# Patient Record
Sex: FEMALE | Race: BLACK OR AFRICAN AMERICAN | Employment: UNEMPLOYED | URBAN - METROPOLITAN AREA
[De-identification: names, ages, dates, MRNs, and addresses within clinical notes are randomized per-mention and may not be internally consistent; named-entity substitution may affect disease eponyms.]

---

## 2017-01-03 ENCOUNTER — HOSPITAL ENCOUNTER (EMERGENCY)
Age: 52
Discharge: HOME | End: 2017-01-03
Attending: EMERGENCY MEDICINE | Admitting: EMERGENCY MEDICINE
Payer: MEDICAID

## 2017-01-03 VITALS
HEIGHT: 64 IN | HEART RATE: 79 BPM | RESPIRATION RATE: 16 BRPM | BODY MASS INDEX: 39.22 KG/M2 | WEIGHT: 229.72 LBS | OXYGEN SATURATION: 100 % | TEMPERATURE: 98.4 F | DIASTOLIC BLOOD PRESSURE: 102 MMHG | SYSTOLIC BLOOD PRESSURE: 169 MMHG

## 2017-01-03 DIAGNOSIS — G89.29 CHRONIC RIGHT SHOULDER PAIN: Primary | ICD-10-CM

## 2017-01-03 DIAGNOSIS — M25.511 CHRONIC RIGHT SHOULDER PAIN: Primary | ICD-10-CM

## 2017-01-03 PROCEDURE — 74011250636 HC RX REV CODE- 250/636: Performed by: PHYSICIAN ASSISTANT

## 2017-01-03 PROCEDURE — 99282 EMERGENCY DEPT VISIT SF MDM: CPT

## 2017-01-03 PROCEDURE — 96372 THER/PROPH/DIAG INJ SC/IM: CPT

## 2017-01-03 RX ORDER — OXYCODONE AND ACETAMINOPHEN 5; 325 MG/1; MG/1
1 TABLET ORAL
Qty: 6 TAB | Refills: 0 | Status: SHIPPED | OUTPATIENT
Start: 2017-01-03 | End: 2017-01-06

## 2017-01-03 RX ORDER — KETOROLAC TROMETHAMINE 30 MG/ML
15 INJECTION, SOLUTION INTRAMUSCULAR; INTRAVENOUS
Status: COMPLETED | OUTPATIENT
Start: 2017-01-03 | End: 2017-01-03

## 2017-01-03 RX ADMIN — KETOROLAC TROMETHAMINE 15 MG: 30 INJECTION, SOLUTION INTRAMUSCULAR at 23:33

## 2017-01-04 RX ORDER — GABAPENTIN 600 MG/1
600 TABLET ORAL 3 TIMES DAILY
Qty: 90 TAB | Refills: 5 | Status: SHIPPED | OUTPATIENT
Start: 2017-01-04 | End: 2017-02-22 | Stop reason: SDUPTHER
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
## 2017-01-04 NOTE — TELEPHONE ENCOUNTER
Requested Prescriptions     Pending Prescriptions Disp Refills    gabapentin (NEURONTIN) 600 mg tablet 90 Tab 5     Sig: Take 1 Tab by mouth three (3) times daily.  Start 2 per day increase as tolerated

## 2017-01-04 NOTE — ED NOTES
Discharge instructions given to patient by WILLIS Sullivan. Verbalized understanding of instructions. Patient discharged without difficulty. Patient discharged in stable condition via ambulation accompanied by self.

## 2017-01-04 NOTE — DISCHARGE INSTRUCTIONS
Shoulder Pain: Care Instructions  Your Care Instructions    You can hurt your shoulder by using it too much during an activity, such as fishing or baseball. It can also happen as part of the everyday wear and tear of getting older. Shoulder injuries can be slow to heal, but your shoulder should get better with time. Your doctor may recommend a sling to rest your shoulder. If you have injured your shoulder, you may need testing and treatment. Follow-up care is a key part of your treatment and safety. Be sure to make and go to all appointments, and call your doctor if you are having problems. It's also a good idea to know your test results and keep a list of the medicines you take. How can you care for yourself at home? · Take pain medicines exactly as directed. ¨ If the doctor gave you a prescription medicine for pain, take it as prescribed. ¨ If you are not taking a prescription pain medicine, ask your doctor if you can take an over-the-counter medicine. ¨ Do not take two or more pain medicines at the same time unless the doctor told you to. Many pain medicines contain acetaminophen, which is Tylenol. Too much acetaminophen (Tylenol) can be harmful. · If your doctor recommends that you wear a sling, use it as directed. Do not take it off before your doctor tells you to. · Put ice or a cold pack on the sore area for 10 to 20 minutes at a time. Put a thin cloth between the ice and your skin. · If there is no swelling, you can put moist heat, a heating pad, or a warm cloth on your shoulder. Some doctors suggest alternating between hot and cold. · Rest your shoulder for a few days. If your doctor recommends it, you can then begin gentle exercise of the shoulder, but do not lift anything heavy. When should you call for help? Call 911 anytime you think you may need emergency care. For example, call if:  · You have chest pain or pressure. This may occur with:  ¨ Sweating. ¨ Shortness of breath.   ¨ Nausea or vomiting. ¨ Pain that spreads from the chest to the neck, jaw, or one or both shoulders or arms. ¨ Dizziness or lightheadedness. ¨ A fast or uneven pulse. After calling 911, chew 1 adult-strength aspirin. Wait for an ambulance. Do not try to drive yourself. · Your arm or hand is cool or pale or changes color. Call your doctor now or seek immediate medical care if:  · You have signs of infection, such as:  ¨ Increased pain, swelling, warmth, or redness in your shoulder. ¨ Red streaks leading from a place on your shoulder. ¨ Pus draining from an area of your shoulder. ¨ Swollen lymph nodes in your neck, armpits, or groin. ¨ A fever. Watch closely for changes in your health, and be sure to contact your doctor if:  · You cannot use your shoulder. · Your shoulder does not get better as expected. Where can you learn more? Go to http://lizbeth-efraín.info/. Enter V704 in the search box to learn more about \"Shoulder Pain: Care Instructions. \"  Current as of: May 23, 2016  Content Version: 11.1  © 0046-3065 [employer]. Care instructions adapted under license by [employer] (which disclaims liability or warranty for this information). If you have questions about a medical condition or this instruction, always ask your healthcare professional. [place] [address] any warranty or liability for your use of this information.

## 2017-01-04 NOTE — TELEPHONE ENCOUNTER
Requested Prescriptions     Pending Prescriptions Disp Refills    gabapentin (NEURONTIN) 600 mg tablet 90 Tab 5     Sig: Take 1 Tab by mouth three (3) times daily. Start 2 per day increase as tolerated     Last office visit:  Last med refill date:  8/15/16  TID - #90 x 5 refills  Changes to med during last visit:  None    Kenyatta Juanjo [facility]ED[facility]  Female, 49 y.o., 1965  Weight:   229 lb 11.5 oz (104.2 kg)  Phone:   21   PCP:   Simeon Larios MD  MRN:   [id number]  MyChart:   Pending  Next Appt:    Today  Future/Past Appointments      Future Appointments:  1/4/2017 3:40 PM Celso Myrick MD [address]  3/14/2017 10:30 AM Simeon Larios MD [facility] JACQUELINE Sandoval      Last Appointment With Me:  12/30/2016      Last Appointment My Department:  12/30/2016
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
## 2017-01-05 NOTE — TELEPHONE ENCOUNTER
Requested Prescriptions     Pending Prescriptions Disp Refills    traMADol (ULTRAM) 50 mg tablet 100 Tab 0     Sig: Take 1 Tab by mouth every six (6) hours as needed for Pain. Max Daily Amount: 200 mg. Appointment needed to refill this medication again. Patient called saying that her knees and legs are in a lot of pain and she is out of her pain meds.

## 2017-01-05 NOTE — TELEPHONE ENCOUNTER
Requested Prescriptions     Pending Prescriptions Disp Refills    traMADol (ULTRAM) 50 mg tablet 100 Tab 0     Sig: Take 1 Tab by mouth every six (6) hours as needed for Pain. Max Daily Amount: 200 mg. Appointment needed to refill this medication again.      Last office visit:12/30/16  Last med refill date:    Changes to med during last visit:

## 2017-01-06 ENCOUNTER — DOCUMENTATION ONLY (OUTPATIENT)
Dept: INTERNAL MEDICINE CLINIC | Age: 52
End: 2017-01-06

## 2017-01-06 RX ORDER — TRAMADOL HYDROCHLORIDE 50 MG/1
50 TABLET ORAL
Qty: 100 TAB | Refills: 0 | Status: SHIPPED | OUTPATIENT
Start: 2017-01-06 | End: 2017-02-22 | Stop reason: ALTCHOICE

## 2017-01-06 NOTE — PROGRESS NOTES
Per verbal order of Tamy Orourke MD called in written prescription of Tramadol 50mg #100 no refills to [address] [place], [place]  4/8/8502  9:89 PM

## 2017-01-30 ENCOUNTER — TELEPHONE (OUTPATIENT)
Dept: NEUROLOGY | Age: 52
End: 2017-01-30

## 2017-01-30 NOTE — TELEPHONE ENCOUNTER
Spoke to patient who was a little confused as to what she needed.    She has an apppointment with another  physicain today   Let her know we will gladly send over any reports requested by that physician

## 2017-02-13 ENCOUNTER — TELEPHONE (OUTPATIENT)
Dept: INTERNAL MEDICINE CLINIC | Age: 52
End: 2017-02-13

## 2017-02-13 ENCOUNTER — OFFICE VISIT (OUTPATIENT)
Dept: INTERNAL MEDICINE CLINIC | Age: 52
End: 2017-02-13

## 2017-02-13 VITALS
OXYGEN SATURATION: 100 % | BODY MASS INDEX: 38.41 KG/M2 | HEART RATE: 89 BPM | WEIGHT: 225 LBS | SYSTOLIC BLOOD PRESSURE: 140 MMHG | RESPIRATION RATE: 16 BRPM | TEMPERATURE: 98.9 F | DIASTOLIC BLOOD PRESSURE: 82 MMHG | HEIGHT: 64 IN

## 2017-02-13 DIAGNOSIS — I10 ESSENTIAL HYPERTENSION: ICD-10-CM

## 2017-02-13 DIAGNOSIS — F11.20 NARCOTIC DEPENDENCE, EPISODIC USE (HCC): ICD-10-CM

## 2017-02-13 DIAGNOSIS — L84 CORNS AND CALLOSITIES: ICD-10-CM

## 2017-02-13 DIAGNOSIS — E11.42 DIABETIC POLYNEUROPATHY ASSOCIATED WITH TYPE 2 DIABETES MELLITUS (HCC): Primary | ICD-10-CM

## 2017-02-13 RX ORDER — OXYCODONE AND ACETAMINOPHEN 5; 325 MG/1; MG/1
1 TABLET ORAL
Qty: 30 TAB | Refills: 0 | Status: SHIPPED | OUTPATIENT
Start: 2017-02-13 | End: 2017-02-22 | Stop reason: SDUPTHER

## 2017-02-13 RX ORDER — NORTRIPTYLINE HYDROCHLORIDE 25 MG/1
25 CAPSULE ORAL
Qty: 30 CAP | Refills: 5 | Status: SHIPPED | OUTPATIENT
Start: 2017-02-13 | End: 2017-02-28 | Stop reason: SDUPTHER

## 2017-02-13 NOTE — PROGRESS NOTES
Chief Complaint   Patient presents with    Leg Pain     leg and foot pain L/R     I have reviewed the patient's medical history in detail and updated the computerized patient record. Health Maintenance reviewed. 1. Have you been to the ER, urgent care clinic since your last visit? Hospitalized since your last visit?no    2. Have you seen or consulted any other health care providers outside of the [address] [place] [address] since your last visit? Include any pap smears or colon screening.  no

## 2017-02-13 NOTE — LETTER
2/22/2017 9:03 AM 
 
Ms. Carson Henao [facility] [address] [id number] Dear Carson Henao: 
 
Diabetes is not as good as it was, but it isn't too bad currently. For now no change in your medications. Please follow the diabetic diet carefully. Reduce starchy foods and sweet foods. Will need to re-check this levels in a few months. If diabetes worsens will need to increase your medications. Please find your most recent results below. Resulted Orders COMPLIANCE DRUG SCREEN/PRESCRIPTION MONITORING Result Value Ref Range Summary FINAL Comment:  
   ==================================================================== 
[facility] COMP DRUG ANALYSIS,UR 
==================================================================== Test                             Result       Flag       Units Drug Present and Declared for Prescription Verification Noroxycodone                   59           EXPECTED   ng/mg creat Noroxycodone is an expected metabolite of oxycodone. Sources of 
   oxycodone include scheduled prescription medications. Tramadol                       PRESENT      EXPECTED 
  O-Desmethyltramadol            PRESENT      EXPECTED Source of tramadol is a prescription medication. O-desmethyltramadol is an expected metabolite of tramadol. Gabapentin                     PRESENT      EXPECTED Atenolol                       PRESENT      EXPECTED Drug Absent but Declared for Prescription Verification Oxycodone                      Not Detected UNEXPECTED ng/mg creat Oxycodone is almost always present in pat 
ients taking this drug 
   consistently. Absence of oxycodone could be due to lapse of time 
   since the last dose or unusual pharmacokinetics (rapid 
   metabolism). Acetaminophen                  Not Detected UNEXPECTED Acetaminophen, as indicated in the declared medication list, is 
   not always detected even when used as directed. Salicylate                     Not Detected UNEXPECTED Aspirin, as indicated in the declared medication list, is not 
   always detected even when used as directed. 
==================================================================== Test                      Result    Flag   Units      Ref Range Creatinine              90               mg/dL      >=20 
==================================================================== Declared Medications: The flagging and interpretation on this report are based on the 
 following declared medications. Unexpected results may arise from 
 inaccuracies in the declared medications. **Note: The  
testing scope of this panel includes these medications: 
 Atenolol (Tenormin) Gabapentin (Neurontin) Oxycodone (Percocet) Tramadol (Ultram) **Note: The testing scope of this panel does not include small to 
 moderate amounts of these reported medications: 
 Acetaminophen (Percocet) Aspirin (Aspirin 81) **Note: The testing scope of this panel does not include following 
 reported medications: 
 Metformin Metformin (Glucophage) 
==================================================================== For clinical consultation, please call [phone]. 
==================================================================== Narrative Performed at:  [address] [facility], [address], [address] : Radha Paniagua MD, Phone:  [phone] CBC W/O DIFF Result Value Ref Range WBC 6.9 3.4 - 10.8 x10E3/uL  
 RBC 4.51 3.77 - 5.28 x10E6/uL HGB 11.7 11.1 - 15.9 g/dL HCT 34.9 34.0 - 46.6 % MCV 77 (L) 79 - 97 fL  
 MCH 25.9 (L) 26.6 - 33.0 pg  
 MCHC 33.5 31.5 - 35.7 g/dL  
 RDW 19.3 (H) 12.3 - 15.4 % PLATELET 933 814 - 817 x10E3/uL Narrative Performed at:  [address]  [id number] : Reena South MD, Phone:  [phone] METABOLIC PANEL, COMPREHENSIVE Result Value Ref Range Glucose 199 (H) 65 - 99 mg/dL BUN 17 6 - 24 mg/dL Creatinine 1.16 (H) 0.57 - 1.00 mg/dL GFR est non-AA 55 (L) >59 mL/min/1.73 GFR est AA 63 >59 mL/min/1.73  
 BUN/Creatinine ratio 15 9 - 23 Sodium 143 134 - 144 mmol/L Potassium 4.4 3.5 - 5.2 mmol/L Chloride 105 96 - 106 mmol/L  
 CO2 22 18 - 29 mmol/L Calcium 9.9 8.7 - 10.2 mg/dL Protein, total 7.3 6.0 - 8.5 g/dL Albumin 4.4 3.5 - 5.5 g/dL GLOBULIN, TOTAL 2.9 1.5 - 4.5 g/dL A-G Ratio 1.5 1.1 - 2.5 Comment: **Effective March 13, 2017 the reference interval** 
  for A/G Ratio will be changing to: Age                Male          Female 0 -  7 days       1.1 - 2.3       1.1 - 2.3 
          8 - 30 days       1.2 - 2.8       1.2 - 2.8 
          1 -  6 months     1.3 - 3.6       1.3 - 3.6 
   7 months -  5 years      1.5 - 2.6       1.5 - 2.6 
             > 5 years      1.2 - 2.2       1.2 - 2.2 Bilirubin, total 0.4 0.0 - 1.2 mg/dL Alk. phosphatase 121 (H) 39 - 117 IU/L  
 AST (SGOT) 8 0 - 40 IU/L  
 ALT (SGPT) 7 0 - 32 IU/L Narrative Performed at:  [address]  [id number] : Reena South MD, Phone:  [phone] HEMOGLOBIN A1C WITH EAG Result Value Ref Range Hemoglobin A1c 7.4 (H) 4.8 - 5.6 % Comment:  
            Pre-diabetes: 5.7 - 6.4 Diabetes: >6.4 Glycemic control for adults with diabetes: <7.0 Estimated average glucose 166 mg/dL Narrative Performed at:  [address]  [id number] : Reena South MD, Phone:  [phone] CKD REPORT Result Value Ref Range Interpretation Note Comment:  
   Supplement report is available. Narrative Performed at:  [address] [place] [address]  [id number] : Arturo Shah PhD, Phone:  [phone] DIABETES PATIENT EDUCATION Result Value Ref Range PDF Image Not applicable Narrative Performed at:  [address] [place], [place] [id number] : Arturo Shah PhD, Phone:  [phone] Please call me if you have any questions: [phone] Sincerely, Bryn Nickerson MD

## 2017-02-13 NOTE — MR AVS SNAPSHOT
Visit Information Date & Time Provider Department Dept. Phone Encounter #  
 2/13/2017 10:10 AM Chrissie Groves MD [facility] Primary Care [phone] [id number] Follow-up Instructions Return in about 4 weeks (around 3/13/2017) for routine follow up. Your Appointments 3/14/2017 10:30 AM  
ROUTINE CARE with Chrissie Groves MD  
[place] [address] ([facility]) Appt Note: f/u $0pb $0cp [facility] 11/23/16; f/u $0pb $0cp [facility] 11/23/16  
 [address] [facility] [address]  
[phone]  
  
   
 [address] [facility] [address] Upcoming Health Maintenance Date Due Pneumococcal 19-64 Medium Risk (1 of 1 - PPSV23) 12/28/1985 DTaP/Tdap/Td series (1 - Tdap) 12/28/1987 BREAST CANCER SCRN MAMMOGRAM 12/28/2016 FOBT Q 1 YEAR AGE 50-75 12/28/2016 PAP AKA CERVICAL CYTOLOGY 1/14/2017 HEMOGLOBIN A1C Q6M 2/15/2017 LIPID PANEL Q1 3/25/2017 MICROALBUMIN Q1 8/15/2017 EYE EXAM RETINAL OR DILATED Q1 10/25/2017 FOOT EXAM Q1 2/13/2018 Allergies as of 2/13/2017  Review Complete On: 2/13/2017 By: Rebecca Zheng LPN Severity Noted Reaction Type Reactions Lortab [Hydrocodone-acetaminophen] High 08/11/2013   Intolerance Nausea and Vomiting Cymbalta [Duloxetine]  10/04/2016    Diarrhea Current Immunizations  Reviewed on 12/13/2016 No immunizations on file. Not reviewed this visit You Were Diagnosed With   
  
 Codes Comments Diabetic polyneuropathy associated with type 2 diabetes mellitus ([facility] [address] [address]    -  Primary ICD-10-CM: E11.42 
ICD-9-CM: 250.60, 357.2 Narcotic dependence, episodic use ([facility] [address] [address]     ICD-10-CM: P54.86 ICD-9-CM: 304.92 Essential hypertension     ICD-10-CM: I10 
ICD-9-CM: 401.9 Corns and callosities     ICD-10-CM: L84 
ICD-9-CM: 909 Vitals BP Pulse Temp Resp Height(growth percentile) Weight(growth percentile) 140/82 89 98.9 °F (37.2 °C) (Oral) 16 5' 4\" (1.626 m) 225 lb (102.1 kg) SpO2 BMI OB Status Smoking Status 100% 38.62 kg/m2 Having regular periods Never Smoker Vitals History BMI and BSA Data Body Mass Index Body Surface Area  
 38.62 kg/m 2 2.15 m 2 Preferred Pharmacy Pharmacy Name Phone [facility] [address] [facility], [address] [facility] [phone] Your Updated Medication List  
  
   
This list is accurate as of: 2/13/17 12:20 PM.  Always use your most recent med list.  
  
  
  
  
 aspirin delayed-release 81 mg tablet Take  by mouth daily. atenolol 100 mg tablet Commonly known as:  TENORMIN Take 1 Tab by mouth daily. TAKE ONE TABLET BY MOUTH ONCE DAILY  
  
 gabapentin 600 mg tablet Commonly known as:  NEURONTIN Take 1 Tab by mouth three (3) times daily. * metFORMIN 1,000 mg tablet Commonly known as:  GLUCOPHAGE  
TAKE 1 TABLET BY MOUTH TWO (2) TIMES DAILY WITH MEALS  
  
 * metFORMIN 1,000 mg tablet Commonly known as:  GLUCOPHAGE  
TAKE ONE TABLET BY MOUTH TWICE DAILY WITH MEALS  
  
 nortriptyline 25 mg capsule Commonly known as:  PAMELOR Take 1 Cap by mouth nightly. oxyCODONE-acetaminophen 5-325 mg per tablet Commonly known as:  PERCOCET Take 1 Tab by mouth every eight (8) hours as needed for Pain. Max Daily Amount: 3 Tabs. traMADol 50 mg tablet Commonly known as:  ULTRAM  
Take 1 Tab by mouth every six (6) hours as needed for Pain. Max Daily Amount: 200 mg. Appointment needed to refill this medication again. * Notice: This list has 2 medication(s) that are the same as other medications prescribed for you. Read the directions carefully, and ask your doctor or other care provider to review them with you. Prescriptions Printed Refills  
 oxyCODONE-acetaminophen (PERCOCET) 5-325 mg per tablet 0 Sig: Take 1 Tab by mouth every eight (8) hours as needed for Pain.  Max Daily Amount: 3 Tabs. Class: Print Route: Oral  
  
Prescriptions Sent to Pharmacy Refills  
 nortriptyline (PAMELOR) 25 mg capsule 5 Sig: Take 1 Cap by mouth nightly. Class: Normal  
 Pharmacy: [address] [facility], [address] [facility] Ph #: [phone] Route: Oral  
  
We Performed the Following AMB SUPPLY ORDER [[id number] Custom] Comments:  
 Suboxone therapy Dr.Antony Cathy Charlton [phone] 
Intelleflex/suboxone CBC W/O DIFF [99869 CPT(R)] [address] MONITORING [[id number] Custom] HEMOGLOBIN A1C WITH EAG [58115 CPT(R)] METABOLIC PANEL, COMPREHENSIVE [11587 CPT(R)] REFERRAL TO PAIN CLINIC [VAA84 Custom] Comments:  
 Chronic pain from neuropathy diabetes, has seen neurology, percocet helps. REFERRAL TO PODIATRY [REF90 Custom] Comments:  
 Corns and neuropathy Follow-up Instructions Return in about 4 weeks (around 3/13/2017) for routine follow up. Referral Information Referral ID Referred By Referred To  
  
 [id number] Andrew MIGUEL [address], [address] Visits Status Start Date End Date 1 New Request 2/13/17 2/13/18 If your referral has a status of pending review or denied, additional information will be sent to support the outcome of this decision. Referral ID Referred By Referred To  
 [id number] [facility]  
   [address] [facility] [address] [place], [address] [facility] Visits Status Start Date End Date 1 New Request 2/13/17 2/13/18 If your referral has a status of pending review or denied, additional information will be sent to support the outcome of this decision. Introducing [facility]!    
 Jodee Meeks introduces [employer] patient portal. Now you can access parts of your medical record, email your doctor's office, and request medication refills online. 1. In your internet browser, go to [email or web address] 2. Click on the First Time User? Click Here link in the Sign In box. You will see the New Member Sign Up page. 3. Enter your [employer] Access Code exactly as it appears below. You will not need to use this code after youve completed the sign-up process. If you do not sign up before the expiration date, you must request a new code. · [employer] Access Code: [email or web address] Expires: 3/13/2017  2:28 PM 
 
4. Enter the last four digits of your Social Security Number (xxxx) and Date of Birth (mm/dd/yyyy) as indicated and click Submit. You will be taken to the next sign-up page. 5. Create a [employer] ID. This will be your [employer] login ID and cannot be changed, so think of one that is secure and easy to remember. 6. Create a [employer] password. You can change your password at any time. 7. Enter your Password Reset Question and Answer. This can be used at a later time if you forget your password. 8. Enter your e-mail address. You will receive e-mail notification when new information is available in [address] 9. Click Sign Up. You can now view and download portions of your medical record. 10. Click the Download Summary menu link to download a portable copy of your medical information. If you have questions, please visit the Frequently Asked Questions section of the [employer] website. Remember, [employer] is NOT to be used for urgent needs. For medical emergencies, dial 911. Now available from your iPhone and Android! Please provide this summary of care documentation to your next provider. Your primary care clinician is listed as Antonella Velásquez. If you have any questions after today's visit, please call [phone].

## 2017-02-13 NOTE — LETTER
AGREEMENT for controlled medication treatment Carolina Desai, have agreed to a course of treatment that includes taking controlled medication. For this treatment I designate _____________________________________ as the [facility] Provider.  The purpose of this agreement is to prevent misunderstandings about controlled medications I may be taking for treatment, and to comply with applicable laws. I understand this agreement is essential to the trust and confidence necessary in a provider-patient relationship and that the designated provider will treat me in accordance with the statements below. As part of my treatment I agree to the followin.  USE 
a. I will take the controlled medication as instructed by the designated provider and avoid improper use of controlled medications. b.   I will not share, sell or trade controlled medication with anyone as this is a criminal offense.  
c.   I will not use any illegal controlled substance, including marijuana, cocaine, etc. as this is a criminal offense. 2.  PROVIDERS 
a. I will only obtain controlled medication from the designated provider. b.   I will not attempt to obtain the same or similar controlled medications, such as opioid pain medication, stimulants, anti-anxiety or hypnotics from any other provider as this is a criminal offense. 
c.   I will inform the designated provider about all other licensed professionals providing medical care to me and authorize communication between all providers to coordinate care, particularly prescribing or dispensing of controlled medications. 3.  PHARMACY 
a.   I will only fill controlled medication prescriptions at the approved pharmacy as listed below. 4.  OFFICE VISITS 
a. I agree to attend scheduled office visit appointments. b.   I am aware my office visits may be monthly or as determined necessary by the designated provider. c.   I will communicate fully with the designated provider about the character and intensity of my symptoms, the effect of the symptoms on my daily life, and how well the controlled medication is helping to relieve the cause of my symptoms. 5.  REFILLS OR CALL-IN PRESCRIPTIONS OF CONTROLLED MEDICATIONS 
a. I agree that refills of my prescriptions for controlled medications will be made at the time of an office visit during regular office hours. No refills will be available during evenings or on weekends. Abusive or inappropriate behavior related to medication refills will not be tolerated. b.   I will not call the office repeatedly to inquire about my controlled medication. I understand that medications will be written on the due date and not before. Calling the office repeatedly will be considered harassment, and I may be discharged from the practice. c.   Controlled medications may not be called in for refill, but doing so is at the discretion of the designated provider. d.   I am aware that only I must  prescriptions for controlled medications at the office but the designated provider may allow a designee to  the prescription from the office under very specific circumstance that may develop. 6.  TOXICOLOGY SCREENING 
a. I agree to random urine toxicology screenings in order to comply with government and [address] [place] [address] regulations. I understand that I will be financially responsible for any charges incurred by this office, Aiden Ruelas of [facility], [employer], or [facility] for the urine toxicology screening, which may not be covered by my insurance. Failure to do so will be considered non-compliance and I may be discharged. b. In some cases an oral swab or hair sample may be substituted for a urine screen. This is at the discretion of the designated provider. I understand that I will be financially responsible for any charges incurred as well. c.   I understand that if the urine toxicology does not show my medications prescribed to me by the designated provider, or it shows any illegal substance or any other medications NOT prescribed by the designated providers, I may be discharged from this practice at the discretion of the designated provider and no further controlled medications will be prescribed or follow-up appointments scheduled. Also, if any illegal substances are detected on the urine toxicology, this information may be provided to local law enforcement. 7. PILL COUNTS 
a. I am aware I may be called at any time to come into the office for a count of all my remaining controlled medications in order to help the designated provider understand the rate at which I use my controlled medications and to more effectively adjust dosage. b. I agree that I will use my medication at a rate NO greater than the prescribed rate and that use of my medication at a greater rate will result in my being without medication for the period of time until next expected due date. c. I will bring in the containers with the medication prescribed by all providers, including the designated provider, to each office visit even if there is no medication remaining. All controlled medication will be in the original containers from the pharmacy for each medication. Failure to do so will be considered non-compliance and I may be discharged from the practice. 8.  LOSS OR THEFT OF MEDICATION 
a. I will safeguard my controlled medication from loss or theft. b.   Lost or stolen controlled medication may not be replaced. This includes a prescription that has not yet been filled at the pharmacy. c.   In the event my controlled medications are stolen or lost, I will notify the designated providers office immediately.   If such event occurs during the night, weekend or holiday, I will leave a detailed message on the answering machine or answering service at the number listed above. 
d.   I will file and produce an official police report for any effort to replace controlled medications prescribed. 9.  AGENCY COLLABORATION I authorize the designated provider and the authorized pharmacy/pharmacist to cooperate fully with any city, state, or federal law enforcement agency in the investigation of any possible misuse, sale or other diversion of my controlled medication. 10.  TREATMENT I understand that if I violate any of the conditions, my controlled medication and/or treatment will be terminated. If the violation involves obtaining controlled substances and/or dangerous drugs from another source, the incident may be reported to other medical facilities and authorities, including law enforcement. In this case, the designated provider may taper off the medication over a period of several days, as necessary, to avoid withdrawal symptoms or will suggest alternate treatment facilities. Also, a drug dependence treatment program may be recommended. 11.  AGREEMENT I agree to follow these guidelines that have been fully explained to me. All of my questions and concerns regarding treatment have been adequately answered. A copy of this document has been given to me. I agree to use ______________________________ Authorized Pharmacy located at _________________________________________________________________ Telephone ________________________________for filling ALL controlled medication prescriptions. This agreement is entered into on 2/13/2017 Patient signature__________________________________________________________ Legal Guardian signature___________________________________________________ Provider signature_________________________________________________________ Witness signature_________________________________________________________

## 2017-02-14 LAB
ALBUMIN SERPL-MCNC: 4.4 G/DL (ref 3.5–5.5)
ALBUMIN/GLOB SERPL: 1.5 {RATIO} (ref 1.1–2.5)
ALP SERPL-CCNC: 121 IU/L (ref 39–117)
ALT SERPL-CCNC: 7 IU/L (ref 0–32)
AST SERPL-CCNC: 8 IU/L (ref 0–40)
BILIRUB SERPL-MCNC: 0.4 MG/DL (ref 0–1.2)
BUN SERPL-MCNC: 17 MG/DL (ref 6–24)
BUN/CREAT SERPL: 15 (ref 9–23)
CALCIUM SERPL-MCNC: 9.9 MG/DL (ref 8.7–10.2)
CHLORIDE SERPL-SCNC: 105 MMOL/L (ref 96–106)
CO2 SERPL-SCNC: 22 MMOL/L (ref 18–29)
CREAT SERPL-MCNC: 1.16 MG/DL (ref 0.57–1)
ERYTHROCYTE [DISTWIDTH] IN BLOOD BY AUTOMATED COUNT: 19.3 % (ref 12.3–15.4)
EST. AVERAGE GLUCOSE BLD GHB EST-MCNC: 166 MG/DL
GLOBULIN SER CALC-MCNC: 2.9 G/DL (ref 1.5–4.5)
GLUCOSE SERPL-MCNC: 199 MG/DL (ref 65–99)
HBA1C MFR BLD: 7.4 % (ref 4.8–5.6)
HCT VFR BLD AUTO: 34.9 % (ref 34–46.6)
HGB BLD-MCNC: 11.7 G/DL (ref 11.1–15.9)
INTERPRETATION: NORMAL
Lab: NORMAL
MCH RBC QN AUTO: 25.9 PG (ref 26.6–33)
MCHC RBC AUTO-ENTMCNC: 33.5 G/DL (ref 31.5–35.7)
MCV RBC AUTO: 77 FL (ref 79–97)
PLATELET # BLD AUTO: 184 X10E3/UL (ref 150–379)
POTASSIUM SERPL-SCNC: 4.4 MMOL/L (ref 3.5–5.2)
PROT SERPL-MCNC: 7.3 G/DL (ref 6–8.5)
RBC # BLD AUTO: 4.51 X10E6/UL (ref 3.77–5.28)
SODIUM SERPL-SCNC: 143 MMOL/L (ref 134–144)
WBC # BLD AUTO: 6.9 X10E3/UL (ref 3.4–10.8)

## 2017-02-14 NOTE — PROGRESS NOTES
HISTORY OF PRESENT ILLNESS  Vance Castro is a 49 y.o. female. Leg Pain    The history is provided by the patient. This is a chronic problem. Episode onset: years. The problem occurs constantly. The problem has been gradually worsening. The pain is present in the right foot and left foot. The pain is severe. Associated symptoms include numbness and tingling. The symptoms are aggravated by activity and contact. Treatments tried: gabapentin. Improvement on treatment: minimal. There has been no history of extremity trauma. lately has undergone a workup with her neurologist Dr. Dejuan Harris. Final diagnosis is that neuropathy. Does not look like he is willing to prescribe chronic narcotics for chronic pain therefore she is come here. States she only gets narcotics from me, but was looked up in the  and received narcotics from another physician just last month. That was evidently for some shoulder problems. She is gotten small amounts of narcotics from numerous providers. Has not signed a narcotic agreement with this practice. Her diabetes has been reasonably well controlled in general her last hemoglobin A1c was several months ago and was 7.4. No problems with hypoglycemia. Has had a workup looking for PAD. That was negative as well. Past Medical History   Diagnosis Date    Anemia     Chronic pain     Cyst in hand 2014     [address]    Diabetes mellitus type 2 in obese ([address]     Diabetic neuropathy ([address]     Hypertension     Migraine        Past Medical History   Diagnosis Date    Anemia     Chronic pain     Cyst in hand 2014     [address]    Diabetes mellitus type 2 in obese ([address]     Diabetic neuropathy ([address]     Hypertension     Migraine      Denies narcotic abuse but her pattern of narcotic usage is somewhat suspicious.   Social History     Social History    Marital status:      Spouse name: N/A    Number of children: N/A    Years of education: N/A     Occupational History    disabled Social History Main Topics    Smoking status: Never Smoker    Smokeless tobacco: Never Used    Alcohol use No    Drug use: No    Sexual activity: Not Currently     Other Topics Concern    Not on file     Social History Narrative    Lives with daughter. Not working. Applying for disabilty. Review of Systems   Cardiovascular: Negative for chest pain. Musculoskeletal: Positive for joint pain. Negative for falls. Neurological: Positive for tingling and numbness. Psychiatric/Behavioral: Negative for substance abuse. Physical Exam  Visit Vitals    /82    Pulse 89    Temp 98.9 °F (37.2 °C) (Oral)    Resp 16    Ht 5' 4\" (1.626 m)    Wt 225 lb (102.1 kg)    SpO2 100%    BMI 38.62 kg/m2     WD WN female NAD  Heart RRR without murmers clicks or rubs  Lungs CTA  Abdo soft nontender  Ext no edema  Diabetic foot exam was performed. No obvious sores or red lesions. Sensation checked by monofilament exam which was normal.  Dorsalis pedis pulse wasreduced. ASSESSMENT and PLAN  Encounter Diagnoses   Name Primary?  Diabetic polyneuropathy associated with type 2 diabetes mellitus (HCC) Yes    Narcotic dependence, episodic use (HCC)     Essential hypertension     Corns and callosities      Orders Placed This Encounter    AMB SUPPLY ORDER    COMPLIANCE DRUG SCREEN/PRESCRIPTION MONITORING    CBC W/O DIFF    METABOLIC PANEL, COMPREHENSIVE    HEMOGLOBIN A1C WITH EAG    REFERRAL TO PAIN CLINIC    REFERRAL TO PODIATRY    oxyCODONE-acetaminophen (PERCOCET) 5-325 mg per tablet    nortriptyline (PAMELOR) 25 mg capsule     Discussed possible side affects, precautions, and drug interactions and possible benefits of the medication(s). We discussed this pain and the usage of controlled substances for chronicneuropathic relief.  In general these medications are indicated for the acute symptom relief and not for chronic usage, allthough they are frequently used for chronic management  After a certain period of time they should be stopped to avoid dependence and/or addiction. I warned her about the dangers of addiction and other side affects including respiratory depression and death. Discussed how this is a controlled substance and that the prescribing of it is should be monitored very carefully. Drug usage is also monitored by our practise and the [facility], since there has been a lot of abuse and diversion of controlled substances. In general we do not refill this medication over the phone. PLease ask for enough pills to get you to your next appointment and make sure you keep your appointments. Warned not to take other medications like alcohol, other benzodiazapines marijuana or other narcotics when on this medication. she was given a controlled substance contract to sign. We went over the terms of the contract. she signed the contract. I explained that  if she violates the terms of the contract I will no longer prescribe controlled substances. Copy of the contract was given to the patient and one will be kept in the chart. We do not treat chronic pain with narcotics in this practice for very long. Even though denies substance abuse Suboxone with a risk for substance abuse chronic pain might help her suggest that she contact the Suboxone clinic. One name and number as well as websites for more were given to her see above. We can also try and get her into a pain manager but nowadays that is proving to be quite difficult. Follow-up Disposition:  Return in about 4 weeks (around 3/13/2017) for routine follow up.

## 2017-02-17 LAB — DRUGS UR: NORMAL

## 2017-02-17 NOTE — PROGRESS NOTES
Send normal/stable results letter. Diabetes is not as good as it was, but it isn't too bad currently. For now no change in your medications. Please follow the diabetic diet carefully. Reduce starchy foods and sweet foods. Will need to re-check this levels in a few months. If diabetes worsens will need to increase your medications.

## 2017-02-21 ENCOUNTER — TELEPHONE (OUTPATIENT)
Dept: INTERNAL MEDICINE CLINIC | Age: 52
End: 2017-02-21

## 2017-02-21 NOTE — TELEPHONE ENCOUNTER
Returned patients call - LM that an appt would be needed - advised she should call in the morning at 8am to schedule that appt  Cristina James LPN  2/58/3757  0:61 PM

## 2017-02-21 NOTE — TELEPHONE ENCOUNTER
Patient called in reference to her foot being in a lot of pain. She said she can barely walk. Ms. Ofelia Gill is willing to come in for an appt if he will be able to give her medicine for this. She said she was only given a 10 day supply and see's the foot doctor on March 2, 2017. Please call her at [phone].

## 2017-02-22 ENCOUNTER — OFFICE VISIT (OUTPATIENT)
Dept: INTERNAL MEDICINE CLINIC | Age: 52
End: 2017-02-22

## 2017-02-22 VITALS
WEIGHT: 225 LBS | HEART RATE: 82 BPM | SYSTOLIC BLOOD PRESSURE: 168 MMHG | DIASTOLIC BLOOD PRESSURE: 90 MMHG | BODY MASS INDEX: 38.41 KG/M2 | OXYGEN SATURATION: 100 % | TEMPERATURE: 99.7 F | HEIGHT: 64 IN | RESPIRATION RATE: 16 BRPM

## 2017-02-22 DIAGNOSIS — Z02.71 DISABILITY EXAMINATION: ICD-10-CM

## 2017-02-22 DIAGNOSIS — I15.2 HYPERTENSION DUE TO ENDOCRINE DISORDER: ICD-10-CM

## 2017-02-22 DIAGNOSIS — Z12.11 SCREENING FOR COLON CANCER: ICD-10-CM

## 2017-02-22 DIAGNOSIS — F11.20 NARCOTIC DEPENDENCE, EPISODIC USE (HCC): ICD-10-CM

## 2017-02-22 DIAGNOSIS — E08.42 DIABETIC POLYNEUROPATHY ASSOCIATED WITH DIABETES MELLITUS DUE TO UNDERLYING CONDITION (HCC): Primary | ICD-10-CM

## 2017-02-22 RX ORDER — GABAPENTIN 600 MG/1
600 TABLET ORAL 3 TIMES DAILY
Qty: 90 TAB | Refills: 5 | Status: SHIPPED | OUTPATIENT
Start: 2017-02-22 | End: 2017-05-28

## 2017-02-22 RX ORDER — OXYCODONE AND ACETAMINOPHEN 5; 325 MG/1; MG/1
1 TABLET ORAL
Qty: 30 TAB | Refills: 0 | Status: SHIPPED | OUTPATIENT
Start: 2017-02-22 | End: 2017-03-13 | Stop reason: ALTCHOICE

## 2017-02-22 NOTE — PROGRESS NOTES
PROGRESS NOTE        SUBJECTIVE:  Diagnosis/Chief Complaint: Foot Pain (refill pain meds)      Doing well with pain no  Pain levels 10/10   Symptoms can't walk  Activities lmited  Side affects: no  States taking medications per medicine list.yes - tramadol doesn't help  Has seen neurology, final diagnosis is just bad diabetic neuropathy. They would not refill her narcotic. She is not getting any narcotics since the last one I gave her about 10 days ago. She has about disability. Applying for that. Has an appointment next week to see podiatry she is going to ask them for a steroid injection to see if that will help her pain. Social History   Substance Use Topics    Smoking status: Never Smoker    Smokeless tobacco: Never Used    Alcohol use No        OBJECTIVE:    .  Visit Vitals    BP (!) 165/92 (BP 1 Location: Left arm, BP Patient Position: Sitting)    Pulse 83    Temp 99.7 °F (37.6 °C)    Resp 16    Ht 5' 4\" (1.626 m)    Wt 225 lb (102.1 kg)    LMP 02/03/2017    SpO2 100%    BMI 38.62 kg/m2     WDWN in NAD  Heart RRR, no:C/M/R  Lungs CTA No wheezes, rales or rhonchi  Abdo: soft no tenderness, rebound or guarding  Neurological exam[de-identified] 2-12 intact  Psychiatric: Normal mood, judgement  Diabetic foot exam was performed. No obvious sores or red lesions. Sensation checked by monofilament exam which was greatly reduced. Dorsalis pedis pulse was present. Reviewed: Medications, allergies, clinical lab test results and imaging results have been reviewed. Any abnormal findings have been addressed. ASSESSMENT:       ICD-10-CM ICD-9-CM    1. Diabetic polyneuropathy associated with diabetes mellitus due to underlying condition ([facility]) E08.42 249.60      357.2    2. Screening for colon cancer Z12.11 V76.51 OCCULT BLOOD, IMMUNOASSAY (FIT)   3. Narcotic dependence, episodic use ([facility]) F11.20 304.92    4.  Hypertension due to endocrine disorder I15.2 405.99      259.9          Patient is 49 y.o. with diagnosis of :   Patient Active Problem List   Diagnosis Code    Hypertension I10    Chronic pain G89.29    Narcotic dependence, episodic use ([facility] F11.20    Diabetes mellitus type 2, controlled ([facility] E11.9    Diabetic polyneuropathy associated with type 2 diabetes mellitus (HCC) E11.42    Idiopathic small and large fiber sensory neuropathy G60.8    Chronic inflammatory demyelinating polyneuropathy (HCC) G61.81    Spinal stenosis of lumbosacral region M48.07    Lumbar back pain with radiculopathy affecting left lower extremity M54.17    Lumbar back pain with radiculopathy affecting right lower extremity M54.17       PLAN:     We discussed a screening exam colon CA and the  the pros and cons of having the test done. The patient made a decision to do the test: yes    Orders Placed This Encounter    OCCULT BLOOD, IMMUNOASSAY (FIT)    gabapentin (NEURONTIN) 600 mg tablet     Sig: Take 1 Tab by mouth three (3) times daily. Dispense:  90 Tab     Refill:  5    oxyCODONE-acetaminophen (PERCOCET) 5-325 mg per tablet     Sig: Take 1 Tab by mouth every eight (8) hours as needed for Pain. Max Daily Amount: 3 Tabs. Dispense:  30 Tab     Refill:  0       We discussed this pain and the usage of controlled substances for chronic neuropathic pain relief. In general these medications are indicated for the acute symptom relief and not for chronic usage, allthough they are frequently used for chronic management  After a certain period of time they should be stopped to avoid dependence and/or addiction. I warned her about the dangers of addiction and other side affects including respiratory depression and death. Discussed how this is a controlled substance and that the prescribing of it is should be monitored very carefully. Drug usage is also monitored by our practise and the [facility], since there has been a lot of abuse and diversion of controlled substances.   In general we do not refill this medication over the phone. PLease ask for enough pills to get you to your next appointment and make sure you keep your appointments. Warned not to take other medications like alcohol, other benzodiazapines marijuana or other narcotics when on this medication. Told her that I would not continue the Percocet and that she should use it very sparingly. Have her  send me disability forms to be filled out. Follow-up Disposition:  Return in about 3 months (around 5/22/2017) for routine follow up.

## 2017-02-22 NOTE — MR AVS SNAPSHOT
Visit Information Date & Time Provider Department Dept. Phone Encounter #  
 2/22/2017 10:00 AM MD Cisco Castillo Dr [id number] Follow-up Instructions Return in about 3 months (around 5/22/2017) for routine follow up. Your Appointments 3/14/2017 10:30 AM  
ROUTINE CARE with MD Elin Castillo ([facility]) Appt Note: f/u $0pb $0cp [facility] 11/23/16; f/u $0pb $0cp [facility] 11/23/16  
 [address] [place] [address]. [address] [place] [address]  
[phone]  
  
   
 [address] [place] [address] [address] Upcoming Health Maintenance Date Due Pneumococcal 19-64 Medium Risk (1 of 1 - PPSV23) 12/28/1985 DTaP/Tdap/Td series (1 - Tdap) 12/28/1987 BREAST CANCER SCRN MAMMOGRAM 12/28/2016 FOBT Q 1 YEAR AGE 50-75 12/28/2016 PAP AKA CERVICAL CYTOLOGY 1/14/2017 LIPID PANEL Q1 3/25/2017 HEMOGLOBIN A1C Q6M 8/13/2017 MICROALBUMIN Q1 8/15/2017 EYE EXAM RETINAL OR DILATED Q1 10/25/2017 FOOT EXAM Q1 2/13/2018 Allergies as of 2/22/2017  Review Complete On: 2/22/2017 By: Ean Thompson LPN Severity Noted Reaction Type Reactions Lortab [Hydrocodone-acetaminophen] High 08/11/2013   Intolerance Nausea and Vomiting Cymbalta [Duloxetine]  10/04/2016    Diarrhea Current Immunizations  Reviewed on 12/13/2016 No immunizations on file. Not reviewed this visit You Were Diagnosed With   
  
 Codes Comments Diabetic polyneuropathy associated with diabetes mellitus due to underlying condition ([facility]    -  Primary ICD-10-CM: E82.00 
ICD-9-CM: 249.60, 357.2 Screening for colon cancer     ICD-10-CM: Z12.11 ICD-9-CM: V76.51 Narcotic dependence, episodic use ([facility]     ICD-10-CM: M72.25 ICD-9-CM: 304.92 Hypertension due to endocrine disorder     ICD-10-CM: I15.2 ICD-9-CM: 405.99, 259.9  Disability examination     ICD-10-CM: Z02.71 
 ICD-9-CM: V68.01 Vitals BP  
  
  
  
  
  
 (!) 165/92 (BP 1 Location: Left arm, BP Patient Position: Sitting) Vitals History BMI and BSA Data Body Mass Index Body Surface Area  
 38.62 kg/m 2 2.15 m 2 Preferred Pharmacy Pharmacy Name Phone [facility] [address] [facility] [phone] Your Updated Medication List  
  
   
This list is accurate as of: 2/22/17 10:42 AM.  Always use your most recent med list.  
  
  
  
  
 aspirin delayed-release 81 mg tablet Take  by mouth daily. atenolol 100 mg tablet Commonly known as:  TENORMIN Take 1 Tab by mouth daily. TAKE ONE TABLET BY MOUTH ONCE DAILY  
  
 gabapentin 600 mg tablet Commonly known as:  NEURONTIN Take 1 Tab by mouth three (3) times daily. metFORMIN 1,000 mg tablet Commonly known as:  GLUCOPHAGE  
TAKE 1 TABLET BY MOUTH TWO (2) TIMES DAILY WITH MEALS  
  
 nortriptyline 25 mg capsule Commonly known as:  PAMELOR Take 1 Cap by mouth nightly. oxyCODONE-acetaminophen 5-325 mg per tablet Commonly known as:  PERCOCET Take 1 Tab by mouth every eight (8) hours as needed for Pain. Max Daily Amount: 3 Tabs. Prescriptions Printed Refills  
 oxyCODONE-acetaminophen (PERCOCET) 5-325 mg per tablet 0 Sig: Take 1 Tab by mouth every eight (8) hours as needed for Pain. Max Daily Amount: 3 Tabs. Class: Print Route: Oral  
  
Prescriptions Sent to Pharmacy Refills  
 gabapentin (NEURONTIN) 600 mg tablet 5 Sig: Take 1 Tab by mouth three (3) times daily. Class: Normal  
 Pharmacy: [facility] [address] [facility], [address] [facility] Ph #: [phone] Route: Oral  
  
We Performed the Following OCCULT BLOOD, IMMUNOASSAY (FIT) [id number] CPT(R)] Follow-up Instructions Return in about 3 months (around 5/22/2017) for routine follow up. Introducing [facility] SERVICES! [employer] introduces [employer] patient portal. Now you can access parts of your medical record, email your doctor's office, and request medication refills online. 1. In your internet browser, go to [email or web address] 2. Click on the First Time User? Click Here link in the Sign In box. You will see the New Member Sign Up page. 3. Enter your [employer] Access Code exactly as it appears below. You will not need to use this code after youve completed the sign-up process. If you do not sign up before the expiration date, you must request a new code. · [employer] Access Code: [email or web address] Expires: 3/13/2017  2:28 PM 
 
4. Enter the last four digits of your Social Security Number (xxxx) and Date of Birth (mm/dd/yyyy) as indicated and click Submit. You will be taken to the next sign-up page. 5. Create a [employer] ID. This will be your [employer] login ID and cannot be changed, so think of one that is secure and easy to remember. 6. Create a [employer] password. You can change your password at any time. 7. Enter your Password Reset Question and Answer. This can be used at a later time if you forget your password. 8. Enter your e-mail address. You will receive e-mail notification when new information is available in [address] 9. Click Sign Up. You can now view and download portions of your medical record. 10. Click the Download Summary menu link to download a portable copy of your medical information. If you have questions, please visit the Frequently Asked Questions section of the [employer] website. Remember, [employer] is NOT to be used for urgent needs. For medical emergencies, dial 911. Now available from your iPhone and Android! Please provide this summary of care documentation to your next provider. Your primary care clinician is listed as Lilia Crow. If you have any questions after today's visit, please call [phone].

## 2017-02-22 NOTE — PROGRESS NOTES
Chief Complaint   Patient presents with    Foot Pain     refill pain meds     I have reviewed the patient's medical history in detail and updated the computerized patient record. Health Maintenance reviewed. .1. Have you been to the ER, urgent care clinic since your last visit? Hospitalized since your last visit?no    2. Have you seen or consulted any other health care providers outside of the [address] [place] [address] since your last visit? Include any pap smears or colon screening.  no

## 2017-02-22 NOTE — ACP (ADVANCE CARE PLANNING)
Pt stated that they DO NOT HAVE AN ADVANCED DIRECTIVE    Encouraged pt to discuss pt's wishes with spouse/partner/family and bring them in the next appt to follow thru with the Advanced Directive

## 2017-02-28 ENCOUNTER — OFFICE VISIT (OUTPATIENT)
Dept: INTERNAL MEDICINE CLINIC | Age: 52
End: 2017-02-28

## 2017-02-28 VITALS
HEART RATE: 88 BPM | HEIGHT: 64 IN | WEIGHT: 225 LBS | RESPIRATION RATE: 16 BRPM | DIASTOLIC BLOOD PRESSURE: 78 MMHG | SYSTOLIC BLOOD PRESSURE: 144 MMHG | OXYGEN SATURATION: 100 % | BODY MASS INDEX: 38.41 KG/M2 | TEMPERATURE: 98.7 F

## 2017-02-28 DIAGNOSIS — M54.50 LOW BACK PAIN WITHOUT SCIATICA, UNSPECIFIED BACK PAIN LATERALITY, UNSPECIFIED CHRONICITY: ICD-10-CM

## 2017-02-28 DIAGNOSIS — E11.40 CONTROLLED TYPE 2 DIABETES MELLITUS WITH DIABETIC NEUROPATHY, WITHOUT LONG-TERM CURRENT USE OF INSULIN (HCC): ICD-10-CM

## 2017-02-28 DIAGNOSIS — G62.9 NEUROPATHY: ICD-10-CM

## 2017-02-28 DIAGNOSIS — Z02.71 DISABILITY EXAMINATION: Primary | ICD-10-CM

## 2017-02-28 RX ORDER — NORTRIPTYLINE HYDROCHLORIDE 25 MG/1
25 CAPSULE ORAL 2 TIMES DAILY
Qty: 60 CAP | Refills: 5 | Status: SHIPPED | OUTPATIENT
Start: 2017-02-28 | End: 2017-05-15 | Stop reason: SDUPTHER

## 2017-02-28 NOTE — PROGRESS NOTES
Chief Complaint   Patient presents with    Documentation     disability     I have reviewed the patient's medical history in detail and updated the computerized patient record. Health Maintenance reviewed. 1. Have you been to the ER, urgent care clinic since your last visit? Hospitalized since your last visit?no    2. Have you seen or consulted any other health care providers outside of the [address] [place] [address] since your last visit? Include any pap smears or colon screening.  no

## 2017-02-28 NOTE — MR AVS SNAPSHOT
Visit Information Date & Time Provider Department Dept. Phone Encounter #  
 2/28/2017  3:50 PM MD Cisco [address] Dr [id number] Follow-up Instructions Return in about 6 weeks (around 4/11/2017) for routine follow up. Your Appointments 3/14/2017 10:30 AM  
ROUTINE CARE with Eunice Gonzalez MD  
Elin Yanez ([facility]) Appt Note: f/u $0pb $0cp [facility] 11/23/16; f/u $0pb $0cp [facility] 11/23/16  
 [address] [place] [address]  
[phone]  
  
   
 [address] Upcoming Health Maintenance Date Due Pneumococcal 19-64 Medium Risk (1 of 1 - PPSV23) 12/28/1985 DTaP/Tdap/Td series (1 - Tdap) 12/28/1987 BREAST CANCER SCRN MAMMOGRAM 12/28/2016 FOBT Q 1 YEAR AGE 50-75 12/28/2016 PAP AKA CERVICAL CYTOLOGY 1/14/2017 LIPID PANEL Q1 3/25/2017 HEMOGLOBIN A1C Q6M 8/13/2017 MICROALBUMIN Q1 8/15/2017 EYE EXAM RETINAL OR DILATED Q1 10/25/2017 FOOT EXAM Q1 2/13/2018 Allergies as of 2/28/2017  Review Complete On: 2/28/2017 By: Gregorio Rodriguez LPN Severity Noted Reaction Type Reactions Lortab [Hydrocodone-acetaminophen] High 08/11/2013   Intolerance Nausea and Vomiting Cymbalta [Duloxetine]  10/04/2016    Diarrhea Current Immunizations  Reviewed on 12/13/2016 No immunizations on file. Not reviewed this visit You Were Diagnosed With   
  
 Codes Comments Disability examination    -  Primary ICD-10-CM: Z02.71 ICD-9-CM: V68.01 Neuropathy     ICD-10-CM: G62.9 ICD-9-CM: 355.9 Low back pain without sciatica, unspecified back pain laterality, unspecified chronicity     ICD-10-CM: M54.5 ICD-9-CM: 724.2 Controlled type 2 diabetes mellitus with diabetic neuropathy, without long-term current use of insulin (HCC)     ICD-10-CM: E11.40 ICD-9-CM: 250.60, 357.2 Vitals  BP  
  
  
  
  
  
 144/78 (BP 1 Location: Left arm, BP Patient Position: Sitting) BMI and BSA Data Body Mass Index Body Surface Area  
 38.62 kg/m 2 2.15 m 2 Preferred Pharmacy Pharmacy Name Phone [facility] [address] [facility], [address] [facility] [phone] Your Updated Medication List  
  
   
This list is accurate as of: 2/28/17  4:52 PM.  Always use your most recent med list.  
  
  
  
  
 aspirin delayed-release 81 mg tablet Take  by mouth daily. atenolol 100 mg tablet Commonly known as:  TENORMIN Take 1 Tab by mouth daily. TAKE ONE TABLET BY MOUTH ONCE DAILY  
  
 gabapentin 600 mg tablet Commonly known as:  NEURONTIN Take 1 Tab by mouth three (3) times daily. metFORMIN 1,000 mg tablet Commonly known as:  GLUCOPHAGE  
TAKE 1 TABLET BY MOUTH TWO (2) TIMES DAILY WITH MEALS  
  
 nortriptyline 25 mg capsule Commonly known as:  PAMELOR Take 1 Cap by mouth two (2) times a day. oxyCODONE-acetaminophen 5-325 mg per tablet Commonly known as:  PERCOCET Take 1 Tab by mouth every eight (8) hours as needed for Pain. Max Daily Amount: 3 Tabs. Prescriptions Sent to Pharmacy Refills  
 nortriptyline (PAMELOR) 25 mg capsule 5 Sig: Take 1 Cap by mouth two (2) times a day. Class: Normal  
 Pharmacy: [address] [facility], [address] [facility] Ph #: [phone] Route: Oral  
  
Follow-up Instructions Return in about 6 weeks (around 4/11/2017) for routine follow up. Introducing [facility] SERVICES! [employer] introduces [employer] patient portal. Now you can access parts of your medical record, email your doctor's office, and request medication refills online. 1. In your internet browser, go to [email or web address] 2. Click on the First Time User? Click Here link in the Sign In box. You will see the New Member Sign Up page. 3. Enter your [employer] Access Code exactly as it appears below. You will not need to use this code after youve completed the sign-up process. If you do not sign up before the expiration date, you must request a new code. · [employer] Access Code: [email or web address] Expires: 3/13/2017  2:28 PM 
 
4. Enter the last four digits of your Social Security Number (xxxx) and Date of Birth (mm/dd/yyyy) as indicated and click Submit. You will be taken to the next sign-up page. 5. Create a [employer] ID. This will be your [employer] login ID and cannot be changed, so think of one that is secure and easy to remember. 6. Create a [employer] password. You can change your password at any time. 7. Enter your Password Reset Question and Answer. This can be used at a later time if you forget your password. 8. Enter your e-mail address. You will receive e-mail notification when new information is available in [address] 9. Click Sign Up. You can now view and download portions of your medical record. 10. Click the Download Summary menu link to download a portable copy of your medical information. If you have questions, please visit the Frequently Asked Questions section of the [employer] website. Remember, [employer] is NOT to be used for urgent needs. For medical emergencies, dial 911. Now available from your iPhone and Android! Please provide this summary of care documentation to your next provider. Your primary care clinician is listed as Dahlia Diaz. If you have any questions after today's visit, please call [phone].

## 2017-03-02 NOTE — PROGRESS NOTES
HISTORY OF PRESENT ILLNESS  Kit Cortez is a 49 y.o. female. Foot Pain   The history is provided by the patient. This is a chronic problem. Episode onset: years. The problem occurs constantly. The problem has been gradually worsening. Pertinent negatives include no chest pain and no shortness of breath. Treatments tried: percocet. The treatment provided mild relief. she needs her disability form filled out. This was done. See scanned sheet for further details. She was last given Percocet few weeks ago and is almost finished it. Her appointment is with podiatry. She is hoping that they can do some injections into her feet to help her with her pain issues. She has had an finished testing with her neurologist final diagnosis seems to be just bad neuropathy. Social History     Social History    Marital status:      Spouse name: N/A    Number of children: N/A    Years of education: N/A     Occupational History    disabled      Social History Main Topics    Smoking status: Never Smoker    Smokeless tobacco: Never Used    Alcohol use No    Drug use: No    Sexual activity: Not Currently     Other Topics Concern    Not on file     Social History Narrative    Lives with daughter. Not working. Applying for disabilty. Review of Systems   Respiratory: Negative for shortness of breath. Cardiovascular: Negative for chest pain. Musculoskeletal: Positive for back pain and joint pain. Negative for falls. Physical Exam  Visit Vitals    /78 (BP 1 Location: Left arm, BP Patient Position: Sitting)    Pulse 88    Temp 98.7 °F (37.1 °C)    Resp 16    Ht 5' 4\" (1.626 m)    Wt 225 lb (102.1 kg)    LMP 02/03/2017    SpO2 100%    BMI 38.62 kg/m2       WDWN NAD  TM clear, throat wnl  Neck no adenopathy  Heart RRR no C/M/R  Lungs CTA  Abdo soft non tender  Ext No redness swelling or edema  Diabetic foot exam was performed. No obvious sores or red lesions.   Sensation checked by monofilament exam which was normal.  Dorsalis pedis pulse waspresent. ASSESSMENT and PLAN  Encounter Diagnoses   Name Primary?  Disability examination Yes    Neuropathy     Low back pain without sciatica, unspecified back pain laterality, unspecified chronicity     Controlled type 2 diabetes mellitus with diabetic neuropathy, without long-term current use of insulin ([facility])      Orders Placed This Encounter    nortriptyline (PAMELOR) 25 mg capsule   Inc dose, keep pods appt. Did not rf percs. Follow-up Disposition:  Return in about 6 weeks (around 4/11/2017) for routine follow up.

## 2017-03-13 ENCOUNTER — OFFICE VISIT (OUTPATIENT)
Dept: INTERNAL MEDICINE CLINIC | Age: 52
End: 2017-03-13

## 2017-03-13 VITALS
RESPIRATION RATE: 16 BRPM | HEIGHT: 64 IN | OXYGEN SATURATION: 99 % | HEART RATE: 90 BPM | SYSTOLIC BLOOD PRESSURE: 147 MMHG | DIASTOLIC BLOOD PRESSURE: 85 MMHG | TEMPERATURE: 98.2 F | BODY MASS INDEX: 39.27 KG/M2 | WEIGHT: 230 LBS

## 2017-03-13 DIAGNOSIS — I10 ESSENTIAL HYPERTENSION: ICD-10-CM

## 2017-03-13 DIAGNOSIS — E11.42 DIABETIC POLYNEUROPATHY ASSOCIATED WITH TYPE 2 DIABETES MELLITUS (HCC): Primary | ICD-10-CM

## 2017-03-13 DIAGNOSIS — Z12.31 SCREENING MAMMOGRAM, ENCOUNTER FOR: ICD-10-CM

## 2017-03-13 NOTE — PROGRESS NOTES
Chief Complaint   Patient presents with    Foot Pain     med refill     I have reviewed the patient's medical history in detail and updated the computerized patient record. Health Maintenance reviewed. 1. Have you been to the ER, urgent care clinic since your last visit? Hospitalized since your last visit?no    2. Have you seen or consulted any other health care providers outside of the [address] [place] [address] since your last visit? Include any pap smears or colon screening.  no

## 2017-03-13 NOTE — MR AVS SNAPSHOT
Visit Information Date & Time Provider Department Dept. Phone Encounter #  
 3/13/2017  1:50 PM Clara Mccullough  [place]  [id number] Follow-up Instructions Return in about 3 months (around 6/13/2017) for routine follow up. Upcoming Health Maintenance Date Due  
 BREAST CANCER SCRN MAMMOGRAM 12/28/2016 FOBT Q 1 YEAR AGE 50-75 12/28/2016 PAP AKA CERVICAL CYTOLOGY 1/14/2017 LIPID PANEL Q1 3/25/2017 HEMOGLOBIN A1C Q6M 8/13/2017 MICROALBUMIN Q1 8/15/2017 EYE EXAM RETINAL OR DILATED Q1 10/25/2017 FOOT EXAM Q1 2/13/2018 DTaP/Tdap/Td series (2 - Td) 3/13/2027 Allergies as of 3/13/2017  Review Complete On: 3/13/2017 By: Pipo Beckham LPN Severity Noted Reaction Type Reactions Lortab [Hydrocodone-acetaminophen] High 08/11/2013   Intolerance Nausea and Vomiting Cymbalta [Duloxetine]  10/04/2016    Diarrhea Lyrica [Pregabalin]  03/13/2017    Drowsiness Current Immunizations  Reviewed on 12/13/2016 No immunizations on file. Not reviewed this visit You Were Diagnosed With   
  
 Codes Comments Diabetic polyneuropathy associated with type 2 diabetes mellitus ([address]    -  Primary ICD-10-CM: E11.42 
ICD-9-CM: 250.60, 357.2 Screening mammogram, encounter for     ICD-10-CM: Z12.31 
ICD-9-CM: V76.12 Essential hypertension     ICD-10-CM: I10 
ICD-9-CM: 401.9 Vitals BP Pulse Temp Resp Height(growth percentile) Weight(growth percentile) 147/85 (BP 1 Location: Left arm, BP Patient Position: Sitting) 90 98.2 °F (36.8 °C) (Oral) 16 5' 4\" (1.626 m) 230 lb (104.3 kg) LMP SpO2 BMI OB Status Smoking Status 03/03/2017 99% 39.48 kg/m2 Having regular periods Never Smoker BMI and BSA Data Body Mass Index Body Surface Area  
 39.48 kg/m 2 2.17 m 2 Preferred Pharmacy Pharmacy Name Phone [facility] [address] [facility], [facility] [address] [phone] Your Updated Medication List  
  
   
This list is accurate as of: 3/13/17  2:31 PM.  Always use your most recent med list.  
  
  
  
  
 aspirin delayed-release 81 mg tablet Take  by mouth daily. atenolol 100 mg tablet Commonly known as:  TENORMIN Take 1 Tab by mouth daily. TAKE ONE TABLET BY MOUTH ONCE DAILY  
  
 gabapentin 600 mg tablet Commonly known as:  NEURONTIN Take 1 Tab by mouth three (3) times daily. metFORMIN 1,000 mg tablet Commonly known as:  GLUCOPHAGE  
TAKE 1 TABLET BY MOUTH TWO (2) TIMES DAILY WITH MEALS  
  
 nortriptyline 25 mg capsule Commonly known as:  PAMELOR Take 1 Cap by mouth two (2) times a day. Follow-up Instructions Return in about 3 months (around 6/13/2017) for routine follow up. To-Do List   
 03/13/2017 Imaging:  ABDULKADIR MAMMO BI SCREENING INCL CAD Patient Instructions Type in the following address into your browser: 
[employer]/suboxone Introducing [facility]! Karla Schafer introduces [employer] patient portal. Now you can access parts of your medical record, email your doctor's office, and request medication refills online. 1. In your internet browser, go to [email or web address] 2. Click on the First Time User? Click Here link in the Sign In box. You will see the New Member Sign Up page. 3. Enter your [employer] Access Code exactly as it appears below. You will not need to use this code after youve completed the sign-up process. If you do not sign up before the expiration date, you must request a new code. · imedot Access Code: [email or web address] Expires: 3/13/2017  3:28 PM 
 
4. Enter the last four digits of your Social Security Number (xxxx) and Date of Birth (mm/dd/yyyy) as indicated and click Submit. You will be taken to the next sign-up page. 5. Create a [employer] ID.  This will be your [employer] login ID and cannot be changed, so think of one that is secure and easy to remember. 6. Create a [employer] password. You can change your password at any time. 7. Enter your Password Reset Question and Answer. This can be used at a later time if you forget your password. 8. Enter your e-mail address. You will receive e-mail notification when new information is available in [address] 9. Click Sign Up. You can now view and download portions of your medical record. 10. Click the Download Summary menu link to download a portable copy of your medical information. If you have questions, please visit the Frequently Asked Questions section of the [employer] website. Remember, [employer] is NOT to be used for urgent needs. For medical emergencies, dial 911. Now available from your iPhone and Android! Please provide this summary of care documentation to your next provider. Your primary care clinician is listed as Robin Alex. If you have any questions after today's visit, please call [phone].

## 2017-03-13 NOTE — PROGRESS NOTES
HISTORY OF PRESENT ILLNESS  Kevin Zaldivar is a 49 y.o. female. Foot Pain   The history is provided by the patient. This is a chronic problem. The problem occurs constantly. The problem has not changed since onset. Pertinent negatives include no chest pain, no abdominal pain and no shortness of breath. Treatments tried: Lyrica, Cymbalta, Tens. The treatment provided no relief. Saw pods who did not recommend steroid shots just get insoles, needs me to sign off. W/u with neurology not helpful. Wants more percs. RefH2T=8.2 recently. Not had mammogram done yet. Not currently on BP meds for her HTN. Fair control off them. Current Outpatient Prescriptions   Medication Sig Dispense Refill    nortriptyline (PAMELOR) 25 mg capsule Take 1 Cap by mouth two (2) times a day. 60 Cap 5    gabapentin (NEURONTIN) 600 mg tablet Take 1 Tab by mouth three (3) times daily. 90 Tab 5    aspirin delayed-release 81 mg tablet Take  by mouth daily.  metFORMIN (GLUCOPHAGE) 1,000 mg tablet TAKE 1 TABLET BY MOUTH TWO (2) TIMES DAILY WITH MEALS 180 Tab 3    atenolol (TENORMIN) 100 mg tablet Take 1 Tab by mouth daily. TAKE ONE TABLET BY MOUTH ONCE DAILY 30 Tab 5     Allergies   Allergen Reactions    Lortab [Hydrocodone-Acetaminophen] Nausea and Vomiting    Cymbalta [Duloxetine] Diarrhea    Lyrica [Pregabalin] Drowsiness         Review of Systems   Respiratory: Negative for shortness of breath. Cardiovascular: Negative for chest pain. Gastrointestinal: Negative for abdominal pain.    Genitourinary:        No breast c/o       Physical Exam  Visit Vitals    /85 (BP 1 Location: Left arm, BP Patient Position: Sitting)    Pulse 90    Temp 98.2 °F (36.8 °C) (Oral)    Resp 16    Ht 5' 4\" (1.626 m)    Wt 230 lb (104.3 kg)    LMP 03/03/2017    SpO2 99%    BMI 39.48 kg/m2     WD WN female NAD  Heart RRR without murmers clicks or rubs  Lungs CTA  Abdo soft nontender  Ext no edema    ASSESSMENT and PLAN  Encounter Diagnoses   Name Primary?  Diabetic polyneuropathy associated with type 2 diabetes mellitus ([facility] [address] Yes    Screening mammogram, encounter for     Essential hypertension      Orders Placed This Encounter    ABDULKADIR MAMMO BI SCREENING INCL CAD     Will not cont percs. Signed off on Dm insoles. Recommend getting into Suboxone clinic, she'll need to call them. Web site given. Will see if there are any other Rx but will NOT cont percs. Follow-up Disposition:  Return in about 3 months (around 6/13/2017) for routine follow up.

## 2017-03-17 ENCOUNTER — HOSPITAL ENCOUNTER (EMERGENCY)
Age: 52
Discharge: HOME | End: 2017-03-17
Attending: EMERGENCY MEDICINE
Payer: MEDICAID

## 2017-03-17 VITALS
TEMPERATURE: 98.3 F | HEART RATE: 95 BPM | DIASTOLIC BLOOD PRESSURE: 95 MMHG | HEIGHT: 64 IN | OXYGEN SATURATION: 97 % | BODY MASS INDEX: 39.97 KG/M2 | WEIGHT: 234.13 LBS | RESPIRATION RATE: 18 BRPM | SYSTOLIC BLOOD PRESSURE: 176 MMHG

## 2017-03-17 DIAGNOSIS — M25.511 CHRONIC RIGHT SHOULDER PAIN: Primary | ICD-10-CM

## 2017-03-17 DIAGNOSIS — G89.29 CHRONIC RIGHT SHOULDER PAIN: Primary | ICD-10-CM

## 2017-03-17 PROCEDURE — 99282 EMERGENCY DEPT VISIT SF MDM: CPT

## 2017-03-17 PROCEDURE — 74011250636 HC RX REV CODE- 250/636: Performed by: PHYSICIAN ASSISTANT

## 2017-03-17 PROCEDURE — 96372 THER/PROPH/DIAG INJ SC/IM: CPT

## 2017-03-17 RX ORDER — KETOROLAC TROMETHAMINE 30 MG/ML
60 INJECTION, SOLUTION INTRAMUSCULAR; INTRAVENOUS
Status: COMPLETED | OUTPATIENT
Start: 2017-03-17 | End: 2017-03-17

## 2017-03-17 RX ADMIN — KETOROLAC TROMETHAMINE 60 MG: 30 INJECTION, SOLUTION INTRAMUSCULAR at 21:07
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
## 2017-03-18 NOTE — DISCHARGE INSTRUCTIONS
Shoulder Pain: Care Instructions  Your Care Instructions    You can hurt your shoulder by using it too much during an activity, such as fishing or baseball. It can also happen as part of the everyday wear and tear of getting older. Shoulder injuries can be slow to heal, but your shoulder should get better with time. Your doctor may recommend a sling to rest your shoulder. If you have injured your shoulder, you may need testing and treatment. Follow-up care is a key part of your treatment and safety. Be sure to make and go to all appointments, and call your doctor if you are having problems. It's also a good idea to know your test results and keep a list of the medicines you take. How can you care for yourself at home? · Take pain medicines exactly as directed. ¨ If the doctor gave you a prescription medicine for pain, take it as prescribed. ¨ If you are not taking a prescription pain medicine, ask your doctor if you can take an over-the-counter medicine. ¨ Do not take two or more pain medicines at the same time unless the doctor told you to. Many pain medicines contain acetaminophen, which is Tylenol. Too much acetaminophen (Tylenol) can be harmful. · If your doctor recommends that you wear a sling, use it as directed. Do not take it off before your doctor tells you to. · Put ice or a cold pack on the sore area for 10 to 20 minutes at a time. Put a thin cloth between the ice and your skin. · If there is no swelling, you can put moist heat, a heating pad, or a warm cloth on your shoulder. Some doctors suggest alternating between hot and cold. · Rest your shoulder for a few days. If your doctor recommends it, you can then begin gentle exercise of the shoulder, but do not lift anything heavy. When should you call for help? Call 911 anytime you think you may need emergency care. For example, call if:  · You have chest pain or pressure. This may occur with:  ¨ Sweating. ¨ Shortness of breath.   ¨ Nausea or vomiting. ¨ Pain that spreads from the chest to the neck, jaw, or one or both shoulders or arms. ¨ Dizziness or lightheadedness. ¨ A fast or uneven pulse. After calling 911, chew 1 adult-strength aspirin. Wait for an ambulance. Do not try to drive yourself. · Your arm or hand is cool or pale or changes color. Call your doctor now or seek immediate medical care if:  · You have signs of infection, such as:  ¨ Increased pain, swelling, warmth, or redness in your shoulder. ¨ Red streaks leading from a place on your shoulder. ¨ Pus draining from an area of your shoulder. ¨ Swollen lymph nodes in your neck, armpits, or groin. ¨ A fever. Watch closely for changes in your health, and be sure to contact your doctor if:  · You cannot use your shoulder. · Your shoulder does not get better as expected. Where can you learn more? Go to http://lizbeth-efrían.info/. Enter Q581 in the search box to learn more about \"Shoulder Pain: Care Instructions. \"  Current as of: May 23, 2016  Content Version: 11.1  © 1985-6784 [employer]. Care instructions adapted under license by [employer] (which disclaims liability or warranty for this information). If you have questions about a medical condition or this instruction, always ask your healthcare professional. [address] any warranty or liability for your use of this information.

## 2017-03-18 NOTE — ED NOTES
Assume care of patient. Patient states she has pain in her right shoulder that started from an [facility] last April. Patient states the the pain has come back and has not seen an orthopedic specialist for. PA at bedside.

## 2017-03-18 NOTE — ED NOTES
.Discharge instructions reviewed with patient and given to pt per Cayetano Belle. Pt able to return/verbalize discharge instructions. Copy of discharge instructions given. Pt condition stable, no further complaints. Pt out of ER, accompanied by self. Ambulatory, steady gait. Wheelchair offered & pt declined. Patient out of ED prior to obtaining discharge vitals.

## 2017-03-20 ENCOUNTER — APPOINTMENT (OUTPATIENT)
Dept: GENERAL RADIOLOGY | Age: 52
End: 2017-03-20
Attending: PHYSICIAN ASSISTANT
Payer: MEDICAID

## 2017-03-20 ENCOUNTER — HOSPITAL ENCOUNTER (EMERGENCY)
Age: 52
Discharge: HOME | End: 2017-03-20
Attending: EMERGENCY MEDICINE
Payer: MEDICAID

## 2017-03-20 ENCOUNTER — TELEPHONE (OUTPATIENT)
Dept: INTERNAL MEDICINE CLINIC | Age: 52
End: 2017-03-20

## 2017-03-20 VITALS
BODY MASS INDEX: 39.75 KG/M2 | TEMPERATURE: 98.6 F | HEART RATE: 109 BPM | OXYGEN SATURATION: 100 % | WEIGHT: 232.81 LBS | RESPIRATION RATE: 18 BRPM | SYSTOLIC BLOOD PRESSURE: 155 MMHG | DIASTOLIC BLOOD PRESSURE: 91 MMHG | HEIGHT: 64 IN

## 2017-03-20 DIAGNOSIS — G89.29 OTHER CHRONIC PAIN: ICD-10-CM

## 2017-03-20 DIAGNOSIS — F11.20 NARCOTIC DEPENDENCE, EPISODIC USE (HCC): ICD-10-CM

## 2017-03-20 DIAGNOSIS — M25.561 ACUTE PAIN OF RIGHT KNEE: Primary | ICD-10-CM

## 2017-03-20 PROCEDURE — 74011250637 HC RX REV CODE- 250/637: Performed by: PHYSICIAN ASSISTANT

## 2017-03-20 PROCEDURE — 99283 EMERGENCY DEPT VISIT LOW MDM: CPT

## 2017-03-20 PROCEDURE — 74011250636 HC RX REV CODE- 250/636: Performed by: PHYSICIAN ASSISTANT

## 2017-03-20 PROCEDURE — 96372 THER/PROPH/DIAG INJ SC/IM: CPT

## 2017-03-20 PROCEDURE — 73562 X-RAY EXAM OF KNEE 3: CPT

## 2017-03-20 RX ORDER — MELOXICAM 15 MG/1
15 TABLET ORAL DAILY
Qty: 20 TAB | Refills: 0 | Status: SHIPPED | OUTPATIENT
Start: 2017-03-20 | End: 2017-03-21 | Stop reason: SDUPTHER

## 2017-03-20 RX ORDER — OXYCODONE AND ACETAMINOPHEN 5; 325 MG/1; MG/1
1 TABLET ORAL
Status: COMPLETED | OUTPATIENT
Start: 2017-03-20 | End: 2017-03-20

## 2017-03-20 RX ORDER — KETOROLAC TROMETHAMINE 30 MG/ML
60 INJECTION, SOLUTION INTRAMUSCULAR; INTRAVENOUS
Status: COMPLETED | OUTPATIENT
Start: 2017-03-20 | End: 2017-03-20

## 2017-03-20 RX ADMIN — KETOROLAC TROMETHAMINE 60 MG: 30 INJECTION, SOLUTION INTRAMUSCULAR at 19:55

## 2017-03-20 RX ADMIN — OXYCODONE HYDROCHLORIDE AND ACETAMINOPHEN 1 TABLET: 5; 325 TABLET ORAL at 20:23

## 2017-03-20 NOTE — LETTER
[address] 
[facility] EMERGENCY DEPT 
[address] [place] [address] [address] [id number] 
[phone] Work/School Note Date: 3/20/2017 To Whom It May concern: 
 
Jayson Geiger was seen and treated today in the emergency room by the following provider(s): 
Attending Provider: Vega Vanegas MD 
Physician Assistant: Jaime Mccabe. Jayson Geiger may return to work on 3/22/2017. Sincerely, 
 
 
 
 
Chaim Byers.  Jaime Tidwell

## 2017-03-20 NOTE — ED NOTES
Received pt to truong chair, resting in position of comfort; pt states she slipped on water today and fell landed on her knees, c/o right knee pain since

## 2017-03-21 ENCOUNTER — OFFICE VISIT (OUTPATIENT)
Dept: INTERNAL MEDICINE CLINIC | Age: 52
End: 2017-03-21

## 2017-03-21 VITALS
HEIGHT: 64 IN | OXYGEN SATURATION: 100 % | TEMPERATURE: 98.9 F | WEIGHT: 230 LBS | HEART RATE: 90 BPM | DIASTOLIC BLOOD PRESSURE: 90 MMHG | BODY MASS INDEX: 39.27 KG/M2 | SYSTOLIC BLOOD PRESSURE: 156 MMHG | RESPIRATION RATE: 16 BRPM

## 2017-03-21 DIAGNOSIS — F11.20 NARCOTIC DEPENDENCE, EPISODIC USE (HCC): ICD-10-CM

## 2017-03-21 DIAGNOSIS — S89.91XA KNEE INJURY, RIGHT, INITIAL ENCOUNTER: Primary | ICD-10-CM

## 2017-03-21 DIAGNOSIS — G60.8 IDIOPATHIC SMALL AND LARGE FIBER SENSORY NEUROPATHY: ICD-10-CM

## 2017-03-21 RX ORDER — MELOXICAM 15 MG/1
15 TABLET ORAL DAILY
Qty: 30 TAB | Refills: 1 | Status: SHIPPED | OUTPATIENT
Start: 2017-03-21 | End: 2017-04-17

## 2017-03-21 RX ORDER — OXYCODONE AND ACETAMINOPHEN 5; 325 MG/1; MG/1
1 TABLET ORAL
Qty: 10 TAB | Refills: 0 | Status: SHIPPED | OUTPATIENT
Start: 2017-03-21 | End: 2017-03-25

## 2017-03-21 NOTE — MR AVS SNAPSHOT
Visit Information Date & Time Provider Department Dept. Phone Encounter #  
 3/21/2017  3:00 PM Ramesh Sullivan MD [facility] Donavan Coats [id number] Follow-up Instructions Return if symptoms worsen or fail to improve. Your Appointments 5/15/2017  1:00 PM  
ROUTINE CARE with Ramesh Sullivan MD  
[facility] [address] [place] [address]) Appt Note: f/u on dm $0 cp lsh 3/13/17  
 [address] [place] [address]. [address] [place] [address]  
[phone]  
  
   
 [address] [place] [address] [address] Upcoming Health Maintenance Date Due  
 BREAST CANCER SCRN MAMMOGRAM 12/28/2016 FOBT Q 1 YEAR AGE 50-75 12/28/2016 PAP AKA CERVICAL CYTOLOGY 1/14/2017 LIPID PANEL Q1 3/25/2017 HEMOGLOBIN A1C Q6M 8/13/2017 MICROALBUMIN Q1 8/15/2017 EYE EXAM RETINAL OR DILATED Q1 10/25/2017 FOOT EXAM Q1 2/13/2018 DTaP/Tdap/Td series (2 - Td) 3/13/2027 Allergies as of 3/21/2017  Review Complete On: 3/21/2017 By: aRmesh Sullivan MD  
  
 Severity Noted Reaction Type Reactions Lortab [Hydrocodone-acetaminophen] High 08/11/2013   Intolerance Nausea and Vomiting Cymbalta [Duloxetine]  10/04/2016    Diarrhea Lyrica [Pregabalin]  03/13/2017    Drowsiness Current Immunizations  Reviewed on 12/13/2016 No immunizations on file. Not reviewed this visit You Were Diagnosed With   
  
 Codes Comments Knee injury, right, initial encounter    -  Primary ICD-10-CM: A24.82TC ICD-9-CM: 959.7 Narcotic dependence, episodic use ([facility] [address]     ICD-10-CM: B54.93 ICD-9-CM: 304.92 Idiopathic small and large fiber sensory neuropathy     ICD-10-CM: G60.8 ICD-9-CM: 595. 4 Vitals BP Pulse Temp Resp Height(growth percentile) Weight(growth percentile) 156/90 (BP 1 Location: Left arm, BP Patient Position: Sitting) 90 98.9 °F (37.2 °C) (Oral) 16 5' 4\" (1.626 m) 230 lb (104.3 kg) LMP SpO2 BMI OB Status Smoking Status 03/03/2017 100% 39.48 kg/m2 Having regular periods Never Smoker BMI and BSA Data Body Mass Index Body Surface Area  
 39.48 kg/m 2 2.17 m 2 Preferred Pharmacy Pharmacy Name [facility] [address] [facility], [address] [phone] Your Updated Medication List  
  
   
This list is accurate as of: 3/21/17  4:04 PM.  Always use your most recent med list.  
  
  
  
  
 aspirin delayed-release 81 mg tablet Take  by mouth daily. atenolol 100 mg tablet Commonly known as:  TENORMIN Take 1 Tab by mouth daily. TAKE ONE TABLET BY MOUTH ONCE DAILY  
  
 gabapentin 600 mg tablet Commonly known as:  NEURONTIN Take 1 Tab by mouth three (3) times daily. meloxicam 15 mg tablet Commonly known as:  MOBIC Take 1 Tab by mouth daily. metFORMIN 1,000 mg tablet Commonly known as:  GLUCOPHAGE  
TAKE 1 TABLET BY MOUTH TWO (2) TIMES DAILY WITH MEALS  
  
 nortriptyline 25 mg capsule Commonly known as:  PAMELOR Take 1 Cap by mouth two (2) times a day. oxyCODONE-acetaminophen 5-325 mg per tablet Commonly known as:  PERCOCET Take 1 Tab by mouth every eight (8) hours as needed for Pain. Max Daily Amount: 3 Tabs. Prescriptions Printed Refills  
 oxyCODONE-acetaminophen (PERCOCET) 5-325 mg per tablet 0 Sig: Take 1 Tab by mouth every eight (8) hours as needed for Pain. Max Daily Amount: 3 Tabs. Class: Print Route: Oral  
  
Prescriptions Sent to Pharmacy Refills  
 meloxicam (MOBIC) 15 mg tablet 1 Sig: Take 1 Tab by mouth daily. Class: Normal  
 Pharmacy: [facility] [address] [facility], [address] Ph #: [phone] Route: Oral  
  
We Performed the Following AMB SUPPLY ORDER [[id number] Custom] Comments:  
 Right knee immobolizer Follow-up Instructions Return if symptoms worsen or fail to improve. Patient Instructions Knee Sprain: Care Instructions Your Care Instructions A knee sprain is one or more stretched, partly torn, or completely torn knee ligaments. Ligaments are bands of ropelike tissue that connect bone to bone and make the knee stable. The knee has four main ligaments. Knee sprains often happen because of a twisting or bending injury from sports such as skiing, basketball, soccer, or football. The knee turns one way while the lower or upper leg goes another way. A sprain also can happen when the knee is hit from the side or the front. If a knee ligament is slightly stretched, you will probably need only home treatment. You may need a splint or brace (immobilizer) for a partly torn ligament. A complete tear may need surgery. A minor knee sprain may take up to 6 weeks to heal, while a severe sprain may take months. Follow-up care is a key part of your treatment and safety. Be sure to make and go to all appointments, and call your doctor if you are having problems. It's also a good idea to know your test results and keep a list of the medicines you take. How can you care for yourself at home? · Follow instructions about how much weight you can put on your leg and how to walk with crutches. · Prop up your leg on a pillow when you ice it or anytime you sit or lie down for the next 3 days. Try to keep it above the level of your heart. This will help reduce swelling. · Put ice or a cold pack on your knee for 10 to 20 minutes at a time. Try to do this every 1 to 2 hours for the next 3 days (when you are awake) or until the swelling goes down. Put a thin cloth between the ice and your skin. Do not get the splint wet. · If you have an elastic bandage, make sure it is snug but not so tight that your leg is numb, tingles, or swells below the bandage. You can loosen the bandage if it is too tight. · Your doctor may recommend a brace (immobilizer) to support your knee while it heals. Wear it as directed. · Ask your doctor if you can take an over-the-counter pain medicine, such as acetaminophen (Tylenol), ibuprofen (Advil, Motrin), or naproxen (Aleve). Be safe with medicines. Read and follow all instructions on the label. When should you call for help? Call 911 anytime you think you may need emergency care. For example, call if: 
· You have sudden chest pain and shortness of breath, or you cough up blood. Call your doctor now or seek immediate medical care if: 
· You have increased or severe pain. · You cannot move your toes or ankle. · Your foot is cool or pale or changes color. · You have tingling, weakness, or numbness in your foot or leg. · Your splint or brace feels too tight. · You are unable to straighten the knee, or the knee \"locks. \" 
· You have signs of a blood clot in your leg, such as: 
¨ Pain in your calf, back of the knee, thigh, or groin. ¨ Redness and swelling in your leg. Watch closely for changes in your health, and be sure to contact your doctor if: 
· Your pain is not getting better or is getting worse. Where can you learn more? Go to http://lizbeth-efraín.info/. Enter N406 in the search box to learn more about \"Knee Sprain: Care Instructions. \" Current as of: May 23, 2016 Content Version: 11.1 © 4948-4224 CareDox. Care instructions adapted under license by [employer] (which disclaims liability or warranty for this information). If you have questions about a medical condition or this instruction, always ask your healthcare professional. [place] [address] any warranty or liability for your use of this information. Introducing [facility]! [employer] introduces [employer] patient portal. Now you can access parts of your medical record, email your doctor's office, and request medication refills online. 1. In your internet browser, go to [email or web address] 2. Click on the First Time User? Click Here link in the Sign In box. You will see the New Member Sign Up page. 3. Enter your Lela Access Code exactly as it appears below. You will not need to use this code after youve completed the sign-up process. If you do not sign up before the expiration date, you must request a new code. · Lela Access Code: [email or web address] Expires: 6/15/2017  8:22 PM 
 
4. Enter the last four digits of your Social Security Number (xxxx) and Date of Birth (mm/dd/yyyy) as indicated and click Submit. You will be taken to the next sign-up page. 5. Create a Lela ID. This will be your Lela login ID and cannot be changed, so think of one that is secure and easy to remember. 6. Create a Lela password. You can change your password at any time. 7. Enter your Password Reset Question and Answer. This can be used at a later time if you forget your password. 8. Enter your e-mail address. You will receive e-mail notification when new information is available in [address] 9. Click Sign Up. You can now view and download portions of your medical record. 10. Click the Download Summary menu link to download a portable copy of your medical information. If you have questions, please visit the Frequently Asked Questions section of the Lela website. Remember, Lela is NOT to be used for urgent needs. For medical emergencies, dial 911. Now available from your iPhone and Android! Please provide this summary of care documentation to your next provider. Your primary care clinician is listed as Ronda Khanna. If you have any questions after today's visit, please call [phone].

## 2017-03-21 NOTE — PATIENT INSTRUCTIONS
Knee Sprain: Care Instructions  Your Care Instructions    A knee sprain is one or more stretched, partly torn, or completely torn knee ligaments. Ligaments are bands of ropelike tissue that connect bone to bone and make the knee stable. The knee has four main ligaments. Knee sprains often happen because of a twisting or bending injury from sports such as skiing, basketball, soccer, or football. The knee turns one way while the lower or upper leg goes another way. A sprain also can happen when the knee is hit from the side or the front. If a knee ligament is slightly stretched, you will probably need only home treatment. You may need a splint or brace (immobilizer) for a partly torn ligament. A complete tear may need surgery. A minor knee sprain may take up to 6 weeks to heal, while a severe sprain may take months. Follow-up care is a key part of your treatment and safety. Be sure to make and go to all appointments, and call your doctor if you are having problems. It's also a good idea to know your test results and keep a list of the medicines you take. How can you care for yourself at home? · Follow instructions about how much weight you can put on your leg and how to walk with crutches. · Prop up your leg on a pillow when you ice it or anytime you sit or lie down for the next 3 days. Try to keep it above the level of your heart. This will help reduce swelling. · Put ice or a cold pack on your knee for 10 to 20 minutes at a time. Try to do this every 1 to 2 hours for the next 3 days (when you are awake) or until the swelling goes down. Put a thin cloth between the ice and your skin. Do not get the splint wet. · If you have an elastic bandage, make sure it is snug but not so tight that your leg is numb, tingles, or swells below the bandage. You can loosen the bandage if it is too tight. · Your doctor may recommend a brace (immobilizer) to support your knee while it heals. Wear it as directed.   · Ask your doctor if you can take an over-the-counter pain medicine, such as acetaminophen (Tylenol), ibuprofen (Advil, Motrin), or naproxen (Aleve). Be safe with medicines. Read and follow all instructions on the label. When should you call for help? Call 911 anytime you think you may need emergency care. For example, call if:  · You have sudden chest pain and shortness of breath, or you cough up blood. Call your doctor now or seek immediate medical care if:  · You have increased or severe pain. · You cannot move your toes or ankle. · Your foot is cool or pale or changes color. · You have tingling, weakness, or numbness in your foot or leg. · Your splint or brace feels too tight. · You are unable to straighten the knee, or the knee \"locks. \"  · You have signs of a blood clot in your leg, such as:  ¨ Pain in your calf, back of the knee, thigh, or groin. ¨ Redness and swelling in your leg. Watch closely for changes in your health, and be sure to contact your doctor if:  · Your pain is not getting better or is getting worse. Where can you learn more? Go to http://lizbeth-efraín.info/. Enter N406 in the search box to learn more about \"Knee Sprain: Care Instructions. \"  Current as of: May 23, 2016  Content Version: 11.1  © 2596-8118 ScreenTag. Care instructions adapted under license by [employer] (which disclaims liability or warranty for this information). If you have questions about a medical condition or this instruction, always ask your healthcare professional. [place] [address] any warranty or liability for your use of this information.

## 2017-03-21 NOTE — DISCHARGE INSTRUCTIONS
Joint Pain: Care Instructions  Your Care Instructions  Many people have small aches and pains from overuse or injury to muscles and joints. Joint injuries often happen during sports or recreation, work tasks, or projects around the home. An overuse injury can happen when you put too much stress on a joint or when you do an activity that stresses the joint over and over, such as using the computer or rowing a boat. You can take action at home to help your muscles and joints get better. You should feel better in 1 to 2 weeks, but it can take 3 months or more to heal completely. Follow-up care is a key part of your treatment and safety. Be sure to make and go to all appointments, and call your doctor if you are having problems. It's also a good idea to know your test results and keep a list of the medicines you take. How can you care for yourself at home? · Do not put weight on the injured joint for at least a day or two. · For the first day or two after an injury, do not take hot showers or baths, and do not use hot packs. The heat could make swelling worse. · Put ice or a cold pack on the sore joint for 10 to 20 minutes at a time. Try to do this every 1 to 2 hours for the next 3 days (when you are awake) or until the swelling goes down. Put a thin cloth between the ice and your skin. · Wrap the injury in an elastic bandage. Do not wrap it too tightly because this can cause more swelling. · Prop up the sore joint on a pillow when you ice it or anytime you sit or lie down during the next 3 days. Try to keep it above the level of your heart. This will help reduce swelling. · Take an over-the-counter pain medicine, such as acetaminophen (Tylenol), ibuprofen (Advil, Motrin), or naproxen (Aleve). Read and follow all instructions on the label. · After 1 or 2 days of rest, begin moving the joint gently.  While the joint is still healing, you can begin to exercise using activities that do not strain or hurt the painful joint. When should you call for help? Call your doctor now or seek immediate medical care if:  · You have signs of infection, such as:  ¨ Increased pain, swelling, warmth, and redness. ¨ Red streaks leading from the joint. ¨ A fever. Watch closely for changes in your health, and be sure to contact your doctor if:  · Your movement or symptoms are not getting better after 1 to 2 weeks of home treatment. Where can you learn more? Go to http://lizbeth-efraín.info/. Enter P205 in the search box to learn more about \"Joint Pain: Care Instructions. \"  Current as of: May 23, 2016  Content Version: 11.1  © 8642-2012 Camero. Care instructions adapted under license by [employer] (which disclaims liability or warranty for this information). If you have questions about a medical condition or this instruction, always ask your healthcare professional. [place] [address] any warranty or liability for your use of this information. Knee Pain or Injury: Care Instructions  Your Care Instructions    Injuries are a common cause of knee problems. Sudden (acute) injuries may be caused by a direct blow to the knee. They can also be caused by abnormal twisting, bending, or falling on the knee. Pain, bruising, or swelling may be severe, and may start within minutes of the injury. Overuse is another cause of knee pain. Other causes are climbing stairs, kneeling, and other activities that use the knee. Everyday wear and tear, especially as you get older, also can cause knee pain. Rest, along with home treatment, often relieves pain and allows your knee to heal. If you have a serious knee injury, you may need tests and treatment. Follow-up care is a key part of your treatment and safety. Be sure to make and go to all appointments, and call your doctor if you are having problems.  It's also a good idea to know your test results and keep a list of the medicines you take. How can you care for yourself at home? · Be safe with medicines. Read and follow all instructions on the label. ¨ If the doctor gave you a prescription medicine for pain, take it as prescribed. ¨ If you are not taking a prescription pain medicine, ask your doctor if you can take an over-the-counter medicine. · Rest and protect your knee. Take a break from any activity that may cause pain. · Put ice or a cold pack on your knee for 10 to 20 minutes at a time. Put a thin cloth between the ice and your skin. · Prop up a sore knee on a pillow when you ice it or anytime you sit or lie down for the next 3 days. Try to keep it above the level of your heart. This will help reduce swelling. · If your knee is not swollen, you can put moist heat, a heating pad, or a warm cloth on your knee. · If your doctor recommends an elastic bandage, sleeve, or other type of support for your knee, wear it as directed. · Follow your doctor's instructions about how much weight you can put on your leg. Use a cane, crutches, or a walker as instructed. · Follow your doctor's instructions about activity during your healing process. If you can do mild exercise, slowly increase your activity. · Reach and stay at a healthy weight. Extra weight can strain the joints, especially the knees and hips, and make the pain worse. Losing even a few pounds may help. When should you call for help? Call 911 anytime you think you may need emergency care. For example, call if:  · You have symptoms of a blood clot in your lung (called a pulmonary embolism). These may include:  ¨ Sudden chest pain. ¨ Trouble breathing. ¨ Coughing up blood. Call your doctor now or seek immediate medical care if:  · You have severe or increasing pain. · Your leg or foot turns cold or changes color. · You cannot stand or put weight on your knee. · Your knee looks twisted or bent out of shape. · You cannot move your knee.   · You have signs of infection, such as:  ¨ Increased pain, swelling, warmth, or redness. ¨ Red streaks leading from the knee. ¨ Pus draining from a place on your knee. ¨ A fever. · You have signs of a blood clot in your leg (called a deep vein thrombosis), such as:  ¨ Pain in your calf, back of the knee, thigh, or groin. ¨ Redness and swelling in your leg or groin. Watch closely for changes in your health, and be sure to contact your doctor if:  · You have tingling, weakness, or numbness in your knee. · You have any new symptoms, such as swelling. · You have bruises from a knee injury that last longer than 2 weeks. · You do not get better as expected. Where can you learn more? Go to http://lizbeth-efraín.info/. Enter K195 in the search box to learn more about \"Knee Pain or Injury: Care Instructions. \"  Current as of: May 27, 2016  Content Version: 11.1  © 9574-7353 AutoReflex.com. Care instructions adapted under license by [employer] (which disclaims liability or warranty for this information). If you have questions about a medical condition or this instruction, always ask your healthcare professional. [place] [address] any warranty or liability for your use of this information. Musculoskeletal Pain: Care Instructions  Your Care Instructions  Different problems with the bones, muscles, nerves, ligaments, and tendons in the body can cause pain. One or more areas of your body may ache or burn. Or they may feel tired, stiff, or sore. The medical term for this type of pain is musculoskeletal pain. It can have many different causes. Sometimes the pain is caused by an injury such as a strain or sprain. Or you might have pain from using one part of your body in the same way over and over again. This is called overuse. In some cases, the cause of the pain is another health problem such as arthritis or fibromyalgia.   The doctor will examine you and ask you questions about your health to help find the cause of your pain. Blood tests or imaging tests like an X-ray may also be helpful. But sometimes doctors can't find a cause of the pain. Treatment depends on your symptoms and the cause of the pain, if known. The doctor has checked you carefully, but problems can develop later. If you notice any problems or new symptoms, get medical treatment right away. Follow-up care is a key part of your treatment and safety. Be sure to make and go to all appointments, and call your doctor if you are having problems. It's also a good idea to know your test results and keep a list of the medicines you take. How can you care for yourself at home? · Rest until you feel better. · Do not do anything that makes the pain worse. Return to exercise gradually if you feel better and your doctor says it's okay. · Be safe with medicines. Read and follow all instructions on the label. ¨ If the doctor gave you a prescription medicine for pain, take it as prescribed. ¨ If you are not taking a prescription pain medicine, ask your doctor if you can take an over-the-counter medicine. · Put ice or a cold pack on the area for 10 to 20 minutes at a time to ease pain. Put a thin cloth between the ice and your skin. When should you call for help? Call your doctor now or seek immediate medical care if:  · You have new pain, or your pain gets worse. · You have new symptoms such as a fever, a rash, or chills. Watch closely for changes in your health, and be sure to contact your doctor if:  · You do not get better as expected. Where can you learn more? Go to http://lizbeth-efraín.info/. Enter R513 in the search box to learn more about \"Musculoskeletal Pain: Care Instructions. \"  Current as of: February 19, 2016  Content Version: 11.1  © 6220-6245 [employer]. Care instructions adapted under license by [employer]â„¢ (which disclaims liability or warranty for this information).  If you have questions about a medical condition or this instruction, always ask your healthcare professional. [place] [address] any warranty or liability for your use of this information.

## 2017-03-21 NOTE — PROGRESS NOTES
HISTORY OF PRESENT ILLNESS  Alison Desir is a 49 y.o. female. Knee Pain   The history is provided by the patient. This is a new problem. The current episode started yesterday. The problem occurs constantly. The problem has not changed since onset. Associated symptoms comments: Right knee only. Exacerbated by: fell yesterday injured right knee. Treatments tried: went to ER [facility] did X-rays given 1 perc. Did not take meloxicam. Slipped on wet floor, not a lot of swelling after injury just hurts. Continues to have the usual neuropathic feet pain complaints. Not taking narcotics since she ran out of. Allergies   Allergen Reactions    Lortab [Hydrocodone-Acetaminophen] Nausea and Vomiting    Cymbalta [Duloxetine] Diarrhea    Lyrica [Pregabalin] Drowsiness     SUBJECTIVE:    Review of Systems   Musculoskeletal: Positive for falls and joint pain. Negative for back pain. Right knee       Physical Exam  Visit Vitals    /90 (BP 1 Location: Left arm, BP Patient Position: Sitting)    Pulse 90    Temp 98.9 °F (37.2 °C) (Oral)    Resp 16    Ht 5' 4\" (1.626 m)    Wt 230 lb (104.3 kg)    LMP 03/03/2017    SpO2 100%    BMI 39.48 kg/m2     WD WN female NAD  Heart RRR without murmers clicks or rubs  Lungs CTA  Abdo soft nontender  Ext  dec rom painful sl swelling     ASSESSMENT and PLAN  Encounter Diagnoses   Name Primary?  Knee injury, right, initial encounter Yes    Narcotic dependence, episodic use (HCC)     Idiopathic small and large fiber sensory neuropathy      Orders Placed This Encounter    AMB SUPPLY ORDER    meloxicam (MOBIC) 15 mg tablet    oxyCODONE-acetaminophen (PERCOCET) 5-325 mg per tablet       We discussed this pain and the usage of controlled substances for acute knee injury relief.  In general these medications are indicated for the acute symptom relief and not for chronic usage, allthough they are frequently used for chronic management  After a certain period of time they should be stopped to avoid dependence and/or addiction. I warned her about the dangers of addiction and other side affects including respiratory depression and death. Discussed how this is a controlled substance and that the prescribing of it is should be monitored very carefully. Drug usage is also monitored by our practise and the [facility], since there has been a lot of abuse and diversion of controlled substances. In general we do not refill this medication over the phone. PLease ask for enough pills to get you to your next appointment and make sure you keep your appointments. Warned not to take other medications like alcohol, other benzodiazapines marijuana or other narcotics when on this medication. One time only prescription for this problem. If pain continues can call me to get a referral to orthopedics, most likely strain the should improve.

## 2017-03-21 NOTE — ED NOTES
Discharge instructions reviewed with pt and copy given by [facility]; pt reporting she got no pain relief and \"only percocet works\"

## 2017-03-22 NOTE — ED PROVIDER NOTES
HPI Comments: Ever Blair is a 49 y.o. female with pertinent PMHx of obesity, DM2, chronic pain, and HTN presenting ambulatory without assistance to ED c/o R knee pain. Pt states that she fell today at home because water was on the floor, causing it to be slick. Pt denies head injury, LOC, syncope, AMS since the event. Pt reports that she has been ambulatory without assistance since the fall. Pt reports 9/10 R knee pain without radiation. Pt has not tried any medications PTA. Pt denies history of arthritis or injury to her right knee. Pt denies associated symptoms of weakness, sensation changes, fever, chills, dizziness, skin changes. PCP: Jamil Stiles MD  Social Hx: Tobacco (-), alcohol use (-), illicit drug use (-)    PMH: per HPI  Surgical Hx: none reported    There are no other complaints, changes, or physical findings at this time. The history is provided by the patient. Past Medical History:   Diagnosis Date    Anemia     Chronic pain     Cyst in hand 2014    [address]    Diabetes mellitus type 2 in obese ([address]     Diabetic neuropathy ([address]     Hypertension     Migraine        History reviewed. No pertinent surgical history. Family History:   Problem Relation Age of Onset   [facility] Cancer Father      brain    Diabetes Mother     Heart Disease Mother     Hypertension Mother     Hypertension Maternal Grandmother     Heart Disease Maternal Grandmother        Social History     Social History    Marital status:      Spouse name: N/A    Number of children: N/A    Years of education: N/A     Occupational History    disabled      Social History Main Topics    Smoking status: Never Smoker    Smokeless tobacco: Never Used    Alcohol use No    Drug use: No    Sexual activity: Not Currently     Other Topics Concern    Not on file     Social History Narrative    Lives with daughter. Not working. Applying for disabilty.          ALLERGIES: Lortab [hydrocodone-acetaminophen]; Cymbalta [duloxetine]; and Lyrica [pregabalin]    Review of Systems   Constitutional: Negative. Negative for activity change, appetite change, chills, diaphoresis, fever and unexpected weight change. HENT: Negative for congestion, hearing loss, rhinorrhea, sinus pressure, sneezing, sore throat and trouble swallowing. Eyes: Negative for pain, redness, itching and visual disturbance. Respiratory: Negative for cough, shortness of breath and wheezing. Cardiovascular: Negative for chest pain, palpitations and leg swelling. Gastrointestinal: Negative for abdominal pain, constipation, diarrhea, nausea and vomiting. Genitourinary: Negative for dysuria. Musculoskeletal: Positive for arthralgias (R knee pain). Negative for gait problem, joint swelling and myalgias. Skin: Negative for color change, pallor, rash and wound. Neurological: Negative for tremors, weakness, light-headedness, numbness and headaches. All other systems reviewed and are negative. Vitals:    03/20/17 1729   BP: (!) 155/91   Pulse: (!) 109   Resp: 18   Temp: 98.6 °F (37 °C)   SpO2: 100%   Weight: 105.6 kg (232 lb 12.9 oz)   Height: 5' 4\" (1.626 m)            Physical Exam   Constitutional: She is oriented to person, place, and time. She appears well-developed and well-nourished. No distress. 53yo AAF in NAD  Communicates appropriately and in full sentences     HENT:   Head: Normocephalic and atraumatic. Neg Wang's sign  Neg Raccoon's sign   Eyes: Conjunctivae and EOM are normal. Pupils are equal, round, and reactive to light. Right eye exhibits no discharge. Left eye exhibits no discharge. Neck: Normal range of motion. Neck supple. No nuchal rigidity   Cardiovascular: Normal rate, normal heart sounds and intact distal pulses. Pulmonary/Chest: Effort normal. No respiratory distress. Musculoskeletal: Normal range of motion. She exhibits tenderness. She exhibits no edema or deformity.         Right knee: She exhibits bony tenderness. She exhibits normal range of motion, no swelling, no effusion, no ecchymosis, no deformity, no laceration, no erythema, normal alignment, no LCL laxity, normal patellar mobility, normal meniscus and no MCL laxity. Tenderness (prepatellar) found. Legs:  Minimal R-sided prepatellar tenderness  NVI  No neurologic, motor, vascular, or compartment embarrassment observed on exam. No focal neurologic deficits. No obvious gait abnormalities and is able to bear weight on the joint. Flexion and extension movements are patients baseline. Appearance and temperature are symmetrical. No tenderness elicited of patella, joint lines, tibial tuberosity, or insertion points. Anterior Drawer Test: Pts knee flexed to 90 degrees to assess forward movement, which is normal.        Neurological: She is alert and oriented to person, place, and time. No cranial nerve deficit. She exhibits normal muscle tone. Coordination normal.   No focal neuro deficits. NVI. Neurologically intact of UE and LE B/L  Sensation intact and symmetrical of UE and LE B/L. Strength 5/5 of UE B/L, Strength 5/5 of LE B/L. Symmetric bulk and tone of LE muscle groups. Skin: Skin is warm and dry. No rash noted. She is not diaphoretic. No erythema. No pallor. No acute skin changes overlying R knee including ecchymosis, laceration, abrasion   Nursing note and vitals reviewed. MDM  Number of Diagnoses or Management Options  Acute pain of right knee:   Diagnosis management comments: DDx: fracture, contusion, effusion, sprain, strain, bursitis; cannot exclude internal derangement. Doubt gout, OA, Baker's cyst, septic arthritis, due to patient's presentation. 50 yo with chronic pain presenting after GLF where she landed on her R knee. Plain films negative but reveal OA.  reviewed and she typically fills Percocets.  Informed pt no indication to treat her with medication so strong without fracture and mobic will treat the pain related to her OA. Pt expresses understanding. Urged orthopedics follow-up. Amount and/or Complexity of Data Reviewed  Tests in the radiology section of CPT®: ordered and reviewed  Independent visualization of images, tracings, or specimens: yes      ED Course       Procedures    I reviewed our electronic medical record system for any past medical records that were available that may contribute to the patients current condition, the nursing notes and and vital signs from today's visit     Nursing notes will be reviewed as they become available in realtime while the pt is in the ED. Progress Note:  8:14 PM  Pt specifically requests Percocet. Informed her that due to the Opioid crisis in [place] and her plain films that reveal OA, I will be treating her with Mobic. Pt bargains with provider but informed her that narcotic medication is not acutely indicated. DISCHARGE NOTE:  8:19 PM  Porsha Randolph  results have been reviewed with her. She has been counseled regarding her diagnosis. She verbally conveys understanding and agreement of the signs, symptoms, diagnosis, treatment and prognosis and additionally agrees to follow up as recommended with Dr. Bib Elizondo MD in 24 - 48 hours. She also agrees with the care-plan and conveys that all of her questions have been answered. I have also put together some discharge instructions for her that include: 1) educational information regarding their diagnosis, 2) how to care for their diagnosis at home, as well a 3) list of reasons why they would want to return to the ED prior to their follow-up appointment, should their condition change. She and/or family's questions have been answered. I have encouraged them to see the official results in [facility]\" or to retrieve the specifics of their results from medical records.      IMAGING COMPLETED AND REVIEWED:  EXAM: XR KNEE RT 3 V     INDICATION: Right knee pain after slip and fall on wet floor today    COMPARISON: 12/15/2016.     FINDINGS: Three views of the right knee demonstrate no fracture or other acute  osseous or articular abnormality. There is tricompartmental osteoarthritis with  cortical irregularity of the posterior patella surface. Incidental note is made  of a small exostosis arises from the proximal fibula. There is no significant  effusion.     IMPRESSION  IMPRESSION: No fracture. Tricompartmental right knee osteoarthritis. Incidental  right proximal fibular exostosis. CLINICAL IMPRESSION:  1. Acute pain of right knee    2. Other chronic pain    3. Narcotic dependence, episodic use ([facility] [address]        Plan:  1. Return precautions  2. Medications as prescribed  3. Follow-ups as discussed  Discharge Medication List as of 3/20/2017  8:14 PM      CONTINUE these medications which have NOT CHANGED    Details   nortriptyline (PAMELOR) 25 mg capsule Take 1 Cap by mouth two (2) times a day., Normal, Disp-60 Cap, R-5      gabapentin (NEURONTIN) 600 mg tablet Take 1 Tab by mouth three (3) times daily. , Normal, Disp-90 Tab, R-5      aspirin delayed-release 81 mg tablet Take  by mouth daily. , Historical Med      metFORMIN (GLUCOPHAGE) 1,000 mg tablet TAKE 1 TABLET BY MOUTH TWO (2) TIMES DAILY WITH MEALS, Print, Disp-180 Tab, R-3      atenolol (TENORMIN) 100 mg tablet Take 1 Tab by mouth daily. TAKE ONE TABLET BY MOUTH ONCE DAILY, Normal, Disp-30 Tab, R-5           Follow-up Information     Follow up With Details Comments Contact Info    None Schedule an appointment as soon as possible for a visit in 2 days As needed, If symptoms worsen, Possible further evaluation and treatment None [phone] Patient stated that they have no PCP          Return to the closest emergency room or follow up sooner for any deterioration      This note will not be viewable in [facility].

## 2017-03-25 ENCOUNTER — HOSPITAL ENCOUNTER (EMERGENCY)
Age: 52
Discharge: HOME | End: 2017-03-26
Attending: EMERGENCY MEDICINE
Payer: MEDICAID
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
## 2017-03-25 DIAGNOSIS — M51.36 DEGENERATIVE DISC DISEASE, LUMBAR: ICD-10-CM

## 2017-03-25 DIAGNOSIS — S39.012A BACK STRAIN, INITIAL ENCOUNTER: ICD-10-CM

## 2017-03-25 PROCEDURE — 99283 EMERGENCY DEPT VISIT LOW MDM: CPT

## 2017-03-25 RX ORDER — OXYCODONE AND ACETAMINOPHEN 5; 325 MG/1; MG/1
TABLET ORAL
COMMUNITY
Start: 2017-03-21 | End: 2017-03-26

## 2017-03-26 ENCOUNTER — APPOINTMENT (OUTPATIENT)
Dept: GENERAL RADIOLOGY | Age: 52
End: 2017-03-26
Attending: PHYSICIAN ASSISTANT
Payer: MEDICAID

## 2017-03-26 VITALS
HEIGHT: 64 IN | HEART RATE: 90 BPM | TEMPERATURE: 98.4 F | WEIGHT: 235.67 LBS | BODY MASS INDEX: 40.23 KG/M2 | RESPIRATION RATE: 18 BRPM | OXYGEN SATURATION: 100 % | SYSTOLIC BLOOD PRESSURE: 157 MMHG | DIASTOLIC BLOOD PRESSURE: 91 MMHG

## 2017-03-26 LAB
APPEARANCE UR: CLEAR
BACTERIA URNS QL MICRO: NEGATIVE /HPF
BILIRUB UR QL: NEGATIVE
COLOR UR: ABNORMAL
EPITH CASTS URNS QL MICRO: ABNORMAL /LPF
GLUCOSE UR STRIP.AUTO-MCNC: >1000 MG/DL
HGB UR QL STRIP: NEGATIVE
HYALINE CASTS URNS QL MICRO: ABNORMAL /LPF (ref 0–5)
KETONES UR QL STRIP.AUTO: NEGATIVE MG/DL
LEUKOCYTE ESTERASE UR QL STRIP.AUTO: NEGATIVE
NITRITE UR QL STRIP.AUTO: NEGATIVE
PH UR STRIP: 5.5 [PH] (ref 5–8)
PROT UR STRIP-MCNC: NEGATIVE MG/DL
RBC #/AREA URNS HPF: ABNORMAL /HPF (ref 0–5)
SP GR UR REFRACTOMETRY: 1.02 (ref 1–1.03)
UA: UC IF INDICATED,UAUC: ABNORMAL
UROBILINOGEN UR QL STRIP.AUTO: 1 EU/DL (ref 0.2–1)
WBC URNS QL MICRO: ABNORMAL /HPF (ref 0–4)

## 2017-03-26 PROCEDURE — 81001 URINALYSIS AUTO W/SCOPE: CPT | Performed by: PHYSICIAN ASSISTANT

## 2017-03-26 PROCEDURE — 74011250637 HC RX REV CODE- 250/637: Performed by: PHYSICIAN ASSISTANT

## 2017-03-26 PROCEDURE — 72100 X-RAY EXAM L-S SPINE 2/3 VWS: CPT

## 2017-03-26 RX ORDER — OXYCODONE AND ACETAMINOPHEN 5; 325 MG/1; MG/1
.5-1 TABLET ORAL
Qty: 10 TAB | Refills: 0 | Status: SHIPPED | OUTPATIENT
Start: 2017-03-26 | End: 2017-04-05 | Stop reason: SDUPTHER

## 2017-03-26 RX ORDER — NAPROXEN 250 MG/1
500 TABLET ORAL
Status: COMPLETED | OUTPATIENT
Start: 2017-03-26 | End: 2017-03-26

## 2017-03-26 RX ORDER — OXYCODONE AND ACETAMINOPHEN 5; 325 MG/1; MG/1
1 TABLET ORAL
Status: DISCONTINUED | OUTPATIENT
Start: 2017-03-26 | End: 2017-03-26

## 2017-03-26 RX ORDER — CYCLOBENZAPRINE HCL 10 MG
10 TABLET ORAL
Status: DISCONTINUED | OUTPATIENT
Start: 2017-03-26 | End: 2017-03-26

## 2017-03-26 RX ADMIN — NAPROXEN 500 MG: 250 TABLET ORAL at 00:48

## 2017-03-26 NOTE — ED NOTES
Pt stated her cousin, who was her ride home, left the hospital. Explained to the pt that she can not have the other ordered medications until her ride comes back to the hospital and is seen by a nurse.

## 2017-03-26 NOTE — ED PROVIDER NOTES
HPI Comments: Jaymie Goodwin is a 49 y.o. female with PMHx significant for chronic pain, HTN, DM, diabetic neuropathy, who presents ambulatory to ED [facility] ED with cc of acute on chronic lower back pain s/p falling backwards down steps yesterday. Patient reports taking Tylenol after falling last night. She states she was unable to get out of bed this morning secondary to pain. Patient notes taking muscle relaxer around 3:00 PM today. She states she ran out of the Percocet prescribed on 3/21/2017 by Dr. Bari Osborne. Patient specifically denies having a pain contract with Dr. Bari Osborne. She reports seeing Dr. Leilani Arce and having fluid drawn from her back and states the results were normal. Of note, patient states her sugar levels have been an average of 130s. Patient specifically denies any head injury, LOC, vomiting, fever, or bladder/bowel incontinence. PCP: Elva Simms MD    Social History: (-) Tobacco, (-) EtOH, (-) Illicit Drugs       There are no other complaints, changes, or physical findings at this time. Written by Hermila Pandey ED [employer], as dictated by Puma Corrigan. The history is provided by the patient. No  was used. Past Medical History:   Diagnosis Date    Anemia     Chronic pain     [facility]yst [facility] hand 2014    [facility]    Diabetes mellitus type 2 in obese ([facility]     Diabetic neuropathy ([facility]     Hypertension     Migraine        History reviewed. No pertinent surgical history.       Family History:   Problem Relation Age of Onset   [facility] Father      brain    Diabetes Mother     Heart Disease Mother     Hypertension Mother     Hypertension Maternal Grandmother     Heart Disease Maternal Grandmother        Social History     Social History    Marital status:      Spouse name: N/A    Number of children: N/A    Years of education: N/A     Occupational History    disabled      Social History Main Topics    Smoking status: Never Smoker    Smokeless tobacco: Never Used    Alcohol use No    Drug use: No    Sexual activity: Not Currently     Other Topics Concern    Not on file     Social History Narrative    Lives with daughter. Not working. Applying for disabilty. ALLERGIES: Lortab [hydrocodone-acetaminophen]; Cymbalta [duloxetine]; and Lyrica [pregabalin]    Review of Systems   Constitutional: Negative. Negative for chills and fever. HENT: Negative. Negative for rhinorrhea and sore throat. Eyes: Negative. Negative for visual disturbance. Respiratory: Negative. Negative for cough, chest tightness, shortness of breath and wheezing. Cardiovascular: Negative. Negative for chest pain and palpitations. Gastrointestinal: Negative. Negative for abdominal pain, constipation, diarrhea, nausea and vomiting. Genitourinary: Negative. Negative for dysuria and hematuria. Musculoskeletal: Positive for back pain. Negative for arthralgias and myalgias. Skin: Negative. Negative for rash. Allergic/Immunologic: Negative. Negative for environmental allergies and food allergies. Neurological: Negative. Negative for syncope and headaches. Psychiatric/Behavioral: Negative. Negative for suicidal ideas. Vitals:    03/25/17 2352   BP: (!) 171/100   Pulse: 96   Resp: 18   Temp: 98.4 °F (36.9 °C)   SpO2: 100%   Weight: 106.9 kg (235 lb 10.8 oz)   Height: 5' 4\" (1.626 m)            Physical Exam   Constitutional: She is oriented to person, place, and time. She appears well-developed and well-nourished. No distress. Patient is ambulatory  Patient is overweight   HENT:   Head: Normocephalic and atraumatic. Right Ear: External ear normal.   Left Ear: External ear normal.   Nose: Nose normal.   Mouth/Throat: Oropharynx is clear and moist. No oropharyngeal exudate. Eyes: Conjunctivae and EOM are normal. Pupils are equal, round, and reactive to light. Right eye exhibits no discharge. Left eye exhibits no discharge. No scleral icterus.    Neck: Normal range of motion. Neck supple. No tracheal deviation present. Cardiovascular: Normal rate, regular rhythm, normal heart sounds and intact distal pulses. Exam reveals no gallop and no friction rub. No murmur heard. Pulmonary/Chest: Effort normal and breath sounds normal. No respiratory distress. She has no wheezes. She has no rales. She exhibits no tenderness. Abdominal: Soft. Bowel sounds are normal. She exhibits no distension and no mass. There is no tenderness. There is no rebound and no guarding. Musculoskeletal:   Bilateral musculature lumbar tenderness   No bony tenderness or deformity      Lymphadenopathy:     She has no cervical adenopathy. Neurological: She is alert and oriented to person, place, and time. No cranial nerve deficit. Skin: Skin is warm and dry. No rash noted. No erythema. Psychiatric: She has a normal mood and affect. Her behavior is normal.   Nursing note and vitals reviewed. MDM  Number of Diagnoses or Management Options  Back strain, initial encounter:   Degenerative disc disease, lumbar:   Elevated blood pressure:   Fall, initial encounter:   Diagnosis management comments: DDx: sprain, strain, fracture, UTI, elevated BP       Amount and/or Complexity of Data Reviewed  Clinical lab tests: ordered and reviewed  Tests in the radiology section of CPT®: ordered and reviewed  Review and summarize past medical records: yes    Patient Progress  Patient progress: stable    ED Course       Procedures    PROGRESS NOTE  1:09 AM   Patient is here with her daughter who is being treated as well. She stated her cousin is in the lobby and would take her home. However, her cousin has left, so we did not medicate her here with narcotics. Written by Herminia Dong ED Scribe, as dictated by [employer].       LABORATORY TESTS:  Recent Results (from the past 12 hour(s))   URINALYSIS W/ REFLEX CULTURE    Collection Time: 03/26/17 12:25 AM   Result Value Ref Range    Color YELLOW/STRAW Appearance CLEAR CLEAR      Specific gravity 1.019 1.003 - 1.030      pH (UA) 5.5 5.0 - 8.0      Protein NEGATIVE  NEG mg/dL    Glucose >1000 (A) NEG mg/dL    Ketone NEGATIVE  NEG mg/dL    Bilirubin NEGATIVE  NEG      Blood NEGATIVE  NEG      Urobilinogen 1.0 0.2 - 1.0 EU/dL    Nitrites NEGATIVE  NEG      Leukocyte Esterase NEGATIVE  NEG      WBC 0-4 0 - 4 /hpf    RBC 0-5 0 - 5 /hpf    Epithelial cells FEW FEW /lpf    Bacteria NEGATIVE  NEG /hpf    UA:UC IF INDICATED CULTURE NOT INDICATED BY UA RESULT CNI      Hyaline cast 2-5 0 - 5 /lpf       IMAGING RESULTS:  XR SPINE LUMB 2 OR 3 V   Final Result   History: Back pain.     Frontal, lateral and coned lateral L5-S1 views show no fracture or subluxation. There is anterior osteophyte formation. There is disc space narrowing at L4-L5. The soft tissues are unremarkable.     IMPRESSION  IMPRESSION:  Disc space narrowing. MEDICATIONS GIVEN:  Medications   oxyCODONE-acetaminophen (PERCOCET) 5-325 mg per tablet 1 Tab (not administered)   cyclobenzaprine (FLEXERIL) tablet 10 mg (not administered)   naproxen (NAPROSYN) tablet 500 mg (500 mg Oral Given 3/26/17 0048)       IMPRESSION:  1. Fall, initial encounter    2. Back strain, initial encounter    3. Degenerative disc disease, lumbar    4. Elevated blood pressure        PLAN:  1. Current Discharge Medication List      CONTINUE these medications which have CHANGED    Details   oxyCODONE-acetaminophen (PERCOCET) 5-325 mg per tablet Take 0.5-1 Tabs by mouth every eight (8) hours as needed for Pain. Max Daily Amount: 3 Tabs. Qty: 10 Tab, Refills: 0           2.    Follow-up Information     Follow up With Details Comments [address], [place]   [address]      Sade Ruby MD   [address] [phone]      [facility] EMERGENCY DEPT  If symptoms worsen [facility] [place] [address]  [phone]          Return to ED if worse     Discharge Note:  1:09 AM  The patient has been re-evaluated and is ready for discharge. Reviewed available results with patient. Counseled patient on diagnosis and care plan. Patient has expressed understanding, and all questions have been answered. Patient agrees with plan and agrees to follow up as recommended, or to return to the ED if their symptoms worsen. Discharge instructions have been provided and explained to the patient, along with reasons to return to the ED. Written by Fhaeem Pabon, ED Scribe, as dictated by Suzanne Koehler. This note is prepared by Faheem Pabon, acting as Scribe for Suzanne Koehler. LIU Mackay,: The scribe's documentation has been prepared under my direction and personally reviewed by me in its entirety. I confirm that the note above accurately reflects all work, treatment, procedures, and medical decision making performed by me.

## 2017-03-26 NOTE — DISCHARGE INSTRUCTIONS

## 2017-03-26 NOTE — ED NOTES
Patient discharged and given discharge instructions by Jaime Birmingham. Patient had an opportunity to ask questions. Patient verbalized understanding of discharge instructions. Patient ambulatory from ED, discharge instructions and prescriptions in hand. Patient accompanied by daughter. WILLIS Vickers notified of pt's BP of 157/91. PA okay for pt to be discharged.

## 2017-04-05 ENCOUNTER — OFFICE VISIT (OUTPATIENT)
Dept: INTERNAL MEDICINE CLINIC | Age: 52
End: 2017-04-05

## 2017-04-05 VITALS
HEIGHT: 64 IN | WEIGHT: 235 LBS | HEART RATE: 99 BPM | DIASTOLIC BLOOD PRESSURE: 77 MMHG | SYSTOLIC BLOOD PRESSURE: 133 MMHG | OXYGEN SATURATION: 98 % | BODY MASS INDEX: 40.12 KG/M2 | RESPIRATION RATE: 16 BRPM | TEMPERATURE: 99.1 F

## 2017-04-05 DIAGNOSIS — M54.50 ACUTE LOW BACK PAIN WITHOUT SCIATICA, UNSPECIFIED BACK PAIN LATERALITY: Primary | ICD-10-CM

## 2017-04-05 DIAGNOSIS — E11.42 DIABETIC POLYNEUROPATHY ASSOCIATED WITH TYPE 2 DIABETES MELLITUS (HCC): ICD-10-CM

## 2017-04-05 DIAGNOSIS — N18.1 CRI (CHRONIC RENAL INSUFFICIENCY), STAGE 1: ICD-10-CM

## 2017-04-05 DIAGNOSIS — F11.20 NARCOTIC DEPENDENCE, EPISODIC USE (HCC): ICD-10-CM

## 2017-04-05 DIAGNOSIS — E11.21 CONTROLLED TYPE 2 DIABETES MELLITUS WITH DIABETIC NEPHROPATHY, WITHOUT LONG-TERM CURRENT USE OF INSULIN (HCC): ICD-10-CM

## 2017-04-05 LAB
BILIRUB UR QL STRIP: NEGATIVE
GLUCOSE UR-MCNC: NORMAL MG/DL
KETONES P FAST UR STRIP-MCNC: NEGATIVE MG/DL
PH UR STRIP: 5.5 [PH] (ref 4.6–8)
PROT UR QL STRIP: NEGATIVE MG/DL
SP GR UR STRIP: 1.01 (ref 1–1.03)
UA UROBILINOGEN AMB POC: NORMAL (ref 0.2–1)
URINALYSIS CLARITY POC: CLEAR
URINALYSIS COLOR POC: YELLOW
URINE BLOOD POC: NORMAL
URINE LEUKOCYTES POC: NEGATIVE
URINE NITRITES POC: NEGATIVE

## 2017-04-05 RX ORDER — OXYCODONE AND ACETAMINOPHEN 5; 325 MG/1; MG/1
1 TABLET ORAL
Qty: 12 TAB | Refills: 0 | Status: SHIPPED | OUTPATIENT
Start: 2017-04-05 | End: 2017-04-17 | Stop reason: ALTCHOICE

## 2017-04-05 NOTE — LETTER
4/10/2017 11:57 AM 
 
Ms. Jamil Lombardo [address] [facility] [address] Dear Jamil Lombardo: 
 
Please find your most recent results below. Resulted Orders AMB POC URINALYSIS DIP STICK AUTO W/O MICRO Result Value Ref Range Color (UA POC) Yellow Clarity (UA POC) Clear Glucose (UA POC) 1+ Negative Comment:  
   >=1000 mg/dl Bilirubin (UA POC) Negative Negative Ketones (UA POC) Negative Negative Specific gravity (UA POC) 1.010 1.001 - 1.035 Blood (UA POC) Trace Negative pH (UA POC) 5.5 4.6 - 8.0 Protein (UA POC) Negative Negative mg/dL Urobilinogen (UA POC) 0.2 mg/dL 0.2 - 1 Nitrites (UA POC) Negative Negative Leukocyte esterase (UA POC) Negative Negative SED RATE (ESR) Result Value Ref Range Sed rate (ESR) 17 0 - 40 mm/hr Narrative Performed at:  [address]  [id number] : Santino Mendenhall MD, Phone:  [phone] RA + CCP ABS Result Value Ref Range Rheumatoid factor <10.0 0.0 - 13.9 IU/mL  
 CCP Antibodies IgG/IgA 5 0 - 19 units Comment:  
                             Negative               <20 Weak positive      20 - 39 Moderate positive  40 - 59 Strong positive        >59 Narrative Performed at:  [address]  [id number] : Santino Mendenhall MD, Phone:  [phone] METABOLIC PANEL, BASIC Result Value Ref Range Glucose 246 (H) 65 - 99 mg/dL BUN 14 6 - 24 mg/dL Creatinine 1.28 (H) 0.57 - 1.00 mg/dL GFR est non-AA 49 (L) >59 mL/min/1.73 GFR est AA 56 (L) >59 mL/min/1.73  
 BUN/Creatinine ratio 11 9 - 23 Comment: **Please note reference interval change** Sodium 139 134 - 144 mmol/L Potassium 4.8 3.5 - 5.2 mmol/L  Chloride 101 96 - 106 mmol/L  
 CO2 18 18 - 29 mmol/L  
 Calcium 9.6 8.7 - 10.2 mg/dL Narrative Performed at:  [address]  [id number] : Faye Berman MD, Phone:  [phone] CKD REPORT Result Value Ref Range Interpretation Note Comment:  
   Supplement report is available. Narrative Performed at:  [address] [place] [address]  [id number] : Christy Esquivel PhD, Phone:  [phone] RECOMMENDATIONS: 
Results of labs are normal/ stable. Follow up as scheduled. Please call me if you have any questions: [phone] Sincerely, Thao Quinones MD

## 2017-04-05 NOTE — PROGRESS NOTES
Chief Complaint   Patient presents with    Back Pain     [facility] last Sat AM     I have reviewed the patient's medical history in detail and updated the computerized patient record. Health Maintenance reviewed. 1. Have you been to the ER, urgent care clinic since your last visit? Hospitalized since your last visit? yes    2. Have you seen or consulted any other health care providers outside of the [address] [place] [address] since your last visit? Include any pap smears or colon screening. Yes  [facility] last Sat AM    Do you have an [address] in place in the event that you have a healthcare crisis that could impact your decision making as it pertains to your health? NO    Would you like information about the Advance Care Plan? YES    Information given. YES

## 2017-04-05 NOTE — MR AVS SNAPSHOT
Visit Information Date & Time Provider Department Dept. Phone Encounter #  
 4/5/2017  2:00 PM MD [address] [id number] Follow-up Instructions Return in about 6 weeks (around 5/17/2017) for routine follow up. Your Appointments 5/15/2017  1:00 PM  
ROUTINE CARE with Ottoniel Greer MD  
[facility]) Appt Note: f/u on dm $0 cp lsh 3/13/17  
 [address] [place] [address]  
[phone]  
  
   
 [address] [facility] [address] Upcoming Health Maintenance Date Due  
 BREAST CANCER SCRN MAMMOGRAM 12/28/2016 FOBT Q 1 YEAR AGE 50-75 12/28/2016 LIPID PANEL Q1 3/25/2017 HEMOGLOBIN A1C Q6M 8/13/2017 MICROALBUMIN Q1 8/15/2017 PAP AKA CERVICAL CYTOLOGY 9/3/2017 EYE EXAM RETINAL OR DILATED Q1 10/25/2017 FOOT EXAM Q1 2/13/2018 DTaP/Tdap/Td series (2 - Td) 3/13/2027 Allergies as of 4/5/2017  Review Complete On: 4/5/2017 By: Shavonne Hopkins LPN Severity Noted Reaction Type Reactions Lortab [Hydrocodone-acetaminophen] High 08/11/2013   Intolerance Nausea and Vomiting Cymbalta [Duloxetine]  10/04/2016    Diarrhea Lyrica [Pregabalin]  03/13/2017    Drowsiness Current Immunizations  Reviewed on 12/13/2016 No immunizations on file. Not reviewed this visit You Were Diagnosed With   
  
 Codes Comments Acute low back pain without sciatica, unspecified back pain laterality    -  Primary ICD-10-CM: M54.5 ICD-9-CM: 724.2 Narcotic dependence, episodic use ([facility]     ICD-10-CM: Z04.62 ICD-9-CM: 304.92 Diabetic polyneuropathy associated with type 2 diabetes mellitus ([facility]     ICD-10-CM: E11.42 
ICD-9-CM: 250.60, 357.2 CRI (chronic renal insufficiency), stage 1     ICD-10-CM: N18.1 ICD-9-CM: 585.1  Controlled type 2 diabetes mellitus with diabetic nephropathy, without long-term current use of insulin (HCC)     ICD-10-CM: E11.21 
ICD-9-CM: 250.40, 583.81 Vitals BP Pulse Temp Resp Height(growth percentile) Weight(growth percentile) 133/77 (BP 1 Location: Left arm, BP Patient Position: Sitting) 99 99.1 °F (37.3 °C) (Oral) 16 5' 4\" (1.626 m) 235 lb (106.6 kg) LMP SpO2 BMI OB Status Smoking Status 03/03/2017 98% 40.34 kg/m2 Having regular periods Never Smoker BMI and BSA Data Body Mass Index Body Surface Area  
 40.34 kg/m 2 2.19 m 2 Preferred Pharmacy Pharmacy Name Phone [facility] [address] [facility], [address] [facility] [phone] Your Updated Medication List  
  
   
This list is accurate as of: 4/5/17  3:14 PM.  Always use your most recent med list.  
  
  
  
  
 aspirin delayed-release 81 mg tablet Take  by mouth daily. atenolol 100 mg tablet Commonly known as:  TENORMIN Take 1 Tab by mouth daily. TAKE ONE TABLET BY MOUTH ONCE DAILY  
  
 gabapentin 600 mg tablet Commonly known as:  NEURONTIN Take 1 Tab by mouth three (3) times daily. lisinopril 10 mg tablet Commonly known as:  PRINIVIL, ZESTRIL  
TAKE ONE TABLET BY MOUTH ONCE DAILY  
  
 meloxicam 15 mg tablet Commonly known as:  MOBIC Take 1 Tab by mouth daily. metFORMIN 1,000 mg tablet Commonly known as:  GLUCOPHAGE  
TAKE 1 TABLET BY MOUTH TWO (2) TIMES DAILY WITH MEALS  
  
 nortriptyline 25 mg capsule Commonly known as:  PAMELOR Take 1 Cap by mouth two (2) times a day. oxyCODONE-acetaminophen 5-325 mg per tablet Commonly known as:  PERCOCET Take 1 Tab by mouth every eight (8) hours as needed for Pain. Max Daily Amount: 3 Tabs. Prescriptions Printed Refills  
 oxyCODONE-acetaminophen (PERCOCET) 5-325 mg per tablet 0 Sig: Take 1 Tab by mouth every eight (8) hours as needed for Pain. Max Daily Amount: 3 Tabs. Class: Print Route: Oral  
  
We Performed the Following AMB POC URINALYSIS DIP STICK AUTO W/O MICRO [40301 CPT(R)] METABOLIC PANEL, BASIC [94760 CPT(R)] CO COLLECTION VENOUS BLOOD,VENIPUNCTURE [id number] CPT(R)] CO HANDLG&/OR CONVEY OF SPEC FOR TR OFFICE TO LAB [12091 CPT(R)]   
 RA + CCP ABS [OPQ15207 Custom] SED RATE (ESR) [id number] CPT(R)] Follow-up Instructions Return in about 6 weeks (around 5/17/2017) for routine follow up. Introducing [facility]! [employer] introduces Drivy patient portal. Now you can access parts of your medical record, email your doctor's office, and request medication refills online. 1. In your internet browser, go to [email or web address] 2. Click on the First Time User? Click Here link in the Sign In box. You will see the New Member Sign Up page. 3. Enter your Drivy Access Code exactly as it appears below. You will not need to use this code after youve completed the sign-up process. If you do not sign up before the expiration date, you must request a new code. · Drivy Access Code: [email or web address] Expires: 6/15/2017  8:22 PM 
 
4. Enter the last four digits of your Social Security Number (xxxx) and Date of Birth (mm/dd/yyyy) as indicated and click Submit. You will be taken to the next sign-up page. 5. Create a Drivy ID. This will be your Drivy login ID and cannot be changed, so think of one that is secure and easy to remember. 6. Create a Drivy password. You can change your password at any time. 7. Enter your Password Reset Question and Answer. This can be used at a later time if you forget your password. 8. Enter your e-mail address. You will receive e-mail notification when new information is available in [address] 9. Click Sign Up. You can now view and download portions of your medical record. 10. Click the Download Summary menu link to download a portable copy of your medical information. If you have questions, please visit the Frequently Asked Questions section of the [employer] website. Remember, [employer] is NOT to be used for urgent needs. For medical emergencies, dial 911. Now available from your iPhone and Android! Please provide this summary of care documentation to your next provider. Your primary care clinician is listed as Sierra Kaur. If you have any questions after today's visit, please call [phone].

## 2017-04-05 NOTE — PROGRESS NOTES
HISTORY OF PRESENT ILLNESS  Nena Coker is a 49 y.o. female. Back Pain    The history is provided by the patient. This is a recurrent problem. Episode onset: 4 days. The problem has not changed since onset. The problem occurs constantly. The pain is associated with no known injury. Pertinent negatives include no fever and no weight loss. left foot was swollen, it's better  Pain was very severe, finished percocet. Daughter is trying to get her into a pain manager for her neuropathic pain. Review of Systems   Constitutional: Negative for fever and weight loss. Gastrointestinal: Negative for blood in stool. Genitourinary: Negative for hematuria. No incontinence   Musculoskeletal: Positive for back pain. Past Medical History:   Diagnosis Date    Anemia     Chronic pain     Cyst in hand 2014    [address]    Diabetes mellitus type 2 in obese ([address]     Diabetic neuropathy ([address]     Hypertension     Migraine      Social History     Social History    Marital status:      Spouse name: N/A    Number of children: N/A    Years of education: N/A     Occupational History    disabled      Social History Main Topics    Smoking status: Never Smoker    Smokeless tobacco: Never Used    Alcohol use No    Drug use: No    Sexual activity: Not Currently     Other Topics Concern    Not on file     Social History Narrative    Lives with daughter. Not working. Applying for disabilty. Physical Exam  Visit Vitals    /77 (BP 1 Location: Left arm, BP Patient Position: Sitting)    Pulse 99    Temp 99.1 °F (37.3 °C) (Oral)    Resp 16    Ht 5' 4\" (1.626 m)    Wt 235 lb (106.6 kg)    LMP 03/03/2017    SpO2 98%    BMI 40.34 kg/m2     WD WN female NAD  Heart RRR without murmers clicks or rubs  Lungs CTA  Abdo soft nontender  Ext no edema  Back was examined, grossly normal, no lesions or rash. Saddle area, sensation present.   Lower leg strength 3 out of 5 bilateral.  Straight leg raises not tested  Diabetic foot exam was performed. No obvious sores or red lesions. ASSESSMENT and PLAN  Encounter Diagnoses   Name Primary?  Acute low back pain without sciatica, unspecified back pain laterality Yes    Narcotic dependence, episodic use ([address]     Diabetic polyneuropathy associated with type 2 diabetes mellitus ([address]     CRI (chronic renal insufficiency), stage 1     Controlled type 2 diabetes mellitus with diabetic nephropathy, without long-term current use of insulin ([facility]      Orders Placed This Encounter    SED RATE (ESR)    RA + CCP ABS    METABOLIC PANEL, BASIC    AMB POC URINALYSIS DIP STICK AUTO W/O MICRO    oxyCODONE-acetaminophen (PERCOCET) 5-325 mg per tablet     Discussed the nature of back pain. Discussed how this is in general a 'red flag' diagnosis and the general limited and temporary nature of this problem as well as its re-occurrence. Discussed further work-up and treatment options. Discused narcotics and back pain: yes    She was looked up in the drug database. Findings show evidence of drug abuse: no    Follow-up Disposition:  Return in about 6 weeks (around 5/17/2017) for routine follow up.

## 2017-04-07 LAB
BUN SERPL-MCNC: 14 MG/DL (ref 6–24)
BUN/CREAT SERPL: 11 (ref 9–23)
CALCIUM SERPL-MCNC: 9.6 MG/DL (ref 8.7–10.2)
CCP IGA+IGG SERPL IA-ACNC: 5 UNITS (ref 0–19)
CHLORIDE SERPL-SCNC: 101 MMOL/L (ref 96–106)
CO2 SERPL-SCNC: 18 MMOL/L (ref 18–29)
CREAT SERPL-MCNC: 1.28 MG/DL (ref 0.57–1)
ERYTHROCYTE [SEDIMENTATION RATE] IN BLOOD BY WESTERGREN METHOD: 17 MM/HR (ref 0–40)
GLUCOSE SERPL-MCNC: 246 MG/DL (ref 65–99)
INTERPRETATION: NORMAL
POTASSIUM SERPL-SCNC: 4.8 MMOL/L (ref 3.5–5.2)
RHEUMATOID FACT SERPL-ACNC: <10 IU/ML (ref 0–13.9)
SODIUM SERPL-SCNC: 139 MMOL/L (ref 134–144)

## 2017-04-14 DIAGNOSIS — Z12.31 SCREENING MAMMOGRAM, ENCOUNTER FOR: ICD-10-CM

## 2017-04-17 ENCOUNTER — APPOINTMENT (OUTPATIENT)
Dept: GENERAL RADIOLOGY | Age: 52
End: 2017-04-17
Attending: EMERGENCY MEDICINE
Payer: MEDICAID

## 2017-04-17 ENCOUNTER — OFFICE VISIT (OUTPATIENT)
Dept: INTERNAL MEDICINE CLINIC | Age: 52
End: 2017-04-17

## 2017-04-17 ENCOUNTER — HOSPITAL ENCOUNTER (EMERGENCY)
Age: 52
Discharge: HOME | End: 2017-04-17
Attending: EMERGENCY MEDICINE
Payer: MEDICAID

## 2017-04-17 VITALS
BODY MASS INDEX: 39.27 KG/M2 | HEIGHT: 64 IN | TEMPERATURE: 99.1 F | WEIGHT: 230 LBS | OXYGEN SATURATION: 98 % | SYSTOLIC BLOOD PRESSURE: 160 MMHG | RESPIRATION RATE: 18 BRPM | DIASTOLIC BLOOD PRESSURE: 90 MMHG | HEART RATE: 89 BPM

## 2017-04-17 VITALS
HEART RATE: 86 BPM | SYSTOLIC BLOOD PRESSURE: 198 MMHG | WEIGHT: 233.91 LBS | DIASTOLIC BLOOD PRESSURE: 113 MMHG | TEMPERATURE: 97.2 F | BODY MASS INDEX: 39.93 KG/M2 | OXYGEN SATURATION: 99 % | RESPIRATION RATE: 16 BRPM | HEIGHT: 64 IN

## 2017-04-17 DIAGNOSIS — E08.42 DIABETIC POLYNEUROPATHY ASSOCIATED WITH DIABETES MELLITUS DUE TO UNDERLYING CONDITION (HCC): Primary | ICD-10-CM

## 2017-04-17 DIAGNOSIS — S80.01XA CONTUSION OF RIGHT KNEE, INITIAL ENCOUNTER: Primary | ICD-10-CM

## 2017-04-17 PROCEDURE — 96372 THER/PROPH/DIAG INJ SC/IM: CPT

## 2017-04-17 PROCEDURE — 99282 EMERGENCY DEPT VISIT SF MDM: CPT

## 2017-04-17 PROCEDURE — 74011250636 HC RX REV CODE- 250/636: Performed by: PHYSICIAN ASSISTANT

## 2017-04-17 PROCEDURE — 73562 X-RAY EXAM OF KNEE 3: CPT

## 2017-04-17 RX ORDER — KETOROLAC TROMETHAMINE 30 MG/ML
30 INJECTION, SOLUTION INTRAMUSCULAR; INTRAVENOUS
Status: COMPLETED | OUTPATIENT
Start: 2017-04-17 | End: 2017-04-17

## 2017-04-17 RX ORDER — ETODOLAC 500 MG/1
500 TABLET, FILM COATED ORAL 2 TIMES DAILY
Qty: 20 TAB | Refills: 0 | Status: SHIPPED | OUTPATIENT
Start: 2017-04-17 | End: 2017-05-15 | Stop reason: ALTCHOICE

## 2017-04-17 RX ADMIN — KETOROLAC TROMETHAMINE 30 MG: 30 INJECTION, SOLUTION INTRAMUSCULAR at 16:12

## 2017-04-17 NOTE — DISCHARGE INSTRUCTIONS
Bruises: Care Instructions  Your Care Instructions    Bruises occur when small blood vessels under the skin tear or rupture, most often from a twist, bump, or fall. Blood leaks into tissues under the skin and causes a black-and-blue spot that often turns colors, including purplish black, reddish blue, or yellowish green, as the bruise heals. Bruises hurt, but most are not serious and will go away on their own within 2 to 4 weeks. Sometimes, gravity causes them to spread down the body. A leg bruise usually will take longer to heal than a bruise on the face or arms. Follow-up care is a key part of your treatment and safety. Be sure to make and go to all appointments, and call your doctor if you are having problems. Its also a good idea to know your test results and keep a list of the medicines you take. How can you care for yourself at home? · Take pain medicines exactly as directed. ¨ If the doctor gave you a prescription medicine for pain, take it as prescribed. ¨ If you are not taking a prescription pain medicine, ask your doctor if you can take an over-the-counter medicine. · Put ice or a cold pack on the area for 10 to 20 minutes at a time. Put a thin cloth between the ice and your skin. · If you can, prop up the bruised area on pillows as much as possible for the next few days. Try to keep the bruise above the level of your heart. When should you call for help? Call your doctor now or seek immediate medical care if:  · You have signs of infection, such as:  ¨ Increased pain, swelling, warmth, or redness. ¨ Red streaks leading from the bruise. ¨ Pus draining from the bruise. ¨ A fever. · You have a bruise on your leg and signs of a blood clot, such as:  ¨ Increasing redness and swelling along with warmth, tenderness, and pain in the bruised area. ¨ Pain in your calf, back of the knee, thigh, or groin. ¨ Redness and swelling in your leg or groin. · Your pain gets worse.   Watch closely for changes in your health, and be sure to contact your doctor if:  · You do not get better as expected. Where can you learn more? Go to http://lizbeth-efraín.info/. Enter [phone] [phone] in the search box to learn more about \"Bruises: Care Instructions. \"  Current as of: May 27, 2016  Content Version: 11.2  © 0590-2075 [employer]. Care instructions adapted under license by [employer] (which disclaims liability or warranty for this information). If you have questions about a medical condition or this instruction, always ask your healthcare professional. [place] [address] any warranty or liability for your use of this information. Chronic Pain: Care Instructions  Your Care Instructions  Chronic pain is pain that lasts a long time (months or even years) and may or may not have a clear cause. It is different from acute pain, which usually does have a clear cause--like an injury or illness--and gets better over time. Chronic pain:  · Lasts over time but may vary from day to day. · Does not go away despite efforts to end it. · May disrupt your sleep and lead to fatigue. · May cause depression or anxiety. · May make your muscles tense, causing more pain. · Can disrupt your work, hobbies, home life, and relationships with friends and family. Chronic pain is a very real condition. It is not just in your head. Treatment can help and usually includes several methods used together, such as medicines, physical therapy, exercise, and other treatments. Learning how to relax and changing negative thought patterns can also help you cope. Chronic pain is complex. Taking an active role in your treatment will help you better manage your pain. Tell your doctor if you have trouble dealing with your pain. You may have to try several things before you find what works best for you. Follow-up care is a key part of your treatment and safety.  Be sure to make and go to all appointments, and call your doctor if you are having problems. Its also a good idea to know your test results and keep a list of the medicines you take. How can you care for yourself at home? · Pace yourself. Break up large jobs into smaller tasks. Save harder tasks for days when you have less pain, or go back and forth between hard tasks and easier ones. Take rest breaks. · Relax, and reduce stress. Relaxation techniques such as deep breathing or meditation can help. · Keep moving. Gentle, daily exercise can help reduce pain over the long run. Try low- or no-impact exercises such as walking, swimming, and stationary biking. Do stretches to stay flexible. · Try heat, cold packs, and massage. · Get enough sleep. Chronic pain can make you tired and drain your energy. Talk with your doctor if you have trouble sleeping because of pain. · Think positive. Your thoughts can affect your pain level. Do things that you enjoy to distract yourself when you have pain instead of focusing on the pain. See a movie, read a book, listen to music, or spend time with a friend. · If you think you are depressed, talk to your doctor about treatment. · Keep a daily pain diary. Record how your moods, thoughts, sleep patterns, activities, and medicine affect your pain. You may find that your pain is worse during or after certain activities or when you are feeling a certain emotion. Having a record of your pain can help you and your doctor find the best ways to treat your pain. · Take pain medicines exactly as directed. ¨ If the doctor gave you a prescription medicine for pain, take it as prescribed. ¨ If you are not taking a prescription pain medicine, ask your doctor if you can take an over-the-counter medicine. Reducing constipation caused by pain medicine  · Include fruits, vegetables, beans, and whole grains in your diet each day. These foods are high in fiber.   · Drink plenty of fluids, enough so that your urine is light yellow or clear like water. If you have kidney, heart, or liver disease and have to limit fluids, talk with your doctor before you increase the amount of fluids you drink. · If your doctor recommends it, get more exercise. Walking is a good choice. Bit by bit, increase the amount you walk every day. Try for at least 30 minutes on most days of the week. · Schedule time each day for a bowel movement. A daily routine may help. Take your time and do not strain when having a bowel movement. When should you call for help? Call your doctor now or seek immediate medical care if:  · Your pain gets worse or is out of control. · You feel down or blue, or you do not enjoy things like you once did. You may be depressed, which is common in people with chronic pain. Depression can be treated. · You have vomiting or cramps for more than 2 hours. Watch closely for changes in your health, and be sure to contact your doctor if:  · You cannot sleep because of pain. · You are very worried or anxious about your pain. · You have trouble taking your pain medicine. · You have any concerns about your pain medicine. · You have trouble with bowel movements, such as:  ¨ No bowel movement in 3 days. ¨ Blood in the anal area, in your stool, or on the toilet paper. ¨ Diarrhea for more than 24 hours. Where can you learn more? Go to http://lizbeth-efraín.info/. Enter N004 in the search box to learn more about \"Chronic Pain: Care Instructions. \"  Current as of: October 14, 2016  Content Version: 11.2  © 1129-6133 [employer]. Care instructions adapted under license by [employer] (which disclaims liability or warranty for this information). If you have questions about a medical condition or this instruction, always ask your healthcare professional. [address] any warranty or liability for your use of this information.

## 2017-04-17 NOTE — PROGRESS NOTES
Chief Complaint   Patient presents with    Leg Pain     I have reviewed the patient's medical history in detail and updated the computerized patient record. Health Maintenance reviewed. 1. Have you been to the ER, urgent care clinic since your last visit? Hospitalized since your last visit?no    2. Have you seen or consulted any other health care providers outside of the [address] [place] [address] since your last visit? Include any pap smears or colon screening. No    Do you have an [address] in place in the event that you have a healthcare crisis that could impact your decision making as it pertains to your health?no    Would you like information about the [address] [place] [address] given. no

## 2017-04-17 NOTE — MR AVS SNAPSHOT
Visit Information Date & Time Provider Department Dept. Phone Encounter #  
 4/17/2017 10:30 AM La Vega MD [facility] Donavan Coats [id number] Follow-up Instructions Return if symptoms worsen or fail to improve. Your Appointments 5/15/2017  1:00 PM  
ROUTINE CARE with La Vega MD  
[facility] [address] [place] [address]) Appt Note: f/u on dm $0 cp lsh 3/13/17  
 [address] [place] [address]. [address] [facility] [address]  
[phone]  
  
   
 [address] [place] [address] [address]  
  
    
 7/11/2017  2:30 PM  
New Patient with Say Deluna MD  
[facility] [address] [place] [address]) Appt Note: np est for diabetes--self referred [facility] [address] [id number] [address] [place] [address] Upcoming Health Maintenance Date Due FOBT Q 1 YEAR AGE 50-75 12/28/2016 LIPID PANEL Q1 3/25/2017 HEMOGLOBIN A1C Q6M 8/13/2017 MICROALBUMIN Q1 8/15/2017 PAP AKA CERVICAL CYTOLOGY 9/3/2017 EYE EXAM RETINAL OR DILATED Q1 10/25/2017 FOOT EXAM Q1 2/13/2018 BREAST CANCER SCRN MAMMOGRAM 3/22/2019 DTaP/Tdap/Td series (2 - Td) 3/13/2027 Allergies as of 4/17/2017  Review Complete On: 4/5/2017 By: Codey Maxwell LPN Severity Noted Reaction Type Reactions Lortab [Hydrocodone-acetaminophen] High 08/11/2013   Intolerance Nausea and Vomiting Cymbalta [Duloxetine]  10/04/2016    Diarrhea Lyrica [Pregabalin]  03/13/2017    Drowsiness Current Immunizations  Reviewed on 12/13/2016 No immunizations on file. Not reviewed this visit You Were Diagnosed With   
  
 Codes Comments Diabetic polyneuropathy associated with diabetes mellitus due to underlying condition ([facility] [address]    -  Primary ICD-10-CM: B55.68 
ICD-9-CM: 249.60, 357.2 Vitals BP Pulse Temp Resp Height(growth percentile) Weight(growth percentile) 160/90 89 99.1 °F (37.3 °C) (Oral) 18 5' 4\" (1.626 m) 230 lb (104.3 kg) SpO2 BMI OB Status Smoking Status 98% 39.48 kg/m2 Having regular periods Never Smoker Vitals History BMI and BSA Data Body Mass Index Body Surface Area  
 39.48 kg/m 2 2.17 m 2 Preferred Pharmacy Pharmacy Name Phone [facility] [address] [facility], [address] [facility] [phone] Your Updated Medication List  
  
   
This list is accurate as of: 4/17/17 10:37 AM.  Always use your most recent med list.  
  
  
  
  
 aspirin delayed-release 81 mg tablet Take  by mouth daily. atenolol 100 mg tablet Commonly known as:  TENORMIN Take 1 Tab by mouth daily. TAKE ONE TABLET BY MOUTH ONCE DAILY  
  
 gabapentin 600 mg tablet Commonly known as:  NEURONTIN Take 1 Tab by mouth three (3) times daily. lisinopril 10 mg tablet Commonly known as:  PRINIVIL, ZESTRIL  
TAKE ONE TABLET BY MOUTH ONCE DAILY  
  
 meloxicam 15 mg tablet Commonly known as:  MOBIC Take 1 Tab by mouth daily. metFORMIN 1,000 mg tablet Commonly known as:  GLUCOPHAGE  
TAKE 1 TABLET BY MOUTH TWO (2) TIMES DAILY WITH MEALS  
  
 nortriptyline 25 mg capsule Commonly known as:  PAMELOR Take 1 Cap by mouth two (2) times a day. Follow-up Instructions Return if symptoms worsen or fail to improve. Introducing [facility] HE[facility]! Kira Lin introduces [employer] patient portal. Now you can access parts of your medical record, email your doctor's office, and request medication refills online. 1. In your internet browser, go to [email or web address] 2. Click on the First Time User? Click Here link in the Sign In box. You will see the New Member Sign Up page. 3. Enter your [employer] Access Code exactly as it appears below.  You will not need to use this code after youve completed the sign-up process. If you do not sign up before the expiration date, you must request a new code. · [email or web address] Access Code: [email or web address] Expires: 6/15/2017  8:22 PM 
 
4. Enter the last four digits of your Social Security Number (xxxx) and Date of Birth (mm/dd/yyyy) as indicated and click Submit. You will be taken to the next sign-up page. 5. Create a [email or web address] ID. This will be your [email or web address] login ID and cannot be changed, so think of one that is secure and easy to remember. 6. Create a [email or web address] password. You can change your password at any time. 7. Enter your Password Reset Question and Answer. This can be used at a later time if you forget your password. 8. Enter your e-mail address. You will receive e-mail notification when new information is available in [address] 9. Click Sign Up. You can now view and download portions of your medical record. 10. Click the Download Summary menu link to download a portable copy of your medical information. If you have questions, please visit the Frequently Asked Questions section of the [email or web address] website. Remember, [email or web address] is NOT to be used for urgent needs. For medical emergencies, dial 911. Now available from your iPhone and Android! Please provide this summary of care documentation to your next provider. Your primary care clinician is listed as Valentino Mooney. If you have any questions after today's visit, please call [phone].

## 2017-04-17 NOTE — ED PROVIDER NOTES
HPI Comments: Hilton Robertson, 49 y.o. female, presents ambulatory to ED [facility] ED with cc of acute onset right anterior knee pain with associated swelling secondary to an injury that occurred last night. The patient also c/o associated right leg swelling. The patient notes that she took ibuprofen and tylenol with no relief. The patient notes that her BP has been elevated. The patient denies hx of prior knee complications or prior evaluation by an orthopedist. She specifically denies any fevers, chills, nausea, vomiting, chest pain, shortness of breath, headache, rash, diarrhea, sweating or weight loss. PCP: Jeimy Wu MD    PMHx significant for: Migraine, HTN, anemia, diabetic neuropathy, diabetes  Social history significant for: - Tobacco, - EtOH, - Illicit Drug Use    There are no other complaints, changes, or physical findings at this time. Written by TEX Bryson, as dictated by Uli Lubin. Mary Singh MD.    The history is provided by the patient. No  was used. Past Medical History:   Diagnosis Date    Anemia     Chronic pain     Cyst in hand 2014    [address]    Diabetes mellitus type 2 in obese ([address]     Diabetic neuropathy ([facility] [address]     Hypertension     Migraine        History reviewed. No pertinent surgical history.       Family History:   Problem Relation Age of Onset   [facility] Cancer Father      brain    Diabetes Mother     Heart Disease Mother     Hypertension Mother     Hypertension Maternal Grandmother     Heart Disease Maternal Grandmother        Social History     Social History    Marital status:      Spouse name: N/A    Number of children: N/A    Years of education: N/A     Occupational History    disabled      Social History Main Topics    Smoking status: Never Smoker    Smokeless tobacco: Never Used    Alcohol use No    Drug use: No    Sexual activity: Not Currently     Other Topics Concern    Not on file     Social History Narrative    Lives with daughter. Not working. Applying for disabilty. ALLERGIES: Lortab [hydrocodone-acetaminophen]; Cymbalta [duloxetine]; and Lyrica [pregabalin]    Review of Systems   Constitutional: Negative. Negative for activity change, appetite change, chills, fatigue, fever and unexpected weight change. HENT: Negative. Negative for congestion, hearing loss, rhinorrhea, sneezing and voice change. Eyes: Negative. Negative for pain and visual disturbance. Respiratory: Negative. Negative for apnea, cough, choking, chest tightness and shortness of breath. Cardiovascular: Positive for leg swelling (Right). Negative for chest pain and palpitations. Gastrointestinal: Negative. Negative for abdominal distention, abdominal pain, blood in stool, diarrhea, nausea and vomiting. Genitourinary: Negative. Negative for difficulty urinating, flank pain, frequency and urgency. No discharge   Musculoskeletal: Positive for arthralgias (R knee) and joint swelling (R knee). Negative for back pain, myalgias and neck stiffness. Skin: Negative. Negative for color change and rash. Neurological: Negative. Negative for dizziness, seizures, syncope, speech difficulty, weakness, numbness and headaches. Hematological: Negative for adenopathy. Psychiatric/Behavioral: Negative. Negative for agitation, behavioral problems, dysphoric mood and suicidal ideas. The patient is not nervous/anxious. Patient Vitals for the past 12 hrs:   Temp Pulse Resp BP SpO2   04/17/17 1320 - 86 - (!) 198/113 99 %   04/17/17 1227 97.2 °F (36.2 °C) 92 16 (!) 162/97 98 %       Physical Exam   Constitutional: She is oriented to person, place, and time. She appears well-developed and well-nourished. No distress. HENT:   Head: Normocephalic and atraumatic. Mouth/Throat: Oropharynx is clear and moist. No oropharyngeal exudate. Eyes: Conjunctivae and EOM are normal. Pupils are equal, round, and reactive to light.  Right eye exhibits no discharge. Left eye exhibits no discharge. Neck: Normal range of motion. Neck supple. Cardiovascular: Normal rate, regular rhythm and intact distal pulses. Exam reveals no gallop and no friction rub. No murmur heard. DP and PT pulses intact   Pulmonary/Chest: Effort normal and breath sounds normal. No respiratory distress. She has no wheezes. She has no rales. She exhibits no tenderness. Abdominal: Soft. Bowel sounds are normal. She exhibits no distension and no mass. There is no tenderness. There is no rebound and no guarding. Musculoskeletal: Normal range of motion. She exhibits no edema. Mild deformity to right anterior knee  (-) Lachman test   Lymphadenopathy:     She has no cervical adenopathy. Neurological: She is alert and oriented to person, place, and time. No cranial nerve deficit. Coordination normal.   Skin: Skin is warm and dry. No rash noted. No erythema. Capillary refill < 2 seconds   Psychiatric: She has a normal mood and affect. Nursing note and vitals reviewed. Written by Rulon Lennox, ED Scribe, as dictated by Carnella Cranker. Eric Hanley MD.    MDM  Number of Diagnoses or Management Options  Diagnosis management comments:   DDx: Sprain, strain, fracture, contusion. Will assess with xray. Amount and/or Complexity of Data Reviewed  Tests in the radiology section of CPT®: ordered and reviewed  Review and summarize past medical records: yes    Patient Progress  Patient progress: stable    ED Course       Procedures    Chief Complaint   Patient presents with    Leg Pain     Bilateral knee pain with swelling worse on the right side for the past 1 day; just left PCP's office this morning who would not continue prescribing her narcotics for her chronic pain       2:30 PM  The patients presenting problems have been discussed, and they are in agreement with the care plan formulated and outlined with them.   I have encouraged them to ask questions as they arise throughout their visit.    MEDICATIONS GIVEN:  Medications   ketorolac (TORADOL) injection 30 mg (30 mg IntraMUSCular Given 4/17/17 1612)     VITAL SIGNS:  Patient Vitals for the past 12 hrs:   Temp Pulse Resp BP SpO2   04/17/17 1320 - 86 - (!) 198/113 99 %   04/17/17 1227 97.2 °F (36.2 °C) 92 16 (!) 162/97 98 %       RADIOLOGY RESULTS:  The following have been ordered and reviewed:   EXAM: XR KNEE RT 3 V     INDICATION: Trauma.     COMPARISON: None.     FINDINGS: Three views of the right knee demonstrate no fracture or other acute  osseous or articular abnormality. There is no effusion. Mild DJD     IMPRESSION  IMPRESSION: No acute abnormality.                  PROGRESS NOTES:  3:35 PM  The patient states that their symptoms have resolved and they feel much better. There are no other new complaints at this time. Her questions have been answered. We are awaiting final results and those will be reviewed with them when they become available. DIAGNOSIS:    1. Contusion of right knee, initial encounter        PLAN:  Follow-up Information     Follow up With Details Comments Fredi Roach MD Call in 2 days As needed, If symptoms worsen [place] [id number] [address] [facility]      Dodie Palomino MD Call in 2 days  [address]  [phone]          Current Discharge Medication List      START taking these medications    Details   etodolac (LODINE) 500 mg tablet Take 1 Tab by mouth two (2) times a day. Qty: 20 Tab, Refills: 0         STOP taking these medications       meloxicam (MOBIC) 15 mg tablet Comments:   Reason for Stopping:             ED COURSE: The patients hospital course has been uncomplicated. 3:45 PM  Yulissa Alvares's  results have been reviewed with her. She has been counseled regarding her diagnosis.   She verbally conveys understanding and agreement of the signs, symptoms, diagnosis, treatment and prognosis and additionally agrees to follow up as recommended with Dr. Meghna Gibbons MD in 24 - 48 hours. She also agrees with the care-plan and conveys that all of her questions have been answered. I have also put together some discharge instructions for her that include: 1) educational information regarding their diagnosis, 2) how to care for their diagnosis at home, as well a 3) list of reasons why they would want to return to the ED prior to their follow-up appointment, should their condition change. This note is prepared by Kristyn Singh, acting as a Scribe for Radha Gale. Saulo Quiroga, [address] Saulo Quiroga MD: The scribe's documentation has been prepared under my direction and personally reviewed by me in its entirety. I confirm that the notes above accurately reflects all work, treatment, procedures, and medical decision making performed by me.

## 2017-04-17 NOTE — PROGRESS NOTES
PROGRESS NOTE        SUBJECTIVE:  Diagnosis/Chief Complaint: Leg Pain      Doing well with pain no  Pain levels 9/10   Symptoms can't sleep  Activities can't walk much because of pain  Side affects: no  States taking medications per medicine list.yes - finished percocet, has appt April 24th  Only percocet helps with my pain, multiple prescribers, short coarses,  other Rx modalities have not helped. Social History   Substance Use Topics    Smoking status: Never Smoker    Smokeless tobacco: Never Used    Alcohol use No        OBJECTIVE:    .  Visit Vitals    BP (!) 168/98    Pulse 89    Temp 99.1 °F (37.3 °C) (Oral)    Resp 18    Ht 5' 4\" (1.626 m)    Wt 230 lb (104.3 kg)    SpO2 98%    BMI 39.48 kg/m2     WDWN in NAD  Heart RRR, no:C/M/R  Lungs CTA No wheezes, rales or rhonchi  Abdo: soft no tenderness, rebound or guarding  Neurological exam[de-identified] 2-12 intact  Psychiatric: Normal mood, judgement    Reviewed: Medications, allergies, clinical lab test results and imaging results have been reviewed. Any abnormal findings have been addressed. ASSESSMENT:       ICD-10-CM ICD-9-CM    1.  Diabetic polyneuropathy associated with diabetes mellitus due to underlying condition ([facility] [address] E08.42 249.60      357.2          Patient is 49 y.o. with diagnosis of :   Patient Active Problem List   Diagnosis Code    Hypertension I10    Chronic pain G89.29    Narcotic dependence, episodic use ([facility] [address] F11.20    Diabetes mellitus type 2, controlled ([facility] [address] E11.9    Diabetic polyneuropathy associated with type 2 diabetes mellitus (HCC) E11.42    Idiopathic small and large fiber sensory neuropathy G60.8    Chronic inflammatory demyelinating polyneuropathy (HCC) G61.81    Spinal stenosis of lumbosacral region M48.07    Lumbar back pain with radiculopathy affecting left lower extremity M54.17    Lumbar back pain with radiculopathy affecting right lower extremity M54.17       PLAN:     Offered change and/or non narcotic Rx to her current neuropathic meds but she is declining. Will no longer Rx narcotics for her chronic pain problem, she needs to f/u with a pain manager for this problem. Explained to her why I can't keep Rx narcotics for her chronic pain issues. We discussed this pain and the usage of controlled substances for chronic neuropathic pain relief. In general these medications are indicated for the acute symptom relief and not for chronic usage, allthough they are frequently used for chronic management  After a certain period of time they should be stopped to avoid dependence and/or addiction. I warned her about the dangers of addiction and other side affects including respiratory depression and death. Discussed how this is a controlled substance and that the prescribing of it is should be monitored very carefully. Drug usage is also monitored by our practise and the [facility], since there has been a lot of abuse and diversion of controlled substances. In general we do not refill this medication over the phone. PLease ask for enough pills to get you to your next appointment and make sure you keep your appointments. Warned not to take other medications like alcohol, other benzodiazapines marijuana or other narcotics when on this medication. She was looked up in the drug database. Findings show evidence of drug abuse: has multiple Rx for her pain issues. Follow-up Disposition:  Return if symptoms worsen or fail to improve.   And as scheduled

## 2017-04-18 ENCOUNTER — PATIENT OUTREACH (OUTPATIENT)
Dept: INTERNAL MEDICINE CLINIC | Age: 52
End: 2017-04-18

## 2017-04-18 NOTE — PROGRESS NOTES
4/18/17  Pt on [address] report. After office visit pt went to [facility] ED. Pt having pain to knee that is chronic and diabetic neuropathy pain. Pt has not been able to get pain under control. Pt states she wants to be able to get the medication she needs to control her pain. Pt states she has appt with a pain management dr on Thursday, unsure of name. She states her daughter set up appt. I told I will follow up with her next week to see how her appt went, pt agreed to follow up call. Pt was also concerned about her blood pressure and would like monitor for at home    4/19/17 [facility] and they will cover pt to get blood pressure monitor but unable to find place that will process the prescription through medicaid for pt. I called [facility], [employer], [employer], [employer] and [employer] with no luck. Per Medicaid rep pt can call number on back of card and find location to get it filled, will relay this info to pt. Pt stated need for glucometer, per medicaid rep they will cover [employer] brand glucometer and strips. [facility] did not have in stock but can order, will relay to pt during next phone call. This note will not be viewable in [address]

## 2017-04-19 ENCOUNTER — PATIENT OUTREACH (OUTPATIENT)
Dept: INTERNAL MEDICINE CLINIC | Age: 52
End: 2017-04-19

## 2017-04-20 ENCOUNTER — DOCUMENTATION ONLY (OUTPATIENT)
Dept: INTERNAL MEDICINE CLINIC | Age: 52
End: 2017-04-20

## 2017-05-01 ENCOUNTER — PATIENT OUTREACH (OUTPATIENT)
Dept: INTERNAL MEDICINE CLINIC | Age: 52
End: 2017-05-01

## 2017-05-15 ENCOUNTER — OFFICE VISIT (OUTPATIENT)
Dept: INTERNAL MEDICINE CLINIC | Age: 52
End: 2017-05-15

## 2017-05-15 VITALS
RESPIRATION RATE: 18 BRPM | WEIGHT: 240 LBS | HEIGHT: 64 IN | HEART RATE: 83 BPM | TEMPERATURE: 98.7 F | BODY MASS INDEX: 40.97 KG/M2 | DIASTOLIC BLOOD PRESSURE: 88 MMHG | OXYGEN SATURATION: 100 % | SYSTOLIC BLOOD PRESSURE: 152 MMHG

## 2017-05-15 DIAGNOSIS — R60.9 EDEMA, UNSPECIFIED TYPE: ICD-10-CM

## 2017-05-15 DIAGNOSIS — I10 ESSENTIAL HYPERTENSION: ICD-10-CM

## 2017-05-15 DIAGNOSIS — E08.40 DIABETES MELLITUS DUE TO UNDERLYING CONDITION, CONTROLLED, WITH DIABETIC NEUROPATHY, WITHOUT LONG-TERM CURRENT USE OF INSULIN (HCC): Primary | ICD-10-CM

## 2017-05-15 RX ORDER — FUROSEMIDE 20 MG/1
40 TABLET ORAL DAILY
Qty: 60 TAB | Refills: 2 | Status: SHIPPED | OUTPATIENT
Start: 2017-05-15 | End: 2017-08-25 | Stop reason: CLARIF

## 2017-05-15 RX ORDER — NORTRIPTYLINE HYDROCHLORIDE 25 MG/1
50 CAPSULE ORAL 2 TIMES DAILY
Qty: 120 CAP | Refills: 5 | Status: SHIPPED | OUTPATIENT
Start: 2017-05-15 | End: 2017-08-25 | Stop reason: CLARIF

## 2017-05-15 RX ORDER — POTASSIUM CHLORIDE 750 MG/1
10 TABLET, EXTENDED RELEASE ORAL DAILY
Qty: 30 TAB | Refills: 2 | Status: SHIPPED | OUTPATIENT
Start: 2017-05-15 | End: 2017-08-25 | Stop reason: CLARIF

## 2017-05-15 NOTE — PATIENT INSTRUCTIONS
Leg and Ankle Edema: Care Instructions  Your Care Instructions  Swelling in the legs, ankles, and feet is called edema. It is common after you sit or stand for a while. Long plane flights or car rides often cause swelling in the legs and feet. You may also have swelling if you have to stand for long periods of time at your job. Problems with the veins in the legs (varicose veins) and changes in hormones can also cause swelling. Sometimes the swelling in the ankles and feet is caused by a more serious problem, such as heart failure, infection, blood clots, or liver or kidney disease. Follow-up care is a key part of your treatment and safety. Be sure to make and go to all appointments, and call your doctor if you are having problems. Its also a good idea to know your test results and keep a list of the medicines you take. How can you care for yourself at home? · If your doctor gave you medicine, take it as prescribed. Call your doctor if you think you are having a problem with your medicine. · Whenever you are resting, raise your legs up. Try to keep the swollen area higher than the level of your heart. · Take breaks from standing or sitting in one position. ¨ Walk around to increase the blood flow in your lower legs. ¨ Move your feet and ankles often while you stand, or tighten and relax your leg muscles. · Wear support stockings. Put them on in the morning, before swelling gets worse. · Eat a balanced diet. Lose weight if you need to. · Limit the amount of salt (sodium) in your diet. Salt holds fluid in the body and may increase swelling. When should you call for help? Call 911 anytime you think you may need emergency care. For example, call if:  · You have symptoms of a blood clot in your lung (called a pulmonary embolism). These may include:  ¨ Sudden chest pain. ¨ Trouble breathing. ¨ Coughing up blood.   Call your doctor now or seek immediate medical care if:  · You have signs of a blood clot, such as:  ¨ Pain in your calf, back of the knee, thigh, or groin. ¨ Redness and swelling in your leg or groin. · You have symptoms of infection, such as:  ¨ Increased pain, swelling, warmth, or redness. ¨ Red streaks or pus. ¨ A fever. Watch closely for changes in your health, and be sure to contact your doctor if:  · Your swelling is getting worse. · You have new or worsening pain in your legs. · You do not get better as expected. Where can you learn more? Go to http://lizbeth-efraín.info/. Enter E238 in the search box to learn more about \"Leg and Ankle Edema: Care Instructions. \"  Current as of: May 27, 2016  Content Version: 11.2  © 1766-8835 [employer]. Care instructions adapted under license by [employer] (which disclaims liability or warranty for this information). If you have questions about a medical condition or this instruction, always ask your healthcare professional. [place] [address] any warranty or liability for your use of this information.

## 2017-05-15 NOTE — PROGRESS NOTES
Chief Complaint   Patient presents with    Ankle Pain     R/ankle pain and swelling    Diabetes     follow up     I have reviewed the patient's medical history in detail and updated the computerized patient record. 1. Have you been to the ER, urgent care clinic since your last visit? Hospitalized since your last visit?no  2. Have you seen or consulted any other health care providers outside of the [address] [place] [address] since your last visit? Include any pap smears or colon screening. No    Do you have an [address] in place in the event that you have a healthcare crisis that could impact your decision making as it pertains to your health?no    Would you like information about the [address] [place] [address] given. no

## 2017-05-15 NOTE — PROGRESS NOTES
Subjective:     Tabitha Cobb is a 49 y.o. female seen for follow-up of diabetes. She has had hypoglycemic attacks. .no  Blood sugar control has been OK  She has diabetes and hypertension. Tabitha Cobb has the additional concern of pain in feet cont PM would not take her ins but has another 1, appt is 5/24/2017, pain level 9/10 nortrip not helping much, nut min c/o side affects. Asks for more perc, only they help. Once again begs for percs  Diabetic Review of Systems: no polyuria or polydipsia, no chest pain, dyspnea or TIA's, no hypoglycemia, has dysesthesias in the feet. Current Outpatient Prescriptions   Medication Sig Dispense Refill    lisinopril (PRINIVIL, ZESTRIL) 10 mg tablet TAKE ONE TABLET BY MOUTH ONCE DAILY 30 Tab 5    nortriptyline (PAMELOR) 25 mg capsule Take 1 Cap by mouth two (2) times a day. 60 Cap 5    gabapentin (NEURONTIN) 600 mg tablet Take 1 Tab by mouth three (3) times daily. 90 Tab 5    aspirin delayed-release 81 mg tablet Take  by mouth daily.  metFORMIN (GLUCOPHAGE) 1,000 mg tablet TAKE 1 TABLET BY MOUTH TWO (2) TIMES DAILY WITH MEALS 180 Tab 3    atenolol (TENORMIN) 100 mg tablet Take 1 Tab by mouth daily. TAKE ONE TABLET BY MOUTH ONCE DAILY 30 Tab 5     Allergies   Allergen Reactions    Lortab [Hydrocodone-Acetaminophen] Nausea and Vomiting    Cymbalta [Duloxetine] Diarrhea    Lyrica [Pregabalin] Drowsiness       Diet and Lifestyle: follows a diabetic diet regularly, nonsmoker.     Social History   Substance Use Topics    Smoking status: Never Smoker    Smokeless tobacco: Never Used    Alcohol use No        Lab Results  Component Value Date/Time   WBC 6.9 02/13/2017 12:20 PM   HGB 11.7 02/13/2017 12:20 PM   HCT 34.9 02/13/2017 12:20 PM   PLATELET 777 03/82/8783 12:20 PM   MCV 77 02/13/2017 12:20 PM       Lab Results  Component Value Date/Time   Hemoglobin A1c 7.4 02/13/2017 12:20 PM   Hemoglobin A1c 6.6 08/15/2016 02:35 PM   Hemoglobin A1c 6.5 03/25/2016 01:08 PM   Glucose 246 04/05/2017 02:59 PM   Glucose (POC) 173 12/18/2015 11:17 AM   Microalb/Creat ratio (ug/mg creat.) 18.2 08/15/2016 02:35 PM   LDL, calculated 96 03/25/2016 01:08 PM   Creatinine 1.28 04/05/2017 02:59 PM      Lab Results  Component Value Date/Time   ALT (SGPT) 7 02/13/2017 12:20 PM   AST (SGOT) 8 02/13/2017 12:20 PM   Alk. phosphatase 121 02/13/2017 12:20 PM   Bilirubin, total 0.4 02/13/2017 12:20 PM       Lab Results  Component Value Date/Time   GFR est AA 56 04/05/2017 02:59 PM   GFR est non-AA 49 04/05/2017 02:59 PM   Creatinine 1.28 04/05/2017 02:59 PM   BUN 14 04/05/2017 02:59 PM   Sodium 139 04/05/2017 02:59 PM   Potassium 4.8 04/05/2017 02:59 PM   Chloride 101 04/05/2017 02:59 PM   CO2 18 04/05/2017 02:59 PM      Lab Results  Component Value Date/Time   TSH 1.920 06/15/2016 10:03 AM   T4, Free 1.1 01/30/2014 10:48 PM      Lab Results   Component Value Date/Time    Glucose 246 04/05/2017 02:59 PM    Glucose (POC) 173 12/18/2015 11:17 AM         Review of Systems  Pertinent items are noted in HPI. Objective:     Significant for the followingmild edema    Visit Vitals    /88 (BP 1 Location: Left arm, BP Patient Position: Sitting)    Pulse 83    Temp 98.7 °F (37.1 °C) (Oral)    Resp 18    Ht 5' 4\" (1.626 m)    Wt 240 lb (108.9 kg)    LMP 05/03/2017    SpO2 100%    BMI 41.2 kg/m2     Appearance: alert, well appearing, and in no distress. Exam: heart sounds normal rate, regular rhythm, normal S1, S2, no murmurs, rubs, clicks or gallops, chest clear  Foot exam: mild edema in ankle sl pitting, feet NV intact no obvious lesions    Lab review: labs reviewed, I note that glycosylated hemoglobin mildly abnormal but acceptable. Assessment/Plan:     Follow-up diabetes stable. Diabetic issues reviewed with her: glycohemoglobin and other lab monitoring discussed. ICD-10-CM ICD-9-CM    1.  Diabetes mellitus due to underlying condition, controlled, with diabetic neuropathy, without long-term current use of insulin ([facility]) E08.40 249.60      357.2    2. Essential hypertension I10 401.9    3. Edema, unspecified type R60.9 782.3      Orders Placed This Encounter    nortriptyline (PAMELOR) 25 mg capsule     Sig: Take 2 Caps by mouth two (2) times a day. Dispense:  120 Cap     Refill:  5    furosemide (LASIX) 20 mg tablet     Sig: Take 2 Tabs by mouth daily. As needed for swelling     Dispense:  60 Tab     Refill:  2    potassium chloride (KLOR-CON) 10 mEq tablet     Sig: Take 1 Tab by mouth daily. Of takes lasix     Dispense:  30 Tab     Refill:  2     Once again told her that I don't Rx narcotics for chronic pain  She will need to see PM to get percs. Pt unhappy but accepting . See patient instructions, went over them personally with the patient. Emphasized compliance. Questions answered. Patient states that they understand the plan of action and will call if there are any issues or misunderstandings. Re-start stockings. Follow-up Disposition:  Return in about 4 months (around 9/15/2017) for routine follow up.

## 2017-05-15 NOTE — MR AVS SNAPSHOT
Visit Information Date & Time Provider Department Dept. Phone Encounter #  
 5/15/2017  1:00 PM Fausto Mendenhall MD [facility] [phone] [id number] Follow-up Instructions Return in about 4 months (around 9/15/2017) for routine follow up. Your Appointments 7/11/2017  2:30 PM  
New Patient with MD [facility]) Appt Note: np est for diabetes--self referred [address] [phone] [address] [facility] Upcoming Health Maintenance Date Due FOBT Q 1 YEAR AGE 50-75 12/28/2016 LIPID PANEL Q1 3/25/2017 PAP AKA CERVICAL CYTOLOGY 9/3/2017 INFLUENZA AGE 9 TO ADULT 8/1/2017 HEMOGLOBIN A1C Q6M 8/13/2017 MICROALBUMIN Q1 8/15/2017 EYE EXAM RETINAL OR DILATED Q1 10/25/2017 FOOT EXAM Q1 2/13/2018 BREAST CANCER SCRN MAMMOGRAM 3/22/2019 DTaP/Tdap/Td series (2 - Td) 3/13/2027 Allergies as of 5/15/2017  Review Complete On: 5/15/2017 By: Roberto Nuñez LPN Severity Noted Reaction Type Reactions Lortab [Hydrocodone-acetaminophen] High 08/11/2013   Intolerance Nausea and Vomiting Cymbalta [Duloxetine]  10/04/2016    Diarrhea Lyrica [Pregabalin]  03/13/2017    Drowsiness Current Immunizations  Reviewed on 12/13/2016 No immunizations on file. Not reviewed this visit You Were Diagnosed With   
  
 Codes Comments Controlled type 2 diabetes mellitus with diabetic nephropathy, without long-term current use of insulin ([facility] [address]    -  Primary ICD-10-CM: E11.21 
ICD-9-CM: 250.40, 583.81 Chronic inflammatory demyelinating polyneuropathy (HCC)     ICD-10-CM: G61.81 
ICD-9-CM: 357.81 Essential hypertension     ICD-10-CM: I10 
ICD-9-CM: 401.9 Vitals BP Pulse Temp Resp Height(growth percentile) Weight(growth percentile) 152/88 (BP 1 Location: Left arm, BP Patient Position: Sitting) 83 98.7 °F (37.1 °C) (Oral) 18 5' 4\" (1.626 m) 240 lb (108.9 kg) LMP SpO2 BMI OB Status Smoking Status 05/03/2017 100% 41.2 kg/m2 Having regular periods Never Smoker Vitals History BMI and BSA Data Body Mass Index Body Surface Area  
 41.2 kg/m 2 2.22 m 2 Preferred Pharmacy Pharmacy Name Phone [facility] [address] [facility], [address] [phone] Your Updated Medication List  
  
   
This list is accurate as of: 5/15/17  1:27 PM.  Always use your most recent med list.  
  
  
  
  
 aspirin delayed-release 81 mg tablet Take  by mouth daily. atenolol 100 mg tablet Commonly known as:  TENORMIN Take 1 Tab by mouth daily. TAKE ONE TABLET BY MOUTH ONCE DAILY  
  
 furosemide 20 mg tablet Commonly known as:  LASIX Take 2 Tabs by mouth daily. As needed for swelling  
  
 gabapentin 600 mg tablet Commonly known as:  NEURONTIN Take 1 Tab by mouth three (3) times daily. lisinopril 10 mg tablet Commonly known as:  PRINIVIL, ZESTRIL  
TAKE ONE TABLET BY MOUTH ONCE DAILY  
  
 metFORMIN 1,000 mg tablet Commonly known as:  GLUCOPHAGE  
TAKE 1 TABLET BY MOUTH TWO (2) TIMES DAILY WITH MEALS  
  
 nortriptyline 25 mg capsule Commonly known as:  PAMELOR Take 2 Caps by mouth two (2) times a day. potassium chloride 10 mEq tablet Commonly known as:  KLOR-CON Take 1 Tab by mouth daily. Of takes lasix Prescriptions Sent to Pharmacy Refills  
 nortriptyline (PAMELOR) 25 mg capsule 5 Sig: Take 2 Caps by mouth two (2) times a day. Class: Normal  
 Pharmacy: [address] [facility], [address] [facility] Ph #: [phone] Route: Oral  
 furosemide (LASIX) 20 mg tablet 2 Sig: Take 2 Tabs by mouth daily. As needed for swelling  Class: Normal  
 Pharmacy: [facility] [address] [facility], [address] Ph #: [phone] Route: Oral  
 potassium chloride (KLOR-CON) 10 mEq tablet 2 Sig: Take 1 Tab by mouth daily. Of takes lasix Class: Normal  
 Pharmacy: [facility] [address] [facility], [address] Ph #: [phone] Route: Oral  
  
Follow-up Instructions Return in about 4 months (around 9/15/2017) for routine follow up. Patient Instructions Leg and Ankle Edema: Care Instructions Your Care Instructions Swelling in the legs, ankles, and feet is called edema. It is common after you sit or stand for a while. Long plane flights or car rides often cause swelling in the legs and feet. You may also have swelling if you have to stand for long periods of time at your job. Problems with the veins in the legs (varicose veins) and changes in hormones can also cause swelling. Sometimes the swelling in the ankles and feet is caused by a more serious problem, such as heart failure, infection, blood clots, or liver or kidney disease. Follow-up care is a key part of your treatment and safety. Be sure to make and go to all appointments, and call your doctor if you are having problems. Its also a good idea to know your test results and keep a list of the medicines you take. How can you care for yourself at home? · If your doctor gave you medicine, take it as prescribed. Call your doctor if you think you are having a problem with your medicine. · Whenever you are resting, raise your legs up. Try to keep the swollen area higher than the level of your heart. · Take breaks from standing or sitting in one position. ¨ Walk around to increase the blood flow in your lower legs. ¨ Move your feet and ankles often while you stand, or tighten and relax your leg muscles. · Wear support stockings. Put them on in the morning, before swelling gets worse. · Eat a balanced diet. Lose weight if you need to. · Limit the amount of salt (sodium) in your diet. Salt holds fluid in the body and may increase swelling. When should you call for help? Call 911 anytime you think you may need emergency care. For example, call if: 
· You have symptoms of a blood clot in your lung (called a pulmonary embolism). These may include: 
¨ Sudden chest pain. ¨ Trouble breathing. ¨ Coughing up blood. Call your doctor now or seek immediate medical care if: 
· You have signs of a blood clot, such as: 
¨ Pain in your calf, back of the knee, thigh, or groin. ¨ Redness and swelling in your leg or groin. · You have symptoms of infection, such as: 
¨ Increased pain, swelling, warmth, or redness. ¨ Red streaks or pus. ¨ A fever. Watch closely for changes in your health, and be sure to contact your doctor if: 
· Your swelling is getting worse. · You have new or worsening pain in your legs. · You do not get better as expected. Where can you learn more? Go to http://lizbeth-efraín.info/. Enter K027 in the search box to learn more about \"Leg and Ankle Edema: Care Instructions. \" Current as of: May 27, 2016 Content Version: 11.2 © 6523-5425 Adviesmanager.nl. Care instructions adapted under license by [employer] (which disclaims liability or warranty for this information). If you have questions about a medical condition or this instruction, always ask your healthcare professional. [place] [address] any warranty or liability for your use of this information. Introducing \[facility] [place] [facility]\"" & HEALTH SERVICES! Heike Black introduces Ratio patient portal. Now you can access parts of your medical record, email your doctor's office, and request medication refills online. 1. In your internet browser, go to [email or web address] 2. Click on the First Time User? Click Here link in the Sign In box. You will see the New Member Sign Up page. 3. Enter your Wiz Maps Access Code exactly as it appears below. You will not need to use this code after youve completed the sign-up process. If you do not sign up before the expiration date, you must request a new code. · Wiz Maps Access Code: [email or web address] Expires: 6/15/2017  8:22 PM 
 
4. Enter the last four digits of your Social Security Number (xxxx) and Date of Birth (mm/dd/yyyy) as indicated and click Submit. You will be taken to the next sign-up page. 5. Create a Wiz Maps ID. This will be your Wiz Maps login ID and cannot be changed, so think of one that is secure and easy to remember. 6. Create a Wiz Maps password. You can change your password at any time. 7. Enter your Password Reset Question and Answer. This can be used at a later time if you forget your password. 8. Enter your e-mail address. You will receive e-mail notification when new information is available in [address] 9. Click Sign Up. You can now view and download portions of your medical record. 10. Click the Download Summary menu link to download a portable copy of your medical information. If you have questions, please visit the Frequently Asked Questions section of the Wiz Maps website. Remember, Wiz Maps is NOT to be used for urgent needs. For medical emergencies, dial 911. Now available from your iPhone and Android! Please provide this summary of care documentation to your next provider. Your primary care clinician is listed as Marti Strauss. If you have any questions after today's visit, please call [phone].

## 2017-05-24 ENCOUNTER — TELEPHONE (OUTPATIENT)
Dept: INTERNAL MEDICINE CLINIC | Age: 52
End: 2017-05-24

## 2017-05-24 NOTE — TELEPHONE ENCOUNTER
Sarthak Cordova, from Dr. Whitmore Bybaron office, called in reference to needing most recent office notes and labs for patient. She is in the office now. Please fax to [phone].

## 2017-05-24 NOTE — TELEPHONE ENCOUNTER
Faxed last office note and last lab results to Dr Sukhdev Pollard per their request - at 50 Angus Zuniga LPN  [phone]/62/9213  66:81 AM

## 2017-05-28 ENCOUNTER — HOSPITAL ENCOUNTER (EMERGENCY)
Age: 52
Discharge: HOME | End: 2017-05-28
Attending: EMERGENCY MEDICINE
Payer: MEDICAID

## 2017-05-28 VITALS
HEIGHT: 64 IN | WEIGHT: 234.35 LBS | OXYGEN SATURATION: 95 % | HEART RATE: 79 BPM | BODY MASS INDEX: 40.01 KG/M2 | RESPIRATION RATE: 14 BRPM | SYSTOLIC BLOOD PRESSURE: 146 MMHG | DIASTOLIC BLOOD PRESSURE: 83 MMHG | TEMPERATURE: 98.2 F

## 2017-05-28 DIAGNOSIS — G62.9 NEUROPATHY: Primary | ICD-10-CM

## 2017-05-28 PROCEDURE — 99282 EMERGENCY DEPT VISIT SF MDM: CPT

## 2017-05-28 RX ORDER — GABAPENTIN 800 MG/1
800 TABLET ORAL 3 TIMES DAILY
Qty: 42 TAB | Refills: 0 | Status: SHIPPED | OUTPATIENT
Start: 2017-05-28 | End: 2017-06-11

## 2017-05-28 RX ORDER — HYDROCODONE BITARTRATE AND ACETAMINOPHEN 7.5; 325 MG/1; MG/1
1 TABLET ORAL
Qty: 10 TAB | Refills: 0 | Status: SHIPPED | OUTPATIENT
Start: 2017-05-28 | End: 2017-07-17 | Stop reason: ALTCHOICE

## 2017-05-29 NOTE — DISCHARGE INSTRUCTIONS
Neuropathic Pain: Care Instructions  Your Care Instructions  Neuropathic pain is caused by pressure on or damage to your nerves. It's often simply called nerve pain. Some people feel this type of pain all the time. For others, it comes and goes. Diabetes, shingles, or an injury can cause nerve pain. Many people say the pain feels sharp, burning, or stabbing. But some people feel it as a dull ache. In some cases, it makes your skin very sensitive. So touch, pressure, and other sensations that did not hurt before may now cause pain. It's important to know that this kind of pain is real and can affect your quality of life. It's also important to know that treatment can help. Treatment includes pain medicines, exercise, and physical therapy. Medicines can help reduce the number of pain signals that travel over the nerves. This can make the painful areas less sensitive. It can also help you sleep better and improve your mood. But medicines are only one part of successful treatment. Most people do best with more than one kind of treatment. Your doctor may recommend that you try cognitive-behavioral therapy and stress management. Or, if needed, you may decide to try to quit smoking, lower your blood pressure, or better control blood sugar. These kinds of healthy changes can also make a difference. If you feel that your treatment is not working, talk to your doctor. And be sure to tell your doctor if you think you might be depressed or anxious. These are common problems that can also be treated. Follow-up care is a key part of your treatment and safety. Be sure to make and go to all appointments, and call your doctor if you are having problems. It's also a good idea to know your test results and keep a list of the medicines you take. How can you care for yourself at home? · Be safe with medicines. Read and follow all instructions on the label.   ¨ If the doctor gave you a prescription medicine for pain, take it as prescribed. ¨ If you are not taking a prescription pain medicine, ask your doctor if you can take an over-the-counter medicine. · Save hard tasks for days when you have less pain. Follow a hard task with an easy task. And remember to take breaks. · Relax, and reduce stress. You may want to try deep breathing or meditation. These can help. · Keep moving. Gentle, daily exercise can help reduce pain. Your doctor or physical therapist can tell you what type of exercise is best for you. This may include walking, swimming, and stationary biking. It may also include stretches and range-of-motion exercises. · Try heat, cold packs, and massage. · Get enough sleep. Constant pain can make you more tired. If the pain makes it hard to sleep, talk with your doctor. · Think positively. Your thoughts can affect your pain. Do fun things to distract yourself from the pain. See a movie, read a book, listen to music, or spend time with a friend. · Keep a pain diary. Try to write down how strong your pain is and what it feels like. Also try to notice and write down how your moods, thoughts, sleep, activities, and medicine affect your pain. These notes can help you and your doctor find the best ways to treat your pain. Reducing constipation caused by pain medicine  Pain medicines often cause constipation. To reduce constipation:  · Include fruits, vegetables, beans, and whole grains in your diet each day. These foods are high in fiber. · Drink plenty of fluids, enough so that your urine is light yellow or clear like water. If you have kidney, heart, or liver disease and have to limit fluids, talk with your doctor before you increase the amount of fluids you drink. · Get some exercise every day. Build up slowly to 30 to 60 minutes a day on 5 or more days of the week. · Take a fiber supplement, such as Citrucel or Metamucil, every day if needed. Read and follow all instructions on the label.   · Schedule time each day for a bowel movement. Having a daily routine may help. Take your time and do not strain when having a bowel movement. · Ask your doctor about a laxative. The goal is to have one easy bowel movement every 1 to 2 days. Do not let constipation go untreated for more than 3 days. When should you call for help? Call your doctor now or seek immediate medical care if:  · You feel sad, anxious, or hopeless for more than a few days. This could mean you are depressed. Depression is common in people who have a lot of pain. But it can be treated. · You have trouble with bowel movements, such as:  ¨ No bowel movement in 3 days. ¨ Blood in the anal area, in your stool, or on the toilet paper. ¨ Diarrhea for more than 24 hours. Watch closely for changes in your health, and be sure to contact your doctor if:  · Your pain is getting worse. · You can't sleep because of pain. · You are very worried or anxious about your pain. · You have trouble taking your pain medicine. · You have any concerns about your pain medicine or its side effects. · You have vomiting or cramps for more than 2 hours. Where can you learn more? Go to http://lizbeth-efraín.info/. Enter S235 in the search box to learn more about \"Neuropathic Pain: Care Instructions. \"  Current as of: October 14, 2016  Content Version: 11.2  © 9899-0621 [employer]. Care instructions adapted under license by [employer] (which disclaims liability or warranty for this information). If you have questions about a medical condition or this instruction, always ask your healthcare professional. [place] [address] any warranty or liability for your use of this information.

## 2017-05-29 NOTE — ED PROVIDER NOTES
HPI Comments: Zuleika Kamara is a 49 y.o. F with PMHx significant for HTN / Anemia / Diabetic neuropathy / DM T2 who presents ambulatory to ED [facility] ED c/o aching pain to BL toes with associated numbness and tingling. Pt also endorses difficulty sleeping secondary to pain. She notes that sx feel like neuropathy. Pt reports compliance with Gabapentin 600 mg and notes that Percocet does not provide relief of sx. She denies any recent injury or trauma. Pt denies any nausea, vomiting, diarrhea, SOB, chest pain, calf pain, abdominal pain, fever or chills. PCP: Meghna Gibbons MD    There are no other complaints, changes, or physical findings at this time. The history is provided by the patient. Past Medical History:   Diagnosis Date    Anemia     Chronic pain     Cyst in hand 2014    [address]    Diabetes mellitus type 2 in obese ([address]     Diabetic neuropathy ([address]     Hypertension     Migraine        History reviewed. No pertinent surgical history. Family History:   Problem Relation Age of Onset   [facility] Cancer Father      brain    Diabetes Mother     Heart Disease Mother     Hypertension Mother     Hypertension Maternal Grandmother     Heart Disease Maternal Grandmother        Social History     Social History    Marital status:      Spouse name: N/A    Number of children: N/A    Years of education: N/A     Occupational History    disabled      Social History Main Topics    Smoking status: Never Smoker    Smokeless tobacco: Never Used    Alcohol use No    Drug use: No    Sexual activity: Not Currently     Other Topics Concern    Not on file     Social History Narrative    Lives with daughter. Not working. Applying for disabilty. ALLERGIES: Lortab [hydrocodone-acetaminophen]; Cymbalta [duloxetine]; and Lyrica [pregabalin]    Review of Systems   Constitutional: Negative for chills, fatigue and fever.         Positive for difficulty sleeping   HENT: Negative for congestion, rhinorrhea and sore throat. Eyes: Negative for pain, discharge and visual disturbance. Respiratory: Negative for cough, chest tightness, shortness of breath and wheezing. Cardiovascular: Negative for chest pain, palpitations and leg swelling. Negative for calf pain   Gastrointestinal: Negative for abdominal pain, constipation, diarrhea, nausea and vomiting. Genitourinary: Negative for dysuria, frequency and hematuria. Musculoskeletal: Negative for back pain. Positive for aching pain to BL toes   Skin: Negative for rash. Neurological: Positive for numbness. Negative for dizziness, weakness, light-headedness and headaches. Psychiatric/Behavioral: Negative. Vitals:    05/28/17 1949 05/28/17 2115   BP: 170/87 146/83   Pulse: 99 79   Resp: 16 14   Temp: 98.4 °F (36.9 °C) 98.2 °F (36.8 °C)   SpO2: 100% 95%   Weight: 106.3 kg (234 lb 5.6 oz)    Height: 5' 4\" (1.626 m)             Physical Exam   Constitutional: She is oriented to person, place, and time. She appears well-developed and well-nourished. No distress. HENT:   Head: Normocephalic and atraumatic. Eyes: EOM are normal. Right eye exhibits no discharge. Left eye exhibits no discharge. No scleral icterus. Neck: Normal range of motion. Neck supple. No tracheal deviation present. Cardiovascular: Normal rate, regular rhythm, normal heart sounds and intact distal pulses. Exam reveals no gallop and no friction rub. No murmur heard. Pulses:       Dorsalis pedis pulses are 2+ on the right side, and 2+ on the left side. Pulmonary/Chest: Effort normal and breath sounds normal. No respiratory distress. She has no wheezes. She has no rales. Abdominal: Soft. She exhibits no distension. There is no tenderness. Musculoskeletal: Normal range of motion. She exhibits no edema or tenderness. No calf tenderness  No lower extremity joint erythema or warmth   Lymphadenopathy:     She has no cervical adenopathy.    Neurological: She is alert and oriented to person, place, and time. No focal neuro deficits   Skin: Skin is warm and dry. No rash noted. Psychiatric: She has a normal mood and affect. Nursing note and vitals reviewed. MDM  Number of Diagnoses or Management Options  Neuropathy:   Diagnosis management comments:     Patient presents to ED with symptoms consistent with diabetic neuropathy. She is NVI. Will increase gabapentin and provide short course of analgesics. Amount and/or Complexity of Data Reviewed  Review and summarize past medical records: yes    Patient Progress  Patient progress: stable    ED Course       Procedures    PROGRESS NOTE:  8:49 PM  Reviewed . Patient has multiple narcotic prescriptions from multiple providers that are all filled at the same pharmacy. Will increase gabapentin dose and give limited supply of Norco and advise patient to follow up with PCP for further pain management. Written by Peng Leblanc. TEX Art Scribe, as dictated by Julia Foley MD    Patient Vitals for the past 12 hrs:   Temp Pulse Resp BP SpO2   05/28/17 2115 98.2 °F (36.8 °C) 79 14 146/83 95 %   05/28/17 1949 98.4 °F (36.9 °C) 99 16 170/87 100 %     IMPRESSION:  1. Neuropathy        PLAN:  1. Discharge Medication List as of 5/28/2017  8:51 PM      START taking these medications    Details   HYDROcodone-acetaminophen (NORCO) 7.5-325 mg per tablet Take 1 Tab by mouth every six (6) hours as needed for Pain. Max Daily Amount: 4 Tabs., Print, Disp-10 Tab, R-0         CONTINUE these medications which have CHANGED    Details   gabapentin (NEURONTIN) 800 mg tablet Take 1 Tab by mouth three (3) times daily for 14 days. , Normal, Disp-42 Tab, R-0         CONTINUE these medications which have NOT CHANGED    Details   nortriptyline (PAMELOR) 25 mg capsule Take 2 Caps by mouth two (2) times a day., Normal, Disp-120 Cap, R-5      furosemide (LASIX) 20 mg tablet Take 2 Tabs by mouth daily.  As needed for swelling, Normal, Disp-60 Tab, R-2      potassium chloride (KLOR-CON) 10 mEq tablet Take 1 Tab by mouth daily. Of takes lasix, Normal, Disp-30 Tab, R-2      lisinopril (PRINIVIL, ZESTRIL) 10 mg tablet TAKE ONE TABLET BY MOUTH ONCE DAILY, Normal, Disp-30 Tab, R-5      aspirin delayed-release 81 mg tablet Take  by mouth daily. , Historical Med      metFORMIN (GLUCOPHAGE) 1,000 mg tablet TAKE 1 TABLET BY MOUTH TWO (2) TIMES DAILY WITH MEALS, Print, Disp-180 Tab, R-3      atenolol (TENORMIN) 100 mg tablet Take 1 Tab by mouth daily. TAKE ONE TABLET BY MOUTH ONCE DAILY, Normal, Disp-30 Tab, R-5           2. Follow-up Information     Follow up With Details Comments [address], [place] In 3 days  [address]  [phone]      [facility] EMERGENCY DEPT  As needed, If symptoms worsen [address] [address] Balwinder Coats        Return to ED if worse     DISCHARGE NOTE:  8:50 PM  The patient's results have been reviewed with family and/or caregiver. They verbally convey their understanding and agreement of the patient's signs, symptoms, diagnosis, treatment, and prognosis. They additionally agree to follow up as recommended in the discharge instructions or to return to the Emergency Room should the patient's condition change prior to their follow-up appointment. The family and/or caregiver verbally agrees with the care-plan and all of their questions have been answered. The discharge instructions have also been provided to the them along with educational information regarding the patient's diagnosis and a list of reasons why the patient would want to return to the ER prior to their follow-up appointment should their condition change. Written by Chester Oconnor. Aman Segovia, ED Scribe, as dictated by Piotr Robledo MD.        Attestations: This note is prepared by Chester Oconnor.  Kaelyn, acting as Scribe for Piotr Robledo MD.    Piotr Robledo MD: The scribe's documentation has been prepared under my direction and personally reviewed by me in its entirety. I confirm that the note above accurately reflects all work, treatment, procedures, and medical decision making performed by me.

## 2017-06-02 RX ORDER — MELOXICAM 15 MG/1
TABLET ORAL
Qty: 90 TAB | Refills: 1 | Status: SHIPPED | OUTPATIENT
Start: 2017-06-02 | End: 2017-12-29 | Stop reason: SDUPTHER

## 2017-06-02 NOTE — TELEPHONE ENCOUNTER
Requested Prescriptions     Pending Prescriptions Disp Refills    meloxicam (MOBIC) 15 mg tablet [Pharmacy Med Name: MELOXICAM 15MG      TAB] 90 Tab 1     Sig: TAKE ONE TABLET BY MOUTH ONCE DAILY     Last office visit 5/15/17 FUTURE 9/15/17  Last filled 3/20/17  Changes made to medication on last visit NONE

## 2017-06-09 ENCOUNTER — DOCUMENTATION ONLY (OUTPATIENT)
Dept: NEUROLOGY | Age: 52
End: 2017-06-09

## 2017-06-09 ENCOUNTER — HOSPITAL ENCOUNTER (EMERGENCY)
Age: 52
Discharge: HOME | End: 2017-06-09
Attending: EMERGENCY MEDICINE
Payer: MEDICAID

## 2017-06-09 VITALS
RESPIRATION RATE: 16 BRPM | OXYGEN SATURATION: 100 % | HEART RATE: 64 BPM | WEIGHT: 242.95 LBS | TEMPERATURE: 98.1 F | DIASTOLIC BLOOD PRESSURE: 88 MMHG | BODY MASS INDEX: 41.48 KG/M2 | HEIGHT: 64 IN | SYSTOLIC BLOOD PRESSURE: 140 MMHG

## 2017-06-09 DIAGNOSIS — R10.9 FLANK PAIN: ICD-10-CM

## 2017-06-09 DIAGNOSIS — G89.29 OTHER CHRONIC PAIN: ICD-10-CM

## 2017-06-09 DIAGNOSIS — M54.50 ACUTE RIGHT-SIDED LOW BACK PAIN WITHOUT SCIATICA: Primary | ICD-10-CM

## 2017-06-09 LAB
ALBUMIN SERPL BCP-MCNC: 3.6 G/DL (ref 3.5–5)
ALBUMIN/GLOB SERPL: 0.9 {RATIO} (ref 1.1–2.2)
ALP SERPL-CCNC: 113 U/L (ref 45–117)
ALT SERPL-CCNC: 15 U/L (ref 12–78)
ANION GAP BLD CALC-SCNC: 7 MMOL/L (ref 5–15)
APPEARANCE UR: CLEAR
AST SERPL W P-5'-P-CCNC: 13 U/L (ref 15–37)
BACTERIA URNS QL MICRO: NEGATIVE /HPF
BASOPHILS # BLD AUTO: 0 K/UL (ref 0–0.1)
BASOPHILS # BLD: 0 % (ref 0–1)
BILIRUB SERPL-MCNC: 0.3 MG/DL (ref 0.2–1)
BILIRUB UR QL: NEGATIVE
BUN SERPL-MCNC: 11 MG/DL (ref 6–20)
BUN/CREAT SERPL: 8 (ref 12–20)
CALCIUM SERPL-MCNC: 8.5 MG/DL (ref 8.5–10.1)
CHLORIDE SERPL-SCNC: 111 MMOL/L (ref 97–108)
CO2 SERPL-SCNC: 25 MMOL/L (ref 21–32)
COLOR UR: NORMAL
CREAT SERPL-MCNC: 1.35 MG/DL (ref 0.55–1.02)
EOSINOPHIL # BLD: 0.3 K/UL (ref 0–0.4)
EOSINOPHIL NFR BLD: 4 % (ref 0–7)
EPITH CASTS URNS QL MICRO: NORMAL /LPF
ERYTHROCYTE [DISTWIDTH] IN BLOOD BY AUTOMATED COUNT: 15.8 % (ref 11.5–14.5)
GLOBULIN SER CALC-MCNC: 3.8 G/DL (ref 2–4)
GLUCOSE SERPL-MCNC: 168 MG/DL (ref 65–100)
GLUCOSE UR STRIP.AUTO-MCNC: NEGATIVE MG/DL
HCG UR QL: NEGATIVE
HCT VFR BLD AUTO: 33.4 % (ref 35–47)
HGB BLD-MCNC: 11.2 G/DL (ref 11.5–16)
HGB UR QL STRIP: NEGATIVE
HYALINE CASTS URNS QL MICRO: NORMAL /LPF (ref 0–5)
KETONES UR QL STRIP.AUTO: NEGATIVE MG/DL
LEUKOCYTE ESTERASE UR QL STRIP.AUTO: NEGATIVE
LYMPHOCYTES # BLD AUTO: 39 % (ref 12–49)
LYMPHOCYTES # BLD: 3.2 K/UL (ref 0.8–3.5)
MCH RBC QN AUTO: 26.4 PG (ref 26–34)
MCHC RBC AUTO-ENTMCNC: 33.5 G/DL (ref 30–36.5)
MCV RBC AUTO: 78.6 FL (ref 80–99)
MONOCYTES # BLD: 0.6 K/UL (ref 0–1)
MONOCYTES NFR BLD AUTO: 7 % (ref 5–13)
NEUTS SEG # BLD: 3.9 K/UL (ref 1.8–8)
NEUTS SEG NFR BLD AUTO: 50 % (ref 32–75)
NITRITE UR QL STRIP.AUTO: NEGATIVE
PH UR STRIP: 5.5 [PH] (ref 5–8)
PLATELET # BLD AUTO: 137 K/UL (ref 150–400)
POTASSIUM SERPL-SCNC: 3.8 MMOL/L (ref 3.5–5.1)
PROT SERPL-MCNC: 7.4 G/DL (ref 6.4–8.2)
PROT UR STRIP-MCNC: NEGATIVE MG/DL
RBC # BLD AUTO: 4.25 M/UL (ref 3.8–5.2)
RBC #/AREA URNS HPF: NORMAL /HPF (ref 0–5)
SODIUM SERPL-SCNC: 143 MMOL/L (ref 136–145)
SP GR UR REFRACTOMETRY: 1.01 (ref 1–1.03)
UA: UC IF INDICATED,UAUC: NORMAL
UROBILINOGEN UR QL STRIP.AUTO: 0.2 EU/DL (ref 0.2–1)
WBC # BLD AUTO: 8 K/UL (ref 3.6–11)
WBC URNS QL MICRO: NORMAL /HPF (ref 0–4)

## 2017-06-09 PROCEDURE — 99283 EMERGENCY DEPT VISIT LOW MDM: CPT

## 2017-06-09 PROCEDURE — 81025 URINE PREGNANCY TEST: CPT

## 2017-06-09 PROCEDURE — 81001 URINALYSIS AUTO W/SCOPE: CPT | Performed by: EMERGENCY MEDICINE

## 2017-06-09 PROCEDURE — 96374 THER/PROPH/DIAG INJ IV PUSH: CPT

## 2017-06-09 PROCEDURE — 85025 COMPLETE CBC W/AUTO DIFF WBC: CPT | Performed by: EMERGENCY MEDICINE

## 2017-06-09 PROCEDURE — 36415 COLL VENOUS BLD VENIPUNCTURE: CPT | Performed by: EMERGENCY MEDICINE

## 2017-06-09 PROCEDURE — 80053 COMPREHEN METABOLIC PANEL: CPT | Performed by: EMERGENCY MEDICINE

## 2017-06-09 PROCEDURE — 74011250636 HC RX REV CODE- 250/636: Performed by: EMERGENCY MEDICINE

## 2017-06-09 RX ORDER — KETOROLAC TROMETHAMINE 30 MG/ML
INJECTION, SOLUTION INTRAMUSCULAR; INTRAVENOUS
Status: DISPENSED
Start: 2017-06-09 | End: 2017-06-09

## 2017-06-09 RX ORDER — KETOROLAC TROMETHAMINE 30 MG/ML
30 INJECTION, SOLUTION INTRAMUSCULAR; INTRAVENOUS
Status: COMPLETED | OUTPATIENT
Start: 2017-06-09 | End: 2017-06-09

## 2017-06-09 RX ORDER — LIDOCAINE 50 MG/G
PATCH TOPICAL
Qty: 6 EACH | Refills: 0 | Status: SHIPPED | OUTPATIENT
Start: 2017-06-09 | End: 2017-09-09

## 2017-06-09 RX ADMIN — KETOROLAC TROMETHAMINE 30 MG: 30 INJECTION, SOLUTION INTRAMUSCULAR at 01:29

## 2017-06-09 NOTE — ED PROVIDER NOTES
HPI Comments: Adal Ayala, 49 y.o. female, presents ambulatory to ED [facility] ED with cc of constant \"nagging\" right flank pain that started yesterday night. She states that her pain is exacerbated with movement. She states that she took a muscle relaxer with no relief in pain. She denies any recent strenuous activity. She specifically denies urinary symptoms. PCP: Simeon Larios MD    Social history significant for: - Tobacco, - EtOH, - Illicit Drug Use    There are no other complaints, changes, or physical findings at this time. Written by TEX Fossibbaron, as dictated by Bryan Conklin MD.    The history is provided by the patient. No  was used. Past Medical History:   Diagnosis Date    Anemia     Chronic pain     Cyst in hand 2014    [address]    Diabetes mellitus type 2 in obese ([address]     Diabetic neuropathy ([address]     Hypertension     Migraine        History reviewed. No pertinent surgical history. Family History:   Problem Relation Age of Onset   [facility] Cancer Father      brain    Diabetes Mother     Heart Disease Mother     Hypertension Mother     Hypertension Maternal Grandmother     Heart Disease Maternal Grandmother        Social History     Social History    Marital status:      Spouse name: N/A    Number of children: N/A    Years of education: N/A     Occupational History    disabled      Social History Main Topics    Smoking status: Never Smoker    Smokeless tobacco: Never Used    Alcohol use No    Drug use: No    Sexual activity: Not Currently     Other Topics Concern    Not on file     Social History Narrative    Lives with daughter. Not working. Applying for disabilty. ALLERGIES: Lortab [hydrocodone-acetaminophen]; Cymbalta [duloxetine]; and Lyrica [pregabalin]    Review of Systems   Constitutional: Negative for activity change, appetite change, fatigue and fever. HENT: Negative. Negative for congestion, rhinorrhea and sore throat. Respiratory: Negative. Negative for cough, shortness of breath and wheezing. Cardiovascular: Negative. Negative for chest pain and leg swelling. Gastrointestinal: Negative. Negative for abdominal distention, abdominal pain, constipation, diarrhea, nausea and vomiting. Endocrine: Negative. Genitourinary: Positive for flank pain (right sided). Negative for difficulty urinating, dysuria, menstrual problem, vaginal bleeding and vaginal discharge. Musculoskeletal: Negative for arthralgias, joint swelling and myalgias. Skin: Negative. Negative for rash. Neurological: Negative. Negative for dizziness, weakness, light-headedness and headaches. Psychiatric/Behavioral: Negative. Patient Vitals for the past 12 hrs:   Temp Pulse Resp BP SpO2   06/09/17 0116 98.1 °F (36.7 °C) 93 18 (!) 160/93 100 %       Physical Exam   Constitutional: She is oriented to person, place, and time. She appears well-developed and well-nourished. HENT:   Head: Atraumatic. Eyes: EOM are normal.   Cardiovascular: Normal rate, regular rhythm, normal heart sounds and intact distal pulses. Exam reveals no gallop and no friction rub. No murmur heard. Pulmonary/Chest: Effort normal and breath sounds normal. No respiratory distress. She has no wheezes. She has no rales. She exhibits no tenderness. Abdominal: Soft. Bowel sounds are normal. She exhibits no distension and no mass. There is no tenderness. There is no rebound and no guarding. Musculoskeletal: Normal range of motion. + paraspinal tenderness that extends from lower thoracic to lumbar region, with extension to iliac crest  + tenderness upon flexion and extension, decreased ROM   Neurological: She is oriented to person, place, and time. Skin: Skin is warm. Psychiatric: She has a normal mood and affect. Nursing note and vitals reviewed.        MDM  Number of Diagnoses or Management Options  Acute right-sided low back pain without sciatica:   Flank pain:   Other chronic pain:   Diagnosis management comments:   Appears mechanical in nature although patient cannot recall the strenuating incident. Will order blood work and UA to rule out UTI/pyelonephritis as underlying factor. Will treat pain. Of note, treating patient's daughter who says that she will be the  and vice versa. Will start analgesic treatment with Toradol. Amount and/or Complexity of Data Reviewed  Clinical lab tests: ordered and reviewed  Review and summarize past medical records: yes    Patient Progress  Patient progress: stable    ED Course       Procedures    Progress Note:  2:23 AM  The patient was offered Toradol, but she refuses at this time. Written by Michelle Guan, ED Scribe, as dictated by Nilam Zhong MD.    Progress Note:  3:54 AM  Patient's PCP states that he will not prescribe pain medication to patient despite patient's repeated requests for narcotics. LABORATORY TESTS:  Recent Results (from the past 12 hour(s))   CBC WITH AUTOMATED DIFF    Collection Time: 06/09/17  1:21 AM   Result Value Ref Range    WBC 8.0 3.6 - 11.0 K/uL    RBC 4.25 3.80 - 5.20 M/uL    HGB 11.2 (L) 11.5 - 16.0 g/dL    HCT 33.4 (L) 35.0 - 47.0 %    MCV 78.6 (L) 80.0 - 99.0 FL    MCH 26.4 26.0 - 34.0 PG    MCHC 33.5 30.0 - 36.5 g/dL    RDW 15.8 (H) 11.5 - 14.5 %    PLATELET 513 (L) 243 - 400 K/uL    NEUTROPHILS 50 32 - 75 %    LYMPHOCYTES 39 12 - 49 %    MONOCYTES 7 5 - 13 %    EOSINOPHILS 4 0 - 7 %    BASOPHILS 0 0 - 1 %    ABS. NEUTROPHILS 3.9 1.8 - 8.0 K/UL    ABS. LYMPHOCYTES 3.2 0.8 - 3.5 K/UL    ABS. MONOCYTES 0.6 0.0 - 1.0 K/UL    ABS. EOSINOPHILS 0.3 0.0 - 0.4 K/UL    ABS.  BASOPHILS 0.0 0.0 - 0.1 K/UL   METABOLIC PANEL, COMPREHENSIVE    Collection Time: 06/09/17  1:21 AM   Result Value Ref Range    Sodium 143 136 - 145 mmol/L    Potassium 3.8 3.5 - 5.1 mmol/L    Chloride 111 (H) 97 - 108 mmol/L    CO2 25 21 - 32 mmol/L    Anion gap 7 5 - 15 mmol/L    Glucose 168 (H) 65 - 100 mg/dL    BUN 11 6 - 20 MG/DL    Creatinine 1.35 (H) 0.55 - 1.02 MG/DL    BUN/Creatinine ratio 8 (L) 12 - 20      GFR est AA 50 (L) >60 ml/min/1.73m2    GFR est non-AA 42 (L) >60 ml/min/1.73m2    Calcium 8.5 8.5 - 10.1 MG/DL    Bilirubin, total 0.3 0.2 - 1.0 MG/DL    ALT (SGPT) 15 12 - 78 U/L    AST (SGOT) 13 (L) 15 - 37 U/L    Alk. phosphatase 113 45 - 117 U/L    Protein, total 7.4 6.4 - 8.2 g/dL    Albumin 3.6 3.5 - 5.0 g/dL    Globulin 3.8 2.0 - 4.0 g/dL    A-G Ratio 0.9 (L) 1.1 - 2.2     URINALYSIS W/ REFLEX CULTURE    Collection Time: 06/09/17  1:21 AM   Result Value Ref Range    Color YELLOW/STRAW      Appearance CLEAR CLEAR      Specific gravity 1.010 1.003 - 1.030      pH (UA) 5.5 5.0 - 8.0      Protein NEGATIVE  NEG mg/dL    Glucose NEGATIVE  NEG mg/dL    Ketone NEGATIVE  NEG mg/dL    Bilirubin NEGATIVE  NEG      Blood NEGATIVE  NEG      Urobilinogen 0.2 0.2 - 1.0 EU/dL    Nitrites NEGATIVE  NEG      Leukocyte Esterase NEGATIVE  NEG      UA:UC IF INDICATED CULTURE NOT INDICATED BY UA RESULT CNI      WBC 0-4 0 - 4 /hpf    RBC 0-5 0 - 5 /hpf    Epithelial cells FEW FEW /lpf    Bacteria NEGATIVE  NEG /hpf    Hyaline cast 0-2 0 - 5 /lpf   HCG URINE, QL. - POC    Collection Time: 06/09/17  1:22 AM   Result Value Ref Range    Pregnancy test,urine (POC) NEGATIVE  NEG         MEDICATIONS GIVEN:  Medications   ketorolac (TORADOL) injection 30 mg (30 mg IntraVENous Given 6/9/17 0129)       IMPRESSION:  1. Acute right-sided low back pain without sciatica    2. Flank pain    3. Other chronic pain        PLAN:  1. Discharge    Follow-up Information     Follow up With Details Comments [address], [place] In 3 days As needed if pain persists [address] [facility],[address] [address] [phone]          Return to ED if worse     Discharge Note:  3:55 AM  The pt is ready for discharge. The pt's signs, symptoms, diagnosis, and discharge instructions have been discussed and pt has conveyed their understanding.  The pt is to follow up as recommended or return to ER should their symptoms worsen. Plan has been discussed and pt is in agreement. This note is prepared by Charleen Cherry, acting as a Scribe for Mart Boone MD.    Mart Boone MD: The scribe's documentation has been prepared under my direction and personally reviewed by me in its entirety. I confirm that the notes above accurately reflects all work, treatment, procedures, and medical decision making performed by me.

## 2017-06-14 ENCOUNTER — DOCUMENTATION ONLY (OUTPATIENT)
Dept: NEUROLOGY | Age: 52
End: 2017-06-14

## 2017-06-14 NOTE — PROGRESS NOTES
Received request for release point for medical records.    This was sent to our medical records   3rd request.

## 2017-06-21 ENCOUNTER — TELEPHONE (OUTPATIENT)
Dept: INTERNAL MEDICINE CLINIC | Age: 52
End: 2017-06-21

## 2017-06-21 RX ORDER — ATENOLOL 100 MG/1
TABLET ORAL
Qty: 30 TAB | Refills: 5 | Status: SHIPPED | OUTPATIENT
Start: 2017-06-21 | End: 2017-12-18 | Stop reason: ALTCHOICE

## 2017-06-21 RX ORDER — TRAMADOL HYDROCHLORIDE 50 MG/1
50 TABLET ORAL
Qty: 100 TAB | Refills: 0 | Status: SHIPPED | OUTPATIENT
Start: 2017-06-21 | End: 2017-07-14 | Stop reason: SDUPTHER

## 2017-06-21 NOTE — TELEPHONE ENCOUNTER
Requested Prescriptions     Pending Prescriptions Disp Refills    atenolol (TENORMIN) 100 mg tablet [Pharmacy Med Name: ATENOLOL 100MG      TAB] 30 Tab 5     Sig: TAKE ONE TABLET BY MOUTH ONCE DAILY    traMADol (ULTRAM) 50 mg tablet 100 Tab 0     Sig: Take 1 Tab by mouth every six (6) hours as needed for Pain. Max Daily Amount: 200 mg. Appointment needed to refill this medication again.

## 2017-06-21 NOTE — TELEPHONE ENCOUNTER
Last office visit:  5/15/17  Last filled:  5/20/16 #30 X 5 refills  No changes  Follow up scheduled in 2 months

## 2017-07-14 ENCOUNTER — TELEPHONE (OUTPATIENT)
Dept: INTERNAL MEDICINE CLINIC | Age: 52
End: 2017-07-14

## 2017-07-14 DIAGNOSIS — G60.8 IDIOPATHIC SMALL AND LARGE FIBER SENSORY NEUROPATHY: Primary | ICD-10-CM

## 2017-07-14 RX ORDER — TRAMADOL HYDROCHLORIDE 50 MG/1
TABLET ORAL
Qty: 100 TAB | Refills: 0 | Status: SHIPPED | OUTPATIENT
Start: 2017-07-14 | End: 2017-07-17

## 2017-07-14 NOTE — TELEPHONE ENCOUNTER
Requested Prescriptions     Pending Prescriptions Disp Refills    traMADol (ULTRAM) 50 mg tablet [Pharmacy Med Name: TRAMADOL HCL 50MG   TAB] 100 Tab 0     Sig: TAKE ONE TABLET BY MOUTH EVERY 6 HOURS AS NEEDED FOR PAIN . MAX  DAIY  AMOUNT  200  MG. Last office visit 5/15/17 FUTURE APPT 7/14/17  Last filled 6/21/17  Changes made to medication on last visit NONE    NEED AN APPT BEFORE FILLING?

## 2017-07-17 ENCOUNTER — OFFICE VISIT (OUTPATIENT)
Dept: INTERNAL MEDICINE CLINIC | Age: 52
End: 2017-07-17

## 2017-07-17 ENCOUNTER — TELEPHONE (OUTPATIENT)
Dept: INTERNAL MEDICINE CLINIC | Age: 52
End: 2017-07-17

## 2017-07-17 VITALS
SYSTOLIC BLOOD PRESSURE: 135 MMHG | BODY MASS INDEX: 40.12 KG/M2 | HEART RATE: 84 BPM | DIASTOLIC BLOOD PRESSURE: 87 MMHG | RESPIRATION RATE: 16 BRPM | WEIGHT: 235 LBS | HEIGHT: 64 IN | TEMPERATURE: 98.7 F | OXYGEN SATURATION: 100 %

## 2017-07-17 DIAGNOSIS — G61.81 CHRONIC INFLAMMATORY DEMYELINATING POLYNEUROPATHY (HCC): ICD-10-CM

## 2017-07-17 DIAGNOSIS — S49.91XA RIGHT SHOULDER INJURY, INITIAL ENCOUNTER: Primary | ICD-10-CM

## 2017-07-17 DIAGNOSIS — E11.21 CONTROLLED TYPE 2 DIABETES MELLITUS WITH DIABETIC NEPHROPATHY, WITHOUT LONG-TERM CURRENT USE OF INSULIN (HCC): ICD-10-CM

## 2017-07-17 LAB — HBA1C MFR BLD HPLC: 7.6 %

## 2017-07-17 RX ORDER — OXYCODONE AND ACETAMINOPHEN 5; 325 MG/1; MG/1
1 TABLET ORAL
Qty: 12 TAB | Refills: 0 | Status: SHIPPED | OUTPATIENT
Start: 2017-07-17 | End: 2017-07-26 | Stop reason: SDUPTHER

## 2017-07-17 RX ORDER — HYDROCODONE BITARTRATE AND ACETAMINOPHEN 7.5; 325 MG/1; MG/1
1 TABLET ORAL
Qty: 12 TAB | Refills: 0 | Status: SHIPPED | OUTPATIENT
Start: 2017-07-17 | End: 2017-07-17 | Stop reason: ALTCHOICE

## 2017-07-17 NOTE — MR AVS SNAPSHOT
Visit Information Date & Time Provider Department Dept. Phone Encounter #  
 7/17/2017  1:45 PM Roxie Ospina MD [address] [place] [address] Primary Care [phone] [id number] Follow-up Instructions Return in about 3 months (around 10/17/2017) for routine follow up. Your Appointments 9/15/2017  1:00 PM  
ROUTINE CARE with Roxie Ospina MD  
[facility]) Appt Note: f/u on dm $0 cp lsh 5/15/17  
 [address] [place] [address]  
[phone]  
  
   
 [address] Upcoming Health Maintenance Date Due FOBT Q 1 YEAR AGE 50-75 12/28/2016 LIPID PANEL Q1 3/25/2017 HEMOGLOBIN A1C Q6M 8/13/2017 MICROALBUMIN Q1 8/15/2017 PAP AKA CERVICAL CYTOLOGY 9/3/2017 INFLUENZA AGE 9 TO ADULT 8/1/2017 EYE EXAM RETINAL OR DILATED Q1 10/25/2017 FOOT EXAM Q1 2/13/2018 BREAST CANCER SCRN MAMMOGRAM 3/22/2019 DTaP/Tdap/Td series (2 - Td) 3/13/2027 Allergies as of 7/17/2017  Review Complete On: 7/17/2017 By: Larisa Vargas LPN Severity Noted Reaction Type Reactions Lortab [Hydrocodone-acetaminophen] High 08/11/2013   Intolerance Nausea and Vomiting Cymbalta [Duloxetine]  10/04/2016    Diarrhea Lyrica [Pregabalin]  03/13/2017    Drowsiness Current Immunizations  Reviewed on 12/13/2016 No immunizations on file. Not reviewed this visit You Were Diagnosed With   
  
 Codes Comments Right shoulder injury, initial encounter    -  Primary ICD-10-CM: T15.35MU ICD-9-CM: 327. 2 Chronic inflammatory demyelinating polyneuropathy (HCC)     ICD-10-CM: G61.81 
ICD-9-CM: 357.81 Controlled type 2 diabetes mellitus with diabetic nephropathy, without long-term current use of insulin (HCC)     ICD-10-CM: E11.21 
ICD-9-CM: 250.40, 583.81 Vitals BP Pulse Temp Resp Height(growth percentile) Weight(growth percentile) 135/87 (BP 1 Location: Left arm, BP Patient Position: Sitting) 84 98.7 °F (37.1 °C) 16 5' 4\" (1.626 m) 235 lb (106.6 kg) SpO2 BMI OB Status Smoking Status 100% 40.34 kg/m2 Having regular periods Never Smoker Vitals History BMI and BSA Data Body Mass Index Body Surface Area  
 40.34 kg/m 2 2.19 m 2 Preferred Pharmacy Pharmacy Name Phone [facility] [address] [facility] [phone] Your Updated Medication List  
  
   
This list is accurate as of: 7/17/17  2:52 PM.  Always use your most recent med list.  
  
  
  
  
 aspirin delayed-release 81 mg tablet Take  by mouth daily. atenolol 100 mg tablet Commonly known as:  TENORMIN  
TAKE ONE TABLET BY MOUTH ONCE DAILY  
  
 furosemide 20 mg tablet Commonly known as:  LASIX Take 2 Tabs by mouth daily. As needed for swelling  
  
 lidocaine 5 % Commonly known as:  Mainor Omid Apply patch to the affected area for 12 hours a day and remove for 12 hours a day. lisinopril 10 mg tablet Commonly known as:  PRINIVIL, ZESTRIL  
TAKE ONE TABLET BY MOUTH ONCE DAILY  
  
 meloxicam 15 mg tablet Commonly known as:  MOBIC  
TAKE ONE TABLET BY MOUTH ONCE DAILY  
  
 metFORMIN 1,000 mg tablet Commonly known as:  GLUCOPHAGE  
TAKE 1 TABLET BY MOUTH TWO (2) TIMES DAILY WITH MEALS  
  
 nortriptyline 25 mg capsule Commonly known as:  PAMELOR Take 2 Caps by mouth two (2) times a day. oxyCODONE-acetaminophen 5-325 mg per tablet Commonly known as:  PERCOCET Take 1 Tab by mouth every eight (8) hours as needed for Pain. Max Daily Amount: 3 Tabs. potassium chloride 10 mEq tablet Commonly known as:  KLOR-CON Take 1 Tab by mouth daily. Of takes lasix Prescriptions Printed Refills  
 oxyCODONE-acetaminophen (PERCOCET) 5-325 mg per tablet 0 Sig: Take 1 Tab by mouth every eight (8) hours as needed for Pain. Max Daily Amount: 3 Tabs. Class: Print Route: Oral  
  
We Performed the Following AMB POC HEMOGLOBIN A1C [82423 CPT(R)] Follow-up Instructions Return in about 3 months (around 10/17/2017) for routine follow up. To-Do List   
 07/17/2017 Imaging:  XR SHOULDER RT AP/LAT MIN 2 V Introducing [facility]! [employer] introduces evolso patient portal. Now you can access parts of your medical record, email your doctor's office, and request medication refills online. 1. In your internet browser, go to [email or web address] 2. Click on the First Time User? Click Here link in the Sign In box. You will see the New Member Sign Up page. 3. Enter your evolso Access Code exactly as it appears below. You will not need to use this code after youve completed the sign-up process. If you do not sign up before the expiration date, you must request a new code. · evolso Access Code: [email or web address] Expires: 9/30/2017  7:56 PM 
 
4. Enter the last four digits of your Social Security Number (xxxx) and Date of Birth (mm/dd/yyyy) as indicated and click Submit. You will be taken to the next sign-up page. 5. Create a evolso ID. This will be your evolso login ID and cannot be changed, so think of one that is secure and easy to remember. 6. Create a evolso password. You can change your password at any time. 7. Enter your Password Reset Question and Answer. This can be used at a later time if you forget your password. 8. Enter your e-mail address. You will receive e-mail notification when new information is available in [address] 9. Click Sign Up. You can now view and download portions of your medical record. 10. Click the Download Summary menu link to download a portable copy of your medical information. If you have questions, please visit the Frequently Asked Questions section of the evolso website.  Remember, evolso is NOT to be used for urgent needs. For medical emergencies, dial 911. Now available from your iPhone and Android! Please provide this summary of care documentation to your next provider. Your primary care clinician is listed as Darrell Reagan. If you have any questions after today's visit, please call [phone].

## 2017-07-17 NOTE — TELEPHONE ENCOUNTER
Patient called wanting to know if she could get something stronger than tramadol because it doesn't work. Please call her at 21 [phone].
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
## 2017-07-17 NOTE — PROGRESS NOTES
Chief Complaint   Patient presents with    Shoulder Pain     pain med     I have reviewed the patient's medical history in detail and updated the computerized patient record. Health Maintenance reviewed. 1. Have you been to the ER, urgent care clinic since your last visit? Hospitalized since your last visit?no    2. Have you seen or consulted any other health care providers outside of the [address] [place] [address] since your last visit? Include any pap smears or colon screening. No    Do you have an [address] in place in the event that you have a healthcare crisis that could impact your decision making as it pertains to your health?no    Would you like information about the [address] [place] [address] given. no

## 2017-07-26 ENCOUNTER — OFFICE VISIT (OUTPATIENT)
Dept: INTERNAL MEDICINE CLINIC | Age: 52
End: 2017-07-26

## 2017-07-26 ENCOUNTER — TELEPHONE (OUTPATIENT)
Dept: INTERNAL MEDICINE CLINIC | Age: 52
End: 2017-07-26

## 2017-07-26 VITALS
DIASTOLIC BLOOD PRESSURE: 70 MMHG | SYSTOLIC BLOOD PRESSURE: 138 MMHG | HEIGHT: 64 IN | WEIGHT: 238 LBS | OXYGEN SATURATION: 100 % | BODY MASS INDEX: 40.63 KG/M2 | HEART RATE: 88 BPM | TEMPERATURE: 98.1 F | RESPIRATION RATE: 18 BRPM

## 2017-07-26 DIAGNOSIS — I10 ESSENTIAL HYPERTENSION: ICD-10-CM

## 2017-07-26 DIAGNOSIS — G61.81 CHRONIC INFLAMMATORY DEMYELINATING POLYNEUROPATHY (HCC): Primary | ICD-10-CM

## 2017-07-26 RX ORDER — OXYCODONE AND ACETAMINOPHEN 5; 325 MG/1; MG/1
1 TABLET ORAL
Qty: 5 TAB | Refills: 0 | Status: SHIPPED | OUTPATIENT
Start: 2017-07-26 | End: 2017-08-25 | Stop reason: CLARIF

## 2017-07-26 NOTE — PROGRESS NOTES
Chief Complaint   Patient presents with    Foot Pain     med refill     I have reviewed the patient's medical history in detail and updated the computerized patient record. Health Maintenance reviewed. 1. Have you been to the ER, urgent care clinic since your last visit? Hospitalized since your last visit?no    2. Have you seen or consulted any other health care providers outside of the [address] [place] [address] since your last visit? Include any pap smears or colon screening. No    Do you have an [address] in place in the event that you have a healthcare crisis that could impact your decision making as it pertains to your health?no  Would you like information about the [address] [place] [address] given. no

## 2017-07-26 NOTE — TELEPHONE ENCOUNTER
Patient called in reference to needing a stronger prescription for her feet. She said she can hardly walk and has to meet disability tomorrow. She would like to speak to Dr. Radha Hicks. Please call her at [phone].

## 2017-07-26 NOTE — PROGRESS NOTES
PROGRESS NOTE        SUBJECTIVE:  Diagnosis/Chief Complaint: Foot Pain (med refill)      Doing well with pain no  Improvement in function: no  Pain levels 8/10   Usage of benzodiazapine or other sedatives no  Symptoms shoulder better, just needss a few percs to get through her disability   Activities: Able to do activities of daily living. No daughter helps her  Side affects: no  States taking medications per medicine list.no   queried yes - latest narcs from me  Urine drug screen done no  Opiod Rx exceeds 50 MME/dayno  Hx of depression no  Hx of drug addiction: no  Safe storage of the opiods: yes - locked up  Narcotic contract reviewed and discussed: yes - has been signed    Social History   Substance Use Topics    Smoking status: Never Smoker    Smokeless tobacco: Never Used    Alcohol use No        OBJECTIVE:    .  Visit Vitals    /70 (BP 1 Location: Left arm, BP Patient Position: Sitting)    Pulse 88    Temp 98.1 °F (36.7 °C) (Oral)    Resp 18    Ht 5' 4\" (1.626 m)    Wt 238 lb (108 kg)    SpO2 100%    BMI 40.85 kg/m2     WDWN in NAD, mental status normal  Heart RRR, no:C/M/R  Lungs CTA No wheezes, rales or rhonchi  Abdo: soft no tenderness, rebound or guarding  Neurological exam[de-identified] 2-12 intact  Psychiatric: Normal mood, judgement    Reviewed: Medications, allergies, clinical lab test results and imaging results have been reviewed. Any abnormal findings have been addressed. ASSESSMENT:     ICD-10-CM ICD-9-CM    1. Chronic inflammatory demyelinating polyneuropathy (HCC) G61.81 357.81    2.  Essential hypertension I10 401.9          Patient is 49 y.o. with diagnosis of :   Patient Active Problem List   Diagnosis Code    Hypertension I10    Chronic pain G89.29    Narcotic dependence, episodic use ([facility] F11.20    Diabetes mellitus type 2, controlled ([facility] E11.9    Diabetic polyneuropathy associated with type 2 diabetes mellitus ([facility] E11.42    Idiopathic small and large fiber sensory neuropathy G60.8    Chronic inflammatory demyelinating polyneuropathy (HCC) G61.81    Spinal stenosis of lumbosacral region M48.07    Lumbar back pain with radiculopathy affecting left lower extremity M54.17    Lumbar back pain with radiculopathy affecting right lower extremity M54.17       PLAN:   She is requesting some perc to get her through [facility]. OK but I will not continue to refill these. Orders Placed This Encounter    oxyCODONE-acetaminophen (PERCOCET) 5-325 mg per tablet     Sig: Take 1 Tab by mouth every eight (8) hours as needed for Pain. Max Daily Amount: 3 Tabs. Dispense:  5 Tab     Refill:  0     Follow-up Disposition:  Return if symptoms worsen or fail to improve.

## 2017-07-26 NOTE — MR AVS SNAPSHOT
Visit Information Date & Time Provider Department Dept. Phone Encounter #  
 7/26/2017  3:30 PM MD [address] [id number] Follow-up Instructions Return if symptoms worsen or fail to improve. Your Appointments 9/15/2017  1:00 PM  
ROUTINE CARE with Kyrie Marina MD  
[facility] [address] [place] [address]) Appt Note: f/u on dm $0 cp lsh 5/15/17  
 [address] [place] [address]. [address] [facility] [address]  
[phone]  
  
   
 [address] [place] [address] [address] Upcoming Health Maintenance Date Due FOBT Q 1 YEAR AGE 50-75 12/28/2016 LIPID PANEL Q1 3/25/2017 MICROALBUMIN Q1 8/15/2017 PAP AKA CERVICAL CYTOLOGY 9/3/2017 INFLUENZA AGE 9 TO ADULT 8/1/2017 EYE EXAM RETINAL OR DILATED Q1 10/25/2017 HEMOGLOBIN A1C Q6M 1/17/2018 FOOT EXAM Q1 2/13/2018 BREAST CANCER SCRN MAMMOGRAM 3/22/2019 DTaP/Tdap/Td series (2 - Td) 3/13/2027 Allergies as of 7/26/2017  Review Complete On: 7/17/2017 By: Lalo Kline LPN Severity Noted Reaction Type Reactions Lortab [Hydrocodone-acetaminophen] High 08/11/2013   Intolerance Nausea and Vomiting Cymbalta [Duloxetine]  10/04/2016    Diarrhea Lyrica [Pregabalin]  03/13/2017    Drowsiness Current Immunizations  Reviewed on 12/13/2016 No immunizations on file. Not reviewed this visit You Were Diagnosed With   
  
 Codes Comments Chronic inflammatory demyelinating polyneuropathy ([facility]    -  Primary ICD-10-CM: G61.81 
ICD-9-CM: 357.81 Essential hypertension     ICD-10-CM: I10 
ICD-9-CM: 401.9 Vitals BP Pulse Temp Resp Height(growth percentile) Weight(growth percentile) 138/70 (BP 1 Location: Left arm, BP Patient Position: Sitting) 88 98.1 °F (36.7 °C) (Oral) 18 5' 4\" (1.626 m) 238 lb (108 kg) SpO2 BMI OB Status Smoking Status 100% 40.85 kg/m2 Having regular periods Never Smoker Vitals History BMI and BSA Data Body Mass Index Body Surface Area  
 40.85 kg/m 2 2.21 m 2 Preferred Pharmacy Pharmacy Name Phone [facility] [address] [phone] Your Updated Medication List  
  
   
This list is accurate as of: 7/26/17  4:34 PM.  Always use your most recent med list.  
  
  
  
  
 aspirin delayed-release 81 mg tablet Take  by mouth daily. atenolol 100 mg tablet Commonly known as:  TENORMIN  
TAKE ONE TABLET BY MOUTH ONCE DAILY  
  
 furosemide 20 mg tablet Commonly known as:  LASIX Take 2 Tabs by mouth daily. As needed for swelling  
  
 lidocaine 5 % Commonly known as:  Joshua Rodarte Apply patch to the affected area for 12 hours a day and remove for 12 hours a day. lisinopril 10 mg tablet Commonly known as:  PRINIVIL, ZESTRIL  
TAKE ONE TABLET BY MOUTH ONCE DAILY  
  
 meloxicam 15 mg tablet Commonly known as:  MOBIC  
TAKE ONE TABLET BY MOUTH ONCE DAILY  
  
 metFORMIN 1,000 mg tablet Commonly known as:  GLUCOPHAGE  
TAKE 1 TABLET BY MOUTH TWO (2) TIMES DAILY WITH MEALS  
  
 nortriptyline 25 mg capsule Commonly known as:  PAMELOR Take 2 Caps by mouth two (2) times a day. oxyCODONE-acetaminophen 5-325 mg per tablet Commonly known as:  PERCOCET Take 1 Tab by mouth every eight (8) hours as needed for Pain. Max Daily Amount: 3 Tabs. potassium chloride 10 mEq tablet Commonly known as:  KLOR-CON Take 1 Tab by mouth daily. Of takes lasix Prescriptions Printed Refills  
 oxyCODONE-acetaminophen (PERCOCET) 5-325 mg per tablet 0 Sig: Take 1 Tab by mouth every eight (8) hours as needed for Pain. Max Daily Amount: 3 Tabs. Class: Print Route: Oral  
  
Follow-up Instructions Return if symptoms worsen or fail to improve. Introducing [address]!    
 Clara Gallegos introduces [employer] patient portal. Now you can access parts of your medical record, email your doctor's office, and request medication refills online. 1. In your internet browser, go to [email or web address] 2. Click on the First Time User? Click Here link in the Sign In box. You will see the New Member Sign Up page. 3. Enter your What [employer] Access Code exactly as it appears below. You will not need to use this code after youve completed the sign-up process. If you do not sign up before the expiration date, you must request a new code. · What [employer] Access Code: [email or web address] Expires: 9/30/2017  7:56 PM 
 
4. Enter the last four digits of your Social Security Number (xxxx) and Date of Birth (mm/dd/yyyy) as indicated and click Submit. You will be taken to the next sign-up page. 5. Create a What [employer] Trend ID. This will be your [employer] login ID and cannot be changed, so think of one that is secure and easy to remember. 6. Create a [employer] password. You can change your password at any time. 7. Enter your Password Reset Question and Answer. This can be used at a later time if you forget your password. 8. Enter your e-mail address. You will receive e-mail notification when new information is available in [address] 9. Click Sign Up. You can now view and download portions of your medical record. 10. Click the Download Summary menu link to download a portable copy of your medical information. If you have questions, please visit the Frequently Asked Questions section of the What [employer] website. Remember, [employer]rend is NOT to be used for urgent needs. For medical emergencies, dial 911. Now available from your iPhone and Android! Please provide this summary of care documentation to your next provider. Your primary care clinician is listed as Buddy Herr. If you have any questions after today's visit, please call [phone].

## 2017-07-30 ENCOUNTER — HOSPITAL ENCOUNTER (EMERGENCY)
Age: 52
Discharge: HOME | End: 2017-07-30
Attending: EMERGENCY MEDICINE
Payer: MEDICAID

## 2017-07-30 VITALS
SYSTOLIC BLOOD PRESSURE: 163 MMHG | TEMPERATURE: 98.7 F | BODY MASS INDEX: 41.36 KG/M2 | WEIGHT: 240.96 LBS | RESPIRATION RATE: 18 BRPM | OXYGEN SATURATION: 100 % | HEART RATE: 96 BPM | DIASTOLIC BLOOD PRESSURE: 104 MMHG

## 2017-07-30 DIAGNOSIS — M51.36 DDD (DEGENERATIVE DISC DISEASE), LUMBAR: Primary | ICD-10-CM

## 2017-07-30 DIAGNOSIS — M54.32 SCIATICA OF LEFT SIDE: ICD-10-CM

## 2017-07-30 PROCEDURE — 99282 EMERGENCY DEPT VISIT SF MDM: CPT

## 2017-07-30 RX ORDER — CARISOPRODOL 350 MG/1
350 TABLET ORAL 4 TIMES DAILY
Qty: 12 TAB | Refills: 0 | Status: SHIPPED | OUTPATIENT
Start: 2017-07-30 | End: 2017-08-25 | Stop reason: CLARIF

## 2017-07-30 RX ORDER — OXYCODONE AND ACETAMINOPHEN 5; 325 MG/1; MG/1
1 TABLET ORAL
Qty: 12 TAB | Refills: 0 | Status: SHIPPED | OUTPATIENT
Start: 2017-07-30 | End: 2017-08-25 | Stop reason: CLARIF

## 2017-07-30 NOTE — DISCHARGE INSTRUCTIONS
Sciatica: Care Instructions  Your Care Instructions    Sciatica (say \"zqe-TP-hn-kuh\") is an irritation of one of the sciatic nerves, which come from the spinal cord in the lower back. The sciatic nerves and their branches extend down through the buttock to the foot. Sciatica can develop when an injured disc in the back presses against a spinal nerve root. Its main symptom is pain, numbness, or weakness that is often worse in the leg or foot than in the back. Sciatica often will improve and go away with time. Early treatment usually includes medicines and exercises to relieve pain. Follow-up care is a key part of your treatment and safety. Be sure to make and go to all appointments, and call your doctor if you are having problems. It's also a good idea to know your test results and keep a list of the medicines you take. How can you care for yourself at home? · Take pain medicines exactly as directed. ¨ If the doctor gave you a prescription medicine for pain, take it as prescribed. ¨ If you are not taking a prescription pain medicine, ask your doctor if you can take an over-the-counter medicine. · Use heat or ice to relieve pain. ¨ To apply heat, put a warm water bottle, heating pad set on low, or warm cloth on your back. Do not go to sleep with a heating pad on your skin. ¨ To use ice, put ice or a cold pack on the area for 10 to 20 minutes at a time. Put a thin cloth between the ice and your skin. · Avoid sitting if possible, unless it feels better than standing. · Alternate lying down with short walks. Increase your walking distance as you are able to without making your symptoms worse. · Do not do anything that makes your symptoms worse. When should you call for help? Call 911 anytime you think you may need emergency care. For example, call if:  · You are unable to move a leg at all.   Call your doctor now or seek immediate medical care if:  · You have new or worse symptoms in your legs or buttocks. Symptoms may include:  ¨ Numbness or tingling. ¨ Weakness. ¨ Pain. · You lose bladder or bowel control. Watch closely for changes in your health, and be sure to contact your doctor if:  · You are not getting better as expected. Where can you learn more? Go to http://lizbeth-efraín.info/. Enter [phone] in the search box to learn more about \"Sciatica: Care Instructions. \"  Current as of: March 21, 2017  Content Version: 11.3  © 7293-1755 [employer]. Care instructions adapted under license by [employer] (which disclaims liability or warranty for this information). If you have questions about a medical condition or this instruction, always ask your healthcare professional. [address] any warranty or liability for your use of this information. Sciatica: Care Instructions  Your Care Instructions    Sciatica (say \"hgj-VX-af-kuh\") is an irritation of one of the sciatic nerves, which come from the spinal cord in the lower back. The sciatic nerves and their branches extend down through the buttock to the foot. Sciatica can develop when an injured disc in the back presses against a spinal nerve root. Its main symptom is pain, numbness, or weakness that is often worse in the leg or foot than in the back. Sciatica often will improve and go away with time. Early treatment usually includes medicines and exercises to relieve pain. Follow-up care is a key part of your treatment and safety. Be sure to make and go to all appointments, and call your doctor if you are having problems. It's also a good idea to know your test results and keep a list of the medicines you take. How can you care for yourself at home? · Take pain medicines exactly as directed. ¨ If the doctor gave you a prescription medicine for pain, take it as prescribed.   ¨ If you are not taking a prescription pain medicine, ask your doctor if you can take an over-the-counter medicine. · Use heat or ice to relieve pain. ¨ To apply heat, put a warm water bottle, heating pad set on low, or warm cloth on your back. Do not go to sleep with a heating pad on your skin. ¨ To use ice, put ice or a cold pack on the area for 10 to 20 minutes at a time. Put a thin cloth between the ice and your skin. · Avoid sitting if possible, unless it feels better than standing. · Alternate lying down with short walks. Increase your walking distance as you are able to without making your symptoms worse. · Do not do anything that makes your symptoms worse. When should you call for help? Call 911 anytime you think you may need emergency care. For example, call if:  · You are unable to move a leg at all. Call your doctor now or seek immediate medical care if:  · You have new or worse symptoms in your legs or buttocks. Symptoms may include:  ¨ Numbness or tingling. ¨ Weakness. ¨ Pain. · You lose bladder or bowel control. Watch closely for changes in your health, and be sure to contact your doctor if:  · You are not getting better as expected. Where can you learn more? Go to http://lizbeth-efraín.info/. Enter [phone] in the search box to learn more about \"Sciatica: Care Instructions. \"  Current as of: March 21, 2017  Content Version: 11.3  © 2986-7140 [employer]. Care instructions adapted under license by [employer] (which disclaims liability or warranty for this information). If you have questions about a medical condition or this instruction, always ask your healthcare professional. [place] [address] any warranty or liability for your use of this information. Back Pain: Care Instructions  Your Care Instructions    Back pain has many possible causes. It is often related to problems with muscles and ligaments of the back. It may also be related to problems with the nerves, discs, or bones of the back.  Moving, lifting, standing, sitting, or sleeping in an awkward way can strain the back. Sometimes you don't notice the injury until later. Arthritis is another common cause of back pain. Although it may hurt a lot, back pain usually improves on its own within several weeks. Most people recover in 12 weeks or less. Using good home treatment and being careful not to stress your back can help you feel better sooner. Follow-up care is a key part of your treatment and safety. Be sure to make and go to all appointments, and call your doctor if you are having problems. Its also a good idea to know your test results and keep a list of the medicines you take. How can you care for yourself at home? · Sit or lie in positions that are most comfortable and reduce your pain. Try one of these positions when you lie down:  ¨ Lie on your back with your knees bent and supported by large pillows. ¨ Lie on the floor with your legs on the seat of a sofa or chair. [place] on your side with your knees and hips bent and a pillow between your legs. ¨ Lie on your stomach if it does not make pain worse. · Do not sit up in bed, and avoid soft couches and twisted positions. Bed rest can help relieve pain at first, but it delays healing. Avoid bed rest after the first day of back pain. · Change positions every 30 minutes. If you must sit for long periods of time, take breaks from sitting. Get up and walk around, or lie in a comfortable position. · Try using a heating pad on a low or medium setting for 15 to 20 minutes every 2 or 3 hours. Try a warm shower in place of one session with the heating pad. · You can also try an ice pack for 10 to 15 minutes every 2 to 3 hours. Put a thin cloth between the ice pack and your skin. · Take pain medicines exactly as directed. ¨ If the doctor gave you a prescription medicine for pain, take it as prescribed. ¨ If you are not taking a prescription pain medicine, ask your doctor if you can take an over-the-counter medicine.   · Take short walks several times a day. You can start with 5 to 10 minutes, 3 or 4 times a day, and work up to longer walks. Walk on level surfaces and avoid hills and stairs until your back is better. · Return to work and other activities as soon as you can. Continued rest without activity is usually not good for your back. · To prevent future back pain, do exercises to stretch and strengthen your back and stomach. Learn how to use good posture, safe lifting techniques, and proper body mechanics. When should you call for help? Call your doctor now or seek immediate medical care if:  · You have new or worsening numbness in your legs. · You have new or worsening weakness in your legs. (This could make it hard to stand up.)  · You lose control of your bladder or bowels. Watch closely for changes in your health, and be sure to contact your doctor if:  · Your pain gets worse. · You are not getting better after 2 weeks. Where can you learn more? Go to http://lizbeth-efraín.info/. Enter B992 in the search box to learn more about \"Back Pain: Care Instructions. \"  Current as of: March 21, 2017  Content Version: 11.3  © 2079-2518 [employer]. Care instructions adapted under license by [employer] (which disclaims liability or warranty for this information). If you have questions about a medical condition or this instruction, always ask your healthcare professional. [place] [address] any warranty or liability for your use of this information. Learning About Degenerative Disc Disease  What is degenerative disc disease? Degenerative disc disease is not really a disease. It's a term used to describe the normal changes in your spinal discs as you age. Spinal discs are small, spongy discs that separate the bones (vertebrae) that make up the spine.  The discs act as shock absorbers for the spine, so it can flex, bend, and twist.  Degenerative disc disease can take place in one or more places along the spine. It most often occurs in the discs in the lower back and the neck. What causes it? As we age, our spinal discs break down, or degenerate. This breakdown causes the symptoms of degenerative disc disease in some people. When the discs break down, they can lose fluid and dry out, and their outer layers can have tiny cracks or tears. This leads to less padding and less space between the bones in the spine. The body reacts to this by making bony growths on the spine called bone spurs. These spurs can press on the spinal nerve roots or spinal cord. This can cause pain and can affect how well the nerves work. What are the symptoms? Many people with degenerative disc disease have no pain. But others have severe pain or other symptoms that limit their activities. Some of the most common symptoms are:  · Pain in the back or neck. Where the pain occurs depends on which discs are affected. · Pain that gets worse when you move, such as bending over, reaching up, or twisting. · Pain that may occur in the rear end (buttocks), arm, or leg if a nerve is pinched. · Numbness or tingling in your arm or leg. How is it diagnosed? A doctor can often diagnose degenerative disc disease while doing a physical exam. If your exam shows no signs of a serious condition, imaging tests (such as an X-ray) aren't likely to help your doctor find the cause of your symptoms. Sometimes degenerative disc disease is found when an X-ray is taken for another reason, such as an injury or other health problem. But even if the doctor finds degenerative disc disease, that doesn't always mean that you will have symptoms. How is it treated? There are several things you can do at home to manage pain from this problem. · To relieve pain, use ice or heat (whichever feels better) on the affected area. ¨ Put ice or a cold pack on the area for 10 to 20 minutes at a time.  Put a thin cloth between the ice and your skin. ¨ Put a warm water bottle, a heating pad set on low, or a warm cloth on your back. Put a thin cloth between the heating pad and your skin. Do not go to sleep with a heating pad on your skin. · Ask your doctor if you can take acetaminophen (such as Tylenol) or nonsteroidal anti-inflammatory drugs, such as ibuprofen or naproxen. Your doctor can prescribe stronger medicines if needed. Be safe with medicines. Read and follow all instructions on the label. · Get some exercise every day. Exercise is one of the best ways to help your back feel better and stay better. It's best to start each exercise slowly. You may notice a little soreness, and that's okay. But if an exercise makes your pain worse, stop doing it. Here are things you can try:  ¨ Walking. It's the simplest and maybe the best activity for your back. It gets your blood moving and helps your muscles stay strong. ¨ Exercises that gently stretch and strengthen your stomach, back, and leg muscles. The stronger those muscles are, the better they're able to protect your back. If you have constant or severe pain in your back or spine, you may need other treatments, such as physical therapy. In some cases, your doctor may suggest surgery. Follow-up care is a key part of your treatment and safety. Be sure to make and go to all appointments, and call your doctor if you are having problems. It's also a good idea to know your test results and keep a list of the medicines you take. Where can you learn more? Go to http://lizbeth-efraín.info/. Enter M168 in the search box to learn more about \"Learning About Degenerative Disc Disease. \"  Current as of: March 21, 2017  Content Version: 11.3  © 9324-8325 [employer]. Care instructions adapted under license by [employer] (which disclaims liability or warranty for this information).  If you have questions about a medical condition or this instruction, always ask your healthcare professional. [place] [address] any warranty or liability for your use of this information. Sciatica: Care Instructions  Your Care Instructions    Sciatica (say \"hcz-IF-er-kuh\") is an irritation of one of the sciatic nerves, which come from the spinal cord in the lower back. The sciatic nerves and their branches extend down through the buttock to the foot. Sciatica can develop when an injured disc in the back presses against a spinal nerve root. Its main symptom is pain, numbness, or weakness that is often worse in the leg or foot than in the back. Sciatica often will improve and go away with time. Early treatment usually includes medicines and exercises to relieve pain. Follow-up care is a key part of your treatment and safety. Be sure to make and go to all appointments, and call your doctor if you are having problems. It's also a good idea to know your test results and keep a list of the medicines you take. How can you care for yourself at home? · Take pain medicines exactly as directed. ¨ If the doctor gave you a prescription medicine for pain, take it as prescribed. ¨ If you are not taking a prescription pain medicine, ask your doctor if you can take an over-the-counter medicine. · Use heat or ice to relieve pain. ¨ To apply heat, put a warm water bottle, heating pad set on low, or warm cloth on your back. Do not go to sleep with a heating pad on your skin. ¨ To use ice, put ice or a cold pack on the area for 10 to 20 minutes at a time. Put a thin cloth between the ice and your skin. · Avoid sitting if possible, unless it feels better than standing. · Alternate lying down with short walks. Increase your walking distance as you are able to without making your symptoms worse. · Do not do anything that makes your symptoms worse. When should you call for help? Call 911 anytime you think you may need emergency care.  For example, call if:  · You are unable to move a leg at all. Call your doctor now or seek immediate medical care if:  · You have new or worse symptoms in your legs or buttocks. Symptoms may include:  ¨ Numbness or tingling. ¨ Weakness. ¨ Pain. · You lose bladder or bowel control. Watch closely for changes in your health, and be sure to contact your doctor if:  · You are not getting better as expected. Where can you learn more? Go to http://lizbeth-efraín.info/. Enter [phone] in the search box to learn more about \"Sciatica: Care Instructions. \"  Current as of: March 21, 2017  Content Version: 11.3  © 5053-5709 [employer]. Care instructions adapted under license by [employer] (which disclaims liability or warranty for this information). If you have questions about a medical condition or this instruction, always ask your healthcare professional. [address] any warranty or liability for your use of this information.

## 2017-07-30 NOTE — ED NOTES
Assumed care of pt, pt ambulatory from triage, resting on stretcher in position of comfort, call bell within reach, pt reports pain in left toes that radiates up leg, history of neuropathy, reports no relief from gabapentin

## 2017-07-30 NOTE — ED PROVIDER NOTES
HPI Comments: Willy Antoine is a 49 y.o. female with PMHx significant for chronic pain, diabetic neuropathy, HTN, who presents ambulatory to [facility] ED with cc of constant L leg pain radiating into her toes since last night. She denies having an orthopedist and states her PCP typically treats her pain with narcotics. Pt denies any recent numbness or urinary/fecal incontinence. PCP: Elvis Valdez MD      Social History: (-) Tobacco, (-) EtOH, (-) Illicit Drugs         There are no other complaints, changes, or physical findings at this time. The history is provided by the patient. No  was used. Past Medical History:   Diagnosis Date    Anemia     Chronic pain     Cyst in hand 2014    [address]    Diabetes mellitus type 2 in obese ([address]     Diabetic neuropathy ([address]     Hypertension     Migraine        History reviewed. No pertinent surgical history. Family History:   Problem Relation Age of Onset   [facility] Cancer Father      brain    Diabetes Mother     Heart Disease Mother     Hypertension Mother     Hypertension Maternal Grandmother     Heart Disease Maternal Grandmother        Social History     Social History    Marital status:      Spouse name: N/A    Number of children: N/A    Years of education: N/A     Occupational History    disabled      Social History Main Topics    Smoking status: Never Smoker    Smokeless tobacco: Never Used    Alcohol use No    Drug use: No    Sexual activity: Not Currently     Other Topics Concern    Not on file     Social History Narrative    Lives with daughter. Not working. Applying for disabilty. ALLERGIES: Lortab [hydrocodone-acetaminophen]; Cymbalta [duloxetine]; and Lyrica [pregabalin]    Review of Systems   Constitutional: Negative. Negative for chills and fever. HENT: Negative. Negative for rhinorrhea and sore throat. Eyes: Negative. Negative for visual disturbance. Respiratory: Negative.   Negative for cough, chest tightness, shortness of breath and wheezing. Cardiovascular: Negative. Negative for chest pain and palpitations. Gastrointestinal: Negative. Negative for abdominal pain, constipation, diarrhea, nausea and vomiting.        -fecal incontinence    Genitourinary: Negative. Negative for dysuria and hematuria. -urinary incontinence    Musculoskeletal: Positive for myalgias (LLE). Negative for arthralgias. Skin: Negative. Negative for rash. Allergic/Immunologic: Negative. Negative for environmental allergies and food allergies. Neurological: Negative. Negative for headaches. Psychiatric/Behavioral: Negative. Negative for suicidal ideas. Vitals:    07/30/17 0736 07/30/17 0800   BP: (!) 159/112 (!) 163/104   Pulse: 96    Resp: 18    Temp: 98.7 °F (37.1 °C)    SpO2: 100%    Weight: 109.3 kg (240 lb 15.4 oz)             Physical Exam   Constitutional: She is oriented to person, place, and time. She appears well-developed and well-nourished. No distress. HENT:   Head: Normocephalic and atraumatic. Right Ear: External ear normal.   Left Ear: External ear normal.   Nose: Nose normal.   Mouth/Throat: Oropharynx is clear and moist. No oropharyngeal exudate. Eyes: Conjunctivae and EOM are normal. Pupils are equal, round, and reactive to light. Right eye exhibits no discharge. Left eye exhibits no discharge. No scleral icterus. Neck: Normal range of motion. Neck supple. No JVD present. No tracheal deviation present. Cardiovascular: Normal rate, regular rhythm, normal heart sounds and intact distal pulses. Exam reveals no gallop and no friction rub. No murmur heard. Pulmonary/Chest: Effort normal and breath sounds normal. No respiratory distress. She has no wheezes. She has no rales. She exhibits no tenderness. Abdominal: Soft. Bowel sounds are normal. She exhibits no distension and no mass. There is no tenderness. There is no rebound and no guarding.    Musculoskeletal: Normal range of motion. She exhibits no edema. Tender L lower back   SLR + on the L    Lymphadenopathy:     She has no cervical adenopathy. Neurological: She is alert and oriented to person, place, and time. She has normal reflexes. No cranial nerve deficit. She exhibits normal muscle tone. Coordination normal.   NVI   Skin: Skin is warm and dry. She is not diaphoretic. Psychiatric: She has a normal mood and affect. Her behavior is normal. Judgment and thought content normal.   Nursing note and vitals reviewed. MDM  Number of Diagnoses or Management Options  DDD (degenerative disc disease), lumbar:   Sciatica of left side:   Diagnosis management comments: DDx: DDD, DJD, sciatica        Amount and/or Complexity of Data Reviewed  Review and summarize past medical records: yes    Patient Progress  Patient progress: stable    ED Course       Procedures    PROGRESS NOTE  7:36 AM   Per medical records, pt had lumbar X-Rays taken on 3/20/2017 that showed DDD. MRI of the lumbar spine from 2016 showed foraminal stenosis and DDD. MEDICATIONS GIVEN:  Medications - No data to display    IMPRESSION:  1. DDD (degenerative disc disease), lumbar    2. Sciatica of left side        PLAN:  1. Discharge Medication List as of 7/30/2017  7:57 AM      START taking these medications    Details   !! oxyCODONE-acetaminophen (PERCOCET) 5-325 mg per tablet Take 1 Tab by mouth every six (6) hours as needed for Pain. Max Daily Amount: 4 Tabs., Print, Disp-12 Tab, R-0      carisoprodol (SOMA) 350 mg tablet Take 1 Tab by mouth four (4) times daily. Max Daily Amount: 1,400 mg., Print, Disp-12 Tab, R-0       !! - Potential duplicate medications found. Please discuss with provider. CONTINUE these medications which have NOT CHANGED    Details   !! oxyCODONE-acetaminophen (PERCOCET) 5-325 mg per tablet Take 1 Tab by mouth every eight (8) hours as needed for Pain.  Max Daily Amount: 3 Tabs., Print, Disp-5 Tab, R-0      atenolol (TENORMIN) 100 mg tablet TAKE ONE TABLET BY MOUTH ONCE DAILY, Normal, Disp-30 Tab, R-5      lidocaine (LIDODERM) 5 % Apply patch to the affected area for 12 hours a day and remove for 12 hours a day., Normal, Disp-6 Each, R-0      meloxicam (MOBIC) 15 mg tablet TAKE ONE TABLET BY MOUTH ONCE DAILY, Normal, Disp-90 Tab, R-1      nortriptyline (PAMELOR) 25 mg capsule Take 2 Caps by mouth two (2) times a day., Normal, Disp-120 Cap, R-5      furosemide (LASIX) 20 mg tablet Take 2 Tabs by mouth daily. As needed for swelling, Normal, Disp-60 Tab, R-2      potassium chloride (KLOR-CON) 10 mEq tablet Take 1 Tab by mouth daily. Of takes lasix, Normal, Disp-30 Tab, R-2      lisinopril (PRINIVIL, ZESTRIL) 10 mg tablet TAKE ONE TABLET BY MOUTH ONCE DAILY, Normal, Disp-30 Tab, R-5      aspirin delayed-release 81 mg tablet Take  by mouth daily. , Historical Med      metFORMIN (GLUCOPHAGE) 1,000 mg tablet TAKE 1 TABLET BY MOUTH TWO (2) TIMES DAILY WITH MEALS, Print, Disp-180 Tab, R-3       !! - Potential duplicate medications found. Please discuss with provider. 2.   Follow-up Information     Follow up With Details Comments Contact Info    Rowdy Bradley MD In 2 days As needed [facility] [address]  [phone]      Balta Baird MD In 2 days As needed [address] [address]  [phone]            Return to ED if worse     Discharge Note:  8:00 AM  The patient has been re-evaluated and is ready for discharge. Reviewed available results with patient. Counseled patient on diagnosis and care plan. Patient has expressed understanding, and all questions have been answered. Patient agrees with plan and agrees to follow up as recommended, or to return to the ED if their symptoms worsen. Discharge instructions have been provided and explained to the patient, along with reasons to return to the ED.   Written by Isidra Swan ED Scribe, as dictated by Shaw Saldana Ashia. This note is prepared by Josy Taamyo, acting as Scribe for Earvin Blades. LIU Ang: The scribe's documentation has been prepared under my direction and personally reviewed by me in its entirety. I confirm that the note above accurately reflects all work, treatment, procedures, and medical decision making performed by me.

## 2017-08-07 ENCOUNTER — TELEPHONE (OUTPATIENT)
Dept: INTERNAL MEDICINE CLINIC | Age: 52
End: 2017-08-07

## 2017-08-07 RX ORDER — PREGABALIN 100 MG/1
100 CAPSULE ORAL 2 TIMES DAILY
Qty: 60 CAP | Refills: 0 | Status: SHIPPED | OUTPATIENT
Start: 2017-08-07 | End: 2017-08-25 | Stop reason: CLARIF

## 2017-08-07 NOTE — TELEPHONE ENCOUNTER
Called patient - she is very upset because of the pain in her feet - states that she has not slept in 3 nights - goes to sleep just fine with her Nortriptylline, however she wakes up all during the night with excruciating foot pain - patient was offered an appt for tomorrow morning but she states there is no need to make the all out taxing effort to come -  in if hes not gonna give her pian reliefshe states she does not need med to help her sleep , she needs something for the pain in her feet - patient requests a call from Dr Ander Kirby - message forwarded to him  Dasha Wagner LPN  7/7/4502  7:50 PM

## 2017-08-07 NOTE — TELEPHONE ENCOUNTER
Patient called in reference to wanting to know if Dr. Jude Elder can give her something for the pain in her feet. She said they are hurting her really bad. Ms. Erica Busch mentioned lyrica but im not for sure if that's what she meant. Please call her at [phone].

## 2017-08-08 ENCOUNTER — DOCUMENTATION ONLY (OUTPATIENT)
Dept: INTERNAL MEDICINE CLINIC | Age: 52
End: 2017-08-08

## 2017-08-08 NOTE — PROGRESS NOTES
Pt called practice, stated that she did not want the lyrica asked for pain management doctor. Deng Marshall advised pt that her nurse kathryn christopher didn't have a pain management doctor, but that could do a referral to send her somewhere else for an pain management doctor pt stated she was not too sure about that then asked for the number to [facility] to contact them herself to see if they have one on file. Deng Marshall

## 2017-08-24 ENCOUNTER — APPOINTMENT (OUTPATIENT)
Dept: GENERAL RADIOLOGY | Age: 52
End: 2017-08-24
Attending: EMERGENCY MEDICINE
Payer: MEDICAID

## 2017-08-24 ENCOUNTER — HOSPITAL ENCOUNTER (EMERGENCY)
Age: 52
Discharge: HOME | End: 2017-08-25
Attending: EMERGENCY MEDICINE
Payer: MEDICAID

## 2017-08-24 DIAGNOSIS — R07.89 ATYPICAL CHEST PAIN: Primary | ICD-10-CM

## 2017-08-24 DIAGNOSIS — N17.9 ACUTE KIDNEY INJURY SUPERIMPOSED ON CHRONIC KIDNEY DISEASE (HCC): ICD-10-CM

## 2017-08-24 DIAGNOSIS — R79.89 ELEVATED BRAIN NATRIURETIC PEPTIDE (BNP) LEVEL: ICD-10-CM

## 2017-08-24 DIAGNOSIS — N18.9 ACUTE KIDNEY INJURY SUPERIMPOSED ON CHRONIC KIDNEY DISEASE (HCC): ICD-10-CM

## 2017-08-24 LAB
ALBUMIN SERPL-MCNC: 3.5 G/DL (ref 3.5–5)
ALBUMIN/GLOB SERPL: 0.9 {RATIO} (ref 1.1–2.2)
ALP SERPL-CCNC: 109 U/L (ref 45–117)
ALT SERPL-CCNC: 13 U/L (ref 12–78)
ANION GAP SERPL CALC-SCNC: 7 MMOL/L (ref 5–15)
AST SERPL-CCNC: 10 U/L (ref 15–37)
BASOPHILS # BLD: 0 K/UL (ref 0–0.1)
BASOPHILS NFR BLD: 0 % (ref 0–1)
BILIRUB SERPL-MCNC: 0.4 MG/DL (ref 0.2–1)
BUN SERPL-MCNC: 17 MG/DL (ref 6–20)
BUN/CREAT SERPL: 10 (ref 12–20)
CALCIUM SERPL-MCNC: 8 MG/DL (ref 8.5–10.1)
CHLORIDE SERPL-SCNC: 110 MMOL/L (ref 97–108)
CK MB CFR SERPL CALC: 5.5 % (ref 0–2.5)
CK MB SERPL-MCNC: 10.4 NG/ML (ref 5–25)
CK SERPL-CCNC: 189 U/L (ref 26–192)
CO2 SERPL-SCNC: 24 MMOL/L (ref 21–32)
CREAT SERPL-MCNC: 1.73 MG/DL (ref 0.55–1.02)
DIFFERENTIAL METHOD BLD: ABNORMAL
EOSINOPHIL # BLD: 0.3 K/UL (ref 0–0.4)
EOSINOPHIL NFR BLD: 4 % (ref 0–7)
ERYTHROCYTE [DISTWIDTH] IN BLOOD BY AUTOMATED COUNT: 16.7 % (ref 11.5–14.5)
GLOBULIN SER CALC-MCNC: 3.9 G/DL (ref 2–4)
GLUCOSE SERPL-MCNC: 203 MG/DL (ref 65–100)
HCT VFR BLD AUTO: 33.3 % (ref 35–47)
HGB BLD-MCNC: 11 G/DL (ref 11.5–16)
LYMPHOCYTES # BLD: 2.3 K/UL (ref 0.8–3.5)
LYMPHOCYTES NFR BLD: 35 % (ref 12–49)
MCH RBC QN AUTO: 26.3 PG (ref 26–34)
MCHC RBC AUTO-ENTMCNC: 33 G/DL (ref 30–36.5)
MCV RBC AUTO: 79.5 FL (ref 80–99)
MONOCYTES # BLD: 0.5 K/UL (ref 0–1)
MONOCYTES NFR BLD: 7 % (ref 5–13)
NEUTS SEG # BLD: 3.6 K/UL (ref 1.8–8)
NEUTS SEG NFR BLD: 54 % (ref 32–75)
PLATELET # BLD AUTO: 145 K/UL (ref 150–400)
POTASSIUM SERPL-SCNC: 4.1 MMOL/L (ref 3.5–5.1)
PROT SERPL-MCNC: 7.4 G/DL (ref 6.4–8.2)
RBC # BLD AUTO: 4.19 M/UL (ref 3.8–5.2)
RBC MORPH BLD: ABNORMAL
SODIUM SERPL-SCNC: 141 MMOL/L (ref 136–145)
TROPONIN I SERPL-MCNC: 0.06 NG/ML
WBC # BLD AUTO: 6.7 K/UL (ref 3.6–11)

## 2017-08-24 PROCEDURE — 96360 HYDRATION IV INFUSION INIT: CPT

## 2017-08-24 PROCEDURE — 83880 ASSAY OF NATRIURETIC PEPTIDE: CPT | Performed by: EMERGENCY MEDICINE

## 2017-08-24 PROCEDURE — 84484 ASSAY OF TROPONIN QUANT: CPT | Performed by: EMERGENCY MEDICINE

## 2017-08-24 PROCEDURE — 82550 ASSAY OF CK (CPK): CPT | Performed by: EMERGENCY MEDICINE

## 2017-08-24 PROCEDURE — 93005 ELECTROCARDIOGRAM TRACING: CPT

## 2017-08-24 PROCEDURE — 36415 COLL VENOUS BLD VENIPUNCTURE: CPT | Performed by: EMERGENCY MEDICINE

## 2017-08-24 PROCEDURE — 80053 COMPREHEN METABOLIC PANEL: CPT | Performed by: EMERGENCY MEDICINE

## 2017-08-24 PROCEDURE — 99284 EMERGENCY DEPT VISIT MOD MDM: CPT

## 2017-08-24 PROCEDURE — 71020 XR CHEST PA LAT: CPT

## 2017-08-24 PROCEDURE — 85025 COMPLETE CBC W/AUTO DIFF WBC: CPT | Performed by: EMERGENCY MEDICINE

## 2017-08-24 RX ORDER — ACETAMINOPHEN 500 MG
1000 TABLET ORAL ONCE
Status: DISCONTINUED | OUTPATIENT
Start: 2017-08-24 | End: 2017-08-25 | Stop reason: HOSPADM

## 2017-08-25 VITALS
HEART RATE: 95 BPM | BODY MASS INDEX: 41.82 KG/M2 | SYSTOLIC BLOOD PRESSURE: 170 MMHG | OXYGEN SATURATION: 99 % | RESPIRATION RATE: 16 BRPM | WEIGHT: 244.93 LBS | HEIGHT: 64 IN | DIASTOLIC BLOOD PRESSURE: 91 MMHG | TEMPERATURE: 98.3 F

## 2017-08-25 LAB
ATRIAL RATE: 97 BPM
BNP SERPL-MCNC: 550 PG/ML (ref 0–125)
CALCULATED P AXIS, ECG09: 70 DEGREES
CALCULATED R AXIS, ECG10: -40 DEGREES
CALCULATED T AXIS, ECG11: -40 DEGREES
DIAGNOSIS, 93000: NORMAL
P-R INTERVAL, ECG05: 128 MS
Q-T INTERVAL, ECG07: 348 MS
QRS DURATION, ECG06: 94 MS
QTC CALCULATION (BEZET), ECG08: 441 MS
TROPONIN I SERPL-MCNC: 0.06 NG/ML
VENTRICULAR RATE, ECG03: 97 BPM

## 2017-08-25 PROCEDURE — 36415 COLL VENOUS BLD VENIPUNCTURE: CPT | Performed by: EMERGENCY MEDICINE

## 2017-08-25 PROCEDURE — 74011250636 HC RX REV CODE- 250/636: Performed by: EMERGENCY MEDICINE

## 2017-08-25 PROCEDURE — 74011250637 HC RX REV CODE- 250/637: Performed by: EMERGENCY MEDICINE

## 2017-08-25 PROCEDURE — 84484 ASSAY OF TROPONIN QUANT: CPT | Performed by: EMERGENCY MEDICINE

## 2017-08-25 PROCEDURE — 74011000250 HC RX REV CODE- 250: Performed by: EMERGENCY MEDICINE

## 2017-08-25 RX ORDER — ACETAMINOPHEN AND CODEINE PHOSPHATE 300; 15 MG/1; MG/1
1 TABLET ORAL
Qty: 5 TAB | Refills: 0 | Status: SHIPPED | OUTPATIENT
Start: 2017-08-25 | End: 2017-09-09

## 2017-08-25 RX ORDER — TRAMADOL HYDROCHLORIDE 50 MG/1
50 TABLET ORAL
Qty: 5 TAB | Refills: 0 | Status: SHIPPED | OUTPATIENT
Start: 2017-08-25 | End: 2017-08-25

## 2017-08-25 RX ADMIN — LIDOCAINE HYDROCHLORIDE 40 ML: 20 SOLUTION ORAL; TOPICAL at 00:01

## 2017-08-25 RX ADMIN — SODIUM CHLORIDE 500 ML: 900 INJECTION, SOLUTION INTRAVENOUS at 00:03

## 2017-08-25 NOTE — ED NOTES
Patient presents to ED with C/O chest pain that started 3 weeks ago. The pt stated once she burps the pain goes away. The pt stated she has also been \"gassy\". The pt stated has used 3 boxes of Gas-X for the symptoms. The pt denies SOB, N/VD, dizziness, fever,chills, and urinary symptoms. Patient is A&Ox3, side rails up, call bell w/in reach, and aware of plan of care. The patient is in NAD.

## 2017-08-25 NOTE — ED NOTES
Patient discharged and given discharge instructions by Toña Almaraz MD. Patient had an opportunity to ask questions. Patient verbalized understanding of discharge instructions. Patient ambulatory from ED, discharge instructions and prescriptions in hand. Patient accompanied by daughter.

## 2017-08-25 NOTE — ED PROVIDER NOTES
HPI Comments: 50 yo F with Hx of HTN and DM presenting for evaluation of left upper CP x 3 weeks. She states she initially thought the pain was related to gas, but has been taking Gas-X without relief. Pt also states she has had a nonproductive cough and SOB when laying flat for that period of time but sleeps on 2 pillows, which has not changed recently. Endorses ongoing frequent foul-smelling belching and flatus over that period of time as well. Pt reports 2-3 episodes of non-bloody diarrhea for the past 3 days. She denies any F/C, ABD pain, nausea, vomiting, urinary Sxs, and calf pain or swelling. Pt also C/O right knee pain after hitting it on her dresser earlier today. States she has been ambulatory with slight pain, but denies any swelling    The history is provided by the patient. Past Medical History:   Diagnosis Date    Anemia     Chronic pain     Cyst in hand 2014    [address]    Diabetes mellitus type 2 in obese ([address]     Diabetic neuropathy [facility]     Hypertension     Migraine        History reviewed. No pertinent surgical history. Family History:   Problem Relation Age of Onset   [facility] Father      brain    Diabetes Mother     Heart Disease Mother     Hypertension Mother     Hypertension Maternal Grandmother     Heart Disease Maternal Grandmother        Social History     Social History    Marital status:      Spouse name: N/A    Number of children: N/A    Years of education: N/A     Occupational History    disabled      Social History Main Topics    Smoking status: Never Smoker    Smokeless tobacco: Never Used    Alcohol use No    Drug use: No    Sexual activity: Not Currently     Other Topics Concern    Not on file     Social History Narrative    Lives with daughter. Not working. Applying for disabilty. ALLERGIES: Lortab [hydrocodone-acetaminophen];  Cymbalta [duloxetine]; and Lyrica [pregabalin]    Review of Systems   Constitutional: Negative for chills, diaphoresis and fever. HENT: Negative for congestion, rhinorrhea and sore throat. Eyes: Negative for photophobia, redness and visual disturbance. Respiratory: Positive for cough and shortness of breath. Negative for apnea. Cardiovascular: Positive for chest pain. Negative for palpitations and leg swelling. Gastrointestinal: Positive for diarrhea. Negative for abdominal pain, nausea and vomiting. See HPI   Endocrine: Negative for polyuria. Genitourinary: Negative for difficulty urinating, dysuria, frequency, hematuria, vaginal bleeding and vaginal discharge. Musculoskeletal: Negative for arthralgias, back pain and myalgias. Skin: Negative for color change, pallor, rash and wound. Neurological: Negative for dizziness, syncope, light-headedness and headaches. Psychiatric/Behavioral: Negative for agitation and confusion. The patient is not nervous/anxious. All other systems reviewed and are negative. Vitals:    08/24/17 2223 08/25/17 0002 08/25/17 0154   BP: 144/75 160/88 (!) 170/91   Pulse: 95     Resp: 16     Temp: 98.3 °F (36.8 °C)     SpO2: 100% 100% 99%   Weight: 111.1 kg (244 lb 14.9 oz)     Height: 5' 4\" (1.626 m)              Physical Exam   Constitutional: She appears well-developed and well-nourished. No distress. Obese middle-aged female   HENT:   Head: Normocephalic and atraumatic. Eyes: Conjunctivae are normal. No scleral icterus. Neck: No tracheal deviation present. Cardiovascular: Regular rhythm and normal heart sounds. Exam reveals no gallop and no friction rub. No murmur heard. tachycardic   Pulmonary/Chest: Effort normal and breath sounds normal. No respiratory distress. She has no wheezes. She has no rales. Abdominal: Soft. She exhibits no distension and no mass. There is no tenderness. There is no rebound and no guarding. Musculoskeletal: She exhibits no edema or deformity.    Right knee: No effusion or warmth, normal ROM, negative ant/post drawer, negative Lachman, no laxity to varus/valgus stress   Neurological: She is alert. Skin: Skin is warm and dry. No rash noted. She is not diaphoretic. No erythema. No pallor. Psychiatric: She has a normal mood and affect. Her behavior is normal.   Nursing note and vitals reviewed. MDM  Number of Diagnoses or Management Options  Acute kidney injury superimposed on chronic kidney disease ([address]): Atypical chest pain:   Elevated brain natriuretic peptide (BNP) level:   Diagnosis management comments: DDx: ACS, PNA, CHF, GERD, gas pain, malingering    A/P: 48 yo F with Hx of HTN and DM presenting for evaluation of chest pain, belching, and flatus for 3 week. Also notes cough and ?orthopnea. Also with right knee pain after hitting it on a dresser. Exam and EKG are benign. Will obtain ACS work-up, BNP, and re-evaluate. Pt declined Tylenol stating she just took some, will defer NSAIDs as pt with Hx of CKD upon lab review. Pt in agreement with plan to defer pain medication at this time       Amount and/or Complexity of Data Reviewed  Clinical lab tests: ordered and reviewed  Tests in the radiology section of CPT®: ordered and reviewed  Tests in the medicine section of CPT®: ordered and reviewed  Review and summarize past medical records: yes  Independent visualization of images, tracings, or specimens: yes    Patient Progress  Patient progress: stable    ED Course       Procedures    EKG interpretation: (Preliminary) 2217  Rhythm: sinus rhythm with occasional premature ventricular complexes and fusion complexes; and regular . Rate (approx.): 97; Axis: left axis deviation; MD interval: normal; QRS interval: normal ; ST/T wave: non-specific abnormality; Other findings: moderate voltage criteria for LVH, no significant change from 11/16/16. Written by Juancarlos Banuelos ED Scribe as dictated by James Paredes MD    DISCHARGE NOTE  2:48 AM  The patient has been re-evaluated and is ready for discharge.  Reviewed available results with patient. Counseled pt on diagnosis and care plan. Pt has expressed understanding, and all questions have been answered. Pt agrees with plan and agrees to F/U as recommended, or return to the ED if their sxs worsen. Discharge instructions have been provided and explained to the pt, along with reasons to return to the ED. Janie Nava MD    2:55 AM  Upon discharge pt asking, \"you're going to give me something stronger for pain, right? \" Offered tramadol and muscle relaxants, to which pt stated she already had those at home, although previously endorsed taking only Tylenol and asked for percocet.  Pt eventually agreed to Tylenol with codeine  Janie Nava MD    LABORATORY TESTS:  Recent Results (from the past 12 hour(s))   EKG, 12 LEAD, INITIAL    Collection Time: 08/24/17 10:17 PM   Result Value Ref Range    Ventricular Rate 97 BPM    Atrial Rate 97 BPM    P-R Interval 128 ms    QRS Duration 94 ms    Q-T Interval 348 ms    QTC Calculation (Bezet) 441 ms    Calculated P Axis 70 degrees    Calculated R Axis -40 degrees    Calculated T Axis -40 degrees    Diagnosis       Sinus rhythm with occasional premature ventricular complexes and fusion   complexes  Left axis deviation  Incomplete right bundle branch block  Moderate voltage criteria for LVH, may be normal variant  Nonspecific ST and T wave abnormality  When compared with ECG of 16-NOV-2016 00:17,  fusion complexes are now present  premature ventricular complexes are now present  Nonspecific T wave abnormality has replaced inverted T waves in Inferior   leads     CBC WITH AUTOMATED DIFF    Collection Time: 08/24/17 10:33 PM   Result Value Ref Range    WBC 6.7 3.6 - 11.0 K/uL    RBC 4.19 3.80 - 5.20 M/uL    HGB 11.0 (L) 11.5 - 16.0 g/dL    HCT 33.3 (L) 35.0 - 47.0 %    MCV 79.5 (L) 80.0 - 99.0 FL    MCH 26.3 26.0 - 34.0 PG    MCHC 33.0 30.0 - 36.5 g/dL    RDW 16.7 (H) 11.5 - 14.5 %    PLATELET 131 (L) 298 - 400 K/uL    NEUTROPHILS 54 32 - 75 % LYMPHOCYTES 35 12 - 49 %    MONOCYTES 7 5 - 13 %    EOSINOPHILS 4 0 - 7 %    BASOPHILS 0 0 - 1 %    ABS. NEUTROPHILS 3.6 1.8 - 8.0 K/UL    ABS. LYMPHOCYTES 2.3 0.8 - 3.5 K/UL    ABS. MONOCYTES 0.5 0.0 - 1.0 K/UL    ABS. EOSINOPHILS 0.3 0.0 - 0.4 K/UL    ABS. BASOPHILS 0.0 0.0 - 0.1 K/UL    DF SMEAR SCANNED      RBC COMMENTS NORMOCYTIC, NORMOCHROMIC     METABOLIC PANEL, COMPREHENSIVE    Collection Time: 08/24/17 10:33 PM   Result Value Ref Range    Sodium 141 136 - 145 mmol/L    Potassium 4.1 3.5 - 5.1 mmol/L    Chloride 110 (H) 97 - 108 mmol/L    CO2 24 21 - 32 mmol/L    Anion gap 7 5 - 15 mmol/L    Glucose 203 (H) 65 - 100 mg/dL    BUN 17 6 - 20 MG/DL    Creatinine 1.73 (H) 0.55 - 1.02 MG/DL    BUN/Creatinine ratio 10 (L) 12 - 20      GFR est AA 38 (L) >60 ml/min/1.73m2    GFR est non-AA 31 (L) >60 ml/min/1.73m2    Calcium 8.0 (L) 8.5 - 10.1 MG/DL    Bilirubin, total 0.4 0.2 - 1.0 MG/DL    ALT (SGPT) 13 12 - 78 U/L    AST (SGOT) 10 (L) 15 - 37 U/L    Alk. phosphatase 109 45 - 117 U/L    Protein, total 7.4 6.4 - 8.2 g/dL    Albumin 3.5 3.5 - 5.0 g/dL    Globulin 3.9 2.0 - 4.0 g/dL    A-G Ratio 0.9 (L) 1.1 - 2.2     CK W/ CKMB & INDEX    Collection Time: 08/24/17 10:33 PM   Result Value Ref Range     26 - 192 U/L    CK - MB 10.4 (H) <3.6 NG/ML    CK-MB Index 5.5 (H) 0 - 2.5     TROPONIN I    Collection Time: 08/24/17 10:33 PM   Result Value Ref Range    Troponin-I, Qt. 0.06 (H) <0.05 ng/mL   NT-PRO BNP    Collection Time: 08/24/17 10:33 PM   Result Value Ref Range    NT pro- (H) 0 - 125 PG/ML   TROPONIN I    Collection Time: 08/25/17  1:26 AM   Result Value Ref Range    Troponin-I, Qt. 0.06 (H) <0.05 ng/mL       IMAGING RESULTS:  XR CHEST PA LAT   Final Result   FINDINGS: The cardia mediastinal contours are stable. The pulmonary vasculature  is within normal limits.      The lungs and pleural spaces are clear. There is no pneumothorax.  The bones and  upper abdomen are stable.     IMPRESSION  IMPRESSION:     No acute process. Stable exam.       MEDICATIONS GIVEN:  Medications   acetaminophen (TYLENOL) tablet 1,000 mg (1,000 mg Oral Refused 8/25/17 0004)   sodium chloride 0.9 % bolus infusion 500 mL (0 mL IntraVENous IV Completed 8/25/17 0101)   mylanta/viscous lidocaine (ROSEY)(GI COCKTAIL) (40 mL Oral Given 8/25/17 0001)       IMPRESSION:  1. Atypical chest pain    2. Acute kidney injury superimposed on chronic kidney disease (HCC)    3. Elevated brain natriuretic peptide (BNP) level        PLAN:  1. Discharge Medication List as of 8/25/2017  2:44 AM        2. Follow-up Information     Follow up With Details Comments [address], [place] In 2 days  [address] [facility],[address] [facility]      Elizabeth Sanchez MD Call to follow up your abnormal BNP test [address] [facility]  [phone]      [facility] EMERGENCY DEPT  As needed, If symptoms worsen [address] [place] [address]  [phone]        Return to ED if worse               This note will not be viewable in [address] [place] [address].

## 2017-08-25 NOTE — ED NOTES
Patient resting comfortably, call bell w/in reach, no further needs expressed at this time, aware of POC.

## 2017-09-09 ENCOUNTER — HOSPITAL ENCOUNTER (EMERGENCY)
Age: 52
Discharge: HOME | End: 2017-09-09
Attending: EMERGENCY MEDICINE
Payer: MEDICAID

## 2017-09-09 VITALS
RESPIRATION RATE: 18 BRPM | DIASTOLIC BLOOD PRESSURE: 112 MMHG | SYSTOLIC BLOOD PRESSURE: 158 MMHG | HEART RATE: 100 BPM | HEIGHT: 64 IN | TEMPERATURE: 98 F | BODY MASS INDEX: 42.23 KG/M2 | WEIGHT: 247.36 LBS | OXYGEN SATURATION: 98 %

## 2017-09-09 DIAGNOSIS — G89.18 POST-OP PAIN: Primary | ICD-10-CM

## 2017-09-09 PROCEDURE — 74011250637 HC RX REV CODE- 250/637: Performed by: EMERGENCY MEDICINE

## 2017-09-09 PROCEDURE — 99283 EMERGENCY DEPT VISIT LOW MDM: CPT

## 2017-09-09 RX ORDER — OXYCODONE AND ACETAMINOPHEN 5; 325 MG/1; MG/1
1 TABLET ORAL
Qty: 20 TAB | Refills: 0 | Status: SHIPPED | OUTPATIENT
Start: 2017-09-09 | End: 2017-09-15 | Stop reason: SDUPTHER

## 2017-09-09 RX ORDER — GABAPENTIN 800 MG/1
800 TABLET ORAL 2 TIMES DAILY
COMMUNITY
End: 2017-11-08 | Stop reason: SDUPTHER

## 2017-09-09 RX ORDER — OXYCODONE AND ACETAMINOPHEN 5; 325 MG/1; MG/1
2 TABLET ORAL
Status: COMPLETED | OUTPATIENT
Start: 2017-09-09 | End: 2017-09-09

## 2017-09-09 RX ADMIN — OXYCODONE HYDROCHLORIDE AND ACETAMINOPHEN 2 TABLET: 5; 325 TABLET ORAL at 23:07

## 2017-09-10 NOTE — DISCHARGE INSTRUCTIONS
Acute Pain After Surgery: Care Instructions  Your Care Instructions  It's common to have some pain after surgery. Pain doesn't mean that something is wrong or that the surgery didn't go well. But when the pain is severe, it's important to work with your doctor to manage it. It's also important to be aware of a few facts about pain and pain medicine. · You are the only person who knows what your pain feels like. So be sure to tell your doctor when you are in pain or when the pain changes. Then he or she will know how to adjust your medicines. · Pain is often easier to control right after it starts. So it may be better to take regular doses of pain medicine and not wait until the pain gets bad. · Medicine can help control pain. But this doesn't mean you'll have no pain. Medicine works to keep the pain at a level you can live with. With time, you will feel better. Follow-up care is a key part of your treatment and safety. Be sure to make and go to all appointments, and call your doctor if you are having problems. It's also a good idea to know your test results and keep a list of the medicines you take. How can you care for yourself at home? · Be safe with medicines. Read and follow all instructions on the label. ¨ If the doctor gave you a prescription medicine for pain, take it as prescribed. ¨ If you are not taking a prescription pain medicine, ask your doctor if you can take an over-the-counter medicine. · If you take an over-the-counter pain medicine, such as acetaminophen (Tylenol), ibuprofen (Advil, Motrin), or naproxen (Aleve), read and follow all instructions on the label. · Do not take two or more pain medicines at the same time unless the doctor told you to. · Do not drink alcohol while you are taking pain medicines. · Try to walk each day if your doctor recommends it. Start by walking a little more than you did the day before. Bit by bit, increase the amount you walk.  Walking increases blood flow. It also helps prevent pneumonia and constipation. · To prevent constipation from opioid pain medicines:  ¨ Talk to your doctor about a laxative. ¨ Include fruits, vegetables, beans, and whole grains in your diet each day. These foods are high in fiber. ¨ Drink plenty of fluids, enough so that your urine is light yellow or clear like water. Drink water, fruit juice, or other drinks that do not contain caffeine or alcohol. If you have kidney, heart, or liver disease and have to limit fluids, talk with your doctor before you increase the amount of fluids you drink. ¨ Take a fiber supplement, such as Citrucel or Metamucil, every day if needed. Read and follow all instructions on the label. If you take pain medicine for more than a few days, talk to your doctor before you take fiber. When should you call for help? Call your doctor now or seek immediate medical care if:  · Your pain gets worse. · Your pain is not controlled by medicine. Watch closely for changes in your health, and be sure to contact your doctor if you have any problems. Where can you learn more? Go to http://lizbeth-efraín.info/. Enter ([phone] in the search box to learn more about \"Acute Pain After Surgery: Care Instructions. \"  Current as of: March 20, 2017  Content Version: 11.3  © 1370-0365 [employer]. Care instructions adapted under license by [employer] (which disclaims liability or warranty for this information). If you have questions about a medical condition or this instruction, always ask your healthcare professional. [place] [address] any warranty or liability for your use of this information.

## 2017-09-10 NOTE — ED NOTES
Pt reports has big toe surgery of right foot a couple days ago. Has since had pain in the right big toe, uncontrollable with medications. Coming in for pain relief.

## 2017-09-10 NOTE — ED PROVIDER NOTES
HPI Comments: Vishal Taylor, 49 y.o. female, with PMHx of migraines, HTN, anemia, diabetic neuropathy, DM presents ambulatory to ED [facility] ED with cc of right foot pain present x 2 days. Pt reports that she underwent surgery on her 1st toe 2 days ago at [facility]. She notes that the toe was infected but that the surgery removed the infected tissue and no amputation was performed. She states that the surgeon did not discharge her with pain medications, and reports that she tried to call her PCP to request some pain medication but that he is out of town. She thus presents to the ED today for pain relief. She notes that she is seeing wound care every other day. She notes that she is ambulatory with pain, and does use a cane for assistance. She denies seeing a pain specialist. She denies fever, chills, N/V/D.     PCP: Lorena Kolb MD    Social history significant for: - Tobacco, - EtOH, - Illicit Drug Use    There are no other complaints, changes, or physical findings at this time. The history is provided by the patient. No  was used. Past Medical History:   Diagnosis Date    Anemia     Chronic pain     Cyst in hand 2014    [facility] [place]    Diabetes mellitus type 2 in obese ([address]     Diabetic neuropathy [facility]     Hypertension     Migraine        No past surgical history on file.       Family History:   Problem Relation Age of Onset   [facility] Father      brain    Diabetes Mother     Heart Disease Mother     Hypertension Mother     Hypertension Maternal Grandmother     Heart Disease Maternal Grandmother        Social History     Social History    Marital status:      Spouse name: N/A    Number of children: N/A    Years of education: N/A     Occupational History    disabled      Social History Main Topics    Smoking status: Never Smoker    Smokeless tobacco: Never Used    Alcohol use No    Drug use: No    Sexual activity: Not Currently     Other Topics Concern    Not on file     Social History Narrative    Lives with daughter. Not working. Applying for disabilty. ALLERGIES: Lortab [hydrocodone-acetaminophen]; Cymbalta [duloxetine]; and Lyrica [pregabalin]    Review of Systems   Constitutional: Negative for chills, fatigue and fever. HENT: Negative. Eyes: Negative. Respiratory: Negative for shortness of breath and wheezing. Cardiovascular: Negative for chest pain and leg swelling. Gastrointestinal: Negative for blood in stool, constipation, diarrhea, nausea and vomiting. Endocrine: Negative. Genitourinary: Negative for difficulty urinating and dysuria. Musculoskeletal: Positive for arthralgias (right 1st toe). Skin: Negative for rash. Allergic/Immunologic: Negative. Neurological: Negative for weakness and numbness. Hematological: Negative. Psychiatric/Behavioral: Negative. Patient Vitals for the past 12 hrs:   Temp Pulse Resp BP SpO2   09/09/17 2234 98 °F (36.7 °C) 100 18 (!) 158/112 98 %     Physical Exam   Constitutional: She is oriented to person, place, and time. She appears well-developed and well-nourished. HENT:   Head: Normocephalic and atraumatic. Mouth/Throat: Mucous membranes are normal.   Eyes: EOM are normal. Pupils are equal, round, and reactive to light. Neck: Normal range of motion. No JVD present. No tracheal deviation present. Cardiovascular: Normal rate, regular rhythm, normal heart sounds and intact distal pulses. Exam reveals no gallop and no friction rub. No murmur heard. 2+ DP pulses  No peripheral edema noted   Pulmonary/Chest: Effort normal and breath sounds normal. No stridor. She has no wheezes. She has no rales. Abdominal: Soft. Bowel sounds are normal. She exhibits no distension and no mass. There is no tenderness. There is no guarding. Musculoskeletal: Normal range of motion. She exhibits no edema or tenderness. Neurological: She is alert and oriented to person, place, and time. Sensation intact in right foot and leg   Skin: Skin is warm and dry. No rash noted.   + Incision site that is 3.5 cm in length to medial plantar aspect of right great toe  Sutures are in place  Dressing is clean, dry and intact  No wound dehiscence   No erythema  No swelling  No fluctuance  No drainage   Psychiatric: She has a normal mood and affect. Her behavior is normal. Judgment and thought content normal.        MDM  Number of Diagnoses or Management Options  Post-op pain:   Diagnosis management comments: Pt s/p recent surgery to great toe, wound appears to be healing well, no signs of infection. Will treat for post-op pain and have her follow up with her surgeon. Amount and/or Complexity of Data Reviewed  Review and summarize past medical records: yes    Patient Progress  Patient progress: stable    ED Course       Procedures    MEDICATIONS GIVEN:  Medications   oxyCODONE-acetaminophen (PERCOCET) 5-325 mg per tablet 2 Tab (2 Tabs Oral Given 9/9/17 4655)       IMPRESSION:  1. Post-op pain        PLAN:  1. Current Discharge Medication List      START taking these medications    Details   oxyCODONE-acetaminophen (PERCOCET) 5-325 mg per tablet Take 1 Tab by mouth every four (4) hours as needed for Pain. Max Daily Amount: 6 Tabs. Qty: 20 Tab, Refills: 0           2. Follow-up Information     Follow up With Details Comments Contact Info    Your foot surgeon Schedule an appointment as soon as possible for a visit      Cheyenne Adams MD Schedule an appointment as soon as possible for a visit  [address] [place], [place] [phone]  [phone]          Return to ED if worse       Discharge Note:  11:13 PM  The pt is ready for discharge. The pt's signs, symptoms, diagnosis, and discharge instructions have been discussed and pt has conveyed their understanding. The pt is to follow up as recommended or return to ER should their symptoms worsen. Plan has been discussed and pt is in agreement.     This note is prepared by Jacey Pfeiffer, acting as a Scribe for Cecil Campbell DO. Cecil Campbell DO: The scribe's documentation has been prepared under my direction and personally reviewed by me in its entirety. I confirm that the notes above accurately reflects all work, treatment, procedures, and medical decision making performed by me.

## 2017-09-10 NOTE — ED NOTES
Pt resting in stretcher. Medicated for pain. Redressed wound on right big toe. Discharge instructions reviewed with pt and given by Dr. Eve Rocha. Pt ambulated out of ED with steady gait after declining wheelchair ride out. No other complaints voiced at this time. Adult visitor to assist pt home.

## 2017-09-15 ENCOUNTER — OFFICE VISIT (OUTPATIENT)
Dept: INTERNAL MEDICINE CLINIC | Age: 52
End: 2017-09-15

## 2017-09-15 VITALS
WEIGHT: 240 LBS | DIASTOLIC BLOOD PRESSURE: 88 MMHG | HEIGHT: 64 IN | BODY MASS INDEX: 40.97 KG/M2 | SYSTOLIC BLOOD PRESSURE: 141 MMHG | HEART RATE: 91 BPM | RESPIRATION RATE: 18 BRPM | OXYGEN SATURATION: 99 % | TEMPERATURE: 98.8 F

## 2017-09-15 DIAGNOSIS — E11.42 DIABETIC POLYNEUROPATHY ASSOCIATED WITH TYPE 2 DIABETES MELLITUS (HCC): ICD-10-CM

## 2017-09-15 DIAGNOSIS — E11.21 CONTROLLED TYPE 2 DIABETES MELLITUS WITH DIABETIC NEPHROPATHY, WITHOUT LONG-TERM CURRENT USE OF INSULIN (HCC): ICD-10-CM

## 2017-09-15 DIAGNOSIS — S91.331D: Primary | ICD-10-CM

## 2017-09-15 DIAGNOSIS — I10 ESSENTIAL HYPERTENSION: ICD-10-CM

## 2017-09-15 RX ORDER — METFORMIN HYDROCHLORIDE 1000 MG/1
TABLET ORAL
Qty: 180 TAB | Refills: 3 | Status: SHIPPED | OUTPATIENT
Start: 2017-09-15 | End: 2018-02-05 | Stop reason: ALTCHOICE

## 2017-09-15 RX ORDER — OXYCODONE AND ACETAMINOPHEN 5; 325 MG/1; MG/1
1 TABLET ORAL
Qty: 20 TAB | Refills: 0 | Status: SHIPPED | OUTPATIENT
Start: 2017-09-15 | End: 2017-10-13 | Stop reason: SDUPTHER

## 2017-09-15 NOTE — MR AVS SNAPSHOT
Visit Information Date & Time Provider Department Dept. Phone Encounter #  
 9/15/2017  1:00 PM Elvis Valdez  [place]  [id number] Follow-up Instructions Return in about 4 weeks (around 10/13/2017) for routine follow up. Upcoming Health Maintenance Date Due FOBT Q 1 YEAR AGE 50-75 12/28/2016 LIPID PANEL Q1 3/25/2017 INFLUENZA AGE 9 TO ADULT 8/1/2017 MICROALBUMIN Q1 8/15/2017 PAP AKA CERVICAL CYTOLOGY 9/3/2017 EYE EXAM RETINAL OR DILATED Q1 10/25/2017 HEMOGLOBIN A1C Q6M 1/17/2018 FOOT EXAM Q1 2/13/2018 BREAST CANCER SCRN MAMMOGRAM 3/22/2019 DTaP/Tdap/Td series (2 - Td) 3/13/2027 Allergies as of 9/15/2017  Review Complete On: 9/15/2017 By: Shyann Alvarez LPN Severity Noted Reaction Type Reactions Lortab [Hydrocodone-acetaminophen] High 08/11/2013   Intolerance Nausea and Vomiting Cymbalta [Duloxetine]  10/04/2016    Diarrhea Lyrica [Pregabalin]  03/13/2017    Drowsiness Current Immunizations  Reviewed on 12/13/2016 No immunizations on file. Not reviewed this visit You Were Diagnosed With   
  
 Codes Comments Penetrating foot wound, right, subsequent encounter    -  Primary ICD-10-CM: J00.032D ICD-9-CM: V58.89, 892.0 Diabetic polyneuropathy associated with type 2 diabetes mellitus ([facility]     ICD-10-CM: E11.42 
ICD-9-CM: 250.60, 357.2 Controlled type 2 diabetes mellitus with diabetic nephropathy, without long-term current use of insulin (HCC)     ICD-10-CM: E11.21 
ICD-9-CM: 250.40, 583.81 Vitals BP Pulse Temp Resp Height(growth percentile) Weight(growth percentile) 141/88 (BP 1 Location: Left arm, BP Patient Position: Sitting) 91 98.8 °F (37.1 °C) (Oral) 18 5' 4\" (1.626 m) 240 lb (108.9 kg) LMP SpO2 BMI OB Status Smoking Status 08/03/2017 99% 41.2 kg/m2 Having regular periods Never Smoker Vitals History BMI and BSA Data Body Mass Index Body Surface Area  
 41.2 kg/m 2 2.22 m 2 Preferred Pharmacy Pharmacy Name Phone [facility] [address], [address] [phone] Your Updated Medication List  
  
   
This list is accurate as of: 9/15/17  1:29 PM.  Always use your most recent med list.  
  
  
  
  
 aspirin delayed-release 81 mg tablet Take  by mouth daily. atenolol 100 mg tablet Commonly known as:  TENORMIN  
TAKE ONE TABLET BY MOUTH ONCE DAILY  
  
 gabapentin 800 mg tablet Commonly known as:  NEURONTIN Take 800 mg by mouth two (2) times a day. lisinopril 10 mg tablet Commonly known as:  PRINIVIL, ZESTRIL  
TAKE ONE TABLET BY MOUTH ONCE DAILY  
  
 meloxicam 15 mg tablet Commonly known as:  MOBIC  
TAKE ONE TABLET BY MOUTH ONCE DAILY  
  
 metFORMIN 1,000 mg tablet Commonly known as:  GLUCOPHAGE  
TAKE 1 TABLET BY MOUTH TWO (2) TIMES DAILY WITH MEALS  
  
 oxyCODONE-acetaminophen 5-325 mg per tablet Commonly known as:  PERCOCET Take 1 Tab by mouth every eight (8) hours as needed for Pain. Max Daily Amount: 3 Tabs. Prescriptions Printed Refills  
 oxyCODONE-acetaminophen (PERCOCET) 5-325 mg per tablet 0 Sig: Take 1 Tab by mouth every eight (8) hours as needed for Pain. Max Daily Amount: 3 Tabs. Class: Print Route: Oral  
  
Follow-up Instructions Return in about 4 weeks (around 10/13/2017) for routine follow up. Introducing [facility]! [facility] introduces [employer] patient portal. Now you can access parts of your medical record, email your doctor's office, and request medication refills online. 1. In your internet browser, go to [email or web address] 2. Click on the First Time User? Click Here link in the Sign In box. You will see the New Member Sign Up page. 3. Enter your [employer] Access Code exactly as it appears below.  You will not need to use this code after youve completed the sign-up process. If you do not sign up before the expiration date, you must request a new code. · Phot[employer]ank Access Code: [email or web address] Expires: 9/30/2017  7:56 PM 
 
4. Enter the last four digits of your Social Security Number (xxxx) and Date of Birth (mm/dd/yyyy) as indicated and click Submit. You will be taken to the next sign-up page. 5. Create a Photorank ID. This will be your Phot[employer] login ID and cannot be changed, so think of one that is secure and easy to remember. 6. Create a [employer] password. You can change your password at any time. 7. Enter your Password Reset Question and Answer. This can be used at a later time if you forget your password. 8. Enter your e-mail address. You will receive e-mail notification when new information is available in [address] 9. Click Sign Up. You can now view and download portions of your medical record. 10. Click the Download Summary menu link to download a portable copy of your medical information. If you have questions, please visit the Frequently Asked Questions section of the [employer] website. Remember, [employer]ank is NOT to be used for urgent needs. For medical emergencies, dial 911. Now available from your iPhone and Android! Please provide this summary of care documentation to your next provider. Your primary care clinician is listed as Keyana Cesar. If you have any questions after today's visit, please call [phone].

## 2017-09-15 NOTE — PROGRESS NOTES
HISTORY OF PRESENT ILLNESS  Farida Blevins is a 49 y.o. female. Wound Check   The history is provided by the patient. This is a new problem. The current episode started more than 1 week ago. The problem occurs constantly. Pertinent negatives include no chest pain and no shortness of breath. Started with a scratch but it progressed and went to [facility] , where was admitted and given ABs. Pods did and [facility] pain and went back to [place] to get pain meds. Yesterday went back saw Pods, not given further pain meds and asks me for some. Finished AB yesterday. BS OK no hypos    Patient Active Problem List   Diagnosis Code    Hypertension I10    Chronic pain G89.29    Narcotic dependence, episodic use ([address] F11.20    Diabetes mellitus type 2, controlled ([address] E11.9    Diabetic polyneuropathy associated with type 2 diabetes mellitus (HCC) E11.42    Idiopathic small and large fiber sensory neuropathy G60.8    Chronic inflammatory demyelinating polyneuropathy (HCC) G61.81    Spinal stenosis of lumbosacral region M48.07    Lumbar back pain with radiculopathy affecting left lower extremity M54.17    Lumbar back pain with radiculopathy affecting right lower extremity M54.17         Review of Systems   Respiratory: Negative for shortness of breath. Cardiovascular: Negative for chest pain. Musculoskeletal: Positive for joint pain. Negative for falls. Neurological: Positive for focal weakness.         Can wiggle her toes a little       Physical Exam  Visit Vitals    /88 (BP 1 Location: Left arm, BP Patient Position: Sitting)    Pulse 91    Temp 98.8 °F (37.1 °C) (Oral)    Resp 18    Ht 5' 4\" (1.626 m)    Wt 240 lb (108.9 kg)    LMP 08/03/2017    SpO2 99%    BMI 41.2 kg/m2     WD WN female NAD  Heart RRR without murmers clicks or rubs  Lungs CTA  Abdo soft nontender  Ext no edema  Right big toe healing wound, min red or swelling, NV intact  Dressing removed replaced  ASSESSMENT and PLAN  Encounter Diagnoses   Name Primary?  Penetrating foot wound, right, subsequent encounter Yes    Diabetic polyneuropathy associated with type 2 diabetes mellitus ([facility]     Controlled type 2 diabetes mellitus with diabetic nephropathy, without long-term current use of insulin ([address]      Orders Placed This Encounter    oxyCODONE-acetaminophen (PERCOCET) 5-325 mg per tablet    metFORMIN (GLUCOPHAGE) 1,000 mg tablet       We discussed this pain and the usage of controlled substances for post op pain relief. In general these medications are indicated for the acute symptom relief and not for chronic usage, allthough they are frequently used for chronic management  After a certain period of time they should be stopped to avoid dependence and/or addiction. I warned her about the dangers of addiction and other side affects including respiratory depression and death. Discussed how this is a controlled substance and that the prescribing of it is should be monitored very carefully. Drug usage is also monitored by our practise and the [facility], since there has been a lot of abuse and diversion of controlled substances. In general we do not refill this medication over the phone. PLease ask for enough pills to get you to your next appointment and make sure you keep your appointments. Warned not to take other medications like alcohol, other benzodiazapines marijuana or other narcotics when on this medication. One more narcotic for this pain but no longer after this    Follow-up Disposition:  Return in about 4 weeks (around 10/13/2017) for routine follow up. Chronic Conditions Addressed Today     1. Hypertension    2. Diabetic polyneuropathy associated with type 2 diabetes mellitus (HCC)     Relevant Medications     oxyCODONE-acetaminophen (PERCOCET) 5-325 mg per tablet     metFORMIN (GLUCOPHAGE) 1,000 mg tablet    3.  Diabetes mellitus type 2, controlled ([address]     Relevant Medications     metFORMIN (GLUCOPHAGE) 1,000 mg tablet      Acute Diagnoses Addressed Today     Penetrating foot wound, right, subsequent encounter    -  Primary

## 2017-09-15 NOTE — PROGRESS NOTES
Chief Complaint   Patient presents with    Wound Check     R/Great Toe surgery last week     I have reviewed the patient's medical history in detail and updated the computerized patient record. Health Maintenance reviewed. 1. Have you been to the ER, urgent care clinic since your last visit? Hospitalized since your last visit?no    2. Have you seen or consulted any other health care providers outside of the [address] [place] [address] since your last visit? Include any pap smears or colon screening.  No    Encouraged pt to discuss pt's wishes with spouse/partner/family and bring them in the next appt to follow thru with the Advanced Directive  no

## 2017-10-13 ENCOUNTER — OFFICE VISIT (OUTPATIENT)
Dept: INTERNAL MEDICINE CLINIC | Age: 52
End: 2017-10-13

## 2017-10-13 VITALS
HEART RATE: 85 BPM | HEIGHT: 64 IN | OXYGEN SATURATION: 100 % | SYSTOLIC BLOOD PRESSURE: 157 MMHG | DIASTOLIC BLOOD PRESSURE: 93 MMHG | BODY MASS INDEX: 41.32 KG/M2 | RESPIRATION RATE: 16 BRPM | TEMPERATURE: 98.4 F | WEIGHT: 242 LBS

## 2017-10-13 DIAGNOSIS — S91.109A OPEN WOUND OF TOE, INITIAL ENCOUNTER: Primary | ICD-10-CM

## 2017-10-13 DIAGNOSIS — E11.42 DIABETIC POLYNEUROPATHY ASSOCIATED WITH TYPE 2 DIABETES MELLITUS (HCC): ICD-10-CM

## 2017-10-13 DIAGNOSIS — E11.65 TYPE 2 DIABETES MELLITUS WITH HYPERGLYCEMIA, WITHOUT LONG-TERM CURRENT USE OF INSULIN (HCC): ICD-10-CM

## 2017-10-13 RX ORDER — OXYCODONE AND ACETAMINOPHEN 5; 325 MG/1; MG/1
1 TABLET ORAL
Qty: 12 TAB | Refills: 0 | Status: SHIPPED | OUTPATIENT
Start: 2017-10-13 | End: 2017-10-24

## 2017-10-13 RX ORDER — LISINOPRIL 10 MG/1
TABLET ORAL
Qty: 30 TAB | Refills: 5 | Status: SHIPPED | OUTPATIENT
Start: 2017-10-13 | End: 2017-10-17 | Stop reason: SDUPTHER

## 2017-10-13 RX ORDER — CEFUROXIME AXETIL 500 MG/1
500 TABLET ORAL 2 TIMES DAILY
Qty: 20 TAB | Refills: 0 | Status: SHIPPED | OUTPATIENT
Start: 2017-10-13 | End: 2017-10-23

## 2017-10-13 NOTE — MR AVS SNAPSHOT
Visit Information Date & Time Provider Department Dept. Phone Encounter #  
 10/13/2017  1:45 PM Hai Amos  Donavan Coats [id number] Follow-up Instructions Return in about 4 weeks (around 11/10/2017) for routine follow up. Your Appointments 10/16/2017  9:00 AM  
New Patient with Malathi Gonzalez MD  
[facility]  [address] [facility] ([address]) Appt Note: diabetic foot pain,ref by dr Kay Lewis [address], [address] [phone]  
[address], [address], [address] [facility] [address] [address] Upcoming Health Maintenance Date Due FOBT Q 1 YEAR AGE 50-75 12/28/2016 LIPID PANEL Q1 3/25/2017 INFLUENZA AGE 9 TO ADULT 8/1/2017 MICROALBUMIN Q1 8/15/2017 PAP AKA CERVICAL CYTOLOGY 9/3/2017 EYE EXAM RETINAL OR DILATED Q1 10/25/2017 HEMOGLOBIN A1C Q6M 1/17/2018 FOOT EXAM Q1 2/13/2018 BREAST CANCER SCRN MAMMOGRAM 3/22/2019 DTaP/Tdap/Td series (2 - Td) 3/13/2027 Allergies as of 10/13/2017  Review Complete On: 10/13/2017 By: Paula Caicedo LPN Severity Noted Reaction Type Reactions Lortab [Hydrocodone-acetaminophen] High 08/11/2013   Intolerance Nausea and Vomiting Cymbalta [Duloxetine]  10/04/2016    Diarrhea Lyrica [Pregabalin]  03/13/2017    Drowsiness Current Immunizations  Reviewed on 12/13/2016 No immunizations on file. Not reviewed this visit You Were Diagnosed With   
  
 Codes Comments Open wound of toe, initial encounter    -  Primary ICD-10-CM: H12.654H ICD-9-CM: 893.0 Diabetic polyneuropathy associated with type 2 diabetes mellitus ([facility]     ICD-10-CM: E11.42 
ICD-9-CM: 250.60, 357.2 Type 2 diabetes mellitus with hyperglycemia, without long-term current use of insulin (HCC)     ICD-10-CM: E11.65 ICD-9-CM: 250.00, 790.29 Vitals BP Pulse Temp Resp Height(growth percentile) Weight(growth percentile) (!) 157/93 (BP 1 Location: Left arm, BP Patient Position: At rest) 85 98.4 °F (36.9 °C) (Oral) 16 5' 4\" (1.626 m) 242 lb (109.8 kg) SpO2 BMI OB Status Smoking Status 100% 41.54 kg/m2 Having regular periods Never Smoker BMI and BSA Data Body Mass Index Body Surface Area 41.54 kg/m 2 2.23 m 2 Preferred Pharmacy Pharmacy Name Phone [facility] [address] [facility], [address] [facility] [phone] Your Updated Medication List  
  
   
This list is accurate as of: 10/13/17  2:17 PM.  Always use your most recent med list.  
  
  
  
  
 aspirin delayed-release 81 mg tablet Take  by mouth daily. atenolol 100 mg tablet Commonly known as:  TENORMIN  
TAKE ONE TABLET BY MOUTH ONCE DAILY  
  
 cefUROXime 500 mg tablet Commonly known as:  CEFTIN Take 1 Tab by mouth two (2) times a day for 10 days. gabapentin 800 mg tablet Commonly known as:  NEURONTIN Take 800 mg by mouth two (2) times a day. lisinopril 10 mg tablet Commonly known as:  PRINIVIL, ZESTRIL  
TAKE ONE TABLET BY MOUTH ONCE DAILY  
  
 meloxicam 15 mg tablet Commonly known as:  MOBIC  
TAKE ONE TABLET BY MOUTH ONCE DAILY  
  
 metFORMIN 1,000 mg tablet Commonly known as:  GLUCOPHAGE  
TAKE 1 TABLET BY MOUTH TWO (2) TIMES DAILY WITH MEALS  
  
 oxyCODONE-acetaminophen 5-325 mg per tablet Commonly known as:  PERCOCET Take 1 Tab by mouth every eight (8) hours as needed for Pain. Max Daily Amount: 3 Tabs. Prescriptions Printed Refills  
 oxyCODONE-acetaminophen (PERCOCET) 5-325 mg per tablet 0 Sig: Take 1 Tab by mouth every eight (8) hours as needed for Pain. Max Daily Amount: 3 Tabs. Class: Print Route: Oral  
  
Prescriptions Sent to Pharmacy Refills  
 cefUROXime (CEFTIN) 500 mg tablet 0 Sig: Take 1 Tab by mouth two (2) times a day for 10 days. Class: Normal  
 Pharmacy: [address] [facility], [facility] #: [phone] Route: Oral  
  
We Performed the Following REFERRAL TO GENERAL SURGERY [REF27 Custom] Comments:  
 Please evaluate patient for wound right big toe has Dm operated upon last mo for wound at [facility]. Follow-up Instructions Return in about 4 weeks (around 11/10/2017) for routine follow up. Referral Information Referral ID Referred By Referred To  
  
 [id number] [facility] [address] [facility], [address] [facility] Visits Status Start Date End Date 1 Authorized 10/13/17 10/13/18 If your referral has a status of pending review or denied, additional information will be sent to support the outcome of this decision. Introducing [facility] & HEALTH SERVICES! Gilbert Girish introduces [employer] patient portal. Now you can access parts of your medical record, email your doctor's office, and request medication refills online. 1. In your internet browser, go to [email or web address] 2. Click on the First Time User? Click Here link in the Sign In box. You will see the New Member Sign Up page. 3. Enter your [employer] Access Code exactly as it appears below. You will not need to use this code after youve completed the sign-up process. If you do not sign up before the expiration date, you must request a new code. · [employer] Access Code: [email or web address] Expires: 1/11/2018  1:40 PM 
 
4. Enter the last four digits of your Social Security Number (xxxx) and Date of Birth (mm/dd/yyyy) as indicated and click Submit. You will be taken to the next sign-up page. 5. Create a [employer] ID. This will be your [employer] login ID and cannot be changed, so think of one that is secure and easy to remember. 6. Create a TextHubt password. You can change your password at any time. 7. Enter your Password Reset Question and Answer. This can be used at a later time if you forget your password. 8. Enter your e-mail address. You will receive e-mail notification when new information is available in [address] 9. Click Sign Up. You can now view and download portions of your medical record. 10. Click the Download Summary menu link to download a portable copy of your medical information. If you have questions, please visit the Frequently Asked Questions section of the [employer] website. Remember, [employer] is NOT to be used for urgent needs. For medical emergencies, dial 911. Now available from your iPhone and Android! Please provide this summary of care documentation to your next provider. Your primary care clinician is listed as Tosha Arzate. If you have any questions after today's visit, please call [phone].

## 2017-10-13 NOTE — PROGRESS NOTES
Follow up for foot pain and swelling following foot surgery 0n 9/6/17  Ken Friday, LPN  05/37/3702  6:84 PM

## 2017-10-15 NOTE — PROGRESS NOTES
HISTORY OF PRESENT ILLNESS  Raul Syed is a 49 y.o. female. Foot Pain   The history is provided by the patient. This is a chronic problem. The current episode started more than 1 week ago (It has been worse). The problem occurs constantly. The problem has not changed since onset. Pertinent negatives include no chest pain and no shortness of breath. Associated symptoms comments: Foot pain especially the toe, fifth on the right. . Treatments tried: She saw podiatry at [facility], had some sort of surgical procedure. The treatment provided no relief. Foot Swelling      She feels like the entire foot is hurting but the focal point seems to be the fifth toe. Feels that this is different from her usual chronic severe neuropathic foot pain. States she has not gotten pain agents from anybody but me recently. Diabetic blood sugar control has been fair, no hypoglycemia, her last hemoglobin A1c was 7.6. She wants to get a second opinion currently has follow-up with podiatry. It is next week. Patient Active Problem List   Diagnosis Code    Hypertension I10    Chronic pain G89.29    Narcotic dependence, episodic use ([address] F11.20    Diabetes mellitus type 2, controlled ([address] E11.9    Diabetic polyneuropathy associated with type 2 diabetes mellitus (HCC) E11.42    Idiopathic small and large fiber sensory neuropathy G60.8    Chronic inflammatory demyelinating polyneuropathy (HCC) G61.81    Spinal stenosis of lumbosacral region M48.07    Lumbar back pain with radiculopathy affecting left lower extremity M54.17    Lumbar back pain with radiculopathy affecting right lower extremity M54.17     Past Surgical History:   Procedure Laterality Date    HX ORTHOPAEDIC      right big toe       Review of Systems   Constitutional: Negative for fever. Respiratory: Negative for shortness of breath. Cardiovascular: Negative for chest pain. Musculoskeletal: Negative for falls.        Physical Exam  Visit Vitals    BP (!) 157/93 (BP 1 Location: Left arm, BP Patient Position: At rest)    Pulse 85    Temp 98.4 °F (36.9 °C) (Oral)    Resp 16    Ht 5' 4\" (1.626 m)    Wt 242 lb (109.8 kg)    SpO2 100%    BMI 41.54 kg/m2     WD WN female NAD  Heart RRR without murmers clicks or rubs  Lungs CTA  Abdo soft nontender  Ext mild edema, not really seeing an open wound but is definitely painful areas in the fifth toe to palpation some discoloration or perhaps cellulitis. ASSESSMENT and PLAN  Encounter Diagnoses   Name Primary?  Open wound of toe, initial encounter Yes    Diabetic polyneuropathy associated with type 2 diabetes mellitus ([facility]     Type 2 diabetes mellitus with hyperglycemia, without long-term current use of insulin ([facility]      Orders Placed This Encounter    REFERRAL TO GENERAL SURGERY    oxyCODONE-acetaminophen (PERCOCET) 5-325 mg per tablet    cefUROXime (CEFTIN) 500 mg tablet       We discussed this pain and the usage of controlled substances for acute Dm foot woundpain relief. In general these medications are indicated for the acute symptom relief and not for chronic usage, allthough they are frequently used for chronic management  After a certain period of time they should be stopped to avoid dependence and/or addiction. I warned her about the dangers of addiction and other side affects including respiratory depression and death. Discussed how this is a controlled substance and that the prescribing of it is should be monitored very carefully. Drug usage is also monitored by our practise and the [facility], since there has been a lot of abuse and diversion of controlled substances. In general we do not refill this medication over the phone. PLease ask for enough pills to get you to your next appointment and make sure you keep your appointments. Warned not to take other medications like alcohol, other benzodiazapines marijuana or other narcotics when on this medication.     May just be a cellulitis, possibility of diabetic foot wound although the skin is still closed but she did have surgery. We will get a shanon from [address], appointment will be on Monday. Pain medications and antibiotics until then. Follow-up Disposition:  Return in about 4 weeks (around 11/10/2017) for routine follow up.

## 2017-10-16 ENCOUNTER — OFFICE VISIT (OUTPATIENT)
Dept: SURGERY | Age: 52
End: 2017-10-16

## 2017-10-16 VITALS
OXYGEN SATURATION: 98 % | HEIGHT: 64 IN | RESPIRATION RATE: 24 BRPM | SYSTOLIC BLOOD PRESSURE: 154 MMHG | WEIGHT: 249 LBS | HEART RATE: 82 BPM | BODY MASS INDEX: 42.51 KG/M2 | DIASTOLIC BLOOD PRESSURE: 98 MMHG | TEMPERATURE: 98.6 F

## 2017-10-16 DIAGNOSIS — G57.91 NEUROPATHY OF FOOT, RIGHT: Primary | ICD-10-CM

## 2017-10-16 DIAGNOSIS — G57.92 NEUROPATHY OF FOOT, LEFT: ICD-10-CM

## 2017-10-16 NOTE — PROGRESS NOTES
The patient is a 49 y.o. woman referred by Driss Marcus MD regarding foot pain. Specifically, the patient has a history of an infection involving the right great toe which required podiatric surgery. She reports that she presently has pain in the right great toe but also pain in all of the other toes, although somewhat less severe than the great toe. The pain in the great toe and the other toes in the right foot is present 24 hours per day and is not changed by weight bearing or lying down. It will not change by time of day. She has similar pain in the toes of the left foot although somewhat less severe. The patient reports her blood sugars are usually above 200. She reports that she does follow a diabetic diet and does take her medications. The patient has had no previous vascular interventions. She has never smoked. Physical Examination:   GENERAL:  Alert overweight woman in no acute distress. EXTREMITIES:  Examination of the lower extremities reveals 2+ dorsalis pedis pulses bilaterally. The legs were obese, but not edematous. There are some small areas of callus on the right great toe but no open wound and no erythema. There is no tenderness of the right great toe or any toes on the right. The patient reports that my compressing the right great toe does not change her pain. The left foot also is free of ulcerations. There is no tenderness on the foot. The patient does not show any evidence of peripheral artery disease as a source for her foot pain. I would be most suspicious of neuropathy as a source for the pain. I have urged the patient to strictly follow a diabetic diet and maintain tight control of blood sugars, ideally blood sugar should be less than 150 which can be helpful in diabetic neuropathy. No vascular intervention is needed. Final Diagnosis:  Neuropathic pain feet, right greater than left.     cc: Driss Marcus MD

## 2017-10-16 NOTE — MR AVS SNAPSHOT
Visit Information Date & Time Provider Department Dept. Phone Encounter #  
 10/16/2017  9:00 AM Young Fung MD [facility] [address] [phone] [id number] Your Appointments 11/10/2017  1:00 PM  
ROUTINE CARE with Marielena Norton MD  
[facility] ([facility]) Appt Note: f/u on dm $0 cp lsh 10/13/17  
 [address] [place] [address]. [address] [place] [address]  
[phone]  
  
   
 [address] Upcoming Health Maintenance Date Due FOBT Q 1 YEAR AGE 50-75 12/28/2016 LIPID PANEL Q1 3/25/2017 INFLUENZA AGE 9 TO ADULT 8/1/2017 MICROALBUMIN Q1 8/15/2017 PAP AKA CERVICAL CYTOLOGY 9/3/2017 EYE EXAM RETINAL OR DILATED Q1 10/25/2017 HEMOGLOBIN A1C Q6M 1/17/2018 FOOT EXAM Q1 2/13/2018 BREAST CANCER SCRN MAMMOGRAM 3/22/2019 DTaP/Tdap/Td series (2 - Td) 3/13/2027 Allergies as of 10/16/2017  Review Complete On: 10/16/2017 By: Young Fung MD  
  
 Severity Noted Reaction Type Reactions Lortab [Hydrocodone-acetaminophen] High 08/11/2013   Intolerance Nausea and Vomiting Cymbalta [Duloxetine]  10/04/2016    Diarrhea Lyrica [Pregabalin]  03/13/2017    Drowsiness Current Immunizations  Reviewed on 12/13/2016 No immunizations on file. Not reviewed this visit You Were Diagnosed With   
  
 Codes Comments Neuropathy of foot, right    -  Primary ICD-10-CM: G57.91 
ICD-9-CM: 355.8 Neuropathy of foot, left     ICD-10-CM: G57.92 
ICD-9-CM: 355.8 Vitals BP Pulse Temp Resp Height(growth percentile) Weight(growth percentile) (!) 154/98 82 98.6 °F (37 °C) (Oral) 24 5' 4\" (1.626 m) 249 lb (112.9 kg) SpO2 BMI OB Status Smoking Status 98% 42.74 kg/m2 Having regular periods Never Smoker BMI and BSA Data Body Mass Index Body Surface Area 42.74 kg/m 2 2.26 m 2 Preferred Pharmacy Pharmacy Name Phone [facility] [address], [address] [phone] Your Updated Medication List  
  
   
This list is accurate as of: 10/16/17 10:29 AM.  Always use your most recent med list.  
  
  
  
  
 aspirin delayed-release 81 mg tablet Take  by mouth daily. atenolol 100 mg tablet Commonly known as:  TENORMIN  
TAKE ONE TABLET BY MOUTH ONCE DAILY  
  
 cefUROXime 500 mg tablet Commonly known as:  CEFTIN Take 1 Tab by mouth two (2) times a day for 10 days. gabapentin 800 mg tablet Commonly known as:  NEURONTIN Take 800 mg by mouth two (2) times a day. lisinopril 10 mg tablet Commonly known as:  PRINIVIL, ZESTRIL  
TAKE ONE TABLET BY MOUTH ONCE DAILY  
  
 meloxicam 15 mg tablet Commonly known as:  MOBIC  
TAKE ONE TABLET BY MOUTH ONCE DAILY  
  
 metFORMIN 1,000 mg tablet Commonly known as:  GLUCOPHAGE  
TAKE 1 TABLET BY MOUTH TWO (2) TIMES DAILY WITH MEALS  
  
 oxyCODONE-acetaminophen 5-325 mg per tablet Commonly known as:  PERCOCET Take 1 Tab by mouth every eight (8) hours as needed for Pain. Max Daily Amount: 3 Tabs. Introducing [facility] & HE[facility] SERVICES! Bari Ramirez introduces [employer] patient portal. Now you can access parts of your medical record, email your doctor's office, and request medication refills online. 1. In your internet browser, go to [email or web address] 2. Click on the First Time User? Click Here link in the Sign In box. You will see the New Member Sign Up page. 3. Enter your [employer] Access Code exactly as it appears below. You will not need to use this code after youve completed the sign-up process. If you do not sign up before the expiration date, you must request a new code. · [employer] Access Code: [email or web address] Expires: 1/11/2018  1:40 PM 
 
4.  Enter the last four digits of your Social Security Number (xxxx) and Date of Birth (mm/dd/yyyy) as indicated and click Submit. You will be taken to the next sign-up page. 5. Create a [employer] ID. This will be your [employer] login ID and cannot be changed, so think of one that is secure and easy to remember. 6. Create a [employer] password. You can change your password at any time. 7. Enter your Password Reset Question and Answer. This can be used at a later time if you forget your password. 8. Enter your e-mail address. You will receive e-mail notification when new information is available in [address] 9. Click Sign Up. You can now view and download portions of your medical record. 10. Click the Download Summary menu link to download a portable copy of your medical information. If you have questions, please visit the Frequently Asked Questions section of the [employer] website. Remember, [employer] is NOT to be used for urgent needs. For medical emergencies, dial 911. Now available from your iPhone and Android! Please provide this summary of care documentation to your next provider. Your primary care clinician is listed as Kimberly Dalton. If you have any questions after today's visit, please call [phone].

## 2017-10-17 ENCOUNTER — DOCUMENTATION ONLY (OUTPATIENT)
Dept: INTERNAL MEDICINE CLINIC | Age: 52
End: 2017-10-17

## 2017-10-17 RX ORDER — GABAPENTIN 800 MG/1
800 TABLET ORAL 2 TIMES DAILY
Qty: 60 TAB | Refills: 5 | Status: CANCELLED | OUTPATIENT
Start: 2017-10-17

## 2017-10-17 RX ORDER — LISINOPRIL 10 MG/1
TABLET ORAL
Qty: 30 TAB | Refills: 5 | Status: SHIPPED | OUTPATIENT
Start: 2017-10-17 | End: 2018-02-05 | Stop reason: ALTCHOICE

## 2017-10-17 NOTE — PROGRESS NOTES
10/17/17 @ 15:38 C/Jason Chacon @ [phone] spoke with Fran Montano stated prescription for Lisinopril is ready for pickup,out of pocket cost $1.00,Refill authorization needed on Gabapentin.

## 2017-10-17 NOTE — TELEPHONE ENCOUNTER
Last office visit:  10/16/17  Last filled:  Gabapentin - not listed                    Lisinopril 10/13/17 #30 X 5 refills  No changes  Follow up in 3 weeks

## 2017-10-17 NOTE — TELEPHONE ENCOUNTER
Requested Prescriptions     Pending Prescriptions Disp Refills    gabapentin (NEURONTIN) 800 mg tablet       Sig: Take 1 Tab by mouth two (2) times a day.     lisinopril (PRINIVIL, ZESTRIL) 10 mg tablet 30 Tab 5

## 2017-10-24 ENCOUNTER — APPOINTMENT (OUTPATIENT)
Dept: GENERAL RADIOLOGY | Age: 52
End: 2017-10-24
Attending: PHYSICIAN ASSISTANT
Payer: MEDICAID

## 2017-10-24 ENCOUNTER — HOSPITAL ENCOUNTER (EMERGENCY)
Age: 52
Discharge: HOME | End: 2017-10-24
Attending: EMERGENCY MEDICINE
Payer: MEDICAID

## 2017-10-24 VITALS
OXYGEN SATURATION: 99 % | HEIGHT: 64 IN | RESPIRATION RATE: 17 BRPM | TEMPERATURE: 98.9 F | BODY MASS INDEX: 42.49 KG/M2 | DIASTOLIC BLOOD PRESSURE: 77 MMHG | SYSTOLIC BLOOD PRESSURE: 147 MMHG | WEIGHT: 248.9 LBS | HEART RATE: 81 BPM

## 2017-10-24 DIAGNOSIS — S90.111A CONTUSION OF RIGHT GREAT TOE WITHOUT DAMAGE TO NAIL, INITIAL ENCOUNTER: Primary | ICD-10-CM

## 2017-10-24 PROCEDURE — 99282 EMERGENCY DEPT VISIT SF MDM: CPT

## 2017-10-24 PROCEDURE — 73660 X-RAY EXAM OF TOE(S): CPT

## 2017-10-24 RX ORDER — OXYCODONE AND ACETAMINOPHEN 5; 325 MG/1; MG/1
1 TABLET ORAL
Qty: 10 TAB | Refills: 0 | Status: SHIPPED | OUTPATIENT
Start: 2017-10-24 | End: 2017-10-27

## 2017-10-25 NOTE — ED NOTES
Rahul Bauman reviewed discharge instructions with the patient. The patient verbalized understanding.

## 2017-10-25 NOTE — ED TRIAGE NOTES
Pt arrived ambulatory to ED with right toe pain. Pt states she had surgery on that toe on 9/6 because it was infected. Pt reports this evening she hit her toe and since then it has become more swollen, painful and red. Pt in no acute distress. VSS.

## 2017-10-25 NOTE — ED PROVIDER NOTES
[facility] [address]  EMERGENCY DEPARTMENT HISTORY AND PHYSICAL EXAM       Date of Service: 10/24/2017   Patient Name: Tigre Valadez   YOB: 1965  Medical Record Number: [id number]    History of Presenting Illness     Chief Complaint   Patient presents with    Toe Pain     after hitting right great toe on a chair earlier today. pt denies selfmedicating PTA. History Provided By:  patient    Additional History:   Tigre Valadez is a 49 y.o. female with PMhx significant for migraines, HTN, anemia, diabetic neuropathy, CAD, DM who presents ambulatory to the ED with cc of sudden onset of thumping, throbbing right great toe pain that onset earlier today. She reports associated right ankle and foot swelling. Pt denies use of medication. She reports having surgery of her right great toe on 09/18/2017 for diabetic complications. Per chart review pt was seen on 09/09/2017 for similar symptoms and was diagnosed with post-op complications. She denies any numbness, weakness, nausea, vomiting, fevers, chills, or gait changes. Social Hx: - Tobacco, - EtOH, - Illicit Drugs    There are no other complaints, changes or physical findings at this time.     Primary Care Provider: Ashwin Swan MD   Specialist:    Past History     Past Medical History:   Past Medical History:   Diagnosis Date    Anemia     CAD (coronary artery disease)     Chronic pain     Cyst in hand 2014    [address]    Diabetes mellitus type 2 in obese ([address]     Diabetic neuropathy ([facility] [address]     Hypertension     Migraine         Past Surgical History:   Past Surgical History:   Procedure Laterality Date    HX ORTHOPAEDIC      right big toe        Family History:   Family History   Problem Relation Age of Onset    Cancer Father      brain    Diabetes Mother     Heart Disease Mother     Hypertension Mother     Hypertension Maternal Grandmother     Heart Disease Maternal Grandmother         Social History:   Social History   Substance Use Topics    Smoking status: Never Smoker    Smokeless tobacco: Never Used    Alcohol use No        Allergies: Allergies   Allergen Reactions    Lortab [Hydrocodone-Acetaminophen] Nausea and Vomiting    Cymbalta [Duloxetine] Diarrhea    Lyrica [Pregabalin] Drowsiness        Review of Systems   Review of Systems   Constitutional: Negative. Negative for chills and fever. HENT: Negative. Negative for congestion, rhinorrhea and sore throat. Eyes: Negative. Negative for visual disturbance. Respiratory: Negative. Negative for cough, chest tightness, shortness of breath and wheezing. Cardiovascular: Negative. Negative for chest pain and palpitations. Gastrointestinal: Negative. Negative for abdominal pain, constipation, diarrhea, nausea and vomiting. Genitourinary: Negative. Negative for dysuria and hematuria. Musculoskeletal: Positive for arthralgias (right great toe) and joint swelling (right ankle and foot). Negative for myalgias. Skin: Negative. Negative for rash. Allergic/Immunologic: Negative. Negative for environmental allergies and food allergies. Neurological: Negative. Negative for weakness, numbness and headaches. Psychiatric/Behavioral: Negative. Negative for suicidal ideas. Physical Exam  Physical Exam   Constitutional: She is oriented to person, place, and time. She appears well-developed. No distress. Pt is an AAF, awake and alert in NAD. Elevated BMI. HENT:   Head: Normocephalic and atraumatic. Right Ear: External ear normal.   Left Ear: External ear normal.   Nose: Nose normal.   Eyes: Conjunctivae and EOM are normal. Pupils are equal, round, and reactive to light. Right eye exhibits no discharge. Left eye exhibits no discharge. Neck: Normal range of motion. Cardiovascular: Normal rate, normal heart sounds and intact distal pulses. Pulmonary/Chest: Effort normal and breath sounds normal. No respiratory distress.  She has no wheezes. She has no rales. Abdominal: Soft. Bowel sounds are normal. There is no tenderness. There is no guarding. No CVA tenderness b/l. Musculoskeletal: Normal range of motion. She exhibits edema and tenderness. She exhibits no deformity. R foot: +edema of all phalanges. No erythema or obvious bony deformity. No break in skin noted. +TTP of the medial aspect of the right great toe. Neurological: She is alert and oriented to person, place, and time. No focal neuro deficits. Skin: Skin is warm and dry. No rash noted. She is not diaphoretic. No erythema. No pallor. Psychiatric: She has a normal mood and affect. Her behavior is normal.   Vitals reviewed. Medical Decision Making   I am the first provider for this patient. I reviewed the vital signs, available nursing notes, past medical history, past surgical history, family history and social history. DDX: Sprain, strain, fracture, contusion. ED Course:  11:16 PM   Initial assessment performed. The patients presenting problems have been discussed, and they are in agreement with the care plan formulated and outlined with them. I have encouraged them to ask questions as they arise throughout their visit. Provider Notes:     PROGRESS NOTE:  10:58 PM  Pt declined pain medication. Written by Vanna Guadarrama ED scribe, as dictated by Pastor Yimi PA-C    Diagnostic Study Results     Radiologic Studies -  The following have been ordered and reviewed:  XR GREAT TOE RT MIN 2 V   Final Result   EXAM: XR GREAT TOE RT MIN 2 V     INDICATION:   Right foot first digit pain after injury today.     COMPARISON: None.     FINDINGS:  Three views of the right great toe demonstrate no acute fracture or  dislocation. There is mild degenerative change at the first metatarsophalangeal  joint.     IMPRESSION  IMPRESSION:  No acute abnormality. Vital Signs-Reviewed the patient's vital signs.    Patient Vitals for the past 12 hrs:   Temp Pulse Resp BP SpO2   10/24/17 2209 98.9 °F (37.2 °C) 81 17 147/77 99 %     Diagnosis:  Clinical Impression:   1. Contusion of right great toe without damage to nail, initial encounter         Plan:  1:   Follow-up Information     Follow up With Details Comments Contact Info    Julian Lisa MD Schedule an appointment as soon as possible for a visit in 2 days  [address]  [phone]      [facility] EMERGENCY DEPT  As needed or, If symptoms worsen [address] [facility] [address] [phone]          2:   Current Discharge Medication List      CONTINUE these medications which have CHANGED    Details   oxyCODONE-acetaminophen (PERCOCET) 5-325 mg per tablet Take 1 Tab by mouth every six (6) hours as needed for Pain for up to 3 days. Max Daily Amount: 4 Tabs. Qty: 10 Tab, Refills: 0           3. RICE    Return to ED if worse. Disposition:    DISCHARGE NOTE  11:52 PM  The patient has been re-evaluated and is ready for discharge. Reviewed available results with patient. Counseled patient on diagnosis and care plan. Patient has expressed understanding, and all questions have been answered. Patient agrees with plan and agrees to follow up as recommended, or return to the ED if their symptoms worsen. Discharge instructions have been provided and explained to the patient, along with reasons to return to the ED.  _______________________________   Attestations: This note is prepared by Loretta Peter, acting as Scribe for Rashaun Burns PA-C. Rashaun Burns PA-C: The scribe's documentation has been prepared under my direction and personally reviewed by me in its entirety. I confirm that the note above accurately reflects all work, treatment, procedures, and medical decision making performed by me.    _______________________________         This note will not be viewable in [address]

## 2017-11-08 ENCOUNTER — OFFICE VISIT (OUTPATIENT)
Dept: INTERNAL MEDICINE CLINIC | Age: 52
End: 2017-11-08

## 2017-11-08 VITALS
SYSTOLIC BLOOD PRESSURE: 142 MMHG | HEART RATE: 79 BPM | RESPIRATION RATE: 18 BRPM | HEIGHT: 64 IN | TEMPERATURE: 99.1 F | WEIGHT: 248 LBS | OXYGEN SATURATION: 100 % | BODY MASS INDEX: 42.34 KG/M2 | DIASTOLIC BLOOD PRESSURE: 81 MMHG

## 2017-11-08 DIAGNOSIS — I10 ESSENTIAL HYPERTENSION: ICD-10-CM

## 2017-11-08 DIAGNOSIS — E11.42 DIABETIC POLYNEUROPATHY ASSOCIATED WITH TYPE 2 DIABETES MELLITUS (HCC): Primary | ICD-10-CM

## 2017-11-08 DIAGNOSIS — N18.1 CONTROLLED TYPE 2 DIABETES MELLITUS WITH STAGE 1 CHRONIC KIDNEY DISEASE, WITHOUT LONG-TERM CURRENT USE OF INSULIN (HCC): ICD-10-CM

## 2017-11-08 DIAGNOSIS — E11.22 CONTROLLED TYPE 2 DIABETES MELLITUS WITH STAGE 1 CHRONIC KIDNEY DISEASE, WITHOUT LONG-TERM CURRENT USE OF INSULIN (HCC): ICD-10-CM

## 2017-11-08 LAB
BILIRUB UR QL STRIP: NEGATIVE
GLUCOSE UR-MCNC: NEGATIVE MG/DL
KETONES P FAST UR STRIP-MCNC: NEGATIVE MG/DL
PH UR STRIP: 5 [PH] (ref 4.6–8)
PROT UR QL STRIP: NORMAL
SP GR UR STRIP: 1.01 (ref 1–1.03)
UA UROBILINOGEN AMB POC: NORMAL (ref 0.2–1)
URINALYSIS CLARITY POC: NORMAL
URINALYSIS COLOR POC: NORMAL
URINE BLOOD POC: NORMAL
URINE LEUKOCYTES POC: NEGATIVE
URINE NITRITES POC: NEGATIVE

## 2017-11-08 RX ORDER — GABAPENTIN 800 MG/1
800 TABLET ORAL 3 TIMES DAILY
Qty: 90 TAB | Refills: 5 | Status: SHIPPED | OUTPATIENT
Start: 2017-11-08 | End: 2018-03-13 | Stop reason: SDUPTHER

## 2017-11-08 RX ORDER — TRAMADOL HYDROCHLORIDE 50 MG/1
50 TABLET ORAL
Qty: 120 TAB | Refills: 0 | Status: SHIPPED | OUTPATIENT
Start: 2017-11-08 | End: 2017-12-18 | Stop reason: ALTCHOICE

## 2017-11-08 NOTE — PROGRESS NOTES
Chief Complaint   Patient presents with    Toe Pain     R/Great Toe, swollen, red and painful     I have reviewed the patient's medical history in detail and updated the computerized patient record. Health Maintenance reviewed. 1. Have you been to the ER, urgent care clinic since your last visit? Hospitalized since your last visit? yes    2. Have you seen or consulted any other health care providers outside of the [address] [place] [address] since your last visit? Include any pap smears or colon screening.  Yes- [facility]    Encouraged pt to discuss pt's wishes with spouse/partner/family and bring them in the next appt to follow thru with the Advanced Directive

## 2017-11-08 NOTE — PROGRESS NOTES
Subjective:     Jv King is a 49 y.o. female seen for follow-up of diabetes. Her feet and diabetic neuropathy continue to be very painful. Once again asks for Percocet but we do not prescribe narcotics for chronic pain issues. She does have an appointment to see a pain manager but he does not take her insurance and its expensive. Her appointment is next month and she like something to get her to that appointment. She has had hypoglycemic attacks. .no  Blood sugar control has been running a little high  She has diabetes and hypertension. Had a fall/injury and injured her foot in September. Increase in worsening pain since then. Workup has not shown any arterial blockage, nerve studies just show severe nerve injury. Multiple other pain treatments have been tried and are minimally effective. Only Percocet seems to really help much. Jv King has the additional concern of the usual pain in her feet, Appt to see DR Gonzalez Oseguera Dec 4th. Diabetic Review of Systems: no polyuria or polydipsia, no chest pain, dyspnea or TIA's, no hypoglycemia, has dysesthesias in the feet. Allergies   Allergen Reactions    Lortab [Hydrocodone-Acetaminophen] Nausea and Vomiting    Cymbalta [Duloxetine] Diarrhea    Lyrica [Pregabalin] Drowsiness       Diet and Lifestyle: follows a diabetic diet regularly, nonsmoker.     Allergies   Allergen Reactions    Lortab [Hydrocodone-Acetaminophen] Nausea and Vomiting    Cymbalta [Duloxetine] Diarrhea    Lyrica [Pregabalin] Drowsiness     Social History   Substance Use Topics    Smoking status: Never Smoker    Smokeless tobacco: Never Used    Alcohol use No        Lab Results  Component Value Date/Time   WBC 6.7 08/24/2017 10:33 PM   HGB 11.0 08/24/2017 10:33 PM   HCT 33.3 08/24/2017 10:33 PM   PLATELET 455 09/86/9119 10:33 PM   MCV 79.5 08/24/2017 10:33 PM     Lab Results  Component Value Date/Time   Hemoglobin A1c 7.4 02/13/2017 12:20 PM   Hemoglobin A1c 6.6 08/15/2016 02:35 PM   Hemoglobin A1c 6.5 03/25/2016 01:08 PM   Glucose 203 08/24/2017 10:33 PM   Glucose (POC) 173 12/18/2015 11:17 AM   Microalb/Creat ratio (ug/mg creat.) 18.2 08/15/2016 02:35 PM   LDL, calculated 96 03/25/2016 01:08 PM   Creatinine 1.73 08/24/2017 10:33 PM      Lab Results  Component Value Date/Time   Cholesterol, total 151 03/25/2016 01:08 PM   HDL Cholesterol 41 03/25/2016 01:08 PM   LDL, calculated 96 03/25/2016 01:08 PM   Triglyceride 69 03/25/2016 01:08 PM     Lab Results  Component Value Date/Time   ALT (SGPT) 13 08/24/2017 10:33 PM   AST (SGOT) 10 08/24/2017 10:33 PM   Alk. phosphatase 109 08/24/2017 10:33 PM   Bilirubin, total 0.4 08/24/2017 10:33 PM   Albumin 3.5 08/24/2017 10:33 PM   Protein, total 7.4 08/24/2017 10:33 PM   INR 1.1 12/08/2015 04:42 PM   Prothrombin time 11.2 12/08/2015 04:42 PM   PLATELET 273 86/23/6012 10:33 PM       Lab Results  Component Value Date/Time   GFR est non-AA 31 08/24/2017 10:33 PM   GFR est AA 38 08/24/2017 10:33 PM   Creatinine 1.73 08/24/2017 10:33 PM   BUN 17 08/24/2017 10:33 PM   Sodium 141 08/24/2017 10:33 PM   Potassium 4.1 08/24/2017 10:33 PM   Chloride 110 08/24/2017 10:33 PM   CO2 24 08/24/2017 10:33 PM   Magnesium 1.4 11/16/2016 12:04 AM     Lab Results   Component Value Date/Time    Glucose 203 08/24/2017 10:33 PM    Glucose (POC) 173 12/18/2015 11:17 AM                       Review of Systems  Pertinent items are noted in HPI. Objective:     Significant for the followingOW  Visit Vitals    /81 (BP 1 Location: Left arm, BP Patient Position: Sitting)    Pulse 79    Temp 99.1 °F (37.3 °C) (Oral)    Resp 18    Ht 5' 4\" (1.626 m)    Wt 248 lb (112.5 kg)    LMP 11/03/2017    SpO2 100%    BMI 42.57 kg/m2     Appearance: chronically ill appearing. Exam: heart sounds normal rate, regular rhythm, normal S1, S2, no murmurs, rubs, clicks or gallops, chest clear  Foot exam: Diabetic foot exam was performed.   No obvious sores or red lesions. Sensation checked by monofilament exam which was absent. Dorsalis pedis pulse was absent. Lab review: labs reviewed, I note that glycosylated hemoglobin mildly abnormal but acceptable. Assessment/Plan:     Follow-up diabetes stable. Diabetic issues reviewed with her: foot care discussed and Podiatry visits discussed and glycohemoglobin and other lab monitoring discussed. ICD-10-CM ICD-9-CM    1. Diabetic polyneuropathy associated with type 2 diabetes mellitus ([facility]) E11.42 250.60      357.2    2. Controlled type 2 diabetes mellitus with stage 1 chronic kidney disease, without long-term current use of insulin ([facility]) E11.22 250.40 AMB POC URINALYSIS DIP STICK AUTO W/O MICRO    N18.1 585.1    3. Essential hypertension I10 401.9      Orders Placed This Encounter    AMB POC URINALYSIS DIP STICK AUTO W/O MICRO    gabapentin (NEURONTIN) 800 mg tablet     Sig: Take 1 Tab by mouth three (3) times daily. Dispense:  90 Tab     Refill:  5    traMADol (ULTRAM) 50 mg tablet     Sig: Take 1 Tab by mouth every six (6) hours as needed for Pain. Max Daily Amount: 200 mg. Dispense:  120 Tab     Refill:  0       We discussed this pain and the usage of controlled substances for neuropathic pain relief. In general these medications are indicated for the acute symptom relief and not for chronic usage, allthough they are frequently used for chronic management  After a certain period of time they should be stopped to avoid dependence and/or addiction. I warned her about the dangers of addiction and other side affects including respiratory depression and death. Discussed how this is a controlled substance and that the prescribing of it is should be monitored very carefully. Drug usage is also monitored by our practise and the [facility], since there has been a lot of abuse and diversion of controlled substances. In general we do not refill this medication over the phone.  PLease ask for enough pills to get you to your next appointment and make sure you keep your appointments. Warned not to take other medications like alcohol, other benzodiazapines marijuana or other narcotics when on this medication. Gave her enough tramadol to get her to the appointment with her pain management doctor who can hopefully get her on a better pain regimen, tramadol only helps minimally. Follow-up Disposition:  Return in about 6 weeks (around 12/20/2017) for routine follow up. Chronic Conditions Addressed Today     1. Hypertension    2. Diabetic polyneuropathy associated with type 2 diabetes mellitus (HCC) - Primary     Relevant Medications     gabapentin (NEURONTIN) 800 mg tablet     traMADol (ULTRAM) 50 mg tablet    3.  Diabetes mellitus type 2, controlled ([address]     Relevant Medications     gabapentin (NEURONTIN) 800 mg tablet     Other Relevant Orders     AMB POC URINALYSIS DIP STICK AUTO W/O MICRO (Completed)

## 2017-11-08 NOTE — MR AVS SNAPSHOT
Visit Information Date & Time Provider Department Dept. Phone Encounter #  
 11/8/2017  1:15 PM Minna Blizzard, MD [facility] [phone] Follow-up Instructions Return in about 6 weeks (around 12/20/2017) for routine follow up. Upcoming Health Maintenance Date Due FOBT Q 1 YEAR AGE 50-75 12/28/2016 LIPID PANEL Q1 3/25/2017 MICROALBUMIN Q1 8/15/2017 PAP AKA CERVICAL CYTOLOGY 9/3/2017 EYE EXAM RETINAL OR DILATED Q1 10/25/2017 HEMOGLOBIN A1C Q6M 1/17/2018 FOOT EXAM Q1 11/8/2018 BREAST CANCER SCRN MAMMOGRAM 3/22/2019 DTaP/Tdap/Td series (2 - Td) 3/13/2027 Allergies as of 11/8/2017  Review Complete On: 11/8/2017 By: Minna Blizzard, MD  
  
 Severity Noted Reaction Type Reactions Lortab [Hydrocodone-acetaminophen] High 08/11/2013   Intolerance Nausea and Vomiting Cymbalta [Duloxetine]  10/04/2016    Diarrhea Lyrica [Pregabalin]  03/13/2017    Drowsiness Current Immunizations  Reviewed on 12/13/2016 No immunizations on file. Not reviewed this visit You Were Diagnosed With   
  
 Codes Comments Diabetic polyneuropathy associated with type 2 diabetes mellitus ([facility]    -  Primary ICD-10-CM: E11.42 
ICD-9-CM: 250.60, 357.2 Controlled type 2 diabetes mellitus with stage 1 chronic kidney disease, without long-term current use of insulin (HCC)     ICD-10-CM: E11.22, N18.1 ICD-9-CM: 250.40, 585.1 Essential hypertension     ICD-10-CM: I10 
ICD-9-CM: 401.9 Vitals BP Pulse Temp Resp Height(growth percentile) Weight(growth percentile) 142/81 (BP 1 Location: Left arm, BP Patient Position: Sitting) 79 99.1 °F (37.3 °C) (Oral) 18 5' 4\" (1.626 m) 248 lb (112.5 kg) LMP SpO2 BMI OB Status Smoking Status 11/03/2017 100% 42.57 kg/m2 Having regular periods Never Smoker Vitals History BMI and BSA Data Body Mass Index Body Surface Area 42.57 kg/m 2 2.25 m 2 Preferred Pharmacy Pharmacy Name Phone [facility] [address] [facility], [address] [phone] Your Updated Medication List  
  
   
This list is accurate as of: 11/8/17  2:13 PM.  Always use your most recent med list.  
  
  
  
  
 aspirin delayed-release 81 mg tablet Take  by mouth daily. atenolol 100 mg tablet Commonly known as:  TENORMIN  
TAKE ONE TABLET BY MOUTH ONCE DAILY  
  
 gabapentin 800 mg tablet Commonly known as:  NEURONTIN Take 1 Tab by mouth three (3) times daily. lisinopril 10 mg tablet Commonly known as:  PRINIVIL, ZESTRIL  
TAKE ONE TABLET BY MOUTH ONCE DAILY  
  
 meloxicam 15 mg tablet Commonly known as:  MOBIC  
TAKE ONE TABLET BY MOUTH ONCE DAILY  
  
 metFORMIN 1,000 mg tablet Commonly known as:  GLUCOPHAGE  
TAKE 1 TABLET BY MOUTH TWO (2) TIMES DAILY WITH MEALS Prescriptions Sent to Pharmacy Refills  
 gabapentin (NEURONTIN) 800 mg tablet 5 Sig: Take 1 Tab by mouth three (3) times daily. Class: Normal  
 Pharmacy: [facility] [address] [facility], [address] Ph #: [phone] Route: Oral  
  
We Performed the Following AMB POC URINALYSIS DIP STICK AUTO W/O MICRO [18722 CPT(R)] Follow-up Instructions Return in about 6 weeks (around 12/20/2017) for routine follow up. Introducing [facility] [place] [facility] SERVICES! Alexandria Barnhart introduces [employer] patient portal. Now you can access parts of your medical record, email your doctor's office, and request medication refills online. 1. In your internet browser, go to [email or web address] 2. Click on the First Time User? Click Here link in the Sign In box. You will see the New Member Sign Up page. 3. Enter your [employer] Access Code exactly as it appears below. You will not need to use this code after youve completed the sign-up process. If you do not sign up before the expiration date, you must request a new code. · [employer] Access Code: [email or web address] Expires: 1/11/2018 12:40 PM 
 
4. Enter the last four digits of your Social Security Number (xxxx) and Date of Birth (mm/dd/yyyy) as indicated and click Submit. You will be taken to the next sign-up page. 5. Create a [employer] ID. This will be your [employer] login ID and cannot be changed, so think of one that is secure and easy to remember. 6. Create a [employer] password. You can change your password at any time. 7. Enter your Password Reset Question and Answer. This can be used at a later time if you forget your password. 8. Enter your e-mail address. You will receive e-mail notification when new information is available in [address] 9. Click Sign Up. You can now view and download portions of your medical record. 10. Click the Download Summary menu link to download a portable copy of your medical information. If you have questions, please visit the Frequently Asked Questions section of the [employer] website. Remember, [employer] is NOT to be used for urgent needs. For medical emergencies, dial 911. Now available from your iPhone and Android! Please provide this summary of care documentation to your next provider. Your primary care clinician is listed as Michael Guardado. If you have any questions after today's visit, please call [phone].

## 2017-12-18 ENCOUNTER — OFFICE VISIT (OUTPATIENT)
Dept: INTERNAL MEDICINE CLINIC | Age: 52
End: 2017-12-18

## 2017-12-18 VITALS
BODY MASS INDEX: 42.34 KG/M2 | HEIGHT: 64 IN | OXYGEN SATURATION: 100 % | HEART RATE: 50 BPM | SYSTOLIC BLOOD PRESSURE: 161 MMHG | DIASTOLIC BLOOD PRESSURE: 78 MMHG | TEMPERATURE: 99.3 F | RESPIRATION RATE: 16 BRPM | WEIGHT: 248 LBS

## 2017-12-18 DIAGNOSIS — E11.42 DIABETIC POLYNEUROPATHY ASSOCIATED WITH TYPE 2 DIABETES MELLITUS (HCC): ICD-10-CM

## 2017-12-18 DIAGNOSIS — I10 ESSENTIAL HYPERTENSION: ICD-10-CM

## 2017-12-18 DIAGNOSIS — S83.91XA SPRAIN OF RIGHT KNEE, UNSPECIFIED LIGAMENT, INITIAL ENCOUNTER: Primary | ICD-10-CM

## 2017-12-18 DIAGNOSIS — F11.20 NARCOTIC DEPENDENCE, EPISODIC USE (HCC): ICD-10-CM

## 2017-12-18 PROBLEM — E66.01 OBESITY, MORBID (HCC): Status: ACTIVE | Noted: 2017-12-18

## 2017-12-18 PROBLEM — E11.21 TYPE 2 DIABETES MELLITUS WITH NEPHROPATHY (HCC): Status: ACTIVE | Noted: 2017-12-18

## 2017-12-18 RX ORDER — METOPROLOL SUCCINATE 50 MG/1
50 TABLET, EXTENDED RELEASE ORAL DAILY
Qty: 30 TAB | Refills: 5 | Status: SHIPPED | OUTPATIENT
Start: 2017-12-18 | End: 2018-02-05 | Stop reason: ALTCHOICE

## 2017-12-18 RX ORDER — OXYCODONE AND ACETAMINOPHEN 5; 325 MG/1; MG/1
1 TABLET ORAL
Qty: 20 TAB | Refills: 0 | Status: SHIPPED | OUTPATIENT
Start: 2017-12-18 | End: 2018-01-08 | Stop reason: ALTCHOICE

## 2017-12-18 NOTE — PROGRESS NOTES
Chief Complaint   Patient presents with    Foot Pain     R/foot follow up    Knee Injury     R/knee    fell on the ice     I have reviewed the patient's medical history in detail and updated the computerized patient record. Health Maintenance reviewed. 1. Have you been to the ER, urgent care clinic since your last visit? Hospitalized since your last visit?no    2. Have you seen or consulted any other health care providers outside of the [address] [place] [address] since your last visit? Include any pap smears or colon screening.  No    Encouraged pt to discuss pt's wishes with spouse/partner/family and bring them in the next appt to follow thru with the Advanced Directive

## 2017-12-18 NOTE — MR AVS SNAPSHOT
Visit Information Date & Time Provider Department Dept. Phone Encounter #  
 12/18/2017  1:45 PM Bryn Nickerson MD [place] [id number] [id number] Follow-up Instructions Return in about 2 months (around 2/18/2018). Upcoming Health Maintenance Date Due FOBT Q 1 YEAR AGE 50-75 12/28/2016 LIPID PANEL Q1 3/25/2017 MICROALBUMIN Q1 8/15/2017 PAP AKA CERVICAL CYTOLOGY 9/3/2017 EYE EXAM RETINAL OR DILATED Q1 10/25/2017 HEMOGLOBIN A1C Q6M 1/17/2018 FOOT EXAM Q1 11/8/2018 DTaP/Tdap/Td series (2 - Td) 3/13/2027 Allergies as of 12/18/2017  Review Complete On: 12/18/2017 By: Bryn Nickerson MD  
  
 Severity Noted Reaction Type Reactions Lortab [Hydrocodone-acetaminophen] High 08/11/2013   Intolerance Nausea and Vomiting Cymbalta [Duloxetine]  10/04/2016    Diarrhea Lyrica [Pregabalin]  03/13/2017    Drowsiness Current Immunizations  Reviewed on 12/13/2016 No immunizations on file. Not reviewed this visit You Were Diagnosed With   
  
 Codes Comments Sprain of right knee, unspecified ligament, initial encounter    -  Primary ICD-10-CM: S83. 91XA ICD-9-CM: 844.9 Diabetic polyneuropathy associated with type 2 diabetes mellitus ([address] [facility]     ICD-10-CM: E11.42 
ICD-9-CM: 250.60, 357.2 Essential hypertension     ICD-10-CM: I10 
ICD-9-CM: 401.9 Narcotic dependence, episodic use ([address]     ICD-10-CM: Y80.23 ICD-9-CM: 304.92 Vitals BP Pulse Temp Resp Height(growth percentile) Weight(growth percentile) 161/78 (BP 1 Location: Left arm, BP Patient Position: Sitting) (!) 50 99.3 °F (37.4 °C) (Oral) 16 5' 4\" (1.626 m) 248 lb (112.5 kg) LMP SpO2 BMI OB Status Smoking Status 12/03/2017 100% 42.57 kg/m2 Having regular periods Never Smoker Vitals History BMI and BSA Data Body Mass Index Body Surface Area 42.57 kg/m 2 2.25 m 2 Preferred Pharmacy Pharmacy Name Phone [facility] [address] [facility], [address] [facility] [phone] Your Updated Medication List  
  
   
This list is accurate as of: 12/18/17  2:43 PM.  Always use your most recent med list.  
  
  
  
  
 aspirin delayed-release 81 mg tablet Take  by mouth daily. gabapentin 800 mg tablet Commonly known as:  NEURONTIN Take 1 Tab by mouth three (3) times daily. lisinopril 10 mg tablet Commonly known as:  PRINIVIL, ZESTRIL  
TAKE ONE TABLET BY MOUTH ONCE DAILY  
  
 meloxicam 15 mg tablet Commonly known as:  MOBIC  
TAKE ONE TABLET BY MOUTH ONCE DAILY  
  
 metFORMIN 1,000 mg tablet Commonly known as:  GLUCOPHAGE  
TAKE 1 TABLET BY MOUTH TWO (2) TIMES DAILY WITH MEALS  
  
 metoprolol succinate 50 mg XL tablet Commonly known as:  TOPROL-XL Take 1 Tab by mouth daily. oxyCODONE-acetaminophen 5-325 mg per tablet Commonly known as:  PERCOCET Take 1 Tab by mouth every eight (8) hours as needed for Pain. Max Daily Amount: 3 Tabs. Prescriptions Printed Refills  
 oxyCODONE-acetaminophen (PERCOCET) 5-325 mg per tablet 0 Sig: Take 1 Tab by mouth every eight (8) hours as needed for Pain. Max Daily Amount: 3 Tabs. Class: Print Route: Oral  
  
Prescriptions Sent to Pharmacy Refills  
 metoprolol succinate (TOPROL-XL) 50 mg XL tablet 5 Sig: Take 1 Tab by mouth daily. Class: Normal  
 Pharmacy: [address], [address] [facility] Ph #: [phone] Route: Oral  
  
We Performed the Following AMB SUPPLY ORDER [[id number] Custom] Comments:  
 Right knee brace hinge Follow-up Instructions Return in about 2 months (around 2/18/2018). To-Do List   
 12/18/2017 Imaging:  XR KNEE RT MAX 2 VWS Patient Instructions We discussed this pain and the usage of controlled substances for knee injury relief.  In general these medications are indicated for the acute symptom relief and not for chronic usage, allthough they are frequently used for chronic management  After a certain period of time they should be stopped to avoid dependence and/or addiction. I warned her about the dangers of addiction and other side affects including respiratory depression and death. Discussed how this is a controlled substance and that the prescribing of it is should be monitored very carefully. Drug usage is also monitored by our practise and the [facility], since there has been a lot of abuse and diversion of controlled substances. In general we do not refill this medication over the phone. PLease ask for enough pills to get you to your next appointment and make sure you keep your appointments. Warned not to take other medications like alcohol, other benzodiazapines marijuana or other narcotics when on this medication. Introducing [facility] & HE[facility] SERVICES! Saniya Rucker introduces [employer] patient portal. Now you can access parts of your medical record, email your doctor's office, and request medication refills online. 1. In your internet browser, go to [email or web address] 2. Click on the First Time User? Click Here link in the Sign In box. You will see the New Member Sign Up page. 3. Enter your [employer] Access Code exactly as it appears below. You will not need to use this code after youve completed the sign-up process. If you do not sign up before the expiration date, you must request a new code. · [employer] Access Code: [email or web address] Expires: 1/11/2018 12:40 PM 
 
4. Enter the last four digits of your Social Security Number (xxxx) and Date of Birth (mm/dd/yyyy) as indicated and click Submit. You will be taken to the next sign-up page. 5. Create a Clewt ID. This will be your S[employer] login ID and cannot be changed, so think of one that is secure and easy to remember. 6. Create a Clewt password. You can change your password at any time. 7. Enter your Password Reset Question and Answer. This can be used at a later time if you forget your password. 8. Enter your e-mail address. You will receive e-mail notification when new information is available in [address] 9. Click Sign Up. You can now view and download portions of your medical record. 10. Click the Download Summary menu link to download a portable copy of your medical information. If you have questions, please visit the Frequently Asked Questions section of the [employer] website. Remember, [employer] is NOT to be used for urgent needs. For medical emergencies, dial 911. Now available from your iPhone and Android! Please provide this summary of care documentation to your next provider. Your primary care clinician is listed as Kadi Frank. If you have any questions after today's visit, please call [phone].

## 2017-12-18 NOTE — PROGRESS NOTES
HISTORY OF PRESENT ILLNESS  Shravan Schultz is a 49 y.o. female. Foot Pain   The history is provided by the patient. This is a chronic problem. The problem has not changed since onset. Associated symptoms comments: Both feet hurting from Dm neuropathy. Knee Injury    This is a new problem. The current episode started yesterday. The problem occurs constantly. The problem has not changed since onset. The pain is present in the right knee, right foot and left foot. The quality of the pain is described as constant. The pain is severe. The symptoms are aggravated by contact. There has been a history of trauma (slipped on ice and fell forward hitting right knee, left OK). She has not been to the emergency room for this. Did not hit her head. Next month is planning to go to pain management for her diabetic neuropathy pain. There are pain issues since he does not take her insurance. States no one has given her narcotic agents lately other than the occasional one by me. Blood sugars have been okay, last A1c was 7.6. No hypoglycemia. Allergies   Allergen Reactions    Lortab [Hydrocodone-Acetaminophen] Nausea and Vomiting    Cymbalta [Duloxetine] Diarrhea    Lyrica [Pregabalin] Drowsiness     Social History     Social History    Marital status:      Spouse name: N/A    Number of children: N/A    Years of education: N/A     Occupational History    disabled      Social History Main Topics    Smoking status: Never Smoker    Smokeless tobacco: Never Used    Alcohol use No    Drug use: No    Sexual activity: Not Currently     Other Topics Concern    Not on file     Social History Narrative    Lives with daughter. Not working. Applying for disabilty. Review of Systems   Musculoskeletal: Positive for falls and joint pain. Neurological: Negative for dizziness and loss of consciousness.        Physical Exam  Visit Vitals    /78 (BP 1 Location: Left arm, BP Patient Position: Sitting)    Pulse (!) 50    Temp 99.3 °F (37.4 °C) (Oral)    Resp 16    Ht 5' 4\" (1.626 m)    Wt 248 lb (112.5 kg)    LMP 12/03/2017    SpO2 100%    BMI 42.57 kg/m2     WD WN female NAD  Heart RRR without murmers clicks or rubs  Lungs CTA  Abdo soft nontender  Ext no edema  Right knee slightly reduced range of motion slight swelling. Generalized tenderness. ASSESSMENT and PLAN  Encounter Diagnoses   Name Primary?  Sprain of right knee, unspecified ligament, initial encounter Yes    Diabetic polyneuropathy associated with type 2 diabetes mellitus ([facility]     Essential hypertension     Narcotic dependence, episodic use ([facility]      Orders Placed This Encounter    AMB SUPPLY ORDER    XR KNEE RT MAX 2 VWS    metoprolol succinate (TOPROL-XL) 50 mg XL tablet    oxyCODONE-acetaminophen (PERCOCET) 5-325 mg per tablet       We discussed this pain and the usage of controlled substances for acute knee injury pain relief. In general these medications are indicated for the acute symptom relief and not for chronic usage, allthough they are frequently used for chronic management  After a certain period of time they should be stopped to avoid dependence and/or addiction. I warned her about the dangers of addiction and other side affects including respiratory depression and death. Discussed how this is a controlled substance and that the prescribing of it is should be monitored very carefully. Drug usage is also monitored by our practise and the [facility], since there has been a lot of abuse and diversion of controlled substances. In general we do not refill this medication over the phone. PLease ask for enough pills to get you to your next appointment and make sure you keep your appointments. Warned not to take other medications like alcohol, other benzodiazapines marijuana or other narcotics when on this medication. Patient looked up in . Will not refill narcotic agent unless the x-ray shows something like a fracture.   She will have to go to pain management to get narcotics for her diabetic neuropathy. Diabetes stable. Hypertension elevated because of knee pain. Follow-up Disposition:  Return in about 2 months (around 2/18/2018).

## 2017-12-18 NOTE — PATIENT INSTRUCTIONS
We discussed this pain and the usage of controlled substances for knee injury relief. In general these medications are indicated for the acute symptom relief and not for chronic usage, allthough they are frequently used for chronic management  After a certain period of time they should be stopped to avoid dependence and/or addiction. I warned her about the dangers of addiction and other side affects including respiratory depression and death. Discussed how this is a controlled substance and that the prescribing of it is should be monitored very carefully. Drug usage is also monitored by our practise and the [facility], since there has been a lot of abuse and diversion of controlled substances. In general we do not refill this medication over the phone. PLease ask for enough pills to get you to your next appointment and make sure you keep your appointments. Warned not to take other medications like alcohol, other benzodiazapines marijuana or other narcotics when on this medication.

## 2017-12-29 RX ORDER — MELOXICAM 15 MG/1
TABLET ORAL
Qty: 90 TAB | Refills: 1 | Status: SHIPPED | OUTPATIENT
Start: 2017-12-29 | End: 2018-02-05 | Stop reason: ALTCHOICE

## 2018-01-08 ENCOUNTER — OFFICE VISIT (OUTPATIENT)
Dept: INTERNAL MEDICINE CLINIC | Age: 53
End: 2018-01-08

## 2018-01-08 VITALS
HEIGHT: 64 IN | TEMPERATURE: 98.6 F | DIASTOLIC BLOOD PRESSURE: 88 MMHG | SYSTOLIC BLOOD PRESSURE: 138 MMHG | RESPIRATION RATE: 18 BRPM | OXYGEN SATURATION: 100 % | BODY MASS INDEX: 42.34 KG/M2 | WEIGHT: 248 LBS | HEART RATE: 58 BPM

## 2018-01-08 DIAGNOSIS — E11.40 TYPE 2 DIABETES MELLITUS WITH DIABETIC NEUROPATHY, WITHOUT LONG-TERM CURRENT USE OF INSULIN (HCC): ICD-10-CM

## 2018-01-08 DIAGNOSIS — S91.101D OPEN WOUND OF GREAT TOE, RIGHT, SUBSEQUENT ENCOUNTER: ICD-10-CM

## 2018-01-08 DIAGNOSIS — I10 ESSENTIAL HYPERTENSION: ICD-10-CM

## 2018-01-08 DIAGNOSIS — M25.552 LEFT HIP PAIN: Primary | ICD-10-CM

## 2018-01-08 LAB
BILIRUB UR QL STRIP: NEGATIVE
GLUCOSE UR-MCNC: NORMAL MG/DL
KETONES P FAST UR STRIP-MCNC: NEGATIVE MG/DL
PH UR STRIP: 5.5 [PH] (ref 4.6–8)
PROT UR QL STRIP: NORMAL
SP GR UR STRIP: 1.01 (ref 1–1.03)
UA UROBILINOGEN AMB POC: NORMAL (ref 0.2–1)
URINALYSIS CLARITY POC: CLEAR
URINALYSIS COLOR POC: NORMAL
URINE BLOOD POC: NORMAL
URINE LEUKOCYTES POC: NEGATIVE
URINE NITRITES POC: NEGATIVE

## 2018-01-08 RX ORDER — OXYCODONE AND ACETAMINOPHEN 5; 325 MG/1; MG/1
1 TABLET ORAL
Qty: 10 TAB | Refills: 0 | Status: SHIPPED | OUTPATIENT
Start: 2018-01-08 | End: 2018-02-05 | Stop reason: ALTCHOICE

## 2018-01-08 NOTE — PROGRESS NOTES
HISTORY OF PRESENT ILLNESS  Ramana Davis is a 47 y.o. female. Hip Pain    The history is provided by the patient. This is a new problem. Episode onset: 3 days. The problem occurs constantly. The problem has not changed since onset. The pain is present in the left hip. The quality of the pain is described as aching and constant. The pain is severe. Pertinent negatives include no back pain. Associated symptoms comments: Rads to abdo. She is a frequent user of narcotic agents, usually for the pain in her feet although we no longer prescribe narcotics for that. Waiting to get into pain management. Not really complaining of those currently, the hips bothering her much more. No trauma, no fever. Last A1c was 7.6. Past Medical History:   Diagnosis Date    Anemia     CAD (coronary artery disease)     Chronic pain     Cyst in hand 2014    [address]    Diabetes mellitus type 2 in obese ([address]     Diabetic neuropathy ([address]     Hypertension     Migraine      Social History     Social History    Marital status:      Spouse name: N/A    Number of children: N/A    Years of education: N/A     Occupational History    disabled      Social History Main Topics    Smoking status: Never Smoker    Smokeless tobacco: Never Used    Alcohol use No    Drug use: No    Sexual activity: Not Currently     Other Topics Concern    Not on file     Social History Narrative    Lives with daughter. Not working. Applying for disabilty. Use diabetes as opposed to diabetic neuropathy so her disability was denied. She is reapplying and will need from me. Review of Systems   Constitutional: Negative for fever. Genitourinary: Negative for dysuria and hematuria. Musculoskeletal: Negative for back pain and falls.        Physical Exam  Visit Vitals    /88 (BP 1 Location: Left arm, BP Patient Position: Sitting)    Pulse (!) 58    Temp 98.6 °F (37 °C) (Oral)    Resp 18    Ht 5' 4\" (1.626 m)    Wt 248 lb (112.5 kg)    LMP 11/03/2017    SpO2 100%    BMI 42.57 kg/m2     WD WN female NAD  Heart RRR without murmers clicks or rubs  Lungs CTA  Abdo soft nontender  Ext no edema  Hip examined no obvious rash redness or swelling. ASSESSMENT and PLAN  Encounter Diagnoses   Name Primary?  Left hip pain Yes    Type 2 diabetes mellitus with diabetic neuropathy, without long-term current use of insulin (HCC)     Essential hypertension     Open wound of great toe, right, subsequent encounter      Orders Placed This Encounter    XR HIP LT W OR WO PELV 2-3 VWS    MICROALBUMIN, UR, RAND W/ MICROALBUMIN/CREA RATIO    REFERRAL TO OPTOMETRY    AMB POC URINALYSIS DIP STICK AUTO W/O MICRO    oxyCODONE-acetaminophen (PERCOCET) 5-325 mg per tablet   Most likely some arthritis, brief narcotic usage only. We discussed this pain and the usage of controlled substances for arthritis pain relief. In general these medications are indicated for the acute symptom relief and not for chronic usage, allthough they are frequently used for chronic management  After a certain period of time they should be stopped to avoid dependence and/or addiction. I warned her about the dangers of addiction and other side affects including respiratory depression and death. Discussed how this is a controlled substance and that the prescribing of it is should be monitored very carefully. Drug usage is also monitored by our practise and the [facility], since there has been a lot of abuse and diversion of controlled substances. In general we do not refill this medication over the phone. PLease ask for enough pills to get you to your next appointment and make sure you keep your appointments. Warned not to take other medications like alcohol, other benzodiazapines marijuana or other narcotics when on this medication. Diabetes stable.  run    Follow-up Disposition:  Return in about 4 weeks (around 2/5/2018) for routine follow up.

## 2018-01-08 NOTE — MR AVS SNAPSHOT
Visit Information Date & Time Provider Department Dept. Phone Encounter #  
 1/8/2018  1:30 PM Tami Bailey MD [facility] [place] [id number] Follow-up Instructions Return in about 4 weeks (around 2/5/2018) for routine follow up. Your Appointments 2/20/2018  1:00 PM  
ROUTINE CARE with Tami Bailey MD  
[facility] [address] [facility]) Appt Note: f/u on dm $0 cp lsh 12/18/17  
 [address] [place] [address] Road. [address] [place] [facility] [address]  
[phone]  
  
   
 [address] [place] [address] [address] Upcoming Health Maintenance Date Due FOBT Q 1 YEAR AGE 50-75 12/28/2016 LIPID PANEL Q1 3/25/2017 MICROALBUMIN Q1 8/15/2017 PAP AKA CERVICAL CYTOLOGY 9/3/2017 EYE EXAM RETINAL OR DILATED Q1 10/25/2017 HEMOGLOBIN A1C Q6M 1/17/2018 FOOT EXAM Q1 11/8/2018 BREAST CANCER SCRN MAMMOGRAM 3/22/2019 DTaP/Tdap/Td series (2 - Td) 3/13/2027 Allergies as of 1/8/2018  Review Complete On: 1/8/2018 By: Tami Bailey MD  
  
 Severity Noted Reaction Type Reactions Lortab [Hydrocodone-acetaminophen] High 08/11/2013   Intolerance Nausea and Vomiting Cymbalta [Duloxetine]  10/04/2016    Diarrhea Lyrica [Pregabalin]  03/13/2017    Drowsiness Current Immunizations  Reviewed on 12/13/2016 No immunizations on file. Not reviewed this visit You Were Diagnosed With   
  
 Codes Comments Left hip pain    -  Primary ICD-10-CM: H09.669 ICD-9-CM: 719.45 Type 2 diabetes mellitus with diabetic neuropathy, without long-term current use of insulin (HCC)     ICD-10-CM: E11.40 ICD-9-CM: 250.60, 357.2 Essential hypertension     ICD-10-CM: I10 
ICD-9-CM: 401.9 Open wound of great toe, right, subsequent encounter     ICD-10-CM: S91.101D ICD-9-CM: V58.89, 893.0 Vitals BP Pulse Temp Resp Height(growth percentile) Weight(growth percentile) 138/88 (BP 1 Location: Left arm, BP Patient Position: Sitting) (!) 58 98.6 °F (37 °C) (Oral) 18 5' 4\" (1.626 m) 248 lb (112.5 kg) LMP SpO2 BMI OB Status Smoking Status 11/03/2017 100% 42.57 kg/m2 Having regular periods Never Smoker BMI and BSA Data Body Mass Index Body Surface Area 42.57 kg/m 2 2.25 m 2 Preferred Pharmacy Pharmacy Name Phone [facility] [address] [facility] [address] [facility] [phone] Your Updated Medication List  
  
   
This list is accurate as of: 1/8/18  2:57 PM.  Always use your most recent med list.  
  
  
  
  
 aspirin delayed-release 81 mg tablet Take  by mouth daily. gabapentin 800 mg tablet Commonly known as:  NEURONTIN Take 1 Tab by mouth three (3) times daily. lisinopril 10 mg tablet Commonly known as:  PRINIVIL, ZESTRIL  
TAKE ONE TABLET BY MOUTH ONCE DAILY  
  
 meloxicam 15 mg tablet Commonly known as:  MOBIC  
TAKE ONE TABLET BY MOUTH ONCE DAILY  
  
 metFORMIN 1,000 mg tablet Commonly known as:  GLUCOPHAGE  
TAKE 1 TABLET BY MOUTH TWO (2) TIMES DAILY WITH MEALS  
  
 metoprolol succinate 50 mg XL tablet Commonly known as:  TOPROL-XL Take 1 Tab by mouth daily. oxyCODONE-acetaminophen 5-325 mg per tablet Commonly known as:  PERCOCET Take 1 Tab by mouth every eight (8) hours as needed for Pain. Max Daily Amount: 3 Tabs. Prescriptions Printed Refills  
 oxyCODONE-acetaminophen (PERCOCET) 5-325 mg per tablet 0 Sig: Take 1 Tab by mouth every eight (8) hours as needed for Pain. Max Daily Amount: 3 Tabs. Class: Print Route: Oral  
  
We Performed the Following AMB POC URINALYSIS DIP STICK AUTO W/O MICRO [19770 CPT(R)] MICROALBUMIN, UR, RAND W/ MICROALBUMIN/CREA RATIO [id number] CPT(R)] REFERRAL TO OPTOMETRY [id number] Custom] Follow-up Instructions Return in about 4 weeks (around 2/5/2018) for routine follow up.   
  
To-Do List   
 01/08/2018 Imaging:  XR HIP LT W OR WO PELV 2-3 VWS Referral Information Referral ID Referred By Referred To  
  
 [id number] Lalitha Navas, [address] [facility] [address] Phone: [phone] Fax: [phone] Visits Status Start Date End Date 1 New Request 1/8/18 1/8/19 If your referral has a status of pending review or denied, additional information will be sent to support the outcome of this decision. Introducing [facility] & HEALTH SERVICES! Xiao Franklin introduces [employer] patient portal. Now you can access parts of your medical record, email your doctor's office, and request medication refills online. 1. In your internet browser, go to [email or web address] 2. Click on the First Time User? Click Here link in the Sign In box. You will see the New Member Sign Up page. 3. Enter your [employer] Access Code exactly as it appears below. You will not need to use this code after youve completed the sign-up process. If you do not sign up before the expiration date, you must request a new code. · [employer] Access Code: [email or web address] Expires: 1/11/2018 12:40 PM 
 
4. Enter the last four digits of your Social Security Number (xxxx) and Date of Birth (mm/dd/yyyy) as indicated and click Submit. You will be taken to the next sign-up page. 5. Create a NextEnergy ID. This will be your [employer] login ID and cannot be changed, so think of one that is secure and easy to remember. 6. Create a [employer]Energy password. You can change your password at any time. 7. Enter your Password Reset Question and Answer. This can be used at a later time if you forget your password. 8. Enter your e-mail address. You will receive e-mail notification when new information is available in [address] 9. Click Sign Up. You can now view and download portions of your medical record.  
10. Click the Download Summary menu link to download a portable copy of your medical information. If you have questions, please visit the Frequently Asked Questions section of the [employer] website. Remember, [employer] is NOT to be used for urgent needs. For medical emergencies, dial 911. Now available from your iPhone and Android! Please provide this summary of care documentation to your next provider. Your primary care clinician is listed as Kimberly Soriano. If you have any questions after today's visit, please call [phone].

## 2018-01-08 NOTE — PROGRESS NOTES
Chief Complaint   Patient presents with    Hip Pain     low back and L/hip pain     I have reviewed the patient's medical history in detail and updated the computerized patient record. Health Maintenance reviewed. 1. Have you been to the ER, urgent care clinic since your last visit? Hospitalized since your last visit?no    2. Have you seen or consulted any other health care providers outside of the [address] [place] [address] [place] since your last visit? Include any pap smears or colon screening. No    Encouraged pt to discuss pt's wishes with spouse/partner/family and bring them in the next appt to follow thru with the Advanced Directive    Fall Risk Assessment, last 12 mths 1/8/2018   Able to walk? Yes   Fall in past 12 months? Yes   Fall with injury? Yes   Number of falls in past 12 months 2   Fall Risk Score 3       PHQ over the last two weeks 1/8/2018   Little interest or pleasure in doing things Several days   Feeling down, depressed or hopeless Several days   Total Score PHQ 2 2       Abuse Screening Questionnaire 1/8/2018   Do you ever feel afraid of your partner? N   Are you in a relationship with someone who physically or mentally threatens you? N   Is it safe for you to go home?  Y       ADL Assessment 1/8/2018   Feeding yourself No Help Needed   Getting from bed to chair No Help Needed   Getting dressed No Help Needed   Bathing or showering No Help Needed   Walk across the room (includes cane/walker) No Help Needed   Using the telphone No Help Needed   Taking your medications No Help Needed   Preparing meals No Help Needed   Managing money (expenses/bills) No Help Needed   Moderately strenuous housework (laundry) No Help Needed   Shopping for personal items (toiletries/medicines) No Help Needed   Shopping for groceries No Help Needed   Driving No Help Needed   Climbing a flight of stairs No Help Needed   Getting to places beyond walking distances No Help Needed

## 2018-01-12 ENCOUNTER — TELEPHONE (OUTPATIENT)
Dept: INTERNAL MEDICINE CLINIC | Age: 53
End: 2018-01-12

## 2018-01-12 NOTE — TELEPHONE ENCOUNTER
Pt would like to know the name of the most recent bp and heart medicine she was given. Please call her at [phone].

## 2018-01-23 ENCOUNTER — TELEPHONE (OUTPATIENT)
Dept: INTERNAL MEDICINE CLINIC | Age: 53
End: 2018-01-23

## 2018-01-23 NOTE — TELEPHONE ENCOUNTER
----- Message from [facility] sent at 1/23/2018 10:44 AM EST -----  Regarding: Dr. Anna Marie Montalvo, from [facility] [phone], is requesting last office notes/visit to pertaining to her heart.   Pt appt  Is on 1/24 at 11am.

## 2018-01-23 NOTE — TELEPHONE ENCOUNTER
Called and notified Zachary Floyd that we dont have any notes pertaining to the patients heart condition - this is a new problem for her - will be glad to send any other records we might have though  Hiram Philip LPN  3/51/3099  8:26 PM

## 2018-02-02 ENCOUNTER — DOCUMENTATION ONLY (OUTPATIENT)
Dept: INTERNAL MEDICINE CLINIC | Age: 53
End: 2018-02-02

## 2018-02-02 ENCOUNTER — TELEPHONE (OUTPATIENT)
Dept: INTERNAL MEDICINE CLINIC | Age: 53
End: 2018-02-02

## 2018-02-02 DIAGNOSIS — M79.2 NEUROPATHIC PAIN: Primary | ICD-10-CM

## 2018-02-02 NOTE — TELEPHONE ENCOUNTER
Deniz Cesar, from [facility], was returning call to [place]. He wanted to make sure that the one that needs to be ordered is the rollator with the seat, breaks, and basket. Please call him at [phone].

## 2018-02-02 NOTE — TELEPHONE ENCOUNTER
Yao Bianchi, from [facility], called in reference to wanting pt to have a rollator rolling walker. Please call him at [phone].

## 2018-02-02 NOTE — PROGRESS NOTES
Arielle [facility] called in ref to see if pt saw dr Sridevi Perea on 2/1/18 adv pt has an upcoming appt on 2/6 for a george maynard f/u sttd that they were unable to reach pt but will try again tomorrow

## 2018-02-05 ENCOUNTER — OFFICE VISIT (OUTPATIENT)
Dept: INTERNAL MEDICINE CLINIC | Age: 53
End: 2018-02-05

## 2018-02-05 VITALS
RESPIRATION RATE: 16 BRPM | SYSTOLIC BLOOD PRESSURE: 124 MMHG | HEART RATE: 60 BPM | DIASTOLIC BLOOD PRESSURE: 67 MMHG | OXYGEN SATURATION: 95 % | TEMPERATURE: 97.6 F | WEIGHT: 245 LBS | BODY MASS INDEX: 41.83 KG/M2 | HEIGHT: 64 IN

## 2018-02-05 DIAGNOSIS — E11.42 DIABETIC POLYNEUROPATHY ASSOCIATED WITH TYPE 2 DIABETES MELLITUS (HCC): ICD-10-CM

## 2018-02-05 DIAGNOSIS — R10.31 GROIN PAIN, RIGHT: ICD-10-CM

## 2018-02-05 DIAGNOSIS — I50.21 ACUTE SYSTOLIC CONGESTIVE HEART FAILURE (HCC): Primary | ICD-10-CM

## 2018-02-05 DIAGNOSIS — N18.2 ACUTE RENAL FAILURE SUPERIMPOSED ON STAGE 2 CHRONIC KIDNEY DISEASE, UNSPECIFIED ACUTE RENAL FAILURE TYPE (HCC): ICD-10-CM

## 2018-02-05 DIAGNOSIS — N17.9 ACUTE RENAL FAILURE SUPERIMPOSED ON STAGE 2 CHRONIC KIDNEY DISEASE, UNSPECIFIED ACUTE RENAL FAILURE TYPE (HCC): ICD-10-CM

## 2018-02-05 DIAGNOSIS — F11.20 NARCOTIC DEPENDENCE, EPISODIC USE (HCC): ICD-10-CM

## 2018-02-05 RX ORDER — OXYCODONE AND ACETAMINOPHEN 5; 325 MG/1; MG/1
1 TABLET ORAL
Qty: 12 TAB | Refills: 0 | Status: SHIPPED | OUTPATIENT
Start: 2018-02-05 | End: 2018-02-12 | Stop reason: SDUPTHER

## 2018-02-05 RX ORDER — INSULIN GLARGINE 100 [IU]/ML
40 INJECTION, SOLUTION SUBCUTANEOUS
COMMUNITY
End: 2018-02-12 | Stop reason: SDUPTHER

## 2018-02-05 RX ORDER — LANOLIN ALCOHOL/MO/W.PET/CERES
500 CREAM (GRAM) TOPICAL DAILY
COMMUNITY
End: 2018-02-13 | Stop reason: SDUPTHER

## 2018-02-05 RX ORDER — BUMETANIDE 2 MG/1
2 TABLET ORAL DAILY
COMMUNITY
End: 2018-03-19 | Stop reason: ALTCHOICE

## 2018-02-05 RX ORDER — ATORVASTATIN CALCIUM 40 MG/1
TABLET, FILM COATED ORAL DAILY
COMMUNITY
End: 2018-02-26 | Stop reason: SDUPTHER

## 2018-02-05 RX ORDER — FOLIC ACID 1 MG/1
TABLET ORAL DAILY
COMMUNITY
End: 2018-02-26 | Stop reason: SDUPTHER

## 2018-02-05 RX ORDER — CEPHALEXIN 500 MG/1
500 CAPSULE ORAL 2 TIMES DAILY
Qty: 21 CAP | Refills: 0 | Status: SHIPPED | OUTPATIENT
Start: 2018-02-05 | End: 2018-02-12 | Stop reason: ALTCHOICE

## 2018-02-05 RX ORDER — CARVEDILOL 6.25 MG/1
12.5 TABLET ORAL 2 TIMES DAILY
COMMUNITY
End: 2019-05-16 | Stop reason: SDUPTHER

## 2018-02-05 RX ORDER — POLYETHYLENE GLYCOL 3350 17 G/17G
17 POWDER, FOR SOLUTION ORAL DAILY
Qty: 30 PACKET | Refills: 1 | Status: SHIPPED | OUTPATIENT
Start: 2018-02-05 | End: 2018-07-25 | Stop reason: ALTCHOICE

## 2018-02-05 NOTE — MR AVS SNAPSHOT
[address] [facility] 
[phone] Patient: Hafsa Dyer MRN: [id number] :1965 Visit Information Date & Time Provider Department Dept. Phone Encounter #  
 2018  2:30 PM Светлана Bauer MD [address] [facility] [phone] [id number] Follow-up Instructions Return in about 1 week (around 2018) for routine follow up. Your Appointments 2018  1:00 PM  
ROUTINE CARE with Светлана Bauer MD  
[facility]) Appt Note: f/u on dm $0 cp lsh 17  
 [address] [place] [address]. [address] [place] [address]  
[phone]  
  
   
 [address] [place] [address] [address] Upcoming Health Maintenance Date Due FOBT Q 1 YEAR AGE 50-75 2016 LIPID PANEL Q1 3/25/2017 MICROALBUMIN Q1 8/15/2017 PAP AKA CERVICAL CYTOLOGY 9/3/2017 EYE EXAM RETINAL OR DILATED Q1 10/25/2017 HEMOGLOBIN A1C Q6M 2018 FOOT EXAM Q1 2018 BREAST CANCER SCRN MAMMOGRAM 3/22/2019 DTaP/Tdap/Td series (2 - Td) 3/13/2027 Allergies as of 2018  Review Complete On: 2018 By: Amaya Reyes LPN Severity Noted Reaction Type Reactions Lortab [Hydrocodone-acetaminophen] High 2013   Intolerance Nausea and Vomiting Cymbalta [Duloxetine]  10/04/2016    Diarrhea Lasix [Furosemide]  2018    Swelling Lyrica [Pregabalin]  2017    Drowsiness Current Immunizations  Reviewed on 2016 No immunizations on file. Not reviewed this visit You Were Diagnosed With   
  
 Codes Comments Acute systolic congestive heart failure ([facility]    -  Primary ICD-10-CM: I50.21 ICD-9-CM: 428.21, 428.0 Acute renal failure superimposed on stage 2 chronic kidney disease, unspecified acute renal failure type ([facility]     ICD-10-CM: N17.9, N18.2 ICD-9-CM: 584.9, 585.2 Groin pain, right     ICD-10-CM: R10.31 ICD-9-CM: 789.09 Diabetic polyneuropathy associated with type 2 diabetes mellitus ([facility]     ICD-10-CM: E11.42 
ICD-9-CM: 250.60, 357.2 Narcotic dependence, episodic use ([facility]     ICD-10-CM: H95.70 ICD-9-CM: 304.92 Vitals BP Pulse Temp Resp Height(growth percentile) Weight(growth percentile) 124/67 (BP 1 Location: Left arm, BP Patient Position: Sitting) 60 97.6 °F (36.4 °C) (Oral) 16 5' 4\" (1.626 m) 245 lb (111.1 kg) SpO2 BMI OB Status Smoking Status 95% 42.05 kg/m2 Having regular periods Never Smoker Vitals History BMI and BSA Data Body Mass Index Body Surface Area 42.05 kg/m 2 2.24 m 2 Preferred Pharmacy Pharmacy Name Phone [facility] 9 [facility] [phone] Your Updated Medication List  
  
   
This list is accurate as of: 2/5/18  4:16 PM.  Always use your most recent med list.  
  
  
  
  
 aspirin delayed-release 81 mg tablet Take  by mouth daily. atorvastatin 40 mg tablet Commonly known as:  LIPITOR Take  by mouth daily. bumetanide 2 mg tablet Commonly known as:  Velna Cocoa Take 2 mg by mouth daily. carvedilol 6.25 mg tablet Commonly known as:  Carmenza Jacob Take  by mouth two (2) times daily (with meals). cephALEXin 500 mg capsule Commonly known as:  Yazmin Sheets Take 1 Cap by mouth two (2) times a day for 7 days. folic acid 1 mg tablet Commonly known as:  Google Take  by mouth daily. gabapentin 800 mg tablet Commonly known as:  NEURONTIN Take 1 Tab by mouth three (3) times daily. LANTUS SOLOSTAR 100 unit/mL (3 mL) Inpn Generic drug:  insulin glargine 40 Units by SubCUTAneous route nightly. oxyCODONE-acetaminophen 5-325 mg per tablet Commonly known as:  PERCOCET Take 1 Tab by mouth every eight (8) hours as needed for Pain. Max Daily Amount: 3 Tabs. polyethylene glycol 17 gram packet Commonly known as:  Mevelyn Mall Take 1 Packet by mouth daily. VITAMIN B-12 500 mcg tablet Generic drug:  cyanocobalamin Take 500 mcg by mouth daily. Prescriptions Printed Refills  
 oxyCODONE-acetaminophen (PERCOCET) 5-325 mg per tablet 0 Sig: Take 1 Tab by mouth every eight (8) hours as needed for Pain. Max Daily Amount: 3 Tabs. Class: Print Route: Oral  
  
Prescriptions Sent to Pharmacy Refills  
 cephALEXin (KEFLEX) 500 mg capsule 0 Sig: Take 1 Cap by mouth two (2) times a day for 7 days. Class: Normal  
 Pharmacy: [facility] DR SYLVESTER REYES [address] [place] [address], [address] [facility] Ph #: [phone] Route: Oral  
 polyethylene glycol (MIRALAX) 17 gram packet 1 Sig: Take 1 Packet by mouth daily. Class: Normal  
 Pharmacy: [facility] DR SYLVESTER REYES [address] [place] [address], [facility] Ph #: [phone] Route: Oral  
  
We Performed the Following REFERRAL TO CARDIOLOGY [AIT83 Custom] Comments: F/U CHF  
 REFERRAL TO NEPHROLOGY [QUU11 Custom] Follow-up Instructions Return in about 1 week (around 2/12/2018) for routine follow up. Referral Information Referral ID Referred By Referred To  
  
 [id number] Dillon Melgoza MD   
   [address] [place] [address], [address] Phone: [phone] Fax: [phone] Visits Status Start Date End Date 1 New Request 2/5/18 2/5/19 If your referral has a status of pending review or denied, additional information will be sent to support the outcome of this decision. Referral ID Referred By Referred To  
 [id number] Kenton MIGUEL Officer, MD  
   [address] [facility], [address] [place] [address] Road Phone: [phone] Fax: [phone] Visits Status Start Date End Date 1 New Request 2/5/18 2/5/19  If your referral has a status of pending review or denied, additional information will be sent to support the outcome of this decision. Introducing [facility]! Alvarez Rojas introduces [employer] patient portal. Now you can access parts of your medical record, email your doctor's office, and request medication refills online. 1. In your internet browser, go to [email or web address] 2. Click on the First Time User? Click Here link in the Sign In box. You will see the New Member Sign Up page. 3. Enter your [employer] Access Code exactly as it appears below. You will not need to use this code after youve completed the sign-up process. If you do not sign up before the expiration date, you must request a new code. · [employer] Access Code: [email or web address] Expires: 5/6/2018  2:38 PM 
 
4. Enter the last four digits of your Social Security Number (xxxx) and Date of Birth (mm/dd/yyyy) as indicated and click Submit. You will be taken to the next sign-up page. 5. Create a [employer] ID. This will be your [employer] login ID and cannot be changed, so think of one that is secure and easy to remember. 6. Create a [employer] password. You can change your password at any time. 7. Enter your Password Reset Question and Answer. This can be used at a later time if you forget your password. 8. Enter your e-mail address. You will receive e-mail notification when new information is available in [address] 9. Click Sign Up. You can now view and download portions of your medical record. 10. Click the Download Summary menu link to download a portable copy of your medical information. If you have questions, please visit the Frequently Asked Questions section of the [employer] website. Remember, [employer] is NOT to be used for urgent needs. For medical emergencies, dial 911. Now available from your iPhone and Android! Please provide this summary of care documentation to your next provider. Your primary care clinician is listed as Gaviota Bean.  If you have any questions after today's visit, please call [phone].

## 2018-02-06 ENCOUNTER — TELEPHONE (OUTPATIENT)
Dept: INTERNAL MEDICINE CLINIC | Age: 53
End: 2018-02-06

## 2018-02-06 RX ORDER — INSULIN ASPART 100 [IU]/ML
7 INJECTION, SOLUTION INTRAVENOUS; SUBCUTANEOUS
Qty: 10 ML | Refills: 1 | Status: SHIPPED | OUTPATIENT
Start: 2018-02-06 | End: 2018-02-19 | Stop reason: ALTCHOICE

## 2018-02-06 NOTE — TELEPHONE ENCOUNTER
Pt called in reference to needing her novolog 7 units 3 times a day called in. She would like to pick it up this evening.

## 2018-02-07 PROBLEM — I50.21 ACUTE SYSTOLIC CONGESTIVE HEART FAILURE (HCC): Status: ACTIVE | Noted: 2018-02-07

## 2018-02-07 PROBLEM — N18.9 RENAL FAILURE (ARF), ACUTE ON CHRONIC (HCC): Status: ACTIVE | Noted: 2018-02-07

## 2018-02-07 PROBLEM — N17.9 RENAL FAILURE (ARF), ACUTE ON CHRONIC (HCC): Status: ACTIVE | Noted: 2018-02-07

## 2018-02-07 NOTE — PROGRESS NOTES
Subjective:     Rachel Mcconnell is an 47 y.o. female who is seen for follow up of CHF. Here with her daughter. She has diabetes, hypertension and CHF. We have no records from her hospitalization, no discharge summary. States was having some issues with her breathing and went to the emergency room over at [facility].  She was diagnosis having congestive heart failure and treated with Lasix. States that Lasix started causing problems with her kidneys and she went back to the hospital and was subsequently admitted. Was found to have both congestive heart failure and renal failure. Currently under the care of both cardiology and nephrology. Spent about a week or so in the hospital prior to discharge. Underwent a procedure to examine the heart, sounds like a cardiac cath. Not sure of the results. Complains of pain and irritation where the cardiac cath was done. Has her usual bilateral lower diabetic neuropathy foot pain. She did not require dialysis and her renal failure resolved on its own. It was was attributed to the Lasix, tolerating Bumex. She has a follow-up with her nephrologist at [facility]. During hospitalization she was given narcotics for pain issues. Subsequently became constipated and had to return with those issues. That did improve with laxatives. During hospitalization, she was taken off her metformin with a kidney failure and was placed on insulin. Was prescribed to insulins, short and long-acting. Was able to get and is taking the long-acting, Lantus. Not been able to get the short acting 1 and is not sure the name. Blood sugars have not been so great mid 200s. Diet and Lifestyle: nonsmoker. Weight monitoring: has been stable    Cardiovascular System Review  Cardiovascular ROS - taking medications as instructed, no medication side effects noted, no chest pain on exertion, notes stable dyspnea on exertion, no change, no swelling of ankles.     Patient Active Problem List    Diagnosis Date Noted    Type 2 diabetes mellitus with diabetic neuropathy ([facility] 01/08/2018    Obesity, morbid ([facility] 12/18/2017    Type 2 diabetes mellitus with nephropathy ([facility] 12/18/2017    Spinal stenosis of lumbosacral region 12/08/2016    Lumbar back pain with radiculopathy affecting left lower extremity 12/08/2016    Lumbar back pain with radiculopathy affecting right lower extremity 12/08/2016    Idiopathic small and large fiber sensory neuropathy 11/16/2016    Chronic inflammatory demyelinating polyneuropathy ([facility] 11/16/2016    Diabetic polyneuropathy associated with type 2 diabetes mellitus ([facility] 06/15/2016    Diabetes mellitus type 2, controlled ([facility] 05/24/2016    Narcotic dependence, episodic use (HCC) 04/29/2016    Chronic pain     Hypertension 11/07/2012     Current Outpatient Prescriptions   Medication Sig Dispense Refill    bumetanide (BUMEX) 2 mg tablet Take 2 mg by mouth daily.  carvedilol (COREG) 6.25 mg tablet Take  by mouth two (2) times daily (with meals).  atorvastatin (LIPITOR) 40 mg tablet Take  by mouth daily.  folic acid (FOLVITE) 1 mg tablet Take  by mouth daily.  cyanocobalamin (VITAMIN B-12) 500 mcg tablet Take 500 mcg by mouth daily.  insulin glargine (LANTUS SOLOSTAR) 100 unit/mL (3 mL) inpn 40 Units by SubCUTAneous route nightly.  cephALEXin (KEFLEX) 500 mg capsule Take 1 Cap by mouth two (2) times a day for 7 days. 21 Cap 0    polyethylene glycol (MIRALAX) 17 gram packet Take 1 Packet by mouth daily. 30 Packet 1    oxyCODONE-acetaminophen (PERCOCET) 5-325 mg per tablet Take 1 Tab by mouth every eight (8) hours as needed for Pain. Max Daily Amount: 3 Tabs. 12 Tab 0    aspirin delayed-release 81 mg tablet Take  by mouth daily.  insulin aspart (NOVOLOG) 100 unit/mL injection 7 Units by SubCUTAneous route Before breakfast, lunch, and dinner.  Indications: type 2 diabetes mellitus 10 mL 1    gabapentin (NEURONTIN) 800 mg tablet Take 1 Tab by mouth three (3) times daily. (Patient taking differently: Take 400 mg by mouth.) 90 Tab 5     Allergies   Allergen Reactions    Lortab [Hydrocodone-Acetaminophen] Nausea and Vomiting    Cymbalta [Duloxetine] Diarrhea    Lasix [Furosemide] Swelling    Lyrica [Pregabalin] Drowsiness     Social History   Substance Use Topics    Smoking status: Never Smoker    Smokeless tobacco: Never Used    Alcohol use No             Review of Systems, additional:  Pertinent items are noted in HPI. Objective:     Visit Vitals    /67 (BP 1 Location: Left arm, BP Patient Position: Sitting)    Pulse 60    Temp 97.6 °F (36.4 °C) (Oral)    Resp 16    Ht 5' 4\" (1.626 m)    Wt 245 lb (111.1 kg)    SpO2 95%    BMI 42.05 kg/m2     General:  fatigued, cooperative, no distress, appears older than stated age   Neck: no JVD   Chest Wall: inspection normal - no chest wall deformities or tenderness, respiratory effort normal   Lung: clear to auscultation bilaterally   Heart:  normal rate, regular rhythm, normal S1, S2, no murmurs, rubs, clicks or gallops   Abdomen: soft, non-tender. Bowel sounds normal. No masses,  no organomegaly   Extremities: extremities normal, atraumatic, no cyanosis or edema   Groin area where she complains of pain on the right, where catheter was placed may be a little swollen no obvious redness. It is tender. Feet as usual unremarkable appearing. Lab review: no lab studies available for review at time of visit. Assessment/Plan:       ICD-10-CM ICD-9-CM    1. Acute systolic congestive heart failure (HCC) I50.21 428.21 REFERRAL TO CARDIOLOGY     428.0    2. Acute renal failure superimposed on stage 2 chronic kidney disease, unspecified acute renal failure type ([facility] N17.9 584.9 REFERRAL TO NEPHROLOGY    N18.2 585.2    3. Groin pain, right R10.31 789.09 cephALEXin (KEFLEX) 500 mg capsule      oxyCODONE-acetaminophen (PERCOCET) 5-325 mg per tablet   4. Diabetic polyneuropathy associated with type 2 diabetes mellitus (HCC) E11.42 250.60      357.2    5. Narcotic dependence, episodic use ([facility] F11.20 304.92      Orders Placed This Encounter    REFERRAL TO CARDIOLOGY     Referral Priority:   Routine     Referral Type:   Consultation     Referral Reason:   Specialty Services Required     Referred to Provider:   Wendi Rogel MD    REFERRAL TO NEPHROLOGY     Referral Priority:   Routine     Referral Type:   Consultation     Referral Reason:   Specialty Services Required     Referred to Provider:   Max Small Officer, MD    bumetanide (BUMEX) 2 mg tablet     Sig: Take 2 mg by mouth daily.  carvedilol (COREG) 6.25 mg tablet     Sig: Take  by mouth two (2) times daily (with meals).  atorvastatin (LIPITOR) 40 mg tablet     Sig: Take  by mouth daily.  folic acid (FOLVITE) 1 mg tablet     Sig: Take  by mouth daily.  cyanocobalamin (VITAMIN B-12) 500 mcg tablet     Sig: Take 500 mcg by mouth daily.  insulin glargine (LANTUS SOLOSTAR) 100 unit/mL (3 mL) inpn     Si Units by SubCUTAneous route nightly.  cephALEXin (KEFLEX) 500 mg capsule     Sig: Take 1 Cap by mouth two (2) times a day for 7 days. Dispense:  21 Cap     Refill:  0    polyethylene glycol (MIRALAX) 17 gram packet     Sig: Take 1 Packet by mouth daily. Dispense:  30 Packet     Refill:  1    oxyCODONE-acetaminophen (PERCOCET) 5-325 mg per tablet     Sig: Take 1 Tab by mouth every eight (8) hours as needed for Pain. Max Daily Amount: 3 Tabs. Dispense:  12 Tab     Refill:  0     . Chronic Conditions Addressed Today     1. Renal failure (ARF), acute on chronic (HCC)     Relevant Medications     bumetanide (BUMEX) 2 mg tablet     Other Relevant Orders     REFERRAL TO NEPHROLOGY    2. Narcotic dependence, episodic use ([facility]    3.  Diabetic polyneuropathy associated with type 2 diabetes mellitus (HCC)     Relevant Medications     atorvastatin (LIPITOR) 40 mg tablet     insulin glargine (LANTUS SOLOSTAR) 100 unit/mL (3 mL) inpn     oxyCODONE-acetaminophen (PERCOCET) 5-325 mg per tablet    4. Acute systolic congestive heart failure (HCC) - Primary     Relevant Medications     bumetanide (BUMEX) 2 mg tablet     carvedilol (COREG) 6.25 mg tablet     atorvastatin (LIPITOR) 40 mg tablet     Other Relevant Orders     REFERRAL TO CARDIOLOGY      Acute Diagnoses Addressed Today     Groin pain, right            Relevant Medications        cephALEXin (KEFLEX) 500 mg capsule        oxyCODONE-acetaminophen (PERCOCET) 5-325 mg per tablet        We will try and get all the medical records pertaining to this admission, continue current CHF medicines. Diabetes she can call me with her insulin, short acting. Continue current long-acting insulin. She will follow-up with nephrology and cardiology, get medical records. Right groin pain may be some infection, treat with antibiotics and did okay some pain pills for short period of time but not for her chronic diabetic foot neuropathy pain for which she needs to see pain management to get narcotics for that. Follow-up Disposition:  Return in about 1 week (around 2/12/2018) for routine follow up.

## 2018-02-12 ENCOUNTER — OFFICE VISIT (OUTPATIENT)
Dept: INTERNAL MEDICINE CLINIC | Age: 53
End: 2018-02-12

## 2018-02-12 VITALS
HEIGHT: 64 IN | SYSTOLIC BLOOD PRESSURE: 130 MMHG | HEART RATE: 98 BPM | OXYGEN SATURATION: 100 % | TEMPERATURE: 98.9 F | RESPIRATION RATE: 20 BRPM | WEIGHT: 241 LBS | DIASTOLIC BLOOD PRESSURE: 60 MMHG | BODY MASS INDEX: 41.15 KG/M2

## 2018-02-12 DIAGNOSIS — G61.81 CHRONIC INFLAMMATORY DEMYELINATING POLYNEUROPATHY (HCC): ICD-10-CM

## 2018-02-12 DIAGNOSIS — I10 ESSENTIAL HYPERTENSION: ICD-10-CM

## 2018-02-12 DIAGNOSIS — E11.40 TYPE 2 DIABETES MELLITUS WITH DIABETIC NEUROPATHY, WITH LONG-TERM CURRENT USE OF INSULIN (HCC): Primary | ICD-10-CM

## 2018-02-12 DIAGNOSIS — Z79.4 TYPE 2 DIABETES MELLITUS WITH DIABETIC NEUROPATHY, WITH LONG-TERM CURRENT USE OF INSULIN (HCC): Primary | ICD-10-CM

## 2018-02-12 DIAGNOSIS — I50.21 ACUTE SYSTOLIC CONGESTIVE HEART FAILURE (HCC): ICD-10-CM

## 2018-02-12 DIAGNOSIS — S89.91XA INJURY OF RIGHT KNEE, INITIAL ENCOUNTER: ICD-10-CM

## 2018-02-12 DIAGNOSIS — I49.9 IRREGULAR HEART RHYTHM: ICD-10-CM

## 2018-02-12 DIAGNOSIS — E78.00 ELEVATED CHOLESTEROL: ICD-10-CM

## 2018-02-12 RX ORDER — INSULIN ASPART 100 [IU]/ML
7 INJECTION, SOLUTION INTRAVENOUS; SUBCUTANEOUS
Qty: 5 PEN | Refills: 5 | Status: SHIPPED | OUTPATIENT
Start: 2018-02-12 | End: 2018-03-02 | Stop reason: SDUPTHER

## 2018-02-12 RX ORDER — OXYCODONE AND ACETAMINOPHEN 5; 325 MG/1; MG/1
1 TABLET ORAL
Qty: 10 TAB | Refills: 0 | Status: SHIPPED | OUTPATIENT
Start: 2018-02-12 | End: 2018-02-19 | Stop reason: SDUPTHER

## 2018-02-12 RX ORDER — INSULIN GLARGINE 100 [IU]/ML
45 INJECTION, SOLUTION SUBCUTANEOUS
Qty: 5 PEN | Refills: 5 | Status: SHIPPED | OUTPATIENT
Start: 2018-02-12 | End: 2018-02-15 | Stop reason: CLARIF

## 2018-02-12 NOTE — PROGRESS NOTES
Subjective:     Rachel Mcconnell is a 47 y.o. female seen for follow-up of diabetes. Vilinda Bones the other day and hit her right knee, direct blow. She has had hypoglycemic attacks. .no  Blood sugar control has been still running high  She has diabetes, hypertension and Systolic HF. Finished narcotics and AB,min groin c/o, had a cardiac cath, still no results from her specialist.  Rachel Mcconnell has the additional concern of breathing OK, is seeing cards for her CHF. Sees nephrology for kidney issues    Diabetic Review of Systems: no polyuria or polydipsia, no chest pain, dyspnea or TIA's, has dysesthesias in the feet. Allergies   Allergen Reactions    Lortab [Hydrocodone-Acetaminophen] Nausea and Vomiting    Cymbalta [Duloxetine] Diarrhea    Lasix [Furosemide] Swelling    Lyrica [Pregabalin] Drowsiness       Diet and Lifestyle: follows a diabetic diet regularly, nonsmoker.     Patient Active Problem List    Diagnosis Date Noted    Acute systolic congestive heart failure ([address] 02/07/2018    Renal failure (ARF), acute on chronic ([address] 02/07/2018    Type 2 diabetes mellitus with diabetic neuropathy ([address] 01/08/2018    Obesity, morbid ([address] 12/18/2017    Type 2 diabetes mellitus with nephropathy ([address] 12/18/2017    Spinal stenosis of lumbosacral region 12/08/2016    Lumbar back pain with radiculopathy affecting left lower extremity 12/08/2016    Lumbar back pain with radiculopathy affecting right lower extremity 12/08/2016    Idiopathic small and large fiber sensory neuropathy 11/16/2016    Chronic inflammatory demyelinating polyneuropathy ([address] 11/16/2016    Diabetic polyneuropathy associated with type 2 diabetes mellitus ([address] 06/15/2016    Diabetes mellitus type 2, controlled ([address] 05/24/2016    Narcotic dependence, episodic use ([address] 04/29/2016    Chronic pain     Hypertension 11/07/2012     Allergies   Allergen Reactions    Lortab [Hydrocodone-Acetaminophen] Nausea and Vomiting    Cymbalta [Duloxetine] Diarrhea    Lasix [Furosemide] Swelling    Lyrica [Pregabalin] Drowsiness     Social History   Substance Use Topics    Smoking status: Never Smoker    Smokeless tobacco: Never Used    Alcohol use No        Lab Results  Component Value Date/Time   WBC 7.5 02/13/2018 12:00 AM   HGB 10.9 (L) 02/13/2018 12:00 AM   HCT 33.7 (L) 02/13/2018 12:00 AM   PLATELET 037 67/61/4386 12:00 AM   MCV 78 (L) 02/13/2018 12:00 AM     Lab Results  Component Value Date/Time   Hemoglobin A1c 8.6 (H) 02/13/2018 12:00 AM   Hemoglobin A1c 7.4 (H) 02/13/2017 12:20 PM   Hemoglobin A1c 6.6 (H) 08/15/2016 02:35 PM   Glucose 209 (H) 02/13/2018 12:00 AM   Glucose (POC) 173 (H) 12/18/2015 11:17 AM   Microalb/Creat ratio (ug/mg creat.) 18.2 08/15/2016 02:35 PM   LDL, calculated 72 02/13/2018 12:00 AM   Creatinine 1.94 (H) 02/13/2018 12:00 AM      Lab Results  Component Value Date/Time   Cholesterol, total 126 02/13/2018 12:00 AM   HDL Cholesterol 37 (L) 02/13/2018 12:00 AM   LDL, calculated 72 02/13/2018 12:00 AM   Triglyceride 84 02/13/2018 12:00 AM     Lab Results  Component Value Date/Time   ALT (SGPT) 13 08/24/2017 10:33 PM   AST (SGOT) 10 (L) 08/24/2017 10:33 PM   Alk.  phosphatase 109 08/24/2017 10:33 PM   Bilirubin, total 0.4 08/24/2017 10:33 PM   Albumin 3.5 08/24/2017 10:33 PM   Protein, total 7.4 08/24/2017 10:33 PM   INR 1.1 12/08/2015 04:42 PM   Prothrombin time 11.2 12/08/2015 04:42 PM   PLATELET 465 62/35/0992 12:00 AM       Lab Results  Component Value Date/Time   GFR est non-AA 29 (L) 02/13/2018 12:00 AM   GFR est AA 34 (L) 02/13/2018 12:00 AM   Creatinine 1.94 (H) 02/13/2018 12:00 AM   BUN 33 (H) 02/13/2018 12:00 AM   Sodium 142 02/13/2018 12:00 AM   Potassium 3.8 02/13/2018 12:00 AM   Chloride 100 02/13/2018 12:00 AM   CO2 21 02/13/2018 12:00 AM   Magnesium 1.4 (L) 11/16/2016 12:04 AM     Lab Results  Component Value Date/Time   TSH 1.780 02/13/2018 12:00 AM   T4, Free 1.1 01/30/2014 10:48 PM      Lab Results Component Value Date/Time    Glucose 209 (H) 02/13/2018 12:00 AM    Glucose (POC) 173 (H) 12/18/2015 11:17 AM         Review of Systems  Pertinent items are noted in HPI. Objective:       Visit Vitals    /60 (BP 1 Location: Left arm, BP Patient Position: At rest)    Pulse 98    Temp 98.9 °F (37.2 °C) (Oral)    Resp 20    Ht 5' 4\" (1.626 m)    Wt 241 lb (109.3 kg)    SpO2 100%    BMI 41.37 kg/m2     Appearance: overweight and chronically ill appearing. Exam: heart sounds irregularly irregular rhythm with rate 100, chest clear  Right knee sl swelling pain with ROM    Lab review: orders written for new lab studies as appropriate; see orders. Assessment/Plan:     Follow-up diabetes stable, borderline controlled. Diabetic issues reviewed with her: all medications, side effects and compliance discussed carefully, glycohemoglobin and other lab monitoring discussed and long term diabetic complications discussed. Chronic Conditions Addressed Today     1. Type 2 diabetes mellitus with diabetic neuropathy (HCC) - Primary     Relevant Medications     oxyCODONE-acetaminophen (PERCOCET) 5-325 mg per tablet     insulin glargine (LANTUS SOLOSTAR) 100 unit/mL (3 mL) inpn     glucose blood VI test strips (ASCENSIA AUTODISC VI, ONE TOUCH ULTRA TEST VI) strip     insulin aspart (NOVOLOG FLEXPEN) 100 unit/mL inpn     Other Relevant Orders     HEMOGLOBIN A1C WITH EAG    2. Hypertension     Relevant Orders     CBC W/O DIFF    3. Chronic inflammatory demyelinating polyneuropathy (HCC)     Relevant Medications     oxyCODONE-acetaminophen (PERCOCET) 5-325 mg per tablet    4.  Acute systolic congestive heart failure (HCC)     Relevant Orders     METABOLIC PANEL, BASIC      Acute Diagnoses Addressed Today     Irregular heart rhythm            Relevant Orders        AMB POC EKG ROUTINE W/ 12 LEADS, INTER & REP (Completed)        TSH 3RD GENERATION    Elevated cholesterol            Relevant Orders        LIPID PANEL Injury of right knee, initial encounter            Relevant Medications        oxyCODONE-acetaminophen (PERCOCET) 5-325 mg per tablet        Other Relevant Orders        XR KNEE RT MAX 2 VWS      if knee not better  Needs to f/u with nephro and cards. Cont insulin called in. Follow-up Disposition:  Return in about 2 weeks (around 2/26/2018) for routine follow up.

## 2018-02-12 NOTE — MR AVS SNAPSHOT
[address] [facility] 
[phone] Patient: Poncho Dolan MRN: [id number] :[id number] Visit Information Date & Time Provider Department Dept. Phone Encounter #  
 2018  2:30 PM MD [address] [id number] Follow-up Instructions Return in about 2 weeks (around 2018) for routine follow up. Your Appointments 2018  1:00 PM  
ROUTINE CARE with Charles Amanda MD  
[facility]) Appt Note: f/u on dm $0 cp lsh 17  
 [address] [place] [address]. [address] [place] [address]  
[phone]  
  
   
 [address] [place] [address] [address] Upcoming Health Maintenance Date Due FOBT Q 1 YEAR AGE 50-75 2016 LIPID PANEL Q1 3/25/2017 MICROALBUMIN Q1 8/15/2017 PAP AKA CERVICAL CYTOLOGY 9/3/2017 EYE EXAM RETINAL OR DILATED Q1 10/25/2017 HEMOGLOBIN A1C Q6M 2018 FOOT EXAM Q1 2018 BREAST CANCER SCRN MAMMOGRAM 3/22/2019 DTaP/Tdap/Td series (2 - Td) 3/13/2027 Allergies as of 2018  Review Complete On: 2018 By: Romy Abdi LPN Severity Noted Reaction Type Reactions Lortab [Hydrocodone-acetaminophen] High 2013   Intolerance Nausea and Vomiting Cymbalta [Duloxetine]  10/04/2016    Diarrhea Lasix [Furosemide]  2018    Swelling Lyrica [Pregabalin]  2017    Drowsiness Current Immunizations  Reviewed on 2016 No immunizations on file. Not reviewed this visit You Were Diagnosed With   
  
 Codes Comments Type 2 diabetes mellitus with diabetic neuropathy, with long-term current use of insulin (HCC)    -  Primary ICD-10-CM: E11.40, Z79.4 ICD-9-CM: 250.60, 357.2, V58.67  Chronic inflammatory demyelinating polyneuropathy (HCC)     ICD-10-CM: G61.81 
ICD-9-CM: 357.81   
 Acute systolic congestive heart failure (HCC)     ICD-10-CM: I50.21 ICD-9-CM: 428.21, 428.0 Essential hypertension     ICD-10-CM: I10 
ICD-9-CM: 401.9 Irregular heart rhythm     ICD-10-CM: I49.9 ICD-9-CM: 427.9 Elevated cholesterol     ICD-10-CM: E78.00 ICD-9-CM: 272.0 Injury of right knee, initial encounter     ICD-10-CM: I07.06OQ ICD-9-CM: 466. 7 Vitals BP Pulse Temp Resp Height(growth percentile) Weight(growth percentile) 130/60 (BP 1 Location: Left arm, BP Patient Position: At rest) 98 98.9 °F (37.2 °C) (Oral) 20 5' 4\" (1.626 m) 241 lb (109.3 kg) SpO2 BMI OB Status Smoking Status 100% 41.37 kg/m2 Having regular periods Never Smoker Vitals History BMI and BSA Data Body Mass Index Body Surface Area  
 41.37 kg/m 2 2.22 m 2 Preferred Pharmacy Pharmacy Name Phone [facility] [address] [place], [place] [address] [facility] [phone] Your Updated Medication List  
  
   
This list is accurate as of: 2/12/18  3:32 PM.  Always use your most recent med list.  
  
  
  
  
 aspirin delayed-release 81 mg tablet Take  by mouth daily. atorvastatin 40 mg tablet Commonly known as:  LIPITOR Take  by mouth daily. bumetanide 2 mg tablet Commonly known as:  Michail Roderick Take 2 mg by mouth daily. carvedilol 6.25 mg tablet Commonly known as:  Lurlene Solo Take  by mouth two (2) times daily (with meals). folic acid 1 mg tablet Commonly known as:  Google Take  by mouth daily. gabapentin 800 mg tablet Commonly known as:  NEURONTIN Take 1 Tab by mouth three (3) times daily. glucose blood VI test strips strip Commonly known as:  ASCENSIA AUTODISC VI, ONE TOUCH ULTRA TEST VI  
Daily verio one touch up to 3 times a day * insulin aspart 100 unit/mL injection Commonly known as:  NOVOLOG  
7 Units by SubCUTAneous route Before breakfast, lunch, and dinner. Indications: type 2 diabetes mellitus * insulin aspart 100 unit/mL Inpn Commonly known as:  Orvis Posey 7 Units by SubCUTAneous route Before breakfast, lunch, and dinner. insulin glargine 100 unit/mL (3 mL) Inpn Commonly known as:  LANTUS SOLOSTAR  
45 Units by SubCUTAneous route nightly. oxyCODONE-acetaminophen 5-325 mg per tablet Commonly known as:  PERCOCET Take 1 Tab by mouth every eight (8) hours as needed for Pain. Max Daily Amount: 10 Tabs. polyethylene glycol 17 gram packet Commonly known as:  Suzen Rude Take 1 Packet by mouth daily. VITAMIN B-12 500 mcg tablet Generic drug:  cyanocobalamin Take 500 mcg by mouth daily. * Notice: This list has 2 medication(s) that are the same as other medications prescribed for you. Read the directions carefully, and ask your doctor or other care provider to review them with you. Prescriptions Printed Refills  
 oxyCODONE-acetaminophen (PERCOCET) 5-325 mg per tablet 0 Sig: Take 1 Tab by mouth every eight (8) hours as needed for Pain. Max Daily Amount: 10 Tabs. Class: Print Route: Oral  
 glucose blood VI test strips (ASCENSIA AUTODISC VI, ONE TOUCH ULTRA TEST VI) strip 5 Sig: Daily verio one touch up to 3 times a day Class: Print Prescriptions Sent to Pharmacy Refills  
 insulin glargine (LANTUS SOLOSTAR) 100 unit/mL (3 mL) inpn 5 Si Units by SubCUTAneous route nightly. Class: Normal  
 Pharmacy: [facility] [address] SYLVE[facility] REYES [address] [place] [address], [facility] Ph #: [phone] Route: SubCUTAneous  
 insulin aspart (NOVOLOG FLEXPEN) 100 unit/mL inpn 5 Si Units by SubCUTAneous route Before breakfast, lunch, and dinner. Class: Normal  
 Pharmacy: [facility] [address] SYLVESTER REYES [address] [place] [address], [address] [facility] Ph #: [phone] Route: SubCUTAneous We Performed the Following AMB POC EKG ROUTINE W/ 12 LEADS, INTER & REP [42725 CPT(R)] Follow-up Instructions Return in about 2 weeks (around 2/26/2018) for routine follow up. To-Do List   
 02/12/2018 Lab:  CBC W/O DIFF   
  
 02/12/2018 Lab:  HEMOGLOBIN A1C WITH EAG   
  
 02/12/2018 Lab:  LIPID PANEL   
  
 02/12/2018 Lab:  METABOLIC PANEL, BASIC   
  
 02/12/2018 Lab:  TSH 3RD GENERATION   
  
 02/12/2018 Imaging:  XR KNEE RT MAX 2 VWS Introducing [facility]! Cade Martinez introduces [employer] patient portal. Now you can access parts of your medical record, email your doctor's office, and request medication refills online. 1. In your internet browser, go to [email or web address] 2. Click on the First Time User? Click Here link in the Sign In box. You will see the New Member Sign Up page. 3. Enter your [employer] Access Code exactly as it appears below. You will not need to use this code after youve completed the sign-up process. If you do not sign up before the expiration date, you must request a new code. · [employer] Access Code: [email or web address] Expires: 5/6/2018  2:38 PM 
 
4. Enter the last four digits of your Social Security Number (xxxx) and Date of Birth (mm/dd/yyyy) as indicated and click Submit. You will be taken to the next sign-up page. 5. Create a [employer] ID. This will be your [employer] login ID and cannot be changed, so think of one that is secure and easy to remember. 6. Create a [employer] password. You can change your password at any time. 7. Enter your Password Reset Question and Answer. This can be used at a later time if you forget your password. 8. Enter your e-mail address. You will receive e-mail notification when new information is available in [address] 9. Click Sign Up. You can now view and download portions of your medical record. 10. Click the Download Summary menu link to download a portable copy of your medical information.  
 
If you have questions, please visit the Frequently Asked Questions section of the [employer]. Remember, [employer] is NOT to be used for urgent needs. For medical emergencies, dial 911. Now available from your iPhone and Android! Please provide this summary of care documentation to your next provider. Your primary care clinician is listed as Kaylee Covarrubias. If you have any questions after today's visit, please call [phone].

## 2018-02-12 NOTE — PROGRESS NOTES
C/o right knee injury and pain - today, this am - blood sugar running high  Lamine Rodriguez, LPN  1/61/3135  5:77 PM  Fall Risk Assessment, last 12 mths 1/8/2018   Able to walk? Yes   Fall in past 12 months? Yes   Fall with injury? Yes   Number of falls in past 12 months 2   Fall Risk Score 3       PHQ over the last two weeks 2/5/2018   Little interest or pleasure in doing things Several days   Feeling down, depressed or hopeless Several days   Total Score PHQ 2 2       Abuse Screening Questionnaire 1/8/2018   Do you ever feel afraid of your partner? N   Are you in a relationship with someone who physically or mentally threatens you? N   Is it safe for you to go home?  Y       ADL Assessment 1/8/2018   Feeding yourself No Help Needed   Getting from bed to chair No Help Needed   Getting dressed No Help Needed   Bathing or showering No Help Needed   Walk across the room (includes cane/walker) No Help Needed   Using the telphone No Help Needed   Taking your medications No Help Needed   Preparing meals No Help Needed   Managing money (expenses/bills) No Help Needed   Moderately strenuous housework (laundry) No Help Needed   Shopping for personal items (toiletries/medicines) No Help Needed   Shopping for groceries No Help Needed   Driving No Help Needed   Climbing a flight of stairs No Help Needed   Getting to places beyond walking distances No Help Needed

## 2018-02-13 ENCOUNTER — CLINICAL SUPPORT (OUTPATIENT)
Dept: INTERNAL MEDICINE CLINIC | Age: 53
End: 2018-02-13

## 2018-02-13 VITALS
DIASTOLIC BLOOD PRESSURE: 55 MMHG | SYSTOLIC BLOOD PRESSURE: 115 MMHG | OXYGEN SATURATION: 100 % | TEMPERATURE: 97.7 F | RESPIRATION RATE: 20 BRPM | HEART RATE: 54 BPM

## 2018-02-13 DIAGNOSIS — I50.21 ACUTE SYSTOLIC CONGESTIVE HEART FAILURE (HCC): Primary | ICD-10-CM

## 2018-02-13 DIAGNOSIS — E66.01 OBESITY, MORBID (HCC): ICD-10-CM

## 2018-02-13 RX ORDER — LANOLIN ALCOHOL/MO/W.PET/CERES
500 CREAM (GRAM) TOPICAL DAILY
Qty: 30 TAB | Refills: 5 | Status: SHIPPED | OUTPATIENT
Start: 2018-02-13 | End: 2019-01-14 | Stop reason: SDUPTHER

## 2018-02-13 NOTE — PROGRESS NOTES
Patient in for lab draw only - when results come in will need to fax BMP results to [facility] at [phone] and [facility] at [address] [facility] [place], LPN  9/27/0958  0:09 PM

## 2018-02-13 NOTE — TELEPHONE ENCOUNTER
Requested Prescriptions     Pending Prescriptions Disp Refills    cyanocobalamin (VITAMIN B-12) 500 mcg tablet       Sig: Take 1 Tab by mouth daily.

## 2018-02-13 NOTE — TELEPHONE ENCOUNTER
Last office visit:  2/12/18  Last filled:  Vit B12 (not listed) 500mg  No changes - 1 tablet daily  Follow up 2/20/18 with Dr Kane Aggarwal

## 2018-02-13 NOTE — LETTER
2/16/2018 4:28 PM 
 
Ms. Rachel Mcconnell [address] [facility] [address] Dear Rachel Mcconnell: 
 
Diabetes is not as good as it was, but it isn't too bad currently. For now no change in your medications. Please follow a diabetic diet carefully. Reduce starchy foods and sweet foods. Will need to re-check this levels in a few months. If diabetes worsens will need to increase your medications.  
    
   
 
 
 
Please find your most recent results below. Resulted Orders CBC W/O DIFF Result Value Ref Range WBC 7.5 3.4 - 10.8 x10E3/uL  
 RBC 4.34 3.77 - 5.28 x10E6/uL HGB 10.9 (L) 11.1 - 15.9 g/dL HCT 33.7 (L) 34.0 - 46.6 % MCV 78 (L) 79 - 97 fL  
 MCH 25.1 (L) 26.6 - 33.0 pg  
 MCHC 32.3 31.5 - 35.7 g/dL RDW 21.8 (H) 12.3 - 15.4 % PLATELET 785 277 - 252 x10E3/uL Narrative Performed at:  [address]  [id number] : Hanane Malave MD, Phone:  [phone] METABOLIC PANEL, BASIC Result Value Ref Range Glucose 209 (H) 65 - 99 mg/dL BUN 33 (H) 6 - 24 mg/dL Creatinine 1.94 (H) 0.57 - 1.00 mg/dL GFR est non-AA 29 (L) >59 mL/min/1.73 GFR est AA 34 (L) >59 mL/min/1.73  
 BUN/Creatinine ratio 17 9 - 23 Sodium 142 134 - 144 mmol/L Potassium 3.8 3.5 - 5.2 mmol/L Chloride 100 96 - 106 mmol/L  
 CO2 21 18 - 29 mmol/L Calcium 9.5 8.7 - 10.2 mg/dL Narrative Performed at:  [address]  [id number] : Hanane Malave MD, Phone:  [phone] LIPID PANEL Result Value Ref Range Cholesterol, total 126 100 - 199 mg/dL Triglyceride 84 0 - 149 mg/dL HDL Cholesterol 37 (L) >39 mg/dL VLDL, calculated 17 5 - 40 mg/dL LDL, calculated 72 0 - 99 mg/dL Narrative Performed at:  [address]  [id number] : Hanane Malave MD, Phone:  [phone] CVD REPORT  
 Result Value Ref Range INTERPRETATION Note Comment:  
   Supplemental report is available. PDF IMAGE Not applicable Narrative Performed at:  [address] [place] [address]  [id number] : Bettie Pimentel PhD, Phone:  [phone] CKD REPORT Result Value Ref Range Interpretation Note Comment:  
   Supplemental report is available. Narrative Performed at:  [address] [place] [address]  [id number] : Bettie Pimentel PhD, Phone:  [phone] HEMOGLOBIN A1C WITH EAG Result Value Ref Range Hemoglobin A1c 8.6 (H) 4.8 - 5.6 % Comment:  
            Pre-diabetes: 5.7 - 6.4 Diabetes: >6.4 Glycemic control for adults with diabetes: <7.0 Estimated average glucose 200 mg/dL Narrative Performed at:  [address]  [id number] : Marley Means MD, Phone:  [phone] DIABETES PATIENT EDUCATION Result Value Ref Range PDF Image Not applicable Narrative Performed at:  [facility] [address] [place] [address]  [id number] : Bettie Pimentel PhD, Phone:  [phone] TSH 3RD GENERATION Result Value Ref Range TSH 1.780 0.450 - 4.500 uIU/mL Narrative Performed at:  [address]  [id number] : Marley Means MD, Phone:  [phone] Please call me if you have any questions: [phone] Sincerely, Miguel A Juares MD

## 2018-02-14 ENCOUNTER — TELEPHONE (OUTPATIENT)
Dept: INTERNAL MEDICINE CLINIC | Age: 53
End: 2018-02-14

## 2018-02-14 LAB
BUN SERPL-MCNC: 33 MG/DL (ref 6–24)
BUN/CREAT SERPL: 17 (ref 9–23)
CALCIUM SERPL-MCNC: 9.5 MG/DL (ref 8.7–10.2)
CHLORIDE SERPL-SCNC: 100 MMOL/L (ref 96–106)
CHOLEST SERPL-MCNC: 126 MG/DL (ref 100–199)
CO2 SERPL-SCNC: 21 MMOL/L (ref 18–29)
CREAT SERPL-MCNC: 1.94 MG/DL (ref 0.57–1)
ERYTHROCYTE [DISTWIDTH] IN BLOOD BY AUTOMATED COUNT: 21.8 % (ref 12.3–15.4)
EST. AVERAGE GLUCOSE BLD GHB EST-MCNC: 200 MG/DL
GFR SERPLBLD CREATININE-BSD FMLA CKD-EPI: 29 ML/MIN/1.73
GFR SERPLBLD CREATININE-BSD FMLA CKD-EPI: 34 ML/MIN/1.73
GLUCOSE SERPL-MCNC: 209 MG/DL (ref 65–99)
HBA1C MFR BLD: 8.6 % (ref 4.8–5.6)
HCT VFR BLD AUTO: 33.7 % (ref 34–46.6)
HDLC SERPL-MCNC: 37 MG/DL
HGB BLD-MCNC: 10.9 G/DL (ref 11.1–15.9)
INTERPRETATION, 910389: NORMAL
INTERPRETATION: NORMAL
LDLC SERPL CALC-MCNC: 72 MG/DL (ref 0–99)
Lab: NORMAL
MCH RBC QN AUTO: 25.1 PG (ref 26.6–33)
MCHC RBC AUTO-ENTMCNC: 32.3 G/DL (ref 31.5–35.7)
MCV RBC AUTO: 78 FL (ref 79–97)
PDF IMAGE, 910387: NORMAL
PLATELET # BLD AUTO: 166 X10E3/UL (ref 150–379)
POTASSIUM SERPL-SCNC: 3.8 MMOL/L (ref 3.5–5.2)
RBC # BLD AUTO: 4.34 X10E6/UL (ref 3.77–5.28)
SODIUM SERPL-SCNC: 142 MMOL/L (ref 134–144)
TRIGL SERPL-MCNC: 84 MG/DL (ref 0–149)
TSH SERPL DL<=0.005 MIU/L-ACNC: 1.78 UIU/ML (ref 0.45–4.5)
VLDLC SERPL CALC-MCNC: 17 MG/DL (ref 5–40)
WBC # BLD AUTO: 7.5 X10E3/UL (ref 3.4–10.8)

## 2018-02-14 NOTE — TELEPHONE ENCOUNTER
2/14/18 @ 9:46 am Called  transfered to [facility] PA's initiated with WILLIS CASTELLANOS, for: (1). Novolog flexpen 100 unit/ml,claim was auto approved,effective 2/14/18 thru 2/14/19. Case ID: [id number].(1). Lantus Solostar injection attempted,rep stated claim was denied on 1/31/18 due to patient not trying and failing, preferred drug [employer]. [place] [id number]. (3) OneTouch Verio test strips,claim pending pharmacy review,requested strips as ordered due to patient currently using this type meter. Pharmacy called status of claims spoke with [employer],verified understanding.

## 2018-02-14 NOTE — TELEPHONE ENCOUNTER
Testing strips pending review for PA - but patient needs to have documented trial and failure of Basaglar insulin first - please send in a new prescription to [id number] [place] [address], VITO  1/19/8230  40:29 PM

## 2018-02-15 ENCOUNTER — TELEPHONE (OUTPATIENT)
Dept: INTERNAL MEDICINE CLINIC | Age: 53
End: 2018-02-15

## 2018-02-15 RX ORDER — INSULIN GLARGINE 100 [IU]/ML
45 INJECTION, SOLUTION SUBCUTANEOUS
Qty: 5 PEN | Refills: 5 | Status: SHIPPED | OUTPATIENT
Start: 2018-02-15 | End: 2018-03-30 | Stop reason: SINTOL

## 2018-02-15 NOTE — TELEPHONE ENCOUNTER
Pt called in reference to wanting to know the status of her walker. Please call her at [phone].

## 2018-02-15 NOTE — TELEPHONE ENCOUNTER
Called patient - she states that the short acting insulin (the Novolog) is not helping to keep her blood sugars down - advised that I would let Dr Rajeev Huang know about this but that likely an appt would be required to discuss changing of any medication - also explained to patient that she will need to call Medicaid number on her card to ask them about obtaining a walker at their expense - they can give her suggestions on how to get this processed  Keith Aguilar LPN  9/19/5741  0:03 PM

## 2018-02-16 ENCOUNTER — TELEPHONE (OUTPATIENT)
Dept: INTERNAL MEDICINE CLINIC | Age: 53
End: 2018-02-16

## 2018-02-16 NOTE — TELEPHONE ENCOUNTER
Pt called stated that she was returning the nurse call from yesterday and that it was very important that she gets call back to today

## 2018-02-16 NOTE — TELEPHONE ENCOUNTER
Called pt and told her to take 9 units 15 minutes before each meal and let us know how her BS are doing.

## 2018-02-19 ENCOUNTER — DOCUMENTATION ONLY (OUTPATIENT)
Dept: INTERNAL MEDICINE CLINIC | Age: 53
End: 2018-02-19

## 2018-02-19 ENCOUNTER — OFFICE VISIT (OUTPATIENT)
Dept: INTERNAL MEDICINE CLINIC | Age: 53
End: 2018-02-19

## 2018-02-19 VITALS
OXYGEN SATURATION: 100 % | SYSTOLIC BLOOD PRESSURE: 121 MMHG | HEART RATE: 54 BPM | WEIGHT: 245 LBS | HEIGHT: 64 IN | BODY MASS INDEX: 41.83 KG/M2 | TEMPERATURE: 99.3 F | DIASTOLIC BLOOD PRESSURE: 88 MMHG | RESPIRATION RATE: 20 BRPM

## 2018-02-19 DIAGNOSIS — E11.40 TYPE 2 DIABETES MELLITUS WITH DIABETIC NEUROPATHY, WITH LONG-TERM CURRENT USE OF INSULIN (HCC): ICD-10-CM

## 2018-02-19 DIAGNOSIS — Z79.4 TYPE 2 DIABETES MELLITUS WITH DIABETIC NEUROPATHY, WITH LONG-TERM CURRENT USE OF INSULIN (HCC): ICD-10-CM

## 2018-02-19 DIAGNOSIS — N28.9 RENAL INSUFFICIENCY: ICD-10-CM

## 2018-02-19 DIAGNOSIS — S89.91XA INJURY OF RIGHT KNEE, INITIAL ENCOUNTER: ICD-10-CM

## 2018-02-19 DIAGNOSIS — F11.20 NARCOTIC DEPENDENCE, EPISODIC USE (HCC): ICD-10-CM

## 2018-02-19 DIAGNOSIS — M25.552 LEFT HIP PAIN: Primary | ICD-10-CM

## 2018-02-19 LAB
BILIRUB UR QL STRIP: NEGATIVE
GLUCOSE UR-MCNC: NEGATIVE MG/DL
KETONES P FAST UR STRIP-MCNC: NEGATIVE MG/DL
PH UR STRIP: 5.5 [PH] (ref 4.6–8)
PROT UR QL STRIP: NEGATIVE
SP GR UR STRIP: 1.01 (ref 1–1.03)
UA UROBILINOGEN AMB POC: NORMAL (ref 0.2–1)
URINALYSIS CLARITY POC: CLEAR
URINALYSIS COLOR POC: YELLOW
URINE BLOOD POC: NORMAL
URINE LEUKOCYTES POC: NEGATIVE
URINE NITRITES POC: NEGATIVE

## 2018-02-19 RX ORDER — OXYCODONE AND ACETAMINOPHEN 5; 325 MG/1; MG/1
1 TABLET ORAL
Qty: 15 TAB | Refills: 0 | Status: SHIPPED | OUTPATIENT
Start: 2018-02-19 | End: 2018-03-02 | Stop reason: SDUPTHER

## 2018-02-19 RX ORDER — OXYCODONE AND ACETAMINOPHEN 5; 325 MG/1; MG/1
1 TABLET ORAL
Qty: 10 TAB | Refills: 0 | Status: SHIPPED | OUTPATIENT
Start: 2018-02-19 | End: 2018-02-19 | Stop reason: ALTCHOICE

## 2018-02-19 NOTE — PROGRESS NOTES
HISTORY OF PRESENT ILLNESS  Claudio Olson is a 47 y.o. female. Hip Injury    The history is provided by the patient. This is a new problem. The current episode started 2 days ago. The problem occurs constantly. The problem has been gradually worsening. The pain is present in the left hip. She fell a week ago injuring her right knee which still hurts some, although it is improved, no recollection of injuring her hip which just started hurting. Finished a course of Percocet, did not get x-ray since knee pain is improving. She is a frequent user of Percocet. Saw her kidney doctor, he is asking us to draw lab work for him, see sheet. Diabetes has been okay, got her short acting insulin, blood sugars have been okay. No hypoglycemia. Has a history of congestive heart failure minimal complaints of swelling or shortness of breath she goes to see her cardiologist next week. Her last A1c was fair at 8.6.     Past Medical History:   Diagnosis Date    Anemia     CAD (coronary artery disease)     Chronic pain     Cyst in hand 2014    [address]    Diabetes mellitus type 2 in obese ([address]     Diabetic neuropathy ([address]     Hypertension     Migraine      Patient Active Problem List   Diagnosis Code    Hypertension I10    Chronic pain G89.29    Narcotic dependence, episodic use ([address] F11.20    Diabetes mellitus type 2, controlled ([address] E11.9    Diabetic polyneuropathy associated with type 2 diabetes mellitus (HCC) E11.42    Idiopathic small and large fiber sensory neuropathy G60.8    Chronic inflammatory demyelinating polyneuropathy (HCC) G61.81    Spinal stenosis of lumbosacral region M48.07    Lumbar back pain with radiculopathy affecting left lower extremity M54.17    Lumbar back pain with radiculopathy affecting right lower extremity M54.17    Obesity, morbid ([facility]) E66.01    Type 2 diabetes mellitus with nephropathy (HCC) E11.21    Type 2 diabetes mellitus with diabetic neuropathy ([facility]) E11.40    Acute systolic congestive heart failure (HCC) I50.21    Renal failure (ARF), acute on chronic (HCC) N17.9, N18.9       Review of Systems   Constitutional: Negative for fever. Respiratory: Negative for cough and shortness of breath. Cardiovascular: Negative for chest pain. Gastrointestinal: Negative for abdominal pain. Genitourinary: Negative for dysuria. Musculoskeletal: Positive for falls. Physical Exam  Visit Vitals    /88 (BP 1 Location: Left arm, BP Patient Position: Sitting)    Pulse (!) 54    Temp 99.3 °F (37.4 °C) (Oral)    Resp 20    Ht 5' 4\" (1.626 m)    Wt 245 lb (111.1 kg)    SpO2 100%    BMI 42.05 kg/m2     WD WN female NAD  Heart RRR without murmers clicks or rubs  Lungs CTA  Abdo soft nontender  Ext no edema  Left hip exam and appears grossly within normal limits but is definitely tender to palpation and tenderness with any range of motion changes. ASSESSMENT and PLAN  Encounter Diagnoses   Name Primary?  Left hip pain Yes    Narcotic dependence, episodic use ([facility])     Renal insufficiency     Injury of right knee, initial encounter     Type 2 diabetes mellitus with diabetic neuropathy, with long-term current use of insulin ([facility]      Orders Placed This Encounter    XR HIP LT W OR WO PELV 2-3 VWS    MICROALBUMIN, UR, RAND W/ MICROALBUMIN/CREA RATIO    PTH INTACT    VITAMIN D, 25 HYDROXY    METABOLIC PANEL, BASIC    CBC W/O DIFF    AMB POC URINALYSIS DIP STICK AUTO W/ MICRO    DISCONTD: oxyCODONE-acetaminophen (PERCOCET) 5-325 mg per tablet    oxyCODONE-acetaminophen (PERCOCET) 5-325 mg per tablet     X-ray and narcotic treatment as above. Hopefully just some arthritis which will resolve on its own but if the pain continues we will need to get her in to see orthopedics. Labs ordered for her nephrologist as above. Diabetes seems stable continue current insulin regimen.     We discussed this pain and the usage of controlled substances for acute hip pain relief. In general these medications are indicated for the acute symptom relief and not for chronic usage, allthough they are frequently used for chronic management  After a certain period of time they should be stopped to avoid dependence and/or addiction. I warned her about the dangers of addiction and other side affects including respiratory depression and death. Discussed how this is a controlled substance and that the prescribing of it is should be monitored very carefully. Drug usage is also monitored by our practise and the [facility], since there has been a lot of abuse and diversion of controlled substances. In general we do not refill this medication over the phone. PLease ask for enough pills to get you to your next appointment and make sure you keep your appointments. Warned not to take other medications like alcohol, other benzodiazapines marijuana or other narcotics when on this medication.  reviewed    Follow-up Disposition:  Return in about 11 days (around 3/2/2018) for routine follow up.

## 2018-02-19 NOTE — PROGRESS NOTES
Received notification of approval of PA request for Novolog Flex pen 2/14/18 through 2/14/19 - also One Touch Verio strips approved for the same dates  Sherryle Oaks, LPN  7/27/2197  45:74 AM  Per request of Dr Safia Charles and Cardiologist and Nephrologist - copy of most recent BMP faxed to nephrology and cardiology  Sherryle Oaks, LPN  3/18/7279  91:40 AM

## 2018-02-19 NOTE — PROGRESS NOTES
Chief Complaint   Patient presents with    Hip Pain     L/hip pain     I have reviewed the patient's medical history in detail and updated the computerized patient record. Health Maintenance reviewed. 1. Have you been to the ER, urgent care clinic since your last visit? Hospitalized since your last visit?no    2. Have you seen or consulted any other health care providers outside of the [address] [place] [address] since your last visit? Include any pap smears or colon screening. No    Encouraged pt to discuss pt's wishes with spouse/partner/family and bring them in the next appt to follow thru with the Advanced Directive    Fall Risk Assessment, last 12 mths 1/8/2018   Able to walk? Yes   Fall in past 12 months? Yes   Fall with injury? Yes   Number of falls in past 12 months 2   Fall Risk Score 3       PHQ over the last two weeks 2/19/2018   Little interest or pleasure in doing things Several days   Feeling down, depressed or hopeless Several days   Total Score PHQ 2 2       Abuse Screening Questionnaire 1/8/2018   Do you ever feel afraid of your partner? N   Are you in a relationship with someone who physically or mentally threatens you? N   Is it safe for you to go home?  Y       ADL Assessment 1/8/2018   Feeding yourself No Help Needed   Getting from bed to chair No Help Needed   Getting dressed No Help Needed   Bathing or showering No Help Needed   Walk across the room (includes cane/walker) No Help Needed   Using the telphone No Help Needed   Taking your medications No Help Needed   Preparing meals No Help Needed   Managing money (expenses/bills) No Help Needed   Moderately strenuous housework (laundry) No Help Needed   Shopping for personal items (toiletries/medicines) No Help Needed   Shopping for groceries No Help Needed   Driving No Help Needed   Climbing a flight of stairs No Help Needed   Getting to places beyond walking distances No Help Needed

## 2018-02-19 NOTE — MR AVS SNAPSHOT
[address] 3023 [facility] 
[phone] Patient: Madison Speaker MRN: [id number] :1965 Visit Information Date & Time Provider Department Dept. Phone Encounter #  
 2018  1:45 PM Harley Mcfadden MD [facility] [phone] Follow-up Instructions Return in about 11 days (around 3/2/2018) for routine follow up. Your Appointments 2018  1:00 PM  
ROUTINE CARE with Harley Mcfadden MD  
[facility] Care [address] [facility]) Appt Note: f/u on dm $0 cp lsh 17  
 [address] [place] [address] Road. [address] [place] [address]  
[phone]  
  
   
 [address] [place] [address] [address] Upcoming Health Maintenance Date Due FOBT Q 1 YEAR AGE 50-75 2016 MICROALBUMIN Q1 8/15/2017 PAP AKA CERVICAL CYTOLOGY 9/3/2017 EYE EXAM RETINAL OR DILATED Q1 10/25/2017 HEMOGLOBIN A1C Q6M 2018 FOOT EXAM Q1 2018 LIPID PANEL Q1 2019 BREAST CANCER SCRN MAMMOGRAM 3/22/2019 DTaP/Tdap/Td series (2 - Td) 3/13/2027 Allergies as of 2018  Review Complete On: 2018 By: Andres Vance LPN Severity Noted Reaction Type Reactions Lortab [Hydrocodone-acetaminophen] High 2013   Intolerance Nausea and Vomiting Cymbalta [Duloxetine]  10/04/2016    Diarrhea Lasix [Furosemide]  2018    Swelling Lyrica [Pregabalin]  2017    Drowsiness Current Immunizations  Reviewed on 2016 No immunizations on file. Not reviewed this visit You Were Diagnosed With   
  
 Codes Comments Left hip pain    -  Primary ICD-10-CM: F60.595 ICD-9-CM: 719.45 Narcotic dependence, episodic use ([facility]     ICD-10-CM: T92.62 ICD-9-CM: 304.92 Renal insufficiency     ICD-10-CM: N28.9 ICD-9-CM: 593.9 Injury of right knee, initial encounter     ICD-10-CM: L25.15KU ICD-9-CM: 070. 7 Type 2 diabetes mellitus with diabetic neuropathy, with long-term current use of insulin (HCC)     ICD-10-CM: E11.40, Z79.4 ICD-9-CM: 250.60, 357.2, V58.67 Vitals BP Pulse Temp Resp Height(growth percentile) Weight(growth percentile) 121/88 (BP 1 Location: Left arm, BP Patient Position: Sitting) (!) 54 99.3 °F (37.4 °C) (Oral) 20 5' 4\" (1.626 m) 245 lb (111.1 kg) SpO2 BMI OB Status Smoking Status 100% 42.05 kg/m2 Having regular periods Never Smoker Vitals History BMI and BSA Data Body Mass Index Body Surface Area 42.05 kg/m 2 2.24 m 2 Preferred Pharmacy Pharmacy Name Phone [facility] [address] [facility] [phone] Your Updated Medication List  
  
   
This list is accurate as of: 2/19/18  3:04 PM.  Always use your most recent med list.  
  
  
  
  
 aspirin delayed-release 81 mg tablet Take  by mouth daily. atorvastatin 40 mg tablet Commonly known as:  LIPITOR Take  by mouth daily. bumetanide 2 mg tablet Commonly known as:  Susan Damon Take 2 mg by mouth daily. carvedilol 6.25 mg tablet Commonly known as:  Yeimy Fountainy Take 12.5 mg by mouth two (2) times daily (with meals). cyanocobalamin 500 mcg tablet Commonly known as:  VITAMIN B-12 Take 1 Tab by mouth daily. folic acid 1 mg tablet Commonly known as:  Google Take  by mouth daily. gabapentin 800 mg tablet Commonly known as:  NEURONTIN Take 1 Tab by mouth three (3) times daily. glucose blood VI test strips strip Commonly known as:  ASCENSIA AUTODISC VI, ONE TOUCH ULTRA TEST VI  
Daily verio one touch up to 3 times a day  
  
 insulin aspart U-100 100 unit/mL Inpn Commonly known as:  NovoLOG Flexpen U-100 Insulin  
7 Units by SubCUTAneous route Before breakfast, lunch, and dinner. insulin glargine 100 unit/mL (3 mL) Inpn Commonly known as:  Carmella Goldberg  
 45 Units by SubCUTAneous route nightly. oxyCODONE-acetaminophen 5-325 mg per tablet Commonly known as:  PERCOCET Take 1 Tab by mouth every eight (8) hours as needed for Pain. Max Daily Amount: 3 Tabs. polyethylene glycol 17 gram packet Commonly known as:  [place] Take 1 Packet by mouth daily. Prescriptions Printed Refills  
 oxyCODONE-acetaminophen (PERCOCET) 5-325 mg per tablet 0 Sig: Take 1 Tab by mouth every eight (8) hours as needed for Pain. Max Daily Amount: 3 Tabs. Class: Print Route: Oral  
  
We Performed the Following AMB POC URINALYSIS DIP STICK AUTO W/ MICRO [65849 CPT(R)] CBC W/O DIFF [53626 CPT(R)] METABOLIC PANEL, BASIC [25417 CPT(R)] MICROALBUMIN, UR, RAND W/ MICROALBUMIN/CREA RATIO [id number] CPT(R)] PTH INTACT [68017 CPT(R)] VITAMIN D, 25 HYDROXY [id number] CPT(R)] Follow-up Instructions Return in about 11 days (around 3/2/2018) for routine follow up. To-Do List   
 02/19/2018 Imaging:  XR HIP LT W OR WO PELV 2-3 VWS Introducing [facility]! Joanne Dunn introduces [employer] patient portal. Now you can access parts of your medical record, email your doctor's office, and request medication refills online. 1. In your internet browser, go to [email or web address] 2. Click on the First Time User? Click Here link in the Sign In box. You will see the New Member Sign Up page. 3. Enter your [employer] Access Code exactly as it appears below. You will not need to use this code after youve completed the sign-up process. If you do not sign up before the expiration date, you must request a new code. · [employer] Access Code: [email or web address] Expires: 5/6/2018  2:38 PM 
 
4. Enter the last four digits of your Social Security Number (xxxx) and Date of Birth (mm/dd/yyyy) as indicated and click Submit. You will be taken to the next sign-up page. 5. Create a [employer] ID. This will be your [employer] login ID and cannot be changed, so think of one that is secure and easy to remember. 6. Create a [employer] password. You can change your password at any time. 7. Enter your Password Reset Question and Answer. This can be used at a later time if you forget your password. 8. Enter your e-mail address. You will receive e-mail notification when new information is available in [address] 9. Click Sign Up. You can now view and download portions of your medical record. 10. Click the Download Summary menu link to download a portable copy of your medical information. If you have questions, please visit the Frequently Asked Questions section of the [employer] website. Remember, [employer] is NOT to be used for urgent needs. For medical emergencies, dial 911. Now available from your iPhone and Android! Please provide this summary of care documentation to your next provider. Your primary care clinician is listed as Yeison July. If you have any questions after today's visit, please call [phone].

## 2018-02-19 NOTE — LETTER
2/26/2018 10:05 AM 
 
Ms. [place] Evans [address] [address] [place] [phone] Dear West Evans: 
 
Discuss results with your kidney doctor. You might benefit from a type of blood pressure medicine which would protect your kidneys from the diabetes. Your results are normal/stable. If not signed up, consider getting my chart to get your results on-line. We can help you to sign up. Please find your most recent results below. Resulted Orders MICROALBUMIN, UR, RAND W/ MICROALBUMIN/CREA RATIO Result Value Ref Range Creatinine, urine 105.0 Not Estab. mg/dL Microalbumin, urine 41.9 Not Estab. ug/mL Microalb/Creat ratio (ug/mg creat.) 39.9 (H) 0.0 - 30.0 Narrative Performed at:  [address]  [id number] : Jarrett Huertas MD, Phone:  [phone] PTH INTACT Result Value Ref Range PTH, Intact CANCELED pg/mL Comment:  
   No plasma specimen received. Result canceled by the ancillary Narrative Performed at:  [address]  [id number] : Jarrett Huertas MD, Phone:  [phone] VITAMIN D, 25 HYDROXY Result Value Ref Range VITAMIN D, 25-HYDROXY 63.9 30.0 - 100.0 ng/mL Comment:  
   Vitamin D deficiency has been defined by the [address] [place] [address] practice guideline as a 
level of serum 25-OH vitamin D less than 20 ng/mL (1,2). The Endocrine Society went on to further define vitamin D 
insufficiency as a level between 21 and 29 ng/mL (2). 1. IOM ([place] of [employer]). 2010. Dietary reference 
   intakes for calcium and D. [facility]: The 
   URBANARA. 2. Marely MF, Petar NC, Tamanna BARLOW, et al. 
   Evaluation, treatment, and prevention of vitamin D 
   deficiency: an Endocrine Society clinical practice 
   guideline. JCEM. 2011 Jul; 96(7):1911-30. Narrative Performed at:  [address]  [id number] : Brandie Mckeon MD, Phone:  [phone] METABOLIC PANEL, BASIC Result Value Ref Range Glucose 181 (H) 65 - 99 mg/dL BUN 20 6 - 24 mg/dL Creatinine 1.38 (H) 0.57 - 1.00 mg/dL GFR est non-AA 44 (L) >59 GFR est AA 51 (L) >59 BUN/Creatinine ratio 14 9 - 23 Sodium 143 134 - 144 mmol/L Potassium 4.2 3.5 - 5.2 mmol/L Chloride 109 (H) 96 - 106 mmol/L  
 CO2 17 (L) 18 - 29 mmol/L Calcium 9.1 8.7 - 10.2 mg/dL Narrative Performed at:  [address]  [id number] : Brandie Mckeon MD, Phone:  [phone] AMB POC URINALYSIS DIP STICK AUTO W/ MICRO Result Value Ref Range Color (UA POC) Yellow Clarity (UA POC) Clear Glucose (UA POC) Negative Negative Bilirubin (UA POC) Negative Negative Ketones (UA POC) Negative Negative Specific gravity (UA POC) 1.015 1.001 - 1.035 Blood (UA POC) 3+ Negative Comment:  
   moderate pH (UA POC) 5.5 4.6 - 8.0 Protein (UA POC) Negative Negative Urobilinogen (UA POC) 0.2 mg/dL 0.2 - 1 Nitrites (UA POC) Negative Negative Leukocyte esterase (UA POC) Negative Negative CBC W/O DIFF Result Value Ref Range WBC 6.0 3.4 - 10.8 x10E3/uL  
 RBC 4.35 3.77 - 5.28 x10E6/uL HGB 10.6 (L) 11.1 - 15.9 g/dL HCT 32.5 (L) 34.0 - 46.6 % MCV 75 (L) 79 - 97 fL  
 MCH 24.4 (L) 26.6 - 33.0 pg  
 MCHC 32.6 31.5 - 35.7 g/dL RDW 21.0 (H) 12.3 - 15.4 % PLATELET 746 (L) 488 - 379 x10E3/uL Hematology comments: Note:   
   Comment:  
   Verified by microscopic examination. Narrative Performed at:  [address]  [id number] : Brandie Mckeon MD, Phone:  [phone] CKD REPORT Result Value Ref Range Interpretation Note Comment:  
   Supplemental report is available. Narrative Performed at:  [address] [place] [address]  [id number] : Ava Correa PhD, Phone:  [phone] Please call me if you have any questions: [phone] Sincerely, Yoselin Olivarez MD

## 2018-02-20 LAB
25(OH)D3+25(OH)D2 SERPL-MCNC: 63.9 NG/ML (ref 30–100)
ALBUMIN/CREAT UR: 39.9 (ref 0–30)
BUN SERPL-MCNC: 20 MG/DL (ref 6–24)
BUN/CREAT SERPL: 14 (ref 9–23)
CALCIUM SERPL-MCNC: 9.1 MG/DL (ref 8.7–10.2)
CHLORIDE SERPL-SCNC: 109 MMOL/L (ref 96–106)
CO2 SERPL-SCNC: 17 MMOL/L (ref 18–29)
CREAT SERPL-MCNC: 1.38 MG/DL (ref 0.57–1)
CREAT UR-MCNC: 105 MG/DL
ERYTHROCYTE [DISTWIDTH] IN BLOOD BY AUTOMATED COUNT: 21 % (ref 12.3–15.4)
GFR SERPLBLD CREATININE-BSD FMLA CKD-EPI: 44 ML/MIN/{1.73_M2}
GFR SERPLBLD CREATININE-BSD FMLA CKD-EPI: 51 ML/MIN/{1.73_M2}
GLUCOSE SERPL-MCNC: 181 MG/DL (ref 65–99)
HCT VFR BLD AUTO: 32.5 % (ref 34–46.6)
HGB BLD-MCNC: 10.6 G/DL (ref 11.1–15.9)
INTERPRETATION: NORMAL
MCH RBC QN AUTO: 24.4 PG (ref 26.6–33)
MCHC RBC AUTO-ENTMCNC: 32.6 G/DL (ref 31.5–35.7)
MCV RBC AUTO: 75 FL (ref 79–97)
MICROALBUMIN UR-MCNC: 41.9 UG/ML
MORPHOLOGY BLD-IMP: ABNORMAL
PLATELET # BLD AUTO: 130 X10E3/UL (ref 150–379)
POTASSIUM SERPL-SCNC: 4.2 MMOL/L (ref 3.5–5.2)
PTH-INTACT SERPL-MCNC: NORMAL PG/ML
RBC # BLD AUTO: 4.35 X10E6/UL (ref 3.77–5.28)
SODIUM SERPL-SCNC: 143 MMOL/L (ref 134–144)
WBC # BLD AUTO: 6 X10E3/UL (ref 3.4–10.8)

## 2018-02-20 NOTE — PROGRESS NOTES
Discuss results with your kidney doctor. You might benefit from a type of blood pressure medicine which would protect your kidneys from the diabetes. Send normal/stable results letter. Your results are normal/stable. If not signed up, consider getting my chart to get your results on-line. We can help you to sign up.

## 2018-02-26 RX ORDER — FOLIC ACID 1 MG/1
1 TABLET ORAL DAILY
Qty: 90 TAB | Refills: 1 | Status: SHIPPED | OUTPATIENT
Start: 2018-02-26 | End: 2018-07-25 | Stop reason: ALTCHOICE

## 2018-02-26 RX ORDER — ATORVASTATIN CALCIUM 40 MG/1
40 TABLET, FILM COATED ORAL DAILY
Qty: 90 TAB | Refills: 1 | Status: SHIPPED | OUTPATIENT
Start: 2018-02-26 | End: 2018-03-02 | Stop reason: RX

## 2018-02-26 NOTE — TELEPHONE ENCOUNTER
Requested Prescriptions     Pending Prescriptions Disp Refills    atorvastatin (LIPITOR) 40 mg tablet       Sig: Take  by mouth daily.  folic acid (FOLVITE) 1 mg tablet       Sig: Take  by mouth daily.

## 2018-02-26 NOTE — TELEPHONE ENCOUNTER
Requested Prescriptions     Pending Prescriptions Disp Refills    atorvastatin (LIPITOR) 40 mg tablet 90 Tab 1     Sig: Take 1 Tab by mouth daily.  folic acid (FOLVITE) 1 mg tablet 90 Tab 1     Sig: Take 1 Tab by mouth daily.      Last office visit 2/19/18 future 3/2/18  Last filled  2/12/18 - 2/12/18  Changes made to medication on last visit  none

## 2018-03-01 ENCOUNTER — TELEPHONE (OUTPATIENT)
Dept: INTERNAL MEDICINE CLINIC | Age: 53
End: 2018-03-01

## 2018-03-01 NOTE — TELEPHONE ENCOUNTER
Pt called in reference to [facility] not carrying her cholesterol medication. They are on back order. The pharmacist recommended for the doctor to call in another strength so that she can receive this medication. Please call her at [phone].

## 2018-03-02 ENCOUNTER — OFFICE VISIT (OUTPATIENT)
Dept: INTERNAL MEDICINE CLINIC | Age: 53
End: 2018-03-02

## 2018-03-02 VITALS
BODY MASS INDEX: 40.97 KG/M2 | OXYGEN SATURATION: 100 % | WEIGHT: 240 LBS | SYSTOLIC BLOOD PRESSURE: 139 MMHG | HEIGHT: 64 IN | RESPIRATION RATE: 18 BRPM | DIASTOLIC BLOOD PRESSURE: 77 MMHG | TEMPERATURE: 97.8 F | HEART RATE: 98 BPM

## 2018-03-02 DIAGNOSIS — M25.552 LEFT HIP PAIN: ICD-10-CM

## 2018-03-02 DIAGNOSIS — E11.21 TYPE 2 DIABETES MELLITUS WITH NEPHROPATHY (HCC): ICD-10-CM

## 2018-03-02 DIAGNOSIS — E11.40 TYPE 2 DIABETES MELLITUS WITH DIABETIC NEUROPATHY, WITH LONG-TERM CURRENT USE OF INSULIN (HCC): ICD-10-CM

## 2018-03-02 DIAGNOSIS — E11.42 DIABETIC POLYNEUROPATHY ASSOCIATED WITH TYPE 2 DIABETES MELLITUS (HCC): ICD-10-CM

## 2018-03-02 DIAGNOSIS — I50.21 ACUTE SYSTOLIC CONGESTIVE HEART FAILURE (HCC): Primary | ICD-10-CM

## 2018-03-02 DIAGNOSIS — Z79.4 TYPE 2 DIABETES MELLITUS WITH DIABETIC NEUROPATHY, WITH LONG-TERM CURRENT USE OF INSULIN (HCC): ICD-10-CM

## 2018-03-02 DIAGNOSIS — M17.12 ARTHRITIS OF LEFT KNEE: ICD-10-CM

## 2018-03-02 RX ORDER — OXYCODONE AND ACETAMINOPHEN 5; 325 MG/1; MG/1
1 TABLET ORAL
Qty: 15 TAB | Refills: 0 | Status: SHIPPED | OUTPATIENT
Start: 2018-03-02 | End: 2018-03-19 | Stop reason: ALTCHOICE

## 2018-03-02 RX ORDER — INSULIN ASPART 100 [IU]/ML
9 INJECTION, SOLUTION INTRAVENOUS; SUBCUTANEOUS
Qty: 5 PEN | Refills: 5 | Status: SHIPPED | OUTPATIENT
Start: 2018-03-02 | End: 2018-05-15 | Stop reason: ALTCHOICE

## 2018-03-02 RX ORDER — SIMVASTATIN 40 MG/1
40 TABLET, FILM COATED ORAL
Qty: 90 TAB | Refills: 1 | Status: SHIPPED | OUTPATIENT
Start: 2018-03-02 | End: 2018-03-30 | Stop reason: ALTCHOICE

## 2018-03-02 NOTE — PROGRESS NOTES
Subjective:     Vlad Acuna is an 47 y.o. female who is seen for follow up of CHF. She has been seeing a cardiologist in [facility], tells me that he is [place] [facility] type of heart [place]. No records in chart as to his evaluation. She also sees a local cardiologist but has switched. Also sees her kidney doctor for some renal insufficiency. Complains of some left knee pain for the last few days. No new injury. She is asking for a sitdown Rollator. Has a lot of pain issues and is frequently on narcotics. She has diabetes, hypertension, hyperlipidemia and CHF. States that her blood sugars are higher than she likes a run to the 100s-200s. No hypoglycemia. Last A1c last month was mildly elevated at 8.6. Diet and Lifestyle: generally follows a low sodium diet, nonsmoker. Follows a diabetic diet in general.  Weight monitoring: has decreased 4 pounds over last few weeks  Was put on Lipitor [employer] having trouble getting. Cardiovascular System Review  Cardiovascular ROS - taking medications as instructed, no medication side effects noted, no chest pain on exertion, notes stable dyspnea on exertion, no change, no swelling of ankles.     Patient Active Problem List    Diagnosis Date Noted    Acute systolic congestive heart failure ([address] 02/07/2018    Renal failure (ARF), acute on chronic ([address] 02/07/2018    Type 2 diabetes mellitus with diabetic neuropathy ([address] 01/08/2018    Obesity, morbid ([address] 12/18/2017    Type 2 diabetes mellitus with nephropathy ([address] 12/18/2017    Spinal stenosis of lumbosacral region 12/08/2016    Lumbar back pain with radiculopathy affecting left lower extremity 12/08/2016    Lumbar back pain with radiculopathy affecting right lower extremity 12/08/2016    Idiopathic small and large fiber sensory neuropathy 11/16/2016    Chronic inflammatory demyelinating polyneuropathy ([address] 11/16/2016    Diabetic polyneuropathy associated with type 2 diabetes mellitus ([facility] 06/15/2016    Diabetes mellitus type 2, controlled ([facility] 05/24/2016    Narcotic dependence, episodic use ([facility] 04/29/2016    Chronic pain     Hypertension 11/07/2012     Allergies   Allergen Reactions    Lortab [Hydrocodone-Acetaminophen] Nausea and Vomiting    Cymbalta [Duloxetine] Diarrhea    Lasix [Furosemide] Swelling    Lyrica [Pregabalin] Drowsiness     Social History   Substance Use Topics    Smoking status: Never Smoker    Smokeless tobacco: Never Used    Alcohol use No        Lab Results  Component Value Date/Time   WBC 6.0 02/19/2018 02:29 PM   HGB 10.6 (L) 02/19/2018 02:29 PM   HCT 32.5 (L) 02/19/2018 02:29 PM   PLATELET 161 (L) 12/82/4410 02:29 PM   MCV 75 (L) 02/19/2018 02:29 PM     Lab Results  Component Value Date/Time   Hemoglobin A1c 8.6 (H) 02/13/2018 12:00 AM   Hemoglobin A1c 7.4 (H) 02/13/2017 12:20 PM   Hemoglobin A1c 6.6 (H) 08/15/2016 02:35 PM   Glucose 181 (H) 02/19/2018 02:29 PM   Glucose (POC) 173 (H) 12/18/2015 11:17 AM   Microalb/Creat ratio (ug/mg creat.) 39.9 (H) 02/19/2018 02:29 PM   LDL, calculated 72 02/13/2018 12:00 AM   Creatinine 1.38 (H) 02/19/2018 02:29 PM      Lab Results  Component Value Date/Time   Cholesterol, total 126 02/13/2018 12:00 AM   HDL Cholesterol 37 (L) 02/13/2018 12:00 AM   LDL, calculated 72 02/13/2018 12:00 AM   Triglyceride 84 02/13/2018 12:00 AM     Lab Results  Component Value Date/Time   ALT (SGPT) 13 08/24/2017 10:33 PM   AST (SGOT) 10 (L) 08/24/2017 10:33 PM   Alk.  phosphatase 109 08/24/2017 10:33 PM   Bilirubin, total 0.4 08/24/2017 10:33 PM   Albumin 3.5 08/24/2017 10:33 PM   Protein, total 7.4 08/24/2017 10:33 PM   INR 1.1 12/08/2015 04:42 PM   Prothrombin time 11.2 12/08/2015 04:42 PM   PLATELET 023 (L) 85/55/6424 02:29 PM       Lab Results  Component Value Date/Time   GFR est non-AA 44 (L) 02/19/2018 02:29 PM   GFR est AA 51 (L) 02/19/2018 02:29 PM   Creatinine 1.38 (H) 02/19/2018 02:29 PM   BUN 20 02/19/2018 02:29 PM   Sodium 143 02/19/2018 02:29 PM   Potassium 4.2 02/19/2018 02:29 PM   Chloride 109 (H) 02/19/2018 02:29 PM   CO2 17 (L) 02/19/2018 02:29 PM   Magnesium 1.4 (L) 11/16/2016 12:04 AM   PTH, Intact CANCELED 02/19/2018 02:29 PM     Lab Results  Component Value Date/Time   TSH 1.780 02/13/2018 12:00 AM   T4, Free 1.1 01/30/2014 10:48 PM      Lab Results   Component Value Date/Time    Glucose 181 (H) 02/19/2018 02:29 PM    Glucose (POC) 173 (H) 12/18/2015 11:17 AM         Review of Systems, additional:  Pertinent items are noted in HPI. Objective:     Visit Vitals    /77 (BP 1 Location: Left arm, BP Patient Position: Sitting)    Pulse 98    Temp 97.8 °F (36.6 °C) (Oral)    Resp 18    Ht 5' 4\" (1.626 m)    Wt 240 lb (108.9 kg)    LMP 12/03/2017    SpO2 100%    BMI 41.2 kg/m2     General:  alert, fatigued, cooperative, no distress, appears older than stated age, moderately obese   Neck: supple, no significant adenopathy, no JVD   Chest Wall: inspection normal - no chest wall deformities or tenderness, respiratory effort normal   Lung: clear to auscultation bilaterally   Heart:  normal rate, regular rhythm, normal S1, S2, no murmurs, rubs, clicks or gallops   Abdomen: soft, non-tender. Bowel sounds normal. No masses,  no organomegaly   Extremities: extremities normal, atraumatic, no cyanosis or edema       Lab review: labs reviewed, I note that glycosylated hemoglobin mildly abnormal but acceptable. Assessment/Plan:     diabetes borderline controlled, needs further observation, hypertension stable, hyperlipidemia stable. Chronic Conditions Addressed Today     1. Type 2 diabetes mellitus with nephropathy (HCC)     Relevant Medications     insulin aspart U-100 (NOVOLOG FLEXPEN U-100 INSULIN) 100 unit/mL inpn     simvastatin (ZOCOR) 40 mg tablet    2.  Type 2 diabetes mellitus with diabetic neuropathy (HCC)     Relevant Medications     oxyCODONE-acetaminophen (PERCOCET) 5-325 mg per tablet     insulin aspart U-100 (NOVOLOG FLEXPEN U-100 INSULIN) 100 unit/mL inpn     simvastatin (ZOCOR) 40 mg tablet    3. Diabetic polyneuropathy associated with type 2 diabetes mellitus (HCC)     Relevant Medications     oxyCODONE-acetaminophen (PERCOCET) 5-325 mg per tablet     insulin aspart U-100 (NOVOLOG FLEXPEN U-100 INSULIN) 100 unit/mL inpn     simvastatin (ZOCOR) 40 mg tablet    4. Acute systolic congestive heart failure (HCC) - Primary     Relevant Medications     simvastatin (ZOCOR) 40 mg tablet      Acute Diagnoses Addressed Today     Arthritis of left knee            Relevant Medications        oxyCODONE-acetaminophen (PERCOCET) 5-325 mg per tablet        Other Relevant Orders        AMB SUPPLY ORDER    Left hip pain            Orders Placed This Encounter    AMB SUPPLY ORDER     Walker rollator with seat    oxyCODONE-acetaminophen (PERCOCET) 5-325 mg per tablet     Sig: Take 1 Tab by mouth every eight (8) hours as needed for Pain. Max Daily Amount: 3 Tabs. Dispense:  15 Tab     Refill:  0    insulin aspart U-100 (NOVOLOG FLEXPEN U-100 INSULIN) 100 unit/mL inpn     Si Units by SubCUTAneous route Before breakfast, lunch, and dinner. Dispense:  5 Pen     Refill:  5    simvastatin (ZOCOR) 40 mg tablet     Sig: Take 1 Tab by mouth nightly. Dispense:  90 Tab     Refill:  1     Short course of Percocet for her knee arthritis. Try and get her a Rollator. Increase her insulin by a small amount as above. If the Lipitor does not come in can switch to simvastatin. No records on her visit the cardiologist or nephrologist will try and get those records. Because of her congestive heart failure is unclear at this point, hopefully it will resolve on its own. Current Outpatient Prescriptions   Medication Sig Dispense Refill    oxyCODONE-acetaminophen (PERCOCET) 5-325 mg per tablet Take 1 Tab by mouth every eight (8) hours as needed for Pain. Max Daily Amount: 3 Tabs.  15 Tab 0    insulin aspart U-100 (NOVOLOG FLEXPEN U-100 INSULIN) 100 unit/mL inpn 9 Units by SubCUTAneous route Before breakfast, lunch, and dinner. 5 Pen 5    simvastatin (ZOCOR) 40 mg tablet Take 1 Tab by mouth nightly. 90 Tab 1    folic acid (FOLVITE) 1 mg tablet Take 1 Tab by mouth daily. 90 Tab 1    insulin glargine (LANTUS,BASAGLAR) 100 unit/mL (3 mL) inpn 45 Units by SubCUTAneous route nightly. 5 Pen 5    cyanocobalamin (VITAMIN B-12) 500 mcg tablet Take 1 Tab by mouth daily. 30 Tab 5    glucose blood VI test strips (ASCENSIA AUTODISC VI, ONE TOUCH ULTRA TEST VI) strip Daily verio one touch up to 3 times a day 100 Strip 5    bumetanide (BUMEX) 2 mg tablet Take 2 mg by mouth daily.  carvedilol (COREG) 6.25 mg tablet Take 12.5 mg by mouth two (2) times daily (with meals).  polyethylene glycol (MIRALAX) 17 gram packet Take 1 Packet by mouth daily. 30 Packet 1    gabapentin (NEURONTIN) 800 mg tablet Take 1 Tab by mouth three (3) times daily. (Patient taking differently: Take 400 mg by mouth.) 90 Tab 5    aspirin delayed-release 81 mg tablet Take  by mouth daily. We discussed this pain and the usage of controlled substances for knee arthritis relief. In general these medications are indicated for the acute symptom relief and not for chronic usage, allthough they are frequently used for chronic management  After a certain period of time they should be stopped to avoid dependence and/or addiction. I warned her about the dangers of addiction and other side affects including respiratory depression and death. Discussed how this is a controlled substance and that the prescribing of it is should be monitored very carefully. Drug usage is also monitored by our practise and the [facility], since there has been a lot of abuse and diversion of controlled substances. In general we do not refill this medication over the phone. PLease ask for enough pills to get you to your next appointment and make sure you keep your appointments.  Warned not to take other medications like alcohol, other benzodiazapines marijuana or other narcotics when on this medication. Follow-up Disposition:  Return in about 4 weeks (around 3/30/2018) for routine follow up.

## 2018-03-02 NOTE — MR AVS SNAPSHOT
[address] [place] [address]. [id number] [facility] 
[phone] Patient: Madison Speaker MRN: [id number] :1965 Visit Information Date & Time Provider Department Dept. Phone Encounter #  
 3/2/2018  2:30 PM Harley Mcfadden  [facility] [id number] Follow-up Instructions Return in about 4 weeks (around 3/30/2018) for routine follow up. Upcoming Health Maintenance Date Due FOBT Q 1 YEAR AGE 50-75 2016 PAP AKA CERVICAL CYTOLOGY 9/3/2017 EYE EXAM RETINAL OR DILATED Q1 10/25/2017 HEMOGLOBIN A1C Q6M 2018 FOOT EXAM Q1 2018 LIPID PANEL Q1 2019 MICROALBUMIN Q1 2019 BREAST CANCER SCRN MAMMOGRAM 3/22/2019 DTaP/Tdap/Td series (2 - Td) 3/13/2027 Allergies as of 3/2/2018  Review Complete On: 3/2/2018 By: Andres Vance LPN Severity Noted Reaction Type Reactions Lortab [Hydrocodone-acetaminophen] High 2013   Intolerance Nausea and Vomiting Cymbalta [Duloxetine]  10/04/2016    Diarrhea Lasix [Furosemide]  2018    Swelling Lyrica [Pregabalin]  2017    Drowsiness Current Immunizations  Reviewed on 2016 No immunizations on file. Not reviewed this visit You Were Diagnosed With   
  
 Codes Comments Acute systolic congestive heart failure ([facility]    -  Primary ICD-10-CM: I50.21 ICD-9-CM: 428.21, 428.0 Arthritis of left knee     ICD-10-CM: M17.12 
ICD-9-CM: 716.96 Diabetic polyneuropathy associated with type 2 diabetes mellitus ([facility]     ICD-10-CM: E11.42 
ICD-9-CM: 250.60, 357.2 Type 2 diabetes mellitus with nephropathy (HCC)     ICD-10-CM: E11.21 
ICD-9-CM: 250.40, 583.81 Left hip pain     ICD-10-CM: M25.552 ICD-9-CM: 719.45 Type 2 diabetes mellitus with diabetic neuropathy, with long-term current use of insulin (HCC)     ICD-10-CM: E11.40, Z79.4 ICD-9-CM: 250.60, 357.2, V58.67 Vitals BP Pulse Temp Resp Height(growth percentile) Weight(growth percentile) 139/77 (BP 1 Location: Left arm, BP Patient Position: Sitting) 98 97.8 °F (36.6 °C) (Oral) 18 5' 4\" (1.626 m) 240 lb (108.9 kg) LMP SpO2 BMI OB Status Smoking Status 12/03/2017 100% 41.2 kg/m2 Postmenopausal Never Smoker BMI and BSA Data Body Mass Index Body Surface Area  
 41.2 kg/m 2 2.22 m 2 Preferred Pharmacy Pharmacy Name Phone [facility] [place] [phone] [facility] [phone] Your Updated Medication List  
  
   
This list is accurate as of 3/2/18  3:16 PM.  Always use your most recent med list.  
  
  
  
  
 aspirin delayed-release 81 mg tablet Take  by mouth daily. bumetanide 2 mg tablet Commonly known as:  Mirtha Narendra Take 2 mg by mouth daily. carvedilol 6.25 mg tablet Commonly known as:  Manus Saray Take 12.5 mg by mouth two (2) times daily (with meals). cyanocobalamin 500 mcg tablet Commonly known as:  VITAMIN B-12 Take 1 Tab by mouth daily. folic acid 1 mg tablet Commonly known as:  [employer] Take 1 Tab by mouth daily. gabapentin 800 mg tablet Commonly known as:  NEURONTIN Take 1 Tab by mouth three (3) times daily. glucose blood VI test strips strip Commonly known as:  ASCENSIA AUTODISC VI, ONE TOUCH ULTRA TEST VI  
Daily verio one touch up to 3 times a day  
  
 insulin aspart U-100 100 unit/mL Inpn Commonly known as:  NovoLOG Flexpen U-100 Insulin  
9 Units by SubCUTAneous route Before breakfast, lunch, and dinner. insulin glargine 100 unit/mL (3 mL) Inpn Commonly known as:  AGUILAR RAMOSAGLAR  
45 Units by SubCUTAneous route nightly. oxyCODONE-acetaminophen 5-325 mg per tablet Commonly known as:  PERCOCET Take 1 Tab by mouth every eight (8) hours as needed for Pain. Max Daily Amount: 3 Tabs. polyethylene glycol 17 gram packet Commonly known as:  Nicolle Charleroi Take 1 Packet by mouth daily. simvastatin 40 mg tablet Commonly known as:  ZOCOR Take 1 Tab by mouth nightly. Prescriptions Printed Refills  
 oxyCODONE-acetaminophen (PERCOCET) 5-325 mg per tablet 0 Sig: Take 1 Tab by mouth every eight (8) hours as needed for Pain. Max Daily Amount: 3 Tabs. Class: Print Route: Oral  
  
Prescriptions Sent to Pharmacy Refills  
 insulin aspart U-100 (NOVOLOG FLEXPEN U-100 INSULIN) 100 unit/mL inpn 5 Si Units by SubCUTAneous route Before breakfast, lunch, and dinner. Class: Normal  
 Pharmacy: [address] [facility]  [facility] [address] [facility] Ph #: [phone] Route: SubCUTAneous  
 simvastatin (ZOCOR) 40 mg tablet 1 Sig: Take 1 Tab by mouth nightly. Class: Normal  
 Pharmacy: [address] [facility]  [facility], [address] [facility] Ph #: [phone] Route: Oral  
  
We Performed the Following AMB SUPPLY ORDER [[id number] Custom] Comments:  
 Walker rollator with seat Follow-up Instructions Return in about 4 weeks (around 3/30/2018) for routine follow up. Introducing [facility]! [facility] introduces Root4 patient portal. Now you can access parts of your medical record, email your doctor's office, and request medication refills online. 1. In your internet browser, go to [email or web address] 2. Click on the First Time User? Click Here link in the Sign In box. You will see the New Member Sign Up page. 3. Enter your Root4 Access Code exactly as it appears below. You will not need to use this code after youve completed the sign-up process. If you do not sign up before the expiration date, you must request a new code. · Root4 Access Code: [email or web address] Expires: 2018  2:38 PM 
 
4.  Enter the last four digits of your Social Security Number (xxxx) and Date of Birth (mm/dd/yyyy) as indicated and click Submit. You will be taken to the next sign-up page. 5. Create a "[employer] ID. This will be your "[employer] login ID and cannot be changed, so think of one that is secure and easy to remember. 6. Create a "[employer] password. You can change your password at any time. 7. Enter your Password Reset Question and Answer. This can be used at a later time if you forget your password. 8. Enter your e-mail address. You will receive e-mail notification when new information is available in [address] 9. Click Sign Up. You can now view and download portions of your medical record. 10. Click the Download Summary menu link to download a portable copy of your medical information. If you have questions, please visit the Frequently Asked Questions section of the "[employer] website. Remember, "[employer] is NOT to be used for urgent needs. For medical emergencies, dial 911. Now available from your iPhone and Android! Please provide this summary of care documentation to your next provider. Your primary care clinician is listed as Pati Zuniga. If you have any questions after today's visit, please call [phone].

## 2018-03-02 NOTE — PROGRESS NOTES
Chief Complaint   Patient presents with    Knee Pain     L/knee pain    Diabetes     follow up     I have reviewed the patient's medical history in detail and updated the computerized patient record. Health Maintenance reviewed. 1. Have you been to the ER, urgent care clinic since your last visit? Hospitalized since your last visit?no    2. Have you seen or consulted any other health care providers outside of the [facility] since your last visit? Include any pap smears or colon screening. No    Encouraged pt to discuss pt's wishes with spouse/partner/family and bring them in the next appt to follow thru with the Advanced Directive    Fall Risk Assessment, last 12 mths 1/8/2018   Able to walk? Yes   Fall in past 12 months? Yes   Fall with injury? Yes   Number of falls in past 12 months 2   Fall Risk Score 3       PHQ over the last two weeks 3/2/2018   Little interest or pleasure in doing things Several days   Feeling down, depressed or hopeless Several days   Total Score PHQ 2 2       Abuse Screening Questionnaire 1/8/2018   Do you ever feel afraid of your partner? N   Are you in a relationship with someone who physically or mentally threatens you? N   Is it safe for you to go home?  Y       ADL Assessment 1/8/2018   Feeding yourself No Help Needed   Getting from bed to chair No Help Needed   Getting dressed No Help Needed   Bathing or showering No Help Needed   Walk across the room (includes cane/walker) No Help Needed   Using the telphone No Help Needed   Taking your medications No Help Needed   Preparing meals No Help Needed   Managing money (expenses/bills) No Help Needed   Moderately strenuous housework (laundry) No Help Needed   Shopping for personal items (toiletries/medicines) No Help Needed   Shopping for groceries No Help Needed   Driving No Help Needed   Climbing a flight of stairs No Help Needed   Getting to places beyond walking distances No Help Needed

## 2018-03-12 NOTE — TELEPHONE ENCOUNTER
Pt called to check on the status of her refill for her lantus, and gabapentin stated that she is on her last pen for for insulin use and that she really needs her medication asap would like sent to [address]

## 2018-03-13 ENCOUNTER — DOCUMENTATION ONLY (OUTPATIENT)
Dept: INTERNAL MEDICINE CLINIC | Age: 53
End: 2018-03-13

## 2018-03-13 NOTE — PROGRESS NOTES
3/13/18 Called [facility] pharmacy spoke with El Castillo stated patient called for Odette Juarez,not covered by plan. El Castillo stated Gordon De La Cruz is ready for pickup,she will call patient.

## 2018-03-13 NOTE — TELEPHONE ENCOUNTER
lantus has been replaced by [employer] - insurance denied coverage for Lantus - given to Janette Kocher, LPN  8/08/0021  68:06 PM

## 2018-03-14 ENCOUNTER — TELEPHONE (OUTPATIENT)
Dept: INTERNAL MEDICINE CLINIC | Age: 53
End: 2018-03-14

## 2018-03-14 DIAGNOSIS — Z79.4 TYPE 2 DIABETES MELLITUS WITH INSULIN THERAPY (HCC): Primary | ICD-10-CM

## 2018-03-14 DIAGNOSIS — E11.9 TYPE 2 DIABETES MELLITUS WITH INSULIN THERAPY (HCC): Primary | ICD-10-CM

## 2018-03-14 RX ORDER — PEN NEEDLE, DIABETIC 30 GX3/16"
NEEDLE, DISPOSABLE MISCELLANEOUS
Qty: 1 PACKAGE | Refills: 5 | Status: SHIPPED | OUTPATIENT
Start: 2018-03-14 | End: 2019-03-29 | Stop reason: SDUPTHER

## 2018-03-14 NOTE — TELEPHONE ENCOUNTER
Patient called in reference to needle the needle to go with her lantus pen. She is almost completely out. Please send to [address]

## 2018-03-19 ENCOUNTER — OFFICE VISIT (OUTPATIENT)
Dept: INTERNAL MEDICINE CLINIC | Age: 53
End: 2018-03-19

## 2018-03-19 VITALS
HEIGHT: 64 IN | RESPIRATION RATE: 16 BRPM | SYSTOLIC BLOOD PRESSURE: 130 MMHG | WEIGHT: 250 LBS | HEART RATE: 104 BPM | DIASTOLIC BLOOD PRESSURE: 75 MMHG | BODY MASS INDEX: 42.68 KG/M2 | TEMPERATURE: 99.5 F

## 2018-03-19 DIAGNOSIS — I50.21 ACUTE SYSTOLIC CONGESTIVE HEART FAILURE (HCC): ICD-10-CM

## 2018-03-19 DIAGNOSIS — E11.21 TYPE 2 DIABETES MELLITUS WITH NEPHROPATHY (HCC): ICD-10-CM

## 2018-03-19 DIAGNOSIS — G61.81 CHRONIC INFLAMMATORY DEMYELINATING POLYNEUROPATHY (HCC): ICD-10-CM

## 2018-03-19 DIAGNOSIS — M25.562 ACUTE PAIN OF LEFT KNEE: Primary | ICD-10-CM

## 2018-03-19 DIAGNOSIS — N18.30 CRI (CHRONIC RENAL INSUFFICIENCY), STAGE 3 (MODERATE) (HCC): ICD-10-CM

## 2018-03-19 PROBLEM — N18.9 RENAL FAILURE (ARF), ACUTE ON CHRONIC (HCC): Status: RESOLVED | Noted: 2018-02-07 | Resolved: 2018-03-19

## 2018-03-19 PROBLEM — N17.9 RENAL FAILURE (ARF), ACUTE ON CHRONIC (HCC): Status: RESOLVED | Noted: 2018-02-07 | Resolved: 2018-03-19

## 2018-03-19 RX ORDER — TORSEMIDE 10 MG/1
20 TABLET ORAL DAILY
COMMUNITY
End: 2018-03-30 | Stop reason: SDUPTHER

## 2018-03-19 RX ORDER — OXYCODONE AND ACETAMINOPHEN 5; 325 MG/1; MG/1
1 TABLET ORAL
Qty: 15 TAB | Refills: 0 | Status: SHIPPED | OUTPATIENT
Start: 2018-03-19 | End: 2018-03-30 | Stop reason: ALTCHOICE

## 2018-03-19 NOTE — PROGRESS NOTES
HISTORY OF PRESENT ILLNESS  Lilia Norton is a 47 y.o. female. Knee Pain   The history is provided by the patient. This is a new problem. The current episode started more than 1 week ago. The problem occurs hourly. The problem has not changed since onset. Pertinent negatives include no chest pain and no shortness of breath. Associated symptoms comments: Left knee right OK. Labs   Pertinent negatives include no chest pain and no shortness of breath. Her nephrologist wants her to get a BMP. She has mild chronic renal insufficiency  When last checked her creatinine was 1.38. No indications of any impending dialysis. Complains of some abdominal discomfort. Notes has not had a bowel movement in several days. Finished a course of narcotics recently for her arthritis. As bad neuropathy as well. Cardiology wants her to see a specialist over at [facility], she says might need pacemaker. Has had some swelling, nephrologist changed her diuretic. Breathing's been not too bad. Blood sugars have been okay 100s 200s. No hypoglycemia. Last A1c was 8.6 last month.     Patient Active Problem List   Diagnosis Code    Hypertension I10    Chronic pain G89.29    Narcotic dependence, episodic use ([facility] F11.20    Diabetes mellitus type 2, controlled ([facility] E11.9    Diabetic polyneuropathy associated with type 2 diabetes mellitus ([facility]) E11.42    Idiopathic small and large fiber sensory neuropathy G60.8    Chronic inflammatory demyelinating polyneuropathy ([facility]) G61.81    Spinal stenosis of lumbosacral region M48.07    Lumbar back pain with radiculopathy affecting left lower extremity M54.17    Lumbar back pain with radiculopathy affecting right lower extremity M54.17    Obesity, morbid ([facility]) E66.01    Type 2 diabetes mellitus with nephropathy ([facility]) E11.21    Type 2 diabetes mellitus with diabetic neuropathy ([facility]) M36.17    Acute systolic congestive heart failure ([facility]) I50.21     Social History     Social History    Marital status:      Spouse name: N/A    Number of children: N/A    Years of education: N/A     Occupational History    disabled      Social History Main Topics    Smoking status: Never Smoker    Smokeless tobacco: Never Used    Alcohol use No    Drug use: No    Sexual activity: Not Currently     Other Topics Concern    Not on file     Social History Narrative    Lives with daughter. Not working. Applying for disabilty. Review of Systems   Respiratory: Negative for shortness of breath. Cardiovascular: Negative for chest pain. Genitourinary: Negative for dysuria and hematuria. Musculoskeletal: Negative for falls. Skin: Negative for rash. Physical Exam  Visit Vitals    /75 (BP 1 Location: Left arm, BP Patient Position: Sitting)    Pulse (!) 104    Temp 99.5 °F (37.5 °C) (Oral)    Resp 16    Ht 5' 4\" (1.626 m)    Wt 250 lb (113.4 kg)    LMP 12/03/2017    BMI 42.91 kg/m2     WD WN female NAD  Heart RRR without murmers clicks or rubs  Lungs CTA  Abdo soft nontender  Ext slight bilateral edema, knees grossly unremarkable    ASSESSMENT and PLAN  Encounter Diagnoses   Name Primary?  Acute pain of left knee Yes    CRI (chronic renal insufficiency), stage 3 (moderate)     Acute systolic congestive heart failure (HCC)     Type 2 diabetes mellitus with nephropathy (HCC)     Chronic inflammatory demyelinating polyneuropathy (HCC)      Orders Placed This Encounter    METABOLIC PANEL, BASIC    REFERRAL TO CARDIOLOGY    torsemide (DEMADEX) 10 mg tablet    oxyCODONE-acetaminophen (PERCOCET) 5-325 mg per tablet   This will be the last Percocet prescription for this problem. Too much narcotic usage for this patient. She understands this. We discussed this pain and the usage of controlled substances for acute arthritis pain relief.  In general these medications are indicated for the acute symptom relief and not for chronic usage, allthough they are frequently used for chronic management  After a certain period of time they should be stopped to avoid dependence and/or addiction. I warned her about the dangers of addiction and other side affects including respiratory depression and death. Discussed how this is a controlled substance and that the prescribing of it is should be monitored very carefully. Drug usage is also monitored by our practise and the [facility], since there has been a lot of abuse and diversion of controlled substances. In general we do not refill this medication over the phone. PLease ask for enough pills to get you to your next appointment and make sure you keep your appointments. Warned not to take other medications like alcohol, other benzodiazapines marijuana or other narcotics when on this medication. If her knee pain were to persist and come in for an injection, but no more narcotics. Warned increasing constipation. Recommended MiraLAX for constipation. Labs per nephrology. Okay cardiology referral to [facility]. Follow-up Disposition:  Return in about 3 months (around 6/19/2018) for routine follow up.

## 2018-03-19 NOTE — PATIENT INSTRUCTIONS
For knee arthritis or nerve pains can try OTC capcasin cream (Zostrix). It's made out of chili peppers and is hot. It may burn your skin at first. You must use 3 -4 times a day to help. If it causes too much skin irritation stop it. Wash hands after using. DO NOT CONTACT EYES.

## 2018-03-19 NOTE — LETTER
3/21/2018 2:07 PM 
 
Ms. Jolley Duty [address] [place] [phone] Dear Samreen Duty: You have some mild to moderate kidney dysfunction, it appears to be stable.  Please follow-up with your nephrologist.  
 
Have already faxed a copy of these labs to your  nephrologist  
 
Please find your most recent results below. Resulted Orders METABOLIC PANEL, BASIC Result Value Ref Range Glucose 243 (H) 65 - 99 mg/dL BUN 29 (H) 6 - 24 mg/dL Creatinine 2.02 (H) 0.57 - 1.00 mg/dL GFR est non-AA 28 (L) >59 mL/min/1.73 GFR est AA 32 (L) >59 mL/min/1.73  
 BUN/Creatinine ratio 14 9 - 23 Sodium 142 134 - 144 mmol/L Potassium 3.8 3.5 - 5.2 mmol/L Chloride 109 (H) 96 - 106 mmol/L  
 CO2 16 (L) 18 - 29 mmol/L Calcium 9.2 8.7 - 10.2 mg/dL Narrative Performed at:  [address]  [id number] : Graeme Oden MD, Phone:  [phone] CKD REPORT Result Value Ref Range Interpretation Note Comment:  
   Supplemental report is available. Narrative Performed at:  [address] [place] [address]  [id number] : Clay Santoyo PhD, Phone:  [phone] Please call me if you have any questions: [phone] Sincerely, Hazle Hammans, MD

## 2018-03-19 NOTE — PROGRESS NOTES
Chief Complaint   Patient presents with    Knee Pain     L/knee pain    Labs     I have reviewed the patient's medical history in detail and updated the computerized patient record. Health Maintenance reviewed. 1. Have you been to the ER, urgent care clinic since your last visit? Hospitalized since your last visit?no    2. Have you seen or consulted any other health care providers outside of the [address] [place] [address] Kahl[place] since your last visit? Include any pap smears or colon screening. No    Encouraged pt to discuss pt's wishes with spouse/partner/family and bring them in the next appt to follow thru with the Advanced Directive    Fall Risk Assessment, last 12 mths 1/8/2018   Able to walk? Yes   Fall in past 12 months? Yes   Fall with injury? Yes   Number of falls in past 12 months 2   Fall Risk Score 3       PHQ over the last two weeks 3/19/2018   Little interest or pleasure in doing things Several days   Feeling down, depressed or hopeless Several days   Total Score PHQ 2 2       Abuse Screening Questionnaire 1/8/2018   Do you ever feel afraid of your partner? N   Are you in a relationship with someone who physically or mentally threatens you? N   Is it safe for you to go home?  Y       ADL Assessment 1/8/2018   Feeding yourself No Help Needed   Getting from bed to chair No Help Needed   Getting dressed No Help Needed   Bathing or showering No Help Needed   Walk across the room (includes cane/walker) No Help Needed   Using the telphone No Help Needed   Taking your medications No Help Needed   Preparing meals No Help Needed   Managing money (expenses/bills) No Help Needed   Moderately strenuous housework (laundry) No Help Needed   Shopping for personal items (toiletries/medicines) No Help Needed   Shopping for groceries No Help Needed   Driving No Help Needed   Climbing a flight of stairs No Help Needed   Getting to places beyond walking distances No Help Needed

## 2018-03-19 NOTE — MR AVS SNAPSHOT
[address] [facility] 
[phone] Patient: Mina Almanza MRN: [id number] :1965 Visit Information Date & Time Provider Department Dept. Phone Encounter #  
 3/19/2018  1:30 PM MD [address] [id number] Follow-up Instructions Return in about 3 months (around 2018) for routine follow up. Your Appointments 3/30/2018  2:30 PM  
ACUTE CARE with Venus Stephenson MD  
[facility]) Appt Note: f/u on dm 3/2/[address] [place] [address]  
[phone]  
  
   
 [address] [place] [address] [address] Upcoming Health Maintenance Date Due FOBT Q 1 YEAR AGE 50-75 2016 PAP AKA CERVICAL CYTOLOGY 9/3/2017 EYE EXAM RETINAL OR DILATED Q1 10/25/2017 HEMOGLOBIN A1C Q6M 2018 FOOT EXAM Q1 2018 LIPID PANEL Q1 2019 MICROALBUMIN Q1 2019 BREAST CANCER SCRN MAMMOGRAM 3/22/2019 DTaP/Tdap/Td series (2 - Td) 3/13/2027 Allergies as of 3/19/2018  Review Complete On: 3/19/2018 By: Bhavya Sarabia LPN Severity Noted Reaction Type Reactions Lortab [Hydrocodone-acetaminophen] High 2013   Intolerance Nausea and Vomiting Cymbalta [Duloxetine]  10/04/2016    Diarrhea Lasix [Furosemide]  2018    Swelling Lyrica [Pregabalin]  2017    Drowsiness Current Immunizations  Reviewed on 2016 No immunizations on file. Not reviewed this visit You Were Diagnosed With   
  
 Codes Comments Acute pain of left knee    -  Primary ICD-10-CM: S14.333 ICD-9-CM: 719.46   
 CRI (chronic renal insufficiency), stage 3 (moderate)     ICD-10-CM: N18.3 ICD-9-CM: 330. 3 Acute systolic congestive heart failure (HCC)     ICD-10-CM: I50.21 ICD-9-CM: 428.21, 428.0 Type 2 diabetes mellitus with nephropathy (HCC)     ICD-10-CM: E11.21 
ICD-9-CM: 250.40, 583.81 Chronic inflammatory demyelinating polyneuropathy (HCC)     ICD-10-CM: G61.81 
ICD-9-CM: 357.81 Vitals BP Pulse Temp Resp Height(growth percentile) Weight(growth percentile) 130/75 (BP 1 Location: Left arm, BP Patient Position: Sitting) (!) 104 99.5 °F (37.5 °C) (Oral) 16 5' 4\" (1.626 m) 250 lb (113.4 kg) LMP BMI OB Status Smoking Status 12/03/2017 42.91 kg/m2 Postmenopausal Never Smoker BMI and BSA Data Body Mass Index Body Surface Area 42.91 kg/m 2 2.26 m 2 Preferred Pharmacy Pharmacy Name Phone [facility] [address] [facility] [phone] Your Updated Medication List  
  
   
This list is accurate as of 3/19/18  2:30 PM.  Always use your most recent med list.  
  
  
  
  
 aspirin delayed-release 81 mg tablet Take  by mouth daily. carvedilol 6.25 mg tablet Commonly known as:  [place] Sloop Take 12.5 mg by mouth daily. cyanocobalamin 500 mcg tablet Commonly known as:  VITAMIN B-12 Take 1 Tab by mouth daily. folic acid 1 mg tablet Commonly known as:  Google Take 1 Tab by mouth daily. gabapentin 800 mg tablet Commonly known as:  NEURONTIN Take 1 Tab by mouth three (3) times daily. glucose blood VI test strips strip Commonly known as:  ASCENSIA AUTODISC VI, ONE TOUCH ULTRA TEST VI  
Daily verio one touch up to 3 times a day  
  
 insulin aspart U-100 100 unit/mL Inpn Commonly known as:  NovoLOG Flexpen U-100 Insulin  
9 Units by SubCUTAneous route Before breakfast, lunch, and dinner. insulin glargine 100 unit/mL (3 mL) Inpn Commonly known as:  LANTUSBASAGLAR  
45 Units by SubCUTAneous route nightly. Insulin Needles (Disposable) 31 gauge x 5/16\" Ndle For flexpen #100, 4 rf daily  
  
 oxyCODONE-acetaminophen 5-325 mg per tablet Commonly known as:  PERCOCET  
 Take 1 Tab by mouth every eight (8) hours as needed for Pain. Max Daily Amount: 3 Tabs. polyethylene glycol 17 gram packet Commonly known as:  [place] Michelle Take 1 Packet by mouth daily. simvastatin 40 mg tablet Commonly known as:  ZOCOR Take 1 Tab by mouth nightly. torsemide 10 mg tablet Commonly known as:  DEMADEX Take 20 mg by mouth daily. Prescriptions Printed Refills  
 oxyCODONE-acetaminophen (PERCOCET) 5-325 mg per tablet 0 Sig: Take 1 Tab by mouth every eight (8) hours as needed for Pain. Max Daily Amount: 3 Tabs. Class: Print Route: Oral  
  
We Performed the Following METABOLIC PANEL, BASIC [09860 CPT(R)] REFERRAL TO CARDIOLOGY [UTC73 Custom] Comments: CHF follow up has insulin controlled DM neuropathy Follow-up Instructions Return in about 3 months (around 6/19/2018) for routine follow up. Referral Information Referral ID Referred By Referred To  
  
 [id number] Yolanda MIGUEL MD   
   [address] [place] [address] Phone: [phone] Fax: [phone] Visits Status Start Date End Date 1 New Request 3/19/18 3/19/19 If your referral has a status of pending review or denied, additional information will be sent to support the outcome of this decision. Patient Instructions For knee arthritis or nerve pains can try OTC capcasin cream (Zostrix). It's made out of chili peppers and is hot. It may burn your skin at first. You must use 3 -4 times a day to help. If it causes too much skin irritation stop it. Wash hands after using. DO NOT CONTACT EYES. Introducing [facility]! Joanne Dunn introduces [employer] patient portal. Now you can access parts of your medical record, email your doctor's office, and request medication refills online. 1. In your internet browser, go to [email or web address] 2. Click on the First Time User? Click Here link in the Sign In box. You will see the New Member Sign Up page. 3. Enter your [employer] Access Code exactly as it appears below. You will not need to use this code after youve completed the sign-up process. If you do not sign up before the expiration date, you must request a new code. · [employer] Access Code: [email or web address] Expires: 5/6/2018  3:38 PM 
 
4. Enter the last four digits of your Social Security Number (xxxx) and Date of Birth (mm/dd/yyyy) as indicated and click Submit. You will be taken to the next sign-up page. 5. Create a [employer] ID. This will be your [employer] login ID and cannot be changed, so think of one that is secure and easy to remember. 6. Create a [employer] password. You can change your password at any time. 7. Enter your Password Reset Question and Answer. This can be used at a later time if you forget your password. 8. Enter your e-mail address. You will receive e-mail notification when new information is available in [address] 9. Click Sign Up. You can now view and download portions of your medical record. 10. Click the Download Summary menu link to download a portable copy of your medical information. If you have questions, please visit the Frequently Asked Questions section of the [employer] website. Remember, [employer] is NOT to be used for urgent needs. For medical emergencies, dial 911. Now available from your iPhone and Android! Please provide this summary of care documentation to your next provider. Your primary care clinician is listed as Oneta Elioer. If you have any questions after today's visit, please call [phone].

## 2018-03-20 LAB
BUN SERPL-MCNC: 29 MG/DL (ref 6–24)
BUN/CREAT SERPL: 14 (ref 9–23)
CALCIUM SERPL-MCNC: 9.2 MG/DL (ref 8.7–10.2)
CHLORIDE SERPL-SCNC: 109 MMOL/L (ref 96–106)
CO2 SERPL-SCNC: 16 MMOL/L (ref 18–29)
CREAT SERPL-MCNC: 2.02 MG/DL (ref 0.57–1)
GFR SERPLBLD CREATININE-BSD FMLA CKD-EPI: 28 ML/MIN/1.73
GFR SERPLBLD CREATININE-BSD FMLA CKD-EPI: 32 ML/MIN/1.73
GLUCOSE SERPL-MCNC: 243 MG/DL (ref 65–99)
INTERPRETATION: NORMAL
POTASSIUM SERPL-SCNC: 3.8 MMOL/L (ref 3.5–5.2)
SODIUM SERPL-SCNC: 142 MMOL/L (ref 134–144)

## 2018-03-21 ENCOUNTER — TELEPHONE (OUTPATIENT)
Dept: INTERNAL MEDICINE CLINIC | Age: 53
End: 2018-03-21

## 2018-03-21 NOTE — PROGRESS NOTES
Send results letter. You have some mild to moderate kidney dysfunction, it appears to be stable.   Please follow-up with your nephrologist.    Please fax a copy of these labs to her nephrologist

## 2018-03-30 ENCOUNTER — DOCUMENTATION ONLY (OUTPATIENT)
Dept: INTERNAL MEDICINE CLINIC | Age: 53
End: 2018-03-30

## 2018-03-30 ENCOUNTER — OFFICE VISIT (OUTPATIENT)
Dept: INTERNAL MEDICINE CLINIC | Age: 53
End: 2018-03-30

## 2018-03-30 VITALS
SYSTOLIC BLOOD PRESSURE: 157 MMHG | RESPIRATION RATE: 18 BRPM | BODY MASS INDEX: 43.19 KG/M2 | WEIGHT: 253 LBS | OXYGEN SATURATION: 100 % | TEMPERATURE: 98.8 F | DIASTOLIC BLOOD PRESSURE: 93 MMHG | HEIGHT: 64 IN | HEART RATE: 93 BPM

## 2018-03-30 DIAGNOSIS — I10 ESSENTIAL HYPERTENSION: ICD-10-CM

## 2018-03-30 DIAGNOSIS — F11.20 NARCOTIC DEPENDENCE, EPISODIC USE (HCC): ICD-10-CM

## 2018-03-30 DIAGNOSIS — I50.20 SYSTOLIC CONGESTIVE HEART FAILURE, UNSPECIFIED HF CHRONICITY (HCC): Primary | ICD-10-CM

## 2018-03-30 DIAGNOSIS — I89.0 LYMPHEDEMA: ICD-10-CM

## 2018-03-30 RX ORDER — TORSEMIDE 20 MG/1
40 TABLET ORAL DAILY
Qty: 100 TAB | Refills: 5 | Status: SHIPPED | OUTPATIENT
Start: 2018-03-30 | End: 2018-04-01 | Stop reason: DRUGHIGH

## 2018-03-30 RX ORDER — ASPIRIN 325 MG
TABLET, DELAYED RELEASE (ENTERIC COATED) ORAL
COMMUNITY
End: 2022-02-01 | Stop reason: ALTCHOICE

## 2018-03-30 RX ORDER — OXYCODONE AND ACETAMINOPHEN 5; 325 MG/1; MG/1
1 TABLET ORAL
Qty: 5 TAB | Refills: 0 | Status: SHIPPED | OUTPATIENT
Start: 2018-03-30 | End: 2018-04-02

## 2018-03-30 RX ORDER — TORSEMIDE 10 MG/1
TABLET ORAL
Qty: 100 TAB | Refills: 5 | Status: SHIPPED | OUTPATIENT
Start: 2018-03-30 | End: 2018-04-20 | Stop reason: SDUPTHER

## 2018-03-30 RX ORDER — ISOSORBIDE DINITRATE 20 MG/1
20 TABLET ORAL 2 TIMES DAILY
COMMUNITY
End: 2019-04-17 | Stop reason: SDUPTHER

## 2018-03-30 RX ORDER — HYDRALAZINE HYDROCHLORIDE 50 MG/1
50 TABLET, FILM COATED ORAL 2 TIMES DAILY
COMMUNITY
End: 2019-04-29 | Stop reason: SDUPTHER

## 2018-03-30 RX ORDER — ATORVASTATIN CALCIUM 40 MG/1
TABLET, FILM COATED ORAL DAILY
COMMUNITY
End: 2020-05-05 | Stop reason: SDUPTHER

## 2018-03-30 NOTE — LETTER
4/2/2018 8:26 AM 
 
Ms. Sarah Mcdermott [address] [place] [id number] Dear Sarah Mcdermott: 
 
  
    
  A copy of lab record being faxed [phone] [facility] associated, [facility].  
 
Your results are normal/stable. If not signed up, consider getting my chart to get your results on-line. We can help you to sign up. Kidney function stable Please find your most recent results below. Resulted Orders METABOLIC PANEL, BASIC Result Value Ref Range Glucose 255 (H) 65 - 99 mg/dL BUN 15 6 - 24 mg/dL Creatinine 1.74 (H) 0.57 - 1.00 mg/dL GFR est non-AA 33 (L) >59 mL/min/1.73 GFR est AA 39 (L) >59 mL/min/1.73  
 BUN/Creatinine ratio 9 9 - 23 Sodium 144 134 - 144 mmol/L Potassium 3.7 3.5 - 5.2 mmol/L Chloride 108 (H) 96 - 106 mmol/L  
 CO2 20 18 - 29 mmol/L Calcium 9.2 8.7 - 10.2 mg/dL Narrative Performed at:  [address]  [id number] : Jonathan Peter MD, Phone:  [phone] CKD REPORT Result Value Ref Range Interpretation Note Comment:  
   Supplemental report is available. Narrative Performed at:  [address] [place] [address]  [id number] : Mina Priest PhD, Phone:  [phone] Please call me if you have any questions: [phone] Sincerely, Kartik Kulkarni MD

## 2018-03-30 NOTE — PROGRESS NOTES
Referral to Cardiology  Dr. Chelle Ward [address] [place] [address]    Tele [phone]  Fax  [phone]    Date: April 12, 2018  @1:PM  Copy of referral given to pt. Bring picture ID, insurance cards and a list of medications. Arrive 15-30 early.

## 2018-03-30 NOTE — PROGRESS NOTES
Chief Complaint   Patient presents with    Ankle swelling     swelling to L/R legs and ankles     I have reviewed the patient's medical history in detail and updated the computerized patient record. Health Maintenance reviewed. 1. Have you been to the ER, urgent care clinic since your last visit? Hospitalized since your last visit? yes    2. Have you seen or consulted any other health care providers outside of the [address] [place] [address] Kahl[place] since your last visit? Include any pap smears or colon screening. Yes    Encouraged pt to discuss pt's wishes with spouse/partner/family and bring them in the next appt to follow thru with the Advanced Directive    Fall Risk Assessment, last 12 mths 1/8/2018   Able to walk? Yes   Fall in past 12 months? Yes   Fall with injury? Yes   Number of falls in past 12 months 2   Fall Risk Score 3       PHQ over the last two weeks 3/30/2018   Little interest or pleasure in doing things Several days   Feeling down, depressed or hopeless Several days   Total Score PHQ 2 2       Abuse Screening Questionnaire 1/8/2018   Do you ever feel afraid of your partner? N   Are you in a relationship with someone who physically or mentally threatens you? N   Is it safe for you to go home?  Y       ADL Assessment 1/8/2018   Feeding yourself No Help Needed   Getting from bed to chair No Help Needed   Getting dressed No Help Needed   Bathing or showering No Help Needed   Walk across the room (includes cane/walker) No Help Needed   Using the telphone No Help Needed   Taking your medications No Help Needed   Preparing meals No Help Needed   Managing money (expenses/bills) No Help Needed   Moderately strenuous housework (laundry) No Help Needed   Shopping for personal items (toiletries/medicines) No Help Needed   Shopping for groceries No Help Needed   Driving No Help Needed   Climbing a flight of stairs No Help Needed   Getting to places beyond walking distances No Help Needed

## 2018-03-30 NOTE — MR AVS SNAPSHOT
[address] 1434 [facility]ly [facility] 
[phone] Patient: Sanya Yoon MRN: [id number] :1965 Visit Information Date & Time Provider Department Dept. Phone Encounter #  
 3/30/2018  1:30 PM MD Elin Larsen [phone] [id number] Follow-up Instructions Return in about 2 weeks (around 2018) for routine follow up. Your Appointments 2018  1:00 PM  
ROUTINE CARE with Shilpa Welch MD  
[facility] Appt Note: swelling and bloodwork $0 cp lsh 3/28/18  
 [address] [place] [address]. [address] [place] [address]  
[phone]  
  
   
 [address] [place] [address] [address]  
  
    
 2018  1:45 PM  
ACUTE CARE with MD Elin Larsen [address] [facility] Appt Note: f/u on dm 3/19/[address] [place] [address]. [address] [place] [address]  
[phone]  
  
   
 [address] [place] [address] [address] Upcoming Health Maintenance Date Due FOBT Q 1 YEAR AGE 50-75 2016 PAP AKA CERVICAL CYTOLOGY 9/3/2017 EYE EXAM RETINAL OR DILATED Q1 10/25/2017 HEMOGLOBIN A1C Q6M 2018 FOOT EXAM Q1 2018 LIPID PANEL Q1 2019 MICROALBUMIN Q1 2019 BREAST CANCER SCRN MAMMOGRAM 3/22/2019 DTaP/Tdap/Td series (2 - Td) 3/13/2027 Allergies as of 3/30/2018  Review Complete On: 3/30/2018 By: Shilpa Welch MD  
  
 Severity Noted Reaction Type Reactions Lortab [Hydrocodone-acetaminophen] High 2013   Intolerance Nausea and Vomiting Cymbalta [Duloxetine]  10/04/2016    Diarrhea Lasix [Furosemide]  2018    Swelling Lyrica [Pregabalin]  2017    Drowsiness Current Immunizations  Reviewed on 2016 No immunizations on file. Not reviewed this visit You Were Diagnosed With   
  
 Codes Comments Systolic congestive heart failure, unspecified HF chronicity ([facility]    -  Primary ICD-10-CM: I50.20 ICD-9-CM: 428.20, 428.0 Lymphedema     ICD-10-CM: I89.0 ICD-9-CM: 652.8 Essential hypertension     ICD-10-CM: I10 
ICD-9-CM: 401.9 Narcotic dependence, episodic use ([facility].)     ICD-10-CM: J79.40 ICD-9-CM: 304.92 Vitals BP Pulse Temp Resp Height(growth percentile) Weight(growth percentile) (!) 157/93 (BP 1 Location: Left arm, BP Patient Position: Sitting) 93 98.8 °F (37.1 °C) (Oral) 18 5' 4\" (1.626 m) 253 lb (114.8 kg) LMP SpO2 BMI OB Status Smoking Status 12/03/2017 100% 43.43 kg/m2 Postmenopausal Never Smoker Vitals History BMI and BSA Data Body Mass Index Body Surface Area  
 43.43 kg/m 2 2.28 m 2 Preferred Pharmacy Pharmacy Name Phone [facility] [address] [facility] [phone] Your Updated Medication List  
  
   
This list is accurate as of 3/30/18  2:30 PM.  Always use your most recent med list.  
  
  
  
  
 aspirin delayed-release 81 mg tablet Take  by mouth daily. atorvastatin 40 mg tablet Commonly known as:  LIPITOR Take  by mouth daily. carvedilol 6.25 mg tablet Commonly known as:  Caroline Alstrom Take 12.5 mg by mouth two (2) times a day. Cholecalciferol (Vitamin D3) 50,000 unit Cap Take  by mouth.  
  
 cyanocobalamin 500 mcg tablet Commonly known as:  VITAMIN B-12 Take 1 Tab by mouth daily. folic acid 1 mg tablet Commonly known as:  Google Take 1 Tab by mouth daily. gabapentin 800 mg tablet Commonly known as:  NEURONTIN Take 1 Tab by mouth three (3) times daily. glucose blood VI test strips strip Commonly known as:  ASCENSIA AUTODISC VI, ONE TOUCH ULTRA TEST VI  
Daily verio one touch up to 3 times a day  
  
 hydrALAZINE 50 mg tablet Commonly known as:  APRESOLINE Take 50 mg by mouth two (2) times a day. insulin aspart U-100 100 unit/mL Inpn Commonly known as:  NovoLOG Flexpen U-100 Insulin  
9 Units by SubCUTAneous route Before breakfast, lunch, and dinner. insulin detemir U-100 100 unit/mL injection Commonly known as:  LEVEMIR  
20 Units by SubCUTAneous route nightly. Insulin Needles (Disposable) 31 gauge x 5/16\" Ndle For flexpen #100, 4 rf daily  
  
 isosorbide dinitrate 20 mg tablet Commonly known as:  ISORDIL Take 20 mg by mouth two (2) times a day. oxyCODONE-acetaminophen 5-325 mg per tablet Commonly known as:  PERCOCET Take 1 Tab by mouth every eight (8) hours as needed for Pain. Max Daily Amount: 3 Tabs. polyethylene glycol 17 gram packet Commonly known as:  Loi Barrs Take 1 Packet by mouth daily. * torsemide 20 mg tablet Commonly known as:  DEMADEX Take 2 Tabs by mouth daily. May take 3 per day if continual leg swelling * torsemide 10 mg tablet Commonly known as:  DEMADEX Increase water pill by 1 per day until today's weight is reduced by 5 lbs, continue the same dosage * Notice: This list has 2 medication(s) that are the same as other medications prescribed for you. Read the directions carefully, and ask your doctor or other care provider to review them with you. Prescriptions Printed Refills  
 oxyCODONE-acetaminophen (PERCOCET) 5-325 mg per tablet 0 Sig: Take 1 Tab by mouth every eight (8) hours as needed for Pain. Max Daily Amount: 3 Tabs. Class: Print Route: Oral  
  
Prescriptions Sent to Pharmacy Refills  
 torsemide (DEMADEX) 20 mg tablet 5 Sig: Take 2 Tabs by mouth daily. May take 3 per day if continual leg swelling Class: Normal  
 Pharmacy: [facility] [address] [facility], [facility] Ph #: [phone] Route: Oral  
 insulin detemir U-100 (LEVEMIR) 100 unit/mL injection 5 Si Units by SubCUTAneous route nightly.   
 Class: Normal  
 Pharmacy: [facility] [address] [facility], [address] Ph #: [phone] Route: SubCUTAneous  
 torsemide (DEMADEX) 10 mg tablet 5 Sig: Increase water pill by 1 per day until today's weight is reduced by 5 lbs, continue the same dosage Class: Normal  
 Pharmacy: [facility] [address] [facility], [address] Ph #: [phone] We Performed the Following COLLECTION VENOUS BLOOD,VENIPUNCTURE [id number] CPT(R)] METABOLIC PANEL, BASIC [05913 CPT(R)] [place] HANDLG&/OR CONVEY OF SPEC FOR TR OFFICE TO LAB [28583 CPT(R)] REFERRAL TO CARDIOLOGY [DUF03 Custom] Follow-up Instructions Return in about 2 weeks (around 4/13/2018) for routine follow up. Referral Information Referral ID Referred By Referred To  
  
 [id number] Cipriano MIGUEL MD   
   [address] [place] [address] Phone: [phone] Fax: [phone] Visits Status Start Date End Date 1 New Request 3/30/18 3/30/19 If your referral has a status of pending review or denied, additional information will be sent to support the outcome of this decision. Patient Instructions Learning About Self-Care for Heart Failure What is self-care for heart failure? Heart failure usually gets worse over time. But there are many things you can do to feel better, stay healthy longer, and avoid the hospital. 
Self-care means managing your health by doing certain things every day, like weighing yourself. It's about knowing which symptoms to watch for so you can avoid getting worse. When you practice good self-care, you know when it's time to call your doctor and when your heart failure has turned into an emergency. The lists below can help. Top 5 self-care tips for every day 1. Take your medicines as prescribed. This gives them the best chance of helping you. 2. Watch for signs that you're getting worse. Weighing yourself every day is one of the best ways to do this. Weight gain may be a sign that your body is holding on to too much fluid. Weigh yourself at the same time each day, using the same scale, on a hard, flat surface. The best time is in the morning after you go to the bathroom and before you eat or drink anything. 3. Find out what your triggers are, and learn to avoid them. Triggers are things that make your heart failure worse, often suddenly. A trigger may be eating too much salt, missing a dose of your medicine, or exercising too hard. 4. Limit sodium. This helps keep fluid from building up and may help you feel better. Your doctor may suggest that you limit sodium to 2,000 milligrams (mg) a day or less. That's less than 1 teaspoon. You can stay under this amount by limiting the salt you eat at home and by watching for \"hidden\" sodium when you eat out or shop for food. 5. Try to exercise throughout the week. Exercise makes your heart stronger and can help you avoid symptoms. Walking is a great way to get exercise. If your doctor says it's safe, start out with some short walks, and then slowly build up to longer ones. When should you act? Try to become familiar with signs that mean your heart failure is getting worse. If you need help, talk with your doctor about making a personal plan. Here are some things to watch for as you practice your daily self-care. Call your doctor if: 
· You have sudden weight gain, such as more than 2 to 3 pounds in a day or 5 pounds in a week. (Your doctor may suggest a different range of weight gain.) · You have new or worse swelling in your feet, ankles, or legs. · Your breathing gets worse. Activities that did not make you short of breath before are hard for you now. · Your breathing when you lie down is worse than usual, or you wake up at night needing to catch your breath. Be sure to make and go to all of your follow-up appointments. And it's always a good idea to call your doctor anytime you have a sudden change in symptoms. When is it an emergency? Sometimes the symptoms get worse very quickly. This is called sudden heart failure. It causes fluid to build up in your lungs. Sudden heart failure is an emergency. If you have any of these symptoms, you need care right away. Call 911 if: 
· You have severe shortness of breath. · You have an irregular or fast heartbeat. · You cough up foamy, pink mucus. What else can you do to stay healthy? There are other things you can do to take care of your body and your heart. These things will help you feel better. And they will also reduce your risk of heart attack and stroke. · Try to stay at a healthy weight. Eat a healthy diet with lots of fresh fruit, vegetables, and whole grains. · If you smoke, quit. · Limit the amount of alcohol you drink. · Keep high blood pressure and diabetes under control. If you need help, talk with your doctor. If your doctor has not set you up with a cardiac rehabilitation (rehab) program, talk to him or her about whether that is right for you. Cardiac rehab includes exercise, help with diet and lifestyle changes, and emotional support. Also let your doctor know if: 
· You're having trouble sleeping. Sleep is important to your well-being. It also helps your heart work the way it's supposed to. Your doctor can help you decide if you need treatment for sleep problems. · You're feeling sad or hopeless much of the time, or you are worried and anxious. Heart failure can be hard on your emotions. Treatment with counseling and medicine can help. And when you feel better, you're more likely to take care of yourself. Follow-up care is a key part of your treatment and safety.  Be sure to make and go to all appointments, and call your doctor if you are having problems. It's also a good idea to know your test results and keep a list of the medicines you take. Where can you learn more? Go to http://lizbeth-efraín.info/. Enter D287 in the search box to learn more about \"Learning About Self-Care for Heart Failure. \" Current as of: September 21, 2016 Content Version: 11.4 © 3852-0992 [employer]. Care instructions adapted under license by [employer] (which disclaims liability or warranty for this information). If you have questions about a medical condition or this instruction, always ask your healthcare professional. [place] [address] any warranty or liability for your use of this information. Introducing [facility]! [employer] introduces [employer] patient portal. Now you can access parts of your medical record, email your doctor's office, and request medication refills online. 1. In your internet browser, go to [email or web address] 2. Click on the First Time User? Click Here link in the Sign In box. You will see the New Member Sign Up page. 3. Enter your [employer] Access Code exactly as it appears below. You will not need to use this code after youve completed the sign-up process. If you do not sign up before the expiration date, you must request a new code. · [employer] Access Code: [email or web address] Expires: 5/6/2018  3:38 PM 
 
4. Enter the last four digits of your Social Security Number (xxxx) and Date of Birth (mm/dd/yyyy) as indicated and click Submit. You will be taken to the next sign-up page. 5. Create a [employer] ID. This will be your [employer] login ID and cannot be changed, so think of one that is secure and easy to remember. 6. Create a [employer] password. You can change your password at any time. 7. Enter your Password Reset Question and Answer. This can be used at a later time if you forget your password. 8. Enter your e-mail address. You will receive e-mail notification when new information is available in [address] 9. Click Sign Up. You can now view and download portions of your medical record. 10. Click the Download Summary menu link to download a portable copy of your medical information. If you have questions, please visit the Frequently Asked Questions section of the [employer] website. Remember, [employer] is NOT to be used for urgent needs. For medical emergencies, dial 911. Now available from your iPhone and Android! Please provide this summary of care documentation to your next provider. Your primary care clinician is listed as Jaden Conley. If you have any questions after today's visit, please call [phone].

## 2018-03-30 NOTE — PATIENT INSTRUCTIONS
Learning About Self-Care for Heart Failure  What is self-care for heart failure? Heart failure usually gets worse over time. But there are many things you can do to feel better, stay healthy longer, and avoid the hospital.  Self-care means managing your health by doing certain things every day, like weighing yourself. It's about knowing which symptoms to watch for so you can avoid getting worse. When you practice good self-care, you know when it's time to call your doctor and when your heart failure has turned into an emergency. The lists below can help. Top 5 self-care tips for every day  1. Take your medicines as prescribed. This gives them the best chance of helping you. 2. Watch for signs that you're getting worse. Weighing yourself every day is one of the best ways to do this. Weight gain may be a sign that your body is holding on to too much fluid. Weigh yourself at the same time each day, using the same scale, on a hard, flat surface. The best time is in the morning after you go to the bathroom and before you eat or drink anything. 3. Find out what your triggers are, and learn to avoid them. Triggers are things that make your heart failure worse, often suddenly. A trigger may be eating too much salt, missing a dose of your medicine, or exercising too hard. 4. Limit sodium. This helps keep fluid from building up and may help you feel better. Your doctor may suggest that you limit sodium to 2,000 milligrams (mg) a day or less. That's less than 1 teaspoon. You can stay under this amount by limiting the salt you eat at home and by watching for \"hidden\" sodium when you eat out or shop for food. 5. Try to exercise throughout the week. Exercise makes your heart stronger and can help you avoid symptoms. Walking is a great way to get exercise. If your doctor says it's safe, start out with some short walks, and then slowly build up to longer ones. When should you act?   Try to become familiar with signs that mean your heart failure is getting worse. If you need help, talk with your doctor about making a personal plan. Here are some things to watch for as you practice your daily self-care. Call your doctor if:  · You have sudden weight gain, such as more than 2 to 3 pounds in a day or 5 pounds in a week. (Your doctor may suggest a different range of weight gain.)  · You have new or worse swelling in your feet, ankles, or legs. · Your breathing gets worse. Activities that did not make you short of breath before are hard for you now. · Your breathing when you lie down is worse than usual, or you wake up at night needing to catch your breath. Be sure to make and go to all of your follow-up appointments. And it's always a good idea to call your doctor anytime you have a sudden change in symptoms. When is it an emergency? Sometimes the symptoms get worse very quickly. This is called sudden heart failure. It causes fluid to build up in your lungs. Sudden heart failure is an emergency. If you have any of these symptoms, you need care right away. Call 911 if:  · You have severe shortness of breath. · You have an irregular or fast heartbeat. · You cough up foamy, pink mucus. What else can you do to stay healthy? There are other things you can do to take care of your body and your heart. These things will help you feel better. And they will also reduce your risk of heart attack and stroke. · Try to stay at a healthy weight. Eat a healthy diet with lots of fresh fruit, vegetables, and whole grains. · If you smoke, quit. · Limit the amount of alcohol you drink. · Keep high blood pressure and diabetes under control. If you need help, talk with your doctor. If your doctor has not set you up with a cardiac rehabilitation (rehab) program, talk to him or her about whether that is right for you. Cardiac rehab includes exercise, help with diet and lifestyle changes, and emotional support.   Also let your doctor know if:  · You're having trouble sleeping. Sleep is important to your well-being. It also helps your heart work the way it's supposed to. Your doctor can help you decide if you need treatment for sleep problems. · You're feeling sad or hopeless much of the time, or you are worried and anxious. Heart failure can be hard on your emotions. Treatment with counseling and medicine can help. And when you feel better, you're more likely to take care of yourself. Follow-up care is a key part of your treatment and safety. Be sure to make and go to all appointments, and call your doctor if you are having problems. It's also a good idea to know your test results and keep a list of the medicines you take. Where can you learn more? Go to http://lizbeth-efraín.info/. Enter R513 in the search box to learn more about \"Learning About Self-Care for Heart Failure. \"  Current as of: September 21, 2016  Content Version: 11.4  © 6514-8744 [employer]. Care instructions adapted under license by [employer] (which disclaims liability or warranty for this information). If you have questions about a medical condition or this instruction, always ask your healthcare professional. [address] any warranty or liability for your use of this information.

## 2018-03-30 NOTE — PROGRESS NOTES
Subjective:     Sarah Mcdermott is a 47 y.o. female who presents for follow up of CHF,diabetes, hypertension, Systolic HF and obesity. New concerns: She has been to the emergency room twice with pain and swelling in her lower extremities. States is taking diuretic prescribed by her nephrologist but it does not appear to be working that well and relates that diuresis has not been that much. Multiple frequent visits for pain and to get narcotics. Does have some medical support hose does not appear to be wearing them consistently. Does try to reduce salt. Breathing has been about the same. [place] [facility] [place] until [place] and she like to see one sooner. Blood sugars have been running generally in the 200s. There was insurance issues with her getting her daily Lantus and she cannot afford to buy it. Does take her NovoLog. Current Outpatient Prescriptions   Medication Sig Dispense Refill    isosorbide dinitrate (ISORDIL) 20 mg tablet Take 20 mg by mouth two (2) times a day.  hydrALAZINE (APRESOLINE) 50 mg tablet Take 50 mg by mouth two (2) times a day.  Cholecalciferol, Vitamin D3, 50,000 unit cap Take  by mouth.  atorvastatin (LIPITOR) 40 mg tablet Take  by mouth daily.  torsemide (DEMADEX) 10 mg tablet Take 20 mg by mouth daily.  Insulin Needles, Disposable, 31 gauge x 5/16\" ndle For flexpen #100, 4 rf daily 1 Package 5    gabapentin (NEURONTIN) 800 mg tablet Take 1 Tab by mouth three (3) times daily. 90 Tab 5    insulin aspart U-100 (NOVOLOG FLEXPEN U-100 INSULIN) 100 unit/mL inpn 9 Units by SubCUTAneous route Before breakfast, lunch, and dinner. 5 Pen 5    folic acid (FOLVITE) 1 mg tablet Take 1 Tab by mouth daily. 90 Tab 1    cyanocobalamin (VITAMIN B-12) 500 mcg tablet Take 1 Tab by mouth daily.  30 Tab 5    glucose blood VI test strips (ASCENSIA AUTODISC VI, ONE TOUCH ULTRA TEST VI) strip Daily verio one touch up to 3 times a day 100 Strip 5    carvedilol (COREG) 6.25 mg tablet Take 12.5 mg by mouth two (2) times a day.  polyethylene glycol (MIRALAX) 17 gram packet Take 1 Packet by mouth daily. 30 Packet 1    aspirin delayed-release 81 mg tablet Take  by mouth daily. Allergies   Allergen Reactions    Lortab [Hydrocodone-Acetaminophen] Nausea and Vomiting    Cymbalta [Duloxetine] Diarrhea    Lasix [Furosemide] Swelling    Lyrica [Pregabalin] Drowsiness       Diet and Lifestyle: generally follows a low sodium diet, nonsmoker    Cardiovascular ROS: taking medications as instructed, no medication side effects noted, no TIA's, no chest pain on exertion, notes stable dyspnea on exertion, no change, noting swelling of ankles. Review of Systems, additional:  Pertinent items are noted in HPI. Social History   Substance Use Topics    Smoking status: Never Smoker    Smokeless tobacco: Never Used    Alcohol use No        Lab Results  Component Value Date/Time   WBC 6.0 02/19/2018 02:29 PM   HGB 10.6 (L) 02/19/2018 02:29 PM   HCT 32.5 (L) 02/19/2018 02:29 PM   PLATELET 689 (L) 04/37/9187 02:29 PM   MCV 75 (L) 02/19/2018 02:29 PM     Lab Results  Component Value Date/Time   Hemoglobin A1c 8.6 (H) 02/13/2018 12:00 AM   Hemoglobin A1c 7.4 (H) 02/13/2017 12:20 PM   Hemoglobin A1c 6.6 (H) 08/15/2016 02:35 PM   Glucose 255 (H) 03/30/2018 02:07 PM   Glucose (POC) 173 (H) 12/18/2015 11:17 AM   Microalb/Creat ratio (ug/mg creat.) 39.9 (H) 02/19/2018 02:29 PM   LDL, calculated 72 02/13/2018 12:00 AM   Creatinine 1.74 (H) 03/30/2018 02:07 PM      Lab Results  Component Value Date/Time   ALT (SGPT) 13 08/24/2017 10:33 PM   AST (SGOT) 10 (L) 08/24/2017 10:33 PM   Alk.  phosphatase 109 08/24/2017 10:33 PM   Bilirubin, total 0.4 08/24/2017 10:33 PM   Albumin 3.5 08/24/2017 10:33 PM   Protein, total 7.4 08/24/2017 10:33 PM   INR 1.1 12/08/2015 04:42 PM   Prothrombin time 11.2 12/08/2015 04:42 PM   PLATELET 175 (L) 38/32/4962 02:29 PM Lab Results  Component Value Date/Time   GFR est non-AA 33 (L) 03/30/2018 02:07 PM   GFR est AA 39 (L) 03/30/2018 02:07 PM   Creatinine 1.74 (H) 03/30/2018 02:07 PM   BUN 15 03/30/2018 02:07 PM   Sodium 144 03/30/2018 02:07 PM   Potassium 3.7 03/30/2018 02:07 PM   Chloride 108 (H) 03/30/2018 02:07 PM   CO2 20 03/30/2018 02:07 PM   Magnesium 1.4 (L) 11/16/2016 12:04 AM   PTH, Intact CANCELED 02/19/2018 02:29 PM     Lab Results   Component Value Date/Time    Glucose 255 (H) 03/30/2018 02:07 PM    Glucose (POC) 173 (H) 12/18/2015 11:17 AM             Objective:     Physical exam significant for the following:     Overweight, weight gain of 13 pounds over the last 4 weeks. 1+ edema bilaterally    Visit Vitals    BP (!) 157/93 (BP 1 Location: Left arm, BP Patient Position: Sitting)    Pulse 93    Temp 98.8 °F (37.1 °C) (Oral)    Resp 18    Ht 5' 4\" (1.626 m)    Wt 253 lb (114.8 kg)    LMP 12/03/2017    SpO2 100%    BMI 43.43 kg/m2     Appearance: chronically ill appearing. General exam: CVS exam BP noted to be mildly elevated today in office, S1, S2 normal, no gallop, no murmur, chest clear, no JVD, no HSM, 1+ bilateral leg edema. No rash no ulcer  . Assessment/Plan:     diabetes borderline controlled, hypertension borderline controlled  . Told her she needs to get the leg swelling under control with her diuretic. The dosage of her Demadex is a low she will increase it until she diuresis. Continue daily weight checks and call me if she does not lose 5 pounds by next week. Discussed narcotics to use for short-term pain relief only. We discussed this pain and the usage of controlled substances for lymphedema pain relief. In general these medications are indicated for the acute symptom relief and not for chronic usage, allthough they are frequently used for chronic management  After a certain period of time they should be stopped to avoid dependence and/or addiction.   I warned her about the dangers of addiction and other side affects including respiratory depression and death. Discussed how this is a controlled substance and that the prescribing of it is should be monitored very carefully. Drug usage is also monitored by our practise and the [facility], since there has been a lot of abuse and diversion of controlled substances. In general we do not refill this medication over the phone. PLease ask for enough pills to get you to your next appointment and make sure you keep your appointments. Warned not to take other medications like alcohol, other benzodiazapines marijuana or other narcotics when on this medication. Orders Placed This Encounter    METABOLIC PANEL, BASIC    CKD REPORT    REFERRAL TO CARDIOLOGY     Referral Priority:   Routine     Referral Type:   Consultation     Referral Reason:   Specialty Services Required     Referred to Provider:   Shaina Owusu MD    [place] [facility]&/OR CONVEY OF SPEC FOR TR OFFICE TO LAB    COLLECTION VENOUS BLOOD,VENIPUNCTURE    isosorbide dinitrate (ISORDIL) 20 mg tablet     Sig: Take 20 mg by mouth two (2) times a day.  hydrALAZINE (APRESOLINE) 50 mg tablet     Sig: Take 50 mg by mouth two (2) times a day.  Cholecalciferol, Vitamin D3, 50,000 unit cap     Sig: Take  by mouth.  atorvastatin (LIPITOR) 40 mg tablet     Sig: Take  by mouth daily.  DISCONTD: torsemide (DEMADEX) 20 mg tablet     Sig: Take 2 Tabs by mouth daily. May take 3 per day if continual leg swelling     Dispense:  100 Tab     Refill:  5    insulin detemir U-100 (LEVEMIR) 100 unit/mL injection     Si Units by SubCUTAneous route nightly.      Dispense:  15 mL     Refill:  5    torsemide (DEMADEX) 10 mg tablet     Sig: Increase water pill by 1 per day until today's weight is reduced by 5 lbs, continue the same dosage     Dispense:  100 Tab     Refill:  5    oxyCODONE-acetaminophen (PERCOCET) 5-325 mg per tablet     Sig: Take 1 Tab by mouth every eight (8) hours as needed for Pain. Max Daily Amount: 3 Tabs. Dispense:  5 Tab     Refill:  0     We will switch her over to Levemir to see if insurance will cover that. We will arrange for her to see cardiology and [address] [place] [address] Ne, appointment given for next week. Follow-up Disposition:  Return in about 2 weeks (around 4/13/2018) for routine follow up.

## 2018-03-31 LAB
BUN SERPL-MCNC: 15 MG/DL (ref 6–24)
BUN/CREAT SERPL: 9 (ref 9–23)
CALCIUM SERPL-MCNC: 9.2 MG/DL (ref 8.7–10.2)
CHLORIDE SERPL-SCNC: 108 MMOL/L (ref 96–106)
CO2 SERPL-SCNC: 20 MMOL/L (ref 18–29)
CREAT SERPL-MCNC: 1.74 MG/DL (ref 0.57–1)
GFR SERPLBLD CREATININE-BSD FMLA CKD-EPI: 33 ML/MIN/1.73
GFR SERPLBLD CREATININE-BSD FMLA CKD-EPI: 39 ML/MIN/1.73
GLUCOSE SERPL-MCNC: 255 MG/DL (ref 65–99)
INTERPRETATION: NORMAL
POTASSIUM SERPL-SCNC: 3.7 MMOL/L (ref 3.5–5.2)
SODIUM SERPL-SCNC: 144 MMOL/L (ref 134–144)

## 2018-03-31 NOTE — PROGRESS NOTES
Send copy of lab record to her nephrologist     Send normal/stable results letter. Your results are normal/stable. If not signed up, consider getting my chart to get your results on-line. We can help you to sign up. Kidney function stable.

## 2018-04-02 ENCOUNTER — HOSPITAL ENCOUNTER (EMERGENCY)
Age: 53
Discharge: HOME | End: 2018-04-02
Attending: EMERGENCY MEDICINE
Payer: MEDICAID

## 2018-04-02 ENCOUNTER — APPOINTMENT (OUTPATIENT)
Dept: GENERAL RADIOLOGY | Age: 53
End: 2018-04-02
Attending: PHYSICIAN ASSISTANT
Payer: MEDICAID

## 2018-04-02 ENCOUNTER — APPOINTMENT (OUTPATIENT)
Dept: ULTRASOUND IMAGING | Age: 53
End: 2018-04-02
Attending: EMERGENCY MEDICINE
Payer: MEDICAID

## 2018-04-02 ENCOUNTER — DOCUMENTATION ONLY (OUTPATIENT)
Dept: INTERNAL MEDICINE CLINIC | Age: 53
End: 2018-04-02

## 2018-04-02 VITALS
OXYGEN SATURATION: 100 % | HEART RATE: 99 BPM | BODY MASS INDEX: 43.96 KG/M2 | WEIGHT: 257.5 LBS | DIASTOLIC BLOOD PRESSURE: 75 MMHG | HEIGHT: 64 IN | TEMPERATURE: 99.2 F | SYSTOLIC BLOOD PRESSURE: 164 MMHG | RESPIRATION RATE: 16 BRPM

## 2018-04-02 DIAGNOSIS — M54.50 ACUTE BILATERAL LOW BACK PAIN WITHOUT SCIATICA: ICD-10-CM

## 2018-04-02 DIAGNOSIS — I50.9 ACUTE ON CHRONIC CONGESTIVE HEART FAILURE, UNSPECIFIED HEART FAILURE TYPE (HCC): Primary | ICD-10-CM

## 2018-04-02 LAB
ALBUMIN SERPL-MCNC: 3.5 G/DL (ref 3.5–5)
ALBUMIN/GLOB SERPL: 0.8 {RATIO} (ref 1.1–2.2)
ALP SERPL-CCNC: 123 U/L (ref 45–117)
ALT SERPL-CCNC: 23 U/L (ref 12–78)
ANION GAP SERPL CALC-SCNC: 9 MMOL/L (ref 5–15)
APPEARANCE UR: CLEAR
AST SERPL-CCNC: 13 U/L (ref 15–37)
ATRIAL RATE: 100 BPM
BACTERIA URNS QL MICRO: NEGATIVE /HPF
BASOPHILS # BLD: 0 K/UL (ref 0–0.1)
BASOPHILS NFR BLD: 0 % (ref 0–1)
BILIRUB SERPL-MCNC: 0.5 MG/DL (ref 0.2–1)
BILIRUB UR QL: NEGATIVE
BNP SERPL-MCNC: 1869 PG/ML (ref 0–125)
BUN SERPL-MCNC: 23 MG/DL (ref 6–20)
BUN/CREAT SERPL: 12 (ref 12–20)
CALCIUM SERPL-MCNC: 8.9 MG/DL (ref 8.5–10.1)
CALCULATED P AXIS, ECG09: 54 DEGREES
CALCULATED R AXIS, ECG10: -37 DEGREES
CALCULATED T AXIS, ECG11: -23 DEGREES
CHLORIDE SERPL-SCNC: 110 MMOL/L (ref 97–108)
CK MB CFR SERPL CALC: 3.1 % (ref 0–2.5)
CK MB SERPL-MCNC: 6.2 NG/ML (ref 5–25)
CK SERPL-CCNC: 202 U/L (ref 26–192)
CO2 SERPL-SCNC: 23 MMOL/L (ref 21–32)
COLOR UR: ABNORMAL
CREAT SERPL-MCNC: 1.95 MG/DL (ref 0.55–1.02)
DIAGNOSIS, 93000: NORMAL
DIFFERENTIAL METHOD BLD: ABNORMAL
EOSINOPHIL # BLD: 0.2 K/UL (ref 0–0.4)
EOSINOPHIL NFR BLD: 3 % (ref 0–7)
EPITH CASTS URNS QL MICRO: ABNORMAL /LPF
ERYTHROCYTE [DISTWIDTH] IN BLOOD BY AUTOMATED COUNT: 19 % (ref 11.5–14.5)
GLOBULIN SER CALC-MCNC: 4.3 G/DL (ref 2–4)
GLUCOSE SERPL-MCNC: 301 MG/DL (ref 65–100)
GLUCOSE UR STRIP.AUTO-MCNC: 500 MG/DL
HCT VFR BLD AUTO: 32.2 % (ref 35–47)
HGB BLD-MCNC: 10.3 G/DL (ref 11.5–16)
HGB UR QL STRIP: ABNORMAL
HYALINE CASTS URNS QL MICRO: ABNORMAL /LPF (ref 0–5)
IMM GRANULOCYTES # BLD: 0 K/UL (ref 0–0.04)
IMM GRANULOCYTES NFR BLD AUTO: 0 % (ref 0–0.5)
KETONES UR QL STRIP.AUTO: NEGATIVE MG/DL
LEUKOCYTE ESTERASE UR QL STRIP.AUTO: ABNORMAL
LYMPHOCYTES # BLD: 2.3 K/UL (ref 0.8–3.5)
LYMPHOCYTES NFR BLD: 29 % (ref 12–49)
MCH RBC QN AUTO: 26.1 PG (ref 26–34)
MCHC RBC AUTO-ENTMCNC: 32 G/DL (ref 30–36.5)
MCV RBC AUTO: 81.7 FL (ref 80–99)
MONOCYTES # BLD: 0.5 K/UL (ref 0–1)
MONOCYTES NFR BLD: 7 % (ref 5–13)
NEUTS SEG # BLD: 4.8 K/UL (ref 1.8–8)
NEUTS SEG NFR BLD: 61 % (ref 32–75)
NITRITE UR QL STRIP.AUTO: NEGATIVE
NRBC # BLD: 0 K/UL (ref 0–0.01)
NRBC BLD-RTO: 0 PER 100 WBC
P-R INTERVAL, ECG05: 110 MS
PH UR STRIP: 5.5 [PH] (ref 5–8)
PLATELET # BLD AUTO: 120 K/UL (ref 150–400)
PMV BLD AUTO: ABNORMAL FL (ref 8.9–12.9)
POTASSIUM SERPL-SCNC: 3.5 MMOL/L (ref 3.5–5.1)
PROT SERPL-MCNC: 7.8 G/DL (ref 6.4–8.2)
PROT UR STRIP-MCNC: ABNORMAL MG/DL
Q-T INTERVAL, ECG07: 356 MS
QRS DURATION, ECG06: 102 MS
QTC CALCULATION (BEZET), ECG08: 459 MS
RBC # BLD AUTO: 3.94 M/UL (ref 3.8–5.2)
RBC #/AREA URNS HPF: >100 /HPF (ref 0–5)
SODIUM SERPL-SCNC: 142 MMOL/L (ref 136–145)
SP GR UR REFRACTOMETRY: 1.02 (ref 1–1.03)
TROPONIN I SERPL-MCNC: 0.11 NG/ML
TROPONIN I SERPL-MCNC: 0.12 NG/ML
UA: UC IF INDICATED,UAUC: ABNORMAL
UROBILINOGEN UR QL STRIP.AUTO: 0.2 EU/DL (ref 0.2–1)
VENTRICULAR RATE, ECG03: 100 BPM
WBC # BLD AUTO: 7.9 K/UL (ref 3.6–11)
WBC URNS QL MICRO: ABNORMAL /HPF (ref 0–4)

## 2018-04-02 PROCEDURE — 87086 URINE CULTURE/COLONY COUNT: CPT | Performed by: EMERGENCY MEDICINE

## 2018-04-02 PROCEDURE — 93970 EXTREMITY STUDY: CPT

## 2018-04-02 PROCEDURE — 82553 CREATINE MB FRACTION: CPT | Performed by: PHYSICIAN ASSISTANT

## 2018-04-02 PROCEDURE — 85025 COMPLETE CBC W/AUTO DIFF WBC: CPT | Performed by: PHYSICIAN ASSISTANT

## 2018-04-02 PROCEDURE — 84484 ASSAY OF TROPONIN QUANT: CPT | Performed by: PHYSICIAN ASSISTANT

## 2018-04-02 PROCEDURE — 77030029684 HC NEB SM VOL KT MONA -A

## 2018-04-02 PROCEDURE — 93005 ELECTROCARDIOGRAM TRACING: CPT

## 2018-04-02 PROCEDURE — 81001 URINALYSIS AUTO W/SCOPE: CPT | Performed by: EMERGENCY MEDICINE

## 2018-04-02 PROCEDURE — 36415 COLL VENOUS BLD VENIPUNCTURE: CPT | Performed by: PHYSICIAN ASSISTANT

## 2018-04-02 PROCEDURE — 74011000250 HC RX REV CODE- 250: Performed by: EMERGENCY MEDICINE

## 2018-04-02 PROCEDURE — 96374 THER/PROPH/DIAG INJ IV PUSH: CPT

## 2018-04-02 PROCEDURE — 82550 ASSAY OF CK (CPK): CPT | Performed by: PHYSICIAN ASSISTANT

## 2018-04-02 PROCEDURE — 83880 ASSAY OF NATRIURETIC PEPTIDE: CPT | Performed by: PHYSICIAN ASSISTANT

## 2018-04-02 PROCEDURE — 74011250637 HC RX REV CODE- 250/637: Performed by: EMERGENCY MEDICINE

## 2018-04-02 PROCEDURE — 71046 X-RAY EXAM CHEST 2 VIEWS: CPT

## 2018-04-02 PROCEDURE — 94640 AIRWAY INHALATION TREATMENT: CPT

## 2018-04-02 PROCEDURE — 80053 COMPREHEN METABOLIC PANEL: CPT | Performed by: PHYSICIAN ASSISTANT

## 2018-04-02 PROCEDURE — 99285 EMERGENCY DEPT VISIT HI MDM: CPT

## 2018-04-02 RX ORDER — HYDROCODONE BITARTRATE AND ACETAMINOPHEN 5; 325 MG/1; MG/1
1 TABLET ORAL
Qty: 15 TAB | Refills: 0 | Status: SHIPPED | OUTPATIENT
Start: 2018-04-02 | End: 2018-04-13 | Stop reason: SINTOL

## 2018-04-02 RX ORDER — IPRATROPIUM BROMIDE AND ALBUTEROL SULFATE 2.5; .5 MG/3ML; MG/3ML
3 SOLUTION RESPIRATORY (INHALATION) ONCE
Status: COMPLETED | OUTPATIENT
Start: 2018-04-02 | End: 2018-04-02

## 2018-04-02 RX ORDER — HYDROCODONE BITARTRATE AND ACETAMINOPHEN 5; 325 MG/1; MG/1
1 TABLET ORAL
Status: COMPLETED | OUTPATIENT
Start: 2018-04-02 | End: 2018-04-02

## 2018-04-02 RX ORDER — BUMETANIDE 0.25 MG/ML
1 INJECTION INTRAMUSCULAR; INTRAVENOUS
Status: COMPLETED | OUTPATIENT
Start: 2018-04-02 | End: 2018-04-02

## 2018-04-02 RX ORDER — CYCLOBENZAPRINE HCL 10 MG
10 TABLET ORAL
Qty: 15 TAB | Refills: 0 | Status: SHIPPED | OUTPATIENT
Start: 2018-04-02 | End: 2018-04-13 | Stop reason: SINTOL

## 2018-04-02 RX ADMIN — IPRATROPIUM BROMIDE AND ALBUTEROL SULFATE 3 ML: .5; 3 SOLUTION RESPIRATORY (INHALATION) at 19:06

## 2018-04-02 RX ADMIN — HYDROCODONE BITARTRATE AND ACETAMINOPHEN 1 TABLET: 5; 325 TABLET ORAL at 18:01

## 2018-04-02 RX ADMIN — BUMETANIDE 1 MG: 0.25 INJECTION INTRAMUSCULAR; INTRAVENOUS at 19:06

## 2018-04-02 NOTE — ED NOTES
Bedside and Verbal shift change report given to Crow Peter RN (oncoming nurse) by Jerson Allen RN (offgoing nurse). Report included the following information SBAR, ED Summary, MAR and Recent Results.

## 2018-04-02 NOTE — ED PROVIDER NOTES
EMERGENCY DEPARTMENT HISTORY AND PHYSICAL EXAM      Date: 4/2/2018  Patient Name: Nagi Childs have seen and evaluated this patient in the Express Care portion of triage for chest pain and leg swelling. The patients care will begin now and orders have been placed. This patient will be seen and provided further care in the Emergency Room. Of note, pt was evaluated at [facility] about 1 week ago for the same sxs. Had negative US of her legs and was treated with Bumex. Written by Jose Angel Christine, a scribe for [facility], P[employer]jamin[facility]. History of Presenting Illness     Chief Complaint   Patient presents with    Chest Pain     pt c/o intermittent chest pain since this morning. Pt also notes \"I'm full of fluid\"     Leg Swelling     History Provided By: Patient    HPI: Sydnee Galeas, 47 y.o. female with PMHx significant for HTN, DM, CAD, CHF, and anemia, presents ambulatory to the ED with cc of gradually worsening leg swelling alongside chest pain and back pain. Per pt, she has noted swelling to the bilateral lower extremities which has been progressively worsening over the course of the past several weeks without alleviation. Pt informs she visited her PCP on 03/30/2018 where she was prescribed fluid pills without alleviation. Pt reports following the leg swelling, she has noted increased chest pain for the past two to three days. Pt informs her chest pain presents to the left sided chest with a moderate intensity alongside an associated sharp, aching sensation to the region. Pt informs her chest pain presents intermittent without any indications. Pt informs her chest pain when present is increased with deep inspirations. Pt lastly informs of pain to the bilateral lower back with no reports of recent injury or trauma. Pt informs her pain presents with a moderate intensity alongside an associated dull, aching sensation which is increased with movements, weight bearing, and ambulance.  Pt reports the back pain is constant in presentation. Pt reports no OTC medications for her symptoms. Of note, pt informs her cardiologist is in [place] at [facility]. Pt informs she has been evaluated and admitted by him in the past with a recent cardiac catheterization with no visualized blockages in January 2018. Pt expresses concern to be linked in with a cardiologist within the region. Pt specifically denies any fevers, chills, nausea, vomiting, abdominal pain, diarrhea, urinary complications, urinary or bowel incontinence, saddle paresthesia, palpitations, or headaches. PMHx: migraine   Social Hx: - EtOH; - Smoker; - Illicit Drugs    PCP: Matthew Gan MD    There are no other complaints, changes, or physical findings at this time. Current Facility-Administered Medications   Medication Dose Route Frequency Provider Last Rate Last Dose    bumetanide (BUMEX) injection 1 mg  1 mg IntraVENous NOW Gerardo Otto MD        albuterol-ipratropium (DUO-NEB) 2.5 MG-0.5 MG/3 ML  3 mL Nebulization ONCE Gerardo Otto MD         Current Outpatient Prescriptions   Medication Sig Dispense Refill    HYDROcodone-acetaminophen (NORCO) 5-325 mg per tablet Take 1 Tab by mouth every four (4) hours as needed for Pain. Max Daily Amount: 6 Tabs. 15 Tab 0    cyclobenzaprine (FLEXERIL) 10 mg tablet Take 1 Tab by mouth three (3) times daily as needed for Muscle Spasm(s). 15 Tab 0    isosorbide dinitrate (ISORDIL) 20 mg tablet Take 20 mg by mouth two (2) times a day.  hydrALAZINE (APRESOLINE) 50 mg tablet Take 50 mg by mouth two (2) times a day.  Cholecalciferol, Vitamin D3, 50,000 unit cap Take  by mouth.  atorvastatin (LIPITOR) 40 mg tablet Take  by mouth daily.  insulin detemir U-100 (LEVEMIR) 100 unit/mL injection 20 Units by SubCUTAneous route nightly.  15 mL 5    torsemide (DEMADEX) 10 mg tablet Increase water pill by 1 per day until today's weight is reduced by 5 lbs, continue the same dosage 100 Tab 5    Insulin Needles, Disposable, 31 gauge x 5/16\" ndle For flexpen #100, 4 rf daily 1 Package 5    gabapentin (NEURONTIN) 800 mg tablet Take 1 Tab by mouth three (3) times daily. 90 Tab 5    insulin aspart U-100 (NOVOLOG FLEXPEN U-100 INSULIN) 100 unit/mL inpn 9 Units by SubCUTAneous route Before breakfast, lunch, and dinner. 5 Pen 5    folic acid (FOLVITE) 1 mg tablet Take 1 Tab by mouth daily. 90 Tab 1    cyanocobalamin (VITAMIN B-12) 500 mcg tablet Take 1 Tab by mouth daily. 30 Tab 5    glucose blood VI test strips (ASCENSIA AUTODISC VI, ONE TOUCH ULTRA TEST VI) strip Daily verio one touch up to 3 times a day 100 Strip 5    carvedilol (COREG) 6.25 mg tablet Take 12.5 mg by mouth two (2) times a day.  polyethylene glycol (MIRALAX) 17 gram packet Take 1 Packet by mouth daily. 30 Packet 1    aspirin delayed-release 81 mg tablet Take  by mouth daily. Past History     Past Medical History:  Past Medical History:   Diagnosis Date    Anemia     CAD (coronary artery disease)     Chronic pain     Cyst in hand 2014    [address]    Diabetes mellitus type 2 in obese ([address]     Diabetic neuropathy ([facility] [facility]     Hypertension     Migraine      Past Surgical History:  Past Surgical History:   Procedure Laterality Date    HX ORTHOPAEDIC      right big toe     Family History:  Family History   Problem Relation Age of Onset    Cancer Father      brain    Diabetes Mother     Heart Disease Mother     Hypertension Mother     Hypertension Maternal Grandmother     Heart Disease Maternal Grandmother      Social History:  Social History   Substance Use Topics    Smoking status: Never Smoker    Smokeless tobacco: Never Used    Alcohol use No     Allergies:   Allergies   Allergen Reactions    Lortab [Hydrocodone-Acetaminophen] Nausea and Vomiting    Cymbalta [Duloxetine] Diarrhea    Lasix [Furosemide] Swelling    Lyrica [Pregabalin] Drowsiness     Review of Systems   Review of Systems   Constitutional: Negative. Negative for chills and fever. HENT: Negative. Negative for congestion and rhinorrhea. Respiratory: Negative. Negative for cough, chest tightness, shortness of breath and wheezing. Cardiovascular: Positive for chest pain and leg swelling (BL). Negative for palpitations. Gastrointestinal: Negative. Negative for abdominal pain, constipation, diarrhea, nausea and vomiting.        - bowel incontinence    Endocrine: Negative. Genitourinary: Negative. Negative for decreased urine volume, dysuria, flank pain, frequency, hematuria, pelvic pain and urgency. -urinary incontinence    Musculoskeletal: Positive for back pain. Negative for neck pain. - saddle paresthesia    Skin: Negative. Negative for color change, pallor and rash. Neurological: Negative. Negative for dizziness, seizures, weakness, numbness and headaches. Hematological: Negative. Negative for adenopathy. Psychiatric/Behavioral: Negative. All other systems reviewed and are negative. Physical Exam   Physical Exam   Constitutional: She is oriented to person, place, and time. She appears well-developed and well-nourished. No distress. HENT:   Head: Normocephalic and atraumatic. Mouth/Throat: No oropharyngeal exudate. Eyes: Conjunctivae are normal. Pupils are equal, round, and reactive to light. Right eye exhibits no discharge. Left eye exhibits no discharge. No scleral icterus. Neck: Normal range of motion. Neck supple. No JVD present. Cardiovascular: Normal rate, regular rhythm, normal heart sounds and intact distal pulses. Exam reveals no gallop and no friction rub. No murmur heard. Pulmonary/Chest: Effort normal and breath sounds normal. No stridor. No respiratory distress. She has no wheezes. She has no rales. She exhibits no tenderness. Abdominal: Soft. Bowel sounds are normal. She exhibits no distension and no mass. There is no tenderness. There is no rebound and no guarding. Musculoskeletal: She exhibits edema. Neurological: She is alert and oriented to person, place, and time. She displays normal reflexes. No cranial nerve deficit. She exhibits normal muscle tone. Coordination normal.   Skin: Skin is warm. No rash noted. She is not diaphoretic. No pallor. Vitals reviewed.     Diagnostic Study Results     Labs -     Recent Results (from the past 12 hour(s))   EKG, 12 LEAD, INITIAL    Collection Time: 04/02/18  1:21 PM   Result Value Ref Range    Ventricular Rate 100 BPM    Atrial Rate 100 BPM    P-R Interval 110 ms    QRS Duration 102 ms    Q-T Interval 356 ms    QTC Calculation (Bezet) 459 ms    Calculated P Axis 54 degrees    Calculated R Axis -37 degrees    Calculated T Axis -23 degrees    Diagnosis       Sinus rhythm with short NC with frequent premature ventricular complexes  Possible Left atrial enlargement  Left axis deviation  Left ventricular hypertrophy  Nonspecific ST and T wave abnormality  When compared with ECG of 24-AUG-2017 22:17,  fusion complexes are no longer present  Inverted T waves have replaced nonspecific T wave abnormality in Inferior   leads     URINALYSIS W/ REFLEX CULTURE    Collection Time: 04/02/18  1:59 PM   Result Value Ref Range    Color YELLOW/STRAW      Appearance CLEAR CLEAR      Specific gravity 1.016 1.003 - 1.030      pH (UA) 5.5 5.0 - 8.0      Protein TRACE (A) NEG mg/dL    Glucose 500 (A) NEG mg/dL    Ketone NEGATIVE  NEG mg/dL    Bilirubin NEGATIVE  NEG      Blood LARGE (A) NEG      Urobilinogen 0.2 0.2 - 1.0 EU/dL    Nitrites NEGATIVE  NEG      Leukocyte Esterase MODERATE (A) NEG      WBC 10-20 0 - 4 /hpf    RBC >100 (H) 0 - 5 /hpf    Epithelial cells FEW FEW /lpf    Bacteria NEGATIVE  NEG /hpf    UA:UC IF INDICATED URINE CULTURE ORDERED (A) CNI      Hyaline cast 0-2 0 - 5 /lpf   CBC WITH AUTOMATED DIFF    Collection Time: 04/02/18  2:00 PM   Result Value Ref Range    WBC 7.9 3.6 - 11.0 K/uL    RBC 3.94 3.80 - 5.20 M/uL    HGB 10.3 (L) 11.5 - 16.0 g/dL    HCT 32.2 (L) 35.0 - 47.0 %    MCV 81.7 80.0 - 99.0 FL    MCH 26.1 26.0 - 34.0 PG    MCHC 32.0 30.0 - 36.5 g/dL    RDW 19.0 (H) 11.5 - 14.5 %    PLATELET 099 (L) 478 - 400 K/uL    MPV ABNORMAL 8.9 - 12.9 FL    NRBC 0.0 0  WBC    ABSOLUTE NRBC 0.00 0.00 - 0.01 K/uL    NEUTROPHILS 61 32 - 75 %    LYMPHOCYTES 29 12 - 49 %    MONOCYTES 7 5 - 13 %    EOSINOPHILS 3 0 - 7 %    BASOPHILS 0 0 - 1 %    IMMATURE GRANULOCYTES 0 0.0 - 0.5 %    ABS. NEUTROPHILS 4.8 1.8 - 8.0 K/UL    ABS. LYMPHOCYTES 2.3 0.8 - 3.5 K/UL    ABS. MONOCYTES 0.5 0.0 - 1.0 K/UL    ABS. EOSINOPHILS 0.2 0.0 - 0.4 K/UL    ABS. BASOPHILS 0.0 0.0 - 0.1 K/UL    ABS. IMM. GRANS. 0.0 0.00 - 0.04 K/UL    DF AUTOMATED     METABOLIC PANEL, COMPREHENSIVE    Collection Time: 04/02/18  2:00 PM   Result Value Ref Range    Sodium 142 136 - 145 mmol/L    Potassium 3.5 3.5 - 5.1 mmol/L    Chloride 110 (H) 97 - 108 mmol/L    CO2 23 21 - 32 mmol/L    Anion gap 9 5 - 15 mmol/L    Glucose 301 (H) 65 - 100 mg/dL    BUN 23 (H) 6 - 20 MG/DL    Creatinine 1.95 (H) 0.55 - 1.02 MG/DL    BUN/Creatinine ratio 12 12 - 20      GFR est AA 33 (L) >60 ml/min/1.73m2    GFR est non-AA 27 (L) >60 ml/min/1.73m2    Calcium 8.9 8.5 - 10.1 MG/DL    Bilirubin, total 0.5 0.2 - 1.0 MG/DL    ALT (SGPT) 23 12 - 78 U/L    AST (SGOT) 13 (L) 15 - 37 U/L    Alk. phosphatase 123 (H) 45 - 117 U/L    Protein, total 7.8 6.4 - 8.2 g/dL    Albumin 3.5 3.5 - 5.0 g/dL    Globulin 4.3 (H) 2.0 - 4.0 g/dL    A-G Ratio 0.8 (L) 1.1 - 2.2     TROPONIN I    Collection Time: 04/02/18  2:00 PM   Result Value Ref Range    Troponin-I, Qt. 0.11 (H) <0.05 ng/mL   CK W/ REFLX CKMB    Collection Time: 04/02/18  2:00 PM   Result Value Ref Range     (H) 26 - 192 U/L   NT-PRO BNP    Collection Time: 04/02/18  2:00 PM   Result Value Ref Range    NT pro-BNP 1869 (H) 0 - 125 PG/ML   CK-MB,QUANT.     Collection Time: 04/02/18  2:00 PM   Result Value Ref Range    CK - MB 6.2 (H) <3.6 NG/ML    CK-MB Index 3.1 (H) 0 - 2.5     TROPONIN I    Collection Time: 04/02/18  6:18 PM   Result Value Ref Range    Troponin-I, Qt. 0.12 (H) <0.05 ng/mL     Radiologic Studies -   DUPLEX LOWER EXT VENOUS BILAT   Final Result      XR CHEST PA LAT   Final Result        BILATERAL LOWER EXTREMITY VENOUS DUPLEX ULTRASOUND     INDICATION: Leg swelling, pain, DVT suspected. Bilateral calf swelling.     COMPARISON: None.     PROCEDURE: Grayscale, color, and spectral Doppler ultrasound imaging of the  bilateral lower extremity veins was performed.     FINDINGS:      The bilateral common femoral, superficial femoral, and popliteal veins  demonstrate normal compressibility, flow, and response to augmentation. No  echogenic thrombi.     The visualized calf veins are patent, with normal compressibility and no  echogenic thrombi.      IMPRESSION  IMPRESSION:   No DVT. CXR Results  (Last 48 hours)               04/02/18 1430  XR CHEST PA LAT Final result    Impression:  Impression: No acute process. No evidence for pulmonary edema           Narrative:  Exam:  2 view chest       Indication: Chest pain, history of congestive heart failure. COMPARISON: 8/24/2017       PA and lateral views demonstrate normal heart size. There is no acute process in   the lung fields. The osseous structures reveal degenerative changes of the   thoracic spine. Medical Decision Making   I am the first provider for this patient. I reviewed the vital signs, available nursing notes, past medical history, past surgical history, family history and social history. Vital Signs-Reviewed the patient's vital signs.   Patient Vitals for the past 12 hrs:   Temp Pulse Resp BP SpO2   04/02/18 1845 - (!) 102 20 158/85 100 %   04/02/18 1830 - 96 24 (!) 148/99 99 %   04/02/18 1800 - 99 26 148/83 100 %   04/02/18 1746 - 99 22 156/72 100 %   04/02/18 1700 - 95 24 (!) 150/91 100 %   04/02/18 1630 - 96 23 (!) 158/96 99 %   04/02/18 1623 - 97 18 - 100 % 04/02/18 1622 - 96 18 (!) 176/97 -   04/02/18 1317 99.2 °F (37.3 °C) (!) 52 18 146/77 100 %     Pulse Oximetry Analysis - 100% on RA    Cardiac Monitor:   Rate: 52 bpm  Rhythm: Sinus Bradycardia     EKG interpretation: (1321)  Rhythm: normal sinus rhythm and PVC's; and regular . Rate (approx.): 100; Axis: left axis deviation; ID interval: normal; QRS interval: normal ; ST/T wave: nonspecific ST T wave abnormality; Other findings: left ventricular hypertrophy. Written by Sj Horne ED Scribe, as dictated by Clif Brown MD.    Records Reviewed: Nursing Notes and Old Medical Records    Provider Notes (Medical Decision Making):   DDx: cardiomyopathy, coronary syndrome, renal failure, DVT, pulmonary HTN/sleep apnea, COPD    Morbidly obese patient with history of CHF, sleep apnea to the ED with multiple complaints including back pain, leg swelling, and chest pain. Saw her PCP recently and was prescribed new diuretics which she informs are not working. Prolonged admission in January in [address] [place] [address] in [facility] [place], [place] secondary to CHF and renal failure. Will obtain labs, CXR, and discuss case with cardiology. ED Course:   Initial assessment performed. The patients presenting problems have been discussed, and they are in agreement with the care plan formulated and outlined with them. I have encouraged them to ask questions as they arise throughout their visit. Disposition:  DISCHARGE NOTE:    6:54 PM  The patient is ready for discharge. The patient signs, symptoms, diagnosis, and discharge instructions have been discussed and the patient has conveyed their understanding. The patient is to follow-up as reccommended or returned to the ER should their symptoms worsen. Plan has been discussed and patient is in agreement. PLAN:  1.    Current Discharge Medication List      START taking these medications    Details   HYDROcodone-acetaminophen (NORCO) 5-325 mg per tablet Take 1 Tab by mouth every four (4) hours as needed for Pain. Max Daily Amount: 6 Tabs. Qty: 15 Tab, Refills: 0    Associated Diagnoses: Acute bilateral low back pain without sciatica      cyclobenzaprine (FLEXERIL) 10 mg tablet Take 1 Tab by mouth three (3) times daily as needed for Muscle Spasm(s). Qty: 15 Tab, Refills: 0           2. Follow-up Information     Follow up With Details Comments Contact Info    Tila Ellis MD Schedule an appointment as soon as possible for a visit in 1 day  [address]  [address] [facility] [address] [facility]      Lucy MD Genaro Schedule an appointment as soon as possible for a visit in 1 day  [address]  [phone]      [facility] EMERGENCY DEPT  If symptoms worsen [address] [place] [address]  [phone]        Return to ED if worse     Diagnosis     Clinical Impression:   1. Acute on chronic congestive heart failure, unspecified heart failure type ([facility] [address]    2. Acute bilateral low back pain without sciatica      Attestations: This note is prepared by Aydee Jefferson, acting as Scribe for Rosezetta Lennox, MD.    Rosezetta Lennox, MD: The scribe's documentation has been prepared under my direction and personally reviewed by me in its entirety. I confirm that the note above accurately reflects all work, treatment, procedures, and medical decision making performed by me.

## 2018-04-02 NOTE — ED NOTES
Assumed care of pt from triage. Pt c/o intermittent chest pain x 2-3 days and leg swelling x 1 week. Pt reports PMH of CHF. Pt also reports lower back pain. Pt placed on cardiac monitor x3. VSS. Pt in position of comfort with no signs of acute distress noted.

## 2018-04-02 NOTE — DISCHARGE INSTRUCTIONS
Back Pain: Care Instructions  Your Care Instructions    Back pain has many possible causes. It is often related to problems with muscles and ligaments of the back. It may also be related to problems with the nerves, discs, or bones of the back. Moving, lifting, standing, sitting, or sleeping in an awkward way can strain the back. Sometimes you don't notice the injury until later. Arthritis is another common cause of back pain. Although it may hurt a lot, back pain usually improves on its own within several weeks. Most people recover in 12 weeks or less. Using good home treatment and being careful not to stress your back can help you feel better sooner. Follow-up care is a key part of your treatment and safety. Be sure to make and go to all appointments, and call your doctor if you are having problems. It's also a good idea to know your test results and keep a list of the medicines you take. How can you care for yourself at home? · Sit or lie in positions that are most comfortable and reduce your pain. Try one of these positions when you lie down:  ¨ Lie on your back with your knees bent and supported by large pillows. ¨ Lie on the floor with your legs on the seat of a sofa or chair. Mozella Iman on your side with your knees and hips bent and a pillow between your legs. ¨ Lie on your stomach if it does not make pain worse. · Do not sit up in bed, and avoid soft couches and twisted positions. Bed rest can help relieve pain at first, but it delays healing. Avoid bed rest after the first day of back pain. · Change positions every 30 minutes. If you must sit for long periods of time, take breaks from sitting. Get up and walk around, or lie in a comfortable position. · Try using a heating pad on a low or medium setting for 15 to 20 minutes every 2 or 3 hours. Try a warm shower in place of one session with the heating pad. · You can also try an ice pack for 10 to 15 minutes every 2 to 3 hours.  Put a thin cloth between the ice pack and your skin. · Take pain medicines exactly as directed. ¨ If the doctor gave you a prescription medicine for pain, take it as prescribed. ¨ If you are not taking a prescription pain medicine, ask your doctor if you can take an over-the-counter medicine. · Take short walks several times a day. You can start with 5 to 10 minutes, 3 or 4 times a day, and work up to longer walks. Walk on level surfaces and avoid hills and stairs until your back is better. · Return to work and other activities as soon as you can. Continued rest without activity is usually not good for your back. · To prevent future back pain, do exercises to stretch and strengthen your back and stomach. Learn how to use good posture, safe lifting techniques, and proper body mechanics. When should you call for help? Call your doctor now or seek immediate medical care if:  ? · You have new or worsening numbness in your legs. ? · You have new or worsening weakness in your legs. (This could make it hard to stand up.)   ? · You lose control of your bladder or bowels. ? Watch closely for changes in your health, and be sure to contact your doctor if:  ? · Your pain gets worse. ? · You are not getting better after 2 weeks. Where can you learn more? Go to http://lizbeth-efraín.info/. Enter I157 in the search box to learn more about \"Back Pain: Care Instructions. \"  Current as of: March 21, 2017  Content Version: 11.4  © 7783-7497 [employer]. Care instructions adapted under license by [employer] (which disclaims liability or warranty for this information). If you have questions about a medical condition or this instruction, always ask your healthcare professional. [place] [address] any warranty or liability for your use of this information.          Heart Failure: Care Instructions  Your Care Instructions    Heart failure occurs when your heart does not pump as much blood as the body needs. Failure does not mean that the heart has stopped pumping but rather that it is not pumping as well as it should. Over time, this causes fluid buildup in your lungs and other parts of your body. Fluid buildup can cause shortness of breath, fatigue, swollen ankles, and other problems. By taking medicines regularly, reducing sodium (salt) in your diet, checking your weight every day, and making lifestyle changes, you can feel better and live longer. Follow-up care is a key part of your treatment and safety. Be sure to make and go to all appointments, and call your doctor if you are having problems. It's also a good idea to know your test results and keep a list of the medicines you take. How can you care for yourself at home? Medicines  ? · Be safe with medicines. Take your medicines exactly as prescribed. Call your doctor if you think you are having a problem with your medicine. ? · Do not take any vitamins, over-the-counter medicine, or herbal products without talking to your doctor first. Irene McDougal not take ibuprofen (Advil or Motrin) and naproxen (Aleve) without talking to your doctor first. They could make your heart failure worse. ? · You may be taking some of the following medicine. ¨ Beta-blockers can slow heart rate, decrease blood pressure, and improve your condition. Taking a beta-blocker may lower your chance of needing to be hospitalized. ¨ Angiotensin-converting enzyme inhibitors (ACEIs) reduce the heart's workload, lower blood pressure, and reduce swelling. Taking an ACEI may lower your chance of needing to be hospitalized again. ¨ Angiotensin II receptor blockers (ARBs) work like ACEIs. Your doctor may prescribe them instead of ACEIs. ¨ Diuretics, also called water pills, reduce swelling. ¨ Potassium supplements replace this important mineral, which is sometimes lost with diuretics.   ¨ Aspirin and other blood thinners prevent blood clots, which can cause a stroke or heart attack. ? You will get more details on the specific medicines your doctor prescribes. Diet  ? · Your doctor may suggest that you limit sodium to 2,000 milligrams (mg) a day or less. That is less than 1 teaspoon of salt a day, including all the salt you eat in cooking or in packaged foods. People get most of their sodium from processed foods. Fast food and restaurant meals also tend to be very high in sodium. ? · Ask your doctor how much liquid you can drink each day. You may have to limit liquids. ?Weight  ? · Weigh yourself without clothing at the same time each day. Record your weight. Call your doctor if you have a sudden weight gain, such as more than 2 to 3 pounds in a day or 5 pounds in a week. (Your doctor may suggest a different range of weight gain.) A sudden weight gain may mean that your heart failure is getting worse. ? Activity level  ? · Start light exercise (if your doctor says it is okay). Even if you can only do a small amount, exercise will help you get stronger, have more energy, and manage your weight and your stress. Walking is an easy way to get exercise. Start out by walking a little more than you did before. Bit by bit, increase the amount you walk. ? · When you exercise, watch for signs that your heart is working too hard. You are pushing yourself too hard if you cannot talk while you are exercising. If you become short of breath or dizzy or have chest pain, stop, sit down, and rest.   ? · If you feel \"wiped out\" the day after you exercise, walk slower or for a shorter distance until you can work up to a better pace. ? · Get enough rest at night. Sleeping with 1 or 2 pillows under your upper body and head may help you breathe easier. ? Lifestyle changes  ? · Do not smoke. Smoking can make a heart condition worse. If you need help quitting, talk to your doctor about stop-smoking programs and medicines. These can increase your chances of quitting for good.  Quitting smoking may be the most important step you can take to protect your heart. ? · Limit alcohol to 2 drinks a day for men and 1 drink a day for women. Too much alcohol can cause health problems. ? · Avoid getting sick from colds and the flu. Get a pneumococcal vaccine shot. If you have had one before, ask your doctor whether you need another dose. Get a flu shot each year. If you must be around people with colds or the flu, wash your hands often. When should you call for help? Call 911 if you have symptoms of sudden heart failure such as:  ? · You have severe trouble breathing. ? · You cough up pink, foamy mucus. ? · You have a new irregular or rapid heartbeat. ?Call your doctor now or seek immediate medical care if:  ? · You have new or increased shortness of breath. ? · You are dizzy or lightheaded, or you feel like you may faint. ? · You have sudden weight gain, such as more than 2 to 3 pounds in a day or 5 pounds in a week. (Your doctor may suggest a different range of weight gain.)   ? · You have increased swelling in your legs, ankles, or feet. ? · You are suddenly so tired or weak that you cannot do your usual activities. ? Watch closely for changes in your health, and be sure to contact your doctor if you develop new symptoms. Where can you learn more? Go to http://lizbeth-efraín.info/. Enter Y676 in the search box to learn more about \"Heart Failure: Care Instructions. \"  Current as of: September 21, 2016  Content Version: 11.4  © 2254-0237 [employer]. Care instructions adapted under license by [employer] (which disclaims liability or warranty for this information). If you have questions about a medical condition or this instruction, always ask your healthcare professional. [address] any warranty or liability for your use of this information. [employer]asmon Activation    Thank you for requesting access to [employer].  Please follow the instructions below to securely access and download your online medical record. [facility] allows you to send messages to your doctor, view your test results, renew your prescriptions, schedule appointments, and more. How Do I Sign Up? 1. In your internet browser, go to [email or web address]  2. Click on the First Time User? Click Here link in the Sign In box. You will be redirect to the New Member Sign Up page. 3. Enter your Onc[employer] Access Code exactly as it appears below. You will not need to use this code after youve completed the sign-up process. If you do not sign up before the expiration date, you must request a new code. Onc[employer]reon Access Code: [email or web address]  Expires: 2018  3:38 PM (This is the date your Onc[employer] access code will )    4. Enter the last four digits of your Social Security Number (xxxx) and Date of Birth (mm/dd/yyyy) as indicated and click Submit. You will be taken to the next sign-up page. 5. Create a Oncothyreon ID. This will be your Oncothy[facility] login ID and cannot be changed, so think of one that is secure and easy to remember. 6. Create a Oncothyreon password. You can change your password at any time. 7. Enter your Password Reset Question and Answer. This can be used at a later time if you forget your password. 8. Enter your e-mail address. You will receive e-mail notification when new information is available in [address] 9. Click Sign Up. You can now view and download portions of your medical record. 10. Click the Download Summary menu link to download a portable copy of your medical information. Additional Information    If you have questions, please visit the Frequently Asked Questions section of the Onc[employer]reon website at [email or web address] Remember, On[employer] is NOT to be used for urgent needs. For medical emergencies, dial 911.

## 2018-04-03 ENCOUNTER — TELEPHONE (OUTPATIENT)
Dept: INTERNAL MEDICINE CLINIC | Age: 53
End: 2018-04-03

## 2018-04-03 LAB
BACTERIA SPEC CULT: NORMAL
CC UR VC: NORMAL
SERVICE CMNT-IMP: NORMAL

## 2018-04-03 NOTE — TELEPHONE ENCOUNTER
Pt called in ref to the status of her prior auth for her nightly insulin stated that she doesn't have any and she really needs it would like it sent in, stated that she would like a call back

## 2018-04-03 NOTE — ED NOTES
Pt given discharge instructions from Dr. Juliette Gitelman and verbalized understanding. Pt ambulatory to exit with a steady gait.

## 2018-04-03 NOTE — TELEPHONE ENCOUNTER
4/3/18 @ 10:40 AM called 6  attempted PA with rep Patrick AGUIRRECorinne [facility] WILLIS Palacios initiated for Nely Bernard [address],claim was auto approved with paid claim. Larisa Hand stated due to claim on 3/13/18 for Janette Rue Wanes Chbil will need to do point of sale override # [id number]. [facility] pharmacy called spoke with Charles Hawley verified understanding.

## 2018-04-04 ENCOUNTER — TELEPHONE (OUTPATIENT)
Dept: INTERNAL MEDICINE CLINIC | Age: 53
End: 2018-04-04

## 2018-04-04 NOTE — TELEPHONE ENCOUNTER
Pt called in reference to requesting her levimar in the pen not the bottle. Please call her at 21 [phone].

## 2018-04-11 ENCOUNTER — TELEPHONE (OUTPATIENT)
Dept: INTERNAL MEDICINE CLINIC | Age: 53
End: 2018-04-11

## 2018-04-11 NOTE — TELEPHONE ENCOUNTER
Please pt most recent labs,office note to Fax:[phone] sttd pt has an appt coming up with this time week

## 2018-04-13 ENCOUNTER — OFFICE VISIT (OUTPATIENT)
Dept: INTERNAL MEDICINE CLINIC | Age: 53
End: 2018-04-13

## 2018-04-13 VITALS
BODY MASS INDEX: 43.54 KG/M2 | HEIGHT: 64 IN | TEMPERATURE: 98.2 F | DIASTOLIC BLOOD PRESSURE: 81 MMHG | WEIGHT: 255 LBS | HEART RATE: 82 BPM | OXYGEN SATURATION: 99 % | SYSTOLIC BLOOD PRESSURE: 150 MMHG | RESPIRATION RATE: 24 BRPM

## 2018-04-13 DIAGNOSIS — N93.9 VAGINAL BLEEDING: ICD-10-CM

## 2018-04-13 DIAGNOSIS — M62.830 SPASM OF BACK MUSCLES: Primary | ICD-10-CM

## 2018-04-13 DIAGNOSIS — I10 ESSENTIAL HYPERTENSION: ICD-10-CM

## 2018-04-13 DIAGNOSIS — F11.20 NARCOTIC DEPENDENCE, EPISODIC USE (HCC): ICD-10-CM

## 2018-04-13 DIAGNOSIS — D50.0 IRON DEFICIENCY ANEMIA DUE TO CHRONIC BLOOD LOSS: ICD-10-CM

## 2018-04-13 DIAGNOSIS — I50.21 ACUTE SYSTOLIC CONGESTIVE HEART FAILURE (HCC): ICD-10-CM

## 2018-04-13 DIAGNOSIS — M48.07 SPINAL STENOSIS OF LUMBOSACRAL REGION: ICD-10-CM

## 2018-04-13 DIAGNOSIS — E11.21 TYPE 2 DIABETES MELLITUS WITH NEPHROPATHY (HCC): ICD-10-CM

## 2018-04-13 RX ORDER — OXYCODONE AND ACETAMINOPHEN 5; 325 MG/1; MG/1
1 TABLET ORAL
Qty: 9 TAB | Refills: 0 | Status: SHIPPED | OUTPATIENT
Start: 2018-04-13 | End: 2018-04-20 | Stop reason: SDUPTHER

## 2018-04-13 RX ORDER — MEDROXYPROGESTERONE ACETATE 10 MG/1
20 TABLET ORAL 2 TIMES DAILY
Qty: 40 TAB | Refills: 0 | Status: SHIPPED | OUTPATIENT
Start: 2018-04-13 | End: 2018-04-20 | Stop reason: SDUPTHER

## 2018-04-13 NOTE — LETTER
4/26/2018 10:14 AM 
 
[facility] [address] Dear Geovanna Done: 
 
Please find your most recent results below. Resulted Orders CBC W/O DIFF Result Value Ref Range WBC 5.9 3.4 - 10.8 x10E3/uL  
 RBC 4.09 3.77 - 5.28 x10E6/uL HGB 10.7 (L) 11.1 - 15.9 g/dL HCT 33.3 (L) 34.0 - 46.6 % MCV 81 79 - 97 fL  
 MCH 26.2 (L) 26.6 - 33.0 pg  
 MCHC 32.1 31.5 - 35.7 g/dL  
 RDW 18.9 (H) 12.3 - 15.4 % PLATELET 598 296 - 796 x10E3/uL Narrative Performed at:  [address]  [id number] : Jemal Kirkpatrick MD, Phone:  [phone] FERRITIN Result Value Ref Range Ferritin 37 15 - 150 ng/mL Narrative Performed at:  [address]  [id number] : Jemal Kirkpatrick MD, Phone:  [phone] IRON PROFILE Result Value Ref Range TIBC 267 250 - 450 ug/dL UIBC 189 131 - 425 ug/dL Iron 78 27 - 159 ug/dL Iron % saturation 29 15 - 55 % Narrative Performed at:  [address]  [id number] : Jemal Kirkpatrick MD, Phone:  [phone] METABOLIC PANEL, BASIC Result Value Ref Range Glucose 405 (H) 65 - 99 mg/dL BUN 12 6 - 24 mg/dL Creatinine 1.58 (H) 0.57 - 1.00 mg/dL GFR est non-AA 38 (L) >59 mL/min/1.73 GFR est AA 43 (L) >59 mL/min/1.73  
 BUN/Creatinine ratio 8 (L) 9 - 23 Sodium 139 134 - 144 mmol/L Potassium 3.7 3.5 - 5.2 mmol/L Chloride 102 96 - 106 mmol/L  
 CO2 18 18 - 29 mmol/L Calcium 8.9 8.7 - 10.2 mg/dL Narrative Performed at:  [address]  [id number] : Jemal Kirkpatrick MD, Phone:  [phone] CKD REPORT Result Value Ref Range Interpretation Note Comment:  
   Supplemental report is available. Narrative Performed at:  [facility] [address] [place] [address] [address] [place], [place]  [id number] : Kimball Brittle PhD, Phone:  [phone] HCG QL SERUM Result Value Ref Range  
 hCG,Beta Subunit,Ql. Negative Negative <6 mIU/mL Narrative Performed at:  [address]  [id number] : Maliha Devine MD, Phone:  [phone] RECOMMENDATIONS: 
The results of your most recent labs are normal/ stable. Your mildly decreased blood count is stable. Please follow up as scheduled. Please call me if you have any questions: [phone] Sincerely, Debi Ferreira MD

## 2018-04-13 NOTE — PROGRESS NOTES
Chief Complaint   Patient presents with    Back Pain     back pain/spasms (two hours sleep last PM)    Vaginal Bleeding     x 2 months since procedure to check for blockages     I have reviewed the patient's medical history in detail and updated the computerized patient record. Health Maintenance reviewed. 1. Have you been to the ER, urgent care clinic since your last visit? Hospitalized since your last visit?no    2. Have you seen or consulted any other health care providers outside of the [address] [place] [address] [place] since your last visit? Include any pap smears or colon screening. No    Encouraged pt to discuss pt's wishes with spouse/partner/family and bring them in the next appt to follow thru with the Advanced Directive    Fall Risk Assessment, last 12 mths 1/8/2018   Able to walk? Yes   Fall in past 12 months? Yes   Fall with injury? Yes   Number of falls in past 12 months 2   Fall Risk Score 3       PHQ over the last two weeks 4/13/2018   Little interest or pleasure in doing things Several days   Feeling down, depressed or hopeless Several days   Total Score PHQ 2 2       Abuse Screening Questionnaire 1/8/2018   Do you ever feel afraid of your partner? N   Are you in a relationship with someone who physically or mentally threatens you? N   Is it safe for you to go home?  Y       ADL Assessment 1/8/2018   Feeding yourself No Help Needed   Getting from bed to chair No Help Needed   Getting dressed No Help Needed   Bathing or showering No Help Needed   Walk across the room (includes cane/walker) No Help Needed   Using the telphone No Help Needed   Taking your medications No Help Needed   Preparing meals No Help Needed   Managing money (expenses/bills) No Help Needed   Moderately strenuous housework (laundry) No Help Needed   Shopping for personal items (toiletries/medicines) No Help Needed   Shopping for groceries No Help Needed   Driving No Help Needed   Climbing a flight of stairs No Help Needed Getting to places beyond walking distances No Help Needed

## 2018-04-13 NOTE — MR AVS SNAPSHOT
Karyle Olp 
 
 
 [address] [place] [address]. [phone] [facility] 
[phone] Patient: Sarah Mcdermott MRN: [id number] :1965 Visit Information Date & Time Provider Department Dept. Phone Encounter #  
 2018  1:45 PM Kartik Kulkarni MD [facility] Donavan Coats [id number] Follow-up Instructions Return in about 1 week (around 2018) for routine follow up. Your Appointments 2018  9:15 AM  
ESTABLISHED PATIENT with Maury Pierson MD  
[facility] [address] [place] [address]) Appt Note: p/Dr Kartik Kulkarni ofc cld to schdl pt to see Cardi/Dr EMANUEL for Systolic CHF  bg; r/s from 18 dr EMANUEL @ [address]  
[phone] [address] [place] [address]  
  
    
 2018  1:45 PM  
ACUTE CARE with Kartik Kulkarni MD  
[address] Appt Note: f/u on dm 3/19/[address] [place] [address]. [address] [place] [address]  
[phone]  
  
   
 [address] [place] [address] [address] Upcoming Health Maintenance Date Due FOBT Q 1 YEAR AGE 50-75 2016 PAP AKA CERVICAL CYTOLOGY 9/3/2017 EYE EXAM RETINAL OR DILATED Q1 10/25/2017 HEMOGLOBIN A1C Q6M 2018 FOOT EXAM Q1 2018 LIPID PANEL Q1 2019 MICROALBUMIN Q1 2019 BREAST CANCER SCRN MAMMOGRAM 3/22/2019 DTaP/Tdap/Td series (2 - Td) 3/13/2027 Allergies as of 2018  Review Complete On: 2018 By: Junie Yusuf RN Severity Noted Reaction Type Reactions Lortab [Hydrocodone-acetaminophen] High 2013   Intolerance Nausea and Vomiting Cymbalta [Duloxetine]  10/04/2016    Diarrhea Lasix [Furosemide]  2018    Swelling Lyrica [Pregabalin]  2017    Drowsiness Current Immunizations  Reviewed on 2016 No immunizations on file. Not reviewed this visit You Were Diagnosed With   
  
 Codes Comments Spasm of back muscles    -  Primary ICD-10-CM: R24.670 ICD-9-CM: 724.8 Vaginal bleeding     ICD-10-CM: N93.9 ICD-9-CM: 623.8 Narcotic dependence, episodic use ([facility]     ICD-10-CM: F20.85 ICD-9-CM: 304.92 Type 2 diabetes mellitus with nephropathy (HCC)     ICD-10-CM: E11.21 
ICD-9-CM: 250.40, 583.81 Acute systolic congestive heart failure (HCC)     ICD-10-CM: I50.21 ICD-9-CM: 428.21, 428.0 Essential hypertension     ICD-10-CM: I10 
ICD-9-CM: 401.9 Spinal stenosis of lumbosacral region     ICD-10-CM: M48.07 
ICD-9-CM: 724.02 Iron deficiency anemia due to chronic blood loss     ICD-10-CM: D50.0 ICD-9-CM: 280.0 Vitals BP Pulse Temp Resp Height(growth percentile) Weight(growth percentile) 150/81 (BP 1 Location: Left arm, BP Patient Position: Sitting) 82 98.2 °F (36.8 °C) (Oral) 24 5' 4\" (1.626 m) 255 lb (115.7 kg) LMP SpO2 BMI OB Status Smoking Status 12/03/2017 99% 43.77 kg/m2 Postmenopausal Never Smoker Vitals History BMI and BSA Data Body Mass Index Body Surface Area 43.77 kg/m 2 2.29 m 2 Preferred Pharmacy Pharmacy Name Phone [facility] [address] [facility] [phone] Your Updated Medication List  
  
   
This list is accurate as of 4/13/18  2:43 PM.  Always use your most recent med list.  
  
  
  
  
 aspirin delayed-release 81 mg tablet Take  by mouth daily. atorvastatin 40 mg tablet Commonly known as:  LIPITOR Take  by mouth daily. carvedilol 6.25 mg tablet Commonly known as:  Pink Parrot Take 12.5 mg by mouth two (2) times a day. Cholecalciferol (Vitamin D3) 50,000 unit Cap Take  by mouth.  
  
 cyanocobalamin 500 mcg tablet Commonly known as:  VITAMIN B-12 Take 1 Tab by mouth daily. folic acid 1 mg tablet Commonly known as:  Google  
 Take 1 Tab by mouth daily. gabapentin 800 mg tablet Commonly known as:  NEURONTIN Take 1 Tab by mouth three (3) times daily. glucose blood VI test strips strip Commonly known as:  ASCENSIA AUTODISC VI, ONE TOUCH ULTRA TEST VI  
Daily verio one touch up to 3 times a day  
  
 hydrALAZINE 50 mg tablet Commonly known as:  APRESOLINE Take 50 mg by mouth two (2) times a day. insulin aspart U-100 100 unit/mL Inpn Commonly known as:  NovoLOG Flexpen U-100 Insulin  
9 Units by SubCUTAneous route Before breakfast, lunch, and dinner. insulin detemir U-100 100 unit/mL injection Commonly known as:  LEVEMIR  
20 Units by SubCUTAneous route nightly. Insulin Needles (Disposable) 31 gauge x 5/16\" Ndle For flexpen #100, 4 rf daily  
  
 isosorbide dinitrate 20 mg tablet Commonly known as:  ISORDIL Take 20 mg by mouth two (2) times a day. medroxyPROGESTERone 10 mg tablet Commonly known as:  PROVERA Take 2 Tabs by mouth two (2) times a day for 10 days. oxyCODONE-acetaminophen 5-325 mg per tablet Commonly known as:  PERCOCET Take 1 Tab by mouth every eight (8) hours as needed for Pain. Max Daily Amount: 3 Tabs. polyethylene glycol 17 gram packet Commonly known as:  Marcia Mule Take 1 Packet by mouth daily. torsemide 10 mg tablet Commonly known as:  DEMADEX Increase water pill by 1 per day until today's weight is reduced by 5 lbs, continue the same dosage Prescriptions Printed Refills  
 oxyCODONE-acetaminophen (PERCOCET) 5-325 mg per tablet 0 Sig: Take 1 Tab by mouth every eight (8) hours as needed for Pain. Max Daily Amount: 3 Tabs. Class: Print Route: Oral  
  
Prescriptions Sent to Pharmacy Refills  
 medroxyPROGESTERone (PROVERA) 10 mg tablet 0 Sig: Take 2 Tabs by mouth two (2) times a day for 10 days.   
 Class: Normal  
 Pharmacy: [address] [place] [address],  [address]  #: [phone] Route: Oral  
  
We Performed the Following CBC W/O DIFF [71065 CPT(R)] FERRITIN [88775 CPT(R)] HCG QL SERUM [51297 CPT(R)] IRON PROFILE J8558038 CPT(R)] METABOLIC PANEL, BASIC [57015 CPT(R)] REFERRAL TO GYNECOLOGY [REF30 Custom] Follow-up Instructions Return in about 1 week (around 4/20/2018) for routine follow up. To-Do List   
 04/16/2018 Imaging:  US TRANSVAGINAL Referral Information Referral ID Referred By Referred To  
  
 [id number] [facility], [address] [facility] [place], [address] [facility] Visits Status Start Date End Date 1 New Request 4/13/18 4/13/19 If your referral has a status of pending review or denied, additional information will be sent to support the outcome of this decision. Introducing [facility]! [facility] introduces [employer] patient portal. Now you can access parts of your medical record, email your doctor's office, and request medication refills online. 1. In your internet browser, go to [email or web address] 2. Click on the First Time User? Click Here link in the Sign In box. You will see the New Member Sign Up page. 3. Enter your [employer] Access Code exactly as it appears below. You will not need to use this code after youve completed the sign-up process. If you do not sign up before the expiration date, you must request a new code. · [employer] Access Code: [email or web address] Expires: 5/6/2018  3:38 PM 
 
4. Enter the last four digits of your Social Security Number (xxxx) and Date of Birth (mm/dd/yyyy) as indicated and click Submit. You will be taken to the next sign-up page. 5. Create a Prelertt ID. This will be your [employer] login ID and cannot be changed, so think of one that is secure and easy to remember. 6. Create a Prelertt password. You can change your password at any time. 7. Enter your Password Reset Question and Answer. This can be used at a later time if you forget your password. 8. Enter your e-mail address. You will receive e-mail notification when new information is available in [address] 9. Click Sign Up. You can now view and download portions of your medical record. 10. Click the Download Summary menu link to download a portable copy of your medical information. If you have questions, please visit the Frequently Asked Questions section of the [employer] website. Remember, [employer] is NOT to be used for urgent needs. For medical emergencies, dial 911. Now available from your iPhone and Android! Please provide this summary of care documentation to your next provider. Your primary care clinician is listed as Darnell Oliver. If you have any questions after today's visit, please call [phone].

## 2018-04-14 LAB
B-HCG SERPL QL: NEGATIVE MIU/ML
BUN SERPL-MCNC: 12 MG/DL (ref 6–24)
BUN/CREAT SERPL: 8 (ref 9–23)
CALCIUM SERPL-MCNC: 8.9 MG/DL (ref 8.7–10.2)
CHLORIDE SERPL-SCNC: 102 MMOL/L (ref 96–106)
CO2 SERPL-SCNC: 18 MMOL/L (ref 18–29)
CREAT SERPL-MCNC: 1.58 MG/DL (ref 0.57–1)
ERYTHROCYTE [DISTWIDTH] IN BLOOD BY AUTOMATED COUNT: 18.9 % (ref 12.3–15.4)
FERRITIN SERPL-MCNC: 37 NG/ML (ref 15–150)
GFR SERPLBLD CREATININE-BSD FMLA CKD-EPI: 38 ML/MIN/1.73
GFR SERPLBLD CREATININE-BSD FMLA CKD-EPI: 43 ML/MIN/1.73
GLUCOSE SERPL-MCNC: 405 MG/DL (ref 65–99)
HCT VFR BLD AUTO: 33.3 % (ref 34–46.6)
HGB BLD-MCNC: 10.7 G/DL (ref 11.1–15.9)
INTERPRETATION: NORMAL
IRON SATN MFR SERPL: 29 % (ref 15–55)
IRON SERPL-MCNC: 78 UG/DL (ref 27–159)
MCH RBC QN AUTO: 26.2 PG (ref 26.6–33)
MCHC RBC AUTO-ENTMCNC: 32.1 G/DL (ref 31.5–35.7)
MCV RBC AUTO: 81 FL (ref 79–97)
PLATELET # BLD AUTO: 156 X10E3/UL (ref 150–379)
POTASSIUM SERPL-SCNC: 3.7 MMOL/L (ref 3.5–5.2)
RBC # BLD AUTO: 4.09 X10E6/UL (ref 3.77–5.28)
SODIUM SERPL-SCNC: 139 MMOL/L (ref 134–144)
TIBC SERPL-MCNC: 267 UG/DL (ref 250–450)
UIBC SERPL-MCNC: 189 UG/DL (ref 131–425)
WBC # BLD AUTO: 5.9 X10E3/UL (ref 3.4–10.8)

## 2018-04-15 NOTE — PROGRESS NOTES
Send normal/stable results letter. Your results are normal/stable. If not signed up, consider getting my chart to get your results on-line. We can help you to sign up. Mildly reduced blood count is stable.

## 2018-04-15 NOTE — PROGRESS NOTES
HISTORY OF PRESENT ILLNESS  Vishal Taylor is a 47 y.o. female. Back Pain    The history is provided by the patient. This is a new problem. The current episode started 2 days ago. The problem has not changed since onset. The problem occurs hourly. Patient reports not work related injury. The pain is associated with no known injury. The pain is present in the middle back. The quality of the pain is described as cramping. The pain does not radiate. The pain is severe. The pain is worse during the night. Pertinent negatives include no chest pain, no fever, no weight loss, no abdominal pain, no bladder incontinence and no dysuria. She has tried muscle relaxants for the symptoms. The treatment provided no relief. Risk factors include obesity. The patient's surgical history non-contributory   Vaginal Bleeding   This is a new problem. The current episode started more than 1 week ago (Ever since her cardiac cath). The problem occurs daily. The problem has been gradually worsening (First it was here and there and he thought it would resolve but it has not). Pertinent negatives include no chest pain and no abdominal pain. Nothing relieves the symptoms. She has tried nothing for the symptoms. For years she took Depo-Provera but stopped that. While on it had no periods. She has an appointment with her gynecologist but would like something to help reduce her vaginal bleeding, states it is pretty profuse. Already has a mild anemia. She has got follow-up with cardiology for congestive heart failure. My referral did not go through but she is going to [facility]. The cardiologist had to leave. Blood sugars have been okay. No hypoglycemia. Breathing is better.     Patient Active Problem List   Diagnosis Code    Hypertension I10    Chronic pain G89.29    Narcotic dependence, episodic use ([address] F11.20    Diabetes mellitus type 2, controlled ([address] E11.9    Idiopathic small and large fiber sensory neuropathy G60.8    Spinal stenosis of lumbosacral region M48.07    Lumbar back pain with radiculopathy affecting right lower extremity M54.17    Obesity, morbid ([facility]) E66.01    Type 2 diabetes mellitus with nephropathy ([facility]) E11.21    Type 2 diabetes mellitus with diabetic neuropathy ([facility]) L01.60    Acute systolic congestive heart failure ([facility]) I50.21     Social History     Social History    Marital status:      Spouse name: N/A    Number of children: N/A    Years of education: N/A     Occupational History    disabled      Social History Main Topics    Smoking status: Never Smoker    Smokeless tobacco: Never Used    Alcohol use No    Drug use: No    Sexual activity: Not Currently     Other Topics Concern    Not on file     Social History Narrative    Lives with daughter. Not working. Applying for disabilty. Review of Systems   Constitutional: Negative for fever and weight loss. Cardiovascular: Negative for chest pain. Gastrointestinal: Negative for abdominal pain. Genitourinary: Positive for vaginal bleeding. Negative for bladder incontinence and dysuria. Musculoskeletal: Positive for back pain. Physical Exam  Visit Vitals    /81 (BP 1 Location: Left arm, BP Patient Position: Sitting)    Pulse 82    Temp 98.2 °F (36.8 °C) (Oral)    Resp 24    Ht 5' 4\" (1.626 m)    Wt 255 lb (115.7 kg)    LMP 12/03/2017    SpO2 99%    BMI 43.77 kg/m2     WD WN female NAD  Heart RRR without murmers clicks or rubs  Lungs CTA  Abdo soft nontender  Ext no edema  Back was examined, grossly normal, no lesions or rash. Saddle area, sensation present. Patellar reflexes 2+ equal bilaterally. Lower leg strength 5 out of 5 bilateral.  Straight leg raises neg    ASSESSMENT and PLAN  Encounter Diagnoses   Name Primary?     Spasm of back muscles Yes    Vaginal bleeding     Narcotic dependence, episodic use (HCC)     Type 2 diabetes mellitus with nephropathy (HCC)     Acute systolic congestive heart failure [facility])     Essential hypertension     Spinal stenosis of lumbosacral region     Iron deficiency anemia due to chronic blood loss      Orders Placed This Encounter    US TRANSVAGINAL    CBC W/O DIFF    FERRITIN    IRON PROFILE    METABOLIC PANEL, BASIC    HCG QL SERUM    CKD REPORT    HCG QL SERUM    REFERRAL TO GYNECOLOGY    oxyCODONE-acetaminophen (PERCOCET) 5-325 mg per tablet    medroxyPROGESTERone (PROVERA) 10 mg tablet     High-dose Provera to see if we can reduce the bleeding. Labs as above to assess her anemia, hopefully is not too bad especially with her congestive heart failure. She needs to keep follow-up with her cardiologist.    Discussed the nature of back pain. Discussed how this is in general a 'red flag' diagnosis and the general limited and temporary nature of this problem as well as its re-occurrence. Discussed further work-up and treatment options. Discused narcotics and back pain: yes and only short-term treatment of her back pain with narcotics    Follow-up Disposition:  Return in about 1 week (around 4/20/2018) for routine follow up.

## 2018-04-20 ENCOUNTER — OFFICE VISIT (OUTPATIENT)
Dept: INTERNAL MEDICINE CLINIC | Age: 53
End: 2018-04-20

## 2018-04-20 VITALS
TEMPERATURE: 98.6 F | BODY MASS INDEX: 44.56 KG/M2 | HEART RATE: 101 BPM | SYSTOLIC BLOOD PRESSURE: 151 MMHG | OXYGEN SATURATION: 100 % | RESPIRATION RATE: 28 BRPM | WEIGHT: 261 LBS | DIASTOLIC BLOOD PRESSURE: 82 MMHG | HEIGHT: 64 IN

## 2018-04-20 DIAGNOSIS — I89.0 LYMPHEDEMA: ICD-10-CM

## 2018-04-20 DIAGNOSIS — E66.01 OBESITY, MORBID (HCC): ICD-10-CM

## 2018-04-20 DIAGNOSIS — E11.21 TYPE 2 DIABETES MELLITUS WITH NEPHROPATHY (HCC): ICD-10-CM

## 2018-04-20 DIAGNOSIS — N92.1 MENOMETRORRHAGIA: ICD-10-CM

## 2018-04-20 DIAGNOSIS — I50.21 ACUTE SYSTOLIC CONGESTIVE HEART FAILURE (HCC): Primary | ICD-10-CM

## 2018-04-20 DIAGNOSIS — M62.830 SPASM OF BACK MUSCLES: ICD-10-CM

## 2018-04-20 LAB — HBA1C MFR BLD HPLC: 9.4 %

## 2018-04-20 RX ORDER — MEDROXYPROGESTERONE ACETATE 10 MG/1
20 TABLET ORAL 2 TIMES DAILY
Qty: 40 TAB | Refills: 0 | Status: SHIPPED | OUTPATIENT
Start: 2018-04-20 | End: 2018-04-30

## 2018-04-20 RX ORDER — LOSARTAN POTASSIUM 50 MG/1
TABLET ORAL DAILY
COMMUNITY
End: 2019-05-21 | Stop reason: ALTCHOICE

## 2018-04-20 RX ORDER — OXYCODONE AND ACETAMINOPHEN 5; 325 MG/1; MG/1
1 TABLET ORAL
Qty: 10 TAB | Refills: 0 | Status: SHIPPED | OUTPATIENT
Start: 2018-04-20 | End: 2018-04-27 | Stop reason: SDUPTHER

## 2018-04-20 RX ORDER — TORSEMIDE 10 MG/1
40 TABLET ORAL DAILY
Qty: 120 TAB | Refills: 5 | Status: SHIPPED | OUTPATIENT
Start: 2018-04-20 | End: 2018-06-28 | Stop reason: ALTCHOICE

## 2018-04-20 NOTE — PROGRESS NOTES
Subjective:     Yolanda Vergara is an 47 y.o. female who is seen for follow up of CHF. She has diabetes, hypertension, CHF and chronic pain. Continues to complain of back spasms. Gets narcotics almost every visit here. Taking Demadex for her congestive heart failure. Despite this continues to gain weight. Went to [facility] to [place] the cardiologist.  Started on losartan. States possibility of doing some procedures that could help with her heart failure. Demadex recommended by her nephrologist who continues to see her for chronic renal insufficiency. Takes 3 a day but does not see a great deal of urination. Her blood sugars has not been great. No hypoglycemia but they run generally in the 200s and 300s. She is taking her insulin per med list.  Diet and Lifestyle: generally follows a low sodium diet, nonsmoker. Weight monitoring: has increased 30 pounds over last several months    Cardiovascular System Review  Cardiovascular ROS - taking medications as instructed, no medication side effects noted, notes new dyspnea on exertion for a few mo, NYHA class 3, noting swelling of ankles. Placed on Provera for vaginal bleeding, that has resolved. Did not drop her hemoglobin.   Has appointment with her gynecologist.    Patient Active Problem List    Diagnosis Date Noted    Acute systolic congestive heart failure ([address] 02/07/2018    Type 2 diabetes mellitus with diabetic neuropathy ([address] 01/08/2018    Obesity, morbid ([address] 12/18/2017    Type 2 diabetes mellitus with nephropathy ([address] 12/18/2017    Spinal stenosis of lumbosacral region 12/08/2016    Lumbar back pain with radiculopathy affecting right lower extremity 12/08/2016    Idiopathic small and large fiber sensory neuropathy 11/16/2016    Diabetes mellitus type 2, controlled ([address] 05/24/2016    Narcotic dependence, episodic use ([address] 04/29/2016    Chronic pain     Hypertension 11/07/2012     Allergies   Allergen Reactions    Lortab [Hydrocodone-Acetaminophen] Nausea and Vomiting    Cymbalta [Duloxetine] Diarrhea    Lasix [Furosemide] Swelling    Lyrica [Pregabalin] Drowsiness     Social History   Substance Use Topics    Smoking status: Never Smoker    Smokeless tobacco: Never Used    Alcohol use No        Lab Results  Component Value Date/Time   WBC 5.9 04/13/2018 02:27 PM   HGB 10.7 (L) 04/13/2018 02:27 PM   HCT 33.3 (L) 04/13/2018 02:27 PM   PLATELET 674 27/20/7089 02:27 PM   MCV 81 04/13/2018 02:27 PM     Lab Results  Component Value Date/Time   Hemoglobin A1c 8.6 (H) 02/13/2018 12:00 AM   Hemoglobin A1c 7.4 (H) 02/13/2017 12:20 PM   Hemoglobin A1c 6.6 (H) 08/15/2016 02:35 PM   Glucose 405 (H) 04/13/2018 02:27 PM   Glucose (POC) 173 (H) 12/18/2015 11:17 AM   Microalb/Creat ratio (ug/mg creat.) 39.9 (H) 02/19/2018 02:29 PM   LDL, calculated 72 02/13/2018 12:00 AM   Creatinine 1.58 (H) 04/13/2018 02:27 PM      Lab Results  Component Value Date/Time   ALT (SGPT) 23 04/02/2018 02:00 PM   AST (SGOT) 13 (L) 04/02/2018 02:00 PM   Alk. phosphatase 123 (H) 04/02/2018 02:00 PM   Bilirubin, total 0.5 04/02/2018 02:00 PM   Albumin 3.5 04/02/2018 02:00 PM   Protein, total 7.8 04/02/2018 02:00 PM   INR 1.1 12/08/2015 04:42 PM   Prothrombin time 11.2 12/08/2015 04:42 PM   PLATELET 360 72/34/0145 02:27 PM       Lab Results  Component Value Date/Time   GFR est non-AA 38 (L) 04/13/2018 02:27 PM   GFR est AA 43 (L) 04/13/2018 02:27 PM   Creatinine 1.58 (H) 04/13/2018 02:27 PM   BUN 12 04/13/2018 02:27 PM   Sodium 139 04/13/2018 02:27 PM   Potassium 3.7 04/13/2018 02:27 PM   Chloride 102 04/13/2018 02:27 PM   CO2 18 04/13/2018 02:27 PM   Magnesium 1.4 (L) 11/16/2016 12:04 AM   PTH, Intact CANCELED 02/19/2018 02:29 PM     Lab Results   Component Value Date/Time    Glucose 405 (H) 04/13/2018 02:27 PM    Glucose (POC) 173 (H) 12/18/2015 11:17 AM         Review of Systems, additional:  Pertinent items are noted in HPI.     Objective:     Visit Vitals    BP 151/82 (BP 1 Location: Left arm, BP Patient Position: Sitting)    Pulse (!) 101    Temp 98.6 °F (37 °C) (Oral)    Resp 28    Ht 5' 4\" (1.626 m)    Wt 261 lb (118.4 kg)    LMP 12/03/2017    SpO2 100%    BMI 44.8 kg/m2     General:  alert, cooperative, no distress, appears older than stated age   Neck: no JVD   Chest Wall: inspection normal - no chest wall deformities or tenderness, respiratory effort normal   Lung: clear to auscultation bilaterally   Heart:  normal rate, regular rhythm, normal S1, S2, no murmurs, rubs, clicks or gallops   Abdomen: soft, non-tender. Bowel sounds normal. No masses,  no organomegaly   Extremities: edema 1+ Bi       Lab review: labs reviewed, I note that renal functions mildly abnormal but acceptable. A1C up to 9.4    Assessment/Plan:     diabetes borderline controlled, needs improvement, hypertension borderline. Chronic Conditions Addressed Today     1. Type 2 diabetes mellitus with nephropathy (HCC)     Relevant Medications     losartan (COZAAR) 50 mg tablet     torsemide (DEMADEX) 10 mg tablet     insulin detemir U-100 (LEVEMIR) 100 unit/mL injection     Other Relevant Orders     AMB POC HEMOGLOBIN A1C (Completed)    2. Acute systolic congestive heart failure (HCC) - Primary     Relevant Medications     losartan (COZAAR) 50 mg tablet     torsemide (DEMADEX) 10 mg tablet     Other Relevant Orders     REFERRAL TO CARDIOLOGY      Acute Diagnoses Addressed Today     Menometrorrhagia            Relevant Medications        medroxyPROGESTERone (PROVERA) 10 mg tablet    Lymphedema            Relevant Medications        losartan (COZAAR) 50 mg tablet        torsemide (DEMADEX) 10 mg tablet    Spasm of back muscles            Relevant Medications        oxyCODONE-acetaminophen (PERCOCET) 5-325 mg per tablet        Congestive heart failure, increase diuretic try and get some of this water weight off her. Start losartan per cardiology recommendations.   Follow-up with nephrologist and gynecologist.  Chao Ligia small amount of narcotics her back spasms. She will need routine labs she follows up. Diabetes increase her insulin as well. See med list below. Okay refill of her progesterone, hopefully will not be needed. Current Outpatient Prescriptions   Medication Sig Dispense Refill    losartan (COZAAR) 50 mg tablet Take  by mouth daily.  medroxyPROGESTERone (PROVERA) 10 mg tablet Take 2 Tabs by mouth two (2) times a day for 10 days. 40 Tab 0    torsemide (DEMADEX) 10 mg tablet Take 4 Tabs by mouth daily. 120 Tab 5    oxyCODONE-acetaminophen (PERCOCET) 5-325 mg per tablet Take 1 Tab by mouth every eight (8) hours as needed for Pain. Max Daily Amount: 3 Tabs. 10 Tab 0    insulin detemir U-100 (LEVEMIR) 100 unit/mL injection 26 Units by SubCUTAneous route nightly. 15 mL 5    isosorbide dinitrate (ISORDIL) 20 mg tablet Take 20 mg by mouth two (2) times a day.  hydrALAZINE (APRESOLINE) 50 mg tablet Take 50 mg by mouth two (2) times a day.  Cholecalciferol, Vitamin D3, 50,000 unit cap Take  by mouth.  atorvastatin (LIPITOR) 40 mg tablet Take  by mouth daily.  Insulin Needles, Disposable, 31 gauge x 5/16\" ndle For flexpen #100, 4 rf daily 1 Package 5    gabapentin (NEURONTIN) 800 mg tablet Take 1 Tab by mouth three (3) times daily. 90 Tab 5    insulin aspart U-100 (NOVOLOG FLEXPEN U-100 INSULIN) 100 unit/mL inpn 9 Units by SubCUTAneous route Before breakfast, lunch, and dinner. 5 Pen 5    folic acid (FOLVITE) 1 mg tablet Take 1 Tab by mouth daily. 90 Tab 1    cyanocobalamin (VITAMIN B-12) 500 mcg tablet Take 1 Tab by mouth daily. 30 Tab 5    glucose blood VI test strips (ASCENSIA AUTODISC VI, ONE TOUCH ULTRA TEST VI) strip Daily verio one touch up to 3 times a day 100 Strip 5    carvedilol (COREG) 6.25 mg tablet Take 12.5 mg by mouth two (2) times a day.  polyethylene glycol (MIRALAX) 17 gram packet Take 1 Packet by mouth daily.  30 Packet 1    aspirin delayed-release 81 mg tablet Take  by mouth daily. Follow-up Disposition:  Return in about 1 week (around 4/27/2018) for routine follow up.

## 2018-04-20 NOTE — PROGRESS NOTES
Chief Complaint   Patient presents with    Wheezing     SOB and wheezing    Spasms     mid back spasms     I have reviewed the patient's medical history in detail and updated the computerized patient record. Health Maintenance reviewed. 1. Have you been to the ER, urgent care clinic since your last visit? Hospitalized since your last visit?no    2. Have you seen or consulted any other health care providers outside of the [facility] since your last visit? Include any pap smears or colon screening. No    Encouraged pt to discuss pt's wishes with spouse/partner/family and bring them in the next appt to follow thru with the Advanced Directive    Fall Risk Assessment, last 12 mths 1/8/2018   Able to walk? Yes   Fall in past 12 months? Yes   Fall with injury? Yes   Number of falls in past 12 months 2   Fall Risk Score 3       PHQ over the last two weeks 4/20/2018   Little interest or pleasure in doing things Several days   Feeling down, depressed or hopeless Several days   Total Score PHQ 2 2       Abuse Screening Questionnaire 1/8/2018   Do you ever feel afraid of your partner? N   Are you in a relationship with someone who physically or mentally threatens you? N   Is it safe for you to go home?  Y       ADL Assessment 1/8/2018   Feeding yourself No Help Needed   Getting from bed to chair No Help Needed   Getting dressed No Help Needed   Bathing or showering No Help Needed   Walk across the room (includes cane/walker) No Help Needed   Using the telphone No Help Needed   Taking your medications No Help Needed   Preparing meals No Help Needed   Managing money (expenses/bills) No Help Needed   Moderately strenuous housework (laundry) No Help Needed   Shopping for personal items (toiletries/medicines) No Help Needed   Shopping for groceries No Help Needed   Driving No Help Needed   Climbing a flight of stairs No Help Needed   Getting to places beyond walking distances No Help Needed

## 2018-04-20 NOTE — MR AVS SNAPSHOT
[address] [address] [facility] 
[phone] Patient: Fabián Osorio MRN: [id number] :1965 Visit Information Date & Time Provider Department Dept. Phone Encounter #  
 2018  3:15 PM MD [address] [id number] Follow-up Instructions Return in about 1 week (around 2018) for routine follow up. Your Appointments 2018  1:45 PM  
ACUTE CARE with Dada Mills MD  
[facility] [address] [place] [address]) Appt Note: f/u on dm 3/19/18s[address] [place] [address]  
[phone]  
  
   
 [address] [place] [address] [address] Upcoming Health Maintenance Date Due FOBT Q 1 YEAR AGE 50-75 2016 PAP AKA CERVICAL CYTOLOGY 9/3/2017 EYE EXAM RETINAL OR DILATED Q1 10/25/2017 HEMOGLOBIN A1C Q6M 2018 FOOT EXAM Q1 2018 LIPID PANEL Q1 2019 MICROALBUMIN Q1 2019 BREAST CANCER SCRN MAMMOGRAM 3/22/2019 DTaP/Tdap/Td series (2 - Td) 3/13/2027 Allergies as of 2018  Review Complete On: 2018 By: Betsy Rockwell LPN Severity Noted Reaction Type Reactions Lortab [Hydrocodone-acetaminophen] High 2013   Intolerance Nausea and Vomiting Cymbalta [Duloxetine]  10/04/2016    Diarrhea Lasix [Furosemide]  2018    Swelling Lyrica [Pregabalin]  2017    Drowsiness Current Immunizations  Reviewed on 2016 No immunizations on file. Not reviewed this visit You Were Diagnosed With   
  
 Codes Comments Acute systolic congestive heart failure ([facility] [address]    -  Primary ICD-10-CM: I50.21 ICD-9-CM: 428.21, 428.0 Menometrorrhagia     ICD-10-CM: N92.1 ICD-9-CM: 626.2  Type 2 diabetes mellitus with nephropathy (HCC)     ICD-10-CM: E11.21 
ICD-9-CM: 250.40, 583.81   
 Lymphedema     ICD-10-CM: I89.0 ICD-9-CM: 483.1 Spasm of back muscles     ICD-10-CM: M62.830 ICD-9-CM: 724.8 Vitals BP Pulse Temp Resp Height(growth percentile) Weight(growth percentile) 151/82 (BP 1 Location: Left arm, BP Patient Position: Sitting) (!) 101 98.6 °F (37 °C) (Oral) 28 5' 4\" (1.626 m) 261 lb (118.4 kg) LMP SpO2 BMI OB Status Smoking Status 12/03/2017 100% 44.8 kg/m2 Postmenopausal Never Smoker Vitals History BMI and BSA Data Body Mass Index Body Surface Area 44.8 kg/m 2 2.31 m 2 Preferred Pharmacy Pharmacy Name Phone [facility] [address] [facility] [phone] Your Updated Medication List  
  
   
This list is accurate as of 4/20/18  4:20 PM.  Always use your most recent med list.  
  
  
  
  
 aspirin delayed-release 81 mg tablet Take  by mouth daily. atorvastatin 40 mg tablet Commonly known as:  LIPITOR Take  by mouth daily. carvedilol 6.25 mg tablet Commonly known as:  Carrolyn Peabody Take 12.5 mg by mouth two (2) times a day. Cholecalciferol (Vitamin D3) 50,000 unit Cap Take  by mouth.  
  
 cyanocobalamin 500 mcg tablet Commonly known as:  VITAMIN B-12 Take 1 Tab by mouth daily. folic acid 1 mg tablet Commonly known as:  Google Take 1 Tab by mouth daily. gabapentin 800 mg tablet Commonly known as:  NEURONTIN Take 1 Tab by mouth three (3) times daily. glucose blood VI test strips strip Commonly known as:  ASCENSIA AUTODISC VI, ONE TOUCH ULTRA TEST VI  
Daily verio one touch up to 3 times a day  
  
 hydrALAZINE 50 mg tablet Commonly known as:  APRESOLINE Take 50 mg by mouth two (2) times a day. insulin aspart U-100 100 unit/mL Inpn Commonly known as:  NovoLOG Flexpen U-100 Insulin  
9 Units by SubCUTAneous route Before breakfast, lunch, and dinner. insulin detemir U-100 100 unit/mL injection Commonly known as:  LEVEMIR  
 20 Units by SubCUTAneous route nightly. Insulin Needles (Disposable) 31 gauge x 5/16\" Ndle For flexpen #100, 4 rf daily  
  
 isosorbide dinitrate 20 mg tablet Commonly known as:  ISORDIL Take 20 mg by mouth two (2) times a day. losartan 50 mg tablet Commonly known as:  COZAAR Take  by mouth daily. medroxyPROGESTERone 10 mg tablet Commonly known as:  PROVERA Take 2 Tabs by mouth two (2) times a day for 10 days. oxyCODONE-acetaminophen 5-325 mg per tablet Commonly known as:  PERCOCET Take 1 Tab by mouth every eight (8) hours as needed for Pain. Max Daily Amount: 3 Tabs. polyethylene glycol 17 gram packet Commonly known as:  Taft Freud Take 1 Packet by mouth daily. torsemide 10 mg tablet Commonly known as:  DEMADEX Take 4 Tabs by mouth daily. Prescriptions Printed Refills  
 medroxyPROGESTERone (PROVERA) 10 mg tablet 0 Sig: Take 2 Tabs by mouth two (2) times a day for 10 days. Class: Print Route: Oral  
 oxyCODONE-acetaminophen (PERCOCET) 5-325 mg per tablet 0 Sig: Take 1 Tab by mouth every eight (8) hours as needed for Pain. Max Daily Amount: 3 Tabs. Class: Print Route: Oral  
  
Prescriptions Sent to Pharmacy Refills  
 torsemide (DEMADEX) 10 mg tablet 5 Sig: Take 4 Tabs by mouth daily. Class: Normal  
 Pharmacy: [facility] [address] SYLVESTER REYES [address] [place] [address], [address] [place] [address] Ph #: [phone] Route: Oral  
  
We Performed the Following AMB POC HEMOGLOBIN A1C [89336 CPT(R)] REFERRAL TO CARDIOLOGY [AKA69 Custom] Comments:  
 F/u CHF Follow-up Instructions Return in about 1 week (around 4/27/2018) for routine follow up. Referral Information Referral ID Referred By Referred To  
  
 [id number] Blake MIGUEL MD Tienne À Many [facility] 4th Floor Marco Luna Phone: [phone] Fax: [phone] Visits Status Start Date End Date 1 New Request 4/20/18 4/20/19 If your referral has a status of pending review or denied, additional information will be sent to support the outcome of this decision. Introducing [facility] HEALTH SERVICES! [facility] introduces [employer] patient portal. Now you can access parts of your medical record, email your doctor's office, and request medication refills online. 1. In your internet browser, go to [email or web address] 2. Click on the First Time User? Click Here link in the Sign In box. You will see the New Member Sign Up page. 3. Enter your [employer] Access Code exactly as it appears below. You will not need to use this code after youve completed the sign-up process. If you do not sign up before the expiration date, you must request a new code. · [employer] Access Code: [email or web address] Expires: 5/6/2018  3:38 PM 
 
4. Enter the last four digits of your Social Security Number (xxxx) and Date of Birth (mm/dd/yyyy) as indicated and click Submit. You will be taken to the next sign-up page. 5. Create a [employer] ID. This will be your [employer] login ID and cannot be changed, so think of one that is secure and easy to remember. 6. Create a [employer] password. You can change your password at any time. 7. Enter your Password Reset Question and Answer. This can be used at a later time if you forget your password. 8. Enter your e-mail address. You will receive e-mail notification when new information is available in [address] 9. Click Sign Up. You can now view and download portions of your medical record. 10. Click the Download Summary menu link to download a portable copy of your medical information. If you have questions, please visit the Frequently Asked Questions section of the [employer] website. Remember, [employer] is NOT to be used for urgent needs. For medical emergencies, dial 911. Now available from your iPhone and Android! Please provide this summary of care documentation to your next provider. Your primary care clinician is listed as Nichole Kim. If you have any questions after today's visit, please call [phone].

## 2018-04-27 ENCOUNTER — OFFICE VISIT (OUTPATIENT)
Dept: INTERNAL MEDICINE CLINIC | Age: 53
End: 2018-04-27

## 2018-04-27 VITALS
HEIGHT: 64 IN | SYSTOLIC BLOOD PRESSURE: 145 MMHG | WEIGHT: 257 LBS | BODY MASS INDEX: 43.87 KG/M2 | TEMPERATURE: 99 F | OXYGEN SATURATION: 100 % | HEART RATE: 100 BPM | RESPIRATION RATE: 16 BRPM | DIASTOLIC BLOOD PRESSURE: 86 MMHG

## 2018-04-27 DIAGNOSIS — M79.674 PAIN OF RIGHT GREAT TOE: ICD-10-CM

## 2018-04-27 DIAGNOSIS — I50.21 ACUTE SYSTOLIC CONGESTIVE HEART FAILURE (HCC): Primary | ICD-10-CM

## 2018-04-27 DIAGNOSIS — M62.830 SPASM OF BACK MUSCLES: ICD-10-CM

## 2018-04-27 DIAGNOSIS — E11.40 TYPE 2 DIABETES MELLITUS WITH DIABETIC NEUROPATHY, WITH LONG-TERM CURRENT USE OF INSULIN (HCC): ICD-10-CM

## 2018-04-27 DIAGNOSIS — F11.20 NARCOTIC DEPENDENCE, EPISODIC USE (HCC): ICD-10-CM

## 2018-04-27 DIAGNOSIS — Z79.4 TYPE 2 DIABETES MELLITUS WITH DIABETIC NEUROPATHY, WITH LONG-TERM CURRENT USE OF INSULIN (HCC): ICD-10-CM

## 2018-04-27 RX ORDER — OXYCODONE AND ACETAMINOPHEN 5; 325 MG/1; MG/1
1 TABLET ORAL
Qty: 21 TAB | Refills: 0 | Status: SHIPPED | OUTPATIENT
Start: 2018-04-27 | End: 2018-05-11 | Stop reason: ALTCHOICE

## 2018-04-27 NOTE — LETTER
4/30/2018 1:15 PM 
 
Ms. Vizcarra Maillard [address] [place] [id number] Dear Zackery Maillard: 
 
Please find your most recent results below. Resulted Orders METABOLIC PANEL, BASIC Result Value Ref Range Glucose 222 (H) 65 - 99 mg/dL BUN 14 6 - 24 mg/dL Creatinine 1.65 (H) 0.57 - 1.00 mg/dL GFR est non-AA 36 (L) >59 mL/min/1.73 GFR est AA 41 (L) >59 mL/min/1.73  
 BUN/Creatinine ratio 8 (L) 9 - 23 Sodium 137 134 - 144 mmol/L Potassium 3.5 3.5 - 5.2 mmol/L Chloride 102 96 - 106 mmol/L  
 CO2 21 18 - 29 mmol/L Calcium 9.4 8.7 - 10.2 mg/dL Narrative Performed at:  [address]  [id number] : Pam Steele MD, Phone:  [phone] CKD REPORT Result Value Ref Range Interpretation Note Comment:  
   Supplemental report is available. Narrative Performed at:  [facility] [place] [address]  [id number] : Corrine Silva MD, Phone:  [phone] DIABETES PATIENT EDUCATION Result Value Ref Range PDF Image Not applicable Narrative Performed at:  [address] [place] [address]  [id number] : Corrine Silva MD, Phone:  [phone] RECOMMENDATIONS: 
The results of your most recent labs are normal/ stable. Please follow up as scheduled. You could also review these results online by using [employer]. Let us know if we can help you get signed up. Please call me if you have any questions: [phone] Sincerely, Eleni Li MD

## 2018-04-27 NOTE — PROGRESS NOTES
Subjective:     Cleopatra Foley is an 47 y.o. female who is seen for follow up of CHF. She has diabetes, hypertension, hyperlipidemia and CHF. Over the last few days her right foot has been hurting especially the big toe. She notes a callus there she was especially tender, has one on the left toe was well but it does not hurt. She called her podiatrist up told her to see me but will see her next week. Diet and Lifestyle: follows a diabetic diet regularly, sedentary, nonsmoker. Despite following the diet relates her sugars have been very high 300s 400s. No hypoglycemia. Weight monitoring: has decreased 4 pounds over last Week  Since we increased her diuretic. Cardiovascular System Review  Cardiovascular ROS - taking medications as instructed, no medication side effects noted, noting some chest pains described as the usual, notes stable dyspnea on exertion, no change, NYHA class 3, no swelling of ankles. Patient Active Problem List    Diagnosis Date Noted    Acute systolic congestive heart failure ([facility] [address] 02/07/2018    Type 2 diabetes mellitus with diabetic neuropathy ([facility] [address] 01/08/2018    Obesity, morbid ([address] 12/18/2017    Type 2 diabetes mellitus with nephropathy ([facility] [address] 12/18/2017    Spinal stenosis of lumbosacral region 12/08/2016    Lumbar back pain with radiculopathy affecting right lower extremity 12/08/2016    Idiopathic small and large fiber sensory neuropathy 11/16/2016    Diabetes mellitus type 2, controlled ([facility] [address] 05/24/2016    Narcotic dependence, episodic use (HCC) 04/29/2016    Chronic pain     Hypertension 11/07/2012     Current Outpatient Prescriptions   Medication Sig Dispense Refill    oxyCODONE-acetaminophen (PERCOCET) 5-325 mg per tablet Take 1 Tab by mouth every eight (8) hours as needed for Pain. Max Daily Amount: 3 Tabs. 21 Tab 0    insulin detemir U-100 (LEVEMIR) 100 unit/mL injection 30 Units by SubCUTAneous route nightly.  15 mL 5    losartan (COZAAR) 50 mg tablet Take by mouth daily.  medroxyPROGESTERone (PROVERA) 10 mg tablet Take 2 Tabs by mouth two (2) times a day for 10 days. 40 Tab 0    torsemide (DEMADEX) 10 mg tablet Take 4 Tabs by mouth daily. 120 Tab 5    isosorbide dinitrate (ISORDIL) 20 mg tablet Take 20 mg by mouth two (2) times a day.  hydrALAZINE (APRESOLINE) 50 mg tablet Take 50 mg by mouth two (2) times a day.  Cholecalciferol, Vitamin D3, 50,000 unit cap Take  by mouth.  atorvastatin (LIPITOR) 40 mg tablet Take  by mouth daily.  Insulin Needles, Disposable, 31 gauge x 5/16\" ndle For flexpen #100, 4 rf daily 1 Package 5    gabapentin (NEURONTIN) 800 mg tablet Take 1 Tab by mouth three (3) times daily. 90 Tab 5    insulin aspart U-100 (NOVOLOG FLEXPEN U-100 INSULIN) 100 unit/mL inpn 9 Units by SubCUTAneous route Before breakfast, lunch, and dinner. 5 Pen 5    folic acid (FOLVITE) 1 mg tablet Take 1 Tab by mouth daily. 90 Tab 1    cyanocobalamin (VITAMIN B-12) 500 mcg tablet Take 1 Tab by mouth daily. 30 Tab 5    glucose blood VI test strips (ASCENSIA AUTODISC VI, ONE TOUCH ULTRA TEST VI) strip Daily verio one touch up to 3 times a day 100 Strip 5    carvedilol (COREG) 6.25 mg tablet Take 12.5 mg by mouth two (2) times a day.  polyethylene glycol (MIRALAX) 17 gram packet Take 1 Packet by mouth daily. 30 Packet 1    aspirin delayed-release 81 mg tablet Take  by mouth daily.        Allergies   Allergen Reactions    Lortab [Hydrocodone-Acetaminophen] Nausea and Vomiting    Cymbalta [Duloxetine] Diarrhea    Lasix [Furosemide] Swelling    Lyrica [Pregabalin] Drowsiness     Social History   Substance Use Topics    Smoking status: Never Smoker    Smokeless tobacco: Never Used    Alcohol use No        Lab Results  Component Value Date/Time   WBC 5.9 04/13/2018 02:27 PM   HGB 10.7 (L) 04/13/2018 02:27 PM   HCT 33.3 (L) 04/13/2018 02:27 PM   PLATELET 923 83/13/9265 02:27 PM   MCV 81 04/13/2018 02:27 PM     Lab Results  Component Value Date/Time   Hemoglobin A1c 8.6 (H) 02/13/2018 12:00 AM   Hemoglobin A1c 7.4 (H) 02/13/2017 12:20 PM   Hemoglobin A1c 6.6 (H) 08/15/2016 02:35 PM   Glucose 222 (H) 04/27/2018 03:17 PM   Glucose (POC) 173 (H) 12/18/2015 11:17 AM   Microalb/Creat ratio (ug/mg creat.) 39.9 (H) 02/19/2018 02:29 PM   LDL, calculated 72 02/13/2018 12:00 AM   Creatinine 1.65 (H) 04/27/2018 03:17 PM      Lab Results  Component Value Date/Time   Cholesterol, total 126 02/13/2018 12:00 AM   HDL Cholesterol 37 (L) 02/13/2018 12:00 AM   LDL, calculated 72 02/13/2018 12:00 AM   Triglyceride 84 02/13/2018 12:00 AM     Lab Results  Component Value Date/Time   ALT (SGPT) 23 04/02/2018 02:00 PM   AST (SGOT) 13 (L) 04/02/2018 02:00 PM   Alk. phosphatase 123 (H) 04/02/2018 02:00 PM   Bilirubin, total 0.5 04/02/2018 02:00 PM   Albumin 3.5 04/02/2018 02:00 PM   Protein, total 7.8 04/02/2018 02:00 PM   INR 1.1 12/08/2015 04:42 PM   Prothrombin time 11.2 12/08/2015 04:42 PM   PLATELET 882 17/46/5256 02:27 PM       Lab Results  Component Value Date/Time   GFR est non-AA 36 (L) 04/27/2018 03:17 PM   GFR est AA 41 (L) 04/27/2018 03:17 PM   Creatinine 1.65 (H) 04/27/2018 03:17 PM   BUN 14 04/27/2018 03:17 PM   Sodium 137 04/27/2018 03:17 PM   Potassium 3.5 04/27/2018 03:17 PM   Chloride 102 04/27/2018 03:17 PM   CO2 21 04/27/2018 03:17 PM   Magnesium 1.4 (L) 11/16/2016 12:04 AM   PTH, Intact CANCELED 02/19/2018 02:29 PM     Lab Results   Component Value Date/Time    Glucose 222 (H) 04/27/2018 03:17 PM    Glucose (POC) 173 (H) 12/18/2015 11:17 AM         Review of Systems, additional:  Pertinent items are noted in HPI.     Objective:     Visit Vitals    /86 (BP 1 Location: Left arm, BP Patient Position: Sitting)    Pulse 100    Temp 99 °F (37.2 °C) (Oral)    Resp 16    Ht 5' 4\" (1.626 m)    Wt 257 lb (116.6 kg)    LMP 12/03/2017    SpO2 100%    BMI 44.11 kg/m2     General:  alert, fatigued, cooperative, no distress, appears older than stated age, morbidly obese   Neck: no JVD   Chest Wall: inspection normal - no chest wall deformities or tenderness, respiratory effort normal   Lung: clear to auscultation bilaterally   Heart:  normal rate, regular rhythm, normal S1, S2, no murmurs, rubs, clicks or gallops   Abdomen: soft, non-tender. Bowel sounds normal. No masses,  no organomegaly   Extremities: edema slight right big toe lateral callus some bruising of the callus toe still vascularly intact       Lab review: labs reviewed, I note that glycosylated hemoglobin mildly abnormal but acceptable. Assessment/Plan:     diabetes loss of control due to intercurrent illness, needs improvement, hypertension stable. Will increase her insulin a little. Possible diabetic foot wound, has not had blood flow levels checked to her extremities, will have her assessed for that she will follow-up with her podiatrist uses a lot of narcotics will okay another refill until she sees podiatry. Some diuresis with increased Demadex. Edema and blood pressure reduced I think her CHF is better. Labs to assess kidney function. Chronic Conditions Addressed Today     1. Narcotic dependence, episodic use (HCC)     Relevant Medications     oxyCODONE-acetaminophen (PERCOCET) 5-325 mg per tablet     insulin detemir U-100 (LEVEMIR) 100 unit/mL injection     Other Relevant Orders     METABOLIC PANEL, BASIC (Completed)     ANKLE BRACHIAL INDEX     RI HANDLG&/OR CONVEY OF SPEC FOR TR OFFICE TO LAB     COLLECTION VENOUS BLOOD,VENIPUNCTURE    2. Type 2 diabetes mellitus with diabetic neuropathy (HCC)     Relevant Medications     oxyCODONE-acetaminophen (PERCOCET) 5-325 mg per tablet     insulin detemir U-100 (LEVEMIR) 100 unit/mL injection     Other Relevant Orders     METABOLIC PANEL, BASIC (Completed)     ANKLE BRACHIAL INDEX     RI HANDLG&/OR CONVEY OF SPEC FOR TR OFFICE TO LAB     COLLECTION VENOUS BLOOD,VENIPUNCTURE    3.  Acute systolic congestive heart failure ([facility] [address] - Primary Relevant Medications     oxyCODONE-acetaminophen (PERCOCET) 5-325 mg per tablet     insulin detemir U-100 (LEVEMIR) 100 unit/mL injection     Other Relevant Orders     METABOLIC PANEL, BASIC (Completed)     ANKLE BRACHIAL INDEX     OH HANDLG&/OR CONVEY OF SPEC FOR TR OFFICE TO LAB     COLLECTION VENOUS BLOOD,VENIPUNCTURE      Acute Diagnoses Addressed Today     Spasm of back muscles            Relevant Medications        oxyCODONE-acetaminophen (PERCOCET) 5-325 mg per tablet        insulin detemir U-100 (LEVEMIR) 100 unit/mL injection        Other Relevant Orders        METABOLIC PANEL, BASIC (Completed)        ANKLE BRACHIAL INDEX        OH HANDLG&/OR CONVEY OF SPEC FOR TR OFFICE TO LAB        COLLECTION VENOUS BLOOD,VENIPUNCTURE    Pain of right great toe            Relevant Medications        oxyCODONE-acetaminophen (PERCOCET) 5-325 mg per tablet        insulin detemir U-100 (LEVEMIR) 100 unit/mL injection        Other Relevant Orders        METABOLIC PANEL, BASIC (Completed)        ANKLE BRACHIAL INDEX        OH HANDLG&/OR CONVEY OF SPEC FOR TR OFFICE TO LAB        COLLECTION VENOUS BLOOD,VENIPUNCTURE        Current Outpatient Prescriptions   Medication Sig Dispense Refill    oxyCODONE-acetaminophen (PERCOCET) 5-325 mg per tablet Take 1 Tab by mouth every eight (8) hours as needed for Pain. Max Daily Amount: 3 Tabs. 21 Tab 0    insulin detemir U-100 (LEVEMIR) 100 unit/mL injection 30 Units by SubCUTAneous route nightly. 15 mL 5    losartan (COZAAR) 50 mg tablet Take  by mouth daily.  medroxyPROGESTERone (PROVERA) 10 mg tablet Take 2 Tabs by mouth two (2) times a day for 10 days. 40 Tab 0    torsemide (DEMADEX) 10 mg tablet Take 4 Tabs by mouth daily. 120 Tab 5    isosorbide dinitrate (ISORDIL) 20 mg tablet Take 20 mg by mouth two (2) times a day.  hydrALAZINE (APRESOLINE) 50 mg tablet Take 50 mg by mouth two (2) times a day.  Cholecalciferol, Vitamin D3, 50,000 unit cap Take  by mouth.       atorvastatin (LIPITOR) 40 mg tablet Take  by mouth daily.  Insulin Needles, Disposable, 31 gauge x 5/16\" ndle For flexpen #100, 4 rf daily 1 Package 5    gabapentin (NEURONTIN) 800 mg tablet Take 1 Tab by mouth three (3) times daily. 90 Tab 5    insulin aspart U-100 (NOVOLOG FLEXPEN U-100 INSULIN) 100 unit/mL inpn 9 Units by SubCUTAneous route Before breakfast, lunch, and dinner. 5 Pen 5    folic acid (FOLVITE) 1 mg tablet Take 1 Tab by mouth daily. 90 Tab 1    cyanocobalamin (VITAMIN B-12) 500 mcg tablet Take 1 Tab by mouth daily. 30 Tab 5    glucose blood VI test strips (ASCENSIA AUTODISC VI, ONE TOUCH ULTRA TEST VI) strip Daily verio one touch up to 3 times a day 100 Strip 5    carvedilol (COREG) 6.25 mg tablet Take 12.5 mg by mouth two (2) times a day.  polyethylene glycol (MIRALAX) 17 gram packet Take 1 Packet by mouth daily. 30 Packet 1    aspirin delayed-release 81 mg tablet Take  by mouth daily. Follow-up Disposition:  Return in about 2 weeks (around 5/11/2018) for routine follow up.

## 2018-04-27 NOTE — MR AVS SNAPSHOT
[address] 4136 [facility] 
[phone] Patient: Viktor Norman MRN: [id number] :1965 Visit Information Date & Time Provider Department Dept. Phone Encounter #  
 2018  1:45 PM Tonie Cisneros MD [facility] Donavan Coats [id number] Follow-up Instructions Return in about 2 weeks (around 2018) for routine follow up. Your Appointments 2018  1:45 PM  
ACUTE CARE with Tonie Cisneros MD  
[facility] Appt Note: f/u on dm 3/19/18s[address] [place] [address]  
[phone]  
  
   
 [address] [place] [address] [address] Upcoming Health Maintenance Date Due FOBT Q 1 YEAR AGE 50-75 2016 PAP AKA CERVICAL CYTOLOGY 9/3/2017 EYE EXAM RETINAL OR DILATED Q1 10/25/2017 Influenza Age 5 to Adult 2018 HEMOGLOBIN A1C Q6M 10/20/2018 FOOT EXAM Q1 2018 LIPID PANEL Q1 2019 MICROALBUMIN Q1 2019 BREAST CANCER SCRN MAMMOGRAM 3/22/2019 DTaP/Tdap/Td series (2 - Td) 3/13/2027 Allergies as of 2018  Review Complete On: 2018 By: Parker Cleary LPN Severity Noted Reaction Type Reactions Lortab [Hydrocodone-acetaminophen] High 2013   Intolerance Nausea and Vomiting Cymbalta [Duloxetine]  10/04/2016    Diarrhea Lasix [Furosemide]  2018    Swelling Lyrica [Pregabalin]  2017    Drowsiness Current Immunizations  Reviewed on 2016 No immunizations on file. Not reviewed this visit You Were Diagnosed With   
  
 Codes Comments Acute systolic congestive heart failure ([facility] [address]    -  Primary ICD-10-CM: I50.21 ICD-9-CM: 428.21, 428.0 Type 2 diabetes mellitus with diabetic neuropathy, with long-term current use of insulin (HCC)     ICD-10-CM: E11.40, Z79.4 ICD-9-CM: 250.60, 357.2, V58.67 Spasm of back muscles     ICD-10-CM: M62.830 ICD-9-CM: 724.8 Pain of right great toe     ICD-10-CM: K95.385 ICD-9-CM: 729.5 Narcotic dependence, episodic use ([facility] [address]     ICD-10-CM: D66.44 ICD-9-CM: 304.92 Vitals BP Pulse Temp Resp Height(growth percentile) Weight(growth percentile) 145/86 (BP 1 Location: Left arm, BP Patient Position: Sitting) 100 99 °F (37.2 °C) (Oral) 16 5' 4\" (1.626 m) 257 lb (116.6 kg) LMP SpO2 BMI OB Status Smoking Status 12/03/2017 100% 44.11 kg/m2 Postmenopausal Never Smoker Vitals History BMI and BSA Data Body Mass Index Body Surface Area  
 44.11 kg/m 2 2.29 m 2 Preferred Pharmacy Pharmacy Name Phone [facility] 9 [facility] [phone] Your Updated Medication List  
  
   
This list is accurate as of 4/27/18  3:22 PM.  Always use your most recent med list.  
  
  
  
  
 aspirin delayed-release 81 mg tablet Take  by mouth daily. atorvastatin 40 mg tablet Commonly known as:  LIPITOR Take  by mouth daily. carvedilol 6.25 mg tablet Commonly known as:  Gaurav Dole Take 12.5 mg by mouth two (2) times a day. cholecalciferol 50,000 unit capsule Commonly known as:  VITAMIN D3 Take  by mouth.  
  
 cyanocobalamin 500 mcg tablet Commonly known as:  VITAMIN B-12 Take 1 Tab by mouth daily. folic acid 1 mg tablet Commonly known as:  Google Take 1 Tab by mouth daily. gabapentin 800 mg tablet Commonly known as:  NEURONTIN Take 1 Tab by mouth three (3) times daily. glucose blood VI test strips strip Commonly known as:  ASCENSIA AUTODISC VI, ONE TOUCH ULTRA TEST VI  
Daily verio one touch up to 3 times a day  
  
 hydrALAZINE 50 mg tablet Commonly known as:  APRESOLINE Take 50 mg by mouth two (2) times a day. insulin aspart U-100 100 unit/mL Inpn Commonly known as:  NovoLOG Flexpen U-100 Insulin  
9 Units by SubCUTAneous route Before breakfast, lunch, and dinner. insulin detemir U-100 100 unit/mL injection Commonly known as:  LEVEMIR  
30 Units by SubCUTAneous route nightly. Insulin Needles (Disposable) 31 gauge x 5/16\" Ndle For flexpen #100, 4 rf daily  
  
 isosorbide dinitrate 20 mg tablet Commonly known as:  ISORDIL Take 20 mg by mouth two (2) times a day. losartan 50 mg tablet Commonly known as:  COZAAR Take  by mouth daily. medroxyPROGESTERone 10 mg tablet Commonly known as:  PROVERA Take 2 Tabs by mouth two (2) times a day for 10 days. oxyCODONE-acetaminophen 5-325 mg per tablet Commonly known as:  PERCOCET Take 1 Tab by mouth every eight (8) hours as needed for Pain. Max Daily Amount: 3 Tabs. polyethylene glycol 17 gram packet Commonly known as:  Krystin Plane Take 1 Packet by mouth daily. torsemide 10 mg tablet Commonly known as:  DEMADEX Take 4 Tabs by mouth daily. Prescriptions Printed Refills  
 oxyCODONE-acetaminophen (PERCOCET) 5-325 mg per tablet 0 Sig: Take 1 Tab by mouth every eight (8) hours as needed for Pain. Max Daily Amount: 3 Tabs. Class: Print Route: Oral  
  
We Performed the Following COLLECTION VENOUS BLOOD,VENIPUNCTURE R0771951 CPT(R)] METABOLIC PANEL, BASIC [43736 CPT(R)] AL HANDLG&/OR CONVEY OF SPEC FOR TR OFFICE TO LAB [07819 CPT(R)] Follow-up Instructions Return in about 2 weeks (around 5/11/2018) for routine follow up. To-Do List   
 Around 05/04/2018 Imaging:  [facility] Introducing [facility]! [facility] introduces [employer] patient portal. Now you can access parts of your medical record, email your doctor's office, and request medication refills online. 1. In your internet browser, go to [email or web address] 2. Click on the First Time User? Click Here link in the Sign In box. You will see the New Member Sign Up page. 3. Enter your [employer] Access Code exactly as it appears below. You will not need to use this code after youve completed the sign-up process. If you do not sign up before the expiration date, you must request a new code. · [employer] Access Code: [email or web address] Expires: 5/6/2018  3:38 PM 
 
4. Enter the last four digits of your Social Security Number (xxxx) and Date of Birth (mm/dd/yyyy) as indicated and click Submit. You will be taken to the next sign-up page. 5. Create a [employer] ID. This will be your [employer] login ID and cannot be changed, so think of one that is secure and easy to remember. 6. Create a [employer] password. You can change your password at any time. 7. Enter your Password Reset Question and Answer. This can be used at a later time if you forget your password. 8. Enter your e-mail address. You will receive e-mail notification when new information is available in [address] 9. Click Sign Up. You can now view and download portions of your medical record. 10. Click the Download Summary menu link to download a portable copy of your medical information. If you have questions, please visit the Frequently Asked Questions section of the [employer] website. Remember, [employer] is NOT to be used for urgent needs. For medical emergencies, dial 911. Now available from your iPhone and Android! Please provide this summary of care documentation to your next provider. Your primary care clinician is listed as Pardeep Sharif. If you have any questions after today's visit, please call [phone].

## 2018-04-27 NOTE — PROGRESS NOTES
Chief Complaint   Patient presents with    Back Pain     muscle spasam back     I have reviewed the patient's medical history in detail and updated the computerized patient record. Health Maintenance reviewed. 1. Have you been to the ER, urgent care clinic since your last visit? Hospitalized since your last visit?no    2. Have you seen or consulted any other health care providers outside of the [address] [place] [address] [place] since your last visit? Include any pap smears or colon screening. No    Encouraged pt to discuss pt's wishes with spouse/partner/family and bring them in the next appt to follow thru with the Advanced Directive    Fall Risk Assessment, last 12 mths 1/8/2018   Able to walk? Yes   Fall in past 12 months? Yes   Fall with injury? Yes   Number of falls in past 12 months 2   Fall Risk Score 3       PHQ over the last two weeks 4/27/2018   Little interest or pleasure in doing things Several days   Feeling down, depressed or hopeless Several days   Total Score PHQ 2 2       Abuse Screening Questionnaire 1/8/2018   Do you ever feel afraid of your partner? N   Are you in a relationship with someone who physically or mentally threatens you? N   Is it safe for you to go home?  Y       ADL Assessment 1/8/2018   Feeding yourself No Help Needed   Getting from bed to chair No Help Needed   Getting dressed No Help Needed   Bathing or showering No Help Needed   Walk across the room (includes cane/walker) No Help Needed   Using the telphone No Help Needed   Taking your medications No Help Needed   Preparing meals No Help Needed   Managing money (expenses/bills) No Help Needed   Moderately strenuous housework (laundry) No Help Needed   Shopping for personal items (toiletries/medicines) No Help Needed   Shopping for groceries No Help Needed   Driving No Help Needed   Climbing a flight of stairs No Help Needed   Getting to places beyond walking distances No Help Needed     Faxed XR order to [address] and pt was given a copy to made appt tomorrow

## 2018-04-28 LAB
BUN SERPL-MCNC: 14 MG/DL (ref 6–24)
BUN/CREAT SERPL: 8 (ref 9–23)
CALCIUM SERPL-MCNC: 9.4 MG/DL (ref 8.7–10.2)
CHLORIDE SERPL-SCNC: 102 MMOL/L (ref 96–106)
CO2 SERPL-SCNC: 21 MMOL/L (ref 18–29)
CREAT SERPL-MCNC: 1.65 MG/DL (ref 0.57–1)
GFR SERPLBLD CREATININE-BSD FMLA CKD-EPI: 36 ML/MIN/1.73
GFR SERPLBLD CREATININE-BSD FMLA CKD-EPI: 41 ML/MIN/1.73
GLUCOSE SERPL-MCNC: 222 MG/DL (ref 65–99)
INTERPRETATION: NORMAL
Lab: NORMAL
POTASSIUM SERPL-SCNC: 3.5 MMOL/L (ref 3.5–5.2)
SODIUM SERPL-SCNC: 137 MMOL/L (ref 134–144)

## 2018-05-04 ENCOUNTER — HOSPITAL ENCOUNTER (EMERGENCY)
Age: 53
Discharge: HOME | End: 2018-05-04
Attending: EMERGENCY MEDICINE
Payer: MEDICAID

## 2018-05-04 VITALS
WEIGHT: 259.7 LBS | TEMPERATURE: 99.2 F | SYSTOLIC BLOOD PRESSURE: 133 MMHG | OXYGEN SATURATION: 99 % | HEART RATE: 106 BPM | RESPIRATION RATE: 18 BRPM | DIASTOLIC BLOOD PRESSURE: 68 MMHG | HEIGHT: 64 IN | BODY MASS INDEX: 44.34 KG/M2

## 2018-05-04 DIAGNOSIS — M25.511 ACUTE PAIN OF RIGHT SHOULDER: Primary | ICD-10-CM

## 2018-05-04 PROCEDURE — 74011250637 HC RX REV CODE- 250/637: Performed by: PHYSICIAN ASSISTANT

## 2018-05-04 PROCEDURE — 99283 EMERGENCY DEPT VISIT LOW MDM: CPT

## 2018-05-04 RX ORDER — OXYCODONE AND ACETAMINOPHEN 5; 325 MG/1; MG/1
1 TABLET ORAL
Status: COMPLETED | OUTPATIENT
Start: 2018-05-04 | End: 2018-05-04

## 2018-05-04 RX ORDER — OXYCODONE AND ACETAMINOPHEN 5; 325 MG/1; MG/1
1 TABLET ORAL
Qty: 12 TAB | Refills: 0 | Status: SHIPPED | OUTPATIENT
Start: 2018-05-04 | End: 2018-05-11 | Stop reason: ALTCHOICE

## 2018-05-04 RX ADMIN — OXYCODONE HYDROCHLORIDE AND ACETAMINOPHEN 1 TABLET: 5; 325 TABLET ORAL at 21:26
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
## 2018-05-05 NOTE — ED NOTES
Discharge instructions given to patient by WILLIS Centeno. Patient verbalized understanding of discharge instructions. Pt discharged without difficulty. Pt discharged in stable condition via ambulation, accompanied by friend.

## 2018-05-11 ENCOUNTER — OFFICE VISIT (OUTPATIENT)
Dept: INTERNAL MEDICINE CLINIC | Age: 53
End: 2018-05-11

## 2018-05-11 ENCOUNTER — TELEPHONE (OUTPATIENT)
Dept: INTERNAL MEDICINE CLINIC | Age: 53
End: 2018-05-11

## 2018-05-11 VITALS
HEIGHT: 64 IN | OXYGEN SATURATION: 99 % | DIASTOLIC BLOOD PRESSURE: 75 MMHG | WEIGHT: 250 LBS | TEMPERATURE: 99 F | HEART RATE: 75 BPM | RESPIRATION RATE: 18 BRPM | BODY MASS INDEX: 42.68 KG/M2 | SYSTOLIC BLOOD PRESSURE: 119 MMHG

## 2018-05-11 DIAGNOSIS — Z79.4 TYPE 2 DIABETES MELLITUS WITH DIABETIC NEUROPATHY, WITH LONG-TERM CURRENT USE OF INSULIN (HCC): ICD-10-CM

## 2018-05-11 DIAGNOSIS — I50.21 ACUTE SYSTOLIC CONGESTIVE HEART FAILURE (HCC): ICD-10-CM

## 2018-05-11 DIAGNOSIS — I10 ESSENTIAL HYPERTENSION: ICD-10-CM

## 2018-05-11 DIAGNOSIS — E11.40 TYPE 2 DIABETES MELLITUS WITH DIABETIC NEUROPATHY, WITH LONG-TERM CURRENT USE OF INSULIN (HCC): ICD-10-CM

## 2018-05-11 DIAGNOSIS — M25.511 RIGHT SHOULDER PAIN, UNSPECIFIED CHRONICITY: ICD-10-CM

## 2018-05-11 DIAGNOSIS — F11.20 NARCOTIC DEPENDENCE, EPISODIC USE (HCC): ICD-10-CM

## 2018-05-11 DIAGNOSIS — E11.21 TYPE 2 DIABETES MELLITUS WITH NEPHROPATHY (HCC): Primary | ICD-10-CM

## 2018-05-11 RX ORDER — INSULIN LISPRO 100 [IU]/ML
12 INJECTION, SOLUTION INTRAVENOUS; SUBCUTANEOUS
Qty: 2 PACKAGE | Refills: 3 | Status: SHIPPED | OUTPATIENT
Start: 2018-05-11 | End: 2019-04-29 | Stop reason: SDUPTHER

## 2018-05-11 RX ORDER — OXYCODONE AND ACETAMINOPHEN 5; 325 MG/1; MG/1
1 TABLET ORAL
Qty: 12 TAB | Refills: 0 | Status: SHIPPED | OUTPATIENT
Start: 2018-05-11 | End: 2018-05-15 | Stop reason: SDUPTHER

## 2018-05-11 RX ORDER — INSULIN GLARGINE 100 [IU]/ML
40 INJECTION, SOLUTION SUBCUTANEOUS
Qty: 10 PEN | Refills: 3 | Status: SHIPPED | OUTPATIENT
Start: 2018-05-11 | End: 2018-07-13 | Stop reason: SDUPTHER

## 2018-05-11 NOTE — PATIENT INSTRUCTIONS
Use same dose of levemir until Lantus comes in.  Use same dose of Novolg until Humalog comes in,see med list.

## 2018-05-11 NOTE — TELEPHONE ENCOUNTER
Called [phone] for a med prior authorization on   FAX # is [phone]    Jhlzyxv138iw/ml - 12units before B/L/D  3 flex pens Dr. Lambert [place] cardiologist wanted this  EOT# [id number]      Lantus 100mg/ml - 40 units before bedtime every night  EOT# [id number]     Rizwan Lawrence   67  [id number]

## 2018-05-11 NOTE — MR AVS SNAPSHOT
[address] 3502 [facility] 
[phone] Patient: Brittany Meza MRN: [id number] :1965 Visit Information Date & Time Provider Department Dept. Phone Encounter #  
 2018  1:00 PM Roxie Ospina MD [facility] Donavan Coats [id number] Follow-up Instructions Return in about 4 weeks (around 2018) for routine follow up. Your Appointments 2018  1:45 PM  
ACUTE CARE with Roxie Ospina MD  
[facility] [facility]) Appt Note: f/u on dm 3/19/18s[address] [place] [address]  
[phone]  
  
   
 [address] [place] [address] [address] Upcoming Health Maintenance Date Due FOBT Q 1 YEAR AGE 50-75 2016 PAP AKA CERVICAL CYTOLOGY 9/3/2017 EYE EXAM RETINAL OR DILATED Q1 10/25/2017 Influenza Age 5 to Adult 2018 HEMOGLOBIN A1C Q6M 10/20/2018 FOOT EXAM Q1 2018 LIPID PANEL Q1 2019 MICROALBUMIN Q1 2019 BREAST CANCER SCRN MAMMOGRAM 3/22/2019 DTaP/Tdap/Td series (2 - Td) 3/13/2027 Allergies as of 2018  Review Complete On: 2018 By: Larisa Vargas LPN Severity Noted Reaction Type Reactions Lortab [Hydrocodone-acetaminophen] High 2013   Intolerance Nausea and Vomiting Cymbalta [Duloxetine]  10/04/2016    Diarrhea Lasix [Furosemide]  2018    Swelling Lyrica [Pregabalin]  2017    Drowsiness Current Immunizations  Reviewed on 2016 No immunizations on file. Not reviewed this visit You Were Diagnosed With   
  
 Codes Comments Type 2 diabetes mellitus with nephropathy ([facility]    -  Primary ICD-10-CM: E11.21 
ICD-9-CM: 250.40, 583.81 Narcotic dependence, episodic use ([facility]     ICD-10-CM: O46.20 ICD-9-CM: 304.92   
 Right shoulder pain, unspecified chronicity     ICD-10-CM: M25.511 ICD-9-CM: 719.41 Essential hypertension     ICD-10-CM: I10 
ICD-9-CM: 401.9 Type 2 diabetes mellitus with diabetic neuropathy, with long-term current use of insulin (HCC)     ICD-10-CM: E11.40, Z79.4 ICD-9-CM: 250.60, 357.2, V58.67 Acute systolic congestive heart failure (HCC)     ICD-10-CM: I50.21 ICD-9-CM: 428.21, 428.0 Vitals BP Pulse Temp Resp Height(growth percentile) Weight(growth percentile) 119/75 (BP 1 Location: Left arm, BP Patient Position: Sitting) 75 99 °F (37.2 °C) (Oral) 18 5' 4\" (1.626 m) 250 lb (113.4 kg) LMP SpO2 BMI OB Status Smoking Status 12/03/2017 99% 42.91 kg/m2 Postmenopausal Never Smoker BMI and BSA Data Body Mass Index Body Surface Area 42.91 kg/m 2 2.26 m 2 Preferred Pharmacy Pharmacy Name Phone [facility], [place] [facility] 75 [phone] [facility] [phone] Your Updated Medication List  
  
   
This list is accurate as of 5/11/18  2:22 PM.  Always use your most recent med list.  
  
  
  
  
 aspirin delayed-release 81 mg tablet Take  by mouth daily. atorvastatin 40 mg tablet Commonly known as:  LIPITOR Take  by mouth daily. carvedilol 6.25 mg tablet Commonly known as:  Corazon Awe Take 12.5 mg by mouth two (2) times a day. cholecalciferol 50,000 unit capsule Commonly known as:  VITAMIN D3 Take  by mouth.  
  
 cyanocobalamin 500 mcg tablet Commonly known as:  VITAMIN B-12 Take 1 Tab by mouth daily. folic acid 1 mg tablet Commonly known as:  Google Take 1 Tab by mouth daily. gabapentin 800 mg tablet Commonly known as:  NEURONTIN Take 1 Tab by mouth three (3) times daily. glucose blood VI test strips strip Commonly known as:  ASCENSIA AUTODISC VI, ONE TOUCH ULTRA TEST VI  
Daily verio one touch up to 3 times a day  
  
 hydrALAZINE 50 mg tablet Commonly known as:  APRESOLINE Take 50 mg by mouth two (2) times a day. insulin aspart U-100 100 unit/mL Inpn Commonly known as:  NovoLOG Flexpen U-100 Insulin  
9 Units by SubCUTAneous route Before breakfast, lunch, and dinner. insulin detemir U-100 100 unit/mL injection Commonly known as:  LEVEMIR  
30 Units by SubCUTAneous route nightly. insulin glargine 100 unit/mL (3 mL) Inpn Commonly known as:  LANTUS,BASAGLAR  
40 Units by SubCUTAneous route nightly. insulin lispro 100 unit/mL kwikpen Commonly known as:  HUMALOG  
12 Units by SubCUTAneous route Before breakfast, lunch, and dinner. Insulin Needles (Disposable) 31 gauge x 5/16\" Ndle For flexpen #100, 4 rf daily  
  
 isosorbide dinitrate 20 mg tablet Commonly known as:  ISORDIL Take 20 mg by mouth two (2) times a day. losartan 50 mg tablet Commonly known as:  COZAAR Take  by mouth daily. oxyCODONE-acetaminophen 5-325 mg per tablet Commonly known as:  PERCOCET Take 1 Tab by mouth every eight (8) hours as needed for Pain. Max Daily Amount: 3 Tabs. polyethylene glycol 17 gram packet Commonly known as:  Clint Spaniel Take 1 Packet by mouth daily. torsemide 10 mg tablet Commonly known as:  DEMADEX Take 4 Tabs by mouth daily. Prescriptions Printed Refills  
 oxyCODONE-acetaminophen (PERCOCET) 5-325 mg per tablet 0 Sig: Take 1 Tab by mouth every eight (8) hours as needed for Pain. Max Daily Amount: 3 Tabs. Class: Print Route: Oral  
  
Prescriptions Sent to Pharmacy Refills  
 insulin glargine (LANTUS,BASAGLAR) 100 unit/mL (3 mL) inpn 3 Si Units by SubCUTAneous route nightly. Class: Normal  
 Pharmacy: [facility] DR SYLVESTER REYES [address] [place] [address], [facility] Ph #: [phone] Route: SubCUTAneous  
 insulin lispro (HUMALOG) 100 unit/mL kwikpen 3 Si Units by SubCUTAneous route Before breakfast, lunch, and dinner. Class: Normal  
 Pharmacy: [facility] DR SYLVESTER REYES 2002 [facility], [address]  #: [phone] Route: SubCUTAneous We Performed the Following REFERRAL TO ORTHOPEDICS [ORT967 Custom] Comments:  
 Right shoulder pain x 1-2 weeks with a fall 5/10/2018 and re-injury Follow-up Instructions Return in about 4 weeks (around 6/8/2018) for routine follow up. Referral Information Referral ID Referred By Referred To  
  
 [id number] Apryl MIGUEL MD   
   [address] [place] [address] Dr Phone: [phone] Fax: [phone] Visits Status Start Date End Date 1 New Request 5/11/18 5/11/19 If your referral has a status of pending review or denied, additional information will be sent to support the outcome of this decision. Patient Instructions Use same dose of levemir until Lantus comes in. Use same dose of Novolg until Humalog comes in,see med list. 
 
 
  
Introducing [facility]! Ángel Hagen introduces [employer] patient portal. Now you can access parts of your medical record, email your doctor's office, and request medication refills online. 1. In your internet browser, go to [email or web address] 2. Click on the First Time User? Click Here link in the Sign In box. You will see the New Member Sign Up page. 3. Enter your [employer] Access Code exactly as it appears below. You will not need to use this code after youve completed the sign-up process. If you do not sign up before the expiration date, you must request a new code. · [employer] Access Code: [email or web address] Expires: 8/9/2018  1:03 PM 
 
4. Enter the last four digits of your Social Security Number (xxxx) and Date of Birth (mm/dd/yyyy) as indicated and click Submit. You will be taken to the next sign-up page. 5. Create a Social[employer] ID.  This will be your Social[employer] login ID and cannot be changed, so think of one that is secure and easy to remember. 6. Create a [employer] password. You can change your password at any time. 7. Enter your Password Reset Question and Answer. This can be used at a later time if you forget your password. 8. Enter your e-mail address. You will receive e-mail notification when new information is available in [address] 9. Click Sign Up. You can now view and download portions of your medical record. 10. Click the Download Summary menu link to download a portable copy of your medical information. If you have questions, please visit the Frequently Asked Questions section of the [employer] website. Remember, [employer] is NOT to be used for urgent needs. For medical emergencies, dial 911. Now available from your iPhone and Android! Please provide this summary of care documentation to your next provider. Your primary care clinician is listed as Buddy Herr. If you have any questions after today's visit, please call [phone].

## 2018-05-11 NOTE — PROGRESS NOTES
Subjective:     Hollie Miranda is an 47 y.o. female who is seen for follow up of CHF. Here with her daughter who is now her guardian. She has diabetes, hyperlipidemia and CHF. She has chronic pain. Frequent user of narcotic agents for various aches and pain issues. Went to the emergency room a week ago for right shoulder pain. No specific injury. Given some Percocet discharged. Finished it. Pain was doing better but she reinjured her shoulder a couple of days ago stumbling and hitting her right shoulder. Diet and Lifestyle: generally follows a low sodium diet, nonsmoker. There have been some issues with her insulin. Previously was on Lantus and Humalog and had improved control. Because of insurance she was switched to Levemir and NovoLog. Blood sugars have been significantly worse 300s and even occasional 400s. We have been gradually increasing her insulin, her last A1c was not that bad at 9.4. We have also been increasing her diuretic gradually. Weight monitoring: She is lost 9 pounds in the last week. States her breathing is about the same. May be slightly less swelling. No complaints of positional dizziness. Cardiovascular System Review  Cardiovascular ROS - taking medications as instructed, no medication side effects noted, no chest pain on exertion, notes stable dyspnea on exertion, no change, NYHA class 3, no swelling of ankles. She had a follow-up with her cardiologist at [facility]. Was some discussion of doing a procedure but he said she had to have better blood sugars and what she is currently having.     Patient Active Problem List    Diagnosis Date Noted    Type 2 diabetes mellitus with diabetic neuropathy, with long-term current use of insulin ([address] 47/85/7274    Acute systolic congestive heart failure ([address] 02/07/2018    Type 2 diabetes mellitus with diabetic neuropathy ([address] 01/08/2018    Obesity, morbid ([address] 12/18/2017    Type 2 diabetes mellitus with nephropathy ([address] 12/18/2017    Spinal stenosis of lumbosacral region 12/08/2016    Lumbar back pain with radiculopathy affecting right lower extremity 12/08/2016    Idiopathic small and large fiber sensory neuropathy 11/16/2016    Narcotic dependence, episodic use (HCC) 04/29/2016    Chronic pain     Hypertension 11/07/2012     Allergies   Allergen Reactions    Lortab [Hydrocodone-Acetaminophen] Nausea and Vomiting    Cymbalta [Duloxetine] Diarrhea    Lasix [Furosemide] Swelling    Lyrica [Pregabalin] Drowsiness     Social History   Substance Use Topics    Smoking status: Never Smoker    Smokeless tobacco: Never Used    Alcohol use No        Lab Results  Component Value Date/Time   WBC 5.9 04/13/2018 02:27 PM   HGB 10.7 (L) 04/13/2018 02:27 PM   HCT 33.3 (L) 04/13/2018 02:27 PM   PLATELET 557 23/19/8893 02:27 PM   MCV 81 04/13/2018 02:27 PM     Lab Results  Component Value Date/Time   Hemoglobin A1c 8.6 (H) 02/13/2018 12:00 AM   Hemoglobin A1c 7.4 (H) 02/13/2017 12:20 PM   Hemoglobin A1c 6.6 (H) 08/15/2016 02:35 PM   Glucose 222 (H) 04/27/2018 03:17 PM   Glucose (POC) 173 (H) 12/18/2015 11:17 AM   Microalb/Creat ratio (ug/mg creat.) 39.9 (H) 02/19/2018 02:29 PM   LDL, calculated 72 02/13/2018 12:00 AM   Creatinine 1.65 (H) 04/27/2018 03:17 PM      Lab Results  Component Value Date/Time   Cholesterol, total 126 02/13/2018 12:00 AM   HDL Cholesterol 37 (L) 02/13/2018 12:00 AM   LDL, calculated 72 02/13/2018 12:00 AM   Triglyceride 84 02/13/2018 12:00 AM     Lab Results  Component Value Date/Time   ALT (SGPT) 23 04/02/2018 02:00 PM   AST (SGOT) 13 (L) 04/02/2018 02:00 PM   Alk.  phosphatase 123 (H) 04/02/2018 02:00 PM   Bilirubin, total 0.5 04/02/2018 02:00 PM   Albumin 3.5 04/02/2018 02:00 PM   Protein, total 7.8 04/02/2018 02:00 PM   INR 1.1 12/08/2015 04:42 PM   Prothrombin time 11.2 12/08/2015 04:42 PM   PLATELET 562 41/84/1224 02:27 PM       Lab Results  Component Value Date/Time   GFR est non-AA 36 (L) 04/27/2018 03:17 PM   GFR est AA 41 (L) 04/27/2018 03:17 PM   Creatinine 1.65 (H) 04/27/2018 03:17 PM   BUN 14 04/27/2018 03:17 PM   Sodium 137 04/27/2018 03:17 PM   Potassium 3.5 04/27/2018 03:17 PM   Chloride 102 04/27/2018 03:17 PM   CO2 21 04/27/2018 03:17 PM   Magnesium 1.4 (L) 11/16/2016 12:04 AM   PTH, Intact CANCELED 02/19/2018 02:29 PM     Lab Results   Component Value Date/Time    Glucose 222 (H) 04/27/2018 03:17 PM    Glucose (POC) 173 (H) 12/18/2015 11:17 AM         Review of Systems, additional:  Pertinent items are noted in HPI. Objective:     Visit Vitals    /75 (BP 1 Location: Left arm, BP Patient Position: Sitting)    Pulse 75    Temp 99 °F (37.2 °C) (Oral)    Resp 18    Ht 5' 4\" (1.626 m)    Wt 250 lb (113.4 kg)    LMP 12/03/2017    SpO2 99%    BMI 42.91 kg/m2     General:  alert, fatigued, cooperative, no distress, appears older than stated age   Neck: supple, no significant adenopathy, no JVD   Chest Wall: inspection normal - no chest wall deformities or tenderness, respiratory effort normal   Lung: clear to auscultation bilaterally   Heart:  normal rate, regular rhythm, normal S1, S2, no murmurs, rubs, clicks or gallops   Abdomen: soft, non-tender. Bowel sounds normal. No masses,  no organomegaly   Extremities: edema slight, pain in the right shoulder tenderness with testing range of motion no obvious asymmetry generalized tenderness to palpation       Lab review: labs reviewed, I note that glycosylated hemoglobin mildly abnormal but acceptable. Assessment/Plan:     hypertension stable. Chronic Conditions Addressed Today     1. Hypertension    2. Narcotic dependence, episodic use ([address]    3. Type 2 diabetes mellitus with nephropathy (HCC) - Primary     Relevant Medications     insulin glargine (LANTUS,BASAGLAR) 100 unit/mL (3 mL) inpn     insulin lispro (HUMALOG) 100 unit/mL kwikpen    4. Acute systolic congestive heart failure ([address]    5.  Type 2 diabetes mellitus with diabetic neuropathy, with long-term current use of insulin (HCC)     Relevant Medications     insulin glargine (LANTUS,BASAGLAR) 100 unit/mL (3 mL) inpn     insulin lispro (HUMALOG) 100 unit/mL kwikpen     oxyCODONE-acetaminophen (PERCOCET) 5-325 mg per tablet      Acute Diagnoses Addressed Today     Right shoulder pain, unspecified chronicity            Relevant Medications        oxyCODONE-acetaminophen (PERCOCET) 5-325 mg per tablet        Other Relevant Orders        REFERRAL TO ORTHOPEDICS        Current Outpatient Prescriptions   Medication Sig Dispense Refill    insulin glargine (LANTUS,BASAGLAR) 100 unit/mL (3 mL) inpn 40 Units by SubCUTAneous route nightly. 10 Pen 3    insulin lispro (HUMALOG) 100 unit/mL kwikpen 12 Units by SubCUTAneous route Before breakfast, lunch, and dinner. 2 Package 3    oxyCODONE-acetaminophen (PERCOCET) 5-325 mg per tablet Take 1 Tab by mouth every eight (8) hours as needed for Pain. Max Daily Amount: 3 Tabs. 12 Tab 0    insulin detemir U-100 (LEVEMIR) 100 unit/mL injection 30 Units by SubCUTAneous route nightly. (Patient taking differently: 25 Units by SubCUTAneous route nightly.) 15 mL 5    losartan (COZAAR) 50 mg tablet Take  by mouth daily.  torsemide (DEMADEX) 10 mg tablet Take 4 Tabs by mouth daily. 120 Tab 5    isosorbide dinitrate (ISORDIL) 20 mg tablet Take 20 mg by mouth two (2) times a day.  hydrALAZINE (APRESOLINE) 50 mg tablet Take 50 mg by mouth two (2) times a day.  Cholecalciferol, Vitamin D3, 50,000 unit cap Take  by mouth.  atorvastatin (LIPITOR) 40 mg tablet Take  by mouth daily.  Insulin Needles, Disposable, 31 gauge x 5/16\" ndle For flexpen #100, 4 rf daily 1 Package 5    gabapentin (NEURONTIN) 800 mg tablet Take 1 Tab by mouth three (3) times daily. 90 Tab 5    insulin aspart U-100 (NOVOLOG FLEXPEN U-100 INSULIN) 100 unit/mL inpn 9 Units by SubCUTAneous route Before breakfast, lunch, and dinner.  (Patient taking differently: 10 Units by SubCUTAneous route Before breakfast, lunch, and dinner.) 5 Pen 5    folic acid (FOLVITE) 1 mg tablet Take 1 Tab by mouth daily. 90 Tab 1    cyanocobalamin (VITAMIN B-12) 500 mcg tablet Take 1 Tab by mouth daily. 30 Tab 5    glucose blood VI test strips (ASCENSIA AUTODISC VI, ONE TOUCH ULTRA TEST VI) strip Daily verio one touch up to 3 times a day 100 Strip 5    carvedilol (COREG) 6.25 mg tablet Take 12.5 mg by mouth two (2) times a day.  polyethylene glycol (MIRALAX) 17 gram packet Take 1 Packet by mouth daily. 30 Packet 1    aspirin delayed-release 81 mg tablet Take  by mouth daily. Daughter has spoken with the insurance company, says can get the new insulin may be with a prior authorization. Prescription given for the new insulin. Can take current insulin until it comes in. Have increased the dosage of insulin. Not really sure if the type that is making a difference but rather she needs more. Did authorize the new insulin and will run into insurance. CHF may be a little better at least at stable continue current diuretic. Shoulder pain okay another prescription for Percocet that she needs to follow-up with orthopedics may be get an x-ray to make sure there is nothing broken may be get a steroid injection although for now we will hold off especially with her diabetes poorly controlled. Hypertension stable  Follow-up Disposition:  Return in about 4 weeks (around 6/8/2018) for routine follow up.

## 2018-05-11 NOTE — PROGRESS NOTES
Chief Complaint   Patient presents with    Shoulder Pain     R/shoulder pain after a fall     Health Maintenance reviewed. 1. Have you been to the ER, urgent care clinic since your last visit? Hospitalized since your last visit?no    2. Have you seen or consulted any other health care providers outside of the [address] [place] [address] since your last visit? Include any pap smears or colon screening. No    Encouraged pt to discuss pt's wishes with spouse/partner/family and bring them in the next appt to follow thru with the Advanced Directive    Fall Risk Assessment, last 12 mths 1/8/2018   Able to walk? Yes   Fall in past 12 months? Yes   Fall with injury? Yes   Number of falls in past 12 months 2   Fall Risk Score 3       PHQ over the last two weeks 4/27/2018   Little interest or pleasure in doing things Several days   Feeling down, depressed or hopeless Several days   Total Score PHQ 2 2       Abuse Screening Questionnaire 1/8/2018   Do you ever feel afraid of your partner? N   Are you in a relationship with someone who physically or mentally threatens you? N   Is it safe for you to go home?  Y       ADL Assessment 1/8/2018   Feeding yourself No Help Needed   Getting from bed to chair No Help Needed   Getting dressed No Help Needed   Bathing or showering No Help Needed   Walk across the room (includes cane/walker) No Help Needed   Using the telphone No Help Needed   Taking your medications No Help Needed   Preparing meals No Help Needed   Managing money (expenses/bills) No Help Needed   Moderately strenuous housework (laundry) No Help Needed   Shopping for personal items (toiletries/medicines) No Help Needed   Shopping for groceries No Help Needed   Driving No Help Needed   Climbing a flight of stairs No Help Needed   Getting to places beyond walking distances No Help Needed

## 2018-05-11 NOTE — TELEPHONE ENCOUNTER
Pt called in reference to her sugar medication. She said she really needs to speak with the nurse. From what I could gather it sounds like she is out of her medication and sugar is high. Prior Mary Heaton is required for medication. Please call her at [phone].

## 2018-05-14 ENCOUNTER — DOCUMENTATION ONLY (OUTPATIENT)
Dept: INTERNAL MEDICINE CLINIC | Age: 53
End: 2018-05-14

## 2018-05-14 NOTE — PROGRESS NOTES
Called [phone] to recertify Humolog    New EOT#ID is [id number]     Daughter in for patient stating that Humolog was denied. Above has been approved and will be sent to the Pharmacy.

## 2018-05-15 ENCOUNTER — OFFICE VISIT (OUTPATIENT)
Dept: INTERNAL MEDICINE CLINIC | Age: 53
End: 2018-05-15

## 2018-05-15 VITALS
OXYGEN SATURATION: 100 % | RESPIRATION RATE: 18 BRPM | BODY MASS INDEX: 43.54 KG/M2 | HEIGHT: 64 IN | TEMPERATURE: 99.1 F | HEART RATE: 97 BPM | DIASTOLIC BLOOD PRESSURE: 102 MMHG | SYSTOLIC BLOOD PRESSURE: 160 MMHG | WEIGHT: 255 LBS

## 2018-05-15 DIAGNOSIS — S49.91XD INJURY OF RIGHT SHOULDER, SUBSEQUENT ENCOUNTER: ICD-10-CM

## 2018-05-15 DIAGNOSIS — E11.40 TYPE 2 DIABETES MELLITUS WITH DIABETIC NEUROPATHY, WITH LONG-TERM CURRENT USE OF INSULIN (HCC): ICD-10-CM

## 2018-05-15 DIAGNOSIS — I50.21 ACUTE SYSTOLIC CONGESTIVE HEART FAILURE (HCC): Primary | ICD-10-CM

## 2018-05-15 DIAGNOSIS — Z79.4 TYPE 2 DIABETES MELLITUS WITH DIABETIC NEUROPATHY, WITH LONG-TERM CURRENT USE OF INSULIN (HCC): ICD-10-CM

## 2018-05-15 RX ORDER — OXYCODONE AND ACETAMINOPHEN 5; 325 MG/1; MG/1
1 TABLET ORAL
Qty: 20 TAB | Refills: 0 | Status: SHIPPED | OUTPATIENT
Start: 2018-05-15 | End: 2018-05-25 | Stop reason: SDUPTHER

## 2018-05-15 NOTE — PROGRESS NOTES
Chief Complaint   Patient presents with    Shoulder Pain     R/shoulder pain  appt with Dr. Shirley Romero May 21st @1:40PM     I have reviewed the patient's medical history in detail and updated the computerized patient record. Health Maintenance reviewed. 1. Have you been to the ER, urgent care clinic since your last visit? Hospitalized since your last visit?no    2. Have you seen or consulted any other health care providers outside of the [address] [place] [address] [place] since your last visit? Include any pap smears or colon screening. No    Encouraged pt to discuss pt's wishes with spouse/partner/family and bring them in the next appt to follow thru with the Advanced Directive    Fall Risk Assessment, last 12 mths 1/8/2018   Able to walk? Yes   Fall in past 12 months? Yes   Fall with injury? Yes   Number of falls in past 12 months 2   Fall Risk Score 3       PHQ over the last two weeks 5/15/2018   Little interest or pleasure in doing things Several days   Feeling down, depressed or hopeless Several days   Total Score PHQ 2 2       Abuse Screening Questionnaire 1/8/2018   Do you ever feel afraid of your partner? N   Are you in a relationship with someone who physically or mentally threatens you? N   Is it safe for you to go home?  Y       ADL Assessment 1/8/2018   Feeding yourself No Help Needed   Getting from bed to chair No Help Needed   Getting dressed No Help Needed   Bathing or showering No Help Needed   Walk across the room (includes cane/walker) No Help Needed   Using the telphone No Help Needed   Taking your medications No Help Needed   Preparing meals No Help Needed   Managing money (expenses/bills) No Help Needed   Moderately strenuous housework (laundry) No Help Needed   Shopping for personal items (toiletries/medicines) No Help Needed   Shopping for groceries No Help Needed   Driving No Help Needed   Climbing a flight of stairs No Help Needed   Getting to places beyond walking distances No Help Needed

## 2018-05-15 NOTE — MR AVS SNAPSHOT
[address] 1434 LTAC, located within [facility] 
[phone] Patient: Agatha Marx MRN: [id number] :1965 Visit Information Date & Time Provider Department Dept. Phone Encounter #  
 5/15/2018  1:45 PM Darci Melton MD [facility] Donavan Coats [id number] Your Appointments 2018  1:00 PM  
ROUTINE CARE with Darci Melton MD  
[facility] ([facility] Appt Note: f/u on dm $0 cp lsh 18  
 [address] [place] [address] Road. [address] [place]cl[place] [address]  
[phone]  
  
   
 [address] [place] [address] [address]  
  
    
 2018  1:45 PM  
ACUTE CARE with MD Elin Sanford 380 [facility] Appt Note: f/u on dm 3/19/18s[address]. [address] [place] [address]  
[phone]  
  
   
 [address] [place] [address] [address] Upcoming Health Maintenance Date Due FOBT Q 1 YEAR AGE 50-75 2016 PAP AKA CERVICAL CYTOLOGY 9/3/2017 EYE EXAM RETINAL OR DILATED Q1 10/25/2017 Influenza Age 5 to Adult 2018 HEMOGLOBIN A1C Q6M 10/20/2018 FOOT EXAM Q1 2018 LIPID PANEL Q1 2019 MICROALBUMIN Q1 2019 BREAST CANCER SCRN MAMMOGRAM 3/22/2019 DTaP/Tdap/Td series (2 - Td) 3/13/2027 Allergies as of 5/15/2018  Review Complete On: 2018 By: Sri Garza LPN Severity Noted Reaction Type Reactions Lortab [Hydrocodone-acetaminophen] High 2013   Intolerance Nausea and Vomiting Cymbalta [Duloxetine]  10/04/2016    Diarrhea Lasix [Furosemide]  2018    Swelling Lyrica [Pregabalin]  2017    Drowsiness Current Immunizations  Reviewed on 2016 No immunizations on file. Not reviewed this visit You Were Diagnosed With   
  
 Codes Comments Acute systolic congestive heart failure ([facility] [address]    -  Primary ICD-10-CM: I50.21 ICD-9-CM: 428.21, 428.0 Type 2 diabetes mellitus with diabetic neuropathy, with long-term current use of insulin (HCC)     ICD-10-CM: E11.40, Z79.4 ICD-9-CM: 250.60, 357.2, V58.67 Injury of right shoulder, subsequent encounter     ICD-10-CM: S49.91XD ICD-9-CM: V58.89, 959.2 Vitals BP Pulse Temp Resp Height(growth percentile) Weight(growth percentile) (!) 160/102 (BP 1 Location: Left arm, BP Patient Position: Sitting) 97 99.1 °F (37.3 °C) (Oral) 18 5' 4\" (1.626 m) 255 lb (115.7 kg) LMP SpO2 BMI OB Status Smoking Status 12/03/2017 100% 43.77 kg/m2 Postmenopausal Never Smoker Vitals History BMI and BSA Data Body Mass Index Body Surface Area 43.77 kg/m 2 2.29 m 2 Preferred Pharmacy Pharmacy Name Phone [facility] 9 [facility] [phone] Your Updated Medication List  
  
   
This list is accurate as of 5/15/18  2:57 PM.  Always use your most recent med list.  
  
  
  
  
 aspirin delayed-release 81 mg tablet Take  by mouth daily. atorvastatin 40 mg tablet Commonly known as:  LIPITOR Take  by mouth daily. carvedilol 6.25 mg tablet Commonly known as:  Deleta Lupis Take 12.5 mg by mouth two (2) times a day. cholecalciferol 50,000 unit capsule Commonly known as:  VITAMIN D3 Take  by mouth.  
  
 cyanocobalamin 500 mcg tablet Commonly known as:  VITAMIN B-12 Take 1 Tab by mouth daily. folic acid 1 mg tablet Commonly known as:  Google Take 1 Tab by mouth daily. gabapentin 800 mg tablet Commonly known as:  NEURONTIN Take 1 Tab by mouth three (3) times daily. glucose blood VI test strips strip Commonly known as:  ASCENSIA AUTODISC VI, ONE TOUCH ULTRA TEST VI  
Daily verio one touch up to 3 times a day  
  
 hydrALAZINE 50 mg tablet Commonly known as:  APRESOLINE  
 Take 50 mg by mouth two (2) times a day. insulin glargine 100 unit/mL (3 mL) Inpn Commonly known as:  LANTUS,BASAGLAR  
40 Units by SubCUTAneous route nightly. insulin lispro 100 unit/mL kwikpen Commonly known as:  HUMALOG  
12 Units by SubCUTAneous route Before breakfast, lunch, and dinner. Insulin Needles (Disposable) 31 gauge x 5/16\" Ndle For flexpen #100, 4 rf daily  
  
 isosorbide dinitrate 20 mg tablet Commonly known as:  ISORDIL Take 20 mg by mouth two (2) times a day. losartan 50 mg tablet Commonly known as:  COZAAR Take  by mouth daily. oxyCODONE-acetaminophen 5-325 mg per tablet Commonly known as:  PERCOCET Take 1 Tab by mouth every eight (8) hours as needed for Pain. Max Daily Amount: 3 Tabs. polyethylene glycol 17 gram packet Commonly known as:  Haskins Baumgarten Take 1 Packet by mouth daily. torsemide 10 mg tablet Commonly known as:  DEMADEX Take 4 Tabs by mouth daily. Prescriptions Printed Refills  
 oxyCODONE-acetaminophen (PERCOCET) 5-325 mg per tablet 0 Sig: Take 1 Tab by mouth every eight (8) hours as needed for Pain. Max Daily Amount: 3 Tabs. Class: Print Route: Oral  
  
Patient Instructions Take an extra demadex ( water pill) for > 4 lb weight gain, weigh daily Introducing \[facility]\"" & HEALTH SERVICES! Joon Christy introduces [employer] patient portal. Now you can access parts of your medical record, email your doctor's office, and request medication refills online. 1. In your internet browser, go to [email or web address] 2. Click on the First Time User? Click Here link in the Sign In box. You will see the New Member Sign Up page. 3. Enter your [employer] Access Code exactly as it appears below. You will not need to use this code after youve completed the sign-up process. If you do not sign up before the expiration date, you must request a new code. · [employer] Access Code: [email or web address] Expires: 8/9/2018  1:03 PM 
 
4. Enter the last four digits of your Social Security Number (xxxx) and Date of Birth (mm/dd/yyyy) as indicated and click Submit. You will be taken to the next sign-up page. 5. Create a [employer] ID. This will be your [employer] login ID and cannot be changed, so think of one that is secure and easy to remember. 6. Create a [employer] password. You can change your password at any time. 7. Enter your Password Reset Question and Answer. This can be used at a later time if you forget your password. 8. Enter your e-mail address. You will receive e-mail notification when new information is available in [address] 9. Click Sign Up. You can now view and download portions of your medical record. 10. Click the Download Summary menu link to download a portable copy of your medical information. If you have questions, please visit the Frequently Asked Questions section of the [employer] website. Remember, [employer] is NOT to be used for urgent needs. For medical emergencies, dial 911. Now available from your iPhone and Android! Please provide this summary of care documentation to your next provider. Your primary care clinician is listed as Buddy Herr. If you have any questions after today's visit, please call [phone].

## 2018-05-15 NOTE — PROGRESS NOTES
HISTORY OF PRESENT ILLNESS  Nestor Currie is a 47 y.o. female. Shoulder Pain    The history is provided by the patient. The incident occurred more than 1 week ago. The incident occurred at home. The injury mechanism was a fall. The right shoulder is affected. The pain is severe. The pain has been fluctuating since onset. There is a history of shoulder injury. There is no history of shoulder surgery. She is got an appointment to see orthopedics on Monday and would like some pain medications to get her through that visit. She gets her narcotic every time she comes to my office even though I try and limit them as much as I can. Lots of sorted pain complaints. Breathing's been a little bit better, we have gradually been increasing her diuretic. She has some chronic renal insufficiency for which she sees nephrology. Finally got her insulins which we had to get prior authorizations for. States her blood sugars have been better in the 200s. Does follow diabetes diet and tries to reduce salt as much as possible. Does do weight checks daily.     Patient Active Problem List   Diagnosis Code    Hypertension I10    Chronic pain G89.29    Narcotic dependence, episodic use ([facility]) F11.20    Idiopathic small and large fiber sensory neuropathy G60.8    Spinal stenosis of lumbosacral region M48.07    Lumbar back pain with radiculopathy affecting right lower extremity M54.17    Obesity, morbid ([facility]) E66.01    Type 2 diabetes mellitus with nephropathy ([facility]) E11.21    Type 2 diabetes mellitus with diabetic neuropathy ([facility]) L86.50    Acute systolic congestive heart failure ([facility]) I50.21    Type 2 diabetes mellitus with diabetic neuropathy, with long-term current use of insulin ([facility]) E11.40, Z79.4     Social History     Social History    Marital status:      Spouse name: N/A    Number of children: N/A    Years of education: N/A     Occupational History    disabled      Social History Main Topics    Smoking status: Never Smoker    Smokeless tobacco: Never Used    Alcohol use No    Drug use: No    Sexual activity: Not Currently     Other Topics Concern    Not on file     Social History Narrative    Lives with daughter. Not working. Applying for disabilty. Current Outpatient Prescriptions   Medication Sig Dispense Refill    oxyCODONE-acetaminophen (PERCOCET) 5-325 mg per tablet Take 1 Tab by mouth every eight (8) hours as needed for Pain. Max Daily Amount: 3 Tabs. 20 Tab 0    insulin glargine (LANTUS,BASAGLAR) 100 unit/mL (3 mL) inpn 40 Units by SubCUTAneous route nightly. 10 Pen 3    insulin lispro (HUMALOG) 100 unit/mL kwikpen 12 Units by SubCUTAneous route Before breakfast, lunch, and dinner. 2 Package 3    losartan (COZAAR) 50 mg tablet Take  by mouth daily.  torsemide (DEMADEX) 10 mg tablet Take 4 Tabs by mouth daily. 120 Tab 5    isosorbide dinitrate (ISORDIL) 20 mg tablet Take 20 mg by mouth two (2) times a day.  hydrALAZINE (APRESOLINE) 50 mg tablet Take 50 mg by mouth two (2) times a day.  Cholecalciferol, Vitamin D3, 50,000 unit cap Take  by mouth.  atorvastatin (LIPITOR) 40 mg tablet Take  by mouth daily.  Insulin Needles, Disposable, 31 gauge x 5/16\" ndle For flexpen #100, 4 rf daily 1 Package 5    gabapentin (NEURONTIN) 800 mg tablet Take 1 Tab by mouth three (3) times daily. 90 Tab 5    folic acid (FOLVITE) 1 mg tablet Take 1 Tab by mouth daily. 90 Tab 1    cyanocobalamin (VITAMIN B-12) 500 mcg tablet Take 1 Tab by mouth daily. 30 Tab 5    glucose blood VI test strips (ASCENSIA AUTODISC VI, ONE TOUCH ULTRA TEST VI) strip Daily verio one touch up to 3 times a day 100 Strip 5    carvedilol (COREG) 6.25 mg tablet Take 12.5 mg by mouth two (2) times a day.  polyethylene glycol (MIRALAX) 17 gram packet Take 1 Packet by mouth daily. 30 Packet 1    aspirin delayed-release 81 mg tablet Take  by mouth daily.        Allergies   Allergen Reactions    Lortab [Hydrocodone-Acetaminophen] Nausea and Vomiting    Cymbalta [Duloxetine] Diarrhea    Lasix [Furosemide] Swelling    Lyrica [Pregabalin] Drowsiness         Review of Systems   Constitutional: Negative for weight loss. Respiratory: Negative for shortness of breath. Cardiovascular: Negative for chest pain. Musculoskeletal: Positive for falls and joint pain. Physical Exam  Visit Vitals    BP (!) 160/102 (BP 1 Location: Left arm, BP Patient Position: Sitting)    Pulse 97    Temp 99.1 °F (37.3 °C) (Oral)    Resp 18    Ht 5' 4\" (1.626 m)    Wt 255 lb (115.7 kg)    LMP 12/03/2017    SpO2 100%    BMI 43.77 kg/m2     WD WN female NAD weight is up 5 pounds from the last visit. Heart RRR without murmers clicks or rubs  Lungs CTA  Abdo soft nontender  Ext mild edema    ASSESSMENT and PLAN  Encounter Diagnoses   Name Primary?  Acute systolic congestive heart failure (HCC) Yes    Type 2 diabetes mellitus with diabetic neuropathy, with long-term current use of insulin (HCC)     Injury of right shoulder, subsequent encounter      Orders Placed This Encounter    oxyCODONE-acetaminophen (PERCOCET) 5-325 mg per tablet       We discussed this pain and the usage of controlled substances for acute pain relief. In general these medications are indicated for the acute symptom relief and not for chronic usage, allthough they are frequently used for chronic management  After a certain period of time they should be stopped to avoid dependence and/or addiction. I warned her about the dangers of addiction and other side affects including respiratory depression and death. Discussed how this is a controlled substance and that the prescribing of it is should be monitored very carefully. Drug usage is also monitored by our practise and the [facility], since there has been a lot of abuse and diversion of controlled substances. In general we do not refill this medication over the phone.  PLease ask for enough pills to get you to your next appointment and make sure you keep your appointments. Warned not to take other medications like alcohol, other benzodiazapines marijuana or other narcotics when on this medication. Wakes up a little and has a little edema. See patient instructions, went over them personally with the patient. Emphasized compliance. Questions answered. Patient states that they understand the plan of action and will call if there are any issues or misunderstandings. Diabetes stable. Follow-up as scheduled.

## 2018-05-22 ENCOUNTER — TELEPHONE (OUTPATIENT)
Dept: INTERNAL MEDICINE CLINIC | Age: 53
End: 2018-05-22

## 2018-05-22 NOTE — TELEPHONE ENCOUNTER
Pt called stated that she wanted to know if dr Floresita Finn would give her something for her shoulder pain she stated that dr chong's office  Has canceled on her again and her shoulder is really bothering her she stated that she def needs something until dr chong can see her

## 2018-05-23 NOTE — TELEPHONE ENCOUNTER
Tried to return patients call - no answer - LM on VM that she will need an appt in order to get any pain meds for her shoulder - asked that she call back for an appt  Lg Cannon LPN  7/89/8068  7:82 AM

## 2018-05-25 ENCOUNTER — OFFICE VISIT (OUTPATIENT)
Dept: INTERNAL MEDICINE CLINIC | Age: 53
End: 2018-05-25

## 2018-05-25 VITALS
TEMPERATURE: 98.4 F | SYSTOLIC BLOOD PRESSURE: 135 MMHG | HEART RATE: 103 BPM | OXYGEN SATURATION: 99 % | DIASTOLIC BLOOD PRESSURE: 73 MMHG | RESPIRATION RATE: 20 BRPM | HEIGHT: 64 IN | BODY MASS INDEX: 43.64 KG/M2 | WEIGHT: 255.6 LBS

## 2018-05-25 DIAGNOSIS — L84 CORN INFECTED: ICD-10-CM

## 2018-05-25 DIAGNOSIS — E11.21 TYPE 2 DIABETES MELLITUS WITH NEPHROPATHY (HCC): ICD-10-CM

## 2018-05-25 DIAGNOSIS — I50.20 SYSTOLIC CONGESTIVE HEART FAILURE, UNSPECIFIED HF CHRONICITY (HCC): ICD-10-CM

## 2018-05-25 DIAGNOSIS — N92.1 MENOMETRORRHAGIA: ICD-10-CM

## 2018-05-25 DIAGNOSIS — S49.91XD INJURY OF RIGHT SHOULDER, SUBSEQUENT ENCOUNTER: Primary | ICD-10-CM

## 2018-05-25 RX ORDER — OXYCODONE AND ACETAMINOPHEN 5; 325 MG/1; MG/1
1 TABLET ORAL
Qty: 15 TAB | Refills: 0 | Status: SHIPPED | OUTPATIENT
Start: 2018-05-25 | End: 2018-06-04

## 2018-05-25 RX ORDER — MEDROXYPROGESTERONE ACETATE 10 MG/1
10 TABLET ORAL DAILY
Qty: 10 TAB | Refills: 1 | Status: SHIPPED | OUTPATIENT
Start: 2018-05-25 | End: 2018-06-04

## 2018-05-25 RX ORDER — CEPHALEXIN 500 MG/1
500 CAPSULE ORAL 3 TIMES DAILY
Qty: 21 CAP | Refills: 0 | Status: SHIPPED | OUTPATIENT
Start: 2018-05-25 | End: 2018-06-01

## 2018-05-25 NOTE — MR AVS SNAPSHOT
[address] 143 [facility] 
[phone] Patient: Foster Lovelace MRN: [id number] :1965 Visit Information Date & Time Provider Department Dept. Phone Encounter #  
 2018  1:45 PM Ines Hicks MD 1908 [facility] [phone] Your Appointments 2018  1:00 PM  
ROUTINE CARE with Ines Hicks MD  
[facility] ([facility] Appt Note: f/u on dm $0 cp lsh 18  
 [address] [place] [address] Road. [address] [place] [address]  
[phone]  
  
   
 [address] [place] [address] [address]  
  
    
 2018  1:45 PM  
ACUTE CARE with MD Elin Jackson 380 [facility] Appt Note: f/u on dm 3/19/18s[address]. [address] [place] [address]  
[phone]  
  
   
 [address] [place] [address] [address] Upcoming Health Maintenance Date Due FOBT Q 1 YEAR AGE 50-75 2016 PAP AKA CERVICAL CYTOLOGY 9/3/2017 EYE EXAM RETINAL OR DILATED Q1 10/25/2017 Influenza Age 5 to Adult 2018 HEMOGLOBIN A1C Q6M 10/20/2018 FOOT EXAM Q1 2018 LIPID PANEL Q1 2019 MICROALBUMIN Q1 2019 BREAST CANCER SCRN MAMMOGRAM 3/22/2019 DTaP/Tdap/Td series (2 - Td) 3/13/2027 Allergies as of 2018  Review Complete On: 2018 By: Ines Hicks MD  
  
 Severity Noted Reaction Type Reactions Lortab [Hydrocodone-acetaminophen] High 2013   Intolerance Nausea and Vomiting Cymbalta [Duloxetine]  10/04/2016    Diarrhea Lasix [Furosemide]  2018    Swelling Lyrica [Pregabalin]  2017    Drowsiness Current Immunizations  Reviewed on 2016 No immunizations on file. Not reviewed this visit You Were Diagnosed With   
  
 Codes Comments Injury of right shoulder, subsequent encounter    -  Primary ICD-10-CM: S49.91XD ICD-9-CM: V58.89, 959.2 Corn infected     ICD-10-CM: L84 
ICD-9-CM: 511 Systolic congestive heart failure, unspecified HF chronicity (HCC)     ICD-10-CM: I50.20 ICD-9-CM: 428.20, 428.0 Type 2 diabetes mellitus with nephropathy (HCC)     ICD-10-CM: E11.21 
ICD-9-CM: 250.40, 583.81 Vitals BP Pulse Temp Resp Height(growth percentile) Weight(growth percentile) 135/73 (BP 1 Location: Left arm, BP Patient Position: Sitting) (!) 103 98.4 °F (36.9 °C) (Oral) 20 5' 4\" (1.626 m) 255 lb 9.6 oz (115.9 kg) LMP SpO2 BMI OB Status Smoking Status 12/03/2017 99% 43.87 kg/m2 Postmenopausal Never Smoker Vitals History BMI and BSA Data Body Mass Index Body Surface Area  
 43.87 kg/m 2 2.29 m 2 Preferred Pharmacy Pharmacy Name Phone [facility] 75 9 [facility] [phone] Your Updated Medication List  
  
   
This list is accurate as of 5/25/18  2:40 PM.  Always use your most recent med list.  
  
  
  
  
 aspirin delayed-release 81 mg tablet Take  by mouth daily. atorvastatin 40 mg tablet Commonly known as:  LIPITOR Take  by mouth daily. carvedilol 6.25 mg tablet Commonly known as:  Menard Davis Take 12.5 mg by mouth two (2) times a day. cephALEXin 500 mg capsule Commonly known as:  Jose Ramon Piles Take 1 Cap by mouth three (3) times daily for 7 days. cholecalciferol 50,000 unit capsule Commonly known as:  VITAMIN D3 Take  by mouth.  
  
 cyanocobalamin 500 mcg tablet Commonly known as:  VITAMIN B-12 Take 1 Tab by mouth daily. folic acid 1 mg tablet Commonly known as:  Google Take 1 Tab by mouth daily. gabapentin 800 mg tablet Commonly known as:  NEURONTIN Take 1 Tab by mouth three (3) times daily. glucose blood VI test strips strip Commonly known as:  ASCENSIA AUTODISC VI, ONE TOUCH ULTRA TEST VI  
Daily verio one touch up to 3 times a day  
  
 hydrALAZINE 50 mg tablet Commonly known as:  APRESOLINE Take 50 mg by mouth two (2) times a day. insulin glargine 100 unit/mL (3 mL) Inpn Commonly known as:  LANTUS,BASAGLAR  
40 Units by SubCUTAneous route nightly. insulin lispro 100 unit/mL kwikpen Commonly known as:  HUMALOG  
12 Units by SubCUTAneous route Before breakfast, lunch, and dinner. Insulin Needles (Disposable) 31 gauge x 5/16\" Ndle For flexpen #100, 4 rf daily  
  
 isosorbide dinitrate 20 mg tablet Commonly known as:  ISORDIL Take 20 mg by mouth two (2) times a day. losartan 50 mg tablet Commonly known as:  COZAAR Take  by mouth daily. oxyCODONE-acetaminophen 5-325 mg per tablet Commonly known as:  PERCOCET Take 1 Tab by mouth every eight (8) hours as needed for Pain. Max Daily Amount: 3 Tabs. polyethylene glycol 17 gram packet Commonly known as:  Eldon Coma Take 1 Packet by mouth daily. torsemide 10 mg tablet Commonly known as:  DEMADEX Take 4 Tabs by mouth daily. Prescriptions Printed Refills  
 oxyCODONE-acetaminophen (PERCOCET) 5-325 mg per tablet 0 Sig: Take 1 Tab by mouth every eight (8) hours as needed for Pain. Max Daily Amount: 3 Tabs. Class: Print Route: Oral  
  
Prescriptions Sent to Pharmacy Refills  
 cephALEXin (KEFLEX) 500 mg capsule 0 Sig: Take 1 Cap by mouth three (3) times daily for 7 days. Class: Normal  
 Pharmacy: [address] 2002 [facility], [address] [facility] Ph #: [phone] Route: Oral  
  
To-Do List   
 05/29/2018 Imaging:  XR SHOULDER RT AP/LAT MIN 2 V Introducing [facility] & HEALTH SERVICES! [place] [employer] introduces [employer] patient portal. Now you can access parts of your medical record, email your doctor's office, and request medication refills online. 1. In your internet browser, go to [email or web address] 2. Click on the First Time User? Click Here link in the Sign In box. You will see the New Member Sign Up page. 3. Enter your [employer] Access Code exactly as it appears below. You will not need to use this code after youve completed the sign-up process. If you do not sign up before the expiration date, you must request a new code. · [employer] Access Code: [email or web address] Expires: 8/9/2018  1:03 PM 
 
4. Enter the last four digits of your Social Security Number (xxxx) and Date of Birth (mm/dd/yyyy) as indicated and click Submit. You will be taken to the next sign-up page. 5. Create a Inn[employer] ID. This will be your [employer] login ID and cannot be changed, so think of one that is secure and easy to remember. 6. Create a [employer] password. You can change your password at any time. 7. Enter your Password Reset Question and Answer. This can be used at a later time if you forget your password. 8. Enter your e-mail address. You will receive e-mail notification when new information is available in [address] 9. Click Sign Up. You can now view and download portions of your medical record. 10. Click the Download Summary menu link to download a portable copy of your medical information. If you have questions, please visit the Frequently Asked Questions section of the [employer] website. Remember, [employer] is NOT to be used for urgent needs. For medical emergencies, dial 911. Now available from your iPhone and Android! Please provide this summary of care documentation to your next provider. Your primary care clinician is listed as Page Aguiar. If you have any questions after today's visit, please call [phone].

## 2018-05-25 NOTE — PROGRESS NOTES
HISTORY OF PRESENT ILLNESS  Nestor Currie is a 47 y.o. female. Shoulder Pain    The history is provided by the patient. The incident occurred more than 1 week ago. The incident occurred at home. The injury mechanism was a fall. The right shoulder is affected. The pain is severe. The pain does not radiate. There is a history of shoulder injury. There is no history of shoulder surgery. Had an appointment to see her orthopedics but it was delayed until mid June. Is a frequent user of narcotic agents for various aches and pains. Complaining of her right toe hurting. Has seen podiatry. He did trim down her callus. She says there is a possibility of amputation. Breathing has been stable, continues her diuretic. Blood sugars mildly elevated, takes her insulin, no hypoglycemia. Last A1c was 9.4. Has some CRI but it stable when last checked. Patient Active Problem List   Diagnosis Code    Hypertension I10    Chronic pain G89.29    Narcotic dependence, episodic use ([facility]) F11.20    Idiopathic small and large fiber sensory neuropathy G60.8    Spinal stenosis of lumbosacral region M48.07    Lumbar back pain with radiculopathy affecting right lower extremity M54.17    Obesity, morbid ([facility]) E66.01    Type 2 diabetes mellitus with nephropathy ([facility]) E11.21    Type 2 diabetes mellitus with diabetic neuropathy ([facility]) P61.09    Acute systolic congestive heart failure ([facility]) I50.21    Type 2 diabetes mellitus with diabetic neuropathy, with long-term current use of insulin ([facility]) E11.40, Z79.4         Review of Systems   Respiratory: Positive for shortness of breath. The usual   Cardiovascular: Negative for chest pain. Genitourinary:        No vaginal bleeding currently   Musculoskeletal: Positive for back pain and falls.         Pods cut callus down, pain x 2 weeks, right big toe       Physical Exam  Visit Vitals    /73 (BP 1 Location: Left arm, BP Patient Position: Sitting)    Pulse (!) 103    Temp 98.4 °F (36.9 °C) (Oral)    Resp 20    Ht 5' 4\" (1.626 m)    Wt 255 lb 9.6 oz (115.9 kg)    LMP 12/03/2017    SpO2 99%    BMI 43.87 kg/m2     WD WN female NAD  Heart RRR without murmers clicks or rubs  Lungs CTA  Abdo soft nontender  Ext no edema, right toe shows a callus with a darkened base. Shoulders tenderness on the right with testing range of motion and positive impingement signs. ASSESSMENT and PLAN  Encounter Diagnoses   Name Primary?  Injury of right shoulder, subsequent encounter Yes    Corn infected     Systolic congestive heart failure, unspecified HF chronicity (HCC)     Type 2 diabetes mellitus with nephropathy (HCC)     Menometrorrhagia      Orders Placed This Encounter    XR SHOULDER RT AP/LAT MIN 2 V    oxyCODONE-acetaminophen (PERCOCET) 5-325 mg per tablet    cephALEXin (KEFLEX) 500 mg capsule    medroxyPROGESTERone (PROVERA) 10 mg tablet     Start shoulder workup with x-rays. We will see if we can get her a appointment see orthopedics sooner. Did tell her that I will give her one last narcotic prescription for shoulder pain but that she would have to follow-up with orthopedics discuss the cause of her pain issues. We discussed this pain and the usage of controlled substances for shoulder pain relief. In general these medications are indicated for the acute symptom relief and not for chronic usage, allthough they are frequently used for chronic management  After a certain period of time they should be stopped to avoid dependence and/or addiction. I warned her about the dangers of addiction and other side affects including respiratory depression and death. Discussed how this is a controlled substance and that the prescribing of it is should be monitored very carefully. Drug usage is also monitored by our practise and the [facility], since there has been a lot of abuse and diversion of controlled substances. In general we do not refill this medication over the phone.  PLease ask for enough pills to get you to your next appointment and make sure you keep your appointments. Warned not to take other medications like alcohol, other benzodiazapines marijuana or other narcotics when on this medication. Callus/corn baby is a little infected, will try an antibiotic, warned of diabetic foot infections if pain worsens to go to the ER otherwise can follow-up with her podiatrist.  She is requesting more Provera in case she is bleeding  Diabetes hypertension and congestive heart failure stable. Current Outpatient Prescriptions   Medication Sig Dispense Refill    oxyCODONE-acetaminophen (PERCOCET) 5-325 mg per tablet Take 1 Tab by mouth every eight (8) hours as needed for Pain. Max Daily Amount: 3 Tabs. 15 Tab 0    cephALEXin (KEFLEX) 500 mg capsule Take 1 Cap by mouth three (3) times daily for 7 days. 21 Cap 0    medroxyPROGESTERone (PROVERA) 10 mg tablet Take 1 Tab by mouth daily for 10 days. 10 Tab 1    insulin glargine (LANTUS,BASAGLAR) 100 unit/mL (3 mL) inpn 40 Units by SubCUTAneous route nightly. 10 Pen 3    insulin lispro (HUMALOG) 100 unit/mL kwikpen 12 Units by SubCUTAneous route Before breakfast, lunch, and dinner. 2 Package 3    losartan (COZAAR) 50 mg tablet Take  by mouth daily.  torsemide (DEMADEX) 10 mg tablet Take 4 Tabs by mouth daily. 120 Tab 5    isosorbide dinitrate (ISORDIL) 20 mg tablet Take 20 mg by mouth two (2) times a day.  hydrALAZINE (APRESOLINE) 50 mg tablet Take 50 mg by mouth two (2) times a day.  Cholecalciferol, Vitamin D3, 50,000 unit cap Take  by mouth.  atorvastatin (LIPITOR) 40 mg tablet Take  by mouth daily.  Insulin Needles, Disposable, 31 gauge x 5/16\" ndle For flexpen #100, 4 rf daily 1 Package 5    gabapentin (NEURONTIN) 800 mg tablet Take 1 Tab by mouth three (3) times daily. 90 Tab 5    folic acid (FOLVITE) 1 mg tablet Take 1 Tab by mouth daily.  90 Tab 1    cyanocobalamin (VITAMIN B-12) 500 mcg tablet Take 1 Tab by mouth daily. 30 Tab 5    glucose blood VI test strips (ASCENSIA AUTODISC VI, ONE TOUCH ULTRA TEST VI) strip Daily verio one touch up to 3 times a day 100 Strip 5    carvedilol (COREG) 6.25 mg tablet Take 12.5 mg by mouth two (2) times a day.  polyethylene glycol (MIRALAX) 17 gram packet Take 1 Packet by mouth daily. 30 Packet 1    aspirin delayed-release 81 mg tablet Take  by mouth daily.          Follow-up Disposition: Not on File  As scheduled

## 2018-05-25 NOTE — PROGRESS NOTES
Chief Complaint   Patient presents with    Shoulder Pain     MEDICATION REFILL     1. Have you been to the ER, urgent care clinic since your last visit? Hospitalized since your last visit? No    2. Have you seen or consulted any other health care providers outside of the [address] [place] [address] since your last visit? Include any pap smears or colon screening.  No     Health Maintenance Due   Topic Date Due    FOBT Q 1 YEAR AGE 50-75  12/28/2016    PAP AKA CERVICAL CYTOLOGY  09/03/2017    EYE EXAM RETINAL OR DILATED Q1  10/25/2017     Pap Smear [facility])  Eye Exam (Dr. Andrew Alarcon)

## 2018-06-04 ENCOUNTER — HOSPITAL ENCOUNTER (EMERGENCY)
Age: 53
Discharge: HOME | End: 2018-06-04
Attending: EMERGENCY MEDICINE
Payer: MEDICAID

## 2018-06-04 ENCOUNTER — APPOINTMENT (OUTPATIENT)
Dept: GENERAL RADIOLOGY | Age: 53
End: 2018-06-04
Attending: PHYSICIAN ASSISTANT
Payer: MEDICAID

## 2018-06-04 VITALS
WEIGHT: 256.62 LBS | RESPIRATION RATE: 18 BRPM | OXYGEN SATURATION: 97 % | DIASTOLIC BLOOD PRESSURE: 106 MMHG | BODY MASS INDEX: 43.81 KG/M2 | HEART RATE: 100 BPM | TEMPERATURE: 98.7 F | HEIGHT: 64 IN | SYSTOLIC BLOOD PRESSURE: 199 MMHG

## 2018-06-04 DIAGNOSIS — M79.671 FOOT PAIN, RIGHT: ICD-10-CM

## 2018-06-04 DIAGNOSIS — M25.561 ARTHRALGIA OF BOTH LOWER LEGS: Primary | ICD-10-CM

## 2018-06-04 DIAGNOSIS — R03.0 ELEVATED BLOOD PRESSURE READING: ICD-10-CM

## 2018-06-04 DIAGNOSIS — M25.562 ARTHRALGIA OF BOTH LOWER LEGS: Primary | ICD-10-CM

## 2018-06-04 PROCEDURE — 73630 X-RAY EXAM OF FOOT: CPT

## 2018-06-04 PROCEDURE — 99283 EMERGENCY DEPT VISIT LOW MDM: CPT

## 2018-06-04 PROCEDURE — 74011250637 HC RX REV CODE- 250/637: Performed by: PHYSICIAN ASSISTANT

## 2018-06-04 RX ORDER — OXYCODONE AND ACETAMINOPHEN 5; 325 MG/1; MG/1
1 TABLET ORAL
Status: COMPLETED | OUTPATIENT
Start: 2018-06-04 | End: 2018-06-04

## 2018-06-04 RX ORDER — OXYCODONE AND ACETAMINOPHEN 5; 325 MG/1; MG/1
1 TABLET ORAL
Qty: 12 TAB | Refills: 0 | Status: SHIPPED | OUTPATIENT
Start: 2018-06-04 | End: 2018-06-06 | Stop reason: SDUPTHER

## 2018-06-04 RX ADMIN — OXYCODONE HYDROCHLORIDE AND ACETAMINOPHEN 1 TABLET: 5; 325 TABLET ORAL at 19:59

## 2018-06-04 NOTE — ED PROVIDER NOTES
PROVIDER IN TRIAGE NOTE:  12:37 PM  Edita Granado PA-C has evaluated the patient as the Provider in Triage (PIT) for bilateral leg pain, heel and R foot pain. They have reviewed the vital signs and the triage nurse assessment. They have talked with the patient and any available family and advised that the appropriate studies have been ordered to initiate the work up based on the clinical presentation during the assessment. The pt has been advised that they will be accommodated in the Main ED as soon as possible. The pt has been requested to contact the triage nurse or PIT immediately if they experiences any changes in their condition during this brief waiting period. EMERGENCY DEPARTMENT HISTORY AND PHYSICAL EXAM      Date: 6/4/2018  Patient Name: Willy Antoine    History of Presenting Illness     Chief Complaint   Patient presents with    Leg Pain     bilateral leg pain with heel pain and c/o corn to right foot       History Provided By: Patient    HPI: Willy Antoine, 47 y.o. female with PMHx significant for HTN, DM, neuropathy, CAD and chronic pan, presents ambulatory to the ED with cc of acute-on-chronic BL LE pain and lower back pain. Pt also c/o corn to R foot. She reports pain is similar to prior episodes of LE pain. Pt is prescribed Percocet for chronic back and leg pain. She denies any recent travel or surgeries. She is allergic to Lortab, Cymbalta, Lasix and Lyrica. She denies any fevers, chills, n/v/d, CP, SOB, cough, or rhinorrhea. Chief Complaint: BL LE pain  Duration:  Days  Timing:  acute-on-chronic  Location: LEs  Quality: Aching  Severity: Mild  Modifying Factors: none   Associated Symptoms: R foot    There are no other complaints, changes, or physical findings at this time.     PCP: Elvis Valdez MD    Current Outpatient Prescriptions   Medication Sig Dispense Refill    oxyCODONE-acetaminophen (PERCOCET) 5-325 mg per tablet Take 1 Tab by mouth every six (6) hours as needed for Pain for up to 12 doses. Max Daily Amount: 4 Tabs. 12 Tab 0    medroxyPROGESTERone (PROVERA) 10 mg tablet Take 1 Tab by mouth daily for 10 days. 10 Tab 1    insulin glargine (LANTUS,BASAGLAR) 100 unit/mL (3 mL) inpn 40 Units by SubCUTAneous route nightly. 10 Pen 3    insulin lispro (HUMALOG) 100 unit/mL kwikpen 12 Units by SubCUTAneous route Before breakfast, lunch, and dinner. 2 Package 3    losartan (COZAAR) 50 mg tablet Take  by mouth daily.  torsemide (DEMADEX) 10 mg tablet Take 4 Tabs by mouth daily. 120 Tab 5    isosorbide dinitrate (ISORDIL) 20 mg tablet Take 20 mg by mouth two (2) times a day.  hydrALAZINE (APRESOLINE) 50 mg tablet Take 50 mg by mouth two (2) times a day.  Cholecalciferol, Vitamin D3, 50,000 unit cap Take  by mouth.  atorvastatin (LIPITOR) 40 mg tablet Take  by mouth daily.  Insulin Needles, Disposable, 31 gauge x 5/16\" ndle For flexpen #100, 4 rf daily 1 Package 5    gabapentin (NEURONTIN) 800 mg tablet Take 1 Tab by mouth three (3) times daily. 90 Tab 5    folic acid (FOLVITE) 1 mg tablet Take 1 Tab by mouth daily. 90 Tab 1    cyanocobalamin (VITAMIN B-12) 500 mcg tablet Take 1 Tab by mouth daily. 30 Tab 5    glucose blood VI test strips (ASCENSIA AUTODISC VI, ONE TOUCH ULTRA TEST VI) strip Daily verio one touch up to 3 times a day 100 Strip 5    carvedilol (COREG) 6.25 mg tablet Take 12.5 mg by mouth two (2) times a day.  polyethylene glycol (MIRALAX) 17 gram packet Take 1 Packet by mouth daily. 30 Packet 1    aspirin delayed-release 81 mg tablet Take  by mouth daily.          Past History     Past Medical History:  Past Medical History:   Diagnosis Date    Anemia     CAD (coronary artery disease)     Chronic pain     [facility] hand 2014    [facility]    Diabetes mellitus type 2 in obese ([address]     Diabetic neuropathy [facility] [address]     Hypertension     Migraine        Past Surgical History:  Past Surgical History:   Procedure Laterality Date    HX ORTHOPAEDIC right big toe       Family History:  Family History   Problem Relation Age of Onset   [facility] Cancer Father      brain    Diabetes Mother     Heart Disease Mother     Hypertension Mother     Hypertension Maternal Grandmother     Heart Disease Maternal Grandmother        Social History:  Social History   Substance Use Topics    Smoking status: Never Smoker    Smokeless tobacco: Never Used    Alcohol use No       Allergies: Allergies   Allergen Reactions    Lortab [Hydrocodone-Acetaminophen] Nausea and Vomiting    Cymbalta [Duloxetine] Diarrhea    Lasix [Furosemide] Swelling    Lyrica [Pregabalin] Drowsiness         Review of Systems   Review of Systems   Constitutional: Negative for chills and fever. HENT: Negative for congestion, rhinorrhea and sore throat. Respiratory: Negative for cough and shortness of breath. Cardiovascular: Negative for chest pain and palpitations. Gastrointestinal: Negative for abdominal pain, diarrhea, nausea and vomiting. Genitourinary: Negative for dysuria and hematuria. Musculoskeletal: Positive for arthralgias, back pain (lower) and myalgias (BL LEs). Negative for neck pain and neck stiffness. Skin: Negative for rash and wound. Allergic/Immunologic: Negative for food allergies and immunocompromised state. Neurological: Negative for dizziness, weakness, numbness and headaches. Psychiatric/Behavioral: Negative for agitation and confusion. Physical Exam   Physical Exam   Constitutional: She is oriented to person, place, and time. She appears well-developed and well-nourished. No distress. Obese female    HENT:   Head: Normocephalic and atraumatic. Nose: Nose normal.   Mouth/Throat: Oropharynx is clear and moist. No oropharyngeal exudate. Eyes: Conjunctivae and EOM are normal. Right eye exhibits no discharge. Left eye exhibits no discharge. No scleral icterus. Neck: Normal range of motion. Neck supple. No JVD present.  No tracheal deviation present. No thyromegaly present. Cardiovascular: Normal rate, regular rhythm and normal heart sounds. Pulmonary/Chest: Effort normal and breath sounds normal. No respiratory distress. She has no wheezes. Musculoskeletal: Normal range of motion. She exhibits tenderness. She exhibits no edema. paralumbar TTP. R foot TTP, clavus noted to dorsal surface of R great toe. 2+ DPs   Lymphadenopathy:     She has no cervical adenopathy. Neurological: She is alert and oriented to person, place, and time. She exhibits normal muscle tone. Coordination normal.   Skin: Skin is warm and dry. She is not diaphoretic. Psychiatric: She has a normal mood and affect. Her behavior is normal. Judgment normal.   Nursing note and vitals reviewed. Diagnostic Study Results     Labs -   No results found for this or any previous visit (from the past 12 hour(s)). Radiologic Studies -   XR FOOT RT MIN 3 V   Final Result   EXAM:  XR FOOT RT MIN 3 V     INDICATION:   Right foot pain, infected callus.     COMPARISON:  10/24/2017     FINDINGS:  Three views of the right foot demonstrate no fracture or other acute  osseous or articular abnormality. Soft tissue swelling of the first digit is  noted.     IMPRESSION  IMPRESSION:  First digit soft tissue swelling without acute osseous abnormality. Medical Decision Making   I am the first provider for this patient. I reviewed the vital signs, available nursing notes, past medical history, past surgical history, family history and social history. Vital Signs-Reviewed the patient's vital signs. Patient Vitals for the past 12 hrs:   Temp Pulse Resp BP SpO2   06/04/18 1743 98.7 °F (37.1 °C) 100 18 (!) 199/106 97 %       Pulse Oximetry Analysis - 97% on RA    Cardiac Monitor:   Rate: 100 bpm    Records Reviewed: Old Medical Records    Provider Notes (Medical Decision Making):   DDx: exacerbation of chronic pain, DDD, spasm    ED Course:   Initial assessment performed.  The patients presenting problems have been discussed, and they are in agreement with the care plan formulated and outlined with them. I have encouraged them to ask questions as they arise throughout their visit. Critical Care Time:   None     Disposition:  Discharge Note:  8:05 PM  The pt is ready for discharge. The pt's signs, symptoms, diagnosis, and discharge instructions have been discussed and pt has conveyed their understanding. The pt is to follow up as recommended or return to ER should their symptoms worsen. Plan has been discussed and pt is in agreement. PLAN:  1. Discharge Medication List as of 6/4/2018  8:02 PM      START taking these medications    Details   oxyCODONE-acetaminophen (PERCOCET) 5-325 mg per tablet Take 1 Tab by mouth every six (6) hours as needed for Pain for up to 12 doses. Max Daily Amount: 4 Tabs., Print, Disp-12 Tab, R-0         CONTINUE these medications which have NOT CHANGED    Details   medroxyPROGESTERone (PROVERA) 10 mg tablet Take 1 Tab by mouth daily for 10 days. , Print, Disp-10 Tab, R-1      insulin glargine (LANTUS,BASAGLAR) 100 unit/mL (3 mL) inpn 40 Units by SubCUTAneous route nightly., Normal, Disp-10 Pen, R-3      insulin lispro (HUMALOG) 100 unit/mL kwikpen 12 Units by SubCUTAneous route Before breakfast, lunch, and dinner., Normal, Disp-2 Package, R-3      losartan (COZAAR) 50 mg tablet Take  by mouth daily. , Historical Med      torsemide (DEMADEX) 10 mg tablet Take 4 Tabs by mouth daily. , Normal, Disp-120 Tab, R-5      isosorbide dinitrate (ISORDIL) 20 mg tablet Take 20 mg by mouth two (2) times a day., Historical Med      hydrALAZINE (APRESOLINE) 50 mg tablet Take 50 mg by mouth two (2) times a day., Historical Med      Cholecalciferol, Vitamin D3, 50,000 unit cap Take  by mouth., Historical Med      atorvastatin (LIPITOR) 40 mg tablet Take  by mouth daily. , Historical Med      Insulin Needles, Disposable, 31 gauge x 5/16\" ndle For flexpen #100, 4 rf daily, Normal, Disp-1 Package, R-5      gabapentin (NEURONTIN) 800 mg tablet Take 1 Tab by mouth three (3) times daily. , Normal, Disp-90 Tab, R-5      folic acid (FOLVITE) 1 mg tablet Take 1 Tab by mouth daily. , Normal, Disp-90 Tab, R-1      cyanocobalamin (VITAMIN B-12) 500 mcg tablet Take 1 Tab by mouth daily. , Normal, Disp-30 Tab, R-5      glucose blood VI test strips (ASCENSIA AUTODISC VI, ONE TOUCH ULTRA TEST VI) strip Daily verio one touch up to 3 times a day, Print, Disp-100 Strip, R-5      carvedilol (COREG) 6.25 mg tablet Take 12.5 mg by mouth two (2) times a day., Historical Med      polyethylene glycol (MIRALAX) 17 gram packet Take 1 Packet by mouth daily. , Normal, Disp-30 Packet, R-1      aspirin delayed-release 81 mg tablet Take  by mouth daily. , Historical Med           2. Follow-up Information     Follow up With Details Comments [address] [place] [address] Street, MD   [address] [place] [address]   [phone]      Wally Neville. Aníbal Singleatry MD  As needed [place] [address]  [phone]      hospitals EMERGENCY DEPT  If symptoms worsen [address] [place] [address]  [phone]        Return to ED if worse     Diagnosis     Clinical Impression:   1. Arthralgia of both lower legs    2. Foot pain, right    3. Elevated blood pressure reading        Attestations:    Attestations: This note is prepared by Niki Mullen, acting as Scribe for [employer]: The scribe's documentation has been prepared under my direction and personally reviewed by me in its entirety. I confirm that the note above accurately reflects all work, treatment, procedures, and medical decision making performed by me.

## 2018-06-05 NOTE — DISCHARGE INSTRUCTIONS
Musculoskeletal Pain: Care Instructions  Your Care Instructions  Different problems with the bones, muscles, nerves, ligaments, and tendons in the body can cause pain. One or more areas of your body may ache or burn. Or they may feel tired, stiff, or sore. The medical term for this type of pain is musculoskeletal pain. It can have many different causes. Sometimes the pain is caused by an injury such as a strain or sprain. Or you might have pain from using one part of your body in the same way over and over again. This is called overuse. In some cases, the cause of the pain is another health problem such as arthritis or fibromyalgia. The doctor will examine you and ask you questions about your health to help find the cause of your pain. Blood tests or imaging tests like an X-ray may also be helpful. But sometimes doctors can't find a cause of the pain. Treatment depends on your symptoms and the cause of the pain, if known. The doctor has checked you carefully, but problems can develop later. If you notice any problems or new symptoms, get medical treatment right away. Follow-up care is a key part of your treatment and safety. Be sure to make and go to all appointments, and call your doctor if you are having problems. It's also a good idea to know your test results and keep a list of the medicines you take. How can you care for yourself at home? · Rest until you feel better. · Do not do anything that makes the pain worse. Return to exercise gradually if you feel better and your doctor says it's okay. · Be safe with medicines. Read and follow all instructions on the label. ¨ If the doctor gave you a prescription medicine for pain, take it as prescribed. ¨ If you are not taking a prescription pain medicine, ask your doctor if you can take an over-the-counter medicine. · Put ice or a cold pack on the area for 10 to 20 minutes at a time to ease pain. Put a thin cloth between the ice and your skin.   When should you call for help? Call your doctor now or seek immediate medical care if:  · You have new pain, or your pain gets worse. · You have new symptoms such as a fever, a rash, or chills. Watch closely for changes in your health, and be sure to contact your doctor if:  · You do not get better as expected. Where can you learn more? Go to Morizon.be  Enter Q624 in the search box to learn more about \"Musculoskeletal Pain: Care Instructions. \"   © 7258-7447 Healthwise, Incorporated. Care instructions adapted under license by [employer] (which disclaims liability or warranty for this information). This care instruction is for use with your licensed healthcare professional. If you have questions about a medical condition or this instruction, always ask your healthcare professional. [address] any warranty or liability for your use of this information. Content Version: 80.5.552567; Current as of: November 20, 2015           Elevated Blood Pressure: Care Instructions  Your Care Instructions    Blood pressure is a measure of how hard the blood pushes against the walls of your arteries. It's normal for blood pressure to go up and down throughout the day. But if it stays up over time, you have high blood pressure. Two numbers tell you your blood pressure. The first number is the systolic pressure. It shows how hard the blood pushes when your heart is pumping. The second number is the diastolic pressure. It shows how hard the blood pushes between heartbeats, when your heart is relaxed and filling with blood. An ideal blood pressure in adults is less than 120/80 (say \"120 over 80\"). High blood pressure is 140/90 or higher. You have high blood pressure if your top number is 140 or higher or your bottom number is 90 or higher, or both. The main test for high blood pressure is simple, fast, and painless.  To diagnose high blood pressure, your doctor will test your blood pressure at different times. After testing your blood pressure, your doctor may ask you to test it again when you are home. If you are diagnosed with high blood pressure, you can work with your doctor to make a long-term plan to manage it. Follow-up care is a key part of your treatment and safety. Be sure to make and go to all appointments, and call your doctor if you are having problems. It's also a good idea to know your test results and keep a list of the medicines you take. How can you care for yourself at home? · Do not smoke. Smoking increases your risk for heart attack and stroke. If you need help quitting, talk to your doctor about stop-smoking programs and medicines. These can increase your chances of quitting for good. · Stay at a healthy weight. · Try to limit how much sodium you eat to less than 2,300 milligrams (mg) a day. Your doctor may ask you to try to eat less than 1,500 mg a day. · Be physically active. Get at least 30 minutes of exercise on most days of the week. Walking is a good choice. You also may want to do other activities, such as running, swimming, cycling, or playing tennis or team sports. · Avoid or limit alcohol. Talk to your doctor about whether you can drink any alcohol. · Eat plenty of fruits, vegetables, and low-fat dairy products. Eat less saturated and total fats. · Learn how to check your blood pressure at home. When should you call for help? Call your doctor now or seek immediate medical care if:  ? · Your blood pressure is much higher than normal (such as 180/110 or higher). ? · You think high blood pressure is causing symptoms such as:  ¨ Severe headache. ¨ Blurry vision. ? Watch closely for changes in your health, and be sure to contact your doctor if:  ? · You do not get better as expected. Where can you learn more? Go to http://lizbeth-efraín.info/.   Enter Z521 in the search box to learn more about \"Elevated Blood Pressure: Care Instructions. \"  Current as of: September 21, 2016  Content Version: 11.4  © 4467-9495 Healthwise, [facility]. Care instructions adapted under license by [employer] (which disclaims liability or warranty for this information). If you have questions about a medical condition or this instruction, always ask your healthcare professional. [place] [address] any warranty or liability for your use of this information.

## 2018-06-06 ENCOUNTER — OFFICE VISIT (OUTPATIENT)
Dept: INTERNAL MEDICINE CLINIC | Age: 53
End: 2018-06-06

## 2018-06-06 VITALS
SYSTOLIC BLOOD PRESSURE: 112 MMHG | WEIGHT: 253 LBS | RESPIRATION RATE: 18 BRPM | BODY MASS INDEX: 43.19 KG/M2 | OXYGEN SATURATION: 99 % | DIASTOLIC BLOOD PRESSURE: 69 MMHG | TEMPERATURE: 98.8 F | HEIGHT: 64 IN | HEART RATE: 90 BPM

## 2018-06-06 DIAGNOSIS — Z98.890 STATUS POST CARDIAC CATHETERIZATION: ICD-10-CM

## 2018-06-06 DIAGNOSIS — I10 ESSENTIAL HYPERTENSION: ICD-10-CM

## 2018-06-06 DIAGNOSIS — F11.20 NARCOTIC DEPENDENCE, EPISODIC USE (HCC): ICD-10-CM

## 2018-06-06 DIAGNOSIS — E11.40 TYPE 2 DIABETES MELLITUS WITH DIABETIC NEUROPATHY, WITH LONG-TERM CURRENT USE OF INSULIN (HCC): ICD-10-CM

## 2018-06-06 DIAGNOSIS — L84 CORN OF TOE: ICD-10-CM

## 2018-06-06 DIAGNOSIS — I50.21 ACUTE SYSTOLIC CONGESTIVE HEART FAILURE (HCC): Primary | ICD-10-CM

## 2018-06-06 DIAGNOSIS — Z79.4 TYPE 2 DIABETES MELLITUS WITH DIABETIC NEUROPATHY, WITH LONG-TERM CURRENT USE OF INSULIN (HCC): ICD-10-CM

## 2018-06-06 RX ORDER — OXYCODONE AND ACETAMINOPHEN 5; 325 MG/1; MG/1
1 TABLET ORAL
Qty: 9 TAB | Refills: 0 | Status: SHIPPED | OUTPATIENT
Start: 2018-06-06 | End: 2018-06-20 | Stop reason: SDUPTHER

## 2018-06-06 NOTE — PROGRESS NOTES
Subjective:     Kasie Giraldo is a 47 y.o. female seen for follow-up of diabetes. Wants pain medications, frequently uses them. She has had hypoglycemic attacks. .no  Blood sugar control has been a little high, reports taking her insulin. She has diabetes, hypertension and CHF. Kasie Giraldo has the additional concern of wants some perc for after cath procedure where they will go check on her heart, big toe hurts. Doctor from [address] doing the procedure. Dr Maria Victoria Vaughn? Cards, min c/o pain currently in the neck but has shoulder pain and is seeing ortho. Reports going to the emergency room a few days ago getting narcotics because she could not walk, was having a lot of pain. States otherwise no other prescribers of pain medications. Waiting to see pods for her toe pain, has right big toe corn, she is supposed to see podiatry and is waiting for an appointment. Diabetic Review of Systems: no polyuria or polydipsia, has dysesthesias in the feet, stable dyspnea. Allergies   Allergen Reactions    Lortab [Hydrocodone-Acetaminophen] Nausea and Vomiting    Cymbalta [Duloxetine] Diarrhea    Lasix [Furosemide] Swelling    Lyrica [Pregabalin] Drowsiness       Diet and Lifestyle: generally follows a low sodium diet, nonsmoker.     Patient Active Problem List    Diagnosis Date Noted    Type 2 diabetes mellitus with diabetic neuropathy, with long-term current use of insulin ([address] 83/58/2614    Acute systolic congestive heart failure ([address] 02/07/2018    Obesity, morbid ([address] 12/18/2017    Type 2 diabetes mellitus with nephropathy ([address] 12/18/2017    Spinal stenosis of lumbosacral region 12/08/2016    Lumbar back pain with radiculopathy affecting right lower extremity 12/08/2016    Idiopathic small and large fiber sensory neuropathy 11/16/2016    Narcotic dependence, episodic use ([address] 04/29/2016    Chronic pain     Hypertension 11/07/2012     Current Outpatient Prescriptions   Medication Sig Dispense Refill  oxyCODONE-acetaminophen (PERCOCET) 5-325 mg per tablet Take 1 Tab by mouth every eight (8) hours as needed for Pain. Max Daily Amount: 3 Tabs. 9 Tab 0    insulin glargine (LANTUS,BASAGLAR) 100 unit/mL (3 mL) inpn 40 Units by SubCUTAneous route nightly. (Patient taking differently: 45 Units by SubCUTAneous route nightly.) 10 Pen 3    insulin lispro (HUMALOG) 100 unit/mL kwikpen 12 Units by SubCUTAneous route Before breakfast, lunch, and dinner. 2 Package 3    losartan (COZAAR) 50 mg tablet Take  by mouth daily.  torsemide (DEMADEX) 10 mg tablet Take 4 Tabs by mouth daily. 120 Tab 5    isosorbide dinitrate (ISORDIL) 20 mg tablet Take 20 mg by mouth two (2) times a day.  hydrALAZINE (APRESOLINE) 50 mg tablet Take 50 mg by mouth two (2) times a day.  Cholecalciferol, Vitamin D3, 50,000 unit cap Take  by mouth.  atorvastatin (LIPITOR) 40 mg tablet Take  by mouth daily.  Insulin Needles, Disposable, 31 gauge x 5/16\" ndle For flexpen #100, 4 rf daily 1 Package 5    gabapentin (NEURONTIN) 800 mg tablet Take 1 Tab by mouth three (3) times daily. 90 Tab 5    folic acid (FOLVITE) 1 mg tablet Take 1 Tab by mouth daily. 90 Tab 1    cyanocobalamin (VITAMIN B-12) 500 mcg tablet Take 1 Tab by mouth daily. 30 Tab 5    glucose blood VI test strips (ASCENSIA AUTODISC VI, ONE TOUCH ULTRA TEST VI) strip Daily verio one touch up to 3 times a day 100 Strip 5    carvedilol (COREG) 6.25 mg tablet Take 12.5 mg by mouth two (2) times a day.  polyethylene glycol (MIRALAX) 17 gram packet Take 1 Packet by mouth daily. 30 Packet 1    aspirin delayed-release 81 mg tablet Take  by mouth daily.        Allergies   Allergen Reactions    Lortab [Hydrocodone-Acetaminophen] Nausea and Vomiting    Cymbalta [Duloxetine] Diarrhea    Lasix [Furosemide] Swelling    Lyrica [Pregabalin] Drowsiness     Social History   Substance Use Topics    Smoking status: Never Smoker    Smokeless tobacco: Never Used   [employer] [address] Alcohol use No        Lab Results  Component Value Date/Time   WBC 5.9 04/13/2018 02:27 PM   HGB 10.7 (L) 04/13/2018 02:27 PM   HCT 33.3 (L) 04/13/2018 02:27 PM   PLATELET 443 53/08/0248 02:27 PM   MCV 81 04/13/2018 02:27 PM     Lab Results  Component Value Date/Time   Hemoglobin A1c 8.6 (H) 02/13/2018 12:00 AM   Hemoglobin A1c 7.4 (H) 02/13/2017 12:20 PM   Hemoglobin A1c 6.6 (H) 08/15/2016 02:35 PM   Glucose 222 (H) 04/27/2018 03:17 PM   Glucose (POC) 173 (H) 12/18/2015 11:17 AM   Microalb/Creat ratio (ug/mg creat.) 39.9 (H) 02/19/2018 02:29 PM   LDL, calculated 72 02/13/2018 12:00 AM   Creatinine 1.65 (H) 04/27/2018 03:17 PM      Lab Results  Component Value Date/Time   Cholesterol, total 126 02/13/2018 12:00 AM   HDL Cholesterol 37 (L) 02/13/2018 12:00 AM   LDL, calculated 72 02/13/2018 12:00 AM   Triglyceride 84 02/13/2018 12:00 AM     Lab Results  Component Value Date/Time   ALT (SGPT) 23 04/02/2018 02:00 PM   AST (SGOT) 13 (L) 04/02/2018 02:00 PM   Alk. phosphatase 123 (H) 04/02/2018 02:00 PM   Bilirubin, total 0.5 04/02/2018 02:00 PM   Albumin 3.5 04/02/2018 02:00 PM   Protein, total 7.8 04/02/2018 02:00 PM   INR 1.1 12/08/2015 04:42 PM   Prothrombin time 11.2 12/08/2015 04:42 PM   PLATELET 912 91/23/1568 02:27 PM       Lab Results  Component Value Date/Time   GFR est non-AA 36 (L) 04/27/2018 03:17 PM   GFR est AA 41 (L) 04/27/2018 03:17 PM   Creatinine 1.65 (H) 04/27/2018 03:17 PM   BUN 14 04/27/2018 03:17 PM   Sodium 137 04/27/2018 03:17 PM   Potassium 3.5 04/27/2018 03:17 PM   Chloride 102 04/27/2018 03:17 PM   CO2 21 04/27/2018 03:17 PM   Magnesium 1.4 (L) 11/16/2016 12:04 AM   PTH, Intact CANCELED 02/19/2018 02:29 PM     Lab Results   Component Value Date/Time    Glucose 222 (H) 04/27/2018 03:17 PM    Glucose (POC) 173 (H) 12/18/2015 11:17 AM         Review of Systems  Pertinent items are noted in HPI.     Objective:     Significant for the following:     Visit Vitals    /69 (BP 1 Location: Left arm, BP Patient Position: Sitting)    Pulse 90    Temp 98.8 °F (37.1 °C) (Oral)    Resp 18    Ht 5' 4\" (1.626 m)    Wt 253 lb (114.8 kg)    LMP 12/03/2017    SpO2 99%    BMI 43.43 kg/m2     Appearance: overweight and chronically ill appearing. Exam: heart sounds normal rate, regular rhythm, normal S1, S2, no murmurs, rubs, clicks or gallops, chest clear, no hepatosplenomegaly, feet: warm, good capillary refill  Foot exam: Right big toe with trimmed the callus deep bruise under. Lab review: labs reviewed, I note that glycosylated hemoglobin mildly abnormal but acceptable. Assessment/Plan:     Follow-up diabetes borderline controlled, needs further observation. Diabetic issues reviewed with her: all medications, side effects and compliance discussed carefully, foot care discussed and Podiatry visits discussed and glycohemoglobin and other lab monitoring discussed. Chronic Conditions Addressed Today     1. Hypertension    2. Narcotic dependence, episodic use ([facility]    3. Acute systolic congestive heart failure ([facility] - Primary    4. Type 2 diabetes mellitus with diabetic neuropathy, with long-term current use of insulin (HCC)     Relevant Medications     oxyCODONE-acetaminophen (PERCOCET) 5-325 mg per tablet      Acute Diagnoses Addressed Today     Corn of toe        Status post cardiac catheterization            Relevant Medications        oxyCODONE-acetaminophen (PERCOCET) 5-325 mg per tablet        Looked up in , nned to reduce narcotic Rx. We discussed this pain and the usage of controlled substances for post procedure pain relief. In general these medications are indicated for the acute symptom relief and not for chronic usage, allthough they are frequently used for chronic management  After a certain period of time they should be stopped to avoid dependence and/or addiction. I warned her about the dangers of addiction and other side affects including respiratory depression and death. Discussed how this is a controlled substance and that the prescribing of it is should be monitored very carefully. Drug usage is also monitored by our practise and the [facility], since there has been a lot of abuse and diversion of controlled substances. In general we do not refill this medication over the phone. PLease ask for enough pills to get you to your next appointment and make sure you keep your appointments. Warned not to take other medications like alcohol, other benzodiazapines marijuana or other narcotics when on this medication. Follow-up Disposition:  Return in about 4 weeks (around 7/4/2018) for routine follow up.

## 2018-06-06 NOTE — MR AVS SNAPSHOT
[address] 5[address] [facility] 
[phone] Patient: Kasie Natarajan MRN: [id number] :1965 Visit Information Date & Time Provider Department Dept. Phone Encounter #  
 2018  1:00 PM MD [address] [id number] Follow-up Instructions Return in about 4 weeks (around 2018) for routine follow up. Your Appointments 2018  1:45 PM  
ACUTE CARE with Dina Romero MD  
[facility]) Appt Note: f/u on dm 3/19/18s[address]. [address] [place] [id number]  
[phone]  
  
   
 [address] [place] [address] [address] Upcoming Health Maintenance Date Due FOBT Q 1 YEAR AGE 50-75 2016 PAP AKA CERVICAL CYTOLOGY 9/3/2017 EYE EXAM RETINAL OR DILATED Q1 10/25/2017 Influenza Age 5 to Adult 2018 HEMOGLOBIN A1C Q6M 10/20/2018 FOOT EXAM Q1 2018 LIPID PANEL Q1 2019 MICROALBUMIN Q1 2019 BREAST CANCER SCRN MAMMOGRAM 3/22/2019 DTaP/Tdap/Td series (2 - Td) 3/13/2027 Allergies as of 2018  Review Complete On: 2018 By: WILLIS Colin Severity Noted Reaction Type Reactions Lortab [Hydrocodone-acetaminophen] High 2013   Intolerance Nausea and Vomiting Cymbalta [Duloxetine]  10/04/2016    Diarrhea Lasix [Furosemide]  2018    Swelling Lyrica [Pregabalin]  2017    Drowsiness Current Immunizations  Reviewed on 2016 No immunizations on file. Not reviewed this visit You Were Diagnosed With   
  
 Codes Comments Acute systolic congestive heart failure ([facility]    -  Primary ICD-10-CM: I50.21 ICD-9-CM: 428.21, 428.0 Narcotic dependence, episodic use ([facility]     ICD-10-CM: E29.52 ICD-9-CM: 304.92   
 Type 2 diabetes mellitus with diabetic neuropathy, with long-term current use of insulin (HCC)     ICD-10-CM: E11.40, Z79.4 ICD-9-CM: 250.60, 357.2, V58.67 Essential hypertension     ICD-10-CM: I10 
ICD-9-CM: 401.9 Corn of toe     ICD-10-CM: L84 
ICD-9-CM: 603 Status post cardiac catheterization     ICD-10-CM: Z98.890 ICD-9-CM: V45.89 Vitals BP Pulse Temp Resp Height(growth percentile) Weight(growth percentile) 112/69 (BP 1 Location: Left arm, BP Patient Position: Sitting) 90 98.8 °F (37.1 °C) (Oral) 18 5' 4\" (1.626 m) 253 lb (114.8 kg) LMP SpO2 BMI OB Status Smoking Status 12/03/2017 99% 43.43 kg/m2 Postmenopausal Never Smoker BMI and BSA Data Body Mass Index Body Surface Area  
 43.43 kg/m 2 2.28 m 2 Preferred Pharmacy Pharmacy Name Phone [facility] [address] [phone] [phone] Your Updated Medication List  
  
   
This list is accurate as of 6/6/18  1:13 PM.  Always use your most recent med list.  
  
  
  
  
 aspirin delayed-release 81 mg tablet Take  by mouth daily. atorvastatin 40 mg tablet Commonly known as:  LIPITOR Take  by mouth daily. carvedilol 6.25 mg tablet Commonly known as:  Gaurav Dole Take 12.5 mg by mouth two (2) times a day. cholecalciferol 50,000 unit capsule Commonly known as:  VITAMIN D3 Take  by mouth.  
  
 cyanocobalamin 500 mcg tablet Commonly known as:  VITAMIN B-12 Take 1 Tab by mouth daily. folic acid 1 mg tablet Commonly known as:  [employer] Take 1 Tab by mouth daily. gabapentin 800 mg tablet Commonly known as:  NEURONTIN Take 1 Tab by mouth three (3) times daily. glucose blood VI test strips strip Commonly known as:  ASCENSIA AUTODISC VI, ONE TOUCH ULTRA TEST VI  
Daily verio one touch up to 3 times a day  
  
 hydrALAZINE 50 mg tablet Commonly known as:  APRESOLINE Take 50 mg by mouth two (2) times a day. insulin glargine 100 unit/mL (3 mL) Inpn Commonly known as:  LANTUS,BASAGLAR  
40 Units by SubCUTAneous route nightly. insulin lispro 100 unit/mL kwikpen Commonly known as:  HUMALOG  
12 Units by SubCUTAneous route Before breakfast, lunch, and dinner. Insulin Needles (Disposable) 31 gauge x 5/16\" Ndle For flexpen #100, 4 rf daily  
  
 isosorbide dinitrate 20 mg tablet Commonly known as:  ISORDIL Take 20 mg by mouth two (2) times a day. losartan 50 mg tablet Commonly known as:  COZAAR Take  by mouth daily. oxyCODONE-acetaminophen 5-325 mg per tablet Commonly known as:  PERCOCET Take 1 Tab by mouth every eight (8) hours as needed for Pain. Max Daily Amount: 3 Tabs. polyethylene glycol 17 gram packet Commonly known as:  Shashi Holster Take 1 Packet by mouth daily. torsemide 10 mg tablet Commonly known as:  DEMADEX Take 4 Tabs by mouth daily. Prescriptions Printed Refills  
 oxyCODONE-acetaminophen (PERCOCET) 5-325 mg per tablet 0 Sig: Take 1 Tab by mouth every eight (8) hours as needed for Pain. Max Daily Amount: 3 Tabs. Class: Print Route: Oral  
  
Follow-up Instructions Return in about 4 weeks (around 7/4/2018) for routine follow up. Introducing [facility]! [employer] introduces [employer] patient portal. Now you can access parts of your medical record, email your doctor's office, and request medication refills online. 1. In your internet browser, go to [email or web address] [employer] 2. Click on the First Time User? Click Here link in the Sign In box. You will see the New Member Sign Up page. 3. Enter your [employer] Access Code exactly as it appears below. You will not need to use this code after youve completed the sign-up process. If you do not sign up before the expiration date, you must request a new code. · [employer] Access Code: [email or web address] Expires: 8/9/2018  1:03 PM 
 
 4. Enter the last four digits of your Social Security Number (xxxx) and Date of Birth (mm/dd/yyyy) as indicated and click Submit. You will be taken to the next sign-up page. 5. Create a [employer] ID. This will be your [employer] login ID and cannot be changed, so think of one that is secure and easy to remember. 6. Create a [employer] password. You can change your password at any time. 7. Enter your Password Reset Question and Answer. This can be used at a later time if you forget your password. 8. Enter your e-mail address. You will receive e-mail notification when new information is available in [address] 9. Click Sign Up. You can now view and download portions of your medical record. 10. Click the Download Summary menu link to download a portable copy of your medical information. If you have questions, please visit the Frequently Asked Questions section of the [employer] website. Remember, [employer] is NOT to be used for urgent needs. For medical emergencies, dial 911. Now available from your iPhone and Android! Please provide this summary of care documentation to your next provider. Your primary care clinician is listed as Ruddy Sainz. If you have any questions after today's visit, please call [phone].

## 2018-06-06 NOTE — PROGRESS NOTES
Chief Complaint   Patient presents with    Foot Swelling     follow up     Health Maintenance reviewed. I have reviewed the patient's medical history in detail and updated the computerized patient record. 1. Have you been to the ER, urgent care clinic since your last visit? Hospitalized since your last visit?no    2. Have you seen or consulted any other health care providers outside of the [address] [place] [address] since your last visit? Include any pap smears or colon screening. No    Encouraged pt to discuss pt's wishes with spouse/partner/family and bring them in the next appt to follow thru with the Advanced Directive    Fall Risk Assessment, last 12 mths 1/8/2018   Able to walk? Yes   Fall in past 12 months? Yes   Fall with injury? Yes   Number of falls in past 12 months 2   Fall Risk Score 3       PHQ over the last two weeks 6/6/2018   Little interest or pleasure in doing things Several days   Feeling down, depressed or hopeless Several days   Total Score PHQ 2 2       Abuse Screening Questionnaire 1/8/2018   Do you ever feel afraid of your partner? N   Are you in a relationship with someone who physically or mentally threatens you? N   Is it safe for you to go home?  Y       ADL Assessment 1/8/2018   Feeding yourself No Help Needed   Getting from bed to chair No Help Needed   Getting dressed No Help Needed   Bathing or showering No Help Needed   Walk across the room (includes cane/walker) No Help Needed   Using the telphone No Help Needed   Taking your medications No Help Needed   Preparing meals No Help Needed   Managing money (expenses/bills) No Help Needed   Moderately strenuous housework (laundry) No Help Needed   Shopping for personal items (toiletries/medicines) No Help Needed   Shopping for groceries No Help Needed   Driving No Help Needed   Climbing a flight of stairs No Help Needed   Getting to places beyond walking distances No Help Needed     Requested DM eye exam from Dr. Lalito Bowdenous [phone]

## 2018-06-20 ENCOUNTER — OFFICE VISIT (OUTPATIENT)
Dept: INTERNAL MEDICINE CLINIC | Age: 53
End: 2018-06-20

## 2018-06-20 VITALS
OXYGEN SATURATION: 98 % | RESPIRATION RATE: 20 BRPM | HEART RATE: 96 BPM | SYSTOLIC BLOOD PRESSURE: 148 MMHG | WEIGHT: 251 LBS | TEMPERATURE: 99.8 F | BODY MASS INDEX: 43.08 KG/M2 | DIASTOLIC BLOOD PRESSURE: 92 MMHG

## 2018-06-20 DIAGNOSIS — M17.0 ARTHRITIS OF BOTH KNEES: Primary | ICD-10-CM

## 2018-06-20 DIAGNOSIS — I50.21 ACUTE SYSTOLIC CONGESTIVE HEART FAILURE (HCC): ICD-10-CM

## 2018-06-20 DIAGNOSIS — Z98.890 STATUS POST CARDIAC CATHETERIZATION: ICD-10-CM
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
## 2018-06-20 DIAGNOSIS — F11.20 NARCOTIC DEPENDENCE, EPISODIC USE (HCC): ICD-10-CM

## 2018-06-20 RX ORDER — OXYCODONE AND ACETAMINOPHEN 5; 325 MG/1; MG/1
1 TABLET ORAL
Qty: 3 TAB | Refills: 0 | Status: SHIPPED | OUTPATIENT
Start: 2018-06-20 | End: 2018-06-20 | Stop reason: DRUGHIGH

## 2018-06-20 RX ORDER — DICLOFENAC SODIUM 10 MG/G
GEL TOPICAL 4 TIMES DAILY
Qty: 1 EACH | Refills: 5 | Status: SHIPPED | OUTPATIENT
Start: 2018-06-20 | End: 2019-07-01 | Stop reason: ALTCHOICE

## 2018-06-20 RX ORDER — OXYCODONE AND ACETAMINOPHEN 5; 325 MG/1; MG/1
1 TABLET ORAL
Qty: 6 TAB | Refills: 0 | Status: SHIPPED | OUTPATIENT
Start: 2018-06-20 | End: 2018-06-28 | Stop reason: SDUPTHER

## 2018-06-20 NOTE — PROGRESS NOTES
Chief Complaint   Patient presents with    Knee Pain     L/knee pain     I have reviewed the patient's medical history in detail and updated the computerized patient record. Health Maintenance reviewed. 1. Have you been to the ER, urgent care clinic since your last visit? Hospitalized since your last visit?no    2. Have you seen or consulted any other health care providers outside of the [address] [place] [address] K[place] since your last visit? Include any pap smears or colon screening. No    Fall Risk Assessment, last 12 mths 1/8/2018   Able to walk? Yes   Fall in past 12 months? Yes   Fall with injury? Yes   Number of falls in past 12 months 2   Fall Risk Score 3       PHQ over the last two weeks 6/20/2018   Little interest or pleasure in doing things Several days   Feeling down, depressed or hopeless Several days   Total Score PHQ 2 2       Abuse Screening Questionnaire 1/8/2018   Do you ever feel afraid of your partner? N   Are you in a relationship with someone who physically or mentally threatens you? N   Is it safe for you to go home?  Y       ADL Assessment 1/8/2018   Feeding yourself No Help Needed   Getting from bed to chair No Help Needed   Getting dressed No Help Needed   Bathing or showering No Help Needed   Walk across the room (includes cane/walker) No Help Needed   Using the telphone No Help Needed   Taking your medications No Help Needed   Preparing meals No Help Needed   Managing money (expenses/bills) No Help Needed   Moderately strenuous housework (laundry) No Help Needed   Shopping for personal items (toiletries/medicines) No Help Needed   Shopping for groceries No Help Needed   Driving No Help Needed   Climbing a flight of stairs No Help Needed   Getting to places beyond walking distances No Help Needed

## 2018-06-20 NOTE — MR AVS SNAPSHOT
Joe Martinez 
 
 
 [address] [place] [address]. [address] [facility] 
[phone] Patient: Blossom Ruiz MRN: [id number] :1965 Visit Information Date & Time Provider Department Dept. Phone Encounter #  
 2018 11:30 AM [facility] [phone] [id number] Follow-up Instructions Return in about 6 weeks (around 2018) for routine follow up. Your Appointments 7/3/2018  1:00 PM  
ACUTE CARE with MD [facility] [address] [place] [address]) Appt Note: f/u on dm 3/19/18smc; f/u on dm 3/19/18s[address]. [address] [place] [address]  
[phone]  
  
   
 [address] [place] [address] [address] Upcoming Health Maintenance Date Due FOBT Q 1 YEAR AGE 50-75 2016 PAP AKA CERVICAL CYTOLOGY 9/3/2017 EYE EXAM RETINAL OR DILATED Q1 10/25/2017 Influenza Age 5 to Adult 2018 HEMOGLOBIN A1C Q6M 10/20/2018 FOOT EXAM Q1 2018 LIPID PANEL Q1 2019 MICROALBUMIN Q1 2019 BREAST CANCER SCRN MAMMOGRAM 3/22/2019 DTaP/Tdap/Td series (2 - Td) 3/13/2027 Allergies as of 2018  Review Complete On: 2018 By: WILLIS Coombs Severity Noted Reaction Type Reactions Lortab [Hydrocodone-acetaminophen] High 2013   Intolerance Nausea and Vomiting Cymbalta [Duloxetine]  10/04/2016    Diarrhea Lasix [Furosemide]  2018    Swelling Lyrica [Pregabalin]  2017    Drowsiness Current Immunizations  Reviewed on 2016 No immunizations on file. Not reviewed this visit You Were Diagnosed With   
  
 Codes Comments Arthritis of both knees    -  Primary ICD-10-CM: M17.0 ICD-9-CM: 716.96 Status post cardiac catheterization     ICD-10-CM: Z98.890 ICD-9-CM: V45.89 Fall, initial encounter     ICD-10-CM: W19. Huma Dixon ICD-9-CM: E888.9 Narcotic dependence, episodic use ([facility]     ICD-10-CM: G64.51 ICD-9-CM: 304.92 Acute systolic congestive heart failure (HCC)     ICD-10-CM: I50.21 ICD-9-CM: 428.21, 428.0 Vitals BP Pulse Temp Resp Weight(growth percentile) LMP  
 (!) 148/92 96 99.8 °F (37.7 °C) (Oral) 20 251 lb (113.9 kg) 12/03/2017 SpO2 BMI OB Status Smoking Status 98% 43.08 kg/m2 Postmenopausal Never Smoker Vitals History BMI and BSA Data Body Mass Index Body Surface Area 43.08 kg/m 2 2.27 m 2 Preferred Pharmacy Pharmacy Name Phone [facility] [address] [facility] [phone] Your Updated Medication List  
  
   
This list is accurate as of 6/20/18 12:40 PM.  Always use your most recent med list.  
  
  
  
  
 aspirin delayed-release 81 mg tablet Take  by mouth daily. atorvastatin 40 mg tablet Commonly known as:  LIPITOR Take  by mouth daily. carvedilol 6.25 mg tablet Commonly known as:  Francenia Elders Take 12.5 mg by mouth two (2) times a day. cholecalciferol 50,000 unit capsule Commonly known as:  VITAMIN D3 Take  by mouth.  
  
 cyanocobalamin 500 mcg tablet Commonly known as:  VITAMIN B-12 Take 1 Tab by mouth daily. diclofenac 1 % Gel Commonly known as:  VOLTAREN Apply  to affected area four (4) times daily. folic acid 1 mg tablet Commonly known as:  Google Take 1 Tab by mouth daily. gabapentin 800 mg tablet Commonly known as:  NEURONTIN Take 1 Tab by mouth three (3) times daily. glucose blood VI test strips strip Commonly known as:  ASCENSIA AUTODISC VI, ONE TOUCH ULTRA TEST VI  
Daily verio one touch up to 3 times a day  
  
 hydrALAZINE 50 mg tablet Commonly known as:  APRESOLINE Take 50 mg by mouth two (2) times a day. insulin glargine 100 unit/mL (3 mL) Inpn Commonly known as:  LANTUS,BASAGLAR  
40 Units by SubCUTAneous route nightly. insulin lispro 100 unit/mL kwikpen Commonly known as:  HUMALOG  
12 Units by SubCUTAneous route Before breakfast, lunch, and dinner. Insulin Needles (Disposable) 31 gauge x 5/16\" Ndle For flexpen #100, 4 rf daily  
  
 isosorbide dinitrate 20 mg tablet Commonly known as:  ISORDIL Take 20 mg by mouth two (2) times a day. losartan 50 mg tablet Commonly known as:  COZAAR Take  by mouth daily. oxyCODONE-acetaminophen 5-325 mg per tablet Commonly known as:  PERCOCET Take 1 Tab by mouth every eight (8) hours as needed for Pain. Max Daily Amount: 3 Tabs. polyethylene glycol 17 gram packet Commonly known as:  Renetta Cancel Take 1 Packet by mouth daily. torsemide 10 mg tablet Commonly known as:  DEMADEX Take 4 Tabs by mouth daily. Prescriptions Printed Refills  
 oxyCODONE-acetaminophen (PERCOCET) 5-325 mg per tablet 0 Sig: Take 1 Tab by mouth every eight (8) hours as needed for Pain. Max Daily Amount: 3 Tabs. Class: Print Route: Oral  
  
Prescriptions Sent to Pharmacy Refills  
 diclofenac (VOLTAREN) 1 % gel 5 Sig: Apply  to affected area four (4) times daily. Class: Normal  
 Pharmacy: [address], [facility] #: [phone] Route: Topical  
  
Follow-up Instructions Return in about 6 weeks (around 8/1/2018) for routine follow up. To-Do List   
 06/20/2018 Imaging:  XR KNEE LT MAX 2 VWS Introducing [facility]! Clara Gallegos introduces Xanga patient portal. Now you can access parts of your medical record, email your doctor's office, and request medication refills online. 1. In your internet browser, go to [email or web address] 2. Click on the First Time User? Click Here link in the Sign In box. You will see the New Member Sign Up page. 3. Enter your Xanga Access Code exactly as it appears below.  You will not need to use this code after youve completed the sign-up process. If you do not sign up before the expiration date, you must request a new code. · Viropro Access Code: [email or web address] Expires: 8/9/2018  1:03 PM 
 
4. Enter the last four digits of your Social Security Number (xxxx) and Date of Birth (mm/dd/yyyy) as indicated and click Submit. You will be taken to the next sign-up page. 5. Create a Viropro ID. This will be your Viropro login ID and cannot be changed, so think of one that is secure and easy to remember. 6. Create a Viropro password. You can change your password at any time. 7. Enter your Password Reset Question and Answer. This can be used at a later time if you forget your password. 8. Enter your e-mail address. You will receive e-mail notification when new information is available in [address] 9. Click Sign Up. You can now view and download portions of your medical record. 10. Click the Download Summary menu link to download a portable copy of your medical information. If you have questions, please visit the Frequently Asked Questions section of the Viropro website. Remember, Viropro is NOT to be used for urgent needs. For medical emergencies, dial 911. Now available from your iPhone and Android! Please provide this summary of care documentation to your next provider. Your primary care clinician is listed as Karin Guo. If you have any questions after today's visit, please call [phone].

## 2018-06-28 ENCOUNTER — OFFICE VISIT (OUTPATIENT)
Dept: INTERNAL MEDICINE CLINIC | Age: 53
End: 2018-06-28

## 2018-06-28 VITALS
OXYGEN SATURATION: 99 % | SYSTOLIC BLOOD PRESSURE: 122 MMHG | TEMPERATURE: 97.4 F | HEIGHT: 64 IN | DIASTOLIC BLOOD PRESSURE: 64 MMHG | HEART RATE: 57 BPM | WEIGHT: 262 LBS | RESPIRATION RATE: 20 BRPM | BODY MASS INDEX: 44.73 KG/M2

## 2018-06-28 DIAGNOSIS — I50.21 ACUTE SYSTOLIC CONGESTIVE HEART FAILURE (HCC): ICD-10-CM
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
## 2018-06-28 DIAGNOSIS — F11.20 NARCOTIC DEPENDENCE, EPISODIC USE (HCC): Primary | ICD-10-CM

## 2018-06-28 DIAGNOSIS — M17.0 ARTHRITIS OF BOTH KNEES: ICD-10-CM

## 2018-06-28 RX ORDER — OXYCODONE AND ACETAMINOPHEN 5; 325 MG/1; MG/1
1 TABLET ORAL
Qty: 3 TAB | Refills: 0 | Status: SHIPPED | OUTPATIENT
Start: 2018-06-28 | End: 2018-07-02 | Stop reason: ALTCHOICE

## 2018-06-28 NOTE — PROGRESS NOTES
Had heart cath yesterday at [facility] - now with neck pain right side of neck - Torsemide on hold for 2 days, too dry for procedure  Chase Woods LPN  3/57/1431  23:06 AM  Fall Risk Assessment, last 12 mths 1/8/2018   Able to walk? Yes   Fall in past 12 months? Yes   Fall with injury? Yes   Number of falls in past 12 months 2   Fall Risk Score 3       PHQ over the last two weeks 6/20/2018   Little interest or pleasure in doing things Several days   Feeling down, depressed or hopeless Several days   Total Score PHQ 2 2       Abuse Screening Questionnaire 1/8/2018   Do you ever feel afraid of your partner? N   Are you in a relationship with someone who physically or mentally threatens you? N   Is it safe for you to go home?  Y       ADL Assessment 1/8/2018   Feeding yourself No Help Needed   Getting from bed to chair No Help Needed   Getting dressed No Help Needed   Bathing or showering No Help Needed   Walk across the room (includes cane/walker) No Help Needed   Using the telphone No Help Needed   Taking your medications No Help Needed   Preparing meals No Help Needed   Managing money (expenses/bills) No Help Needed   Moderately strenuous housework (laundry) No Help Needed   Shopping for personal items (toiletries/medicines) No Help Needed   Shopping for groceries No Help Needed   Driving No Help Needed   Climbing a flight of stairs No Help Needed   Getting to places beyond walking distances No Help Needed

## 2018-06-28 NOTE — PROGRESS NOTES
Subjective:     Zackery Kelley is an 47 y.o. female who is seen for follow up of CHF. She has diabetes, hypertension and CHF. Saw her cardiac physician for right heart catheterization, wants pain medications since it hurts. Was supposed to wait until the procedure to take her narcotics but took them prior to the procedure. Cardiologist said she was to try and he gave her some fluids. He is got her set up to see other specialists within the [facility] system. Wants me to continue as her PCP. Needs a form filled out for her , attempting to get disability. Diet and Lifestyle: generally follows a low sodium diet, nonsmoker. Weight monitoring: has increased 10 pounds over last week    Cardiovascular System Review  Cardiovascular ROS - taking medications as instructed, no medication side effects noted, no chest pain on exertion, notes stable dyspnea on exertion, no change, NYHA class 3, noting swelling of ankles. Patient Active Problem List    Diagnosis Date Noted    Type 2 diabetes mellitus with diabetic neuropathy, with long-term current use of insulin ([facility] 31/92/6771    Acute systolic congestive heart failure ([address] 02/07/2018    Obesity, morbid ([address] 12/18/2017    Type 2 diabetes mellitus with nephropathy ([address] 12/18/2017    Spinal stenosis of lumbosacral region 12/08/2016    Lumbar back pain with radiculopathy affecting right lower extremity 12/08/2016    Idiopathic small and large fiber sensory neuropathy 11/16/2016    Narcotic dependence, episodic use (HCC) 04/29/2016    Chronic pain     Hypertension 11/07/2012     Current Outpatient Prescriptions   Medication Sig Dispense Refill    oxyCODONE-acetaminophen (PERCOCET) 5-325 mg per tablet Take 1 Tab by mouth every eight (8) hours as needed for Pain. Max Daily Amount: 3 Tabs. 3 Tab 0    diclofenac (VOLTAREN) 1 % gel Apply  to affected area four (4) times daily.  1 Each 5    insulin glargine (LANTUS,BASAGLAR) 100 unit/mL (3 mL) inpn 40 Units by SubCUTAneous route nightly. (Patient taking differently: 45 Units by SubCUTAneous route nightly.) 10 Pen 3    insulin lispro (HUMALOG) 100 unit/mL kwikpen 12 Units by SubCUTAneous route Before breakfast, lunch, and dinner. 2 Package 3    losartan (COZAAR) 50 mg tablet Take  by mouth daily.  isosorbide dinitrate (ISORDIL) 20 mg tablet Take 20 mg by mouth two (2) times a day.  hydrALAZINE (APRESOLINE) 50 mg tablet Take 50 mg by mouth two (2) times a day.  Cholecalciferol, Vitamin D3, 50,000 unit cap Take  by mouth.  atorvastatin (LIPITOR) 40 mg tablet Take  by mouth daily.  Insulin Needles, Disposable, 31 gauge x 5/16\" ndle For flexpen #100, 4 rf daily 1 Package 5    gabapentin (NEURONTIN) 800 mg tablet Take 1 Tab by mouth three (3) times daily. 90 Tab 5    folic acid (FOLVITE) 1 mg tablet Take 1 Tab by mouth daily. 90 Tab 1    cyanocobalamin (VITAMIN B-12) 500 mcg tablet Take 1 Tab by mouth daily. 30 Tab 5    glucose blood VI test strips (ASCENSIA AUTODISC VI, ONE TOUCH ULTRA TEST VI) strip Daily verio one touch up to 3 times a day 100 Strip 5    carvedilol (COREG) 6.25 mg tablet Take 12.5 mg by mouth two (2) times a day.  polyethylene glycol (MIRALAX) 17 gram packet Take 1 Packet by mouth daily. 30 Packet 1    aspirin delayed-release 81 mg tablet Take  by mouth daily.        Allergies   Allergen Reactions    Lortab [Hydrocodone-Acetaminophen] Nausea and Vomiting    Cymbalta [Duloxetine] Diarrhea    Lasix [Furosemide] Swelling    Lyrica [Pregabalin] Drowsiness     Social History   Substance Use Topics    Smoking status: Never Smoker    Smokeless tobacco: Never Used    Alcohol use No        Lab Results  Component Value Date/Time   WBC 5.9 04/13/2018 02:27 PM   HGB 10.7 (L) 04/13/2018 02:27 PM   HCT 33.3 (L) 04/13/2018 02:27 PM   PLATELET 263 55/21/9360 02:27 PM   MCV 81 04/13/2018 02:27 PM     Lab Results  Component Value Date/Time Hemoglobin A1c 8.6 (H) 02/13/2018 12:00 AM   Hemoglobin A1c 7.4 (H) 02/13/2017 12:20 PM   Hemoglobin A1c 6.6 (H) 08/15/2016 02:35 PM   Glucose 222 (H) 04/27/2018 03:17 PM   Glucose (POC) 173 (H) 12/18/2015 11:17 AM   Microalb/Creat ratio (ug/mg creat.) 39.9 (H) 02/19/2018 02:29 PM   LDL, calculated 72 02/13/2018 12:00 AM   Creatinine 1.65 (H) 04/27/2018 03:17 PM      Lab Results  Component Value Date/Time   GFR est non-AA 36 (L) 04/27/2018 03:17 PM   GFR est AA 41 (L) 04/27/2018 03:17 PM   Creatinine 1.65 (H) 04/27/2018 03:17 PM   BUN 14 04/27/2018 03:17 PM   Sodium 137 04/27/2018 03:17 PM   Potassium 3.5 04/27/2018 03:17 PM   Chloride 102 04/27/2018 03:17 PM   CO2 21 04/27/2018 03:17 PM   Magnesium 1.4 (L) 11/16/2016 12:04 AM   PTH, Intact CANCELED 02/19/2018 02:29 PM     Lab Results   Component Value Date/Time    Glucose 222 (H) 04/27/2018 03:17 PM    Glucose (POC) 173 (H) 12/18/2015 11:17 AM         Review of Systems, additional:  Pertinent items are noted in HPI. Objective:     Visit Vitals    /57 (BP 1 Location: Left arm, BP Patient Position: At rest)    Pulse (!) 57    Temp 97.4 °F (36.3 °C) (Oral)    Resp 20    Ht 5' 4\" (1.626 m)    Wt 262 lb (118.8 kg)    LMP 12/03/2017    SpO2 99%    BMI 44.97 kg/m2     General:  alert, cooperative, no distress, appears stated age   Neck:  Cath site, no JVD   Chest Wall: inspection normal - no chest wall deformities or tenderness, respiratory effort normal   Lung: clear to auscultation bilaterally   Heart:  normal rate, regular rhythm, normal S1, S2, no murmurs, rubs, clicks or gallops   Abdomen: soft, non-tender. Bowel sounds normal. No masses,  no organomegaly   Extremities: edema slight       Lab review: stable kidney function last A1c 9.4. Assessment/Plan:     hypertension stable. Chronic Conditions Addressed Today     1.  Narcotic dependence, episodic use (HCC) - Primary     Relevant Medications     oxyCODONE-acetaminophen (PERCOCET) 5-325 mg per tablet    2. Acute systolic congestive heart failure (HCC)      Acute Diagnoses Addressed Today     Arthritis of both knees        Fall, initial encounter            I will not prescribe pain medications after she undergoes procedures after this unless I do the procedure. She understands this, last time I will give narcotics for postprocedure pain. CHF stable continue follow-up with cardiology. Forms filled out, various specialties noted. Will send these to her . ICD-10-CM ICD-9-CM    1. Narcotic dependence, episodic use (HCC) F11.20 304.92 oxyCODONE-acetaminophen (PERCOCET) 5-325 mg per tablet   2. Arthritis of both knees M17.0 716.96    3. Fall, initial encounter [address] XXXA E888.9    4. Acute systolic congestive heart failure (HCC) I50.21 428.21      428.0      Orders Placed This Encounter    oxyCODONE-acetaminophen (PERCOCET) 5-325 mg per tablet     Sig: Take 1 Tab by mouth every eight (8) hours as needed for Pain. Max Daily Amount: 3 Tabs. Dispense:  3 Tab     Refill:  0     Follow-up Disposition:  Return in about 6 weeks (around 8/9/2018) for routine follow up.

## 2018-06-28 NOTE — MR AVS SNAPSHOT
[address]. [address] [address] LTAC, located within [facility] 
[phone] Patient: Dianna Sanchez MRN: [id number] :1965 Visit Information Date & Time Provider Department Dept. Phone Encounter #  
 2018 11:30 AM Jahaira Nicole MD [address] [facility] [phone] [id number] Follow-up Instructions Return in about 6 weeks (around 2018) for routine follow up. Your Appointments 7/3/2018  1:00 PM  
ACUTE CARE with Jahaira Nicole MD  
[facility] [address] [place] [address]) Appt Note: f/u on dm 3/19/18smc; f/u on dm 3/19/18smc  
 [address] [place] [address]  
[phone]  
  
   
 [address] [place] [address] [address] Upcoming Health Maintenance Date Due FOBT Q 1 YEAR AGE 50-75 2016 PAP AKA CERVICAL CYTOLOGY 9/3/2017 EYE EXAM RETINAL OR DILATED Q1 10/25/2017 Influenza Age 5 to Adult 2018 HEMOGLOBIN A1C Q6M 10/20/2018 FOOT EXAM Q1 2018 LIPID PANEL Q1 2019 MICROALBUMIN Q1 2019 BREAST CANCER SCRN MAMMOGRAM 3/22/2019 DTaP/Tdap/Td series (2 - Td) 3/13/2027 Allergies as of 2018  Review Complete On: 2018 By: WILLIS Adames Severity Noted Reaction Type Reactions Lortab [Hydrocodone-acetaminophen] High 2013   Intolerance Nausea and Vomiting Cymbalta [Duloxetine]  10/04/2016    Diarrhea Lasix [Furosemide]  2018    Swelling Lyrica [Pregabalin]  2017    Drowsiness Current Immunizations  Reviewed on 2016 No immunizations on file. Not reviewed this visit You Were Diagnosed With   
  
 Codes Comments [facility]otic [facility] episodic [facility]    -  Primary ICD-10-CM: F11.20 ICD-9-CM: 304.92 Arthritis of both knees     ICD-10-CM: M17.0 ICD-9-CM: 716.96 Fall, initial encounter     ICD-10-CM: W19. Beto Rose ICD-9-CM: E888.9 Acute systolic congestive heart failure (HCC)     ICD-10-CM: I50.21 ICD-9-CM: 428.21, 428.0 Vitals BP Pulse Temp Resp Height(growth percentile) Weight(growth percentile) 122/64 (!) 57 97.4 °F (36.3 °C) (Oral) 20 5' 4\" (1.626 m) 262 lb (118.8 kg) LMP SpO2 BMI OB Status Smoking Status 12/03/2017 99% 44.97 kg/m2 Postmenopausal Never Smoker Vitals History BMI and BSA Data Body Mass Index Body Surface Area 44.97 kg/m 2 2.32 m 2 Preferred Pharmacy Pharmacy Name Phone [facility] [address] [facility] [address] [facility] [phone] Your Updated Medication List  
  
   
This list is accurate as of 6/28/18 12:15 PM.  Always use your most recent med list.  
  
  
  
  
 aspirin delayed-release 81 mg tablet Take  by mouth daily. atorvastatin 40 mg tablet Commonly known as:  LIPITOR Take  by mouth daily. carvedilol 6.25 mg tablet Commonly known as:  Mike Barnett Take 12.5 mg by mouth two (2) times a day. cholecalciferol 50,000 unit capsule Commonly known as:  VITAMIN D3 Take  by mouth.  
  
 cyanocobalamin 500 mcg tablet Commonly known as:  VITAMIN B-12 Take 1 Tab by mouth daily. diclofenac 1 % Gel Commonly known as:  VOLTAREN Apply  to affected area four (4) times daily. folic acid 1 mg tablet Commonly known as:  Google Take 1 Tab by mouth daily. gabapentin 800 mg tablet Commonly known as:  NEURONTIN Take 1 Tab by mouth three (3) times daily. glucose blood VI test strips strip Commonly known as:  ASCENSIA AUTODISC VI, ONE TOUCH ULTRA TEST VI  
Daily verio one touch up to 3 times a day  
  
 hydrALAZINE 50 mg tablet Commonly known as:  APRESOLINE Take 50 mg by mouth two (2) times a day. insulin glargine 100 unit/mL (3 mL) Inpn Commonly known as:  LANTUS,BASAGLAR  
40 Units by SubCUTAneous route nightly. insulin lispro 100 unit/mL kwikpen Commonly known as:  HUMALOG  
12 Units by SubCUTAneous route Before breakfast, lunch, and dinner. Insulin Needles (Disposable) 31 gauge x 5/16\" Ndle For flexpen #100, 4 rf daily  
  
 isosorbide dinitrate 20 mg tablet Commonly known as:  ISORDIL Take 20 mg by mouth two (2) times a day. losartan 50 mg tablet Commonly known as:  COZAAR Take  by mouth daily. oxyCODONE-acetaminophen 5-325 mg per tablet Commonly known as:  PERCOCET Take 1 Tab by mouth every eight (8) hours as needed for Pain. Max Daily Amount: 3 Tabs. polyethylene glycol 17 gram packet Commonly known as:  Krystin Plane Take 1 Packet by mouth daily. Prescriptions Printed Refills  
 oxyCODONE-acetaminophen (PERCOCET) 5-325 mg per tablet 0 Sig: Take 1 Tab by mouth every eight (8) hours as needed for Pain. Max Daily Amount: 3 Tabs. Class: Print Route: Oral  
  
Follow-up Instructions Return in about 6 weeks (around 8/9/2018) for routine follow up. Introducing [facility]! [employer] introduces Photo Rankr patient portal. Now you can access parts of your medical record, email your doctor's office, and request medication refills online. 1. In your internet browser, go to [email or web address] 2. Click on the First Time User? Click Here link in the Sign In box. You will see the New Member Sign Up page. 3. Enter your Photo Rankr Access Code exactly as it appears below. You will not need to use this code after youve completed the sign-up process. If you do not sign up before the expiration date, you must request a new code. · Photo Rankr Access Code: [email or web address] Expires: 8/9/2018  1:03 PM 
 
4. Enter the last four digits of your Social Security Number (xxxx) and Date of Birth (mm/dd/yyyy) as indicated and click Submit. You will be taken to the next sign-up page. 5. Create a Photo Rankr ID.  This will be your Photo Rankr login ID and cannot be changed, so think of one that is secure and easy to remember. 6. Create a Adimab password. You can change your password at any time. 7. Enter your Password Reset Question and Answer. This can be used at a later time if you forget your password. 8. Enter your e-mail address. You will receive e-mail notification when new information is available in [address] 9. Click Sign Up. You can now view and download portions of your medical record. 10. Click the Download Summary menu link to download a portable copy of your medical information. If you have questions, please visit the Frequently Asked Questions section of the Adimab website. Remember, Adimab is NOT to be used for urgent needs. For medical emergencies, dial 911. Now available from your iPhone and Android! Please provide this summary of care documentation to your next provider. Your primary care clinician is listed as Bc Spaulding. If you have any questions after today's visit, please call [phone].

## 2018-06-29 ENCOUNTER — TELEPHONE (OUTPATIENT)
Dept: INTERNAL MEDICINE CLINIC | Age: 53
End: 2018-06-29

## 2018-06-29 DIAGNOSIS — M25.562 CHRONIC PAIN OF BOTH KNEES: ICD-10-CM

## 2018-06-29 DIAGNOSIS — M54.16 LUMBAR BACK PAIN WITH RADICULOPATHY AFFECTING RIGHT LOWER EXTREMITY: ICD-10-CM

## 2018-06-29 DIAGNOSIS — M25.561 CHRONIC PAIN OF BOTH KNEES: ICD-10-CM

## 2018-06-29 DIAGNOSIS — G60.8 IDIOPATHIC SMALL AND LARGE FIBER SENSORY NEUROPATHY: ICD-10-CM

## 2018-06-29 DIAGNOSIS — G89.29 OTHER CHRONIC PAIN: Primary | ICD-10-CM

## 2018-06-29 DIAGNOSIS — G89.29 CHRONIC PAIN OF BOTH KNEES: ICD-10-CM

## 2018-06-29 NOTE — TELEPHONE ENCOUNTER
Pt called in reference to saying the medical supply company said that they are waiting to receive paperwork back that they sent over for her warfarin.

## 2018-07-02 ENCOUNTER — OFFICE VISIT (OUTPATIENT)
Dept: INTERNAL MEDICINE CLINIC | Age: 53
End: 2018-07-02

## 2018-07-02 VITALS
DIASTOLIC BLOOD PRESSURE: 77 MMHG | RESPIRATION RATE: 16 BRPM | HEIGHT: 64 IN | HEART RATE: 61 BPM | SYSTOLIC BLOOD PRESSURE: 148 MMHG | WEIGHT: 255 LBS | TEMPERATURE: 98.7 F | OXYGEN SATURATION: 99 % | BODY MASS INDEX: 43.54 KG/M2

## 2018-07-02 DIAGNOSIS — J06.9 UPPER RESPIRATORY TRACT INFECTION, UNSPECIFIED TYPE: ICD-10-CM

## 2018-07-02 DIAGNOSIS — M17.12 ARTHRITIS OF LEFT KNEE: ICD-10-CM

## 2018-07-02 DIAGNOSIS — Z79.4 TYPE 2 DIABETES MELLITUS WITH DIABETIC NEUROPATHY, WITH LONG-TERM CURRENT USE OF INSULIN (HCC): Primary | ICD-10-CM

## 2018-07-02 DIAGNOSIS — I50.21 ACUTE SYSTOLIC CONGESTIVE HEART FAILURE (HCC): ICD-10-CM

## 2018-07-02 DIAGNOSIS — E11.40 TYPE 2 DIABETES MELLITUS WITH DIABETIC NEUROPATHY, WITH LONG-TERM CURRENT USE OF INSULIN (HCC): Primary | ICD-10-CM

## 2018-07-02 RX ORDER — AMOXICILLIN 500 MG/1
500 TABLET, FILM COATED ORAL 3 TIMES DAILY
Qty: 21 TAB | Refills: 0 | Status: SHIPPED | OUTPATIENT
Start: 2018-07-02 | End: 2018-07-09

## 2018-07-02 NOTE — PROGRESS NOTES
Subjective:     Alex King is a 47 y.o. female seen for follow-up of diabetes. Complains of coughing for a few days. She has had hypoglycemic attacks. .no  Blood sugar control has been good 161  She has diabetes, hypertension, hyperlipidemia and CHF. Alex King has the additional concern of knee hurting  Left one, no injury pain x few days, coughing x 4 days no fever    Diabetic Review of Systems: no hypoglycemia, has dysesthesias in the feet. Allergies   Allergen Reactions    Lortab [Hydrocodone-Acetaminophen] Nausea and Vomiting    Cymbalta [Duloxetine] Diarrhea    Lasix [Furosemide] Swelling    Lyrica [Pregabalin] Drowsiness       Diet and Lifestyle: follows a diabetic diet regularly, nonsmoker.     Patient Active Problem List    Diagnosis Date Noted    Type 2 diabetes mellitus with diabetic neuropathy, with long-term current use of insulin ([address] 36/62/5430    Acute systolic congestive heart failure ([address] 02/07/2018    Obesity, morbid ([address] 12/18/2017    Type 2 diabetes mellitus with nephropathy ([address] 12/18/2017    Spinal stenosis of lumbosacral region 12/08/2016    Lumbar back pain with radiculopathy affecting right lower extremity 12/08/2016    Idiopathic small and large fiber sensory neuropathy 11/16/2016    Narcotic dependence, episodic use ([address] 04/29/2016    Chronic pain     Hypertension 11/07/2012     Allergies   Allergen Reactions    Lortab [Hydrocodone-Acetaminophen] Nausea and Vomiting    Cymbalta [Duloxetine] Diarrhea    Lasix [Furosemide] Swelling    Lyrica [Pregabalin] Drowsiness     Past Medical History:   Diagnosis Date    Anemia     CAD (coronary artery disease)     Chronic pain     Cyst in hand 2014    [address]    Diabetes mellitus type 2 in obese ([address]     Diabetic neuropathy ([address]     Hypertension     Migraine      Social History   Substance Use Topics    Smoking status: Never Smoker    Smokeless tobacco: Never Used    Alcohol use No        Lab Results  Component Value Date/Time   Hemoglobin A1c 8.6 (H) 02/13/2018 12:00 AM   Hemoglobin A1c 7.4 (H) 02/13/2017 12:20 PM   Hemoglobin A1c 6.6 (H) 08/15/2016 02:35 PM   Glucose 222 (H) 04/27/2018 03:17 PM   Glucose (POC) 173 (H) 12/18/2015 11:17 AM   Microalb/Creat ratio (ug/mg creat.) 39.9 (H) 02/19/2018 02:29 PM   LDL, calculated 72 02/13/2018 12:00 AM   Creatinine 1.65 (H) 04/27/2018 03:17 PM      Lab Results  Component Value Date/Time   ALT (SGPT) 23 04/02/2018 02:00 PM   AST (SGOT) 13 (L) 04/02/2018 02:00 PM   Alk. phosphatase 123 (H) 04/02/2018 02:00 PM   Bilirubin, total 0.5 04/02/2018 02:00 PM   Albumin 3.5 04/02/2018 02:00 PM   Protein, total 7.8 04/02/2018 02:00 PM   INR 1.1 12/08/2015 04:42 PM   Prothrombin time 11.2 12/08/2015 04:42 PM   PLATELET 823 66/92/2979 02:27 PM       Lab Results  Component Value Date/Time   GFR est non-AA 36 (L) 04/27/2018 03:17 PM   GFR est AA 41 (L) 04/27/2018 03:17 PM   Creatinine 1.65 (H) 04/27/2018 03:17 PM   BUN 14 04/27/2018 03:17 PM   Sodium 137 04/27/2018 03:17 PM   Potassium 3.5 04/27/2018 03:17 PM   Chloride 102 04/27/2018 03:17 PM   CO2 21 04/27/2018 03:17 PM   Magnesium 1.4 (L) 11/16/2016 12:04 AM   PTH, Intact CANCELED 02/19/2018 02:29 PM     Lab Results   Component Value Date/Time    Glucose 222 (H) 04/27/2018 03:17 PM    Glucose (POC) 173 (H) 12/18/2015 11:17 AM         Review of Systems  Pertinent items are noted in HPI. Objective:     Significant for the following:     Visit Vitals    /77 (BP 1 Location: Left arm, BP Patient Position: Sitting)    Pulse 61    Temp 98.7 °F (37.1 °C) (Oral)    Resp 16    Ht 5' 4\" (1.626 m)    Wt 255 lb (115.7 kg)    LMP 12/03/2017    SpO2 99%    BMI 43.77 kg/m2     Appearance: alert, well appearing, and in no distress and overweight.   Neck no JVD  Exam: heart sounds normal rate, regular rhythm, normal S1, S2, no murmurs, rubs, clicks or gallops, chest clear  Foot exam: deferred  Both knees examined no obvious swelling or redness no effusion. Bilateral edema slight. Lab review: labs reviewed, I note that glycosylated hemoglobin mildly abnormal but acceptable. Assessment/Plan:     Follow-up diabetes stable. Diabetic issues reviewed with her: all medications, side effects and compliance discussed carefully and glycohemoglobin and other lab monitoring discussed. Not really seeing congestive heart failure, her weight seems stable. Chronic Conditions Addressed Today     1. Acute systolic congestive heart failure (HCC)     Relevant Orders     XR CHEST PA LAT    2. Type 2 diabetes mellitus with diabetic neuropathy, with long-term current use of insulin (HCC) - Primary      Acute Diagnoses Addressed Today     Arthritis of left knee        Upper respiratory tract infection, unspecified type            Discussed antibiotic usage side effects cold symptoms not better can take antibiotic. If symptoms continue get a chest x-ray. CHF seems stable she will follow-up with cardiology continue diuretics    Orders Placed This Encounter    XR CHEST PA LAT     Standing Status:   Future     Standing Expiration Date:   8/3/2019     Scheduling Instructions:      [place]     Order Specific Question:   Reason for Exam     Answer:   coughing wheezing     Order Specific Question:   Is Patient Allergic to Contrast Dye? Answer:   No    amoxicillin 500 mg tab     Sig: Take 500 mg by mouth three (3) times daily for 7 days. Dispense:  21 Tab     Refill:  0       Current Outpatient Prescriptions   Medication Sig Dispense Refill    amoxicillin 500 mg tab Take 500 mg by mouth three (3) times daily for 7 days. 21 Tab 0    diclofenac (VOLTAREN) 1 % gel Apply  to affected area four (4) times daily. 1 Each 5    insulin glargine (LANTUS,BASAGLAR) 100 unit/mL (3 mL) inpn 40 Units by SubCUTAneous route nightly.  (Patient taking differently: 45 Units by SubCUTAneous route nightly.) 10 Pen 3    insulin lispro (HUMALOG) 100 unit/mL kwikpen 12 Units by SubCUTAneous route Before breakfast, lunch, and dinner. 2 Package 3    losartan (COZAAR) 50 mg tablet Take  by mouth daily.  isosorbide dinitrate (ISORDIL) 20 mg tablet Take 20 mg by mouth two (2) times a day.  hydrALAZINE (APRESOLINE) 50 mg tablet Take 50 mg by mouth two (2) times a day.  Cholecalciferol, Vitamin D3, 50,000 unit cap Take  by mouth.  atorvastatin (LIPITOR) 40 mg tablet Take  by mouth daily.  Insulin Needles, Disposable, 31 gauge x 5/16\" ndle For flexpen #100, 4 rf daily 1 Package 5    gabapentin (NEURONTIN) 800 mg tablet Take 1 Tab by mouth three (3) times daily. 90 Tab 5    folic acid (FOLVITE) 1 mg tablet Take 1 Tab by mouth daily. 90 Tab 1    cyanocobalamin (VITAMIN B-12) 500 mcg tablet Take 1 Tab by mouth daily. 30 Tab 5    glucose blood VI test strips (ASCENSIA AUTODISC VI, ONE TOUCH ULTRA TEST VI) strip Daily verio one touch up to 3 times a day 100 Strip 5    carvedilol (COREG) 6.25 mg tablet Take 12.5 mg by mouth two (2) times a day.  polyethylene glycol (MIRALAX) 17 gram packet Take 1 Packet by mouth daily. 30 Packet 1    aspirin delayed-release 81 mg tablet Take  by mouth daily. Follow-up Disposition:  Return in about 6 weeks (around 8/13/2018) for routine follow up.

## 2018-07-02 NOTE — TELEPHONE ENCOUNTER
Patient needs a rolling walker - paperwork will need to be sent to [address] [place] [address], LPN  3/0/2284  8:20 PM

## 2018-07-02 NOTE — MR AVS SNAPSHOT
[address] [facility] 
[phone] Patient: Anitra Malik MRN: [id number] :1965 Visit Information Date & Time Provider Department Dept. Phone Encounter #  
 2018  2:45 PM Saud Laurent  Donavan Coats [id number] Follow-up Instructions Return in about 6 weeks (around 2018) for routine follow up. Upcoming Health Maintenance Date Due FOBT Q 1 YEAR AGE 50-75 2016 PAP AKA CERVICAL CYTOLOGY 9/3/2017 EYE EXAM RETINAL OR DILATED Q1 10/25/2017 Influenza Age 5 to Adult 2018 HEMOGLOBIN A1C Q6M 10/20/2018 FOOT EXAM Q1 2018 LIPID PANEL Q1 2019 MICROALBUMIN Q1 2019 BREAST CANCER SCRN MAMMOGRAM 3/22/2019 DTaP/Tdap/Td series (2 - Td) 3/13/2027 Allergies as of 2018  Review Complete On: 2018 By: Saud Laurent MD  
  
 Severity Noted Reaction Type Reactions Lortab [Hydrocodone-acetaminophen] High 2013   Intolerance Nausea and Vomiting Cymbalta [Duloxetine]  10/04/2016    Diarrhea Lasix [Furosemide]  2018    Swelling Lyrica [Pregabalin]  2017    Drowsiness Current Immunizations  Reviewed on 2016 No immunizations on file. Not reviewed this visit You Were Diagnosed With   
  
 Codes Comments Type 2 diabetes mellitus with diabetic neuropathy, with long-term current use of insulin (HCC)    -  Primary ICD-10-CM: E11.40, Z79.4 ICD-9-CM: 250.60, 357.2, V58.67 Acute systolic congestive heart failure (HCC)     ICD-10-CM: I50.21 ICD-9-CM: 428.21, 428.0 Arthritis of left knee     ICD-10-CM: M17.12 
ICD-9-CM: 716.96 Upper respiratory tract infection, unspecified type     ICD-10-CM: J06.9 ICD-9-CM: 465.9 Vitals BP Pulse Temp Resp Height(growth percentile) Weight(growth percentile) 148/77 (BP 1 Location: Left arm, BP Patient Position: Sitting) 61 98.7 °F (37.1 °C) (Oral) 16 5' 4\" (1.626 m) 255 lb (115.7 kg) LMP SpO2 BMI OB Status Smoking Status 12/03/2017 99% 43.77 kg/m2 Postmenopausal Never Smoker Vitals History BMI and BSA Data Body Mass Index Body Surface Area 43.77 kg/m 2 2.29 m 2 Preferred Pharmacy Pharmacy Name Phone [facility] [address] [facility] [address] [facility] [phone] Your Updated Medication List  
  
   
This list is accurate as of 7/2/18  3:32 PM.  Always use your most recent med list.  
  
  
  
  
 amoxicillin 500 mg Tab Take 500 mg by mouth three (3) times daily for 7 days. aspirin delayed-release 81 mg tablet Take  by mouth daily. atorvastatin 40 mg tablet Commonly known as:  LIPITOR Take  by mouth daily. carvedilol 6.25 mg tablet Commonly known as:  Conner Mckenzie Take 12.5 mg by mouth two (2) times a day. cholecalciferol 50,000 unit capsule Commonly known as:  VITAMIN D3 Take  by mouth.  
  
 cyanocobalamin 500 mcg tablet Commonly known as:  VITAMIN B-12 Take 1 Tab by mouth daily. diclofenac 1 % Gel Commonly known as:  VOLTAREN Apply  to affected area four (4) times daily. folic acid 1 mg tablet Commonly known as:  Google Take 1 Tab by mouth daily. gabapentin 800 mg tablet Commonly known as:  NEURONTIN Take 1 Tab by mouth three (3) times daily. glucose blood VI test strips strip Commonly known as:  ASCENSIA AUTODISC VI, ONE TOUCH ULTRA TEST VI  
Daily verio one touch up to 3 times a day  
  
 hydrALAZINE 50 mg tablet Commonly known as:  APRESOLINE Take 50 mg by mouth two (2) times a day. insulin glargine 100 unit/mL (3 mL) Inpn Commonly known as:  LANTUSBASAGLAR  
40 Units by SubCUTAneous route nightly. insulin lispro 100 unit/mL kwikpen Commonly known as:  HUMALOG 12 Units by SubCUTAneous route Before breakfast, lunch, and dinner. Insulin Needles (Disposable) 31 gauge x 5/16\" Ndle For flexpen #100, 4 rf daily  
  
 isosorbide dinitrate 20 mg tablet Commonly known as:  ISORDIL Take 20 mg by mouth two (2) times a day. losartan 50 mg tablet Commonly known as:  COZAAR Take  by mouth daily. polyethylene glycol 17 gram packet Commonly known as:  Keshia Fail Take 1 Packet by mouth daily. Prescriptions Sent to Pharmacy Refills  
 amoxicillin 500 mg tab 0 Sig: Take 500 mg by mouth three (3) times daily for 7 days. Class: Normal  
 Pharmacy: [address] [facility], [address] [facility] #: [phone] Route: Oral  
  
Follow-up Instructions Return in about 6 weeks (around 8/13/2018) for routine follow up. To-Do List   
 07/02/2018 Imaging:  XR CHEST PA LAT Introducing [facility]! Braxton Jerry introduces WHOOP patient portal. Now you can access parts of your medical record, email your doctor's office, and request medication refills online. 1. In your internet browser, go to [email or web address] 2. Click on the First Time User? Click Here link in the Sign In box. You will see the New Member Sign Up page. 3. Enter your WHOOP Access Code exactly as it appears below. You will not need to use this code after youve completed the sign-up process. If you do not sign up before the expiration date, you must request a new code. · WHOOP Access Code: [email or web address] Expires: 8/9/2018  1:03 PM 
 
4. Enter the last four digits of your Social Security Number (xxxx) and Date of Birth (mm/dd/yyyy) as indicated and click Submit. You will be taken to the next sign-up page. 5. Create a WHOOP ID. This will be your WHOOP login ID and cannot be changed, so think of one that is secure and easy to remember. 6. Create a UYA100 password. You can change your password at any time. 7. Enter your Password Reset Question and Answer. This can be used at a later time if you forget your password. 8. Enter your e-mail address. You will receive e-mail notification when new information is available in [address] 9. Click Sign Up. You can now view and download portions of your medical record. 10. Click the Download Summary menu link to download a portable copy of your medical information. If you have questions, please visit the Frequently Asked Questions section of the UYA100 website. Remember, UYA100 is NOT to be used for urgent needs. For medical emergencies, dial 911. Now available from your iPhone and Android! Please provide this summary of care documentation to your next provider. Your primary care clinician is listed as Elizabeth Juan. If you have any questions after today's visit, please call [phone].

## 2018-07-03 NOTE — TELEPHONE ENCOUNTER
Order for rolling walker sent to Dr Ziyad Rg for signature - then will fax to [facility] with demographics  Yudith Acosta LPN  0/2/3563  0:76 AM

## 2018-07-13 ENCOUNTER — OFFICE VISIT (OUTPATIENT)
Dept: INTERNAL MEDICINE CLINIC | Age: 53
End: 2018-07-13

## 2018-07-13 VITALS
OXYGEN SATURATION: 95 % | RESPIRATION RATE: 20 BRPM | SYSTOLIC BLOOD PRESSURE: 125 MMHG | BODY MASS INDEX: 44.76 KG/M2 | WEIGHT: 262.2 LBS | DIASTOLIC BLOOD PRESSURE: 99 MMHG | TEMPERATURE: 98.3 F | HEART RATE: 64 BPM | HEIGHT: 64 IN

## 2018-07-13 DIAGNOSIS — M54.50 ACUTE BILATERAL LOW BACK PAIN WITHOUT SCIATICA: Primary | ICD-10-CM

## 2018-07-13 DIAGNOSIS — E11.40 TYPE 2 DIABETES MELLITUS WITH DIABETIC NEUROPATHY, WITH LONG-TERM CURRENT USE OF INSULIN (HCC): ICD-10-CM

## 2018-07-13 DIAGNOSIS — Z79.4 TYPE 2 DIABETES MELLITUS WITH DIABETIC NEUROPATHY, WITH LONG-TERM CURRENT USE OF INSULIN (HCC): ICD-10-CM

## 2018-07-13 DIAGNOSIS — F11.20 NARCOTIC DEPENDENCE, EPISODIC USE (HCC): ICD-10-CM

## 2018-07-13 DIAGNOSIS — I50.21 ACUTE SYSTOLIC CONGESTIVE HEART FAILURE (HCC): ICD-10-CM

## 2018-07-13 RX ORDER — OXYCODONE AND ACETAMINOPHEN 5; 325 MG/1; MG/1
1 TABLET ORAL
Qty: 6 TAB | Refills: 0 | Status: SHIPPED | OUTPATIENT
Start: 2018-07-13 | End: 2018-07-25 | Stop reason: ALTCHOICE

## 2018-07-13 RX ORDER — INSULIN GLARGINE 100 [IU]/ML
40 INJECTION, SOLUTION SUBCUTANEOUS
Qty: 10 PEN | Refills: 3
Start: 2018-07-13 | End: 2019-05-29 | Stop reason: ALTCHOICE

## 2018-07-13 NOTE — MR AVS SNAPSHOT
[address] 3968 [facility] 
[phone] Patient: Nichelle Hernandez MRN: [id number] :1965 Visit Information Date & Time Provider Department Dept. Phone Encounter #  
 2018  1:45 PM MD [address] [id number] Follow-up Instructions Return if symptoms worsen or fail to improve. Your Appointments 2018  1:45 PM  
ACUTE CARE with Diamond Feng MD  
[facility] Zaynab Elizabeth) Appt Note: f/u on dm $0 cp lsh 18  
 [address] [place] [address]. [address] [place] [id number]  
[phone]  
  
   
 [address] [place] [address] [address] Upcoming Health Maintenance Date Due FOBT Q 1 YEAR AGE 50-75 2016 PAP AKA CERVICAL CYTOLOGY 9/3/2017 EYE EXAM RETINAL OR DILATED Q1 10/25/2017 Influenza Age 5 to Adult 2018 HEMOGLOBIN A1C Q6M 10/20/2018 FOOT EXAM Q1 2018 LIPID PANEL Q1 2019 MICROALBUMIN Q1 2019 BREAST CANCER SCRN MAMMOGRAM 3/22/2019 DTaP/Tdap/Td series (2 - Td) 3/13/2027 Allergies as of 2018  Review Complete On: 2018 By: Diamond Feng MD  
  
 Severity Noted Reaction Type Reactions Lortab [Hydrocodone-acetaminophen] High 2013   Intolerance Nausea and Vomiting Cymbalta [Duloxetine]  10/04/2016    Diarrhea Lasix [Furosemide]  2018    Swelling Lyrica [Pregabalin]  2017    Drowsiness Current Immunizations  Reviewed on 2016 No immunizations on file. Not reviewed this visit You Were Diagnosed With   
  
 Codes Comments Acute bilateral low back pain without sciatica    -  Primary ICD-10-CM: M54.5 ICD-9-CM: 724.2, 338.19 Type 2 diabetes mellitus with diabetic neuropathy, with long-term current use of insulin (HCC)     ICD-10-CM: E11.40, Z79.4 ICD-9-CM: 250.60, 357.2, V58.67   
 Narcotic dependence, episodic use ([facility]     ICD-10-CM: C83.56 ICD-9-CM: 304.92 Acute systolic congestive heart failure (HCC)     ICD-10-CM: I50.21 ICD-9-CM: 428.21, 428.0 Vitals BP Pulse Temp Resp Height(growth percentile) Weight(growth percentile) (!) 125/99 (BP 1 Location: Right arm, BP Patient Position: Sitting) 64 98.3 °F (36.8 °C) (Oral) 20 5' 4\" (1.626 m) 262 lb 3.2 oz (118.9 kg) LMP SpO2 BMI OB Status Smoking Status 12/03/2017 95% 45.01 kg/m2 Postmenopausal Never Smoker Vitals History BMI and BSA Data Body Mass Index Body Surface Area 45.01 kg/m 2 2.32 m 2 Preferred Pharmacy Pharmacy Name Phone [facility] [address] [facility] [phone] Your Updated Medication List  
  
   
This list is accurate as of 7/13/18  2:28 PM.  Always use your most recent med list.  
  
  
  
  
 aspirin delayed-release 81 mg tablet Take  by mouth daily. atorvastatin 40 mg tablet Commonly known as:  LIPITOR Take  by mouth daily. carvedilol 6.25 mg tablet Commonly known as:  Gillermina Begun Take 12.5 mg by mouth two (2) times a day. cholecalciferol 50,000 unit capsule Commonly known as:  VITAMIN D3 Take  by mouth.  
  
 cyanocobalamin 500 mcg tablet Commonly known as:  VITAMIN B-12 Take 1 Tab by mouth daily. diclofenac 1 % Gel Commonly known as:  VOLTAREN Apply  to affected area four (4) times daily. folic acid 1 mg tablet Commonly known as:  Maryella Kelch Take 1 Tab by mouth daily. gabapentin 800 mg tablet Commonly known as:  NEURONTIN Take 1 Tab by mouth three (3) times daily. glucose blood VI test strips strip Commonly known as:  ASCENSIA AUTODISC VI, ONE TOUCH ULTRA TEST VI  
Daily verio one touch up to 3 times a day  
  
 hydrALAZINE 50 mg tablet Commonly known as:  APRESOLINE Take 50 mg by mouth two (2) times a day. insulin glargine 100 unit/mL (3 mL) Inpn Commonly known as:  LANTUS,BASAGLAR  
40 Units by SubCUTAneous route nightly. insulin lispro 100 unit/mL kwikpen Commonly known as:  HUMALOG  
12 Units by SubCUTAneous route Before breakfast, lunch, and dinner. Insulin Needles (Disposable) 31 gauge x 5/16\" Ndle For flexpen #100, 4 rf daily  
  
 isosorbide dinitrate 20 mg tablet Commonly known as:  ISORDIL Take 20 mg by mouth two (2) times a day. losartan 50 mg tablet Commonly known as:  COZAAR Take  by mouth daily. oxyCODONE-acetaminophen 5-325 mg per tablet Commonly known as:  PERCOCET Take 1 Tab by mouth every eight (8) hours as needed for Pain. Max Daily Amount: 3 Tabs. polyethylene glycol 17 gram packet Commonly known as:  Yaima  Take 1 Packet by mouth daily. Prescriptions Printed Refills  
 oxyCODONE-acetaminophen (PERCOCET) 5-325 mg per tablet 0 Sig: Take 1 Tab by mouth every eight (8) hours as needed for Pain. Max Daily Amount: 3 Tabs. Class: Print Route: Oral  
  
Follow-up Instructions Return if symptoms worsen or fail to improve. Introducing [facility]! Samantha Decker introduces Parature patient portal. Now you can access parts of your medical record, email your doctor's office, and request medication refills online. 1. In your internet browser, go to [email or web address] 2. Click on the First Time User? Click Here link in the Sign In box. You will see the New Member Sign Up page. 3. Enter your Parature Access Code exactly as it appears below. You will not need to use this code after youve completed the sign-up process. If you do not sign up before the expiration date, you must request a new code. · Parature Access Code: [email or web address] Expires: 8/9/2018  1:03 PM 
 
4. Enter the last four digits of your Social Security Number (xxxx) and Date of Birth (mm/dd/yyyy) as indicated and click Submit.  You will be taken to the next sign-up page. 5. Create a Certeon ID. This will be your Certeon login ID and cannot be changed, so think of one that is secure and easy to remember. 6. Create a Certeon password. You can change your password at any time. 7. Enter your Password Reset Question and Answer. This can be used at a later time if you forget your password. 8. Enter your e-mail address. You will receive e-mail notification when new information is available in [address] 9. Click Sign Up. You can now view and download portions of your medical record. 10. Click the Download Summary menu link to download a portable copy of your medical information. If you have questions, please visit the Frequently Asked Questions section of the Certeon website. Remember, Certeon is NOT to be used for urgent needs. For medical emergencies, dial 911. Now available from your iPhone and Android! Please provide this summary of care documentation to your next provider. Your primary care clinician is listed as Kadi Frank. If you have any questions after today's visit, please call [phone].

## 2018-07-13 NOTE — PROGRESS NOTES
HISTORY OF PRESENT ILLNESS  Sudheer Esquivel is a 47 y.o. female. LOW BACK PAIN   The history is provided by the patient. This is a new problem. Episode onset: 3 days. The problem occurs hourly. The problem has not changed since onset. Associated symptoms include shortness of breath. Pertinent negatives include no chest pain. She has tried acetaminophen for the symptoms. The treatment provided no relief. Knee Pain   The current episode started more than 1 week ago. The problem occurs constantly. Associated symptoms include shortness of breath. Pertinent negatives include no chest pain. Patient Active Problem List   Diagnosis Code    Hypertension I10    Chronic pain G89.29    Narcotic dependence, episodic use ([facility]) F11.20    Idiopathic small and large fiber sensory neuropathy G60.8    Spinal stenosis of lumbosacral region M48.07    Lumbar back pain with radiculopathy affecting right lower extremity M54.17    Obesity, morbid ([facility]) E66.01    Type 2 diabetes mellitus with nephropathy ([facility]) N15.36    Acute systolic congestive heart failure ([facility]) I50.21    Type 2 diabetes mellitus with diabetic neuropathy, with long-term current use of insulin ([facility]) E11.40, Z79.4       Review of Systems   Respiratory: Positive for shortness of breath. Cardiovascular: Negative for chest pain. Gastrointestinal:        No incontinence   Genitourinary: Negative for dysuria. Physical Exam  Visit Vitals    BP (!) 125/99 (BP 1 Location: Right arm, BP Patient Position: Sitting)    Pulse 64    Temp 98.3 °F (36.8 °C) (Oral)    Resp 20    Ht 5' 4\" (1.626 m)    Wt 262 lb 3.2 oz (118.9 kg)    LMP 12/03/2017    SpO2 95%    BMI 45.01 kg/m2     WD WN female NAD  Heart RRR without murmers clicks or rubs  Lungs CTA  Abdo soft nontender  Ext no edema  Back was examined, grossly normal, no lesions or rash. Saddle area, sensation present. Patellar reflexes 2+ equal bilaterally.   Lower leg strength 5 out of 5 bilateral.  Straight leg raises eng  Knee grossly nl Nl korey no effusion  ASSESSMENT and PLAN  Encounter Diagnoses   Name Primary?  Acute bilateral low back pain without sciatica Yes    Type 2 diabetes mellitus with diabetic neuropathy, with long-term current use of insulin (HCC)     Narcotic dependence, episodic use (HCC)     Acute systolic congestive heart failure ([address]      Orders Placed This Encounter    oxyCODONE-acetaminophen (PERCOCET) 5-325 mg per tablet    insulin glargine (LANTUS,BASAGLAR) 100 unit/mL (3 mL) inpn     Discussed the nature of back pain. Discussed how this is in general a 'red flag' diagnosis and the general limited and temporary nature of this problem as well as its re-occurrence. Discussed further work-up and treatment options. Discused narcotics and back pain: yes, limited role of narcotics will not be refilled beyond the 6 tablets given her. Knee arthritis continue Voltaren gel. If no better can come in for steroid shot. CHF stable no change in medications, diabetes continue Lantus.   Follow-up as scheduled

## 2018-07-13 NOTE — PROGRESS NOTES
Chief Complaint   Patient presents with    LOW BACK PAIN     X 3 DAYS; DENIES INJURY    Knee Pain     X 3 DAYS; DENIES INJURY     1. Have you been to the ER, urgent care clinic since your last visit? Hospitalized since your last visit? No    2. Have you seen or consulted any other health care providers outside of the [address] [place] [address] since your last visit? Include any pap smears or colon screening. No     Health Maintenance Due   Topic Date Due    FOBT Q 1 YEAR AGE 50-75  12/28/2016    PAP AKA CERVICAL CYTOLOGY  09/03/2017    EYE EXAM RETINAL OR DILATED Q1  10/25/2017     Pap smear (Aug 2017--[facility])  Eye exam Dr. Juel Cheadle October 2018. Colonoscopy performed at [facility]    Do you have an [address] in place in the event that you have a healthcare crisis that could impact your decision making as it pertains to your health? NO    Would you like information about Advance Care Planning? NO    Information given.  NO

## 2018-07-25 ENCOUNTER — OFFICE VISIT (OUTPATIENT)
Dept: INTERNAL MEDICINE CLINIC | Age: 53
End: 2018-07-25

## 2018-07-25 VITALS
WEIGHT: 265 LBS | OXYGEN SATURATION: 97 % | RESPIRATION RATE: 20 BRPM | DIASTOLIC BLOOD PRESSURE: 77 MMHG | HEIGHT: 64 IN | BODY MASS INDEX: 45.24 KG/M2 | HEART RATE: 89 BPM | TEMPERATURE: 98.7 F | SYSTOLIC BLOOD PRESSURE: 126 MMHG

## 2018-07-25 DIAGNOSIS — F11.20 NARCOTIC DEPENDENCE, EPISODIC USE (HCC): ICD-10-CM

## 2018-07-25 DIAGNOSIS — I50.21 ACUTE SYSTOLIC CONGESTIVE HEART FAILURE (HCC): ICD-10-CM

## 2018-07-25 DIAGNOSIS — E11.21 TYPE 2 DIABETES MELLITUS WITH NEPHROPATHY (HCC): ICD-10-CM

## 2018-07-25 DIAGNOSIS — S49.91XA INJURY OF RIGHT SHOULDER, INITIAL ENCOUNTER: Primary | ICD-10-CM

## 2018-07-25 RX ORDER — OXYCODONE AND ACETAMINOPHEN 5; 325 MG/1; MG/1
1 TABLET ORAL
Qty: 9 TAB | Refills: 0 | Status: SHIPPED | OUTPATIENT
Start: 2018-07-25 | End: 2018-08-02 | Stop reason: ALTCHOICE

## 2018-07-25 RX ORDER — TORSEMIDE 10 MG/1
TABLET ORAL DAILY
COMMUNITY
End: 2018-07-25 | Stop reason: SDUPTHER

## 2018-07-25 RX ORDER — TORSEMIDE 10 MG/1
TABLET ORAL
Qty: 90 TAB | Refills: 1 | Status: SHIPPED | OUTPATIENT
Start: 2018-07-25 | End: 2019-07-01 | Stop reason: ALTCHOICE

## 2018-07-25 NOTE — PROGRESS NOTES
Chief Complaint   Patient presents with    Shoulder Pain     X 1 DAY; FELL; RIGHT SHOULDER     1. Have you been to the ER, urgent care clinic since your last visit? Hospitalized since your last visit? No    2. Have you seen or consulted any other health care providers outside of the [address] [place] [address] since your last visit? Include any pap smears or colon screening.  No     Health Maintenance Due   Topic Date Due    FOBT Q 1 YEAR AGE 50-75  12/28/2016    EYE EXAM RETINAL OR DILATED Q1  10/25/2017

## 2018-07-25 NOTE — PROGRESS NOTES
HISTORY OF PRESENT ILLNESS  Vidal Barros is a 47 y.o. female. Shoulder Pain    The history is provided by the patient. The incident occurred yesterday. The incident occurred at home. The injury mechanism was a fall. The right shoulder is affected. The pain is severe. The pain does not radiate. There is no history of shoulder injury. There is no history of shoulder surgery. She reports no foreign bodies present. States her blood sugars are okay 100s-200s. No hypoglycemia last A1c was 9.4. Current Outpatient Prescriptions   Medication Sig Dispense Refill    torsemide (DEMADEX) 10 mg tablet Take  by mouth daily.  insulin glargine (LANTUS,BASAGLAR) 100 unit/mL (3 mL) inpn 40 Units by SubCUTAneous route nightly. 10 Pen 3    diclofenac (VOLTAREN) 1 % gel Apply  to affected area four (4) times daily. 1 Each 5    insulin lispro (HUMALOG) 100 unit/mL kwikpen 12 Units by SubCUTAneous route Before breakfast, lunch, and dinner. 2 Package 3    losartan (COZAAR) 50 mg tablet Take  by mouth daily.  isosorbide dinitrate (ISORDIL) 20 mg tablet Take 20 mg by mouth two (2) times a day.  hydrALAZINE (APRESOLINE) 50 mg tablet Take 50 mg by mouth two (2) times a day.  Cholecalciferol, Vitamin D3, 50,000 unit cap Take  by mouth.  atorvastatin (LIPITOR) 40 mg tablet Take  by mouth daily.  Insulin Needles, Disposable, 31 gauge x 5/16\" ndle For flexpen #100, 4 rf daily 1 Package 5    gabapentin (NEURONTIN) 800 mg tablet Take 1 Tab by mouth three (3) times daily. 90 Tab 5    cyanocobalamin (VITAMIN B-12) 500 mcg tablet Take 1 Tab by mouth daily. 30 Tab 5    glucose blood VI test strips (ASCENSIA AUTODISC VI, ONE TOUCH ULTRA TEST VI) strip Daily verio one touch up to 3 times a day 100 Strip 5    carvedilol (COREG) 6.25 mg tablet Take 12.5 mg by mouth two (2) times a day.  aspirin delayed-release 81 mg tablet Take  by mouth daily.        Allergies   Allergen Reactions    Lortab [Hydrocodone-Acetaminophen] Nausea and Vomiting    Cymbalta [Duloxetine] Diarrhea    Lasix [Furosemide] Swelling    Lyrica [Pregabalin] Drowsiness       ROS  Reports taking blood pressure medications without side affects. No complaints of exertional chest pain, the usual shortness of breath no  focal weakness. Minimal swelling in lower legs or dizziness with standing. Physical Exam  Visit Vitals    /77 (BP 1 Location: Left arm, BP Patient Position: Sitting)    Pulse 89    Temp 98.7 °F (37.1 °C) (Oral)    Resp 20    Ht 5' 4\" (1.626 m)    Wt 265 lb (120.2 kg)    LMP 12/03/2017    SpO2 97%    BMI 45.49 kg/m2     WD WN female NAD weight is up 3 pounds from previous visit, right shoulder is tender to palpation tender with testing range of motion  Heart RRR without murmers clicks or rubs  Lungs CTA  Abdo soft nontender  Ext sl edema    ASSESSMENT and PLAN  Encounter Diagnoses   Name Primary?  Injury of right shoulder, initial encounter Yes    Acute systolic congestive heart failure (HCC)     Type 2 diabetes mellitus with nephropathy (HCC)     Narcotic dependence, episodic use ([facility] [address]      Orders Placed This Encounter    XR SHOULDER RT AP/LAT MIN 2 V    DISCONTD: torsemide (DEMADEX) 10 mg tablet    torsemide (DEMADEX) 10 mg tablet    oxyCODONE-acetaminophen (PERCOCET) 5-325 mg per tablet       We discussed this pain and the usage of controlled substances for shoulder pain relief. In general these medications are indicated for the acute symptom relief and not for chronic usage, allthough they are frequently used for chronic management  After a certain period of time they should be stopped to avoid dependence and/or addiction. I warned her about the dangers of addiction and other side affects including respiratory depression and death. Discussed how this is a controlled substance and that the prescribing of it is should be monitored very carefully.  Drug usage is also monitored by our practise and the [facility], since there has been a lot of abuse and diversion of controlled substances. In general we do not refill this medication over the phone. PLease ask for enough pills to get you to your next appointment and make sure you keep your appointments. Warned not to take other medications like alcohol, other benzodiazapines marijuana or other narcotics when on this medication. 1 narcotic only for this injury. I think her congestive heart failure is getting a little worse. Increase her diuretic for a few days. She is getting an MRI of her heart coming up pretty soon. Follow-up Disposition:  Return in about 3 weeks (around 8/15/2018) for routine follow up.

## 2018-07-25 NOTE — MR AVS SNAPSHOT
[address] [address]26 [facility] 
[phone] Patient: Kevin Zaldivar MRN: [id number] :1965 Visit Information Date & Time Provider Department Dept. Phone Encounter #  
 2018  1:30 PM MD [address] [id number] Follow-up Instructions Return in about 3 weeks (around 8/15/2018) for routine follow up. Your Appointments 2018  1:45 PM  
ACUTE CARE with Mihir Kaur MD  
[facility]) Appt Note: f/u on dm $0 cp lsh 18  
 [address] [place] [address]. [address] [place] [id number]  
[phone]  
  
   
 [address] [place] [address] [address] Upcoming Health Maintenance Date Due FOBT Q 1 YEAR AGE 50-75 2016 EYE EXAM RETINAL OR DILATED Q1 10/25/2017 Influenza Age 5 to Adult 2018 HEMOGLOBIN A1C Q6M 10/20/2018 FOOT EXAM Q1 2018 LIPID PANEL Q1 2019 MICROALBUMIN Q1 2019 BREAST CANCER SCRN MAMMOGRAM 3/22/2019 PAP AKA CERVICAL CYTOLOGY 2020 DTaP/Tdap/Td series (2 - Td) 3/13/2027 Allergies as of 2018  Review Complete On: 2018 By: Mihir Kaur MD  
  
 Severity Noted Reaction Type Reactions Lortab [Hydrocodone-acetaminophen] High 2013   Intolerance Nausea and Vomiting Cymbalta [Duloxetine]  10/04/2016    Diarrhea Lasix [Furosemide]  2018    Swelling Lyrica [Pregabalin]  2017    Drowsiness Current Immunizations  Reviewed on 2016 No immunizations on file. Not reviewed this visit You Were Diagnosed With   
  
 Codes Comments Injury of right shoulder, initial encounter    -  Primary ICD-10-CM: U36.61YY ICD-9-CM: 959.2 Acute systolic congestive heart failure (HCC)     ICD-10-CM: I50.21 ICD-9-CM: 428.21, 428.0 Type 2 diabetes mellitus with nephropathy (HCC)     ICD-10-CM: E11.21 
ICD-9-CM: 250.40, 583.81 Narcotic dependence, episodic use ([facility] [address]     ICD-10-CM: R77.11 ICD-9-CM: 304.92 Vitals BP Pulse Temp Resp Height(growth percentile) Weight(growth percentile) 126/77 (BP 1 Location: Left arm, BP Patient Position: Sitting) 89 98.7 °F (37.1 °C) (Oral) 20 5' 4\" (1.626 m) 265 lb (120.2 kg) LMP SpO2 BMI OB Status Smoking Status 12/03/2017 97% 45.49 kg/m2 Postmenopausal Never Smoker Vitals History BMI and BSA Data Body Mass Index Body Surface Area 45.49 kg/m 2 2.33 m 2 Preferred Pharmacy Pharmacy Name Phone [facility] [address] [facility] [address] [facility] [phone] Your Updated Medication List  
  
   
This list is accurate as of 7/25/18  2:14 PM.  Always use your most recent med list.  
  
  
  
  
 aspirin delayed-release 81 mg tablet Take  by mouth daily. atorvastatin 40 mg tablet Commonly known as:  LIPITOR Take  by mouth daily. carvedilol 6.25 mg tablet Commonly known as:  Monda Lovings Take 12.5 mg by mouth two (2) times a day. cholecalciferol 50,000 unit capsule Commonly known as:  VITAMIN D3 Take  by mouth.  
  
 cyanocobalamin 500 mcg tablet Commonly known as:  VITAMIN B-12 Take 1 Tab by mouth daily. diclofenac 1 % Gel Commonly known as:  VOLTAREN Apply  to affected area four (4) times daily. gabapentin 800 mg tablet Commonly known as:  NEURONTIN Take 1 Tab by mouth three (3) times daily. glucose blood VI test strips strip Commonly known as:  ASCENSIA AUTODISC VI, ONE TOUCH ULTRA TEST VI  
Daily verio one touch up to 3 times a day  
  
 hydrALAZINE 50 mg tablet Commonly known as:  APRESOLINE Take 50 mg by mouth two (2) times a day. insulin glargine 100 unit/mL (3 mL) Inpn Commonly known as:  LANTUSBASAGLAR  
40 Units by SubCUTAneous route nightly. insulin lispro 100 unit/mL kwikpen Commonly known as:  HUMALOG  
12 Units by SubCUTAneous route Before breakfast, lunch, and dinner. Insulin Needles (Disposable) 31 gauge x 5/16\" Ndle For flexpen #100, 4 rf daily  
  
 isosorbide dinitrate 20 mg tablet Commonly known as:  ISORDIL Take 20 mg by mouth two (2) times a day. losartan 50 mg tablet Commonly known as:  COZAAR Take  by mouth daily. oxyCODONE-acetaminophen 5-325 mg per tablet Commonly known as:  PERCOCET Take 1 Tab by mouth every eight (8) hours as needed for Pain. Max Daily Amount: 3 Tabs. torsemide 10 mg tablet Commonly known as:  DEMADEX  
2 per day for 3 to 4 days then 1 daily Prescriptions Printed Refills  
 oxyCODONE-acetaminophen (PERCOCET) 5-325 mg per tablet 0 Sig: Take 1 Tab by mouth every eight (8) hours as needed for Pain. Max Daily Amount: 3 Tabs. Class: Print Route: Oral  
  
Prescriptions Sent to Pharmacy Refills  
 torsemide (DEMADEX) 10 mg tablet 1 Si per day for 3 to 4 days then 1 daily Class: Normal  
 Pharmacy: [address]  [facility], [address] [facility] #: [phone] Follow-up Instructions Return in about 3 weeks (around 8/15/2018) for routine follow up. To-Do List   
 2018 Imaging:  XR SHOULDER RT AP/LAT MIN 2 V Introducing [facility] & HEALTH SERVICES! Jodee Meeks introduces [employer] patient portal. Now you can access parts of your medical record, email your doctor's office, and request medication refills online. 1. In your internet browser, go to [email or web address] 2. Click on the First Time User? Click Here link in the Sign In box. You will see the New Member Sign Up page. 3. Enter your [employer] Access Code exactly as it appears below. You will not need to use this code after youve completed the sign-up process.  If you do not sign up before the expiration date, you must request a new code. · [employer] Access Code: [email or web address] Expires: 8/9/2018  1:03 PM 
 
4. Enter the last four digits of your Social Security Number (xxxx) and Date of Birth (mm/dd/yyyy) as indicated and click Submit. You will be taken to the next sign-up page. 5. Create a [employer] ID. This will be your [employer] login ID and cannot be changed, so think of one that is secure and easy to remember. 6. Create a [employer] password. You can change your password at any time. 7. Enter your Password Reset Question and Answer. This can be used at a later time if you forget your password. 8. Enter your e-mail address. You will receive e-mail notification when new information is available in [address] 9. Click Sign Up. You can now view and download portions of your medical record. 10. Click the Download Summary menu link to download a portable copy of your medical information. If you have questions, please visit the Frequently Asked Questions section of the [employer] website. Remember, [employer] is NOT to be used for urgent needs. For medical emergencies, dial 911. Now available from your iPhone and Android! Please provide this summary of care documentation to your next provider. Your primary care clinician is listed as Antonella Velásquez. If you have any questions after today's visit, please call [phone].

## 2018-08-02 ENCOUNTER — OFFICE VISIT (OUTPATIENT)
Dept: INTERNAL MEDICINE CLINIC | Age: 53
End: 2018-08-02

## 2018-08-02 VITALS
HEIGHT: 64 IN | WEIGHT: 257 LBS | TEMPERATURE: 98.3 F | OXYGEN SATURATION: 99 % | RESPIRATION RATE: 20 BRPM | HEART RATE: 102 BPM | DIASTOLIC BLOOD PRESSURE: 95 MMHG | SYSTOLIC BLOOD PRESSURE: 142 MMHG | BODY MASS INDEX: 43.87 KG/M2

## 2018-08-02 DIAGNOSIS — E11.21 TYPE 2 DIABETES MELLITUS WITH NEPHROPATHY (HCC): ICD-10-CM

## 2018-08-02 DIAGNOSIS — M25.511 ACUTE PAIN OF RIGHT SHOULDER: Primary | ICD-10-CM

## 2018-08-02 DIAGNOSIS — G62.9 NEUROPATHY: ICD-10-CM

## 2018-08-02 RX ORDER — TRAMADOL HYDROCHLORIDE 50 MG/1
50 TABLET ORAL
Qty: 30 TAB | Refills: 0 | Status: SHIPPED | OUTPATIENT
Start: 2018-08-02 | End: 2019-01-14 | Stop reason: ALTCHOICE

## 2018-08-02 NOTE — MR AVS SNAPSHOT
[address] 3546 [facility] 
[phone] Patient: Suzi Finch MRN: [id number] :1965 Visit Information Date & Time Provider Department Dept. Phone Encounter #  
 2018 10:30 AM Vincenzo Currie MD [facility] [phone] [id number] Follow-up Instructions Return in about 6 weeks (around 2018) for routine follow up. Upcoming Health Maintenance Date Due FOBT Q 1 YEAR AGE 50-75 2016 EYE EXAM RETINAL OR DILATED Q1 10/25/2017 Influenza Age 5 to Adult 2018 HEMOGLOBIN A1C Q6M 10/20/2018 FOOT EXAM Q1 2018 LIPID PANEL Q1 2019 MICROALBUMIN Q1 2019 BREAST CANCER SCRN MAMMOGRAM 3/22/2019 PAP AKA CERVICAL CYTOLOGY 2020 DTaP/Tdap/Td series (2 - Td) 3/13/2027 Allergies as of 2018  Review Complete On: 2018 By: Vincenzo Currie MD  
  
 Severity Noted Reaction Type Reactions Lortab [Hydrocodone-acetaminophen] High 2013   Intolerance Nausea and Vomiting Cymbalta [Duloxetine]  10/04/2016    Diarrhea Lasix [Furosemide]  2018    Swelling Lyrica [Pregabalin]  2017    Drowsiness Current Immunizations  Reviewed on 2016 No immunizations on file. Not reviewed this visit You Were Diagnosed With   
  
 Codes Comments Acute pain of right shoulder    -  Primary ICD-10-CM: M25.511 ICD-9-CM: 719.41 Neuropathy     ICD-10-CM: G62.9 ICD-9-CM: 355.9 Type 2 diabetes mellitus with nephropathy (HCC)     ICD-10-CM: E11.21 
ICD-9-CM: 250.40, 583.81 Vitals BP Pulse Temp Resp Height(growth percentile) Weight(growth percentile) (!) 142/95 (BP 1 Location: Left arm, BP Patient Position: At rest) (!) 102 98.3 °F (36.8 °C) (Oral) 20 5' 4\" (1.626 m) 257 lb (116.6 kg) LMP SpO2 BMI OB Status Smoking Status 2017 99% 44.11 kg/m2 Postmenopausal Never Smoker BMI and BSA Data Body Mass Index Body Surface Area  
 44.11 kg/m 2 2.29 m 2 Preferred Pharmacy Pharmacy Name Phone [facility] [address] [facility] [phone] Your Updated Medication List  
  
   
This list is accurate as of 8/2/18 11:01 AM.  Always use your most recent med list.  
  
  
  
  
 aspirin delayed-release 81 mg tablet Take  by mouth daily. atorvastatin 40 mg tablet Commonly known as:  LIPITOR Take  by mouth daily. carvedilol 6.25 mg tablet Commonly known as:  Sharolyn Pisano Take 12.5 mg by mouth two (2) times a day. cholecalciferol 50,000 unit capsule Commonly known as:  VITAMIN D3 Take  by mouth.  
  
 cyanocobalamin 500 mcg tablet Commonly known as:  VITAMIN B-12 Take 1 Tab by mouth daily. diclofenac 1 % Gel Commonly known as:  VOLTAREN Apply  to affected area four (4) times daily. gabapentin 800 mg tablet Commonly known as:  NEURONTIN Take 1 Tab by mouth three (3) times daily. glucose blood VI test strips strip Commonly known as:  ASCENSIA AUTODISC VI, ONE TOUCH ULTRA TEST VI  
Daily verio one touch up to 3 times a day  
  
 hydrALAZINE 50 mg tablet Commonly known as:  APRESOLINE Take 50 mg by mouth two (2) times a day. insulin glargine 100 unit/mL (3 mL) Inpn Commonly known as:  LANTUS,BASAGLAR  
40 Units by SubCUTAneous route nightly. insulin lispro 100 unit/mL kwikpen Commonly known as:  HUMALOG  
12 Units by SubCUTAneous route Before breakfast, lunch, and dinner. Insulin Needles (Disposable) 31 gauge x 5/16\" Ndle For flexpen #100, 4 rf daily  
  
 isosorbide dinitrate 20 mg tablet Commonly known as:  ISORDIL Take 20 mg by mouth two (2) times a day. losartan 50 mg tablet Commonly known as:  COZAAR Take  by mouth daily. torsemide 10 mg tablet Commonly known as:  DEMADEX  
2 per day for 3 to 4 days then 1 daily traMADol 50 mg tablet Commonly known as:  ULTRAM  
Take 1 Tab by mouth every six (6) hours as needed for Pain. Max Daily Amount: 200 mg. Prescriptions Printed Refills  
 traMADol (ULTRAM) 50 mg tablet 0 Sig: Take 1 Tab by mouth every six (6) hours as needed for Pain. Max Daily Amount: 200 mg. Class: Print Route: Oral  
  
We Performed the Following AMB SUPPLY ORDER [[id number] Custom] Comments:  
 TENS unit REFERRAL TO ORTHOPEDICS [QID028 Custom] Comments:  
 Right shoulder pain since fall in late July Follow-up Instructions Return in about 6 weeks (around 9/13/2018) for routine follow up. To-Do List   
 08/02/2018 Imaging:  XR SHOULDER RT AP/LAT MIN 2 V Referral Information Referral ID Referred By Referred To  
  
 [id number] [facility] [address]  Phone: [phone] Fax: [phone] Visits Status Start Date End Date 1 New Request 8/2/18 8/2/19 If your referral has a status of pending review or denied, additional information will be sent to support the outcome of this decision. Patient Instructions Some medications should not be stopped abruptly. Doing so will frequently cause rebound and withdrawal symptoms therefore I suggest gradually weaning off the medication. Reduce the dose by one half for 2 weeks. Then reduce dosage again by one half for 2 weeks. Every other day for a week then stop the medicine. 
gabapentin Introducing [facility] & HE[facility]! [facility] introduces [employer] patient portal. Now you can access parts of your medical record, email your doctor's office, and request medication refills online. 1. In your internet browser, go to [email or web address] 2. Click on the First Time User? Click Here link in the Sign In box. You will see the New Member Sign Up page. 3. Enter your [employer] Access Code exactly as it appears below. You will not need to use this code after youve completed the sign-up process. If you do not sign up before the expiration date, you must request a new code. · [employer] Access Code: [email or web address] Expires: 8/9/2018  1:03 PM 
 
4. Enter the last four digits of your Social Security Number (xxxx) and Date of Birth (mm/dd/yyyy) as indicated and click Submit. You will be taken to the next sign-up page. 5. Create a [employer] ID. This will be your [employer] login ID and cannot be changed, so think of one that is secure and easy to remember. 6. Create a [employer] password. You can change your password at any time. 7. Enter your Password Reset Question and Answer. This can be used at a later time if you forget your password. 8. Enter your e-mail address. You will receive e-mail notification when new information is available in [address] 9. Click Sign Up. You can now view and download portions of your medical record. 10. Click the Download Summary menu link to download a portable copy of your medical information. If you have questions, please visit the Frequently Asked Questions section of the [employer] website. Remember, [employer] is NOT to be used for urgent needs. For medical emergencies, dial 911. Now available from your iPhone and Android! Please provide this summary of care documentation to your next provider. Your primary care clinician is listed as Kristen Sandhoff. If you have any questions after today's visit, please call [phone].

## 2018-08-02 NOTE — PATIENT INSTRUCTIONS
Some medications should not be stopped abruptly. Doing so will frequently cause rebound and withdrawal symptoms therefore I suggest gradually weaning off the medication. Reduce the dose by one half for 2 weeks. Then reduce dosage again by one half for 2 weeks. Every other day for a week then stop the medicine. 
gabapentin

## 2018-08-02 NOTE — PROGRESS NOTES
HISTORY OF PRESENT ILLNESS Vance Castro is a 47 y.o. female. Diabetes The history is provided by the patient. This is a chronic problem. The problem has been gradually improving. Pertinent negatives include no chest pain and no shortness of breath. Shoulder Pain The incident occurred more than 1 week ago. The incident occurred at home. The injury mechanism was a fall. The right shoulder is affected. The pain is at a severity of 8/10. There is a history of shoulder injury. She has other injuries (chronic foot pain). There is no history of shoulder surgery. She would like some assistance in filling out disability form She would like some more narcotics for her chronic pain, we have tried multiple medicines for it but so far no improvement she feels the gabapentin is not helping. Continues to see cardiology has an MRI scheduled of her heart. Diabetes she says has been doing okay, her last A1c was not so good at 9.4. No hypoglycemia. Patient Active Problem List  
Diagnosis Code  Hypertension I10  Chronic pain G89.29  
 Narcotic dependence, episodic use ([facility]) F11.20  
 Idiopathic small and large fiber sensory neuropathy G60.8  Spinal stenosis of lumbosacral region M48.07  
 Lumbar back pain with radiculopathy affecting right lower extremity M54.17  
 Obesity, morbid ([facility]) E66.01  
 Type 2 diabetes mellitus with nephropathy ([facility]) I84.66  
 Acute systolic congestive heart failure ([facility]) I50.21  Type 2 diabetes mellitus with diabetic neuropathy, with long-term current use of insulin ([facility]) E11.40, Z79.4 Social History Social History  Marital status:  Spouse name: N/A  
 Number of children: N/A  
 Years of education: N/A Occupational History  disabled Social History Main Topics  Smoking status: Never Smoker  Smokeless tobacco: Never Used  Alcohol use No  
 Drug use: No  
 Sexual activity: Not Currently Other Topics Concern  Not on file Social History Narrative Lives with daughter. Not working. Applying for disabilty. Review of Systems Respiratory: Negative for shortness of breath. Cardiovascular: Negative for chest pain. Genitourinary: Negative for frequency. Musculoskeletal: Positive for back pain, falls and joint pain. Neurological: Negative for focal weakness. Physical Exam 
Visit Vitals  BP (!) 142/95 (BP 1 Location: Left arm, BP Patient Position: At rest)  Pulse (!) 102  Temp 98.3 °F (36.8 °C) (Oral)  Resp 20  
 Ht 5' 4\" (1.626 m)  Wt 257 lb (116.6 kg)  LMP 12/03/2017  SpO2 99%  BMI 44.11 kg/m2 WD WN female NAD Heart RRR without murmers clicks or rubs Lungs CTA Abdo soft nontender Ext no edema Right shoulder symmetric, pain with testing range of motion. Positive impingement signs. ASSESSMENT and PLAN Encounter Diagnoses Name Primary?  Acute pain of right shoulder Yes  Neuropathy  Type 2 diabetes mellitus with nephropathy ([facility] [address] Orders Placed This Encounter  AMB SUPPLY ORDER  XR SHOULDER RT AP/LAT MIN 2 V  
 REFERRAL TO ORTHOPEDICS  traMADol (ULTRAM) 50 mg tablet Get x-ray and have her see orthopedics. Wean off gabapentin not helping, other medicines Lyrica have not helped her gait side effects. Will try TENS unit. Pain control with tramadol, now going to continue the Percocet which she is used to getting. He is not happy with that. Diabetes will need to do blood work upon follow-up and she will follow-up with her cardiologist for her congestive heart failure. My nurse did help her with doing her disability paperwork. I filled out part of it as well. Follow-up Disposition: 
Return in about 6 weeks (around 9/13/2018) for routine follow up.

## 2018-09-17 ENCOUNTER — TELEPHONE (OUTPATIENT)
Dept: INTERNAL MEDICINE CLINIC | Age: 53
End: 2018-09-17

## 2018-09-17 NOTE — TELEPHONE ENCOUNTER
----- Message from Dipak Keenan sent at 9/17/2018  2:38 PM EDT -----  Regarding: Dr. Ranee Spurling is returning a call back from the office. She states that she is needing a prior-authorization for Humalog for coverage.  Best contact number: [phone]

## 2018-09-18 ENCOUNTER — TELEPHONE (OUTPATIENT)
Dept: INTERNAL MEDICINE CLINIC | Age: 53
End: 2018-09-18

## 2018-09-18 NOTE — TELEPHONE ENCOUNTER
Pt called in reference to her humalog. She is out and needs it to be authorized. Please call her at [phone].

## 2018-09-18 NOTE — TELEPHONE ENCOUNTER
9/18/18 Called 2  PA initiated for Humalog BASHIR [facility] PA rep Pippa GARCÍA,tranfered to pharmacist, Del Hernandez ,override exception done, due to neuropathy. Effective 9/18/19 thru 9/18/19. [address] called @ [phone],JHADQLMIR pharmacist stated it did not go thru,phone # provided as requested by Jolynn Phipps for assistance.

## 2018-09-21 NOTE — TELEPHONE ENCOUNTER
Requested Prescriptions     Pending Prescriptions Disp Refills    gabapentin (NEURONTIN) 800 mg tablet 270 Tab 1     Sig: Take 1 Tab by mouth three (3) times daily.      Last office visit 8/31/18 future   Last filled 3/13/18   Changes made to medication on last visit  none

## 2018-09-21 NOTE — TELEPHONE ENCOUNTER
Requested Prescriptions     Pending Prescriptions Disp Refills    gabapentin (NEURONTIN) 800 mg tablet 90 Tab 5     Sig: Take 1 Tab by mouth three (3) times daily.        Pt said she is on 600 mg not 800 mg

## 2018-09-22 RX ORDER — GABAPENTIN 800 MG/1
800 TABLET ORAL 3 TIMES DAILY
Qty: 270 TAB | Refills: 1 | Status: SHIPPED | OUTPATIENT
Start: 2018-09-22 | End: 2019-01-02 | Stop reason: DRUGHIGH

## 2018-10-03 ENCOUNTER — TELEPHONE (OUTPATIENT)
Dept: INTERNAL MEDICINE CLINIC | Age: 53
End: 2018-10-03

## 2018-10-03 NOTE — TELEPHONE ENCOUNTER
Called and spoke with [facility] - they just wanted to report to Dr Kassandra Arellano patients bloodpressure of 109/60 and she was feeling a bit lightheaded, so they asked for verbal order to put a telemetry for vital signs in patients home for couple of weeks to closely monitor her vital signed - per verbal order of Jason Lam, VITO  83/7/9189  9:85 PM

## 2018-10-03 NOTE — TELEPHONE ENCOUNTER
Ni Ornelas, from [facility], called in reference to visiting pt today. Her bp was low. It was 109/68. She was complaining of dizziness and light headiness when she stood. Pt was put on a different type of bp medicine (hydralyzine 25 mg 3 times a day) and parvedilol 6.25 mg two times a day. Ni Ornelas would like to request the telehealth home monitioring for pt. Please call her at [phone].

## 2018-10-15 ENCOUNTER — TELEPHONE (OUTPATIENT)
Dept: INTERNAL MEDICINE CLINIC | Age: 53
End: 2018-10-15

## 2018-10-15 NOTE — TELEPHONE ENCOUNTER
Pt called in reference to going back to straight medicaid until November 1, 2018. She needs her test strips approved in order for ins to pay. Please call her at [phone].

## 2018-10-17 NOTE — TELEPHONE ENCOUNTER
Called patient - One Touch Verio test strips - need PA - [phone] - [address] Mehdi Ventura LPN  68/86/4420  8:73 PM

## 2018-10-17 NOTE — TELEPHONE ENCOUNTER
Pt called back in reference to her test strips. He is completely out. Please call her at [phone].

## 2018-10-18 NOTE — TELEPHONE ENCOUNTER
Spoke with pharmacy rep at [facility]) - pharmacy will need to process the claim for the testing strips under DME due to the patients age - [facility] states they dont process claims that way  Asif Hudson LPN  14/21/6673  3:07 PM

## 2018-10-18 NOTE — TELEPHONE ENCOUNTER
Need to verify with [facility] patients current temporary Medicaid coverage number - then call back to [facility] to apply for PA for testing strips  Buck Crespo LPN  44/94/3339  8:90 AM

## 2018-10-18 NOTE — TELEPHONE ENCOUNTER
Patient states that the testing strips for Verio cost $20 without insurance, but she cannot afford this on her disability - have tried to get them covered by her [facility] Medicaid - they refuse  - her new [place]bur will kick in on November 1, 2018, but she will not have a way to check her blood sugar until then - should we order her a new monitor and testing strips or wait until the new insurance in November?   Tamar Gosselin, LPN  85/39/1141  1:34 PM

## 2018-10-23 RX ORDER — GABAPENTIN 600 MG/1
600 TABLET ORAL 3 TIMES DAILY
Qty: 90 TAB | Refills: 5 | Status: CANCELLED | OUTPATIENT
Start: 2018-10-23

## 2018-10-23 NOTE — TELEPHONE ENCOUNTER
Notified patient that she can either wait until 11/1/18 as Dr Tank Martinez suggested to get the old test strips, or pay the $20 out of pocket for the ones she needs now, or take her machine to the pharmacy to see if they can decide which testing strips are covered alternatives on medicaid  Danny Mixon LPN  84/55/0895  7:31 PM

## 2018-10-23 NOTE — TELEPHONE ENCOUNTER
Requested Prescriptions     Pending Prescriptions Disp Refills    gabapentin (NEURONTIN) 600 mg tablet 90 Tab 5     Sig: Take 1 Tab by mouth three (3) times daily.

## 2018-10-26 NOTE — TELEPHONE ENCOUNTER
Requested Prescriptions     Pending Prescriptions Disp Refills    gabapentin (NEURONTIN) 600 mg tablet 90 Tab 5     Sig: Take 1 Tab by mouth three (3) times daily. Refills remaining. Verified with pharmacy tech at [facility].

## 2018-12-21 RX ORDER — INSULIN LISPRO 100 [IU]/ML
12 INJECTION, SOLUTION INTRAVENOUS; SUBCUTANEOUS
Qty: 2 PACKAGE | Refills: 3 | Status: CANCELLED | OUTPATIENT
Start: 2018-12-21

## 2018-12-21 NOTE — TELEPHONE ENCOUNTER
Last office visit:  8/2/18  Last filled:  Humalog Kwikpen 100u/ml 5/11/18 #2 packages X 3 refills  12 units SC twice daily  No changes  No follow up scheduled

## 2019-01-02 RX ORDER — GABAPENTIN 600 MG/1
600 TABLET ORAL 3 TIMES DAILY
Qty: 90 TAB | Refills: 5 | Status: SHIPPED | OUTPATIENT
Start: 2019-01-02 | End: 2019-07-08 | Stop reason: SDUPTHER

## 2019-01-02 NOTE — TELEPHONE ENCOUNTER
Requested Prescriptions     Pending Prescriptions Disp Refills    gabapentin (NEURONTIN) 600 mg tablet 90 Tab 5     Sig: Take 1 Tab by mouth three (3) times daily.      Future Appointments:  1/4/2019  1:00 PM Kimberly Sanchez MD   Last Appointment With Me:  8/31/2018   Last Filled:  [phone]  Changes Made to Medication on Last Visit:  None

## 2019-01-14 ENCOUNTER — OFFICE VISIT (OUTPATIENT)
Dept: INTERNAL MEDICINE CLINIC | Age: 54
End: 2019-01-14

## 2019-01-14 VITALS
TEMPERATURE: 98.3 F | OXYGEN SATURATION: 96 % | WEIGHT: 260 LBS | BODY MASS INDEX: 44.39 KG/M2 | DIASTOLIC BLOOD PRESSURE: 62 MMHG | HEART RATE: 97 BPM | RESPIRATION RATE: 20 BRPM | HEIGHT: 64 IN | SYSTOLIC BLOOD PRESSURE: 102 MMHG

## 2019-01-14 DIAGNOSIS — Z79.4 TYPE 2 DIABETES MELLITUS WITH DIABETIC NEUROPATHY, WITH LONG-TERM CURRENT USE OF INSULIN (HCC): Primary | ICD-10-CM

## 2019-01-14 DIAGNOSIS — E11.40 TYPE 2 DIABETES MELLITUS WITH DIABETIC NEUROPATHY, WITH LONG-TERM CURRENT USE OF INSULIN (HCC): Primary | ICD-10-CM

## 2019-01-14 DIAGNOSIS — I50.21 ACUTE SYSTOLIC CONGESTIVE HEART FAILURE (HCC): ICD-10-CM

## 2019-01-14 DIAGNOSIS — E78.00 ELEVATED CHOLESTEROL: ICD-10-CM

## 2019-01-14 DIAGNOSIS — G62.9 NEUROPATHY: ICD-10-CM

## 2019-01-14 DIAGNOSIS — E11.21 TYPE 2 DIABETES MELLITUS WITH NEPHROPATHY (HCC): ICD-10-CM

## 2019-01-14 DIAGNOSIS — G89.4 CHRONIC PAIN SYNDROME: ICD-10-CM

## 2019-01-14 DIAGNOSIS — E53.8 VITAMIN B 12 DEFICIENCY: ICD-10-CM

## 2019-01-14 DIAGNOSIS — S69.92XA INJURY OF FINGER OF LEFT HAND, INITIAL ENCOUNTER: ICD-10-CM

## 2019-01-14 RX ORDER — OXYCODONE AND ACETAMINOPHEN 5; 325 MG/1; MG/1
1 TABLET ORAL
Qty: 15 TAB | Refills: 0 | Status: SHIPPED | OUTPATIENT
Start: 2019-01-14 | End: 2019-01-14 | Stop reason: ALTCHOICE

## 2019-01-14 RX ORDER — LANOLIN ALCOHOL/MO/W.PET/CERES
500 CREAM (GRAM) TOPICAL DAILY
Qty: 30 TAB | Refills: 5 | Status: SHIPPED | OUTPATIENT
Start: 2019-01-14 | End: 2019-10-01 | Stop reason: SDUPTHER

## 2019-01-14 RX ORDER — OXYCODONE AND ACETAMINOPHEN 5; 325 MG/1; MG/1
1 TABLET ORAL 2 TIMES DAILY
COMMUNITY
End: 2019-07-01 | Stop reason: ALTCHOICE

## 2019-01-14 NOTE — PROGRESS NOTES
Subjective:     Cleopatra Foley is a 47 y.o. female seen for follow-up of diabetes. She has had hypoglycemic attacks. .no  Blood sugar control has been 150's usu  She has diabetes, hypertension, hyperlipidemia and Systolic HF. Cleopatra Foley has the additional concern of had a pacemaker placed  [facility] yesterday injuring left hand/2nd finger. States her last usage of narcotics was previously when I gave them to her. Patient was looked up in the . There are multiple prescribers of controlled substances  yes. She has been prescribed oxycodone 2 weeks ago by provider at [facility], it was supposed to last a month. Patient informed that I will not prescribe narcotics today. Diabetic Review of Systems: no polyuria or polydipsia, no chest pain, dyspnea or TIA's, no hypoglycemia, no medication side effects noted, has dysesthesias in the feet. Allergies   Allergen Reactions    Lortab [Hydrocodone-Acetaminophen] Nausea and Vomiting    Cymbalta [Duloxetine] Diarrhea    Lasix [Furosemide] Swelling    Lyrica [Pregabalin] Drowsiness       Diet and Lifestyle: generally follows a low sodium diet, follows a diabetic diet regularly, nonsmoker. Patient Active Problem List    Diagnosis Date Noted    Type 2 diabetes mellitus with diabetic neuropathy, with long-term current use of insulin ([address] 56/90/1805    Acute systolic congestive heart failure ([address] 02/07/2018    Obesity, morbid ([address] 12/18/2017    Type 2 diabetes mellitus with nephropathy ([address] 12/18/2017    Spinal stenosis of lumbosacral region 12/08/2016    Lumbar back pain with radiculopathy affecting right lower extremity 12/08/2016    Idiopathic small and large fiber sensory neuropathy 11/16/2016    Narcotic dependence, episodic use (HCC) 04/29/2016    Chronic pain     Hypertension 11/07/2012     Current Outpatient Medications   Medication Sig Dispense Refill    cyanocobalamin (VITAMIN B-12) 500 mcg tablet Take 1 Tab by mouth daily.  30 Tab 5    oxyCODONE-acetaminophen (PERCOCET) 5-325 mg per tablet Take 1 Tab by mouth every six (6) hours as needed for Pain. Max Daily Amount: 4 Tabs. 15 Tab 0    gabapentin (NEURONTIN) 600 mg tablet Take 1 Tab by mouth three (3) times daily. 90 Tab 5    torsemide (DEMADEX) 10 mg tablet 2 per day for 3 to 4 days then 1 daily 90 Tab 1    insulin glargine (LANTUS,BASAGLAR) 100 unit/mL (3 mL) inpn 40 Units by SubCUTAneous route nightly. 10 Pen 3    diclofenac (VOLTAREN) 1 % gel Apply  to affected area four (4) times daily. 1 Each 5    insulin lispro (HUMALOG) 100 unit/mL kwikpen 12 Units by SubCUTAneous route Before breakfast, lunch, and dinner. 2 Package 3    losartan (COZAAR) 50 mg tablet Take  by mouth daily.  isosorbide dinitrate (ISORDIL) 20 mg tablet Take 20 mg by mouth two (2) times a day.  hydrALAZINE (APRESOLINE) 50 mg tablet Take 50 mg by mouth two (2) times a day.  Cholecalciferol, Vitamin D3, 50,000 unit cap Take  by mouth.  atorvastatin (LIPITOR) 40 mg tablet Take  by mouth daily.  Insulin Needles, Disposable, 31 gauge x 5/16\" ndle For flexpen #100, 4 rf daily 1 Package 5    glucose blood VI test strips (ASCENSIA AUTODISC VI, ONE TOUCH ULTRA TEST VI) strip Daily verio one touch up to 3 times a day 100 Strip 5    carvedilol (COREG) 6.25 mg tablet Take 12.5 mg by mouth two (2) times a day.  aspirin delayed-release 81 mg tablet Take  by mouth daily.        Allergies   Allergen Reactions    Lortab [Hydrocodone-Acetaminophen] Nausea and Vomiting    Cymbalta [Duloxetine] Diarrhea    Lasix [Furosemide] Swelling    Lyrica [Pregabalin] Drowsiness     Past Medical History:   Diagnosis Date    Anemia     CAD (coronary artery disease)     Chronic pain     Cyst in hand 2014    [address]    Diabetes mellitus type 2 in obese ([address]     Diabetic neuropathy ([facility] [address]     Hypertension     Migraine      Social History     Tobacco Use    Smoking status: Never Smoker    Smokeless tobacco: Never Used   Substance Use Topics    Alcohol use: No     Alcohol/week: 0.0 oz        Lab Results   Component Value Date/Time    WBC 5.9 04/13/2018 02:27 PM    HGB 10.7 (L) 04/13/2018 02:27 PM    HCT 33.3 (L) 04/13/2018 02:27 PM    PLATELET 086 05/12/9492 02:27 PM    MCV 81 04/13/2018 02:27 PM     Lab Results   Component Value Date/Time    Hemoglobin A1c 8.6 (H) 02/13/2018 12:00 AM    Hemoglobin A1c 7.4 (H) 02/13/2017 12:20 PM    Hemoglobin A1c 6.6 (H) 08/15/2016 02:35 PM    Glucose 222 (H) 04/27/2018 03:17 PM    Glucose (POC) 173 (H) 12/18/2015 11:17 AM    Microalb/Creat ratio (ug/mg creat.) 39.9 (H) 02/19/2018 02:29 PM    LDL, calculated 72 02/13/2018 12:00 AM    Creatinine 1.65 (H) 04/27/2018 03:17 PM      Lab Results   Component Value Date/Time    Cholesterol, total 126 02/13/2018 12:00 AM    HDL Cholesterol 37 (L) 02/13/2018 12:00 AM    LDL, calculated 72 02/13/2018 12:00 AM    Triglyceride 84 02/13/2018 12:00 AM     Lab Results   Component Value Date/Time    ALT (SGPT) 23 04/02/2018 02:00 PM    AST (SGOT) 13 (L) 04/02/2018 02:00 PM    Alk.  phosphatase 123 (H) 04/02/2018 02:00 PM    Bilirubin, total 0.5 04/02/2018 02:00 PM    Albumin 3.5 04/02/2018 02:00 PM    Protein, total 7.8 04/02/2018 02:00 PM    INR 1.1 12/08/2015 04:42 PM    Prothrombin time 11.2 12/08/2015 04:42 PM    PLATELET 185 31/49/5215 02:27 PM     Lab Results   Component Value Date/Time    GFR est non-AA 36 (L) 04/27/2018 03:17 PM    GFR est AA 41 (L) 04/27/2018 03:17 PM    Creatinine 1.65 (H) 04/27/2018 03:17 PM    BUN 14 04/27/2018 03:17 PM    Sodium 137 04/27/2018 03:17 PM    Potassium 3.5 04/27/2018 03:17 PM    Chloride 102 04/27/2018 03:17 PM    CO2 21 04/27/2018 03:17 PM    Magnesium 1.4 (L) 11/16/2016 12:04 AM    PTH, Intact CANCELED 02/19/2018 02:29 PM     Lab Results   Component Value Date/Time    TSH 1.780 02/13/2018 12:00 AM    T4, Free 1.1 01/30/2014 10:48 PM      Lab Results   Component Value Date/Time    Glucose 222 (H) 04/27/2018 03:17 PM    Glucose (POC) 173 (H) 12/18/2015 11:17 AM         Review of Systems  Pertinent items are noted in HPI. Objective:     Significant for the following:     Visit Vitals  /62 (BP 1 Location: Right arm, BP Patient Position: Sitting)   Pulse 97   Temp 98.3 °F (36.8 °C) (Oral)   Resp 20   Ht 5' 4\" (1.626 m)   Wt 260 lb (117.9 kg)   LMP 12/03/2017   SpO2 96%   BMI 44.63 kg/m²     Appearance: overweight and chronically ill appearing. Exam: heart sounds normal rate, regular rhythm, normal S1, S2, no murmurs, rubs, clicks or gallops, chest clear, no hepatosplenomegaly  Foot exam: Diabetic foot exam was performed. No obvious sores or red lesions. Sensation checked by monofilament exam which was reduced  Dorsalis pedis pulse was present. Bunion on right foot irritatedscliness      Lab review: orders written for new lab studies as appropriate; see orders. Assessment/Plan:     Follow-up diabetes stable. Diabetic issues reviewed with her: all medications, side effects and compliance discussed carefully, foot care discussed and Podiatry visits discussed and glycohemoglobin and other lab monitoring discussed. Chronic Conditions Addressed Today     1. Acute systolic congestive heart failure (HCC)     Relevant Orders     TSH 3RD GENERATION (Completed)     VITAMIN D, 25 HYDROXY (Completed)     SPECIMEN HANDLING,PT->LAB     [place] [facility]&/OR CONVEY OF SPEC FOR TR OFFICE TO LAB     COLLECTION VENOUS BLOOD,VENIPUNCTURE    2. Chronic pain    3. Type 2 diabetes mellitus with diabetic neuropathy, with long-term current use of insulin (HCC) - Primary     Relevant Medications     oxyCODONE-acetaminophen (PERCOCET) 5-325 mg per tablet     Other Relevant Orders     HEMOGLOBIN A1C WITH EAG (Completed)     SPECIMEN HANDLING,PT->LAB     [place] [facility]&/OR CONVEY OF SPEC FOR TR OFFICE TO LAB     COLLECTION VENOUS BLOOD,VENIPUNCTURE    4.  Type 2 diabetes mellitus with nephropathy (HCC)     Relevant Orders     SPECIMEN HANDLING,PT->LAB [place] [facility]&/OR CONVEY OF SPEC FOR TR OFFICE TO LAB     COLLECTION VENOUS BLOOD,VENIPUNCTURE      Acute Diagnoses Addressed Today     Injury of finger of left hand, initial encounter            Relevant Orders        XR HAND LT MIN 3 V        AMB SUPPLY ORDER    Vitamin B 12 deficiency            Relevant Medications        cyanocobalamin (VITAMIN B-12) 500 mcg tablet        oxyCODONE-acetaminophen (PERCOCET) 5-325 mg per tablet        Other Relevant Orders        [place] [facility]&/OR CONVEY OF SPEC FOR TR OFFICE TO LAB        COLLECTION VENOUS BLOOD,VENIPUNCTURE    Neuropathy            Relevant Medications        oxyCODONE-acetaminophen (PERCOCET) 5-325 mg per tablet    Elevated cholesterol            Relevant Orders        LIPID PANEL (Completed)        SPECIMEN HANDLING,PT->LAB        [place] [facility]&/OR CONVEY OF SPEC FOR TR OFFICE TO LAB        COLLECTION VENOUS BLOOD,VENIPUNCTURE          Patient was looked up in the . There are multiple prescribers of controlled substances  yes and have cancelled perc Rx. Follow-up Disposition:  Return in about 3 months (around 4/14/2019) for routine follow up.

## 2019-01-14 NOTE — PROGRESS NOTES
Chief Complaint   Patient presents with    Fall     BACK PAIN AND LEFT 2nd DIGIT FINGER. FELL LATE LAST NIGHT PER PATIENT. 1. Have you been to the ER, urgent care clinic since your last visit? Hospitalized since your last visit? No    2. Have you seen or consulted any other health care providers outside of the [address] [place] [address] since your last visit? Include any pap smears or colon screening. She sees her cardiologist as scheduled. Health Maintenance Due   Topic Date Due    Shingrix Vaccine Age 50> (1 of 2) 12/28/2016    FOBT Q 1 YEAR AGE 50-75  12/28/2016    Influenza Age 9 to Adult  08/01/2018    HEMOGLOBIN A1C Q6M  10/20/2018    EYE EXAM RETINAL OR DILATED  10/25/2018    FOOT EXAM Q1  11/08/2018    LIPID PANEL Q1  02/13/2019    BREAST CANCER SCRN MAMMOGRAM  03/22/2019     Mammogram ([address])  Patient refuses flu vaccine.

## 2019-01-15 LAB
25(OH)D3+25(OH)D2 SERPL-MCNC: 32.1 NG/ML (ref 30–100)
CHOLEST SERPL-MCNC: 170 MG/DL (ref 100–199)
EST. AVERAGE GLUCOSE BLD GHB EST-MCNC: 163 MG/DL
HBA1C MFR BLD: 7.3 % (ref 4.8–5.6)
HDLC SERPL-MCNC: 50 MG/DL
INTERPRETATION, 910389: NORMAL
LDLC SERPL CALC-MCNC: 86 MG/DL (ref 0–99)
Lab: NORMAL
TRIGL SERPL-MCNC: 170 MG/DL (ref 0–149)
TSH SERPL DL<=0.005 MIU/L-ACNC: 0.85 UIU/ML (ref 0.45–4.5)
VLDLC SERPL CALC-MCNC: 34 MG/DL (ref 5–40)

## 2019-01-16 LAB
AMPHETAMINES UR QL SCN: NEGATIVE NG/ML
BARBITURATES UR QL SCN: NEGATIVE NG/ML
BASOPHILS # BLD AUTO: 0 X10E3/UL (ref 0–0.2)
BASOPHILS NFR BLD AUTO: 1 %
BENZODIAZ UR QL SCN: NEGATIVE NG/ML
BZE UR QL SCN: NEGATIVE NG/ML
CANNABINOIDS UR QL SCN: NEGATIVE NG/ML
DRUG SCREEN COMMENT:, 753798: NORMAL
EOSINOPHIL # BLD AUTO: 0.2 X10E3/UL (ref 0–0.4)
EOSINOPHIL NFR BLD AUTO: 4 %
ERYTHROCYTE [DISTWIDTH] IN BLOOD BY AUTOMATED COUNT: 22 % (ref 12.3–15.4)
HCT VFR BLD AUTO: 33.4 % (ref 34–46.6)
HGB BLD-MCNC: 10.3 G/DL (ref 11.1–15.9)
IMM GRANULOCYTES # BLD AUTO: 0 X10E3/UL (ref 0–0.1)
IMM GRANULOCYTES NFR BLD AUTO: 0 %
LYMPHOCYTES # BLD AUTO: 1.8 X10E3/UL (ref 0.7–3.1)
LYMPHOCYTES NFR BLD AUTO: 34 %
MCH RBC QN AUTO: 25.8 PG (ref 26.6–33)
MCHC RBC AUTO-ENTMCNC: 30.8 G/DL (ref 31.5–35.7)
MCV RBC AUTO: 84 FL (ref 79–97)
MONOCYTES # BLD AUTO: 0.3 X10E3/UL (ref 0.1–0.9)
MONOCYTES NFR BLD AUTO: 6 %
MORPHOLOGY BLD-IMP: ABNORMAL
NEUTROPHILS # BLD AUTO: 2.9 X10E3/UL (ref 1.4–7)
NEUTROPHILS NFR BLD AUTO: 55 %
OPIATES UR QL SCN: NEGATIVE NG/ML
PCP UR QL SCN: NEGATIVE NG/ML
PLATELET # BLD AUTO: 134 X10E3/UL (ref 150–379)
RBC # BLD AUTO: 3.99 X10E6/UL (ref 3.77–5.28)
SPECIMEN STATUS REPORT, ROLRST: NORMAL
WBC # BLD AUTO: 5.3 X10E3/UL (ref 3.4–10.8)

## 2019-01-16 NOTE — PROGRESS NOTES
Send normal/stable results letter. Your results are normal/stable. If not signed up, consider getting my chart to get your results on-line. We can help you to sign up. Send a copy of labs to her provider at [facility]. Please send my latest note to them as well.

## 2019-01-17 NOTE — PROGRESS NOTES
Discuss these results with your provider at [facility] [place]

## 2019-01-22 ENCOUNTER — TELEPHONE (OUTPATIENT)
Dept: INTERNAL MEDICINE CLINIC | Age: 54
End: 2019-01-22

## 2019-01-22 NOTE — TELEPHONE ENCOUNTER
Faxed last office note and results of labs to Dr Radha Clinton at [facility] per verbal order of Wiley Ward and request of patient - to [address] [facility], LPN  6/47/4440  6:36 PM  Confirmation of receipt received  Giovanni Marquez LPN  8/76/2581  9:98 PM

## 2019-01-22 NOTE — TELEPHONE ENCOUNTER
Pt called in reference to Dr. Javed File not receiving her bloodwork or urine. She would like it sent to them asap. Please call her at [phone].

## 2019-03-18 DIAGNOSIS — Z79.4 TYPE 2 DIABETES MELLITUS WITH DIABETIC NEUROPATHY, WITH LONG-TERM CURRENT USE OF INSULIN (HCC): ICD-10-CM

## 2019-03-18 DIAGNOSIS — E11.40 TYPE 2 DIABETES MELLITUS WITH DIABETIC NEUROPATHY, WITH LONG-TERM CURRENT USE OF INSULIN (HCC): ICD-10-CM

## 2019-03-18 NOTE — TELEPHONE ENCOUNTER
Pt called in reference to her test strips. She said that the ins company said it needs to be expedited. She is completely out of her strips. Please call pt at [phone] once this Aleena Tay has been started.

## 2019-03-19 ENCOUNTER — TELEPHONE (OUTPATIENT)
Dept: INTERNAL MEDICINE CLINIC | Age: 54
End: 2019-03-19

## 2019-03-19 NOTE — TELEPHONE ENCOUNTER
Spoke with [facility] - AMMON FRANCO (pharmacist) and she informed me that she will run the test strips.  verified. Informed patient to call [facility] to get status of test strips.

## 2019-03-19 NOTE — TELEPHONE ENCOUNTER
Patient notified of the turnaround time for the OneTouch Verio test strips. Informed patient to call the office the next day to get insurance decision, but informed the patient of the preferred product (True Metrix). Understanding verbalized.

## 2019-03-19 NOTE — TELEPHONE ENCOUNTER
Pt called saying that Reinaldo Ilan had called her and she was calling back to speak with her again. Please call her at [phone].

## 2019-03-19 NOTE — TELEPHONE ENCOUNTER
3/19/19 Called # provided by pharmacy . Transferred on 4th call to [place] [address] spoke with WILLIS Poon initiated for One [employer] on formulary. Preferred by plan is True Metrix,one machine with test strips for one year per patient. Turn around time up to 24 hours. Request given to [facility], due to calls received from patient stating she is out of strips. Please send new script for True Metrix meter and strips. if rejected. [facility] pharmacy called,Kurt states,we will need new script if rejected.

## 2019-03-19 NOTE — TELEPHONE ENCOUNTER
Pt called back today in reference to her test strips being authorized. She would like someone to call her on [phone].

## 2019-03-21 ENCOUNTER — TELEPHONE (OUTPATIENT)
Dept: INTERNAL MEDICINE CLINIC | Age: 54
End: 2019-03-21

## 2019-03-21 NOTE — TELEPHONE ENCOUNTER
[facility] - received denial of PA request for OneTouch Verio test strips - patient will need a new prescription for a True Metrix  and a True Metrix meter - please send to [address] [place] [address], LPN  6/96/6658  3:97 PM

## 2019-03-22 RX ORDER — BLOOD-GLUCOSE METER
EACH MISCELLANEOUS
Qty: 1 EACH | Refills: 0 | Status: SHIPPED | OUTPATIENT
Start: 2019-03-22

## 2019-03-25 ENCOUNTER — HOSPITAL ENCOUNTER (EMERGENCY)
Age: 54
Discharge: HOME | End: 2019-03-25
Attending: EMERGENCY MEDICINE
Payer: MEDICAID

## 2019-03-25 ENCOUNTER — APPOINTMENT (OUTPATIENT)
Dept: GENERAL RADIOLOGY | Age: 54
End: 2019-03-25
Attending: EMERGENCY MEDICINE
Payer: MEDICAID

## 2019-03-25 VITALS
HEART RATE: 98 BPM | DIASTOLIC BLOOD PRESSURE: 89 MMHG | SYSTOLIC BLOOD PRESSURE: 159 MMHG | TEMPERATURE: 98.4 F | OXYGEN SATURATION: 100 % | WEIGHT: 279.32 LBS | BODY MASS INDEX: 47.69 KG/M2 | RESPIRATION RATE: 18 BRPM | HEIGHT: 64 IN

## 2019-03-25 DIAGNOSIS — M19.012 ARTHRITIS OF LEFT ACROMIOCLAVICULAR JOINT: ICD-10-CM

## 2019-03-25 DIAGNOSIS — M62.838 MUSCLE SPASM OF LEFT SHOULDER: ICD-10-CM

## 2019-03-25 DIAGNOSIS — S13.4XXA WHIPLASH INJURIES, INITIAL ENCOUNTER: Primary | ICD-10-CM

## 2019-03-25 LAB
AMPHET UR QL SCN: NEGATIVE
BARBITURATES UR QL SCN: NEGATIVE
BENZODIAZ UR QL: NEGATIVE
CANNABINOIDS UR QL SCN: NEGATIVE
COCAINE UR QL SCN: NEGATIVE
DRUG SCRN COMMENT,DRGCM: NORMAL
METHADONE UR QL: NEGATIVE
OPIATES UR QL: NEGATIVE
PCP UR QL: NEGATIVE

## 2019-03-25 PROCEDURE — 80307 DRUG TEST PRSMV CHEM ANLYZR: CPT

## 2019-03-25 PROCEDURE — 99282 EMERGENCY DEPT VISIT SF MDM: CPT

## 2019-03-25 PROCEDURE — 75810000123 HC INJ'S ANES/STEROID AGT PERIPH NERVE

## 2019-03-25 PROCEDURE — 74011000250 HC RX REV CODE- 250: Performed by: PHYSICIAN ASSISTANT

## 2019-03-25 PROCEDURE — 73030 X-RAY EXAM OF SHOULDER: CPT

## 2019-03-25 RX ORDER — CYCLOBENZAPRINE HCL 10 MG
10 TABLET ORAL
Qty: 20 TAB | Refills: 0 | Status: SHIPPED | OUTPATIENT
Start: 2019-03-25 | End: 2019-04-16

## 2019-03-25 RX ORDER — BUPIVACAINE HYDROCHLORIDE 5 MG/ML
1 INJECTION, SOLUTION EPIDURAL; INTRACAUDAL
Status: COMPLETED | OUTPATIENT
Start: 2019-03-25 | End: 2019-03-25

## 2019-03-25 RX ADMIN — BUPIVACAINE HYDROCHLORIDE 5 MG: 5 INJECTION, SOLUTION EPIDURAL; INTRACAUDAL; PERINEURAL at 21:31
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
## 2019-03-26 NOTE — DISCHARGE INSTRUCTIONS
Patient Education        Muscle Cramps: Care Instructions  Your Care Instructions    A muscle cramp occurs when a muscle tightens up suddenly. A cramp often happens in the legs. A muscle cramp is also called a muscle spasm or a charley horse. Muscle cramps usually last less than a minute. However, the pain may last for several minutes. Leg cramps that occur at night may wake you up. Heavy exercise, dehydration, and being overweight can increase your risk of getting cramps. An imbalance of certain chemicals in your blood, called electrolytes, can also lead to muscle cramps. Pregnant women sometimes get muscle cramps during sleep. Muscle cramps can be treated by stretching and massaging the muscle. If cramps keep coming back, your doctor may prescribe medicine that relaxes your muscles. Follow-up care is a key part of your treatment and safety. Be sure to make and go to all appointments, and call your doctor if you are having problems. It's also a good idea to know your test results and keep a list of the medicines you take. How can you care for yourself at home? · Drink plenty of fluids to prevent dehydration. Choose water and other caffeine-free clear liquids until you feel better. If you have kidney, heart, or liver disease and have to limit fluids, talk with your doctor before you increase the amount of fluids you drink. · Stretch your muscles every day, especially before and after exercise and at bedtime. Regular stretching can relax your muscles and may prevent cramps. · Do not suddenly increase the amount of exercise you get. Increase your exercise a little each week. · When you get a cramp, stretch and massage the muscle. You can also take a warm shower or bath to relax the muscle. A heating pad placed on the muscle can also help. · Take a daily multivitamin supplement.   · Ask your doctor if you can take an over-the-counter pain medicine, such as acetaminophen (Tylenol), ibuprofen (Advil, Motrin), or naproxen (Aleve). Be safe with medicines. Read and follow all instructions on the label. When should you call for help? Watch closely for changes in your health, and be sure to contact your doctor if:    · You get muscle cramps often that do not go away after home treatment.     · Your muscle cramps often wake you up at night.     · You do not get better as expected. Where can you learn more? Go to http://lizbeth-efraín.info/. Enter B994 in the search box to learn more about \"Muscle Cramps: Care Instructions. \"  Current as of: September 20, 2018  Content Version: 11.9  © 4652-0718 [employer]. Care instructions adapted under license by [employer] (which disclaims liability or warranty for this information). If you have questions about a medical condition or this instruction, always ask your healthcare professional. [place] [address] any warranty or liability for your use of this information. Patient Education        Neck Spasm: Exercises  Your Care Instructions  Here are some examples of typical rehabilitation exercises for your condition. Start each exercise slowly. Ease off the exercise if you start to have pain. Your doctor or physical therapist will tell you when you can start these exercises and which ones will work best for you. How to do the exercises  Levator scapula stretch    1. Sit in a firm chair, or stand up straight. 2. Gently tilt your head toward your left shoulder. 3. Turn your head to look down into your armpit, bending your head slightly forward. Let the weight of your head stretch your neck muscles. 4. Hold for 15 to 30 seconds. 5. Return to your starting position. 6. Follow the same instructions above, but tilt your head toward your right shoulder. 7. Repeat 2 to 4 times toward each shoulder. Upper trapezius stretch    1. Sit in a firm chair, or stand up straight.   2. This stretch works best if you keep your shoulder down as you lean away from it. To help you remember to do this, start by relaxing your shoulders and lightly holding on to your thighs or your chair. 3. Tilt your head toward your shoulder and hold for 15 to 30 seconds. Let the weight of your head stretch your muscles. 4. If you would like a little added stretch, place your arm behind your back. Use the arm opposite of the direction you are tilting your head. For example, if you are tilting your head to the left, place your right arm behind your back. 5. Repeat 2 to 4 times toward each shoulder. Neck rotation    1. Sit in a firm chair, or stand up straight. 2. Keeping your chin level, turn your head to the right, and hold for 15 to 30 seconds. 3. Turn your head to the left, and hold for 15 to 30 seconds. 4. Repeat 2 to 4 times to each side. Chin tuck    1. Lie on the floor with a rolled-up towel under your neck. Your head should be touching the floor. 2. Slowly bring your chin toward the front of your neck. 3. Hold for a count of 6, and then relax for up to 10 seconds. 4. Repeat 8 to 12 times. Forward neck flexion    1. Sit in a firm chair, or stand up straight. 2. Bend your head forward. 3. Hold for 15 to 30 seconds, then return to your starting position. 4. Repeat 2 to 4 times. Follow-up care is a key part of your treatment and safety. Be sure to make and go to all appointments, and call your doctor if you are having problems. It's also a good idea to know your test results and keep a list of the medicines you take. Where can you learn more? Go to http://lizbeth-efraín.info/. Enter P962 in the search box to learn more about \"Neck Spasm: Exercises. \"  Current as of: September 20, 2018  Content Version: 11.9  © 6647-9403 [employer]. Care instructions adapted under license by [employer] (which disclaims liability or warranty for this information).  If you have questions about a medical condition or this instruction, always ask your healthcare professional. [address] any warranty or liability for your use of this information. Patient Education        Shoulder Arthritis: Exercises  Your Care Instructions  Here are some examples of typical rehabilitation exercises for your condition. Start each exercise slowly. Ease off the exercise if you start to have pain. Your doctor or physical therapist will tell you when you can start these exercises and which ones will work best for you. How to do the exercises  Shoulder flexion (lying down)    1. Lie on your back, holding a wand with both hands. Your palms should face down as you hold the wand. 2. Keeping your elbows straight, slowly raise your arms over your head. Raise them until you feel a stretch in your shoulders, upper back, and chest.  3. Hold for 15 to 30 seconds. 4. Repeat 2 to 4 times. Shoulder rotation (lying down)    1. Lie on your back. Hold a wand with both hands with your elbows bent and palms up. 2. Keep your elbows close to your body, and move the wand across your body toward the sore arm. 3. Hold for 8 to 12 seconds. 4. Repeat 2 to 4 times. Shoulder internal rotation with towel    1. Hold a towel above and behind your head with the arm that is not sore. 2. With your sore arm, reach behind your back and grasp the towel. 3. With the arm above your head, pull the towel upward. Do this until you feel a stretch on the front and outside of your sore shoulder. 4. Hold 15 to 30 seconds. 5. Repeat 2 to 4 times. Shoulder blade squeeze    1. Stand with your arms at your sides, and squeeze your shoulder blades together. Do not raise your shoulders up as you squeeze. 2. Hold 6 seconds. 3. Repeat 8 to 12 times. Resisted rows    1. Put the band around a solid object at about waist level. (A bedpost will work well.) Each hand should hold an end of the band.   2. With your elbows at your sides and bent to 90 degrees, pull the band back. Your shoulder blades should move toward each other. Return to the starting position. 3. Repeat 8 to 12 times. External rotator strengthening exercise    1. Start by tying a piece of elastic exercise material to a doorknob. You can use surgical tubing or Thera-Band. (You may also hold one end of the band in each hand.)  2. Stand or sit with your shoulder relaxed and your elbow bent 90 degrees. Your upper arm should rest comfortably against your side. Squeeze a rolled towel between your elbow and your body for comfort. This will help keep your arm at your side. 3. Hold one end of the elastic band with the hand of the painful arm. 4. Start with your forearm across your belly. Slowly rotate the forearm out away from your body. Keep your elbow and upper arm tucked against the towel roll or the side of your body until you begin to feel tightness in your shoulder. Slowly move your arm back to where you started. 5. Repeat 8 to 12 times. Internal rotator strengthening exercise    1. Start by tying a piece of elastic exercise material to a doorknob. You can use surgical tubing or Thera-Band. 2. Stand or sit with your shoulder relaxed and your elbow bent 90 degrees. Your upper arm should rest comfortably against your side. Squeeze a rolled towel between your elbow and your body for comfort. This will help keep your arm at your side. 3. Hold one end of the elastic band in the hand of the painful arm. 4. Slowly rotate your forearm toward your body until it touches your belly. Slowly move it back to where you started. 5. Keep your elbow and upper arm firmly tucked against the towel roll or at your side. 6. Repeat 8 to 12 times. Pendulum swing    1. Hold on to a table or the back of a chair with your good arm. Then bend forward a little and let your sore arm hang straight down. This exercise does not use the arm muscles.  Rather, use your legs and your hips to create movement that makes your arm swing freely. 2. Use the movement from your hips and legs to guide the slightly swinging arm back and forth like a pendulum (or elephant trunk). Then guide it in circles that start small (about the size of a dinner plate). Make the circles a bit larger each day, as your pain allows. 3. Do this exercise for 5 minutes, 5 to 7 times each day. 4. As you have less pain, try bending over a little farther to do this exercise. This will increase the amount of movement at your shoulder. Follow-up care is a key part of your treatment and safety. Be sure to make and go to all appointments, and call your doctor if you are having problems. It's also a good idea to know your test results and keep a list of the medicines you take. Where can you learn more? Go to http://lizbeth-efraín.info/. Enter H562 in the search box to learn more about \"Shoulder Arthritis: Exercises. \"  Current as of: September 20, 2018  Content Version: 11.9  © 5749-9758 [employer]. Care instructions adapted under license by [employer] (which disclaims liability or warranty for this information). If you have questions about a medical condition or this instruction, always ask your healthcare professional. [address] any warranty or liability for your use of this information. Patient Education        Osteoarthritis: Care Instructions  Your Care Instructions    Arthritis is a common health problem in which the joints are inflamed. There are several kinds of arthritis. Osteoarthritis is caused by a breakdown of cartilage, the hard, thick tissue that cushions the joints. It causes pain, stiffness, and swelling, often in the spine, fingers, hips, and knees. Osteoarthritis can happen at any age, but it is most common in older people. Osteoarthritis never goes away completely, but it can be controlled.  Medicine and home treatment can reduce the pain and prevent the arthritis from getting worse. Follow-up care is a key part of your treatment and safety. Be sure to make and go to all appointments, and call your doctor if you are having problems. It's also a good idea to know your test results and keep a list of the medicines you take. How can you care for yourself at home? · Take a warm shower or bath in the morning to relieve stiffness. Avoid sitting still afterwards. · If the joint is not swollen, use moist heat, like a warm, damp towel, for 20 to 30 minutes, 2 or 3 times a day. Do not use heat on a swollen joint. · If the joint is swollen, use ice or cold packs for 10 to 20 minutes, once an hour. Cold will help relieve pain and reduce inflammation. Put a thin cloth between the ice and your skin. · To prevent stiffness, gently move the joint through its full range of motion several times a day. · If the joint hurts, avoid activities that put a strain on it for a few days. Take rest breaks throughout the day. · Get regular exercise. Walking, swimming, yoga, biking, lida chi, and water aerobics are good exercises that are gentle on the joints. · Reach and stay at a healthy weight. If you need to lose or maintain weight, regular exercise and a healthy diet will help. Extra weight can strain the joints, especially the knees and hips, and make the pain worse. Losing even a few pounds may help. · Take pain medicines exactly as directed. ? If the doctor gave you a prescription medicine for pain, take it as prescribed. ? If you are not taking a prescription pain medicine, ask your doctor if you can take an over-the-counter medicine. When should you call for help?   Call your doctor now or seek immediate medical care if:    · The pain is so bad that you cannot use the joint.     · You have sudden back pain with weakness in your legs or loss of bowel or bladder control.     · Your stools are black and tarlike or have streaks of blood.     · You have severe pain and swelling in more than one joint.    Watch closely for changes in your health, and be sure to contact your doctor if:    · You have side effects from the medicines, like belly pain, ongoing heartburn, or nausea.     · Joint pain continues for more than 6 weeks, and home treatment is not helping. Where can you learn more? Go to http://lizbeth-efraín.info/. Enter Y080 in the search box to learn more about \"Osteoarthritis: Care Instructions. \"  Current as of: Kasie 10, 2018  Content Version: 11.9  © 8854-1360 [employer]. Care instructions adapted under license by [employer] (which disclaims liability or warranty for this information). If you have questions about a medical condition or this instruction, always ask your healthcare professional. [address] any warranty or liability for your use of this information. Patient Education        Whiplash: Care Instructions  Your Care Instructions  Whiplash occurs when your head is suddenly forced forward and then snapped backward, as might happen in a car accident or sports injury. This can cause pain and stiffness in your neck. Your head, chest, shoulders, and arms also may hurt. Most whiplash gets better with home care. Your doctor may advise you to take medicine to relieve pain or relax your muscles. He or she may suggest exercise and physical therapy to increase flexibility and relieve pain. You can try wearing a neck (cervical) collar to support your neck. For a while you probably will need to avoid lifting and other activities that can strain the neck. Follow-up care is a key part of your treatment and safety. Be sure to make and go to all appointments, and call your doctor if you are having problems. It's also a good idea to know your test results and keep a list of the medicines you take. How can you care for yourself at home? · Take pain medicines exactly as directed.   ? If the doctor gave you a prescription medicine for pain, take it as prescribed. ? If you are not taking a prescription pain medicine, ask your doctor if you can take an over-the-counter medicine. ? Do not take two or more pain medicines at the same time unless the doctor told you to. Many pain medicines have acetaminophen, which is Tylenol. Too much acetaminophen (Tylenol) can be harmful. · You can try using a soft foam collar to support your neck for short periods of time. You can buy one at most [employer]. Do not wear the collar more than 2 or 3 days unless your doctor tells you to. · You can try using heat and ice to see if it helps. ? Try using a heating pad on a low or medium setting for 15 to 20 minutes every 2 to 3 hours. Try a warm shower in place of one session with the heating pad. You can also buy single-use heat wraps that last up to 8 hours. ? You can also try an ice pack for 10 to 15 minutes every 2 to 3 hours. · Do not do anything that makes the pain worse. Take it easy for a couple of days. You can do your usual activities if they do not hurt your neck or put it at risk for more stress or injury. Avoid lifting, sports, or other activities that might strain your neck. · Try sleeping on a special neck pillow. Place it under your neck, not under your head. Placing a tightly rolled-up towel under your neck while you sleep will also work. If you use a neck pillow or rolled towel, do not use your regular pillow at the same time. · Once your neck pain is gone, do exercises to stretch your neck and back and make them stronger. Your doctor or physical therapist can tell you which exercises are best.  When should you call for help? Call 911 anytime you think you may need emergency care. For example, call if:    · You are unable to move an arm or a leg at all.   [facility] your doctor now or seek immediate medical care if:    · You have new or worse symptoms in your arms, legs, chest, belly, or buttocks.  Symptoms may include:  ? Numbness or tingling. ? Weakness. ? Pain.     · You lose bladder or bowel control.    Watch closely for changes in your health, and be sure to contact your doctor if:    · You are not getting better as expected. Where can you learn more? Go to http://lizbeth-efraín.info/. Enter E492 in the search box to learn more about \"Whiplash: Care Instructions. \"  Current as of: September 20, 2018  Content Version: 11.9  © 3327-5061 [employer]. Care instructions adapted under license by [employer] (which disclaims liability or warranty for this information). If you have questions about a medical condition or this instruction, always ask your healthcare professional. [address] any warranty or liability for your use of this information.
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
## 2019-03-29 DIAGNOSIS — Z79.4 TYPE 2 DIABETES MELLITUS WITH INSULIN THERAPY (HCC): ICD-10-CM

## 2019-03-29 DIAGNOSIS — E11.9 TYPE 2 DIABETES MELLITUS WITH INSULIN THERAPY (HCC): ICD-10-CM

## 2019-03-29 NOTE — TELEPHONE ENCOUNTER
Requested Prescriptions     Pending Prescriptions Disp Refills    Insulin Needles, Disposable, 32 gauge x 5/32\" ndle [Pharmacy Med Name: Sharad Hazard [id number]] 1 Pen Needle 11     Sig: USE  ONCE DAILY     Last office visit 1/14/19  Future 4/17/19  Last filled    Changes made to medication on last visit  none

## 2019-03-29 NOTE — TELEPHONE ENCOUNTER
Pt says she only has 2 insulin needles left. I did explain to her that provider will not be back until Monday.

## 2019-03-30 RX ORDER — PEN NEEDLE, DIABETIC 31 GX3/16"
NEEDLE, DISPOSABLE MISCELLANEOUS
Qty: 1 PEN NEEDLE | Refills: 11 | Status: SHIPPED | OUTPATIENT
Start: 2019-03-30 | End: 2020-07-01

## 2019-04-01 ENCOUNTER — HOSPITAL ENCOUNTER (EMERGENCY)
Age: 54
Discharge: HOME | End: 2019-04-01
Attending: EMERGENCY MEDICINE
Payer: MEDICAID

## 2019-04-01 VITALS
SYSTOLIC BLOOD PRESSURE: 141 MMHG | DIASTOLIC BLOOD PRESSURE: 79 MMHG | HEART RATE: 100 BPM | TEMPERATURE: 98.4 F | RESPIRATION RATE: 16 BRPM | OXYGEN SATURATION: 100 % | BODY MASS INDEX: 47.69 KG/M2 | WEIGHT: 279.32 LBS | HEIGHT: 64 IN

## 2019-04-01 DIAGNOSIS — S43.402D SPRAIN OF LEFT SHOULDER, UNSPECIFIED SHOULDER SPRAIN TYPE, SUBSEQUENT ENCOUNTER: Primary | ICD-10-CM

## 2019-04-01 PROCEDURE — 99283 EMERGENCY DEPT VISIT LOW MDM: CPT

## 2019-04-01 PROCEDURE — A4565 SLINGS: HCPCS

## 2019-04-01 PROCEDURE — 74011250637 HC RX REV CODE- 250/637: Performed by: EMERGENCY MEDICINE

## 2019-04-01 RX ORDER — OXYCODONE AND ACETAMINOPHEN 5; 325 MG/1; MG/1
TABLET ORAL
Status: DISCONTINUED
Start: 2019-04-01 | End: 2019-04-01 | Stop reason: HOSPADM

## 2019-04-01 RX ORDER — IBUPROFEN 400 MG/1
800 TABLET ORAL ONCE
Status: DISCONTINUED | OUTPATIENT
Start: 2019-04-01 | End: 2019-04-01

## 2019-04-01 RX ORDER — TIZANIDINE HYDROCHLORIDE 4 MG/1
4 CAPSULE, GELATIN COATED ORAL
Qty: 15 CAP | Refills: 0 | Status: SHIPPED | OUTPATIENT
Start: 2019-04-01 | End: 2019-04-16

## 2019-04-01 RX ORDER — OXYCODONE AND ACETAMINOPHEN 5; 325 MG/1; MG/1
1 TABLET ORAL ONCE
Status: COMPLETED | OUTPATIENT
Start: 2019-04-01 | End: 2019-04-01

## 2019-04-01 RX ADMIN — OXYCODONE AND ACETAMINOPHEN 1 TABLET: 5; 325 TABLET ORAL at 01:26
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
## 2019-04-15 PROCEDURE — 99282 EMERGENCY DEPT VISIT SF MDM: CPT

## 2019-04-16 ENCOUNTER — HOSPITAL ENCOUNTER (EMERGENCY)
Age: 54
Discharge: HOME | End: 2019-04-16
Attending: EMERGENCY MEDICINE
Payer: MEDICAID

## 2019-04-16 VITALS
HEART RATE: 87 BPM | HEIGHT: 64 IN | RESPIRATION RATE: 16 BRPM | TEMPERATURE: 98 F | DIASTOLIC BLOOD PRESSURE: 83 MMHG | OXYGEN SATURATION: 99 % | WEIGHT: 277.78 LBS | BODY MASS INDEX: 47.42 KG/M2 | SYSTOLIC BLOOD PRESSURE: 159 MMHG

## 2019-04-16 DIAGNOSIS — S46.912D STRAIN OF LEFT SHOULDER, SUBSEQUENT ENCOUNTER: Primary | ICD-10-CM
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
## 2019-04-16 PROCEDURE — 74011000250 HC RX REV CODE- 250: Performed by: EMERGENCY MEDICINE

## 2019-04-16 RX ORDER — DICLOFENAC POTASSIUM 50 MG/1
50 TABLET, FILM COATED ORAL 3 TIMES DAILY
Qty: 20 TAB | Refills: 0 | Status: SHIPPED | OUTPATIENT
Start: 2019-04-16 | End: 2019-04-16

## 2019-04-16 RX ORDER — LIDOCAINE 4 G/100G
1 PATCH TOPICAL EVERY 24 HOURS
Status: DISCONTINUED | OUTPATIENT
Start: 2019-04-16 | End: 2019-04-16 | Stop reason: HOSPADM

## 2019-04-16 RX ORDER — METHOCARBAMOL 500 MG/1
500 TABLET, FILM COATED ORAL 4 TIMES DAILY
Qty: 20 TAB | Refills: 0 | Status: SHIPPED | OUTPATIENT
Start: 2019-04-16 | End: 2019-07-01 | Stop reason: ALTCHOICE

## 2019-04-16 RX ORDER — METHOCARBAMOL 500 MG/1
500 TABLET, FILM COATED ORAL 4 TIMES DAILY
Qty: 20 TAB | Refills: 0 | Status: SHIPPED | OUTPATIENT
Start: 2019-04-16 | End: 2019-04-16

## 2019-04-16 RX ORDER — DICLOFENAC POTASSIUM 50 MG/1
50 TABLET, FILM COATED ORAL 3 TIMES DAILY
Qty: 20 TAB | Refills: 0 | Status: SHIPPED | OUTPATIENT
Start: 2019-04-16 | End: 2019-07-01 | Stop reason: ALTCHOICE
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
## 2019-04-17 RX ORDER — ISOSORBIDE DINITRATE 20 MG/1
20 TABLET ORAL 2 TIMES DAILY
Qty: 180 TAB | Refills: 1 | Status: SHIPPED | OUTPATIENT
Start: 2019-04-17 | End: 2020-01-02 | Stop reason: SDUPTHER

## 2019-04-17 NOTE — TELEPHONE ENCOUNTER
Last office visit:  1/14/19  Last filled:  Isordil 20mg BID (not listed)  No changes  No follow up scheduled

## 2019-04-17 NOTE — TELEPHONE ENCOUNTER
Requested Prescriptions     Pending Prescriptions Disp Refills    isosorbide dinitrate (ISORDIL) 20 mg tablet       Sig: Take 1 Tab by mouth two (2) times a day.

## 2019-04-29 NOTE — TELEPHONE ENCOUNTER
Last office visit:  01/14/2019  Last filled:    Hydralazine (not listed)  Humalog Insulin 5/11/18 #2 pckgs X 3 refills  No changes  No follow up scheduled

## 2019-04-29 NOTE — TELEPHONE ENCOUNTER
Requested Prescriptions     Pending Prescriptions Disp Refills    insulin lispro (HUMALOG) 100 unit/mL kwikpen 2 Package 3     Si Units by SubCUTAneous route Before breakfast, lunch, and dinner.  hydrALAZINE (APRESOLINE) 50 mg tablet       Sig: Take 1 Tab by mouth two (2) times a day. Pt said that hydralazine was changed to 100 mg every 8 hrs.

## 2019-04-30 ENCOUNTER — TELEPHONE (OUTPATIENT)
Dept: INTERNAL MEDICINE CLINIC | Age: 54
End: 2019-04-30

## 2019-04-30 RX ORDER — HYDRALAZINE HYDROCHLORIDE 50 MG/1
50 TABLET, FILM COATED ORAL 2 TIMES DAILY
Qty: 60 TAB | Refills: 1 | Status: SHIPPED | OUTPATIENT
Start: 2019-04-30 | End: 2019-07-01 | Stop reason: SDUPTHER

## 2019-04-30 RX ORDER — INSULIN LISPRO 100 [IU]/ML
12 INJECTION, SOLUTION INTRAVENOUS; SUBCUTANEOUS
Qty: 2 PACKAGE | Refills: 1 | Status: SHIPPED | OUTPATIENT
Start: 2019-04-30 | End: 2019-06-04 | Stop reason: ALTCHOICE

## 2019-05-13 NOTE — TELEPHONE ENCOUNTER
Requested Prescriptions     Pending Prescriptions Disp Refills    lisinopril (PRINIVIL, ZESTRIL) 10 mg tablet 30 Tab 5     Sig: TAKE ONE TABLET BY MOUTH ONCE DAILY

## 2019-05-15 NOTE — TELEPHONE ENCOUNTER
Patient was started on this medication in the hospital  Last office visit:  01/14/2019  Last filled:  Lisinopril 10mg once daily  No follow up scheduled

## 2019-05-16 RX ORDER — LISINOPRIL 10 MG/1
TABLET ORAL
Qty: 90 TAB | Refills: 1 | OUTPATIENT
Start: 2019-05-16

## 2019-05-16 NOTE — TELEPHONE ENCOUNTER
Requested Prescriptions     Pending Prescriptions Disp Refills    carvedilol (COREG) 6.25 mg tablet       Sig: Take 2 Tabs by mouth two (2) times a day.

## 2019-05-17 RX ORDER — CARVEDILOL 12.5 MG/1
12.5 TABLET ORAL 2 TIMES DAILY
Qty: 60 TAB | Refills: 5 | Status: SHIPPED | OUTPATIENT
Start: 2019-05-17 | End: 2019-11-25 | Stop reason: SDUPTHER

## 2019-05-17 NOTE — TELEPHONE ENCOUNTER
Requested Prescriptions     Pending Prescriptions Disp Refills    carvedilol (COREG) 6.25 mg tablet       Sig: Take 2 Tabs by mouth two (2) times a day.      Future Appointments:  No future appointments.    Last Appointment With Me:  1/14/2019

## 2019-05-20 ENCOUNTER — APPOINTMENT (OUTPATIENT)
Dept: GENERAL RADIOLOGY | Age: 54
End: 2019-05-20
Attending: EMERGENCY MEDICINE
Payer: MEDICAID

## 2019-05-20 ENCOUNTER — HOSPITAL ENCOUNTER (EMERGENCY)
Age: 54
Discharge: HOME | End: 2019-05-20
Attending: EMERGENCY MEDICINE
Payer: MEDICAID

## 2019-05-20 VITALS
WEIGHT: 275.57 LBS | OXYGEN SATURATION: 98 % | BODY MASS INDEX: 47.05 KG/M2 | SYSTOLIC BLOOD PRESSURE: 169 MMHG | DIASTOLIC BLOOD PRESSURE: 90 MMHG | HEIGHT: 64 IN | TEMPERATURE: 97.9 F | HEART RATE: 85 BPM | RESPIRATION RATE: 16 BRPM

## 2019-05-20 DIAGNOSIS — M25.511 CHRONIC RIGHT SHOULDER PAIN: Primary | ICD-10-CM

## 2019-05-20 DIAGNOSIS — G89.29 CHRONIC RIGHT SHOULDER PAIN: Primary | ICD-10-CM

## 2019-05-20 PROCEDURE — 99283 EMERGENCY DEPT VISIT LOW MDM: CPT

## 2019-05-20 PROCEDURE — 73030 X-RAY EXAM OF SHOULDER: CPT

## 2019-05-20 PROCEDURE — 96372 THER/PROPH/DIAG INJ SC/IM: CPT

## 2019-05-20 PROCEDURE — 74011250636 HC RX REV CODE- 250/636: Performed by: EMERGENCY MEDICINE

## 2019-05-20 PROCEDURE — 74011000250 HC RX REV CODE- 250: Performed by: EMERGENCY MEDICINE

## 2019-05-20 RX ORDER — LIDOCAINE 4 G/100G
1 PATCH TOPICAL
Status: DISCONTINUED | OUTPATIENT
Start: 2019-05-20 | End: 2019-05-20 | Stop reason: HOSPADM

## 2019-05-20 RX ORDER — KETOROLAC TROMETHAMINE 30 MG/ML
30 INJECTION, SOLUTION INTRAMUSCULAR; INTRAVENOUS
Status: COMPLETED | OUTPATIENT
Start: 2019-05-20 | End: 2019-05-20

## 2019-05-20 RX ORDER — KETOROLAC TROMETHAMINE 30 MG/ML
INJECTION, SOLUTION INTRAMUSCULAR; INTRAVENOUS
Status: DISCONTINUED
Start: 2019-05-20 | End: 2019-05-20 | Stop reason: HOSPADM

## 2019-05-20 RX ORDER — LIDOCAINE 50 MG/G
PATCH TOPICAL
Qty: 5 EACH | Refills: 0 | Status: SHIPPED | OUTPATIENT
Start: 2019-05-20

## 2019-05-20 RX ORDER — TRAMADOL HYDROCHLORIDE 50 MG/1
50 TABLET ORAL
Qty: 10 TAB | Refills: 0 | Status: SHIPPED | OUTPATIENT
Start: 2019-05-20 | End: 2019-05-21 | Stop reason: ALTCHOICE

## 2019-05-20 RX ORDER — LIDOCAINE 4 G/100G
PATCH TOPICAL
Status: DISCONTINUED
Start: 2019-05-20 | End: 2019-05-20 | Stop reason: HOSPADM

## 2019-05-20 RX ADMIN — KETOROLAC TROMETHAMINE 30 MG: 30 INJECTION, SOLUTION INTRAMUSCULAR at 01:58

## 2019-05-20 NOTE — DISCHARGE INSTRUCTIONS
Patient Education     Musculoskeletal Pain: Care Instructions  Your Care Instructions  Different problems with the bones, muscles, nerves, ligaments, and tendons in the body can cause pain. One or more areas of your body may ache or burn. Or they may feel tired, stiff, or sore. The medical term for this type of pain is musculoskeletal pain. It can have many different causes. Sometimes the pain is caused by an injury such as a strain or sprain. Or you might have pain from using one part of your body in the same way over and over again. This is called overuse. In some cases, the cause of the pain is another health problem such as arthritis or fibromyalgia. The doctor will examine you and ask you questions about your health to help find the cause of your pain. Blood tests or imaging tests like an X-ray may also be helpful. But sometimes doctors can't find a cause of the pain. Treatment depends on your symptoms and the cause of the pain, if known. The doctor has checked you carefully, but problems can develop later. If you notice any problems or new symptoms, get medical treatment right away. Follow-up care is a key part of your treatment and safety. Be sure to make and go to all appointments, and call your doctor if you are having problems. It's also a good idea to know your test results and keep a list of the medicines you take. How can you care for yourself at home? · Rest until you feel better. · Do not do anything that makes the pain worse. Return to exercise gradually if you feel better and your doctor says it's okay. · Be safe with medicines. Read and follow all instructions on the label. ¨ If the doctor gave you a prescription medicine for pain, take it as prescribed. ¨ If you are not taking a prescription pain medicine, ask your doctor if you can take an over-the-counter medicine. · Put ice or a cold pack on the area for 10 to 20 minutes at a time to ease pain.  Put a thin cloth between the ice and your skin. When should you call for help? Call your doctor now or seek immediate medical care if:  · You have new pain, or your pain gets worse. · You have new symptoms such as a fever, a rash, or chills. Watch closely for changes in your health, and be sure to contact your doctor if:  · You do not get better as expected. Where can you learn more? Go to [employer]  Enter Q624 in the search box to learn more about \"Musculoskeletal Pain: Care Instructions. \"   © 0935-7669 [employer]. Care instructions adapted under license by [facility] [employer] (which disclaims liability or warranty for this information). This care instruction is for use with your licensed healthcare professional. If you have questions about a medical condition or this instruction, always ask your healthcare professional. [address] any warranty or liability for your use of this information. Content Version: 51.5.320889; Current as of: November 20, 2015           Patient Education        Shoulder Pain: Care Instructions  Your Care Instructions    You can hurt your shoulder by using it too much during an activity, such as fishing or baseball. It can also happen as part of the everyday wear and tear of getting older. Shoulder injuries can be slow to heal, but your shoulder should get better with time. Your doctor may recommend a sling to rest your shoulder. If you have injured your shoulder, you may need testing and treatment. Follow-up care is a key part of your treatment and safety. Be sure to make and go to all appointments, and call your doctor if you are having problems. It's also a good idea to know your test results and keep a list of the medicines you take. How can you care for yourself at home? · Take pain medicines exactly as directed. ? If the doctor gave you a prescription medicine for pain, take it as prescribed.   ? If you are not taking a prescription pain medicine, ask your doctor if you can take an over-the-counter medicine. ? Do not take two or more pain medicines at the same time unless the doctor told you to. Many pain medicines contain acetaminophen, which is Tylenol. Too much acetaminophen (Tylenol) can be harmful. · If your doctor recommends that you wear a sling, use it as directed. Do not take it off before your doctor tells you to. · Put ice or a cold pack on the sore area for 10 to 20 minutes at a time. Put a thin cloth between the ice and your skin. · If there is no swelling, you can put moist heat, a heating pad, or a warm cloth on your shoulder. Some doctors suggest alternating between hot and cold. · Rest your shoulder for a few days. If your doctor recommends it, you can then begin gentle exercise of the shoulder, but do not lift anything heavy. When should you call for help? Call 911 anytime you think you may need emergency care. For example, call if:    · You have chest pain or pressure. This may occur with:  ? Sweating. ? Shortness of breath. ? Nausea or vomiting. ? Pain that spreads from the chest to the neck, jaw, or one or both shoulders or arms. ? Dizziness or lightheadedness. ? A fast or uneven pulse. After calling 911, chew 1 adult-strength aspirin. Wait for an ambulance. Do not try to drive yourself.     · Your arm or hand is cool or pale or changes color.    Call your doctor now or seek immediate medical care if:    · You have signs of infection, such as:  ? Increased pain, swelling, warmth, or redness in your shoulder. ? Red streaks leading from a place on your shoulder. ? Pus draining from an area of your shoulder. ? Swollen lymph nodes in your neck, armpits, or groin. ? A fever.    Watch closely for changes in your health, and be sure to contact your doctor if:    · You cannot use your shoulder.     · Your shoulder does not get better as expected. Where can you learn more? Go to http://lizbeth-efraín.info/.   Enter H996 in the search box to learn more about \"Shoulder Pain: Care Instructions. \"  Current as of: September 20, 2018  Content Version: 11.9  © 0504-3467 [employer]. Care instructions adapted under license by [employer] (which disclaims liability or warranty for this information). If you have questions about a medical condition or this instruction, always ask your healthcare professional. [place] [address] any warranty or liability for your use of this information.

## 2019-05-20 NOTE — LETTER
[address] 
[facility] EMERGENCY DEPT 
[address] [place] [address]. [id number] [phone] Work/School Note Date: 5/20/2019 To Whom It May concern: 
 
Raj Melgar was seen and treated today in the emergency room by the following provider(s): 
Attending Provider: Laurie Cho MD.   
 
Raj Melgar may return to work on 5/21/19.  
 
Sincerely, 
 
 
 
 
Tani Almazan MD
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
## 2019-05-21 ENCOUNTER — OFFICE VISIT (OUTPATIENT)
Dept: INTERNAL MEDICINE CLINIC | Age: 54
End: 2019-05-21

## 2019-05-21 VITALS
BODY MASS INDEX: 46.95 KG/M2 | OXYGEN SATURATION: 93 % | RESPIRATION RATE: 16 BRPM | WEIGHT: 275 LBS | HEART RATE: 87 BPM | SYSTOLIC BLOOD PRESSURE: 131 MMHG | DIASTOLIC BLOOD PRESSURE: 72 MMHG | HEIGHT: 64 IN | TEMPERATURE: 98.4 F

## 2019-05-21 DIAGNOSIS — M48.07 SPINAL STENOSIS OF LUMBOSACRAL REGION: ICD-10-CM

## 2019-05-21 DIAGNOSIS — Z79.4 TYPE 2 DIABETES MELLITUS WITH INSULIN THERAPY (HCC): Primary | ICD-10-CM

## 2019-05-21 DIAGNOSIS — I50.21 ACUTE SYSTOLIC CONGESTIVE HEART FAILURE (HCC): ICD-10-CM

## 2019-05-21 DIAGNOSIS — E11.21 TYPE 2 DIABETES MELLITUS WITH NEPHROPATHY (HCC): ICD-10-CM

## 2019-05-21 DIAGNOSIS — E11.40 TYPE 2 DIABETES MELLITUS WITH DIABETIC NEUROPATHY, WITH LONG-TERM CURRENT USE OF INSULIN (HCC): ICD-10-CM

## 2019-05-21 DIAGNOSIS — Z13.220 SCREENING CHOLESTEROL LEVEL: ICD-10-CM

## 2019-05-21 DIAGNOSIS — F11.20 NARCOTIC DEPENDENCE, EPISODIC USE (HCC): ICD-10-CM

## 2019-05-21 DIAGNOSIS — Z79.4 TYPE 2 DIABETES MELLITUS WITH DIABETIC NEUROPATHY, WITH LONG-TERM CURRENT USE OF INSULIN (HCC): ICD-10-CM

## 2019-05-21 DIAGNOSIS — E11.9 TYPE 2 DIABETES MELLITUS WITH INSULIN THERAPY (HCC): Primary | ICD-10-CM

## 2019-05-21 DIAGNOSIS — S91.331S PENETRATING WOUND OF RIGHT FOOT, SEQUELA: ICD-10-CM

## 2019-05-21 RX ORDER — CEPHALEXIN 500 MG/1
1 TABLET ORAL 3 TIMES DAILY
Qty: 21 TAB | Refills: 0 | Status: SHIPPED | OUTPATIENT
Start: 2019-05-21 | End: 2019-05-28

## 2019-05-21 RX ORDER — LISINOPRIL 10 MG/1
10 TABLET ORAL DAILY
Qty: 90 TAB | Refills: 1 | Status: SHIPPED | OUTPATIENT
Start: 2019-05-21 | End: 2020-05-05 | Stop reason: SDUPTHER

## 2019-05-21 NOTE — PATIENT INSTRUCTIONS
Learning About Foot and Toenail Care Foot and toenail care: Overview Checking your loved one's feet and keeping them clean and soft can help prevent cracks and infection in the skin. This is especially important for people who have diabetes. Keeping toenails trimmedand polished if that's what the person likesalso helps the person feel well-groomed. If the person you care for has diabetes or has foot problems, such as bad bunions and corns, think about taking them to see a podiatrist. This is a doctor who specializes in the care of the feet. Sometimes a podiatrist will come to the home if the person can't go out for visits. Try to take the person for salon pedicures if that is what they want. It's a chance to get out and see people and continue a favorite activity. You can do basic nail care at home. Usually all you need to do is keep the nails clean and at a safe length. How do you trim someone's toenails? Try to trim the person's nails every week. Or check the nails each week to see if they need to be trimmed. It's easiest to trim nails after the person has had a shower or foot bath. It makes the nails softer and easier to trim. Start by gathering your supplies. You will need toenail clippers and a nail file. You may also need nail polish and nail polish remover. To trim the nails: 
1. Wash and dry your hands. You don't need to wear gloves. 2. Use nail polish remover to take off any polish. 3. Hold the person's foot and toe steady with one hand while you trim the nail with your other hand. Trim the nails straight across. Leave the nails a little longer at the corners so that the sharp ends don't cut into the skin. 4. Keep the nails no longer than the tip of the toes. 5. Let the nails dry if they are still damp and soft. 6. Use a nail file to gently smooth the edges of the nails, especially at the corners. They may be sharp after the nails are cut straight. 7. Apply nail polish, if the person wants it. If the person's nails are thick and discolored, it may be safest to have a podiatrist cut them. What else do you need to know? When you're caring for someone's nails, it is important to remember not to trim or cut the cuticles. A minor cut in a cuticle could lead to an infection. Wash the feet daily in the shower or bath or in a basin made for washing feet. It's extra important to wash the feet carefully if the person has diabetes. After washing the feet, dry gently. Put lotion on the feet, especially on the heels. But don't put it between the toes. If the person doesn't have diabetes and you see signs of athlete's foot (such as dry, cracking, or itchy skin between the toes), you can try an over-the-counter medicine. These medicines can kill the fungus that causes athlete's foot. If the problem doesn't go away, talk to the person's doctor. Look every day for cuts or signs of infection, such as pain, swelling, redness, or warmth. If you see any of these signsespecially in someone who has diabetescall the doctor. Where can you learn more? Go to http://lizbeth-efraín.info/. Enter A726 in the search box to learn more about \"Learning About Foot and Toenail Care. \" Current as of: April 18, 2018 Content Version: 11.9 © 7240-0792 [employer]. Care instructions adapted under license by [employer] (which disclaims liability or warranty for this information). If you have questions about a medical condition or this instruction, always ask your healthcare professional. [address] any warranty or liability for your use of this information. Learning About Diabetes Food Guidelines Your Care Instructions Meal planning is important to manage diabetes. It helps keep your blood sugar at a target level (which you set with your doctor).  You don't have to eat special foods. You can eat what your family eats, including sweets once in a while. But you do have to pay attention to how often you eat and how much you eat of certain foods. You may want to work with a dietitian or a certified diabetes educator (CDE) to help you plan meals and snacks. A dietitian or CDE can also help you lose weight if that is one of your goals. What should you know about eating carbs? Managing the amount of carbohydrate (carbs) you eat is an important part of healthy meals when you have diabetes. Carbohydrate is found in many foods. · Learn which foods have carbs. And learn the amounts of carbs in different foods. ? Bread, cereal, pasta, and rice have about 15 grams of carbs in a serving. A serving is 1 slice of bread (1 ounce), ½ cup of cooked cereal, or 1/3 cup of cooked pasta or rice. ? Fruits have 15 grams of carbs in a serving. A serving is 1 small fresh fruit, such as an apple or orange; ½ of a banana; ½ cup of cooked or canned fruit; ½ cup of fruit juice; 1 cup of melon or raspberries; or 2 tablespoons of dried fruit. ? Milk and no-sugar-added yogurt have 15 grams of carbs in a serving. A serving is 1 cup of milk or 2/3 cup of no-sugar-added yogurt. ? Starchy vegetables have 15 grams of carbs in a serving. A serving is ½ cup of mashed potatoes or sweet potato; 1 cup winter squash; ½ of a small baked potato; ½ cup of cooked beans; or ½ cup cooked corn or green peas. · Learn how much carbs to eat each day and at each meal. A dietitian or CDE can teach you how to keep track of the amount of carbs you eat. This is called carbohydrate counting. · If you are not sure how to count carbohydrate grams, use the Plate Method to plan meals. It is a good, quick way to make sure that you have a balanced meal. It also helps you spread carbs throughout the day. ? Divide your plate by types of foods.  Put non-starchy vegetables on half the plate, meat or other protein food on one-quarter of the plate, and a grain or starchy vegetable in the final quarter of the plate. To this you can add a small piece of fruit and 1 cup of milk or yogurt, depending on how many carbs you are supposed to eat at a meal. 
· Try to eat about the same amount of carbs at each meal. Do not \"save up\" your daily allowance of carbs to eat at one meal. 
· Proteins have very little or no carbs per serving. Examples of proteins are beef, chicken, turkey, fish, eggs, tofu, cheese, cottage cheese, and peanut butter. A serving size of meat is 3 ounces, which is about the size of a deck of cards. Examples of meat substitute serving sizes (equal to 1 ounce of meat) are 1/4 cup of cottage cheese, 1 egg, 1 tablespoon of peanut butter, and ½ cup of tofu. How can you eat out and still eat healthy? · Learn to estimate the serving sizes of foods that have carbohydrate. If you measure food at home, it will be easier to estimate the amount in a serving of restaurant food. · If the meal you order has too much carbohydrate (such as potatoes, corn, or baked beans), ask to have a low-carbohydrate food instead. Ask for a salad or green vegetables. · If you use insulin, check your blood sugar before and after eating out to help you plan how much to eat in the future. · If you eat more carbohydrate at a meal than you had planned, take a walk or do other exercise. This will help lower your blood sugar. What else should you know? · Limit saturated fat, such as the fat from meat and dairy products. This is a healthy choice because people who have diabetes are at higher risk of heart disease. So choose lean cuts of meat and nonfat or low-fat dairy products. Use olive or canola oil instead of butter or shortening when cooking. · Don't skip meals. Your blood sugar may drop too low if you skip meals and take insulin or certain medicines for diabetes. · Check with your doctor before you drink alcohol. Alcohol can cause your blood sugar to drop too low. Alcohol can also cause a bad reaction if you take certain diabetes medicines. Follow-up care is a key part of your treatment and safety. Be sure to make and go to all appointments, and call your doctor if you are having problems. It's also a good idea to know your test results and keep a list of the medicines you take. Where can you learn more? Go to http://lizbeth-efraín.info/. Enter D629 in the search box to learn more about \"Learning About Diabetes Food Guidelines. \" Current as of: July 25, 2018 Content Version: 11.9 © 1503-0136 [employer]. Care instructions adapted under license by [employer] (which disclaims liability or warranty for this information). If you have questions about a medical condition or this instruction, always ask your healthcare professional. [address] any warranty or liability for your use of this information.

## 2019-05-21 NOTE — PROGRESS NOTES
I have reviewed the patient's medical history in detail and updated the computerized patient record. Health Maintenance reviewed. 1. Have you been to the ER, urgent care clinic since your last visit? Hospitalized since your last visit? yes    2. Have you seen or consulted any other health care providers outside of the [address] [place] [address] [place] since your last visit? Include any pap smears or colon screening. [facility] Adm       Encouraged pt to discuss pt's wishes with spouse/partner/family and bring them in the next appt to follow thru with the Advanced Directive    Fall Risk Assessment, last 12 mths 5/21/2019   Able to walk? Yes   Fall in past 12 months? No   Fall with injury? -   Number of falls in past 12 months -   Fall Risk Score -       3 most recent PHQ Screens 5/21/2019   Little interest or pleasure in doing things Several days   Feeling down, depressed, irritable, or hopeless Several days   Total Score PHQ 2 2       Abuse Screening Questionnaire 5/21/2019   Do you ever feel afraid of your partner? N   Are you in a relationship with someone who physically or mentally threatens you? N   Is it safe for you to go home? Y       ADL Assessment 5/21/2019   Feeding yourself No Help Needed   Getting from bed to chair No Help Needed   Getting dressed No Help Needed   Bathing or showering No Help Needed   Walk across the room (includes cane/walker) No Help Needed   Using the telphone No Help Needed   Taking your medications No Help Needed   Preparing meals No Help Needed   Managing money (expenses/bills) No Help Needed   Moderately strenuous housework (laundry) No Help Needed   Shopping for personal items (toiletries/medicines) No Help Needed   Shopping for groceries No Help Needed   Driving No Help Needed   Climbing a flight of stairs No Help Needed   Getting to places beyond walking distances No Help Needed       Redressed pt's R/Great Toe with 4x4s and tape after cleaning with sterile water.   Pt tolerated well.      As per pt, TDAP two years ago.

## 2019-05-22 LAB
25(OH)D3+25(OH)D2 SERPL-MCNC: 35 NG/ML (ref 30–100)
BUN SERPL-MCNC: 28 MG/DL (ref 6–24)
BUN/CREAT SERPL: 15 (ref 9–23)
CALCIUM SERPL-MCNC: 9.2 MG/DL (ref 8.7–10.2)
CHLORIDE SERPL-SCNC: 110 MMOL/L (ref 96–106)
CHOLEST SERPL-MCNC: 145 MG/DL (ref 100–199)
CO2 SERPL-SCNC: 22 MMOL/L (ref 20–29)
CREAT SERPL-MCNC: 1.91 MG/DL (ref 0.57–1)
EST. AVERAGE GLUCOSE BLD GHB EST-MCNC: 154 MG/DL
GLUCOSE SERPL-MCNC: 56 MG/DL (ref 65–99)
HBA1C MFR BLD: 7 % (ref 4.8–5.6)
HDLC SERPL-MCNC: 41 MG/DL
INTERPRETATION, 910389: NORMAL
INTERPRETATION: NORMAL
LDLC SERPL CALC-MCNC: 86 MG/DL (ref 0–99)
Lab: NORMAL
PDF IMAGE, 910387: NORMAL
POTASSIUM SERPL-SCNC: 4.8 MMOL/L (ref 3.5–5.2)
SODIUM SERPL-SCNC: 144 MMOL/L (ref 134–144)
TRIGL SERPL-MCNC: 89 MG/DL (ref 0–149)
VLDLC SERPL CALC-MCNC: 18 MG/DL (ref 5–40)

## 2019-05-22 NOTE — PROGRESS NOTES
Subjective:     Nichelle Hernandez is an 47 y.o. female who is seen for follow up of CHF. She has diabetes, hypertension, hyperlipidemia, CHF and Systolic HF. She notes a wound on her right foot, the big toe. Is been there for 2 days, no specific injury. Diet and Lifestyle: does not rigorously follow a diabetic diet, nonsmoker. Has no known peripheral artery disease. No reports of hypoglycemia, says her blood sugars have been good. Weight monitoring: has been stable  She is being [place] by [facility]. Reports that at one time her ejection fraction was fairly low but since management with them it has improved significantly. Continues to get her pain medications from pain manager or at [facility]. Needs refills of her other medications, does not take losartan any longer. Switched over to lisinopril. Minimal complaints of cough. Cardiovascular System Review  Cardiovascular ROS - taking medications as instructed, no medication side effects noted, no chest pain on exertion, notes stable dyspnea on exertion, no change, no swelling of ankles. They are requesting that she was some weight. Patient Active Problem List    Diagnosis Date Noted    Type 2 diabetes mellitus with diabetic neuropathy, with long-term current use of insulin ([facility] [address] 00/37/5984    Acute systolic congestive heart failure ([facility] [address] 02/07/2018    Obesity, morbid ([facility] [address] 12/18/2017    Type 2 diabetes mellitus with nephropathy ([facility] [address] 12/18/2017    Spinal stenosis of lumbosacral region 12/08/2016    Lumbar back pain with radiculopathy affecting right lower extremity 12/08/2016    Idiopathic small and large fiber sensory neuropathy 11/16/2016    Narcotic dependence, episodic use (HCC) 04/29/2016    Chronic pain     Hypertension 11/07/2012     Current Outpatient Medications   Medication Sig Dispense Refill    lisinopril (PRINIVIL, ZESTRIL) 10 mg tablet Take 1 Tab by mouth daily.  90 Tab 1    cephalexin 500 mg tab Take 1 Tab by mouth three (3) times daily for 7 days. 21 Tab 0    HUMALOG KWIKPEN INSULIN 100 unit/mL kwikpen INJECT 12 UNITS SUB-Q BEFORE BREAKFAST , LUNCH AND DINNER 5 Pen 5    lidocaine (LIDODERM) 5 % Apply patch to the affected area for 12 hours a day and remove for 12 hours a day. 5 Each 0    carvedilol (COREG) 12.5 mg tablet Take 1 Tab by mouth two (2) times a day. 60 Tab 5    insulin lispro (HUMALOG) 100 unit/mL kwikpen 12 Units by SubCUTAneous route Before breakfast, lunch, and dinner. 2 Package 1    hydrALAZINE (APRESOLINE) 50 mg tablet Take 1 Tab by mouth two (2) times a day. 60 Tab 1    isosorbide dinitrate (ISORDIL) 20 mg tablet Take 1 Tab by mouth two (2) times a day. 180 Tab 1    methocarbamol (ROBAXIN) 500 mg tablet Take 1 Tab by mouth four (4) times daily. 20 Tab 0    diclofenac potassium (CATAFLAM) 50 mg tablet Take 1 Tab by mouth three (3) times daily. 20 Tab 0    Insulin Needles, Disposable, 32 gauge x 5/32\" ndle USE  ONCE DAILY 1 Pen Needle 11    Blood-Glucose Meter (TRUE METRIX GLUCOSE METER) misc Check blood sugar up to three times daily 1 Each 0    glucose blood VI test strips (TRUE METRIX GLUCOSE TEST STRIP) strip Check blood sugar up to three times daily 100 Strip 11    cyanocobalamin (VITAMIN B-12) 500 mcg tablet Take 1 Tab by mouth daily. 30 Tab 5    oxyCODONE-acetaminophen (PERCOCET) 5-325 mg per tablet Take 1 Tab by mouth two (2) times a day.  gabapentin (NEURONTIN) 600 mg tablet Take 1 Tab by mouth three (3) times daily. 90 Tab 5    torsemide (DEMADEX) 10 mg tablet 2 per day for 3 to 4 days then 1 daily 90 Tab 1    insulin glargine (LANTUS,BASAGLAR) 100 unit/mL (3 mL) inpn 40 Units by SubCUTAneous route nightly. 10 Pen 3    diclofenac (VOLTAREN) 1 % gel Apply  to affected area four (4) times daily. 1 Each 5    Cholecalciferol, Vitamin D3, 50,000 unit cap Take  by mouth.  atorvastatin (LIPITOR) 40 mg tablet Take  by mouth daily.       aspirin delayed-release 81 mg tablet Take  by mouth daily. Allergies   Allergen Reactions    Lortab [Hydrocodone-Acetaminophen] Nausea and Vomiting    Cymbalta [Duloxetine] Diarrhea    Lasix [Furosemide] Swelling    Lyrica [Pregabalin] Drowsiness     Past Medical History:   Diagnosis Date    Anemia     CAD (coronary artery disease)     Chronic pain     Cyst in hand 2014    [address]    Diabetes mellitus type 2 in obese ([address]     Diabetic neuropathy ([facility] [address]     Hypertension     Migraine      Social History     Tobacco Use    Smoking status: Never Smoker    Smokeless tobacco: Never Used   Substance Use Topics    Alcohol use: No     Alcohol/week: 0.0 oz         at [facility] wants her to get labs, see sheet. Review of Systems, additional:  Pertinent items are noted in HPI. Objective:     Visit Vitals  /72 (BP 1 Location: Left arm, BP Patient Position: Sitting)   Pulse 87   Temp 98.4 °F (36.9 °C) (Oral)   Resp 16   Ht 5' 4\" (1.626 m)   Wt 275 lb (124.7 kg)   LMP 12/03/2017   SpO2 93%   BMI 47.20 kg/m²     General:  alert, fatigued, cooperative, no distress, appears older than stated age   Neck: not examined, no JVD   Chest Wall: inspection normal - no chest wall deformities or tenderness, respiratory effort normal   Lung: clear to auscultation bilaterally   Heart:  normal rate, regular rhythm, normal S1, S2, no murmurs, rubs, clicks or gallops   Abdomen: soft, non-tender. Bowel sounds normal. No masses,  no organomegaly   Extremities: no edema, right toe where there is a somewhat thickened callus has wound there is some blood and there is a slight odor to the area. Sensation reduced, pulses present. Lab review: orders written for new lab studies as appropriate; see orders. Assessment/Plan:     diabetes poorly controlled, needs to follow diet more regularly, hypertension well controlled, stable, hyperlipidemia stable. Diabetic foot wound needs to see surgeon, started on antibiotic. Discussed this with surgeon who will see her in 2 days. Did mention that this can be a limb threatening infection. Chronic Conditions Addressed Today     1. Type 2 diabetes mellitus with nephropathy (HCC)     Relevant Medications     lisinopril (PRINIVIL, ZESTRIL) 10 mg tablet     Other Relevant Orders     OR HANDLG&/OR CONVEY OF SPEC FOR TR OFFICE TO LAB    2. Type 2 diabetes mellitus with diabetic neuropathy, with long-term current use of insulin (HCC)     Relevant Medications     lisinopril (PRINIVIL, ZESTRIL) 10 mg tablet     Other Relevant Orders     OR HANDLG&/OR CONVEY OF SPEC FOR TR OFFICE TO LAB    3. Spinal stenosis of lumbosacral region     Relevant Orders     OR HANDLG&/OR CONVEY OF SPEC FOR TR OFFICE TO LAB    4. Narcotic dependence, episodic use (HCC)     Relevant Orders     OR HANDLG&/OR CONVEY OF SPEC FOR TR OFFICE TO LAB    5.  Acute systolic congestive heart failure (HCC)     Relevant Medications     lisinopril (PRINIVIL, ZESTRIL) 10 mg tablet     Other Relevant Orders     OR HANDLG&/OR CONVEY OF SPEC FOR TR OFFICE TO LAB      Acute Diagnoses Addressed Today     Type 2 diabetes mellitus with insulin therapy ([facility] [address]    -  Primary        Relevant Medications        lisinopril (PRINIVIL, ZESTRIL) 10 mg tablet        Other Relevant Orders        METABOLIC PANEL, BASIC        HEMOGLOBIN A1C WITH EAG        VITAMIN D, 25 HYDROXY        OR HANDLG&/OR CONVEY OF SPEC FOR TR OFFICE TO LAB    Penetrating wound of right foot, sequela            Relevant Medications        cephalexin 500 mg tab        Other Relevant Orders        OR HANDLG&/OR CONVEY OF SPEC FOR TR OFFICE TO LAB        REFERRAL TO GENERAL SURGERY    Screening cholesterol level            Relevant Orders        LIPID PANEL        OR HANDLG&/OR CONVEY OF SPEC FOR TR OFFICE TO LAB        Orders Placed This Encounter    METABOLIC PANEL, BASIC    LIPID PANEL    HEMOGLOBIN A1C WITH EAG    VITAMIN D, 25 HYDROXY    REFERRAL TO GENERAL SURGERY     Referral Priority:   Routine     Referral Type: Consultation     Referral Reason:   Specialty Services Required     Referred to Provider:   Bria Crisostomo MD     Number of Visits Requested:   1    DE HANDLG&/OR CONVEY OF SPEC FOR TR OFFICE TO LAB    lisinopril (PRINIVIL, ZESTRIL) 10 mg tablet     Sig: Take 1 Tab by mouth daily. Dispense:  90 Tab     Refill:  1    cephalexin 500 mg tab     Sig: Take 1 Tab by mouth three (3) times daily for 7 days. Dispense:  21 Tab     Refill:  0     See patient instructions, went over them personally with the patient. Emphasized compliance. Questions answered. Patient states that they understand the plan of action and will call if there are any issues or misunderstandings. We did briefly talk about diet. She is going to do a low carbohydrate diet. Follow-up and Dispositions    · Return in about 1 month (around 6/18/2019) for routine follow up.      \

## 2019-05-25 ENCOUNTER — TELEPHONE (OUTPATIENT)
Dept: INTERNAL MEDICINE CLINIC | Age: 54
End: 2019-05-25

## 2019-05-26 ENCOUNTER — HOSPITAL ENCOUNTER (EMERGENCY)
Age: 54
Discharge: HOME | End: 2019-05-27
Attending: EMERGENCY MEDICINE | Admitting: EMERGENCY MEDICINE
Payer: MEDICAID

## 2019-05-26 VITALS
WEIGHT: 278.22 LBS | TEMPERATURE: 98.6 F | HEART RATE: 84 BPM | HEIGHT: 64 IN | OXYGEN SATURATION: 100 % | BODY MASS INDEX: 47.5 KG/M2 | DIASTOLIC BLOOD PRESSURE: 100 MMHG | SYSTOLIC BLOOD PRESSURE: 180 MMHG | RESPIRATION RATE: 18 BRPM

## 2019-05-26 DIAGNOSIS — R03.0 ELEVATED BLOOD PRESSURE READING: ICD-10-CM

## 2019-05-26 DIAGNOSIS — Z76.5 DRUG-SEEKING BEHAVIOR: ICD-10-CM

## 2019-05-26 DIAGNOSIS — M25.512 CHRONIC LEFT SHOULDER PAIN: Primary | ICD-10-CM

## 2019-05-26 DIAGNOSIS — G89.29 CHRONIC LEFT SHOULDER PAIN: Primary | ICD-10-CM

## 2019-05-26 PROCEDURE — 74011000250 HC RX REV CODE- 250: Performed by: PHYSICIAN ASSISTANT

## 2019-05-26 PROCEDURE — 96372 THER/PROPH/DIAG INJ SC/IM: CPT

## 2019-05-26 PROCEDURE — 74011250636 HC RX REV CODE- 250/636: Performed by: PHYSICIAN ASSISTANT

## 2019-05-26 PROCEDURE — 99283 EMERGENCY DEPT VISIT LOW MDM: CPT

## 2019-05-26 RX ORDER — LIDOCAINE 4 G/100G
1 PATCH TOPICAL
Status: DISCONTINUED | OUTPATIENT
Start: 2019-05-26 | End: 2019-05-27 | Stop reason: HOSPADM

## 2019-05-26 RX ORDER — KETOROLAC TROMETHAMINE 30 MG/ML
30 INJECTION, SOLUTION INTRAMUSCULAR; INTRAVENOUS
Status: COMPLETED | OUTPATIENT
Start: 2019-05-26 | End: 2019-05-26

## 2019-05-26 RX ADMIN — KETOROLAC TROMETHAMINE 30 MG: 30 INJECTION, SOLUTION INTRAMUSCULAR at 23:34

## 2019-05-27 ENCOUNTER — TELEPHONE (OUTPATIENT)
Dept: INTERNAL MEDICINE CLINIC | Age: 54
End: 2019-05-27

## 2019-05-27 NOTE — DISCHARGE INSTRUCTIONS
Patient Education        Chronic Pain: Care Instructions  Your Care Instructions    Chronic pain is pain that lasts a long time (months or even years) and may or may not have a clear cause. It is different from acute pain, which usually does have a clear cause--like an injury or illness--and gets better over time. Chronic pain:  · Lasts over time but may vary from day to day. · Does not go away despite efforts to end it. · May disrupt your sleep and lead to fatigue. · May cause depression or anxiety. · May make your muscles tense, causing more pain. · Can disrupt your work, hobbies, home life, and relationships with friends and family. Chronic pain is a very real condition. It is not just in your head. Treatment can help and usually includes several methods used together, such as medicines, physical therapy, exercise, and other treatments. Learning how to relax and changing negative thought patterns can also help you cope. Chronic pain is complex. Taking an active role in your treatment will help you better manage your pain. Tell your doctor if you have trouble dealing with your pain. You may have to try several things before you find what works best for you. Follow-up care is a key part of your treatment and safety. Be sure to make and go to all appointments, and call your doctor if you are having problems. It's also a good idea to know your test results and keep a list of the medicines you take. How can you care for yourself at home? · Pace yourself. Break up large jobs into smaller tasks. Save harder tasks for days when you have less pain, or go back and forth between hard tasks and easier ones. Take rest breaks. · Relax, and reduce stress. Relaxation techniques such as deep breathing or meditation can help. · Keep moving. Gentle, daily exercise can help reduce pain over the long run. Try low- or no-impact exercises such as walking, swimming, and stationary biking.  Do stretches to stay flexible. · Try heat, cold packs, and massage. · Get enough sleep. Chronic pain can make you tired and drain your energy. Talk with your doctor if you have trouble sleeping because of pain. · Think positive. Your thoughts can affect your pain level. Do things that you enjoy to distract yourself when you have pain instead of focusing on the pain. See a movie, read a book, listen to music, or spend time with a friend. · If you think you are depressed, talk to your doctor about treatment. · Keep a daily pain diary. Record how your moods, thoughts, sleep patterns, activities, and medicine affect your pain. You may find that your pain is worse during or after certain activities or when you are feeling a certain emotion. Having a record of your pain can help you and your doctor find the best ways to treat your pain. · Take pain medicines exactly as directed. ? If the doctor gave you a prescription medicine for pain, take it as prescribed. ? If you are not taking a prescription pain medicine, ask your doctor if you can take an over-the-counter medicine. Reducing constipation caused by pain medicine  · Include fruits, vegetables, beans, and whole grains in your diet each day. These foods are high in fiber. · Drink plenty of fluids, enough so that your urine is light yellow or clear like water. If you have kidney, heart, or liver disease and have to limit fluids, talk with your doctor before you increase the amount of fluids you drink. · If your doctor recommends it, get more exercise. Walking is a good choice. Bit by bit, increase the amount you walk every day. Try for at least 30 minutes on most days of the week. · Schedule time each day for a bowel movement. A daily routine may help. Take your time and do not strain when having a bowel movement. When should you call for help?   Call your doctor now or seek immediate medical care if:    · Your pain gets worse or is out of control.     · You feel down or blue, or you do not enjoy things like you once did. You may be depressed, which is common in people with chronic pain. Depression can be treated.     · You have vomiting or cramps for more than 2 hours.    Watch closely for changes in your health, and be sure to contact your doctor if:    · You cannot sleep because of pain.     · You are very worried or anxious about your pain.     · You have trouble taking your pain medicine.     · You have any concerns about your pain medicine.     · You have trouble with bowel movements, such as:  ? No bowel movement in 3 days. ? Blood in the anal area, in your stool, or on the toilet paper. ? Diarrhea for more than 24 hours. Where can you learn more? Go to http://lizbeth-efraín.info/. Enter N004 in the search box to learn more about \"Chronic Pain: Care Instructions. \"  Current as of: Kasie 3, 2018  Content Version: 11.9  © 4882-5929 [employer]. Care instructions adapted under license by [employer] (which disclaims liability or warranty for this information). If you have questions about a medical condition or this instruction, always ask your healthcare professional. [place] [address] any warranty or liability for your use of this information. Patient Education        Shoulder Pain: Care Instructions  Your Care Instructions    You can hurt your shoulder by using it too much during an activity, such as fishing or baseball. It can also happen as part of the everyday wear and tear of getting older. Shoulder injuries can be slow to heal, but your shoulder should get better with time. Your doctor may recommend a sling to rest your shoulder. If you have injured your shoulder, you may need testing and treatment. Follow-up care is a key part of your treatment and safety. Be sure to make and go to all appointments, and call your doctor if you are having problems.  It's also a good idea to know your test results and keep a list of the medicines you take. How can you care for yourself at home? · Take pain medicines exactly as directed. ? If the doctor gave you a prescription medicine for pain, take it as prescribed. ? If you are not taking a prescription pain medicine, ask your doctor if you can take an over-the-counter medicine. ? Do not take two or more pain medicines at the same time unless the doctor told you to. Many pain medicines contain acetaminophen, which is Tylenol. Too much acetaminophen (Tylenol) can be harmful. · If your doctor recommends that you wear a sling, use it as directed. Do not take it off before your doctor tells you to. · Put ice or a cold pack on the sore area for 10 to 20 minutes at a time. Put a thin cloth between the ice and your skin. · If there is no swelling, you can put moist heat, a heating pad, or a warm cloth on your shoulder. Some doctors suggest alternating between hot and cold. · Rest your shoulder for a few days. If your doctor recommends it, you can then begin gentle exercise of the shoulder, but do not lift anything heavy. When should you call for help? Call 911 anytime you think you may need emergency care. For example, call if:    · You have chest pain or pressure. This may occur with:  ? Sweating. ? Shortness of breath. ? Nausea or vomiting. ? Pain that spreads from the chest to the neck, jaw, or one or both shoulders or arms. ? Dizziness or lightheadedness. ? A fast or uneven pulse. After calling 911, chew 1 adult-strength aspirin. Wait for an ambulance. Do not try to drive yourself.     · Your arm or hand is cool or pale or changes color.    Call your doctor now or seek immediate medical care if:    · You have signs of infection, such as:  ? Increased pain, swelling, warmth, or redness in your shoulder. ? Red streaks leading from a place on your shoulder. ? Pus draining from an area of your shoulder. ? Swollen lymph nodes in your neck, armpits, or groin. ? A fever.  Watch closely for changes in your health, and be sure to contact your doctor if:    · You cannot use your shoulder.     · Your shoulder does not get better as expected. Where can you learn more? Go to http://lizbeth-efraín.info/. Enter V141 in the search box to learn more about \"Shoulder Pain: Care Instructions. \"  Current as of: September 20, 2018  Content Version: 11.9  © 6825-7549 [employer]. Care instructions adapted under license by [employer] (which disclaims liability or warranty for this information). If you have questions about a medical condition or this instruction, always ask your healthcare professional. [place] [address] any warranty or liability for your use of this information. Patient Education        Elevated Blood Pressure: Care Instructions  Your Care Instructions    Blood pressure is a measure of how hard the blood pushes against the walls of your arteries. It's normal for blood pressure to go up and down throughout the day. But if it stays up over time, you have high blood pressure. Two numbers tell you your blood pressure. The first number is the systolic pressure. It shows how hard the blood pushes when your heart is pumping. The second number is the diastolic pressure. It shows how hard the blood pushes between heartbeats, when your heart is relaxed and filling with blood. An ideal blood pressure in adults is less than 120/80 (say \"120 over 80\"). High blood pressure is 140/90 or higher. You have high blood pressure if your top number is 140 or higher or your bottom number is 90 or higher, or both. The main test for high blood pressure is simple, fast, and painless. To diagnose high blood pressure, your doctor will test your blood pressure at different times. After testing your blood pressure, your doctor may ask you to test it again when you are home.   If you are diagnosed with high blood pressure, you can work with your doctor to make a long-term plan to manage it. Follow-up care is a key part of your treatment and safety. Be sure to make and go to all appointments, and call your doctor if you are having problems. It's also a good idea to know your test results and keep a list of the medicines you take. How can you care for yourself at home? · Do not smoke. Smoking increases your risk for heart attack and stroke. If you need help quitting, talk to your doctor about stop-smoking programs and medicines. These can increase your chances of quitting for good. · Stay at a healthy weight. · Try to limit how much sodium you eat to less than 2,300 milligrams (mg) a day. Your doctor may ask you to try to eat less than 1,500 mg a day. · Be physically active. Get at least 30 minutes of exercise on most days of the week. Walking is a good choice. You also may want to do other activities, such as running, swimming, cycling, or playing tennis or team sports. · Avoid or limit alcohol. Talk to your doctor about whether you can drink any alcohol. · Eat plenty of fruits, vegetables, and low-fat dairy products. Eat less saturated and total fats. · Learn how to check your blood pressure at home. When should you call for help? Call your doctor now or seek immediate medical care if:  ? · Your blood pressure is much higher than normal (such as 180/110 or higher). ? · You think high blood pressure is causing symptoms such as:  ¨ Severe headache. ¨ Blurry vision. ? Watch closely for changes in your health, and be sure to contact your doctor if:  ? · You do not get better as expected. Where can you learn more? Go to http://lizbeth-efraín.info/. Enter E151 in the search box to learn more about \"Elevated Blood Pressure: Care Instructions. \"  Current as of: September 21, 2016  Content Version: 11.4  © 4789-0839 [employer].  Care instructions adapted under license by [employer] (which disclaims liability or warranty for this information). If you have questions about a medical condition or this instruction, always ask your healthcare professional. [place] [address] any warranty or liability for your use of this information.

## 2019-05-27 NOTE — TELEPHONE ENCOUNTER
Can't get humalog, ins won't pay 4 it. Still has lantus, wants to stop lantus and just take humalog, call ins. ?PA or change the short acting insulin    Also appt with wound care needs to be re-scheduled since rodneyatoin prob.
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
## 2019-05-28 ENCOUNTER — TELEPHONE (OUTPATIENT)
Dept: INTERNAL MEDICINE CLINIC | Age: 54
End: 2019-05-28

## 2019-05-28 NOTE — TELEPHONE ENCOUNTER
5/28/19 Called [phone] due to claim done thru [employer] for PA on Humalog Kwik pen 100 units/ml pen injector. PA rep Deyanira Fisher stated claim was approved effective 5/28 thru 5/27/20. Plan covers 15/31 days,pharmacy running 15/42 days. Claim went thru for 15/31 days. [employer] called @ [phone] ,[facility] stated they are aware of current problem will reach out once problem is solved,phone # provided for a  call back when corrected . [facility] called spoke with Irving Ren she stated claim went thru with 0 out of pocket. patient to be called when ready for pickup.

## 2019-05-29 NOTE — PROGRESS NOTES
Sugar running too low but we have made adjustments to your insulin. You should be able to get your Humalog now. Please call me if you have a problem. Can stop the Lantus but may need to re-start if your blood sugars go too high > 350.

## 2019-05-30 ENCOUNTER — DOCUMENTATION ONLY (OUTPATIENT)
Dept: INTERNAL MEDICINE CLINIC | Age: 54
End: 2019-05-30

## 2019-05-30 NOTE — PROGRESS NOTES
Per request of patient faxed copy of most recent labs to [facility] Dr Gregorio Lynne at [address] [facility], LPN  [phone]/9976  0:02 PM

## 2019-06-04 ENCOUNTER — OFFICE VISIT (OUTPATIENT)
Dept: INTERNAL MEDICINE CLINIC | Age: 54
End: 2019-06-04

## 2019-06-04 VITALS
TEMPERATURE: 98.1 F | WEIGHT: 273 LBS | RESPIRATION RATE: 20 BRPM | DIASTOLIC BLOOD PRESSURE: 74 MMHG | OXYGEN SATURATION: 96 % | SYSTOLIC BLOOD PRESSURE: 130 MMHG | HEART RATE: 89 BPM | BODY MASS INDEX: 46.61 KG/M2 | HEIGHT: 64 IN

## 2019-06-04 DIAGNOSIS — E11.21 TYPE 2 DIABETES MELLITUS WITH NEPHROPATHY (HCC): ICD-10-CM

## 2019-06-04 DIAGNOSIS — F11.20 NARCOTIC DEPENDENCE, EPISODIC USE (HCC): ICD-10-CM

## 2019-06-04 DIAGNOSIS — E11.40 TYPE 2 DIABETES MELLITUS WITH DIABETIC NEUROPATHY, WITH LONG-TERM CURRENT USE OF INSULIN (HCC): ICD-10-CM

## 2019-06-04 DIAGNOSIS — S91.101D OPEN WOUND OF RIGHT GREAT TOE, SUBSEQUENT ENCOUNTER: Primary | ICD-10-CM

## 2019-06-04 DIAGNOSIS — Z79.4 TYPE 2 DIABETES MELLITUS WITH DIABETIC NEUROPATHY, WITH LONG-TERM CURRENT USE OF INSULIN (HCC): ICD-10-CM

## 2019-06-04 DIAGNOSIS — E66.01 OBESITY, MORBID (HCC): ICD-10-CM

## 2019-06-04 DIAGNOSIS — I50.21 ACUTE SYSTOLIC CONGESTIVE HEART FAILURE (HCC): ICD-10-CM

## 2019-06-04 DIAGNOSIS — I10 ESSENTIAL HYPERTENSION: ICD-10-CM

## 2019-06-04 RX ORDER — CEPHALEXIN 500 MG/1
500 CAPSULE ORAL 3 TIMES DAILY
COMMUNITY
End: 2019-07-01 | Stop reason: ALTCHOICE

## 2019-06-04 NOTE — PATIENT INSTRUCTIONS
Open Stacie-en-Y Gastric Bypass: Before Your Surgery  What is an open Stacie-en-Y gastric bypass? A Stacie-en-Y (say \"alesia-en-why\") gastric bypass is surgery to make the stomach smaller. It also changes the connection between the stomach and the intestines. It can help you lose weight. The surgery limits the amount of food the stomach can hold. This helps you eat less and feel full sooner. Open surgery means that the surgery is done through a long cut (incision) in the belly. You will be asleep during the surgery. The doctor will separate the upper part of your stomach from the rest of your stomach. This forms a small pouch. The pouch will hold the food you eat. The doctor will connect the new stomach pouch to the middle section of the small intestine. This part of the intestine is called the jejunum. After the surgery, the food you eat will pass from the small pouch into your jejunum. Food will no longer go through the lower portion of your stomach or the first section of your intestines. You will stay in the hospital 1 or more days after the surgery. Most people can go back to work or their usual routine in about 4 to 6 weeks. Follow-up care is a key part of your treatment and safety. Be sure to make and go to all appointments, and call your doctor if you are having problems. It's also a good idea to know your test results and keep a list of the medicines you take. What happens before surgery?   Surgery can be stressful. This information will help you understand what you can expect. And it will help you safely prepare for surgery.   Preparing for surgery    · Understand exactly what surgery is planned, along with the risks, benefits, and other options. · Tell your doctors ALL the medicines, vitamins, supplements, and herbal remedies you take.  Some of these can increase the risk of bleeding or interact with anesthesia.     · If you take blood thinners, such as warfarin (Coumadin), clopidogrel (Plavix), or aspirin, be sure to talk to your doctor. He or she will tell you if you should stop taking these medicines before your surgery. Make sure that you understand exactly what your doctor wants you to do.     · Your doctor will tell you which medicines to take or stop before your surgery. You may need to stop taking certain medicines a week or more before surgery. So talk to your doctor as soon as you can.     · If you have an advance directive, let your doctor know. It may include a living will and a durable power of  for health care. Bring a copy to the hospital. If you don't have one, you may want to prepare one. It lets your doctor and loved ones know your health care wishes. Doctors advise that everyone prepare these papers before any type of surgery or procedure.     · You may be asked to follow a clear liquid diet for several days before surgery. Your doctor will tell you how to do this. What happens on the day of surgery? · Follow the instructions exactly about when to stop eating and drinking. If you don't, your surgery may be canceled. If your doctor told you to take your medicines on the day of surgery, take them with only a sip of water.     · Take a bath or shower before you come in for your surgery. Do not apply lotions, perfumes, deodorants, or nail polish.     · Do not shave the surgical site yourself.     · Take off all jewelry and piercings. And take out contact lenses, if you wear them.    At the hospital or surgery center   · Bring a picture ID.     · The area for surgery is often marked to make sure there are no errors.     · You will be kept comfortable and safe by your anesthesia provider. You will be asleep during the surgery.     · The surgery will take about 2 to 3 hours.     · You may get an epidural catheter. This is a tiny tube that puts pain medicine into the area in your back around your spinal cord. The epidural will prevent belly pain after surgery.    Going home   · Be sure you have someone to drive you home. Anesthesia and pain medicine make it unsafe for you to drive.     · You will be given more specific instructions about recovering from your surgery. They will cover things like diet, wound care, follow-up care, driving, and getting back to your normal routine. When should you call your doctor? · You have questions or concerns.     · You don't understand how to prepare for your surgery.     · You become ill before the surgery (such as fever, flu, or a cold).     · You need to reschedule or have changed your mind about having the surgery. Where can you learn more? Go to http://lizbeth-efraín.info/. Enter U310 in the search box to learn more about \"Open Stacie-en-Y Gastric Bypass: Before Your Surgery. \"  Current as of: June 25, 2018  Content Version: 11.9  © 3766-4597 [employer]. Care instructions adapted under license by [employer] (which disclaims liability or warranty for this information). If you have questions about a medical condition or this instruction, always ask your healthcare professional. [address] any warranty or liability for your use of this information. Open Stacie-en-Y Gastric Bypass: Before Your Surgery  What is an open Stacie-en-Y gastric bypass? A Stacie-en-Y (say \"alesia-en-why\") gastric bypass is surgery to make the stomach smaller. It also changes the connection between the stomach and the intestines. It can help you lose weight. The surgery limits the amount of food the stomach can hold. This helps you eat less and feel full sooner. Open surgery means that the surgery is done through a long cut (incision) in the belly. You will be asleep during the surgery. The doctor will separate the upper part of your stomach from the rest of your stomach. This forms a small pouch. The pouch will hold the food you eat.  The doctor will connect the new stomach pouch to the middle section of the small intestine. This part of the intestine is called the jejunum. After the surgery, the food you eat will pass from the small pouch into your jejunum. Food will no longer go through the lower portion of your stomach or the first section of your intestines. You will stay in the hospital 1 or more days after the surgery. Most people can go back to work or their usual routine in about 4 to 6 weeks. Follow-up care is a key part of your treatment and safety. Be sure to make and go to all appointments, and call your doctor if you are having problems. It's also a good idea to know your test results and keep a list of the medicines you take. What happens before surgery?   Surgery can be stressful. This information will help you understand what you can expect. And it will help you safely prepare for surgery.   Preparing for surgery    · Understand exactly what surgery is planned, along with the risks, benefits, and other options. · Tell your doctors ALL the medicines, vitamins, supplements, and herbal remedies you take. Some of these can increase the risk of bleeding or interact with anesthesia.     · If you take blood thinners, such as warfarin (Coumadin), clopidogrel (Plavix), or aspirin, be sure to talk to your doctor. He or she will tell you if you should stop taking these medicines before your surgery. Make sure that you understand exactly what your doctor wants you to do.     · Your doctor will tell you which medicines to take or stop before your surgery. You may need to stop taking certain medicines a week or more before surgery. So talk to your doctor as soon as you can.     · If you have an advance directive, let your doctor know. It may include a living will and a durable power of  for health care. Bring a copy to the hospital. If you don't have one, you may want to prepare one. It lets your doctor and loved ones know your health care wishes.  Doctors advise that everyone prepare these papers before any type of surgery or procedure.     · You may be asked to follow a clear liquid diet for several days before surgery. Your doctor will tell you how to do this. What happens on the day of surgery? · Follow the instructions exactly about when to stop eating and drinking. If you don't, your surgery may be canceled. If your doctor told you to take your medicines on the day of surgery, take them with only a sip of water.     · Take a bath or shower before you come in for your surgery. Do not apply lotions, perfumes, deodorants, or nail polish.     · Do not shave the surgical site yourself.     · Take off all jewelry and piercings. And take out contact lenses, if you wear them.    At the hospital or surgery center   · Bring a picture ID.     · The area for surgery is often marked to make sure there are no errors.     · You will be kept comfortable and safe by your anesthesia provider. You will be asleep during the surgery.     · The surgery will take about 2 to 3 hours.     · You may get an epidural catheter. This is a tiny tube that puts pain medicine into the area in your back around your spinal cord. The epidural will prevent belly pain after surgery. Going home   · Be sure you have someone to drive you home. Anesthesia and pain medicine make it unsafe for you to drive.     · You will be given more specific instructions about recovering from your surgery. They will cover things like diet, wound care, follow-up care, driving, and getting back to your normal routine. When should you call your doctor? · You have questions or concerns.     · You don't understand how to prepare for your surgery.     · You become ill before the surgery (such as fever, flu, or a cold).     · You need to reschedule or have changed your mind about having the surgery. Where can you learn more? Go to http://lizbeth-efraín.info/.   Enter J025 in the search box to learn more about \"Open Stacie-en-Y Gastric Bypass: Before Your Surgery. \"  Current as of: June 25, 2018  Content Version: 11.9  © 3707-0158 [employer]. Care instructions adapted under license by [employer] (which disclaims liability or warranty for this information). If you have questions about a medical condition or this instruction, always ask your healthcare professional. [address] any warranty or liability for your use of this information. Learning About Low-Carbohydrate Diets for Weight Loss  What is a low-carbohydrate diet? Low-carb diets avoid foods that are high in carbohydrate. These high-carb foods include pasta, bread, rice, cereal, fruits, and starchy vegetables. Instead, these diets usually have you eat foods that are high in fat and protein. Many people lose weight quickly on a low-carb diet. But the early weight loss is water. People on this diet often gain the weight back after they start eating carbs again. Not all diet plans are safe or work well. A lot of the evidence shows that low-carb diets aren't healthy. That's because these diets often don't include healthy foods like fruits and vegetables. Losing weight safely means balancing protein, fat, and carbs with every meal and snack. And low-carb diets don't always provide the vitamins, minerals, and fiber you need. If you have a serious medical condition, talk to your doctor before you try any diet. These conditions include kidney disease, heart disease, type 2 diabetes, high cholesterol, and high blood pressure. If you are pregnant, it may not be safe for your baby if you are on a low-carb diet. How can you lose weight safely? You might have heard that a diet plan helped another person lose weight. But that doesn't mean that it will work for you. It is very hard to stay on a diet that includes lots of big changes in your eating habits.  If you want to get to a healthy weight and stay there, making healthy lifestyle changes will often work better than dieting. These steps can help. · Make a plan for change. Work with your doctor to create a plan that is right for you. · See a dietitian. He or she can show you how to make healthy changes in your eating habits. · Manage stress. If you have a lot of stress in your life, it can be hard to focus on making healthy changes to your daily habits. · Track your food and activity. You are likely to do better at losing weight if you keep track of what you eat and what you do. Follow-up care is a key part of your treatment and safety. Be sure to make and go to all appointments, and call your doctor if you are having problems. It's also a good idea to know your test results and keep a list of the medicines you take. Where can you learn more? Go to http://lizbeth-efraín.info/. Enter A121 in the search box to learn more about \"Learning About Low-Carbohydrate Diets for Weight Loss. \"  Current as of: March 28, 2018  Content Version: 11.9  © 9908-8086 [employer]. Care instructions adapted under license by [employer] (which disclaims liability or warranty for this information). If you have questions about a medical condition or this instruction, always ask your healthcare professional. [address] any warranty or liability for your use of this information.

## 2019-06-04 NOTE — PROGRESS NOTES
HISTORY OF PRESENT ILLNESS  Jayson Geiger is a 47 y.o. female. Diabetes   The history is provided by the patient. This is a chronic problem. The current episode started more than 1 week ago. The problem has been gradually improving. Pertinent negatives include no chest pain and no shortness of breath. Treatments tried: takes insulin. The treatment provided moderate relief. Toe Pain    This is a new problem. The current episode started more than 2 days ago. The problem occurs daily. The problem has been gradually improving. The pain is present in the right toes (big). The patient is experiencing no pain. Associated symptoms include numbness. Treatments tried: er visit. The treatment provided mild relief. There has been no history of extremity trauma. She's interested in tryinmg trulicity because heard it helps with WL. No hypos BS have been 100's 200's  Was suppose to see surgeon, not seen him yet    Patient Active Problem List   Diagnosis Code    Hypertension I10    Chronic pain G89.29    Narcotic dependence, episodic use ([facility] [address] F11.20    Idiopathic small and large fiber sensory neuropathy G60.8    Spinal stenosis of lumbosacral region M48.07    Lumbar back pain with radiculopathy affecting right lower extremity M54.16    Obesity, morbid ([facility]) E66.01    Type 2 diabetes mellitus with nephropathy ([facility]) E37.43    Acute systolic congestive heart failure ([facility]) I50.21    Type 2 diabetes mellitus with diabetic neuropathy, with long-term current use of insulin ([facility]) E11.40, Z79.4       Review of Systems   Respiratory: Negative for shortness of breath. Cardiovascular: Negative for chest pain. Musculoskeletal: Negative for falls. Neurological: Positive for numbness.      Social History     Socioeconomic History    Marital status:      Spouse name: Not on file    Number of children: Not on file    Years of education: Not on file    Highest education level: Not on file   Occupational History  Occupation: disabled   Social Needs    Financial resource strain: Not on file    Food insecurity:     Worry: Not on file     Inability: Not on file   [employer] needs:     Medical: Not on file     Non-medical: Not on file   Tobacco Use    Smoking status: Never Smoker    Smokeless tobacco: Never Used   Substance and Sexual Activity    Alcohol use: No     Alcohol/week: 0.0 oz    Drug use: No    Sexual activity: Not Currently   Lifestyle    Physical activity:     Days per week: Not on file     Minutes per session: Not on file    Stress: Not on file   Relationships    Social connections:     Talks on phone: Not on file     Gets together: Not on file     Attends [employer]ism service: Not on file     Active member of club or organization: Not on file     Attends meetings of clubs or organizations: Not on file     Relationship status: Not on file    Intimate partner violence:     Fear of current or ex partner: Not on file     Emotionally abused: Not on file     Physically abused: Not on file     Forced sexual activity: Not on file   Other Topics Concern    Not on file   Social History Narrative    Lives with daughter. Not working. Applying for disabilty. Physical Exam  Visit Vitals  /74 (BP 1 Location: Left arm, BP Patient Position: Sitting)   Pulse 89   Temp 98.1 °F (36.7 °C) (Oral)   Resp 20   Ht 5' 4\" (1.626 m)   Wt 273 lb (123.8 kg)   LMP 12/03/2017   SpO2 96%   BMI 46.86 kg/m²     WD WN female NAD MO  Heart RRR without murmers clicks or rubs  Lungs CTA  Abdo soft nontender  Ext no edema, right toe callus with underlying wound 2 cm, NV intact    ASSESSMENT and PLAN  Encounter Diagnoses   Name Primary?     Open wound of right great toe, subsequent encounter Yes    Type 2 diabetes mellitus with nephropathy (HCC)     Type 2 diabetes mellitus with diabetic neuropathy, with long-term current use of insulin (HCC)     Obesity, morbid ([address]     Narcotic dependence, episodic use ([address]     Acute systolic congestive heart failure (HCC)     Essential hypertension      Orders Placed This Encounter    cephALEXin (KEFLEX) 500 mg capsule    dulaglutide (TRULICITY) 1.90 FK/1.7 mL sub-q pen   CHF stable,   See patient instructions, went over them personally with the patient. Emphasized compliance. Questions answered. Patient states that they understand the plan of action and will call if there are any issues or misunderstandings. Limb threatening wound needs to see surgeon, but seems like its healing    OK trulicity likely PA required. Follow-up and Dispositions    · Return in about 1 month (around 7/2/2019) for routine follow up.

## 2019-06-04 NOTE — PROGRESS NOTES
Chief Complaint   Patient presents with    Diabetes     follow up    Toe Pain     R/Great Toe  [facility] - ER x2 days ago due to pain     I have reviewed the patient's medical history in detail and updated the computerized patient record. Health Maintenance reviewed. 1. Have you been to the ER, urgent care clinic since your last visit? Hospitalized since your last visit? yes    2. Have you seen or consulted any other health care providers outside of the [address] [place] [address] [place] since your last visit? Include any pap smears or colon screening. [facility] - ER 2 days ago      Encouraged pt to discuss pt's wishes with spouse/partner/family and bring them in the next appt to follow thru with the Advanced Directive    Fall Risk Assessment, last 12 mths 5/21/2019   Able to walk? Yes   Fall in past 12 months? No   Fall with injury? -   Number of falls in past 12 months -   Fall Risk Score -       3 most recent PHQ Screens 6/4/2019   Little interest or pleasure in doing things Several days   Feeling down, depressed, irritable, or hopeless Several days   Total Score PHQ 2 2       Abuse Screening Questionnaire 5/21/2019   Do you ever feel afraid of your partner? N   Are you in a relationship with someone who physically or mentally threatens you? N   Is it safe for you to go home?  Y       ADL Assessment 5/21/2019   Feeding yourself No Help Needed   Getting from bed to chair No Help Needed   Getting dressed No Help Needed   Bathing or showering No Help Needed   Walk across the room (includes cane/walker) No Help Needed   Using the telphone No Help Needed   Taking your medications No Help Needed   Preparing meals No Help Needed   Managing money (expenses/bills) No Help Needed   Moderately strenuous housework (laundry) No Help Needed   Shopping for personal items (toiletries/medicines) No Help Needed   Shopping for groceries No Help Needed   Driving No Help Needed   Climbing a flight of stairs No Help Needed   Getting to places beyond walking distances No Help Needed

## 2019-06-05 ENCOUNTER — TELEPHONE (OUTPATIENT)
Dept: INTERNAL MEDICINE CLINIC | Age: 54
End: 2019-06-05

## 2019-06-05 ENCOUNTER — DOCUMENTATION ONLY (OUTPATIENT)
Dept: INTERNAL MEDICINE CLINIC | Age: 54
End: 2019-06-05

## 2019-06-05 NOTE — TELEPHONE ENCOUNTER
Patients appointment has been moved to Tuesday June 18, 2019 @ 130 pm d/t patient having another appointment on 06/11 - patient is aware of this appointment  Jas Steele LPN  2/4/9137  0:21 PMReceived a return call from [place] at the [facility] wound care center - they have scheduled the patient an appointment for Tuesday June 11, 2019 at 215pm - at their [place] location - they will address the patients foot wound and then decide on which surgeon to refer to if necessary Jas Steele LPN  4/3/0599  23:27 PM  [address] [facility]. Stephon Rojas [address] [place] [address]  [phone]

## 2019-06-06 ENCOUNTER — TELEPHONE (OUTPATIENT)
Dept: INTERNAL MEDICINE CLINIC | Age: 54
End: 2019-06-06

## 2019-06-06 ENCOUNTER — DOCUMENTATION ONLY (OUTPATIENT)
Dept: INTERNAL MEDICINE CLINIC | Age: 54
End: 2019-06-06

## 2019-06-06 NOTE — PROGRESS NOTES
Patient states that she has not tried any of these other drugs before, but wants Dr Merissa Osman to pick the one that will also cause her to lose weight - that was why she asked for the original prescription - advised that I didn't know that either of these substitutes had that effect, but will submit message to Dr Alba Kelley LPN  5/5/6508  8:53 PM

## 2019-06-06 NOTE — PROGRESS NOTES
3/0/04 PA for Trulicity 5.44 AL/1.9 ml ,request has been denied   PA Case: [id number], Status: Denied. Pt need trial and failure of two preferred drugs: Byetta ,Bydureon PM , or Victoza.

## 2019-06-06 NOTE — TELEPHONE ENCOUNTER
Notified patient of new medication - advised not to make any other changes to her medication without talking to Dr Mario Dupree, VITO  6/4/8126  4:44 PM

## 2019-06-06 NOTE — PROGRESS NOTES
Switched to [address] [place] [address] - patient notified of change Jordyn Schultz LPN  7/3/1610  0:36 PM

## 2019-06-06 NOTE — TELEPHONE ENCOUNTER
Called the office of Dr Luana Singh - attempted to get an appointment with surgeon for patients toe wound - they suggested that patient should definitely see wound clinic first for an assessment - appointment has been scheduled for the wound clinic on 06/18/2019 - referral is now needed for wound care clinic instead of Dr Cathy Antunez - message sent to Dr Barbie Gomez LPN  4/2/7105  8:50 PM

## 2019-06-10 ENCOUNTER — TELEPHONE (OUTPATIENT)
Dept: INTERNAL MEDICINE CLINIC | Age: 54
End: 2019-06-10

## 2019-06-10 NOTE — TELEPHONE ENCOUNTER
Patient would like to speak with sandra. She didn't disclose what she needed due to her not being able to understand me because she said the phone was breaking up. Please call her at [phone].

## 2019-06-13 NOTE — TELEPHONE ENCOUNTER
Called patient - she states that she wanted the Trulicity - I advised that we did infact try to get her the Trulicity but the insurance denied until she tried Victoza first - patient states that she would like for  Us to try again because she has tried the Victoza and it is not working for her - patient was very difficult to understand too much background noise - advised that she will need to try the Victoza for at least 30 days before we can say that it did not help her  Sebastian Kay LPN  5/67/5417  3:49 PM

## 2019-06-18 ENCOUNTER — HOSPITAL ENCOUNTER (OUTPATIENT)
Dept: WOUND CARE | Age: 54
End: 2019-06-18

## 2019-07-01 ENCOUNTER — OFFICE VISIT (OUTPATIENT)
Dept: INTERNAL MEDICINE CLINIC | Age: 54
End: 2019-07-01

## 2019-07-01 VITALS
OXYGEN SATURATION: 95 % | DIASTOLIC BLOOD PRESSURE: 64 MMHG | SYSTOLIC BLOOD PRESSURE: 99 MMHG | BODY MASS INDEX: 46.44 KG/M2 | RESPIRATION RATE: 16 BRPM | WEIGHT: 272 LBS | HEART RATE: 97 BPM | TEMPERATURE: 98.1 F | HEIGHT: 64 IN

## 2019-07-01 DIAGNOSIS — E11.21 TYPE 2 DIABETES MELLITUS WITH NEPHROPATHY (HCC): Primary | ICD-10-CM

## 2019-07-01 DIAGNOSIS — G60.8 IDIOPATHIC SMALL AND LARGE FIBER SENSORY NEUROPATHY: ICD-10-CM

## 2019-07-01 DIAGNOSIS — I50.21 ACUTE SYSTOLIC CONGESTIVE HEART FAILURE (HCC): ICD-10-CM

## 2019-07-01 DIAGNOSIS — N18.30 CRI (CHRONIC RENAL INSUFFICIENCY), STAGE 3 (MODERATE) (HCC): ICD-10-CM

## 2019-07-01 DIAGNOSIS — E66.01 OBESITY, MORBID (HCC): ICD-10-CM

## 2019-07-01 DIAGNOSIS — K02.9 DENTAL CARIES: ICD-10-CM

## 2019-07-01 RX ORDER — INSULIN LISPRO 100 [IU]/ML
8 INJECTION, SOLUTION INTRAVENOUS; SUBCUTANEOUS
Qty: 5 PEN | Refills: 5 | Status: SHIPPED | OUTPATIENT
Start: 2019-07-01 | End: 2020-02-25

## 2019-07-01 RX ORDER — OXYCODONE AND ACETAMINOPHEN 5; 325 MG/1; MG/1
1 TABLET ORAL
Qty: 12 TAB | Refills: 0 | Status: SHIPPED | OUTPATIENT
Start: 2019-07-01 | End: 2019-07-04

## 2019-07-01 RX ORDER — HYDRALAZINE HYDROCHLORIDE 50 MG/1
50 TABLET, FILM COATED ORAL 2 TIMES DAILY
Qty: 60 TAB | Refills: 5 | Status: SHIPPED | OUTPATIENT
Start: 2019-07-01 | End: 2019-07-01

## 2019-07-01 RX ORDER — AMOXICILLIN AND CLAVULANATE POTASSIUM 875; 125 MG/1; MG/1
TABLET, FILM COATED ORAL EVERY 12 HOURS
COMMUNITY
End: 2019-07-10 | Stop reason: ALTCHOICE

## 2019-07-01 RX ORDER — HYDRALAZINE HYDROCHLORIDE 50 MG/1
50 TABLET, FILM COATED ORAL 2 TIMES DAILY
Qty: 60 TAB | Refills: 1 | Status: SHIPPED | OUTPATIENT
Start: 2019-07-01 | End: 2019-07-01 | Stop reason: SDUPTHER

## 2019-07-01 RX ORDER — HYDRALAZINE HYDROCHLORIDE 25 MG/1
25 TABLET, FILM COATED ORAL 2 TIMES DAILY
Qty: 60 TAB | Refills: 5 | Status: SHIPPED | OUTPATIENT
Start: 2019-07-01 | End: 2020-05-05 | Stop reason: SDUPTHER

## 2019-07-01 NOTE — PROGRESS NOTES
Subjective:     Irene Tee is a 47 y.o. female seen for follow-up of diabetes. She has had hypoglycemic attacks. .yes 47  Blood sugar control has been OK otherwise  She has diabetes, hypertension and CHF. Irene Tee has the additional concern of tooth bothering her sees dentist Weds. Went to ER yesterday put on augmentin. They did not give her pain meds which she asks for. [facility]. Diabetic Review of Systems: no polyuria or polydipsia, no chest pain, dyspnea or TIA's, no medication side effects noted, has dysesthesias in the feet. Allergies   Allergen Reactions    Lortab [Hydrocodone-Acetaminophen] Nausea and Vomiting    Cymbalta [Duloxetine] Diarrhea    Lasix [Furosemide] Swelling    Lyrica [Pregabalin] Drowsiness       Diet and Lifestyle: follows a diabetic diet regularly, nonsmoker.     Patient Active Problem List    Diagnosis Date Noted    Type 2 diabetes mellitus with diabetic neuropathy, with long-term current use of insulin ([facility] 64/97/5121    Acute systolic congestive heart failure ([address] 02/07/2018    Obesity, morbid ([address] 12/18/2017    Type 2 diabetes mellitus with nephropathy ([address] 12/18/2017    Spinal stenosis of lumbosacral region 12/08/2016    Lumbar back pain with radiculopathy affecting right lower extremity 12/08/2016    Idiopathic small and large fiber sensory neuropathy 11/16/2016    Narcotic dependence, episodic use ([facility] [address] [facility] 04/29/2016    Chronic pain     Hypertension 11/07/2012     Allergies   Allergen Reactions    Lortab [Hydrocodone-Acetaminophen] Nausea and Vomiting    Cymbalta [Duloxetine] Diarrhea    Lasix [Furosemide] Swelling    Lyrica [Pregabalin] Drowsiness     Social History     Tobacco Use    Smoking status: Never Smoker    Smokeless tobacco: Never Used   Substance Use Topics    Alcohol use: No     Alcohol/week: 0.0 oz        Lab Results   Component Value Date/Time    WBC 5.3 01/14/2019 02:26 PM    HGB 10.3 (L) 01/14/2019 02:26 PM    HCT 33.4 (L) 01/14/2019 02:26 PM    PLATELET 294 (L) 61/22/4730 02:26 PM    MCV 84 01/14/2019 02:26 PM     Lab Results   Component Value Date/Time    Hemoglobin A1c 7.0 (H) 05/21/2019 03:44 PM    Hemoglobin A1c 7.3 (H) 01/14/2019 02:26 PM    Hemoglobin A1c 8.6 (H) 02/13/2018 12:00 AM    Glucose 56 (L) 05/21/2019 03:44 PM    Glucose (POC) 173 (H) 12/18/2015 11:17 AM    Microalb/Creat ratio (ug/mg creat.) 39.9 (H) 02/19/2018 02:29 PM    LDL, calculated 86 05/21/2019 03:44 PM    Creatinine 1.91 (H) 05/21/2019 03:44 PM      Lab Results   Component Value Date/Time    Cholesterol, total 145 05/21/2019 03:44 PM    HDL Cholesterol 41 05/21/2019 03:44 PM    LDL, calculated 86 05/21/2019 03:44 PM    Triglyceride 89 05/21/2019 03:44 PM     Lab Results   Component Value Date/Time    ALT (SGPT) 23 04/02/2018 02:00 PM    AST (SGOT) 13 (L) 04/02/2018 02:00 PM    Alk. phosphatase 123 (H) 04/02/2018 02:00 PM    Bilirubin, total 0.5 04/02/2018 02:00 PM    Albumin 3.5 04/02/2018 02:00 PM    Protein, total 7.8 04/02/2018 02:00 PM    INR 1.1 12/08/2015 04:42 PM    Prothrombin time 11.2 12/08/2015 04:42 PM    PLATELET 845 (L) 08/70/7317 02:26 PM     Lab Results   Component Value Date/Time    GFR est non-AA 30 (L) 05/21/2019 03:44 PM    GFR est AA 34 (L) 05/21/2019 03:44 PM    Creatinine 1.91 (H) 05/21/2019 03:44 PM    BUN 28 (H) 05/21/2019 03:44 PM    Sodium 144 05/21/2019 03:44 PM    Potassium 4.8 05/21/2019 03:44 PM    Chloride 110 (H) 05/21/2019 03:44 PM    CO2 22 05/21/2019 03:44 PM    Magnesium 1.4 (L) 11/16/2016 12:04 AM    PTH, Intact CANCELED 02/19/2018 02:29 PM     Lab Results   Component Value Date/Time    Glucose 56 (L) 05/21/2019 03:44 PM    Glucose (POC) 173 (H) 12/18/2015 11:17 AM         Review of Systems  Pertinent items are noted in HPI.     Objective:     Significant for the following:     Visit Vitals  BP 99/64 (BP 1 Location: Right arm, BP Patient Position: Sitting)   Pulse 97   Temp 98.1 °F (36.7 °C) (Oral)   Resp 16   Ht 5' 4\" (1.626 m)   Wt 272 lb (123.4 kg)   LMP 12/03/2017   SpO2 95%   BMI 46.69 kg/m²     Appearance: tired chronic ill appearing, and in no distress. Exam: heart sounds normal rate, regular rhythm, normal S1, S2, no murmurs, rubs, clicks or gallops, chest clear  Foot exam: deferred    Lab review: labs reviewed, I note that glycosylated hemoglobin mildly abnormal but acceptable. Assessment/Plan:     Follow-up diabetes well controlled, stable. Diabetic issues reviewed with her: all medications, side effects and compliance discussed carefully and glycohemoglobin and other lab monitoring discussed. Chronic Conditions Addressed Today     1. Type 2 diabetes mellitus with nephropathy (HCC) - Primary     Relevant Medications     liraglutide (VICTOZA) 0.6 mg/0.1 mL (18 mg/3 mL) pnij     HUMALOG KWIKPEN INSULIN 100 unit/mL kwikpen    2. Obesity, morbid ([address]     Relevant Medications     liraglutide (VICTOZA) 0.6 mg/0.1 mL (18 mg/3 mL) pnij    3. Idiopathic small and large fiber sensory neuropathy     Relevant Medications     oxyCODONE-acetaminophen (PERCOCET) 5-325 mg per tablet    4. Acute systolic congestive heart failure (HCC)     Relevant Medications     hydrALAZINE (APRESOLINE) 25 mg tablet      Acute Diagnoses Addressed Today     Dental caries            Relevant Medications        oxyCODONE-acetaminophen (PERCOCET) 5-325 mg per tablet        amoxicillin-clavulanate (AUGMENTIN) 875-125 mg per tablet    CRI (chronic renal insufficiency), stage 3 (moderate) ([address]                Patient was looked up in the . There are multiple prescribers of controlled substances  Yes. Call Pm to let him know I rx her some for teeth    We discussed this pain and the usage of controlled substances for acute dental relief.  In general these medications are indicated for the acute symptom relief and not for chronic usage, allthough they are frequently used for chronic management  After a certain period of time they should be stopped to avoid dependence and/or addiction. I warned her about the dangers of addiction and other side affects including respiratory depression and death. Discussed how this is a controlled substance and that the prescribing of it is should be monitored very carefully. Drug usage is also monitored by our practise and the [facility], since there has been a lot of abuse and diversion of controlled substances. In general we do not refill this medication over the phone. PLease ask for enough pills to get you to your next appointment and make sure you keep your appointments. Warned not to take other medications like alcohol, other benzodiazapines marijuana or other narcotics when on this medication. See patient instructions, went over them personally with the patient. Emphasized compliance. Questions answered. Patient states that they understand the plan of action and will call if there are any issues or misunderstandings. Follow-up and Dispositions    · Return in about 2 weeks (around 7/15/2019) for routine follow up.

## 2019-07-01 NOTE — PROGRESS NOTES
Chief Complaint   Patient presents with    Hypertension    Diabetes     1. Have you been to the ER, urgent care clinic since your last visit? Hospitalized since your last visit? Yes, [facility] ER for tooth pain. 06.30.2019    2. Have you seen or consulted any other health care providers outside of the [address] [place] [address] since your last visit? Include any pap smears or colon screening.  No     Health Maintenance Due   Topic Date Due    Pneumococcal 0-64 years (1 of 1 - PPSV23) 12/28/1972    Shingrix Vaccine Age 50> (1 of 2) 12/28/2016    FOBT Q 1 YEAR AGE 50-75  12/28/2016    EYE EXAM RETINAL OR DILATED  10/25/2018    MICROALBUMIN Q1  02/19/2019    BREAST CANCER SCRN MAMMOGRAM  03/22/2019     Mammogram [place]

## 2019-07-08 DIAGNOSIS — G60.8 IDIOPATHIC SMALL AND LARGE FIBER SENSORY NEUROPATHY: Primary | ICD-10-CM

## 2019-07-08 RX ORDER — GABAPENTIN 600 MG/1
600 TABLET ORAL 3 TIMES DAILY
Qty: 90 TAB | Refills: 5 | Status: SHIPPED | OUTPATIENT
Start: 2019-07-08 | End: 2019-07-10 | Stop reason: ALTCHOICE

## 2019-07-08 NOTE — TELEPHONE ENCOUNTER
Requested Prescriptions     Pending Prescriptions Disp Refills    gabapentin (NEURONTIN) 600 mg tablet 90 Tab 5     Sig: Take 1 Tab by mouth three (3) times daily.      Future Appointments:  7/15/2019  2:45 PM Dary Mejia MD   Last Appointment With Me:  7/1/2019

## 2019-07-09 DIAGNOSIS — Z79.4 TYPE 2 DIABETES MELLITUS WITH DIABETIC NEUROPATHY, WITH LONG-TERM CURRENT USE OF INSULIN (HCC): Primary | ICD-10-CM

## 2019-07-09 DIAGNOSIS — E11.40 TYPE 2 DIABETES MELLITUS WITH DIABETIC NEUROPATHY, WITH LONG-TERM CURRENT USE OF INSULIN (HCC): Primary | ICD-10-CM

## 2019-07-09 RX ORDER — PREGABALIN 100 MG/1
100 CAPSULE ORAL 2 TIMES DAILY
Qty: 60 CAP | Refills: 0 | Status: SHIPPED | OUTPATIENT
Start: 2019-07-09 | End: 2019-07-10 | Stop reason: DRUGHIGH

## 2019-07-09 NOTE — TELEPHONE ENCOUNTER
Requested Prescriptions     Pending Prescriptions Disp Refills    pregabalin (LYRICA) 100 mg capsule 60 Cap 0     Sig: Take 1 Cap by mouth two (2) times a day. Max Daily Amount: 200 mg. Appointment needed to refill this medication again.      Future Appointments:  7/15/2019  2:45 PM Emani Abarca MD   Last Appointment With Me:  7/1/2019

## 2019-07-09 NOTE — TELEPHONE ENCOUNTER
Requested Prescriptions     Pending Prescriptions Disp Refills    pregabalin (LYRICA) 100 mg capsule 60 Cap 0     Sig: Take 1 Cap by mouth two (2) times a day. Max Daily Amount: 200 mg. Appointment needed to refill this medication again.

## 2019-07-10 ENCOUNTER — OFFICE VISIT (OUTPATIENT)
Dept: INTERNAL MEDICINE CLINIC | Age: 54
End: 2019-07-10

## 2019-07-10 VITALS
OXYGEN SATURATION: 97 % | TEMPERATURE: 98.4 F | DIASTOLIC BLOOD PRESSURE: 81 MMHG | HEIGHT: 64 IN | RESPIRATION RATE: 20 BRPM | BODY MASS INDEX: 45.24 KG/M2 | SYSTOLIC BLOOD PRESSURE: 135 MMHG | HEART RATE: 102 BPM | WEIGHT: 265 LBS

## 2019-07-10 DIAGNOSIS — E11.40 TYPE 2 DIABETES MELLITUS WITH DIABETIC NEUROPATHY, WITH LONG-TERM CURRENT USE OF INSULIN (HCC): Primary | ICD-10-CM

## 2019-07-10 DIAGNOSIS — E11.40 NEUROPATHY DUE TO TYPE 2 DIABETES MELLITUS (HCC): ICD-10-CM

## 2019-07-10 DIAGNOSIS — Z79.4 TYPE 2 DIABETES MELLITUS WITH DIABETIC NEUROPATHY, WITH LONG-TERM CURRENT USE OF INSULIN (HCC): Primary | ICD-10-CM

## 2019-07-10 DIAGNOSIS — I50.21 ACUTE SYSTOLIC CONGESTIVE HEART FAILURE (HCC): ICD-10-CM

## 2019-07-10 DIAGNOSIS — S91.101D OPEN WOUND OF RIGHT GREAT TOE, SUBSEQUENT ENCOUNTER: ICD-10-CM

## 2019-07-10 RX ORDER — PREGABALIN 25 MG/1
50 CAPSULE ORAL 2 TIMES DAILY
Qty: 120 CAP | Refills: 1 | Status: SHIPPED | OUTPATIENT
Start: 2019-07-10 | End: 2019-09-08 | Stop reason: SDUPTHER

## 2019-07-10 NOTE — PROGRESS NOTES
Follow up for foot and hand pain - wants to discuss  Lyrica and gabapentin  again  Kendal Powers LPN  1/42/2407  41:96 AM

## 2019-07-11 LAB
ALBUMIN/CREAT UR: 34 MG/G CREAT (ref 0–30)
CREAT UR-MCNC: 100.8 MG/DL
MICROALBUMIN UR-MCNC: 34.3 UG/ML

## 2019-07-13 NOTE — PROGRESS NOTES
Subjective:     Baltazar Xavier is a 47 y.o. female seen for follow-up of diabetes. She has had hypoglycemic attacks. .no  Blood sugar control has been OK last A1Cwas 7.0  She has diabetes, hypertension, hyperlipidemia, CHF and chronic pain. Baltazar Xaiver has the additional concern of wants try lyrica for her Bi pain in feet again. Preevious caused drowsiness. Gabapentin not helped. Seeing pods for her big toe wound distal right big toe  Breasthing OK wt stable    Diabetic Review of Systems: no polyuria or polydipsia, no chest pain, dyspnea or TIA's, no hypoglycemia, has dysesthesias in the feet. Allergies   Allergen Reactions    Lortab [Hydrocodone-Acetaminophen] Nausea and Vomiting    Cymbalta [Duloxetine] Diarrhea    Lasix [Furosemide] Swelling       Diet and Lifestyle: follows a diabetic diet regularly, nonsmoker.     Patient Active Problem List    Diagnosis Date Noted    Type 2 diabetes mellitus with diabetic neuropathy, with long-term current use of insulin ([facility] [address] 88/15/0454    Acute systolic congestive heart failure ([facility] [address] 02/07/2018    Obesity, morbid ([address] 12/18/2017    Type 2 diabetes mellitus with nephropathy ([facility] [address] 12/18/2017    Spinal stenosis of lumbosacral region 12/08/2016    Lumbar back pain with radiculopathy affecting right lower extremity 12/08/2016    Idiopathic small and large fiber sensory neuropathy 11/16/2016    Narcotic dependence, episodic use ([facility] [address] 04/29/2016    Chronic pain     Hypertension 11/07/2012     Allergies   Allergen Reactions    Lortab [Hydrocodone-Acetaminophen] Nausea and Vomiting    Cymbalta [Duloxetine] Diarrhea    Lasix [Furosemide] Swelling     Social History     Tobacco Use    Smoking status: Never Smoker    Smokeless tobacco: Never Used   Substance Use Topics    Alcohol use: No     Alcohol/week: 0.0 oz        Lab Results   Component Value Date/Time    WBC 5.3 01/14/2019 02:26 PM    HGB 10.3 (L) 01/14/2019 02:26 PM    HCT 33.4 (L) 01/14/2019 02:26 PM    PLATELET 309 (L) 06/25/2864 02:26 PM    MCV 84 01/14/2019 02:26 PM     Lab Results   Component Value Date/Time    Hemoglobin A1c 7.0 (H) 05/21/2019 03:44 PM    Hemoglobin A1c 7.3 (H) 01/14/2019 02:26 PM    Hemoglobin A1c 8.6 (H) 02/13/2018 12:00 AM    Glucose 56 (L) 05/21/2019 03:44 PM    Glucose (POC) 173 (H) 12/18/2015 11:17 AM    Microalb/Creat ratio (ug/mg creat.) 34.0 (H) 07/10/2019 12:14 PM    LDL, calculated 86 05/21/2019 03:44 PM    Creatinine 1.91 (H) 05/21/2019 03:44 PM      Lab Results   Component Value Date/Time    Cholesterol, total 145 05/21/2019 03:44 PM    HDL Cholesterol 41 05/21/2019 03:44 PM    LDL, calculated 86 05/21/2019 03:44 PM    Triglyceride 89 05/21/2019 03:44 PM     Lab Results   Component Value Date/Time    ALT (SGPT) 23 04/02/2018 02:00 PM    AST (SGOT) 13 (L) 04/02/2018 02:00 PM    Alk. phosphatase 123 (H) 04/02/2018 02:00 PM    Bilirubin, total 0.5 04/02/2018 02:00 PM    Albumin 3.5 04/02/2018 02:00 PM    Protein, total 7.8 04/02/2018 02:00 PM    INR 1.1 12/08/2015 04:42 PM    Prothrombin time 11.2 12/08/2015 04:42 PM    PLATELET 903 (L) 84/70/4137 02:26 PM     Lab Results   Component Value Date/Time    GFR est non-AA 30 (L) 05/21/2019 03:44 PM    GFR est AA 34 (L) 05/21/2019 03:44 PM    Creatinine 1.91 (H) 05/21/2019 03:44 PM    BUN 28 (H) 05/21/2019 03:44 PM    Sodium 144 05/21/2019 03:44 PM    Potassium 4.8 05/21/2019 03:44 PM    Chloride 110 (H) 05/21/2019 03:44 PM    CO2 22 05/21/2019 03:44 PM    Magnesium 1.4 (L) 11/16/2016 12:04 AM    PTH, Intact CANCELED 02/19/2018 02:29 PM     Lab Results   Component Value Date/Time    Glucose 56 (L) 05/21/2019 03:44 PM    Glucose (POC) 173 (H) 12/18/2015 11:17 AM         Review of Systems  Pertinent items are noted in HPI.     Objective:     Significant for the following:     Visit Vitals  /81 (BP 1 Location: Left arm, BP Patient Position: At rest)   Pulse (!) 102   Temp 98.4 °F (36.9 °C) (Oral)   Resp 20   Ht 5' 4\" (1.626 m)   Wt 265 lb (120.2 kg)   LMP 12/03/2017   SpO2 97%   BMI 45.49 kg/m²     Appearance: overweight and chronically ill appearing. Exam: heart sounds normal rate, regular rhythm, normal S1, S2, no murmurs, rubs, clicks or gallops, chest clear  Foot exam: Diabetic foot exam was performed. No obvious sores or red lesions. Sensation checked by monofilament exam which was dec. Dorsalis pedis pulse wasred. Bandage over right great toe wound      Lab review: labs reviewed, I note that glycosylated hemoglobin mildly abnormal but acceptable. Assessment/Plan:     Follow-up diabetes well controlled, stable. Diabetic issues reviewed with her: all medications, side effects and compliance discussed carefully, foot care discussed and Podiatry visits discussed, annual eye examinations at Ophthalmology discussed and glycohemoglobin and other lab monitoring discussed. ICD-10-CM ICD-9-CM    1. Type 2 diabetes mellitus with diabetic neuropathy, with long-term current use of insulin (HCC) E11.40 250.60 MICROALBUMIN, UR, RAND W/ MICROALB/CREAT RATIO    Z79.4 357.2 MN HANDLG&/OR CONVEY OF SPEC FOR TR OFFICE TO LAB     V58.67    2. Neuropathy due to type 2 diabetes mellitus (HCC) E11.40 250.60 pregabalin (LYRICA) 25 mg capsule     357.2    3. Acute systolic congestive heart failure (HCC) I50.21 428.21      428.0    4. Open wound of right great toe, subsequent encounter S91.101D V58.89      893.0      Orders Placed This Encounter    MICROALBUMIN, UR, RAND W/ MICROALB/CREAT RATIO    MN HANDLG&/OR CONVEY OF SPEC FOR TR OFFICE TO LAB    pregabalin (LYRICA) 25 mg capsule     Sig: Take 2 Caps by mouth two (2) times a day. Max Daily Amount: 100 mg. Start 1 tablet daily, usually in the evening, every few days increase as tolerated, pain. Dispense:  120 Cap     Refill:  1     Discussed possible side affects, precautions, and drug interactions and possible benefits of the medication(s).     Current Outpatient Medications   Medication Sig Dispense Refill    pregabalin (LYRICA) 25 mg capsule Take 2 Caps by mouth two (2) times a day. Max Daily Amount: 100 mg. Start 1 tablet daily, usually in the evening, every few days increase as tolerated, pain. 120 Cap 1    liraglutide (VICTOZA) 0.6 mg/0.1 mL (18 mg/3 mL) pnij 1.2 mg by SubCUTAneous route daily. 15 Pen 1    HUMALOG KWIKPEN INSULIN 100 unit/mL kwikpen 8 Units by SubCUTAneous route Before breakfast, lunch, and dinner. 5 Pen 5    hydrALAZINE (APRESOLINE) 25 mg tablet Take 1 Tab by mouth two (2) times a day. 60 Tab 5    lisinopril (PRINIVIL, ZESTRIL) 10 mg tablet Take 1 Tab by mouth daily. 90 Tab 1    lidocaine (LIDODERM) 5 % Apply patch to the affected area for 12 hours a day and remove for 12 hours a day. 5 Each 0    carvedilol (COREG) 12.5 mg tablet Take 1 Tab by mouth two (2) times a day. 60 Tab 5    isosorbide dinitrate (ISORDIL) 20 mg tablet Take 1 Tab by mouth two (2) times a day. 180 Tab 1    Insulin Needles, Disposable, 32 gauge x 5/32\" ndle USE  ONCE DAILY 1 Pen Needle 11    Blood-Glucose Meter (TRUE METRIX GLUCOSE METER) misc Check blood sugar up to three times daily 1 Each 0    glucose blood VI test strips (TRUE METRIX GLUCOSE TEST STRIP) strip Check blood sugar up to three times daily 100 Strip 11    cyanocobalamin (VITAMIN B-12) 500 mcg tablet Take 1 Tab by mouth daily. 30 Tab 5    Cholecalciferol, Vitamin D3, 50,000 unit cap Take  by mouth.  atorvastatin (LIPITOR) 40 mg tablet Take  by mouth daily.  aspirin delayed-release 81 mg tablet Take  by mouth daily. Follow-up and Dispositions    · Return in about 2 months (around 9/10/2019) for routine follow up.

## 2019-07-15 ENCOUNTER — TELEPHONE (OUTPATIENT)
Dept: INTERNAL MEDICINE CLINIC | Age: 54
End: 2019-07-15

## 2019-07-15 NOTE — TELEPHONE ENCOUNTER
7/15/19 Called [employer] @ 8  PA initiated with WILLIS Yepez for Lyrica 25 mg cap. PA was auto approved effective 7/15/19 thru 7/14/20. 14 [facility] [id number]. [address] called approval , spoke with Kely,stated it went thru with 0 co pay.

## 2019-07-15 NOTE — TELEPHONE ENCOUNTER
Pt called in reference to her lyrica and gabapentin. She said that she was unable to get the lyrica because it has to have a prior Sunday [place]. Ms. Friasnatan Santoyo also said that Dr. Bishop Wang would not give her the gabapentin because of the lyrica. She said that her feet are killing her and she doesn't have either medication. Please call her at [phone].

## 2019-07-15 NOTE — TELEPHONE ENCOUNTER
verified. Informed patient that the PA for her Lyrica has been approved with $0 copay. Informed patient to call the pharmacy to see if it is available for . Understanding verbalized.

## 2019-07-29 ENCOUNTER — OFFICE VISIT (OUTPATIENT)
Dept: INTERNAL MEDICINE CLINIC | Age: 54
End: 2019-07-29

## 2019-07-29 VITALS
HEIGHT: 64 IN | BODY MASS INDEX: 44.73 KG/M2 | DIASTOLIC BLOOD PRESSURE: 80 MMHG | TEMPERATURE: 98 F | WEIGHT: 262 LBS | RESPIRATION RATE: 16 BRPM | SYSTOLIC BLOOD PRESSURE: 139 MMHG | HEART RATE: 92 BPM | OXYGEN SATURATION: 94 %

## 2019-07-29 DIAGNOSIS — E11.21 TYPE 2 DIABETES MELLITUS WITH NEPHROPATHY (HCC): ICD-10-CM

## 2019-07-29 DIAGNOSIS — F11.20 NARCOTIC DEPENDENCE, EPISODIC USE (HCC): ICD-10-CM

## 2019-07-29 DIAGNOSIS — M70.62 TROCHANTERIC BURSITIS OF LEFT HIP: Primary | ICD-10-CM

## 2019-07-29 DIAGNOSIS — M70.61 TROCHANTERIC BURSITIS, RIGHT HIP: ICD-10-CM

## 2019-07-29 RX ORDER — OXYCODONE AND ACETAMINOPHEN 5; 325 MG/1; MG/1
1 TABLET ORAL
Qty: 6 TAB | Refills: 0 | Status: SHIPPED | OUTPATIENT
Start: 2019-07-29 | End: 2019-07-29 | Stop reason: ALTCHOICE

## 2019-07-29 NOTE — PROGRESS NOTES
Chief Complaint   Patient presents with    Hip Pain     [facility] - ER last PM     I have reviewed the patient's medical history in detail and updated the computerized patient record. Health Maintenance reviewed. 1. Have you been to the ER, urgent care clinic since your last visit? Hospitalized since your last visit? yes    2. Have you seen or consulted any other health care providers outside of the [address] [place] [address] [place] since your last visit? Include any pap smears or colon screening. [facility] - ER      Encouraged pt to discuss pt's wishes with spouse/partner/family and bring them in the next appt to follow thru with the Advanced Directive    Fall Risk Assessment, last 12 mths 5/21/2019   Able to walk? Yes   Fall in past 12 months? No   Fall with injury? -   Number of falls in past 12 months -   Fall Risk Score -       3 most recent PHQ Screens 7/29/2019   Little interest or pleasure in doing things Several days   Feeling down, depressed, irritable, or hopeless Several days   Total Score PHQ 2 2       Abuse Screening Questionnaire 5/21/2019   Do you ever feel afraid of your partner? N   Are you in a relationship with someone who physically or mentally threatens you? N   Is it safe for you to go home?  Y       ADL Assessment 5/21/2019   Feeding yourself No Help Needed   Getting from bed to chair No Help Needed   Getting dressed No Help Needed   Bathing or showering No Help Needed   Walk across the room (includes cane/walker) No Help Needed   Using the telphone No Help Needed   Taking your medications No Help Needed   Preparing meals No Help Needed   Managing money (expenses/bills) No Help Needed   Moderately strenuous housework (laundry) No Help Needed   Shopping for personal items (toiletries/medicines) No Help Needed   Shopping for groceries No Help Needed   Driving No Help Needed   Climbing a flight of stairs No Help Needed   Getting to places beyond walking distances No Help Needed     [facility] CARE  OFFICE PROCEDURE PROGRESS NOTE        Chart reviewed for the following:   Ailyn GARIBAY LPN, have reviewed the History, Physical and updated the Allergic reactions for Kayli Hacking performed immediately prior to start of procedure:   Mayo Rodriguez LPN, have performed the following reviews on Nel Merino prior to the start of the procedure:            * Patient was identified by name and date of birth   * Agreement on procedure being performed was verified  * Risks and Benefits explained to the patient by Dr. Musa Wild  * Procedure site verified and marked as necessary  * Patient was positioned for comfort  * Consent was signed and verified     Time: 2:10PM    Date of procedure: 7/29/2019    Procedure performed by:  Elio Hilario MD    Provider assisted by: Andi Euceda LPN    Patient assisted by: Andi Euceda LPN    How tolerated by patient: Well    Post Procedural Pain Scale: 8/10    Comments: None    Dr. Musa Wild did the procedure

## 2019-07-29 NOTE — PROGRESS NOTES
HISTORY OF PRESENT ILLNESS  Laverne Chen is a 47 y.o. female. Hip Pain    The history is provided by the patient. This is a new problem. The current episode started more than 1 week ago. The problem occurs daily. The problem has been gradually worsening. The pain is present in the left upper leg, right upper leg, left knee and right knee. The quality of the pain is described as aching and constant. The pain is severe. Associated symptoms include stiffness and back pain. There has been no history of extremity trauma. trying lyrica but so far not helping.   Last A1C was 7.0    Social History     Socioeconomic History    Marital status:      Spouse name: Not on file    Number of children: Not on file    Years of education: Not on file    Highest education level: Not on file   Occupational History    Occupation: disabled   Social Needs    Financial resource strain: Not on file    Food insecurity:     Worry: Not on file     Inability: Not on file   [place] needs:     Medical: Not on file     Non-medical: Not on file   Tobacco Use    Smoking status: Never Smoker    Smokeless tobacco: Never Used   Substance and Sexual Activity    Alcohol use: No     Alcohol/week: 0.0 standard drinks    Drug use: No    Sexual activity: Not Currently   Lifestyle    Physical activity:     Days per week: Not on file     Minutes per session: Not on file    Stress: Not on file   Relationships    Social connections:     Talks on phone: Not on file     Gets together: Not on file     Attends Jew service: Not on file     Active member of club or organization: Not on file     Attends meetings of clubs or organizations: Not on file     Relationship status: Not on file    Intimate partner violence:     Fear of current or ex partner: Not on file     Emotionally abused: Not on file     Physically abused: Not on file     Forced sexual activity: Not on file   Other Topics Concern    Not on file   Social History Narrative    Lives with daughter. Not working. Applying for disabilty. Current Outpatient Medications   Medication Sig Dispense Refill    pregabalin (LYRICA) 25 mg capsule Take 2 Caps by mouth two (2) times a day. Max Daily Amount: 100 mg. Start 1 tablet daily, usually in the evening, every few days increase as tolerated, pain. 120 Cap 1    liraglutide (VICTOZA) 0.6 mg/0.1 mL (18 mg/3 mL) pnij 1.2 mg by SubCUTAneous route daily. 15 Pen 1    HUMALOG KWIKPEN INSULIN 100 unit/mL kwikpen 8 Units by SubCUTAneous route Before breakfast, lunch, and dinner. 5 Pen 5    hydrALAZINE (APRESOLINE) 25 mg tablet Take 1 Tab by mouth two (2) times a day. 60 Tab 5    lisinopril (PRINIVIL, ZESTRIL) 10 mg tablet Take 1 Tab by mouth daily. 90 Tab 1    lidocaine (LIDODERM) 5 % Apply patch to the affected area for 12 hours a day and remove for 12 hours a day. 5 Each 0    carvedilol (COREG) 12.5 mg tablet Take 1 Tab by mouth two (2) times a day. 60 Tab 5    isosorbide dinitrate (ISORDIL) 20 mg tablet Take 1 Tab by mouth two (2) times a day. 180 Tab 1    Insulin Needles, Disposable, 32 gauge x 5/32\" ndle USE  ONCE DAILY 1 Pen Needle 11    Blood-Glucose Meter (TRUE METRIX GLUCOSE METER) misc Check blood sugar up to three times daily 1 Each 0    glucose blood VI test strips (TRUE METRIX GLUCOSE TEST STRIP) strip Check blood sugar up to three times daily 100 Strip 11    cyanocobalamin (VITAMIN B-12) 500 mcg tablet Take 1 Tab by mouth daily. 30 Tab 5    Cholecalciferol, Vitamin D3, 50,000 unit cap Take  by mouth.  atorvastatin (LIPITOR) 40 mg tablet Take  by mouth daily.  aspirin delayed-release 81 mg tablet Take  by mouth daily. Review of Systems   Musculoskeletal: Positive for back pain and stiffness. no hypos. No trauma, cant get comfortable PM  Out of percs given by her PM    Social History     Socioeconomic History    Marital status:      Spouse name: Not on file    Number of children: Not on file    Years of education: Not on file    Highest education level: Not on file   Occupational History    Occupation: disabled   Social Needs    Financial resource strain: Not on file    Food insecurity:     Worry: Not on file     Inability: Not on file   [place] needs:     Medical: Not on file     Non-medical: Not on file   Tobacco Use    Smoking status: Never Smoker    Smokeless tobacco: Never Used   Substance and Sexual Activity    Alcohol use: No     Alcohol/week: 0.0 standard drinks    Drug use: No    Sexual activity: Not Currently   Lifestyle    Physical activity:     Days per week: Not on file     Minutes per session: Not on file    Stress: Not on file   Relationships    Social connections:     Talks on phone: Not on file     Gets together: Not on file     Attends Yazidism service: Not on file     Active member of club or organization: Not on file     Attends meetings of clubs or organizations: Not on file     Relationship status: Not on file    Intimate partner violence:     Fear of current or ex partner: Not on file     Emotionally abused: Not on file     Physically abused: Not on file     Forced sexual activity: Not on file   Other Topics Concern    Not on file   Social History Narrative    Lives with daughter. Not working. Applying for disabilty. Physical Exam  Visit Vitals  /80 (BP 1 Location: Left arm, BP Patient Position: Sitting)   Pulse 92   Temp 98 °F (36.7 °C) (Oral)   Resp 16   Ht 5' 4\" (1.626 m)   Wt 262 lb (118.8 kg)   LMP 12/03/2017   SpO2 94%   BMI 44.97 kg/m²     WD WN female NAD  Heart RRR without murmers clicks or rubs  Lungs CTA  Abdo soft nontender  Ext no edema  Hips grossly nl, areas of Bi hip tender to palp  ASSESSMENT and PLAN  Encounter Diagnoses   Name Primary?     Trochanteric bursitis of left hip Yes    Trochanteric bursitis, right hip     Type 2 diabetes mellitus with nephropathy (HCC)     Narcotic dependence, episodic use ([facility] [address] Orders Placed This Encounter    DRAIN/INJECT LARGE JOINT/BURSA    DRAIN/INJECT LARGE JOINT/BURSA    DISCONTD: oxyCODONE-acetaminophen (PERCOCET) 5-325 mg per tablet     Options discussed. Discussed possible side affects and benefits of the procedure and/or medications. The patient agrees to undergo the procedure. Consent obtained. Sterile procedure followed. There were no complications and the procedure was well tolerated. Instructed patient to call me if it is ineffective or if there are any complications like increasing pain, redness or swelling. Point of maximal tenderness was palpated. Area cleaned with alcohol. This area was injected with 2 cc of lidocaine and 6 kenalog. One to right the 2nd to left. The procedure was well tolerated. Dm stable. Patient was looked up in the . There are multiple prescribers of controlled substances  yes. Re Sierra Stairs from PM earlier this mo, would not  RF that  See patient instructions, went over them personally with the patient. Emphasized compliance. Questions answered. Patient states that they understand the plan of action and will call if there are any issues or misunderstandings. Follow-up and Dispositions    · Return in about 5 weeks (around 9/2/2019).

## 2019-07-30 ENCOUNTER — TELEPHONE (OUTPATIENT)
Dept: INTERNAL MEDICINE CLINIC | Age: 54
End: 2019-07-30

## 2019-07-31 ENCOUNTER — TELEPHONE (OUTPATIENT)
Dept: INTERNAL MEDICINE CLINIC | Age: 54
End: 2019-07-31

## 2019-07-31 NOTE — TELEPHONE ENCOUNTER
7/31/19 PA initiated with WILLIS Pugh for Victoza 18 mg/ml inj . Case ID: [id number]. Turn around time 24 hours with decision faxed to office.

## 2019-08-01 NOTE — TELEPHONE ENCOUNTER
Received notice of approval for PA request of Victoza 3 joseph - approved for 08/01/2019 through 07/31/2020

## 2019-09-06 DIAGNOSIS — E11.21 TYPE 2 DIABETES MELLITUS WITH NEPHROPATHY (HCC): ICD-10-CM

## 2019-09-06 NOTE — TELEPHONE ENCOUNTER
Requested Prescriptions     Pending Prescriptions Disp Refills    liraglutide (VICTOZA) 0.6 mg/0.1 mL (18 mg/3 mL) pnij 15 Pen 1     Si.2 mg by SubCUTAneous route daily.

## 2019-09-08 DIAGNOSIS — E11.40 NEUROPATHY DUE TO TYPE 2 DIABETES MELLITUS (HCC): ICD-10-CM

## 2019-09-08 DIAGNOSIS — E11.21 TYPE 2 DIABETES MELLITUS WITH NEPHROPATHY (HCC): ICD-10-CM

## 2019-09-09 RX ORDER — PREGABALIN 25 MG/1
50 CAPSULE ORAL 2 TIMES DAILY
Qty: 120 CAP | Refills: 0 | Status: SHIPPED | OUTPATIENT
Start: 2019-09-09 | End: 2019-09-16 | Stop reason: SDUPTHER

## 2019-09-09 RX ORDER — LIRAGLUTIDE 6 MG/ML
INJECTION SUBCUTANEOUS
Qty: 5 PEN | Refills: 1 | Status: SHIPPED | OUTPATIENT
Start: 2019-09-09 | End: 2019-09-16 | Stop reason: SDUPTHER

## 2019-09-09 NOTE — TELEPHONE ENCOUNTER
Last office visit:  07/29/2019  Last filled:  Victoza 0.6mg/1ml 1.2mg daily SC #15 pens X 1 ref 07/01/2019  No changes  Follow up 09/10/2019 with Dr Dangelo Shin

## 2019-09-16 ENCOUNTER — OFFICE VISIT (OUTPATIENT)
Dept: INTERNAL MEDICINE CLINIC | Age: 54
End: 2019-09-16

## 2019-09-16 VITALS
HEART RATE: 95 BPM | BODY MASS INDEX: 44.76 KG/M2 | HEIGHT: 64 IN | SYSTOLIC BLOOD PRESSURE: 107 MMHG | RESPIRATION RATE: 16 BRPM | OXYGEN SATURATION: 96 % | TEMPERATURE: 98.3 F | WEIGHT: 262.2 LBS | DIASTOLIC BLOOD PRESSURE: 70 MMHG

## 2019-09-16 DIAGNOSIS — I10 ESSENTIAL HYPERTENSION: ICD-10-CM

## 2019-09-16 DIAGNOSIS — F11.20 NARCOTIC DEPENDENCE (HCC): ICD-10-CM

## 2019-09-16 DIAGNOSIS — E11.40 NEUROPATHY DUE TO TYPE 2 DIABETES MELLITUS (HCC): ICD-10-CM

## 2019-09-16 DIAGNOSIS — N18.30 CRI (CHRONIC RENAL INSUFFICIENCY), STAGE 3 (MODERATE) (HCC): ICD-10-CM

## 2019-09-16 DIAGNOSIS — E11.21 TYPE 2 DIABETES MELLITUS WITH NEPHROPATHY (HCC): Primary | ICD-10-CM

## 2019-09-16 RX ORDER — PREGABALIN 25 MG/1
50 CAPSULE ORAL 2 TIMES DAILY
Qty: 120 CAP | Refills: 0 | Status: SHIPPED | OUTPATIENT
Start: 2019-09-16 | End: 2019-10-22 | Stop reason: SDUPTHER

## 2019-09-16 NOTE — PROGRESS NOTES
Subjective:     Glendy Uribe is a 47 y.o. female seen for follow-up of diabetes. Needs refills of Lyrica  She has had hypoglycemic attacks. .no  Blood sugar control has been good her last A1c was 7.0  She has diabetes, hypertension, hyperlipidemia and CHF. Glendy Uribe has the additional concern of getting narcotic agent Percocet from her pain manager at [facility]. He wants her to get a drug of abuse urine panel. States her pain levels are reasonable. Follows low-salt diet minimal swelling in her extremities. Diabetic Review of Systems: no polyuria or polydipsia, no chest pain, dyspnea or TIA's, no numbness, tingling or pain in extremities, no hypoglycemia, no medication side effects noted. Allergies   Allergen Reactions    Lortab [Hydrocodone-Acetaminophen] Nausea and Vomiting    Cymbalta [Duloxetine] Diarrhea    Lasix [Furosemide] Swelling    Morphine Other (comments)       Diet and Lifestyle: follows a diabetic diet regularly, nonsmoker. Patient Active Problem List    Diagnosis Date Noted    Type 2 diabetes mellitus with diabetic neuropathy, with long-term current use of insulin ([facility] [address] 23/50/6525    Acute systolic congestive heart failure ([address] 02/07/2018    Obesity, morbid ([address] 12/18/2017    Type 2 diabetes mellitus with nephropathy ([address] 12/18/2017    Spinal stenosis of lumbosacral region 12/08/2016    Lumbar back pain with radiculopathy affecting right lower extremity 12/08/2016    Idiopathic small and large fiber sensory neuropathy 11/16/2016    Narcotic dependence, episodic use (HCC) 04/29/2016    Chronic pain     Hypertension 11/07/2012     Current Outpatient Medications   Medication Sig Dispense Refill    LYRICA 25 mg capsule Take 2 Caps by mouth two (2) times a day. Max Daily Amount: 100 mg. 120 Cap 0    liraglutide (VICTOZA) 0.6 mg/0.1 mL (18 mg/3 mL) pnij 1.2 mg by SubCUTAneous route daily.  15 Pen 1    HUMALOG KWIKPEN INSULIN 100 unit/mL kwikpen 8 Units by SubCUTAneous route Before breakfast, lunch, and dinner. 5 Pen 5    hydrALAZINE (APRESOLINE) 25 mg tablet Take 1 Tab by mouth two (2) times a day. 60 Tab 5    lisinopril (PRINIVIL, ZESTRIL) 10 mg tablet Take 1 Tab by mouth daily. 90 Tab 1    lidocaine (LIDODERM) 5 % Apply patch to the affected area for 12 hours a day and remove for 12 hours a day. 5 Each 0    carvedilol (COREG) 12.5 mg tablet Take 1 Tab by mouth two (2) times a day. 60 Tab 5    isosorbide dinitrate (ISORDIL) 20 mg tablet Take 1 Tab by mouth two (2) times a day. 180 Tab 1    Insulin Needles, Disposable, 32 gauge x 5/32\" ndle USE  ONCE DAILY 1 Pen Needle 11    Blood-Glucose Meter (TRUE METRIX GLUCOSE METER) misc Check blood sugar up to three times daily 1 Each 0    glucose blood VI test strips (TRUE METRIX GLUCOSE TEST STRIP) strip Check blood sugar up to three times daily 100 Strip 11    cyanocobalamin (VITAMIN B-12) 500 mcg tablet Take 1 Tab by mouth daily. 30 Tab 5    atorvastatin (LIPITOR) 40 mg tablet Take  by mouth daily.  aspirin delayed-release 81 mg tablet Take  by mouth daily.  Cholecalciferol, Vitamin D3, 50,000 unit cap Take  by mouth.        Allergies   Allergen Reactions    Lortab [Hydrocodone-Acetaminophen] Nausea and Vomiting    Cymbalta [Duloxetine] Diarrhea    Lasix [Furosemide] Swelling    Morphine Other (comments)     Social History     Tobacco Use    Smoking status: Never Smoker    Smokeless tobacco: Never Used   Substance Use Topics    Alcohol use: No     Alcohol/week: 0.0 standard drinks        Lab Results   Component Value Date/Time    Hemoglobin A1c 7.1 (H) 09/16/2019 03:00 PM    Hemoglobin A1c 7.0 (H) 05/21/2019 03:44 PM    Hemoglobin A1c 7.3 (H) 01/14/2019 02:26 PM    Glucose 117 (H) 09/16/2019 03:00 PM    Glucose (POC) 173 (H) 12/18/2015 11:17 AM    Microalb/Creat ratio (ug/mg creat.) 34.0 (H) 07/10/2019 12:14 PM    LDL, calculated 86 05/21/2019 03:44 PM    Creatinine 1.65 (H) 09/16/2019 03:00 PM Lab Results   Component Value Date/Time    Cholesterol, total 145 05/21/2019 03:44 PM    HDL Cholesterol 41 05/21/2019 03:44 PM    LDL, calculated 86 05/21/2019 03:44 PM    Triglyceride 89 05/21/2019 03:44 PM     Lab Results   Component Value Date/Time    ALT (SGPT) 23 04/02/2018 02:00 PM    AST (SGOT) 13 (L) 04/02/2018 02:00 PM    Alk. phosphatase 123 (H) 04/02/2018 02:00 PM    Bilirubin, total 0.5 04/02/2018 02:00 PM    Albumin 3.5 04/02/2018 02:00 PM    Protein, total 7.8 04/02/2018 02:00 PM    INR 1.1 12/08/2015 04:42 PM    Prothrombin time 11.2 12/08/2015 04:42 PM    PLATELET 292 (L) 32/93/0226 02:26 PM     Lab Results   Component Value Date/Time    GFR est non-AA 35 (L) 09/16/2019 03:00 PM    GFR est AA 41 (L) 09/16/2019 03:00 PM    Creatinine 1.65 (H) 09/16/2019 03:00 PM    BUN 28 (H) 09/16/2019 03:00 PM    Sodium 144 09/16/2019 03:00 PM    Potassium 4.2 09/16/2019 03:00 PM    Chloride 111 (H) 09/16/2019 03:00 PM    CO2 21 09/16/2019 03:00 PM    Magnesium 1.4 (L) 11/16/2016 12:04 AM    PTH, Intact CANCELED 02/19/2018 02:29 PM     Lab Results   Component Value Date/Time    Glucose 117 (H) 09/16/2019 03:00 PM    Glucose (POC) 173 (H) 12/18/2015 11:17 AM         Review of Systems  Pertinent items are noted in HPI. Objective:     Significant for the following:     Visit Vitals  /70 (BP 1 Location: Right arm, BP Patient Position: Sitting)   Pulse 95   Temp 98.3 °F (36.8 °C) (Oral)   Resp 16   Ht 5' 4\" (1.626 m)   Wt 262 lb 3.2 oz (118.9 kg)   LMP 12/03/2017   SpO2 96%   BMI 45.01 kg/m²     Appearance: alert, well appearing, and in no distress and overweight. Exam: heart sounds normal rate, regular rhythm, normal S1, S2, no murmurs, rubs, clicks or gallops, chest clear  Foot exam: deferred    Lab review: orders written for new lab studies as appropriate; see orders. Assessment/Plan:     Follow-up diabetes well controlled, stable.   Diabetic issues reviewed with her: all medications, side effects and compliance discussed carefully, annual eye examinations at Ophthalmology discussed and glycohemoglobin and other lab monitoring discussed. Urine panel drugs of abuse order, will need to fax at the [facility] she can continue her narcotic agent. Okay refill of her Lyrica. Hypertension stable  Congestive heart failure stable    Chronic Conditions Addressed Today     1. Hypertension     Relevant Orders     METABOLIC PANEL, BASIC (Completed)     [place] [facility]&/OR CONVEY OF SPEC FOR TR OFFICE TO LAB     COLLECTION VENOUS BLOOD,VENIPUNCTURE    2.  Type 2 diabetes mellitus with nephropathy (HCC) - Primary     Relevant Medications     LYRICA 25 mg capsule     Other Relevant Orders     HEMOGLOBIN A1C WITH EAG (Completed)     REFERRAL TO OPTOMETRY     [facility]/OR CONVEY OF SPEC FOR TR OFFICE TO LAB     COLLECTION VENOUS BLOOD,VENIPUNCTURE      Acute Diagnoses Addressed Today     Narcotic dependence ([facility] [address]            Relevant Medications        LYRICA 25 mg capsule        Other Relevant Orders        COMPLIANCE DRUG SCREEN/PRESCRIPTION MONITORING    Neuropathy due to type 2 diabetes mellitus (HCC)            Relevant Medications        LYRICA 25 mg capsule        Other Relevant Orders        [place] [facility]/OR CONVEY OF SPEC FOR TR OFFICE TO LAB        COLLECTION VENOUS BLOOD,VENIPUNCTURE    CRI (chronic renal insufficiency), stage 3 (moderate) (HCC)            Relevant Orders        [facility]/OR CONVEY OF SPEC FOR TR OFFICE TO LAB        COLLECTION VENOUS BLOOD,VENIPUNCTURE        Orders Placed This Encounter    COMPLIANCE DRUG SCREEN/PRESCRIPTION MONITORING    METABOLIC PANEL, BASIC    HEMOGLOBIN A1C WITH EAG    CKD REPORT    DIABETES PATIENT EDUCATION    REFERRAL TO OPTOMETRY     Referral Priority:   Routine     Referral Type:   Consultation     Referral Reason:   Specialty Services Required     Referred to Provider:   Lavern Plascencia OD    [place] [facility]&/OR CONVEY OF SPEC FOR TR OFFICE TO LAB    COLLECTION VENOUS BLOOD,VENIPUNCTURE    LYRICA 25 mg capsule     Sig: Take 2 Caps by mouth two (2) times a day. Max Daily Amount: 100 mg. Dispense:  120 Cap     Refill:  0     Do not fill till Oct 10,2019     Follow-up and Dispositions    · Return in about 2 months (around 11/16/2019) for routine follow up.

## 2019-09-16 NOTE — PROGRESS NOTES
1. Have you been to the ER, urgent care clinic since your last visit? Hospitalized since your last visit? No    2. Have you seen or consulted any other health care providers outside of the [address] [place] [address] since your last visit? Include any pap smears or colon screening.  No     Health Maintenance Due   Topic Date Due    Pneumococcal 0-64 years (1 of 3 - PCV13) 12/28/1972    Shingrix Vaccine Age 50> (1 of 2) 12/28/2016    FOBT Q 1 YEAR AGE 50-75  12/28/2016    EYE EXAM RETINAL OR DILATED  10/25/2018    BREAST CANCER SCRN MAMMOGRAM  03/22/2019     Mammogram @ [facility] 9

## 2019-09-17 LAB
BUN SERPL-MCNC: 28 MG/DL (ref 6–24)
BUN/CREAT SERPL: 17 (ref 9–23)
CALCIUM SERPL-MCNC: 9.2 MG/DL (ref 8.7–10.2)
CHLORIDE SERPL-SCNC: 111 MMOL/L (ref 96–106)
CO2 SERPL-SCNC: 21 MMOL/L (ref 20–29)
CREAT SERPL-MCNC: 1.65 MG/DL (ref 0.57–1)
EST. AVERAGE GLUCOSE BLD GHB EST-MCNC: 157 MG/DL
GLUCOSE SERPL-MCNC: 117 MG/DL (ref 65–99)
HBA1C MFR BLD: 7.1 % (ref 4.8–5.6)
INTERPRETATION: NORMAL
Lab: NORMAL
POTASSIUM SERPL-SCNC: 4.2 MMOL/L (ref 3.5–5.2)
SODIUM SERPL-SCNC: 144 MMOL/L (ref 134–144)

## 2019-09-20 LAB — DRUGS UR: NORMAL

## 2019-09-24 ENCOUNTER — TELEPHONE (OUTPATIENT)
Dept: INTERNAL MEDICINE CLINIC | Age: 54
End: 2019-09-24

## 2019-09-24 NOTE — TELEPHONE ENCOUNTER
Pt called in reference to wanting to know her results from her kidney test. She also was inquiring if Dr. Anirudh Navarro had finished her paperwork for Dr. Donita Silva. Please call her at [phone].

## 2019-10-01 DIAGNOSIS — E53.8 VITAMIN B 12 DEFICIENCY: ICD-10-CM

## 2019-10-01 NOTE — TELEPHONE ENCOUNTER
Requested Prescriptions     Pending Prescriptions Disp Refills    cyanocobalamin (VITAMIN B-12) 500 mcg tablet 30 Tab 5     Sig: Take 1 Tab by mouth daily.

## 2019-10-01 NOTE — TELEPHONE ENCOUNTER
Pt called in reference to her kidney test and paperwork for Dr. Darrick Jeffery. Please call her at [phone].

## 2019-10-02 ENCOUNTER — TELEPHONE (OUTPATIENT)
Dept: INTERNAL MEDICINE CLINIC | Age: 54
End: 2019-10-02

## 2019-10-02 NOTE — TELEPHONE ENCOUNTER
----- Message from Kelle Arenas sent at 10/2/2019 12:51 PM EDT -----  Regarding: Dr. Aiden Bender  Pt requesting a call back in regards to urine test being sent to the office of Dr. Liliam Frey and also to verify if Rx ''Vitamin B\" was sent to [facility]. Best contact [phone].

## 2019-10-03 RX ORDER — LANOLIN ALCOHOL/MO/W.PET/CERES
500 CREAM (GRAM) TOPICAL DAILY
Qty: 90 TAB | Refills: 3 | Status: SHIPPED | OUTPATIENT
Start: 2019-10-03 | End: 2020-08-03 | Stop reason: SDUPTHER

## 2019-10-07 DIAGNOSIS — E11.21 TYPE 2 DIABETES MELLITUS WITH NEPHROPATHY (HCC): ICD-10-CM

## 2019-10-07 RX ORDER — LANOLIN ALCOHOL/MO/W.PET/CERES
500 CREAM (GRAM) TOPICAL DAILY
Qty: 90 TAB | Refills: 1 | Status: SHIPPED | OUTPATIENT
Start: 2019-10-07 | End: 2020-11-02 | Stop reason: SDUPTHER

## 2019-10-07 NOTE — TELEPHONE ENCOUNTER
Requested Prescriptions     Pending Prescriptions Disp Refills    liraglutide (VICTOZA) 0.6 mg/0.1 mL (18 mg/3 mL) pnij 15 Pen 1     Si.2 mg by SubCUTAneous route daily.      Last office visit 19  Future  19  Last filled  19  Changes made to medication on last visit  none

## 2019-10-07 NOTE — TELEPHONE ENCOUNTER
Requested Prescriptions     Pending Prescriptions Disp Refills    cyanocobalamin (VITAMIN B-12) 500 mcg tablet 90 Tab 1     Sig: Take 1 Tab by mouth daily.      Last office visit 9/16/19 future 11-19-19  Last filled  10/03/19  Changes made to medication on last visit  none

## 2019-10-08 DIAGNOSIS — E11.40 NEUROPATHY DUE TO TYPE 2 DIABETES MELLITUS (HCC): ICD-10-CM

## 2019-10-08 RX ORDER — TORSEMIDE 20 MG/1
40 TABLET ORAL DAILY
Qty: 100 TAB | Refills: 5 | Status: CANCELLED | OUTPATIENT
Start: 2019-10-08

## 2019-10-08 NOTE — TELEPHONE ENCOUNTER
Requested Prescriptions     Pending Prescriptions Disp Refills    LYRICA 25 mg capsule 120 Cap 0     Sig: Take 2 Caps by mouth two (2) times a day. Max Daily Amount: 100 mg.  torsemide (DEMADEX) 20 mg tablet 100 Tab 5     Sig: Take 2 Tabs by mouth daily.  May take 3 per day if continual leg swelling

## 2019-10-14 RX ORDER — PREGABALIN 25 MG/1
50 CAPSULE ORAL 2 TIMES DAILY
Qty: 120 CAP | Refills: 0 | OUTPATIENT
Start: 2019-10-14

## 2019-10-14 NOTE — TELEPHONE ENCOUNTER
Last office visit:  09/16/2019  Last filled:  Lyrica 25mg 2 caps BID #120 X no refills  No changes  ANN  Follow up 11/18/2019

## 2019-10-21 NOTE — TELEPHONE ENCOUNTER
General Message/Vendor Calls     Caller's first and last name: Pretty Brothers       Reason for call: Lyrica prescription       Callback required yes/no and why: yes       Best contact number(s):[phone]       Details to clarify the request: Patient is checking the status of her prescription for Lyrica to go to the [address] [place] [address] in [facility]

## 2019-10-22 ENCOUNTER — TELEPHONE (OUTPATIENT)
Dept: INTERNAL MEDICINE CLINIC | Age: 54
End: 2019-10-22

## 2019-10-22 DIAGNOSIS — E11.40 NEUROPATHY DUE TO TYPE 2 DIABETES MELLITUS (HCC): ICD-10-CM

## 2019-10-22 NOTE — TELEPHONE ENCOUNTER
Pt called in reference to her lyrica prescription. She accidentally threw it away. Ms. Alvarez Tellez would like to know if she can  another script. Please call her at [phone].

## 2019-10-22 NOTE — TELEPHONE ENCOUNTER
Requested Prescriptions     Pending Prescriptions Disp Refills    LYRICA 25 mg capsule 120 Cap 0     Sig: Take 2 Caps by mouth two (2) times a day. Max Daily Amount: 100 mg.      Future Appointments:  11/18/2019  1:00 PM Ethan Power MD   Last Appointment With Me:  9/16/2019

## 2019-10-23 RX ORDER — PREGABALIN 25 MG/1
50 CAPSULE ORAL 2 TIMES DAILY
Qty: 120 CAP | Refills: 0 | Status: SHIPPED | OUTPATIENT
Start: 2019-10-23 | End: 2019-11-18 | Stop reason: SDUPTHER

## 2019-11-18 ENCOUNTER — OFFICE VISIT (OUTPATIENT)
Dept: INTERNAL MEDICINE CLINIC | Age: 54
End: 2019-11-18

## 2019-11-18 VITALS
BODY MASS INDEX: 44.9 KG/M2 | RESPIRATION RATE: 16 BRPM | HEART RATE: 94 BPM | DIASTOLIC BLOOD PRESSURE: 76 MMHG | SYSTOLIC BLOOD PRESSURE: 136 MMHG | TEMPERATURE: 97.6 F | HEIGHT: 64 IN | OXYGEN SATURATION: 94 % | WEIGHT: 263 LBS

## 2019-11-18 DIAGNOSIS — Z12.11 COLON CANCER SCREENING: ICD-10-CM

## 2019-11-18 DIAGNOSIS — F11.20 NARCOTIC DEPENDENCE, EPISODIC USE (HCC): ICD-10-CM

## 2019-11-18 DIAGNOSIS — E11.40 TYPE 2 DIABETES MELLITUS WITH DIABETIC NEUROPATHY, WITH LONG-TERM CURRENT USE OF INSULIN (HCC): ICD-10-CM

## 2019-11-18 DIAGNOSIS — E66.01 OBESITY, MORBID (HCC): ICD-10-CM

## 2019-11-18 DIAGNOSIS — E11.21 TYPE 2 DIABETES MELLITUS WITH NEPHROPATHY (HCC): ICD-10-CM

## 2019-11-18 DIAGNOSIS — Z79.4 TYPE 2 DIABETES MELLITUS WITH DIABETIC NEUROPATHY, WITH LONG-TERM CURRENT USE OF INSULIN (HCC): ICD-10-CM

## 2019-11-18 DIAGNOSIS — I10 ESSENTIAL HYPERTENSION: Primary | ICD-10-CM

## 2019-11-18 DIAGNOSIS — E11.40 NEUROPATHY DUE TO TYPE 2 DIABETES MELLITUS (HCC): ICD-10-CM

## 2019-11-18 RX ORDER — PREGABALIN 25 MG/1
50 CAPSULE ORAL 2 TIMES DAILY
Qty: 120 CAP | Refills: 1 | Status: SHIPPED | OUTPATIENT
Start: 2019-11-18 | End: 2020-01-22 | Stop reason: SDUPTHER

## 2019-11-18 NOTE — PROGRESS NOTES
Subjective:     Yesenia Donovan is a 47 y.o. female seen for follow-up of diabetes. She also has hypertension and congestive heart failure. She has had hypoglycemic attacks. .no  Blood sugar control has been ok, last A1c in September was 7.1  She has diabetes, hypertension, hyperlipidemia and CHF. Yesenia Donovan has the additional concern of wants to lose more weight. She is disappointed that the big toes that has not really helped much in that respect, interested in alternative medical treatment for her obesity. Tries to follow diet but so far not able to lose weight. Diabetic Review of Systems: no polyuria or polydipsia, no chest pain, dyspnea or TIA's, has dysesthesias in the feet. No apparent nocturnal dyspnea. Minimal complaints of lower leg swelling. Allergies   Allergen Reactions    Lortab [Hydrocodone-Acetaminophen] Nausea and Vomiting    Cymbalta [Duloxetine] Diarrhea    Lasix [Furosemide] Swelling    Morphine Other (comments)       Diet and Lifestyle: follows a diabetic diet regularly, nonsmoker.     Patient Active Problem List    Diagnosis Date Noted    Type 2 diabetes mellitus with diabetic neuropathy, with long-term current use of insulin ([address] 17/93/5082    Acute systolic congestive heart failure ([address] 02/07/2018    Obesity, morbid ([address] 12/18/2017    Type 2 diabetes mellitus with nephropathy ([address] 12/18/2017    Spinal stenosis of lumbosacral region 12/08/2016    Lumbar back pain with radiculopathy affecting right lower extremity 12/08/2016    Idiopathic small and large fiber sensory neuropathy 11/16/2016    Narcotic dependence, episodic use ([address] 04/29/2016    Chronic pain     Hypertension 11/07/2012     Social History     Tobacco Use    Smoking status: Never Smoker    Smokeless tobacco: Never Used   Substance Use Topics    Alcohol use: No     Alcohol/week: 0.0 standard drinks        Lab Results   Component Value Date/Time    WBC 5.3 01/14/2019 02:26 PM    HGB 10.3 (L) 01/14/2019 02:26 PM    HCT 33.4 (L) 01/14/2019 02:26 PM    PLATELET 570 (L) 17/34/3349 02:26 PM    MCV 84 01/14/2019 02:26 PM     Lab Results   Component Value Date/Time    Hemoglobin A1c 7.1 (H) 09/16/2019 03:00 PM    Hemoglobin A1c 7.0 (H) 05/21/2019 03:44 PM    Hemoglobin A1c 7.3 (H) 01/14/2019 02:26 PM    Glucose 117 (H) 09/16/2019 03:00 PM    Glucose (POC) 173 (H) 12/18/2015 11:17 AM    Microalb/Creat ratio (ug/mg creat.) 34.0 (H) 07/10/2019 12:14 PM    LDL, calculated 86 05/21/2019 03:44 PM    Creatinine 1.65 (H) 09/16/2019 03:00 PM      Lab Results   Component Value Date/Time    Cholesterol, total 145 05/21/2019 03:44 PM    HDL Cholesterol 41 05/21/2019 03:44 PM    LDL, calculated 86 05/21/2019 03:44 PM    Triglyceride 89 05/21/2019 03:44 PM     Lab Results   Component Value Date/Time    ALT (SGPT) 23 04/02/2018 02:00 PM    AST (SGOT) 13 (L) 04/02/2018 02:00 PM    Alk. phosphatase 123 (H) 04/02/2018 02:00 PM    Bilirubin, total 0.5 04/02/2018 02:00 PM    Albumin 3.5 04/02/2018 02:00 PM    Protein, total 7.8 04/02/2018 02:00 PM    INR 1.1 12/08/2015 04:42 PM    Prothrombin time 11.2 12/08/2015 04:42 PM    PLATELET 110 (L) 76/20/2920 02:26 PM     Lab Results   Component Value Date/Time    GFR est non-AA 35 (L) 09/16/2019 03:00 PM    GFR est AA 41 (L) 09/16/2019 03:00 PM    Creatinine 1.65 (H) 09/16/2019 03:00 PM    BUN 28 (H) 09/16/2019 03:00 PM    Sodium 144 09/16/2019 03:00 PM    Potassium 4.2 09/16/2019 03:00 PM    Chloride 111 (H) 09/16/2019 03:00 PM    CO2 21 09/16/2019 03:00 PM    Magnesium 1.4 (L) 11/16/2016 12:04 AM    PTH, Intact CANCELED 02/19/2018 02:29 PM     Lab Results   Component Value Date/Time    TSH 0.847 01/14/2019 02:26 PM    T4, Free 1.1 01/30/2014 10:48 PM      Lab Results   Component Value Date/Time    Glucose 117 (H) 09/16/2019 03:00 PM    Glucose (POC) 173 (H) 12/18/2015 11:17 AM         Review of Systems  Pertinent items are noted in HPI.     Objective:     Significant for the following:     Visit Vitals  /76 (BP 1 Location: Left arm, BP Patient Position: Sitting)   Pulse 94   Temp 97.6 °F (36.4 °C) (Oral)   Resp 16   Ht 5' 4\" (1.626 m)   Wt 263 lb (119.3 kg)   LMP 12/03/2017   SpO2 94%   BMI 45.14 kg/m²     Appearance: Somewhat chronically ill, and in no distress and overweight. Exam: heart sounds normal rate, regular rhythm, normal S1, S2, no murmurs, rubs, clicks or gallops, chest clear  Foot exam: def    Lab review: labs reviewed, I note that glycosylated hemoglobin mildly abnormal but acceptable. Assessment/Plan:     Follow-up diabetes well controlled, stable. Diabetic issues reviewed with her: diabetic diet discussed in detail, written exchange diet given, all medications, side effects and compliance discussed carefully and glycohemoglobin and other lab monitoring discussed. Chronic Conditions Addressed Today     1. Hypertension - Primary     Relevant Orders     METABOLIC PANEL, BASIC    2. Narcotic dependence, episodic use (HCC)    3. Obesity, morbid (HCC)     Relevant Medications     liraglutide (VICTOZA) 0.6 mg/0.1 mL (18 mg/3 mL) pnij    4. Type 2 diabetes mellitus with nephropathy (HCC)     Relevant Medications     liraglutide (VICTOZA) 0.6 mg/0.1 mL (18 mg/3 mL) pnij     LYRICA 25 mg capsule     Other Relevant Orders     HEMOGLOBIN A1C WITH EAG    5.  Type 2 diabetes mellitus with diabetic neuropathy, with long-term current use of insulin (HCC)     Relevant Medications     liraglutide (VICTOZA) 0.6 mg/0.1 mL (18 mg/3 mL) pnij     LYRICA 25 mg capsule      Acute Diagnoses Addressed Today     Neuropathy due to type 2 diabetes mellitus (HCC)            Relevant Medications        liraglutide (VICTOZA) 0.6 mg/0.1 mL (18 mg/3 mL) pnij        LYRICA 25 mg capsule    Colon cancer screening            Relevant Orders        OCCULT BLOOD IMMUNOASSAY,DIAGNOSTIC          INC victoza maybe higher dose will help  HTN stable  CHF stable    See patient instructions, went over them personally with the patient. Emphasized compliance. Questions answered. Patient states that they understand the plan of action and will call if there are any issues or misunderstandings. We discussed a screening exam colon CA test and the  the pros and cons of having the test done. The patient made a decision to do the test: yes    Follow-up and Dispositions    · Return in about 2 months (around 1/18/2020) for routine follow up.

## 2019-11-18 NOTE — PATIENT INSTRUCTIONS
Learning About Meal Planning for Diabetes  Why plan your meals? Meal planning can be a key part of managing diabetes. Planning meals and snacks with the right balance of carbohydrate, protein, and fat can help you keep your blood sugar at the target level you set with your doctor. You don't have to eat special foods. You can eat what your family eats, including sweets once in a while. But you do have to pay attention to how often you eat and how much you eat of certain foods. You may want to work with a dietitian or a certified diabetes educator. He or she can give you tips and meal ideas and can answer your questions about meal planning. This health professional can also help you reach a healthy weight if that is one of your goals. What plan is right for you? Your dietitian or diabetes educator may suggest that you start with the plate format or carbohydrate counting. The plate format  The plate format is a simple way to help you manage how you eat. You plan meals by learning how much space each food should take on a plate. Using the plate format helps you spread carbohydrate throughout the day. It can make it easier to keep your blood sugar level within your target range. It also helps you see if you're eating healthy portion sizes. To use the plate format, you put non-starchy vegetables on half your plate. Add meat or meat substitutes on one-quarter of the plate. Put a grain or starchy vegetable (such as brown rice or a potato) on the final quarter of the plate. You can add a small piece of fruit and some low-fat or fat-free milk or yogurt, depending on your carbohydrate goal for each meal.  Here are some tips for using the plate format:  · Make sure that you are not using an oversized plate. A 9-inch plate is best. Many restaurants use larger plates. · Get used to using the plate format at home. Then you can use it when you eat out. · Write down your questions about using the plate format.  Talk to your doctor, a dietitian, or a diabetes educator about your concerns. Carbohydrate counting  With carbohydrate counting, you plan meals based on the amount of carbohydrate in each food. Carbohydrate raises blood sugar higher and more quickly than any other nutrient. It is found in desserts, breads and cereals, and fruit. It's also found in starchy vegetables such as potatoes and corn, grains such as rice and pasta, and milk and yogurt. Spreading carbohydrate throughout the day helps keep your blood sugar levels within your target range. Your daily amount depends on several things, including your weight, how active you are, which diabetes medicines you take, and what your goals are for your blood sugar levels. A registered dietitian or diabetes educator can help you plan how much carbohydrate to include in each meal and snack. A guideline for your daily amount of carbohydrate is:  · 45 to 60 grams at each meal. That's about the same as 3 to 4 carbohydrate servings. · 15 to 20 grams at each snack. That's about the same as 1 carbohydrate serving. The Nutrition Facts label on packaged foods tells you how much carbohydrate is in a serving of the food. First, look at the serving size on the food label. Is that the amount you eat in a serving? All of the nutrition information on a food label is based on that serving size. So if you eat more or less than that, you'll need to adjust the other numbers. Total carbohydrate is the next thing you need to look for on the label. If you count carbohydrate servings, one serving of carbohydrate is 15 grams. For foods that don't come with labels, such as fresh fruits and vegetables, you'll need a guide that lists carbohydrate in these foods. Ask your doctor, dietitian, or diabetes educator about books or other nutrition guides you can use.   If you take insulin, you need to know how many grams of carbohydrate are in a meal. This lets you know how much rapid-acting insulin to take before you eat. If you use an insulin pump, you get a constant rate of insulin during the day. So the pump must be programmed at meals to give you extra insulin to cover the rise in blood sugar after meals. When you know how much carbohydrate you will eat, you can take the right amount of insulin. Or, if you always use the same amount of insulin, you need to make sure that you eat the same amount of carbohydrate at meals. If you need more help to understand carbohydrate counting and food labels, ask your doctor, dietitian, or diabetes educator. How do you get started with meal planning? Here are some tips to get started:  · Plan your meals a week at a time. Don't forget to include snacks too. · Use cookbooks or online recipes to plan several main meals. Plan some quick meals for busy nights. You also can double some recipes that freeze well. Then you can save half for other busy nights when you don't have time to cook. · Make sure you have the ingredients you need for your recipes. If you're running low on basic items, put these items on your shopping list too. · List foods that you use to make breakfasts, lunches, and snacks. List plenty of fruits and vegetables. · Post this list on the refrigerator. Add to it as you think of more things you need. · Take the list to the store to do your weekly shopping. Follow-up care is a key part of your treatment and safety. Be sure to make and go to all appointments, and call your doctor if you are having problems. It's also a good idea to know your test results and keep a list of the medicines you take. Where can you learn more? Go to http://lizbeth-efraín.info/. Rowdy Quezada in the search box to learn more about \"Learning About Meal Planning for Diabetes. \"  Current as of: April 16, 2019  Content Version: 12.2  © 2863-9678 [employer].  Care instructions adapted under license by [employer] (which disclaims liability or warranty for this information). If you have questions about a medical condition or this instruction, always ask your healthcare professional. [address] any warranty or liability for your use of this information. Learning About Diabetes Food Guidelines  Your Care Instructions    Meal planning is important to manage diabetes. It helps keep your blood sugar at a target level (which you set with your doctor). You don't have to eat special foods. You can eat what your family eats, including sweets once in a while. But you do have to pay attention to how often you eat and how much you eat of certain foods. You may want to work with a dietitian or a certified diabetes educator (CDE) to help you plan meals and snacks. A dietitian or CDE can also help you lose weight if that is one of your goals. What should you know about eating carbs? Managing the amount of carbohydrate (carbs) you eat is an important part of healthy meals when you have diabetes. Carbohydrate is found in many foods. · Learn which foods have carbs. And learn the amounts of carbs in different foods. ? Bread, cereal, pasta, and rice have about 15 grams of carbs in a serving. A serving is 1 slice of bread (1 ounce), ½ cup of cooked cereal, or 1/3 cup of cooked pasta or rice. ? Fruits have 15 grams of carbs in a serving. A serving is 1 small fresh fruit, such as an apple or orange; ½ of a banana; ½ cup of cooked or canned fruit; ½ cup of fruit juice; 1 cup of melon or raspberries; or 2 tablespoons of dried fruit. ? Milk and no-sugar-added yogurt have 15 grams of carbs in a serving. A serving is 1 cup of milk or 2/3 cup of no-sugar-added yogurt. ? Starchy vegetables have 15 grams of carbs in a serving. A serving is ½ cup of mashed potatoes or sweet potato; 1 cup winter squash; ½ of a small baked potato; ½ cup of cooked beans; or ½ cup cooked corn or green peas.   · Learn how much carbs to eat each day and at each meal. A dietitian or CDE can teach you how to keep track of the amount of carbs you eat. This is called carbohydrate counting. · If you are not sure how to count carbohydrate grams, use the Plate Method to plan meals. It is a good, quick way to make sure that you have a balanced meal. It also helps you spread carbs throughout the day. ? Divide your plate by types of foods. Put non-starchy vegetables on half the plate, meat or other protein food on one-quarter of the plate, and a grain or starchy vegetable in the final quarter of the plate. To this you can add a small piece of fruit and 1 cup of milk or yogurt, depending on how many carbs you are supposed to eat at a meal.  · Try to eat about the same amount of carbs at each meal. Do not \"save up\" your daily allowance of carbs to eat at one meal.  · Proteins have very little or no carbs per serving. Examples of proteins are beef, chicken, turkey, fish, eggs, tofu, cheese, cottage cheese, and peanut butter. A serving size of meat is 3 ounces, which is about the size of a deck of cards. Examples of meat substitute serving sizes (equal to 1 ounce of meat) are 1/4 cup of cottage cheese, 1 egg, 1 tablespoon of peanut butter, and ½ cup of tofu. How can you eat out and still eat healthy? · Learn to estimate the serving sizes of foods that have carbohydrate. If you measure food at home, it will be easier to estimate the amount in a serving of restaurant food. · If the meal you order has too much carbohydrate (such as potatoes, corn, or baked beans), ask to have a low-carbohydrate food instead. Ask for a salad or green vegetables. · If you use insulin, check your blood sugar before and after eating out to help you plan how much to eat in the future. · If you eat more carbohydrate at a meal than you had planned, take a walk or do other exercise. This will help lower your blood sugar. What else should you know?   · Limit saturated fat, such as the fat from meat and dairy products. This is a healthy choice because people who have diabetes are at higher risk of heart disease. So choose lean cuts of meat and nonfat or low-fat dairy products. Use olive or canola oil instead of butter or shortening when cooking. · Don't skip meals. Your blood sugar may drop too low if you skip meals and take insulin or certain medicines for diabetes. · Check with your doctor before you drink alcohol. Alcohol can cause your blood sugar to drop too low. Alcohol can also cause a bad reaction if you take certain diabetes medicines. Follow-up care is a key part of your treatment and safety. Be sure to make and go to all appointments, and call your doctor if you are having problems. It's also a good idea to know your test results and keep a list of the medicines you take. Where can you learn more? Go to http://lizbeth-efraín.info/. Enter E516 in the search box to learn more about \"Learning About Diabetes Food Guidelines. \"  Current as of: April 16, 2019  Content Version: 12.2  © 7786-2868 [employer]. Care instructions adapted under license by [email or web address] (which disclaims liability or warranty for this information). If you have questions about a medical condition or this instruction, always ask your healthcare professional. [address] any warranty or liability for your use of this information.

## 2019-11-18 NOTE — PROGRESS NOTES
Chief Complaint   Patient presents with    Hypertension     follow up     I have reviewed the patient's medical history in detail and updated the computerized patient record. Health Maintenance reviewed. 1. Have you been to the ER, urgent care clinic since your last visit? Hospitalized since your last visit?no    2. Have you seen or consulted any other health care providers outside of the [address] [place] [address] [place] since your last visit? Include any pap smears or colon screening. No      Encouraged pt to discuss pt's wishes with spouse/partner/family and bring them in the next appt to follow thru with the Advanced Directive    Fall Risk Assessment, last 12 mths 5/21/2019   Able to walk? Yes   Fall in past 12 months? No   Fall with injury? -   Number of falls in past 12 months -   Fall Risk Score -       3 most recent PHQ Screens 11/18/2019   Little interest or pleasure in doing things Several days   Feeling down, depressed, irritable, or hopeless Several days   Total Score PHQ 2 2       Abuse Screening Questionnaire 5/21/2019   Do you ever feel afraid of your partner? N   Are you in a relationship with someone who physically or mentally threatens you? N   Is it safe for you to go home?  Y       ADL Assessment 5/21/2019   Feeding yourself No Help Needed   Getting from bed to chair No Help Needed   Getting dressed No Help Needed   Bathing or showering No Help Needed   Walk across the room (includes cane/walker) No Help Needed   Using the telphone No Help Needed   Taking your medications No Help Needed   Preparing meals No Help Needed   Managing money (expenses/bills) No Help Needed   Moderately strenuous housework (laundry) No Help Needed   Shopping for personal items (toiletries/medicines) No Help Needed   Shopping for groceries No Help Needed   Driving No Help Needed   Climbing a flight of stairs No Help Needed   Getting to places beyond walking distances No Help Needed

## 2019-11-19 ENCOUNTER — TELEPHONE (OUTPATIENT)
Dept: INTERNAL MEDICINE CLINIC | Age: 54
End: 2019-11-19

## 2019-11-19 ENCOUNTER — DOCUMENTATION ONLY (OUTPATIENT)
Dept: INTERNAL MEDICINE CLINIC | Age: 54
End: 2019-11-19

## 2019-11-19 NOTE — TELEPHONE ENCOUNTER
Called [phone] Opt 2,PA initiated with PA rep Matthew LINTON,for Victoza 1.8 mg subcutaneously once daily. Claim pending pharmacy review,outcome will be fax to office within 24 hours. Ref # [id number]. Please note.

## 2019-11-19 NOTE — PROGRESS NOTES
11/19/19 PA approved for Victoza 18 MG/3 ml pen Effective 11/19/19 thru 11/18/20. [facility] pharmacy called approval to [facility] not going thru at this time was last filled on 11/13/19,next refill is 12/5/19. Please note. Thanks

## 2019-11-25 RX ORDER — CARVEDILOL 12.5 MG/1
TABLET ORAL
Qty: 60 TAB | Refills: 5 | Status: SHIPPED | OUTPATIENT
Start: 2019-11-25 | End: 2020-05-30

## 2019-12-11 LAB
BUN SERPL-MCNC: 21 MG/DL (ref 6–24)
BUN/CREAT SERPL: 11 (ref 9–23)
CALCIUM SERPL-MCNC: 9.2 MG/DL (ref 8.7–10.2)
CHLORIDE SERPL-SCNC: 106 MMOL/L (ref 96–106)
CO2 SERPL-SCNC: 23 MMOL/L (ref 20–29)
CREAT SERPL-MCNC: 1.87 MG/DL (ref 0.57–1)
EST. AVERAGE GLUCOSE BLD GHB EST-MCNC: 163 MG/DL
GLUCOSE SERPL-MCNC: 249 MG/DL (ref 65–99)
HBA1C MFR BLD: 7.3 % (ref 4.8–5.6)
INTERPRETATION: NORMAL
Lab: NORMAL
POTASSIUM SERPL-SCNC: 5.1 MMOL/L (ref 3.5–5.2)
SODIUM SERPL-SCNC: 143 MMOL/L (ref 134–144)

## 2019-12-11 NOTE — PROGRESS NOTES
Stable labs. Your diabetes is doing well, Keep up the good work! Call me for any problems especially if the sugars go to low, where you feel weak or shaky.

## 2020-01-02 NOTE — TELEPHONE ENCOUNTER
Requested Prescriptions     Pending Prescriptions Disp Refills    isosorbide dinitrate (ISORDIL) 20 mg tablet 180 Tab 1     Sig: Take 1 Tab by mouth two (2) times a day.

## 2020-01-03 NOTE — TELEPHONE ENCOUNTER
Last office visit:  11/18/2019  Last filled:  Isosorbide 20mg 1 BID 3180 x 1 REFILL 4/17/2019   No changes  Follow up 1/21/2020

## 2020-01-04 RX ORDER — ISOSORBIDE DINITRATE 20 MG/1
20 TABLET ORAL 2 TIMES DAILY
Qty: 180 TAB | Refills: 1 | Status: SHIPPED | OUTPATIENT
Start: 2020-01-04 | End: 2020-11-18 | Stop reason: SDUPTHER

## 2020-01-07 ENCOUNTER — TELEPHONE (OUTPATIENT)
Dept: INTERNAL MEDICINE CLINIC | Age: 55
End: 2020-01-07

## 2020-01-07 NOTE — TELEPHONE ENCOUNTER
Pt stated that she has been diag with the flu and she keeps her grandchild,  wanted a note for her daughter so she can stay home with the child. Dr. Keira Guerrero stated that he could only give a note for patient.

## 2020-01-07 NOTE — TELEPHONE ENCOUNTER
Pt called requesting to speak with Dr Ugo Arango. Please call her at [phone]. She did not disclose what she wanted to speak with him about.

## 2020-01-08 ENCOUNTER — TELEPHONE (OUTPATIENT)
Dept: INTERNAL MEDICINE CLINIC | Age: 55
End: 2020-01-08

## 2020-01-08 DIAGNOSIS — E11.21 TYPE 2 DIABETES MELLITUS WITH NEPHROPATHY (HCC): Primary | ICD-10-CM

## 2020-01-08 NOTE — TELEPHONE ENCOUNTER
Pt called in reference to needing a prescription for lifestyle patch to monitor her sugar for 14 days so that she doesn't have to stick her finger. Pt would like it sent to [facility]. Please call her at [phone].

## 2020-01-10 NOTE — TELEPHONE ENCOUNTER
Patient would like a prescription for Lian glucose meter sent to [facility] so she doesn't have to stick her finger - advised I would send a request to Dr Jamshid Horne but unsure if her insurance will pay for it  Seven Daily LPN  0/13/3722  71:33 AM

## 2020-01-12 RX ORDER — INSULIN PUMP SYRINGE, 3 ML
EACH MISCELLANEOUS
Qty: 1 KIT | Refills: 0 | Status: SHIPPED | OUTPATIENT
Start: 2020-01-12

## 2020-01-22 ENCOUNTER — OFFICE VISIT (OUTPATIENT)
Dept: INTERNAL MEDICINE CLINIC | Age: 55
End: 2020-01-22

## 2020-01-22 ENCOUNTER — TELEPHONE (OUTPATIENT)
Dept: INTERNAL MEDICINE CLINIC | Age: 55
End: 2020-01-22

## 2020-01-22 VITALS
RESPIRATION RATE: 24 BRPM | HEIGHT: 64 IN | BODY MASS INDEX: 45.75 KG/M2 | HEART RATE: 95 BPM | SYSTOLIC BLOOD PRESSURE: 119 MMHG | TEMPERATURE: 96.5 F | DIASTOLIC BLOOD PRESSURE: 80 MMHG | WEIGHT: 268 LBS | OXYGEN SATURATION: 96 %

## 2020-01-22 DIAGNOSIS — F11.20 NARCOTIC DEPENDENCE, EPISODIC USE (HCC): ICD-10-CM

## 2020-01-22 DIAGNOSIS — E11.21 TYPE 2 DIABETES MELLITUS WITH NEPHROPATHY (HCC): ICD-10-CM

## 2020-01-22 DIAGNOSIS — N18.30 CRI (CHRONIC RENAL INSUFFICIENCY), STAGE 3 (MODERATE) (HCC): ICD-10-CM

## 2020-01-22 DIAGNOSIS — E78.00 ELEVATED CHOLESTEROL: ICD-10-CM

## 2020-01-22 DIAGNOSIS — Z79.4 TYPE 2 DIABETES MELLITUS WITH DIABETIC NEUROPATHY, WITH LONG-TERM CURRENT USE OF INSULIN (HCC): Primary | ICD-10-CM

## 2020-01-22 DIAGNOSIS — E66.01 OBESITY, MORBID (HCC): ICD-10-CM

## 2020-01-22 DIAGNOSIS — Z79.4 TYPE 2 DIABETES MELLITUS WITH INSULIN THERAPY (HCC): ICD-10-CM

## 2020-01-22 DIAGNOSIS — E11.40 TYPE 2 DIABETES MELLITUS WITH DIABETIC NEUROPATHY, WITH LONG-TERM CURRENT USE OF INSULIN (HCC): Primary | ICD-10-CM

## 2020-01-22 DIAGNOSIS — E11.40 NEUROPATHY DUE TO TYPE 2 DIABETES MELLITUS (HCC): ICD-10-CM

## 2020-01-22 DIAGNOSIS — E11.9 TYPE 2 DIABETES MELLITUS WITH INSULIN THERAPY (HCC): ICD-10-CM

## 2020-01-22 RX ORDER — PREGABALIN 25 MG/1
50 CAPSULE ORAL 2 TIMES DAILY
Qty: 120 CAP | Refills: 2 | Status: SHIPPED | OUTPATIENT
Start: 2020-01-22 | End: 2020-05-13 | Stop reason: SDUPTHER

## 2020-01-22 NOTE — TELEPHONE ENCOUNTER
Insurance not paying for Lyrica. She has gabapentin and will take that until we can get the Lyrica approved.

## 2020-01-22 NOTE — PROGRESS NOTES
Subjective:     Ever Blair is a 49 y.o. female seen for follow-up of diabetes. She has had hypoglycemic attacks. .no  Blood sugar control has been ok  She has diabetes, hypertension, hyperlipidemia and CHF. Ever Blair has the additional concern of needs her Lyrica refill. Pain is not doing well today but she ran out of her Lyrica. Her brother who is my patient has been ill and is in the hospital, we mainly talked about him. Diabetic Review of Systems: no polyuria or polydipsia, no chest pain, dyspnea or TIA's, no numbness, tingling or pain in extremities. Correction has a lot of numbness and tingling for which the Lyrica helps. Reports taking blood pressure medications without side affects. No complaints of exertional chest pain, excessive shortness of breath or focal weakness. Minimal swelling in lower legs or dizziness with standing. Allergies   Allergen Reactions    Lortab [Hydrocodone-Acetaminophen] Nausea and Vomiting    Cymbalta [Duloxetine] Diarrhea    Lasix [Furosemide] Swelling    Morphine Other (comments)       Diet and Lifestyle: follows a diabetic diet regularly. Patient Active Problem List    Diagnosis Date Noted    Type 2 diabetes mellitus with diabetic neuropathy, with long-term current use of insulin ([facility] [address] 03/04/9734    Acute systolic congestive heart failure ([address] 02/07/2018    Obesity, morbid ([address] 12/18/2017    Type 2 diabetes mellitus with nephropathy ([facility] [address] 12/18/2017    Spinal stenosis of lumbosacral region 12/08/2016    Lumbar back pain with radiculopathy affecting right lower extremity 12/08/2016    Idiopathic small and large fiber sensory neuropathy 11/16/2016    Narcotic dependence, episodic use (HCC) 04/29/2016    Chronic pain     Hypertension 11/07/2012     Current Outpatient Medications   Medication Sig Dispense Refill    LYRICA 25 mg capsule Take 2 Caps by mouth two (2) times a day. Max Daily Amount: 100 mg.  Indications: diabetic complication causing injury to some body nerves 120 Cap 2    Blood-Glucose Meter monitoring kit Want free style leda kit 1 Kit 0    isosorbide dinitrate (ISORDIL) 20 mg tablet Take 1 Tab by mouth two (2) times a day. 180 Tab 1    carvedilol (COREG) 12.5 mg tablet TAKE 1 TABLET BY MOUTH TWICE DAILY 60 Tab 5    liraglutide (VICTOZA) 0.6 mg/0.1 mL (18 mg/3 mL) pnij 1.8 mg by SubCUTAneous route daily. 15 Pen 1    cyanocobalamin (VITAMIN B-12) 500 mcg tablet Take 1 Tab by mouth daily. 90 Tab 1    cyanocobalamin (VITAMIN B12) 500 mcg tablet Take 1 Tab by mouth daily. 90 Tab 3    HUMALOG KWIKPEN INSULIN 100 unit/mL kwikpen 8 Units by SubCUTAneous route Before breakfast, lunch, and dinner. 5 Pen 5    hydrALAZINE (APRESOLINE) 25 mg tablet Take 1 Tab by mouth two (2) times a day. 60 Tab 5    lisinopril (PRINIVIL, ZESTRIL) 10 mg tablet Take 1 Tab by mouth daily. 90 Tab 1    lidocaine (LIDODERM) 5 % Apply patch to the affected area for 12 hours a day and remove for 12 hours a day. 5 Each 0    Insulin Needles, Disposable, 32 gauge x 5/32\" ndle USE  ONCE DAILY 1 Pen Needle 11    Blood-Glucose Meter (TRUE METRIX GLUCOSE METER) misc Check blood sugar up to three times daily 1 Each 0    glucose blood VI test strips (TRUE METRIX GLUCOSE TEST STRIP) strip Check blood sugar up to three times daily 100 Strip 11    Cholecalciferol, Vitamin D3, 50,000 unit cap Take  by mouth.  atorvastatin (LIPITOR) 40 mg tablet Take  by mouth daily.  aspirin delayed-release 81 mg tablet Take  by mouth daily.        Allergies   Allergen Reactions    Lortab [Hydrocodone-Acetaminophen] Nausea and Vomiting    Cymbalta [Duloxetine] Diarrhea    Lasix [Furosemide] Swelling    Morphine Other (comments)     Social History     Tobacco Use    Smoking status: Never Smoker    Smokeless tobacco: Never Used   Substance Use Topics    Alcohol use: No     Alcohol/week: 0.0 standard drinks        Lab Results   Component Value Date/Time    WBC 5.3 01/14/2019 02:26 PM    HGB 10.3 (L) 01/14/2019 02:26 PM    HCT 33.4 (L) 01/14/2019 02:26 PM    PLATELET 403 (L) 76/67/8214 02:26 PM    MCV 84 01/14/2019 02:26 PM     Lab Results   Component Value Date/Time    Hemoglobin A1c 7.3 (H) 12/10/2019 02:33 PM    Hemoglobin A1c 7.1 (H) 09/16/2019 03:00 PM    Hemoglobin A1c 7.0 (H) 05/21/2019 03:44 PM    Glucose 249 (H) 12/10/2019 02:33 PM    Glucose (POC) 173 (H) 12/18/2015 11:17 AM    Microalb/Creat ratio (ug/mg creat.) 34.0 (H) 07/10/2019 12:14 PM    LDL, calculated 86 05/21/2019 03:44 PM    Creatinine 1.87 (H) 12/10/2019 02:33 PM      Lab Results   Component Value Date/Time    Cholesterol, total 145 05/21/2019 03:44 PM    HDL Cholesterol 41 05/21/2019 03:44 PM    LDL, calculated 86 05/21/2019 03:44 PM    Triglyceride 89 05/21/2019 03:44 PM     Lab Results   Component Value Date/Time    ALT (SGPT) 23 04/02/2018 02:00 PM    AST (SGOT) 13 (L) 04/02/2018 02:00 PM    Alk. phosphatase 123 (H) 04/02/2018 02:00 PM    Bilirubin, total 0.5 04/02/2018 02:00 PM    Albumin 3.5 04/02/2018 02:00 PM    Protein, total 7.8 04/02/2018 02:00 PM    INR 1.1 12/08/2015 04:42 PM    Prothrombin time 11.2 12/08/2015 04:42 PM    PLATELET 969 (L) 00/59/7697 02:26 PM     Lab Results   Component Value Date/Time    GFR est non-AA 30 (L) 12/10/2019 02:33 PM    GFR est AA 35 (L) 12/10/2019 02:33 PM    Creatinine 1.87 (H) 12/10/2019 02:33 PM    BUN 21 12/10/2019 02:33 PM    Sodium 143 12/10/2019 02:33 PM    Potassium 5.1 12/10/2019 02:33 PM    Chloride 106 12/10/2019 02:33 PM    CO2 23 12/10/2019 02:33 PM    Magnesium 1.4 (L) 11/16/2016 12:04 AM    PTH, Intact CANCELED 02/19/2018 02:29 PM     Lab Results   Component Value Date/Time    Glucose 249 (H) 12/10/2019 02:33 PM    Glucose (POC) 173 (H) 12/18/2015 11:17 AM         Review of Systems  Pertinent items are noted in HPI.     Objective:     Significant for the following:     Visit Vitals  /80 (BP 1 Location: Left arm, BP Patient Position: Sitting)   Pulse 95   Temp 96.5 °F (35.8 °C) (Temporal)   Resp 24   Ht 5' 4\" (1.626 m)   Wt 268 lb (121.6 kg)   LMP 12/03/2017   SpO2 96%   BMI 46.00 kg/m²     Appearance: chronically ill appearing. Exam: heart sounds normal rate, regular rhythm, normal S1, S2, no murmurs, rubs, clicks or gallops, chest clear, no hepatosplenomegaly  Foot exam: deferred    Lab review: labs reviewed, I note that glycosylated hemoglobin mildly abnormal but acceptable. 7.3 December of last year  Cr almost nl    Assessment/Plan:     Follow-up diabetes well controlled, stable. Diabetic issues reviewed with her: all medications, side effects and compliance discussed carefully and glycohemoglobin and other lab monitoring discussed. Okay refill of her Lyrica  Congestive heart failure and diabetes seems stable, continue current medicines. Good blood pressure. ICD-10-CM ICD-9-CM    1. Type 2 diabetes mellitus with diabetic neuropathy, with long-term current use of insulin ([facility]) E11.40 250.60     Z79.4 357.2      V58.67    2. Neuropathy due to type 2 diabetes mellitus ([facility]) E11.40 250.60 LYRICA 25 mg capsule     357.2    3. Type 2 diabetes mellitus with nephropathy ([facility]) E11.21 250.40      583.81    4. Narcotic dependence, episodic use ([facility]) F11.20 304.92    5. Obesity, morbid ([facility] [address] E66.01 278.01    6. CRI (chronic renal insufficiency), stage 3 (moderate) ([facility]) N18.3 585.3    7. Type 2 diabetes mellitus with insulin therapy ([facility]) E11.9 250.00     Z79.4 V58.67    8. Elevated cholesterol E78.00 272.0      Orders Placed This Encounter    LYRICA 25 mg capsule     Sig: Take 2 Caps by mouth two (2) times a day. Max Daily Amount: 100 mg. Indications: diabetic complication causing injury to some body nerves     Dispense:  120 Cap     Refill:  2       Follow-up and Dispositions    · Return in about 3 months (around 4/22/2020) for routine follow up.

## 2020-01-22 NOTE — PROGRESS NOTES
Chief Complaint   Patient presents with    Diabetes     follow up     I have reviewed the patient's medical history in detail and updated the computerized patient record. Health Maintenance reviewed. 1. Have you been to the ER, urgent care clinic since your last visit? Hospitalized since your last visit?no    2. Have you seen or consulted any other health care providers outside of the [address] [place] [address] [place] since your last visit? Include any pap smears or colon screening. No      Encouraged pt to discuss pt's wishes with spouse/partner/family and bring them in the next appt to follow thru with the Advanced Directive    Fall Risk Assessment, last 12 mths 1/22/2020   Able to walk? Yes   Fall in past 12 months? No   Fall with injury? -   Number of falls in past 12 months -   Fall Risk Score -       3 most recent PHQ Screens 1/22/2020   Little interest or pleasure in doing things Several days   Feeling down, depressed, irritable, or hopeless Several days   Total Score PHQ 2 2       Abuse Screening Questionnaire 1/22/2020   Do you ever feel afraid of your partner? N   Are you in a relationship with someone who physically or mentally threatens you? N   Is it safe for you to go home?  Y       ADL Assessment 1/22/2020   Feeding yourself No Help Needed   Getting from bed to chair No Help Needed   Getting dressed No Help Needed   Bathing or showering No Help Needed   Walk across the room (includes cane/walker) No Help Needed   Using the telphone No Help Needed   Taking your medications No Help Needed   Preparing meals No Help Needed   Managing money (expenses/bills) No Help Needed   Moderately strenuous housework (laundry) No Help Needed   Shopping for personal items (toiletries/medicines) No Help Needed   Shopping for groceries No Help Needed   Driving No Help Needed   Climbing a flight of stairs No Help Needed   Getting to places beyond walking distances No Help Needed

## 2020-01-22 NOTE — TELEPHONE ENCOUNTER
1/22/20 Called plan @ 1 [id number] PA initiated PA initiated with PA rep [facility] KEVAN GARCÍA,for Freestyle Wheat reader. Ref ID: [id number]. Claim pending clinical review decision will be fax to office within 23.58 hours

## 2020-01-23 ENCOUNTER — TELEPHONE (OUTPATIENT)
Dept: INTERNAL MEDICINE CLINIC | Age: 55
End: 2020-01-23

## 2020-01-23 NOTE — TELEPHONE ENCOUNTER
1,23/20 Plan called @ 5 ,PA rep Steffi GARCIA,state Lyrica 25 mg caps is on file approved 7/15/19 thru 7/14/20. Claim rejection @ pharmacy,was resolved by plan,claim went thru as paid claim. Florecita at [employer] verified claim went thru with 0 co pay. Karin Marin to call patient when ready for pickup,she verified understanding. Please note Thanks

## 2020-01-28 ENCOUNTER — DOCUMENTATION ONLY (OUTPATIENT)
Dept: INTERNAL MEDICINE CLINIC | Age: 55
End: 2020-01-28

## 2020-01-28 NOTE — PROGRESS NOTES
FYI - PHARMACY PRIOR AUTHORIZATION REQUEST #[id number] for a Freestyle Lian 14 day reader for pt's BS has been DENIED.

## 2020-02-03 ENCOUNTER — TELEPHONE (OUTPATIENT)
Dept: INTERNAL MEDICINE CLINIC | Age: 55
End: 2020-02-03

## 2020-02-03 NOTE — TELEPHONE ENCOUNTER
Pt called in reference to seeing the pain dr today. She said the office needs a letter from Dr. Kit Myles saying that its ok to keep her pills at 45 per month for her neuropathy and hip. Please call her at [phone].

## 2020-02-03 NOTE — LETTER
NOTIFICATION medication, controlled substance 2/6/2020 3:29 PM 
 
Ms. Anna Garcia [address] [facility] [address] To Whom It May Concern: 
 
Anna Garcia is currently under the care of [address]. She has been prescribed Lyrica 25 mg tablets which she takes 4 times daily for her diabetic neuropathic pain. I fill this for her. If there are questions or concerns please have the patient contact our office. Sincerely, Anselmo Kendrick MD

## 2020-02-25 ENCOUNTER — OFFICE VISIT (OUTPATIENT)
Dept: INTERNAL MEDICINE CLINIC | Age: 55
End: 2020-02-25

## 2020-02-25 ENCOUNTER — TELEPHONE (OUTPATIENT)
Dept: INTERNAL MEDICINE CLINIC | Age: 55
End: 2020-02-25

## 2020-02-25 VITALS
WEIGHT: 270 LBS | OXYGEN SATURATION: 96 % | DIASTOLIC BLOOD PRESSURE: 70 MMHG | SYSTOLIC BLOOD PRESSURE: 110 MMHG | RESPIRATION RATE: 18 BRPM | TEMPERATURE: 97.9 F | HEART RATE: 81 BPM | HEIGHT: 64 IN | BODY MASS INDEX: 46.1 KG/M2

## 2020-02-25 DIAGNOSIS — E11.21 TYPE 2 DIABETES MELLITUS WITH NEPHROPATHY (HCC): Primary | ICD-10-CM

## 2020-02-25 DIAGNOSIS — G60.8 IDIOPATHIC SMALL AND LARGE FIBER SENSORY NEUROPATHY: ICD-10-CM

## 2020-02-25 DIAGNOSIS — Z12.39 SCREENING FOR BREAST CANCER: ICD-10-CM

## 2020-02-25 DIAGNOSIS — R63.4 WEIGHT LOSS: ICD-10-CM

## 2020-02-25 DIAGNOSIS — F11.20 NARCOTIC DEPENDENCE, EPISODIC USE (HCC): ICD-10-CM

## 2020-02-25 DIAGNOSIS — I10 ESSENTIAL HYPERTENSION: ICD-10-CM

## 2020-02-25 RX ORDER — INSULIN LISPRO 100 [IU]/ML
8 INJECTION, SOLUTION INTRAVENOUS; SUBCUTANEOUS
Qty: 5 PEN | Refills: 5 | Status: SHIPPED | OUTPATIENT
Start: 2020-02-25 | End: 2020-07-10 | Stop reason: SDUPTHER

## 2020-02-25 NOTE — PROGRESS NOTES
Chief Complaint   Patient presents with    Weight Management     I have reviewed the patient's medical history in detail and updated the computerized patient record. Health Maintenance reviewed. 1. Have you been to the ER, urgent care clinic since your last visit? Hospitalized since your last visit?no    2. Have you seen or consulted any other health care providers outside of the [address] [place] [address] [place] since your last visit? Include any pap smears or colon screening. No      Encouraged pt to discuss pt's wishes with spouse/partner/family and bring them in the next appt to follow thru with the Advanced Directive    Fall Risk Assessment, last 12 mths 1/22/2020   Able to walk? Yes   Fall in past 12 months? No   Fall with injury? -   Number of falls in past 12 months -   Fall Risk Score -       3 most recent PHQ Screens 1/22/2020   Little interest or pleasure in doing things Several days   Feeling down, depressed, irritable, or hopeless Several days   Total Score PHQ 2 2       Abuse Screening Questionnaire 1/22/2020   Do you ever feel afraid of your partner? N   Are you in a relationship with someone who physically or mentally threatens you? N   Is it safe for you to go home?  Y       ADL Assessment 1/22/2020   Feeding yourself No Help Needed   Getting from bed to chair No Help Needed   Getting dressed No Help Needed   Bathing or showering No Help Needed   Walk across the room (includes cane/walker) No Help Needed   Using the telphone No Help Needed   Taking your medications No Help Needed   Preparing meals No Help Needed   Managing money (expenses/bills) No Help Needed   Moderately strenuous housework (laundry) No Help Needed   Shopping for personal items (toiletries/medicines) No Help Needed   Shopping for groceries No Help Needed   Driving No Help Needed   Climbing a flight of stairs No Help Needed   Getting to places beyond walking distances No Help Needed

## 2020-02-25 NOTE — LETTER
2/29/2020 Ms. Renner Gray [address] [facility] [address] Dear Tamiko Coppola 
 
Ms. Wendy Berry, date of birth 1965 is currently prescribed Percocet 39 a month which keeps her pain levels under control. She is doing reasonably well on this regimen with no signs of abuse or diversion. Please continue to prescribe her this narcotic chronic pain issues diabetic neuropathy. Please call if you have any questions [phone] . Sincerely, Liz Willingham MD

## 2020-02-25 NOTE — PROGRESS NOTES
Subjective:     Tyron Antunez is a 49 y.o. female seen for follow-up of diabetes. She has had hypoglycemic attacks. .no  Blood sugar control has been fair  She has diabetes, hypertension, hyperlipidemia, coronary artery disease and Systolic HF. Currently on Victoza she wants Saxenda since cannot lose weight and she looked up how Saxenda helps people. Tyron Antunez has the additional concern of needs leter for her PM at [facility] to [facility] per mo  Marcella    Diabetic Review of Systems: no polyuria or polydipsia, no chest pain, dyspnea or TIA's, no hypoglycemia, no medication side effects noted, has dysesthesias in the feet. Allergies   Allergen Reactions    Lortab [Hydrocodone-Acetaminophen] Nausea and Vomiting    Cymbalta [Duloxetine] Diarrhea    Lasix [Furosemide] Swelling    Morphine Other (comments)       Diet and Lifestyle: follows a diabetic diet regularly.     Patient Active Problem List    Diagnosis Date Noted    Type 2 diabetes mellitus with diabetic neuropathy, with long-term current use of insulin ([facility] 04/20/6995    Acute systolic congestive heart failure ([facility] [address] 02/07/2018    Obesity, morbid ([address] 12/18/2017    Type 2 diabetes mellitus with nephropathy ([facility] 12/18/2017    Spinal stenosis of lumbosacral region 12/08/2016    Lumbar back pain with radiculopathy affecting right lower extremity 12/08/2016    Idiopathic small and large fiber sensory neuropathy 11/16/2016    Narcotic dependence, episodic use ([facility] 04/29/2016    Chronic pain     Hypertension 11/07/2012       Allergies   Allergen Reactions    Lortab [Hydrocodone-Acetaminophen] Nausea and Vomiting    Cymbalta [Duloxetine] Diarrhea    Lasix [Furosemide] Swelling    Morphine Other (comments)     Social History     Tobacco Use    Smoking status: Never Smoker    Smokeless tobacco: Never Used   Substance Use Topics    Alcohol use: No     Alcohol/week: 0.0 standard drinks        Lab Results   Component Value Date/Time WBC 5.3 01/14/2019 02:26 PM    HGB 10.3 (L) 01/14/2019 02:26 PM    HCT 33.4 (L) 01/14/2019 02:26 PM    PLATELET 023 (L) 97/84/4548 02:26 PM    MCV 84 01/14/2019 02:26 PM     Lab Results   Component Value Date/Time    Hemoglobin A1c 7.3 (H) 12/10/2019 02:33 PM    Hemoglobin A1c 7.1 (H) 09/16/2019 03:00 PM    Hemoglobin A1c 7.0 (H) 05/21/2019 03:44 PM    Glucose 249 (H) 12/10/2019 02:33 PM    Glucose (POC) 173 (H) 12/18/2015 11:17 AM    Microalb/Creat ratio (ug/mg creat.) 34.0 (H) 07/10/2019 12:14 PM    LDL, calculated 86 05/21/2019 03:44 PM    Creatinine 1.87 (H) 12/10/2019 02:33 PM      Lab Results   Component Value Date/Time    Cholesterol, total 145 05/21/2019 03:44 PM    HDL Cholesterol 41 05/21/2019 03:44 PM    LDL, calculated 86 05/21/2019 03:44 PM    Triglyceride 89 05/21/2019 03:44 PM     Lab Results   Component Value Date/Time    ALT (SGPT) 23 04/02/2018 02:00 PM    AST (SGOT) 13 (L) 04/02/2018 02:00 PM    Alk. phosphatase 123 (H) 04/02/2018 02:00 PM    Bilirubin, total 0.5 04/02/2018 02:00 PM    Albumin 3.5 04/02/2018 02:00 PM    Protein, total 7.8 04/02/2018 02:00 PM    INR 1.1 12/08/2015 04:42 PM    Prothrombin time 11.2 12/08/2015 04:42 PM    PLATELET 294 (L) 96/33/8349 02:26 PM     Lab Results   Component Value Date/Time    GFR est non-AA 30 (L) 12/10/2019 02:33 PM    GFR est AA 35 (L) 12/10/2019 02:33 PM    Creatinine 1.87 (H) 12/10/2019 02:33 PM    BUN 21 12/10/2019 02:33 PM    Sodium 143 12/10/2019 02:33 PM    Potassium 5.1 12/10/2019 02:33 PM    Chloride 106 12/10/2019 02:33 PM    CO2 23 12/10/2019 02:33 PM    Magnesium 1.4 (L) 11/16/2016 12:04 AM    PTH, Intact CANCELED 02/19/2018 02:29 PM     Lab Results   Component Value Date/Time    Glucose 249 (H) 12/10/2019 02:33 PM    Glucose (POC) 173 (H) 12/18/2015 11:17 AM         Review of Systems  Pertinent items are noted in HPI.     Objective:     Significant for the following:     Visit Vitals  /70 (BP 1 Location: Left arm, BP Patient Position: Sitting)   Pulse 81   Temp 97.9 °F (36.6 °C) (Temporal)   Resp 18   Ht 5' 4\" (1.626 m)   Wt 270 lb (122.5 kg)   LMP 12/03/2017   SpO2 96%   BMI 46.35 kg/m²     Appearance: alert, well appearing, and in no distress and overweight. Exam: heart sounds normal rate, regular rhythm, normal S1, S2, no murmurs, rubs, clicks or gallops, chest clear, no hepatosplenomegaly  Foot exam: defderred    Lab review: labs reviewed, I note that glycosylated hemoglobin mildly abnormal but acceptable. Assessment/Plan:     Follow-up diabetes well controlled, stable. Diabetic issues reviewed with her: all medications, side effects and compliance discussed carefully and use and side effects of insulin is taught. Chronic Conditions Addressed Today     1. Hypertension     Relevant Orders     METABOLIC PANEL, BASIC    2. Narcotic dependence, episodic use ([facility]    3. Idiopathic small and large fiber sensory neuropathy    4. Type 2 diabetes mellitus with nephropathy (HCC) - Primary     Relevant Medications     HUMALOG KWIKPEN INSULIN 100 unit/mL kwikpen     Other Relevant Orders     HEMOGLOBIN A1C WITH EAG      Acute Diagnoses Addressed Today     Weight loss            Relevant Medications        liraglutide, weight loss, (SAXENDA) 3 mg/0.5 mL (18 mg/3 mL) pen    Screening for breast cancer            Relevant Orders        ABDULKADIR MAMMO BI SCREENING INCL CAD        Letter written per her request.  SPECT insurance will not pay for Saxenda, since she like to lose weight try her on Jardiance which has weight loss properties. Suspect 1 of the reasons why she cannot lose weight spite follow diet is her insulin. Once she gets the [place] we can reduce her insulin to twice a day with meals. We can get her off insulin entirely to help her with weight loss. CHF stable  Discussed possible side affects, precautions, and drug interactions and possible benefits of the medication(s).       Follow-up and Dispositions    · Return in about 4 weeks (around 3/24/2020) for routine follow up.

## 2020-02-25 NOTE — TELEPHONE ENCOUNTER
Pt called wanting to know if Dr. Holger Ku can call in one of the other alternative medications that he had wrote for her. Either invokana or something that started with a ALAN. Please call her at [phone].

## 2020-02-27 ENCOUNTER — TELEPHONE (OUTPATIENT)
Dept: INTERNAL MEDICINE CLINIC | Age: 55
End: 2020-02-27

## 2020-02-27 NOTE — TELEPHONE ENCOUNTER
2/27/20 Called plan @ 0  PA initiated and completed with WILLIS Gold. Ref :  [id number]. Claim pending outcome,decision will be fax to office with letter mail to patient.

## 2020-03-02 ENCOUNTER — TELEPHONE (OUTPATIENT)
Dept: INTERNAL MEDICINE CLINIC | Age: 55
End: 2020-03-02

## 2020-03-02 NOTE — TELEPHONE ENCOUNTER
Pt would like to know if she can take her jardiance and victoza on the same day. Please call her at [phone].

## 2020-03-03 ENCOUNTER — OFFICE VISIT (OUTPATIENT)
Dept: INTERNAL MEDICINE CLINIC | Age: 55
End: 2020-03-03

## 2020-03-03 VITALS
OXYGEN SATURATION: 96 % | TEMPERATURE: 97.3 F | HEIGHT: 64 IN | HEART RATE: 93 BPM | RESPIRATION RATE: 16 BRPM | WEIGHT: 262 LBS | SYSTOLIC BLOOD PRESSURE: 112 MMHG | BODY MASS INDEX: 44.73 KG/M2 | DIASTOLIC BLOOD PRESSURE: 70 MMHG

## 2020-03-03 DIAGNOSIS — G89.4 CHRONIC PAIN SYNDROME: ICD-10-CM

## 2020-03-03 DIAGNOSIS — R63.4 WEIGHT LOSS: ICD-10-CM

## 2020-03-03 DIAGNOSIS — I10 ESSENTIAL HYPERTENSION: ICD-10-CM

## 2020-03-03 DIAGNOSIS — F11.20 NARCOTIC DEPENDENCE, EPISODIC USE (HCC): ICD-10-CM

## 2020-03-03 DIAGNOSIS — E11.43 TYPE 2 DIABETES MELLITUS WITH DIABETIC AUTONOMIC NEUROPATHY, WITHOUT LONG-TERM CURRENT USE OF INSULIN (HCC): ICD-10-CM

## 2020-03-03 DIAGNOSIS — E66.01 OBESITY, MORBID (HCC): ICD-10-CM

## 2020-03-03 DIAGNOSIS — E11.21 TYPE 2 DIABETES MELLITUS WITH NEPHROPATHY (HCC): Primary | ICD-10-CM

## 2020-03-03 DIAGNOSIS — I50.21 ACUTE SYSTOLIC CONGESTIVE HEART FAILURE (HCC): ICD-10-CM

## 2020-03-03 NOTE — PATIENT INSTRUCTIONS
Learning About Diabetes Food Guidelines Your Care Instructions Meal planning is important to manage diabetes. It helps keep your blood sugar at a target level (which you set with your doctor). You don't have to eat special foods. You can eat what your family eats, including sweets once in a while. But you do have to pay attention to how often you eat and how much you eat of certain foods. You may want to work with a dietitian or a certified diabetes educator (CDE) to help you plan meals and snacks. A dietitian or CDE can also help you lose weight if that is one of your goals. What should you know about eating carbs? Managing the amount of carbohydrate (carbs) you eat is an important part of healthy meals when you have diabetes. Carbohydrate is found in many foods. · Learn which foods have carbs. And learn the amounts of carbs in different foods. ? Bread, cereal, pasta, and rice have about 15 grams of carbs in a serving. A serving is 1 slice of bread (1 ounce), ½ cup of cooked cereal, or 1/3 cup of cooked pasta or rice. ? Fruits have 15 grams of carbs in a serving. A serving is 1 small fresh fruit, such as an apple or orange; ½ of a banana; ½ cup of cooked or canned fruit; ½ cup of fruit juice; 1 cup of melon or raspberries; or 2 tablespoons of dried fruit. ? Milk and no-sugar-added yogurt have 15 grams of carbs in a serving. A serving is 1 cup of milk or 2/3 cup of no-sugar-added yogurt. ? Starchy vegetables have 15 grams of carbs in a serving. A serving is ½ cup of mashed potatoes or sweet potato; 1 cup winter squash; ½ of a small baked potato; ½ cup of cooked beans; or ½ cup cooked corn or green peas. · Learn how much carbs to eat each day and at each meal. A dietitian or CDE can teach you how to keep track of the amount of carbs you eat. This is called carbohydrate counting.  
· If you are not sure how to count carbohydrate grams, use the Plate Method to plan meals. It is a good, quick way to make sure that you have a balanced meal. It also helps you spread carbs throughout the day. ? Divide your plate by types of foods. Put non-starchy vegetables on half the plate, meat or other protein food on one-quarter of the plate, and a grain or starchy vegetable in the final quarter of the plate. To this you can add a small piece of fruit and 1 cup of milk or yogurt, depending on how many carbs you are supposed to eat at a meal. 
· Try to eat about the same amount of carbs at each meal. Do not \"save up\" your daily allowance of carbs to eat at one meal. 
· Proteins have very little or no carbs per serving. Examples of proteins are beef, chicken, turkey, fish, eggs, tofu, cheese, cottage cheese, and peanut butter. A serving size of meat is 3 ounces, which is about the size of a deck of cards. Examples of meat substitute serving sizes (equal to 1 ounce of meat) are 1/4 cup of cottage cheese, 1 egg, 1 tablespoon of peanut butter, and ½ cup of tofu. How can you eat out and still eat healthy? · Learn to estimate the serving sizes of foods that have carbohydrate. If you measure food at home, it will be easier to estimate the amount in a serving of restaurant food. · If the meal you order has too much carbohydrate (such as potatoes, corn, or baked beans), ask to have a low-carbohydrate food instead. Ask for a salad or green vegetables. · If you use insulin, check your blood sugar before and after eating out to help you plan how much to eat in the future. · If you eat more carbohydrate at a meal than you had planned, take a walk or do other exercise. This will help lower your blood sugar. What else should you know? · Limit saturated fat, such as the fat from meat and dairy products. This is a healthy choice because people who have diabetes are at higher risk of heart disease.  So choose lean cuts of meat and nonfat or low-fat dairy products. Use olive or canola oil instead of butter or shortening when cooking. · Don't skip meals. Your blood sugar may drop too low if you skip meals and take insulin or certain medicines for diabetes. · Check with your doctor before you drink alcohol. Alcohol can cause your blood sugar to drop too low. Alcohol can also cause a bad reaction if you take certain diabetes medicines. Follow-up care is a key part of your treatment and safety. Be sure to make and go to all appointments, and call your doctor if you are having problems. It's also a good idea to know your test results and keep a list of the medicines you take. Where can you learn more? Go to http://lizbeth-efraín.info/. Enter N541 in the search box to learn more about \"Learning About Diabetes Food Guidelines. \" Current as of: April 16, 2019 Content Version: 12.2 © 2140-6034 [employer]. Care instructions adapted under license by [employer] (which disclaims liability or warranty for this information). If you have questions about a medical condition or this instruction, always ask your healthcare professional. [address] any warranty or liability for your use of this information. Learning About Meal Planning for Diabetes Why plan your meals? Meal planning can be a key part of managing diabetes. Planning meals and snacks with the right balance of carbohydrate, protein, and fat can help you keep your blood sugar at the target level you set with your doctor. You don't have to eat special foods. You can eat what your family eats, including sweets once in a while. But you do have to pay attention to how often you eat and how much you eat of certain foods. You may want to work with a dietitian or a certified diabetes educator. He or she can give you tips and meal ideas and can answer your questions about meal planning.  This health professional can also help you reach a healthy weight if that is one of your goals. What plan is right for you? Your dietitian or diabetes educator may suggest that you start with the plate format or carbohydrate counting. The plate format The plate format is a simple way to help you manage how you eat. You plan meals by learning how much space each food should take on a plate. Using the plate format helps you spread carbohydrate throughout the day. It can make it easier to keep your blood sugar level within your target range. It also helps you see if you're eating healthy portion sizes. To use the plate format, you put non-starchy vegetables on half your plate. Add meat or meat substitutes on one-quarter of the plate. Put a grain or starchy vegetable (such as brown rice or a potato) on the final quarter of the plate. You can add a small piece of fruit and some low-fat or fat-free milk or yogurt, depending on your carbohydrate goal for each meal. 
Here are some tips for using the plate format: · Make sure that you are not using an oversized plate. A 9-inch plate is best. Many restaurants use larger plates. · Get used to using the plate format at home. Then you can use it when you eat out. · Write down your questions about using the plate format. Talk to your doctor, a dietitian, or a diabetes educator about your concerns. Carbohydrate counting With carbohydrate counting, you plan meals based on the amount of carbohydrate in each food. Carbohydrate raises blood sugar higher and more quickly than any other nutrient. It is found in desserts, breads and cereals, and fruit. It's also found in starchy vegetables such as potatoes and corn, grains such as rice and pasta, and milk and yogurt. Spreading carbohydrate throughout the day helps keep your blood sugar levels within your target range.  
Your daily amount depends on several things, including your weight, how active you are, which diabetes medicines you take, and what your goals are for your blood sugar levels. A registered dietitian or diabetes educator can help you plan how much carbohydrate to include in each meal and snack. A guideline for your daily amount of carbohydrate is: · 45 to 60 grams at each meal. That's about the same as 3 to 4 carbohydrate servings. · 15 to 20 grams at each snack. That's about the same as 1 carbohydrate serving. The Nutrition Facts label on packaged foods tells you how much carbohydrate is in a serving of the food. First, look at the serving size on the food label. Is that the amount you eat in a serving? All of the nutrition information on a food label is based on that serving size. So if you eat more or less than that, you'll need to adjust the other numbers. Total carbohydrate is the next thing you need to look for on the label. If you count carbohydrate servings, one serving of carbohydrate is 15 grams. For foods that don't come with labels, such as fresh fruits and vegetables, you'll need a guide that lists carbohydrate in these foods. Ask your doctor, dietitian, or diabetes educator about books or other nutrition guides you can use. If you take insulin, you need to know how many grams of carbohydrate are in a meal. This lets you know how much rapid-acting insulin to take before you eat. If you use an insulin pump, you get a constant rate of insulin during the day. So the pump must be programmed at meals to give you extra insulin to cover the rise in blood sugar after meals. When you know how much carbohydrate you will eat, you can take the right amount of insulin. Or, if you always use the same amount of insulin, you need to make sure that you eat the same amount of carbohydrate at meals. If you need more help to understand carbohydrate counting and food labels, ask your doctor, dietitian, or diabetes educator. How do you get started with meal planning? Here are some tips to get started: · Plan your meals a week at a time. Don't forget to include snacks too. · Use cookbooks or online recipes to plan several main meals. Plan some quick meals for busy nights. You also can double some recipes that freeze well. Then you can save half for other busy nights when you don't have time to cook. · Make sure you have the ingredients you need for your recipes. If you're running low on basic items, put these items on your shopping list too. · List foods that you use to make breakfasts, lunches, and snacks. List plenty of fruits and vegetables. · Post this list on the refrigerator. Add to it as you think of more things you need. · Take the list to the store to do your weekly shopping. Follow-up care is a key part of your treatment and safety. Be sure to make and go to all appointments, and call your doctor if you are having problems. It's also a good idea to know your test results and keep a list of the medicines you take. Where can you learn more? Go to http://lizbeth-efraín.info/. Corazon Drummond in the search box to learn more about \"Learning About Meal Planning for Diabetes. \" Current as of: April 16, 2019 Content Version: 12.2 © 2709-7905 [employer]. Care instructions adapted under license by [employer] (which disclaims liability or warranty for this information). If you have questions about a medical condition or this instruction, always ask your healthcare professional. [place] [address] any warranty or liability for your use of this information.

## 2020-03-03 NOTE — PROGRESS NOTES
Chief Complaint   Patient presents with    Documentation     [employer] Authroization Application (tinted car windows)     I have reviewed the patient's medical history in detail and updated the computerized patient record. Health Maintenance reviewed. 1. Have you been to the ER, urgent care clinic since your last visit? Hospitalized since your last visit?no    2. Have you seen or consulted any other health care providers outside of the [facility] since your last visit? Include any pap smears or colon screening. No      Encouraged pt to discuss pt's wishes with spouse/partner/family and bring them in the next appt to follow thru with the Advanced Directive    Fall Risk Assessment, last 12 mths 1/22/2020   Able to walk? Yes   Fall in past 12 months? No   Fall with injury? -   Number of falls in past 12 months -   Fall Risk Score -       3 most recent PHQ Screens 1/22/2020   Little interest or pleasure in doing things Several days   Feeling down, depressed, irritable, or hopeless Several days   Total Score PHQ 2 2       Abuse Screening Questionnaire 1/22/2020   Do you ever feel afraid of your partner? N   Are you in a relationship with someone who physically or mentally threatens you? N   Is it safe for you to go home?  Y       ADL Assessment 1/22/2020   Feeding yourself No Help Needed   Getting from bed to chair No Help Needed   Getting dressed No Help Needed   Bathing or showering No Help Needed   Walk across the room (includes cane/walker) No Help Needed   Using the telphone No Help Needed   Taking your medications No Help Needed   Preparing meals No Help Needed   Managing money (expenses/bills) No Help Needed   Moderately strenuous housework (laundry) No Help Needed   Shopping for personal items (toiletries/medicines) No Help Needed   Shopping for groceries No Help Needed   Driving No Help Needed   Climbing a flight of stairs No Help Needed   Getting to places beyond walking distances No Help Needed

## 2020-03-03 NOTE — PROGRESS NOTES
Subjective:     Yoli Ayoub is a 49 y.o. female seen for follow-up of diabetes. Agree with comments, see chief complaint. She has had hypoglycemic attacks. .no  Blood sugar control has been OK despite stopping insulin, has started Jardiance  She has diabetes, hypertension and hyperlipidemia. Yoli Ayoub has the additional concern of Agree with comments, see chief complaint. She is very pleased that and only a week or 2 she is lost 8 pounds. No dysuria. Her request for Ankit Lopez was denied. Diabetic Review of Systems: no polyuria or polydipsia, no chest pain, dyspnea or TIA's, no numbness, tingling or pain in extremities. Does have numbness and tingling, Percocet helps with the pain. Allergies   Allergen Reactions    Lortab [Hydrocodone-Acetaminophen] Nausea and Vomiting    Cymbalta [Duloxetine] Diarrhea    Lasix [Furosemide] Swelling    Morphine Other (comments)       Diet and Lifestyle: follows a diabetic diet regularly, nonsmoker. Patient Active Problem List    Diagnosis Date Noted    Hyperglycemia due to type 2 diabetes mellitus ([facility] [address] 37/30/5967    Acute systolic congestive heart failure ([address] 02/07/2018    Obesity, morbid ([address] 12/18/2017    Type 2 diabetes mellitus with nephropathy ([address] 12/18/2017    Spinal stenosis of lumbosacral region 12/08/2016    Lumbar back pain with radiculopathy affecting right lower extremity 12/08/2016    Idiopathic small and large fiber sensory neuropathy 11/16/2016    Narcotic dependence, episodic use ([facility] [address] 04/29/2016    Chronic pain     Hypertension 11/07/2012     Current Outpatient Medications   Medication Sig Dispense Refill    empagliflozin (JARDIANCE) 10 mg tablet Take 1 Tab by mouth daily. 30 Tab 1    HUMALOG KWIKPEN INSULIN 100 unit/mL kwikpen 8 Units by SubCUTAneous route Before breakfast and dinner. (Patient not taking: Reported on 3/3/2020) 5 Pen 5    LYRICA 25 mg capsule Take 2 Caps by mouth two (2) times a day.  Max Daily Amount: 100 mg. Indications: diabetic complication causing injury to some body nerves 120 Cap 2    Blood-Glucose Meter monitoring kit Want free style leda kit 1 Kit 0    isosorbide dinitrate (ISORDIL) 20 mg tablet Take 1 Tab by mouth two (2) times a day. 180 Tab 1    carvedilol (COREG) 12.5 mg tablet TAKE 1 TABLET BY MOUTH TWICE DAILY 60 Tab 5    cyanocobalamin (VITAMIN B-12) 500 mcg tablet Take 1 Tab by mouth daily. 90 Tab 1    cyanocobalamin (VITAMIN B12) 500 mcg tablet Take 1 Tab by mouth daily. 90 Tab 3    hydrALAZINE (APRESOLINE) 25 mg tablet Take 1 Tab by mouth two (2) times a day. 60 Tab 5    lisinopril (PRINIVIL, ZESTRIL) 10 mg tablet Take 1 Tab by mouth daily. 90 Tab 1    lidocaine (LIDODERM) 5 % Apply patch to the affected area for 12 hours a day and remove for 12 hours a day. 5 Each 0    Insulin Needles, Disposable, 32 gauge x 5/32\" ndle USE  ONCE DAILY 1 Pen Needle 11    Blood-Glucose Meter (TRUE METRIX GLUCOSE METER) misc Check blood sugar up to three times daily 1 Each 0    glucose blood VI test strips (TRUE METRIX GLUCOSE TEST STRIP) strip Check blood sugar up to three times daily 100 Strip 11    Cholecalciferol, Vitamin D3, 50,000 unit cap Take  by mouth.  atorvastatin (LIPITOR) 40 mg tablet Take  by mouth daily.  aspirin delayed-release 81 mg tablet Take  by mouth daily.        Allergies   Allergen Reactions    Lortab [Hydrocodone-Acetaminophen] Nausea and Vomiting    Cymbalta [Duloxetine] Diarrhea    Lasix [Furosemide] Swelling    Morphine Other (comments)     Social History     Tobacco Use    Smoking status: Never Smoker    Smokeless tobacco: Never Used   Substance Use Topics    Alcohol use: No     Alcohol/week: 0.0 standard drinks        Lab Results   Component Value Date/Time    WBC 5.3 01/14/2019 02:26 PM    HGB 10.3 (L) 01/14/2019 02:26 PM    HCT 33.4 (L) 01/14/2019 02:26 PM    PLATELET 970 (L) 55/67/9711 02:26 PM    MCV 84 01/14/2019 02:26 PM     Lab Results Component Value Date/Time    Hemoglobin A1c 7.3 (H) 12/10/2019 02:33 PM    Hemoglobin A1c 7.1 (H) 09/16/2019 03:00 PM    Hemoglobin A1c 7.0 (H) 05/21/2019 03:44 PM    Glucose 249 (H) 12/10/2019 02:33 PM    Glucose (POC) 173 (H) 12/18/2015 11:17 AM    Microalb/Creat ratio (ug/mg creat.) 34.0 (H) 07/10/2019 12:14 PM    LDL, calculated 86 05/21/2019 03:44 PM    Creatinine 1.87 (H) 12/10/2019 02:33 PM      Lab Results   Component Value Date/Time    Cholesterol, total 145 05/21/2019 03:44 PM    HDL Cholesterol 41 05/21/2019 03:44 PM    LDL, calculated 86 05/21/2019 03:44 PM    Triglyceride 89 05/21/2019 03:44 PM     Lab Results   Component Value Date/Time    ALT (SGPT) 23 04/02/2018 02:00 PM    AST (SGOT) 13 (L) 04/02/2018 02:00 PM    Alk. phosphatase 123 (H) 04/02/2018 02:00 PM    Bilirubin, total 0.5 04/02/2018 02:00 PM    Albumin 3.5 04/02/2018 02:00 PM    Protein, total 7.8 04/02/2018 02:00 PM    INR 1.1 12/08/2015 04:42 PM    Prothrombin time 11.2 12/08/2015 04:42 PM    PLATELET 215 (L) 86/61/5304 02:26 PM     Lab Results   Component Value Date/Time    GFR est non-AA 30 (L) 12/10/2019 02:33 PM    GFR est AA 35 (L) 12/10/2019 02:33 PM    Creatinine 1.87 (H) 12/10/2019 02:33 PM    BUN 21 12/10/2019 02:33 PM    Sodium 143 12/10/2019 02:33 PM    Potassium 5.1 12/10/2019 02:33 PM    Chloride 106 12/10/2019 02:33 PM    CO2 23 12/10/2019 02:33 PM    Magnesium 1.4 (L) 11/16/2016 12:04 AM    PTH, Intact CANCELED 02/19/2018 02:29 PM     Lab Results   Component Value Date/Time    Glucose 249 (H) 12/10/2019 02:33 PM    Glucose (POC) 173 (H) 12/18/2015 11:17 AM         Review of Systems  Pertinent items are noted in HPI.     Objective:     Significant for the following:     Visit Vitals  /70 (BP 1 Location: Left arm, BP Patient Position: Sitting)   Pulse 93   Temp 97.3 °F (36.3 °C) (Temporal)   Resp 16   Ht 5' 4\" (1.626 m)   Wt 262 lb (118.8 kg)   LMP 12/03/2017   SpO2 96%   BMI 44.97 kg/m²     Appearance: alert, well appearing, and in no distress. Exam: heart sounds normal rate, regular rhythm, normal S1, S2, no murmurs, rubs, clicks or gallops, chest clear, no hepatosplenomegaly  Foot exam: Deferred    Lab review: labs reviewed, I note that glycosylated hemoglobin mildly abnormal but acceptable. Assessment/Plan:     Follow-up diabetes stable, reasonably well controlled. Diabetic issues reviewed with her: all medications, side effects and compliance discussed carefully and glycohemoglobin and other lab monitoring discussed. Chronic Conditions Addressed Today     1. Hypertension    2. Chronic pain    3. Narcotic dependence, episodic use ([address]    4. Obesity, morbid ([facility] [address]    5. Type 2 diabetes mellitus with nephropathy (HCC) - Primary    6. Acute systolic congestive heart failure (HCC)      Acute Diagnoses Addressed Today     Weight loss        Type 2 diabetes mellitus with diabetic autonomic neuropathy, without long-term current use of insulin ([facility] [address]            Letter written for her to continue her Percocet which she gets from the physician at [facility]. Can leave off insulin, if her sugars go above 300 can restart but for now she will continue her current diabetes regiment without insulin. Significant weight loss much of that is probably water weight but hopefully this weight loss will continue with her new diabetes medication regiment. Current Outpatient Medications   Medication Sig Dispense Refill    empagliflozin (JARDIANCE) 10 mg tablet Take 1 Tab by mouth daily. 30 Tab 1    HUMALOG KWIKPEN INSULIN 100 unit/mL kwikpen 8 Units by SubCUTAneous route Before breakfast and dinner. (Patient not taking: Reported on 3/3/2020) 5 Pen 5    LYRICA 25 mg capsule Take 2 Caps by mouth two (2) times a day. Max Daily Amount: 100 mg.  Indications: diabetic complication causing injury to some body nerves 120 Cap 2    Blood-Glucose Meter monitoring kit Want free style leda kit 1 Kit 0    isosorbide dinitrate (ISORDIL) 20 mg tablet Take 1 Tab by mouth two (2) times a day. 180 Tab 1    carvedilol (COREG) 12.5 mg tablet TAKE 1 TABLET BY MOUTH TWICE DAILY 60 Tab 5    cyanocobalamin (VITAMIN B-12) 500 mcg tablet Take 1 Tab by mouth daily. 90 Tab 1    cyanocobalamin (VITAMIN B12) 500 mcg tablet Take 1 Tab by mouth daily. 90 Tab 3    hydrALAZINE (APRESOLINE) 25 mg tablet Take 1 Tab by mouth two (2) times a day. 60 Tab 5    lisinopril (PRINIVIL, ZESTRIL) 10 mg tablet Take 1 Tab by mouth daily. 90 Tab 1    lidocaine (LIDODERM) 5 % Apply patch to the affected area for 12 hours a day and remove for 12 hours a day. 5 Each 0    Insulin Needles, Disposable, 32 gauge x 5/32\" ndle USE  ONCE DAILY 1 Pen Needle 11    Blood-Glucose Meter (TRUE METRIX GLUCOSE METER) misc Check blood sugar up to three times daily 1 Each 0    glucose blood VI test strips (TRUE METRIX GLUCOSE TEST STRIP) strip Check blood sugar up to three times daily 100 Strip 11    Cholecalciferol, Vitamin D3, 50,000 unit cap Take  by mouth.  atorvastatin (LIPITOR) 40 mg tablet Take  by mouth daily.  aspirin delayed-release 81 mg tablet Take  by mouth daily. Follow-up and Dispositions    · Return if symptoms worsen or fail to improve.

## 2020-03-04 ENCOUNTER — DOCUMENTATION ONLY (OUTPATIENT)
Dept: INTERNAL MEDICINE CLINIC | Age: 55
End: 2020-03-04

## 2020-03-04 PROBLEM — E11.65 HYPERGLYCEMIA DUE TO TYPE 2 DIABETES MELLITUS (HCC): Status: ACTIVE | Noted: 2020-03-04

## 2020-03-12 ENCOUNTER — TELEPHONE (OUTPATIENT)
Dept: INTERNAL MEDICINE CLINIC | Age: 55
End: 2020-03-12

## 2020-03-12 LAB
BUN SERPL-MCNC: 14 MG/DL (ref 6–24)
BUN/CREAT SERPL: 8 (ref 9–23)
CALCIUM SERPL-MCNC: 9.1 MG/DL (ref 8.7–10.2)
CHLORIDE SERPL-SCNC: 108 MMOL/L (ref 96–106)
CO2 SERPL-SCNC: 19 MMOL/L (ref 20–29)
CREAT SERPL-MCNC: 1.8 MG/DL (ref 0.57–1)
EST. AVERAGE GLUCOSE BLD GHB EST-MCNC: 171 MG/DL
GLUCOSE SERPL-MCNC: 284 MG/DL (ref 65–99)
HBA1C MFR BLD: 7.6 % (ref 4.8–5.6)
INTERPRETATION: NORMAL
Lab: NORMAL
POTASSIUM SERPL-SCNC: 4.2 MMOL/L (ref 3.5–5.2)
SODIUM SERPL-SCNC: 144 MMOL/L (ref 134–144)

## 2020-03-12 NOTE — TELEPHONE ENCOUNTER
Pt called in reference to wanting to know if her son can get a work note to return to work tomorrow. She said he took off today because she was not feeling well due to her diabetes. Please call her at [phone].

## 2020-03-24 ENCOUNTER — OFFICE VISIT (OUTPATIENT)
Dept: INTERNAL MEDICINE CLINIC | Age: 55
End: 2020-03-24

## 2020-03-24 VITALS
DIASTOLIC BLOOD PRESSURE: 82 MMHG | BODY MASS INDEX: 45.75 KG/M2 | HEART RATE: 93 BPM | HEIGHT: 64 IN | TEMPERATURE: 98.9 F | SYSTOLIC BLOOD PRESSURE: 136 MMHG | RESPIRATION RATE: 18 BRPM | OXYGEN SATURATION: 96 % | WEIGHT: 268 LBS

## 2020-03-24 DIAGNOSIS — G60.8 IDIOPATHIC SMALL AND LARGE FIBER SENSORY NEUROPATHY: ICD-10-CM

## 2020-03-24 DIAGNOSIS — E11.21 TYPE 2 DIABETES MELLITUS WITH NEPHROPATHY (HCC): ICD-10-CM

## 2020-03-24 DIAGNOSIS — M79.605 PAIN OF LEFT LOWER EXTREMITY: ICD-10-CM

## 2020-03-24 DIAGNOSIS — I50.21 ACUTE SYSTOLIC CONGESTIVE HEART FAILURE (HCC): ICD-10-CM

## 2020-03-24 DIAGNOSIS — I10 ESSENTIAL HYPERTENSION: ICD-10-CM

## 2020-03-24 DIAGNOSIS — M70.62 GREATER TROCHANTERIC BURSITIS OF BOTH HIPS: Primary | ICD-10-CM

## 2020-03-24 DIAGNOSIS — E11.65 TYPE 2 DIABETES MELLITUS WITH HYPERGLYCEMIA, WITHOUT LONG-TERM CURRENT USE OF INSULIN (HCC): ICD-10-CM

## 2020-03-24 DIAGNOSIS — F11.20 NARCOTIC DEPENDENCE, EPISODIC USE (HCC): ICD-10-CM

## 2020-03-24 DIAGNOSIS — M70.61 GREATER TROCHANTERIC BURSITIS OF BOTH HIPS: Primary | ICD-10-CM

## 2020-03-24 RX ORDER — NORTRIPTYLINE HYDROCHLORIDE 25 MG/1
25 CAPSULE ORAL 2 TIMES DAILY
Qty: 60 CAP | Refills: 1 | Status: SHIPPED | OUTPATIENT
Start: 2020-03-24 | End: 2020-11-18 | Stop reason: ALTCHOICE

## 2020-03-24 NOTE — PROGRESS NOTES
Chief Complaint   Patient presents with    Diabetes     I have reviewed the patient's medical history in detail and updated the computerized patient record. Health Maintenance reviewed. 1. Have you been to the ER, urgent care clinic since your last visit? Hospitalized since your last visit?no    2. Have you seen or consulted any other health care providers outside of the [address] [place] [address] [place] since your last visit? Include any pap smears or colon screening. No      Encouraged pt to discuss pt's wishes with spouse/partner/family and bring them in the next appt to follow thru with the Advanced Directive    Fall Risk Assessment, last 12 mths 1/22/2020   Able to walk? Yes   Fall in past 12 months? No   Fall with injury? -   Number of falls in past 12 months -   Fall Risk Score -       3 most recent PHQ Screens 1/22/2020   Little interest or pleasure in doing things Several days   Feeling down, depressed, irritable, or hopeless Several days   Total Score PHQ 2 2       Abuse Screening Questionnaire 1/22/2020   Do you ever feel afraid of your partner? N   Are you in a relationship with someone who physically or mentally threatens you? N   Is it safe for you to go home?  Y       ADL Assessment 1/22/2020   Feeding yourself No Help Needed   Getting from bed to chair No Help Needed   Getting dressed No Help Needed   Bathing or showering No Help Needed   Walk across the room (includes cane/walker) No Help Needed   Using the telphone No Help Needed   Taking your medications No Help Needed   Preparing meals No Help Needed   Managing money (expenses/bills) No Help Needed   Moderately strenuous housework (laundry) No Help Needed   Shopping for personal items (toiletries/medicines) No Help Needed   Shopping for groceries No Help Needed   Driving No Help Needed   Climbing a flight of stairs No Help Needed   Getting to places beyond walking distances No Help Needed       [address] NOTE        Chart reviewed for the following:   Buren Libman, LPN, have reviewed the History, Physical and updated the Allergic reactions for Genesis Toi performed immediately prior to start of procedure:   Buren Libman, LPN, have performed the following reviews on Anitra Malik prior to the start of the procedure:            * Patient was identified by name and date of birth   * Agreement on procedure being performed was verified  * Risks and Benefits explained to the patient by Dr. Carey Alas  * Procedure site verified and marked as necessary  * Patient was positioned for comfort  * Consent was signed and verified     Time: 10:40AM      Date of procedure: 3/24/2020    Procedure performed by:  Norman Riley MD    Provider assisted by: Teresa Campbell. JUAN Delaney    Patient assisted by: Margot Delaney LPN    How tolerated by patient: Well    Post Procedural Pain Scale: 10/10    Comments: None    Dr. Alfreda Arredondo did the procedure

## 2020-03-24 NOTE — PROGRESS NOTES
HISTORY OF PRESENT ILLNESS  Cordelia Melissa is a 49 y.o. female. Hip Injury    The history is provided by the patient. This is a recurrent problem. The current episode started more than 1 week ago. The problem occurs constantly. The problem has not changed since onset. The pain is present in the left hip, right hip and left foot. The quality of the pain is described as aching. The pain is severe. Pertinent negatives include no back pain and no neck pain. She has tried OTC pain medications (gets narcotic from [facility] but out going next week) for the symptoms. There has been no history of extremity trauma. left foot pain aches and shoots. No trauma? Gout. Been to ER    Review of Systems   Constitutional: Negative for fever. Respiratory: Negative for shortness of breath. Cardiovascular: Negative for chest pain. Musculoskeletal: Negative for back pain, falls and neck pain.      Patient Active Problem List   Diagnosis Code    Hypertension I10    Chronic pain G89.29    Narcotic dependence, episodic use ([facility]) F11.20    Idiopathic small and large fiber sensory neuropathy G60.8    Spinal stenosis of lumbosacral region M48.07    Lumbar back pain with radiculopathy affecting right lower extremity M54.16    Obesity, morbid ([facility]) E66.01    Type 2 diabetes mellitus with nephropathy ([facility]) B54.80    Acute systolic congestive heart failure ([facility]) I50.21    Hyperglycemia due to type 2 diabetes mellitus [facility] E11.65       Social History     Socioeconomic History    Marital status:      Spouse name: Not on file    Number of children: Not on file    Years of education: Not on file    Highest education level: Not on file   Occupational History    Occupation: disabled   Social Needs    Financial resource strain: Not on file    Food insecurity     Worry: Not on file     Inability: Not on file   [employer] needs     Medical: Not on file     Non-medical: Not on file   Tobacco Use    Smoking status: Never Smoker    Smokeless tobacco: Never Used   Substance and Sexual Activity    Alcohol use: No     Alcohol/week: 0.0 standard drinks    Drug use: No    Sexual activity: Not Currently   Lifestyle    Physical activity     Days per week: Not on file     Minutes per session: Not on file    Stress: Not on file   Relationships    Social connections     Talks on phone: Not on file     Gets together: Not on file     Attends Jain service: Not on file     Active member of club or organization: Not on file     Attends meetings of clubs or organizations: Not on file     Relationship status: Not on file    Intimate partner violence     Fear of current or ex partner: Not on file     Emotionally abused: Not on file     Physically abused: Not on file     Forced sexual activity: Not on file   Other Topics Concern    Not on file   Social History Narrative    Lives with daughter. Not working. Applying for disabilty. Physical Exam  Visit Vitals  /82 (BP 1 Location: Left arm, BP Patient Position: Sitting)   Pulse 93   Temp 98.9 °F (37.2 °C) (Oral)   Resp 18   Ht 5' 4\" (1.626 m)   Wt 268 lb (121.6 kg)   LMP 12/03/2017   SpO2 96%   BMI 46.00 kg/m²     WD WN female NAD  Heart RRR without murmers clicks or rubs  Lungs CTA  Abdo soft nontender  Ext no edema some pt tenderness  Foot Diabetic foot exam was performed. No obvious sores or red lesions. Sensation checked by monofilament exam which was normal.  Dorsalis pedis pulse wasneg. ASSESSMENT and PLAN  Encounter Diagnoses   Name Primary?     Greater trochanteric bursitis of both hips Yes    Essential hypertension     Narcotic dependence, episodic use (HCC)     Idiopathic small and large fiber sensory neuropathy     Type 2 diabetes mellitus with nephropathy (HCC)     Acute systolic congestive heart failure (HCC)     Type 2 diabetes mellitus with hyperglycemia, without long-term current use of insulin (HCC)     Pain of left lower extremity      Orders Placed This Encounter    DRAIN/INJECT LARGE JOINT/BURSA    DRAIN/INJECT LARGE JOINT/BURSA    REFERRAL TO PODIATRY    empagliflozin (JARDIANCE) 25 mg tablet    nortriptyline (PAMELOR) 25 mg capsule     Options discussed. Discussed possible side affects and benefits of the procedure and/or medications. The patient agrees to undergo the procedure. Consent obtained. Sterile procedure followed. There were no complications and the procedure was well tolerated. Instructed patient to call me if it is ineffective or if there are any complications like increasing pain, redness or swelling. Point of maximal tenderness was palpated. Area cleaned with alcohol. This area was injected with 7 cc of lidocaine and 2cc kenalog. The procedure was well tolerated. Foot pain?neuropathy, trial AD  Discussed possible side affects, precautions, and drug interactions and possible benefits of the medication(s). Follow-up and Dispositions    · Return in about 6 weeks (around 5/5/2020) for routine follow up.

## 2020-03-24 NOTE — PROGRESS NOTES
Prior to entering the building patient was screened for illness - no fever (98.9 oral) - no cough, congestion, runny nose or SOB - denies GI symptoms - denies travel or any known exposure to travelers or sicknesses  Yudith Acosta LPN  7/41/4011  0:83 AM

## 2020-03-30 ENCOUNTER — TELEPHONE (OUTPATIENT)
Dept: INTERNAL MEDICINE CLINIC | Age: 55
End: 2020-03-30

## 2020-03-30 NOTE — TELEPHONE ENCOUNTER
----- Message from Sulma Leonard sent at 3/30/2020 12:39 PM EDT -----  Regarding: /Telephone  General Message/Vendor Calls    Caller's first and last name: Mary Edwards with [facility]      Reason for call: glucose monitor       Callback required yes/no and why:No      Best contact number(s):[phone]      Details to clarify the request:Teresa advising the Dr the patient is not able to receive a free glucose monitor due to not currently taking insulin. Christy stated she was not able to notify the patient with the information.       Marilee Coello

## 2020-04-03 ENCOUNTER — PATIENT MESSAGE (OUTPATIENT)
Dept: OTHER | Age: 55
End: 2020-04-03

## 2020-04-03 DIAGNOSIS — E11.21 TYPE 2 DIABETES MELLITUS WITH NEPHROPATHY (HCC): ICD-10-CM

## 2020-04-24 DIAGNOSIS — E11.21 TYPE 2 DIABETES MELLITUS WITH NEPHROPATHY (HCC): ICD-10-CM

## 2020-04-24 NOTE — TELEPHONE ENCOUNTER
Chief Complaint   Patient presents with    Medication Refill     Last office visit 3/24/20 future ?   Last filled  3/24/20  Changes made to medication on last visit      None

## 2020-04-24 NOTE — TELEPHONE ENCOUNTER
Requested Prescriptions     Pending Prescriptions Disp Refills    empagliflozin (JARDIANCE) 25 mg tablet 30 Tab 5     Sig: Take 1 Tab by mouth daily.        Pt would like a 90 day supply

## 2020-05-05 ENCOUNTER — VIRTUAL VISIT (OUTPATIENT)
Dept: INTERNAL MEDICINE CLINIC | Age: 55
End: 2020-05-05

## 2020-05-05 VITALS — WEIGHT: 261 LBS | BODY MASS INDEX: 44.8 KG/M2

## 2020-05-05 DIAGNOSIS — E11.65 TYPE 2 DIABETES MELLITUS WITH HYPERGLYCEMIA, WITH LONG-TERM CURRENT USE OF INSULIN (HCC): ICD-10-CM

## 2020-05-05 DIAGNOSIS — I10 ESSENTIAL HYPERTENSION: ICD-10-CM

## 2020-05-05 DIAGNOSIS — Z79.4 TYPE 2 DIABETES MELLITUS WITH HYPERGLYCEMIA, WITH LONG-TERM CURRENT USE OF INSULIN (HCC): ICD-10-CM

## 2020-05-05 DIAGNOSIS — E11.21 TYPE 2 DIABETES MELLITUS WITH NEPHROPATHY (HCC): Primary | ICD-10-CM

## 2020-05-05 DIAGNOSIS — I50.21 ACUTE SYSTOLIC CONGESTIVE HEART FAILURE (HCC): ICD-10-CM

## 2020-05-05 DIAGNOSIS — E78.00 HYPERCHOLESTEREMIA: ICD-10-CM

## 2020-05-05 RX ORDER — HYDRALAZINE HYDROCHLORIDE 25 MG/1
25 TABLET, FILM COATED ORAL 2 TIMES DAILY
Qty: 180 TAB | Refills: 1 | Status: SHIPPED | OUTPATIENT
Start: 2020-05-05 | End: 2020-11-18 | Stop reason: SDUPTHER

## 2020-05-05 RX ORDER — LISINOPRIL 10 MG/1
10 TABLET ORAL DAILY
Qty: 90 TAB | Refills: 3 | Status: SHIPPED | OUTPATIENT
Start: 2020-05-05 | End: 2020-09-11

## 2020-05-05 RX ORDER — ATORVASTATIN CALCIUM 40 MG/1
40 TABLET, FILM COATED ORAL DAILY
Qty: 90 TAB | Refills: 3 | Status: SHIPPED | OUTPATIENT
Start: 2020-05-05 | End: 2020-07-06 | Stop reason: ALTCHOICE

## 2020-05-05 NOTE — PROGRESS NOTES
Consent: Amilcar Putnam, who was seen by synchronous (real-time) audio-video technology, and/or her healthcare decision maker, is aware that this patient-initiated, Telehealth encounter on 5/5/2020 is a billable service, with coverage as determined by her insurance carrier. She is aware that she may receive a bill and has provided verbal consent to proceed: Yes. Subjective:     Amilcar Putnam is a 49 y.o. female seen for follow-up of diabetes. She has had hypoglycemic attacks. .no  Blood sugar control has been good  Lab Results   Component Value Date/Time    Hemoglobin A1c 7.6 (H) 03/11/2020 02:38 PM    Hemoglobin A1c 7.3 (H) 12/10/2019 02:33 PM    Hemoglobin A1c 7.1 (H) 09/16/2019 03:00 PM    Glucose 284 (H) 03/11/2020 02:38 PM    Glucose (POC) 173 (H) 12/18/2015 11:17 AM    Microalbumin urine (POC) 10 01/14/2015 11:46 AM    Microalb/Creat ratio (ug/mg creat.) 34.0 (H) 07/10/2019 12:14 PM    Microalbumin/creat ratio (POC)  01/14/2015 11:46 AM    LDL, calculated 86 05/21/2019 03:44 PM    Creatinine 1.80 (H) 03/11/2020 02:38 PM       She has diabetes, hypertension and Systolic HF. Amilcar Putnam has the additional concern of losing some weight with combination of Victoza and Jardiance. Does not take her insulin very often at this point since her sugars generally stay below 200. Diabetic Review of Systems: no polyuria or polydipsia, no chest pain, dyspnea or TIA's, no hypoglycemia, has dysesthesias in the feet. Allergies   Allergen Reactions    Lortab [Hydrocodone-Acetaminophen] Nausea and Vomiting    Cymbalta [Duloxetine] Diarrhea    Lasix [Furosemide] Swelling    Morphine Other (comments)       Diet and Lifestyle: follows a diabetic diet regularly, nonsmoker.     Patient Active Problem List    Diagnosis Date Noted    Hyperglycemia due to type 2 diabetes mellitus ([facility] 51/03/1332    Acute systolic congestive heart failure ([facility] 02/07/2018    Obesity, morbid ([facility] 12/18/2017    Type 2 diabetes mellitus with nephropathy ([facility] 12/18/2017    Spinal stenosis of lumbosacral region 12/08/2016    Lumbar back pain with radiculopathy affecting right lower extremity 12/08/2016    Idiopathic small and large fiber sensory neuropathy 11/16/2016    Narcotic dependence, episodic use (HCC) 04/29/2016    Chronic pain     Hypertension 11/07/2012     Current Outpatient Medications   Medication Sig Dispense Refill    liraglutide (VICTOZA) 0.6 mg/0.1 mL (18 mg/3 mL) pnij 1.2 mg by SubCUTAneous route daily. 2 Adjustable Dose Pre-filled Pen Syringe 5    atorvastatin (LIPITOR) 40 mg tablet Take 1 Tab by mouth daily. 90 Tab 3    lisinopriL (PRINIVIL, ZESTRIL) 10 mg tablet Take 1 Tab by mouth daily. 90 Tab 3    hydrALAZINE (APRESOLINE) 25 mg tablet Take 1 Tab by mouth two (2) times a day. 180 Tab 1    empagliflozin (JARDIANCE) 25 mg tablet Take 1 Tab by mouth daily. 30 Tab 5    nortriptyline (PAMELOR) 25 mg capsule Take 1 Cap by mouth two (2) times a day. 60 Cap 1    HUMALOG KWIKPEN INSULIN 100 unit/mL kwikpen 8 Units by SubCUTAneous route Before breakfast and dinner. (Patient taking differently: 8 Units by SubCUTAneous route Before breakfast and dinner. Taking as needed) 5 Pen 5    LYRICA 25 mg capsule Take 2 Caps by mouth two (2) times a day. Max Daily Amount: 100 mg. Indications: diabetic complication causing injury to some body nerves 120 Cap 2    Blood-Glucose Meter monitoring kit Want free style leda kit 1 Kit 0    isosorbide dinitrate (ISORDIL) 20 mg tablet Take 1 Tab by mouth two (2) times a day. 180 Tab 1    carvedilol (COREG) 12.5 mg tablet TAKE 1 TABLET BY MOUTH TWICE DAILY 60 Tab 5    cyanocobalamin (VITAMIN B-12) 500 mcg tablet Take 1 Tab by mouth daily. 90 Tab 1    cyanocobalamin (VITAMIN B12) 500 mcg tablet Take 1 Tab by mouth daily. 90 Tab 3    lidocaine (LIDODERM) 5 % Apply patch to the affected area for 12 hours a day and remove for 12 hours a day.  5 Each 0    Insulin Needles, Disposable, 32 gauge x 5/32\" ndle USE  ONCE DAILY 1 Pen Needle 11    Blood-Glucose Meter (TRUE METRIX GLUCOSE METER) misc Check blood sugar up to three times daily 1 Each 0    glucose blood VI test strips (TRUE METRIX GLUCOSE TEST STRIP) strip Check blood sugar up to three times daily 100 Strip 11    Cholecalciferol, Vitamin D3, 50,000 unit cap Take  by mouth.  aspirin delayed-release 81 mg tablet Take  by mouth daily. Allergies   Allergen Reactions    Lortab [Hydrocodone-Acetaminophen] Nausea and Vomiting    Cymbalta [Duloxetine] Diarrhea    Lasix [Furosemide] Swelling    Morphine Other (comments)     Past Medical History:   Diagnosis Date    Anemia     CAD (coronary artery disease)     Chronic pain     Cyst in hand 2014    [address]    Diabetes mellitus type 2 in obese ([facility]     Diabetic neuropathy ([facility]     Hypertension     Migraine      Social History     Tobacco Use    Smoking status: Never Smoker    Smokeless tobacco: Never Used   Substance Use Topics    Alcohol use: No     Alcohol/week: 0.0 standard drinks        Lab Results   Component Value Date/Time    Hemoglobin A1c 7.6 (H) 03/11/2020 02:38 PM    Hemoglobin A1c 7.3 (H) 12/10/2019 02:33 PM    Hemoglobin A1c 7.1 (H) 09/16/2019 03:00 PM    Glucose 284 (H) 03/11/2020 02:38 PM    Glucose (POC) 173 (H) 12/18/2015 11:17 AM    Microalb/Creat ratio (ug/mg creat.) 34.0 (H) 07/10/2019 12:14 PM    LDL, calculated 86 05/21/2019 03:44 PM    Creatinine 1.80 (H) 03/11/2020 02:38 PM      Lab Results   Component Value Date/Time    Cholesterol, total 145 05/21/2019 03:44 PM    HDL Cholesterol 41 05/21/2019 03:44 PM    LDL, calculated 86 05/21/2019 03:44 PM    Triglyceride 89 05/21/2019 03:44 PM     Lab Results   Component Value Date/Time    ALT (SGPT) 23 04/02/2018 02:00 PM    AST (SGOT) 13 (L) 04/02/2018 02:00 PM    Alk.  phosphatase 123 (H) 04/02/2018 02:00 PM    Bilirubin, total 0.5 04/02/2018 02:00 PM    Albumin 3.5 04/02/2018 02:00 PM Protein, total 7.8 04/02/2018 02:00 PM    INR 1.1 12/08/2015 04:42 PM    Prothrombin time 11.2 12/08/2015 04:42 PM    PLATELET 068 (L) 36/99/2112 02:26 PM     Lab Results   Component Value Date/Time    GFR est non-AA 32 (L) 03/11/2020 02:38 PM    GFR est AA 37 (L) 03/11/2020 02:38 PM    Creatinine 1.80 (H) 03/11/2020 02:38 PM    BUN 14 03/11/2020 02:38 PM    Sodium 144 03/11/2020 02:38 PM    Potassium 4.2 03/11/2020 02:38 PM    Chloride 108 (H) 03/11/2020 02:38 PM    CO2 19 (L) 03/11/2020 02:38 PM    Magnesium 1.4 (L) 11/16/2016 12:04 AM    PTH, Intact CANCELED 02/19/2018 02:29 PM     Lab Results   Component Value Date/Time    Glucose 284 (H) 03/11/2020 02:38 PM    Glucose (POC) 173 (H) 12/18/2015 11:17 AM         Review of Systems  Pertinent items are noted in HPI. Objective:     Significant for the following:     Visit Vitals  Wt 261 lb (118.4 kg) Comment: pt weighted one week ago. LMP 12/03/2017   BMI 44.80 kg/m²     Appearance: alert, well appearing, and in no distress and overweight. Exam: def    Lab review: labs reviewed, I note that glycosylated hemoglobin mildly abnormal but acceptable. Assessment/Plan:     Follow-up diabetes well controlled, stable. Diabetic issues reviewed with her: all medications, side effects and compliance discussed carefully and glycohemoglobin and other lab monitoring discussed. Chronic Conditions Addressed Today     1. Hypertension     Relevant Medications     atorvastatin (LIPITOR) 40 mg tablet     lisinopriL (PRINIVIL, ZESTRIL) 10 mg tablet     hydrALAZINE (APRESOLINE) 25 mg tablet     Other Relevant Orders     METABOLIC PANEL, COMPREHENSIVE    2. Type 2 diabetes mellitus with nephropathy (HCC) - Primary     Relevant Medications     liraglutide (VICTOZA) 0.6 mg/0.1 mL (18 mg/3 mL) pnij     atorvastatin (LIPITOR) 40 mg tablet     lisinopriL (PRINIVIL, ZESTRIL) 10 mg tablet    3.  Acute systolic congestive heart failure (HCC)     Relevant Medications     atorvastatin (LIPITOR) 40 mg tablet     lisinopriL (PRINIVIL, ZESTRIL) 10 mg tablet     hydrALAZINE (APRESOLINE) 25 mg tablet    4.  Hyperglycemia due to type 2 diabetes mellitus (HCC)     Relevant Medications     liraglutide (VICTOZA) 0.6 mg/0.1 mL (18 mg/3 mL) pnij     atorvastatin (LIPITOR) 40 mg tablet     lisinopriL (PRINIVIL, ZESTRIL) 10 mg tablet     Other Relevant Orders     HEMOGLOBIN A1C WITH EAG      Acute Diagnoses Addressed Today     Hypercholesteremia            Relevant Medications        atorvastatin (LIPITOR) 40 mg tablet        Other Relevant Orders        LIPID PANEL        Above issues stable          3mo f/u

## 2020-05-05 NOTE — PROGRESS NOTES
Chief Complaint   Patient presents with    Diabetes     BS running 160 to 165     Patient has not been out of the country in 45-60 days, NO diarrhea, NO cough, NO chest conjestion, NO temp. Pt has not been around anyone with these symptoms. Health Maintenance reviewed. I have reviewed the patient's medical history in detail and updated the computerized patient record. 1. Have you been to the ER, urgent care clinic since your last visit? Hospitalized since your last visit?no    2. Have you seen or consulted any other health care providers outside of the [address] [place] [address] since your last visit? Include any pap smears or colon screening. No      Encouraged pt to discuss pt's wishes with spouse/partner/family and bring them in the next appt to follow thru with the Advanced Directive    Fall Risk Assessment, last 12 mths 1/22/2020   Able to walk? Yes   Fall in past 12 months? No   Fall with injury? -   Number of falls in past 12 months -   Fall Risk Score -       3 most recent PHQ Screens 1/22/2020   Little interest or pleasure in doing things Several days   Feeling down, depressed, irritable, or hopeless Several days   Total Score PHQ 2 2       Abuse Screening Questionnaire 1/22/2020   Do you ever feel afraid of your partner? N   Are you in a relationship with someone who physically or mentally threatens you? N   Is it safe for you to go home?  Y       ADL Assessment 1/22/2020   Feeding yourself No Help Needed   Getting from bed to chair No Help Needed   Getting dressed No Help Needed   Bathing or showering No Help Needed   Walk across the room (includes cane/walker) No Help Needed   Using the telphone No Help Needed   Taking your medications No Help Needed   Preparing meals No Help Needed   Managing money (expenses/bills) No Help Needed   Moderately strenuous housework (laundry) No Help Needed   Shopping for personal items (toiletries/medicines) No Help Needed   Shopping for groceries No Help Needed   Driving No Help Needed   Climbing a flight of stairs No Help Needed   Getting to places beyond walking distances No Help Needed

## 2020-05-06 ENCOUNTER — TELEPHONE (OUTPATIENT)
Dept: INTERNAL MEDICINE CLINIC | Age: 55
End: 2020-05-06

## 2020-05-13 DIAGNOSIS — E11.40 NEUROPATHY DUE TO TYPE 2 DIABETES MELLITUS (HCC): ICD-10-CM

## 2020-05-13 RX ORDER — PREGABALIN 25 MG/1
50 CAPSULE ORAL 2 TIMES DAILY
Qty: 180 CAP | Refills: 1 | Status: SHIPPED | OUTPATIENT
Start: 2020-05-13 | End: 2020-05-14 | Stop reason: ALTCHOICE

## 2020-05-13 NOTE — TELEPHONE ENCOUNTER
Requested Prescriptions     Pending Prescriptions Disp Refills    Lyrica 25 mg capsule 180 Cap 1     Sig: Take 2 Caps by mouth two (2) times a day. Max Daily Amount: 100 mg.  Indications: diabetic complication causing injury to some body nerves     Last office visit 5/520 future 7/16/20  Last filled  5/520  Changes made to medication on last visit    None      If you refilled this RX on the 5th please ignore

## 2020-05-13 NOTE — TELEPHONE ENCOUNTER
Requested Prescriptions     Pending Prescriptions Disp Refills    Lyrica 25 mg capsule 120 Cap 2     Sig: Take 2 Caps by mouth two (2) times a day. Max Daily Amount: 100 mg.  Indications: diabetic complication causing injury to some body nerves

## 2020-05-14 ENCOUNTER — TELEPHONE (OUTPATIENT)
Dept: INTERNAL MEDICINE CLINIC | Age: 55
End: 2020-05-14

## 2020-05-14 DIAGNOSIS — G60.8 IDIOPATHIC SMALL AND LARGE FIBER SENSORY NEUROPATHY: Primary | ICD-10-CM

## 2020-05-14 RX ORDER — PREGABALIN 25 MG/1
50 CAPSULE ORAL 2 TIMES DAILY
Qty: 120 CAP | Refills: 1 | Status: SHIPPED | OUTPATIENT
Start: 2020-05-14 | End: 2020-07-06 | Stop reason: SDUPTHER

## 2020-05-14 NOTE — TELEPHONE ENCOUNTER
5/14/20 Plan called @ 0  PA submitted for Lyrica 25 mg caps. Case ID: [id number]. Brand is non formulary. Claim pending clinical review,outcome will be fax to office within 24 to 72 hours. Please follow up as needed. Thanks

## 2020-06-01 NOTE — TELEPHONE ENCOUNTER
Requested Prescriptions     Pending Prescriptions Disp Refills    losartan (COZAAR) 25 mg tablet 30 Tab 5     Sig: Take 1 Tab by mouth daily.

## 2020-06-02 RX ORDER — LOSARTAN POTASSIUM 25 MG/1
25 TABLET ORAL DAILY
Qty: 30 TAB | Refills: 5 | OUTPATIENT
Start: 2020-06-02

## 2020-06-04 ENCOUNTER — DOCUMENTATION ONLY (OUTPATIENT)
Dept: INTERNAL MEDICINE CLINIC | Age: 55
End: 2020-06-04

## 2020-06-04 NOTE — PROGRESS NOTES
[facility] Review  Christiano Kendall  Lyrica 25mg capsule has been approved until 12/31/20

## 2020-06-26 NOTE — DISCHARGE INSTRUCTIONS
Foot Pain: Care Instructions  Your Care Instructions  Foot injuries that cause pain and swelling are fairly common. Almost all sports or home repair projects can cause a misstep that ends up as foot pain. Normal wear and tear, especially as you get older, also can cause foot pain. Most minor foot injuries will heal on their own, and home treatment is usually all you need to do. If you have a severe injury, you may need tests and treatment. Follow-up care is a key part of your treatment and safety. Be sure to make and go to all appointments, and call your doctor if you are having problems. Its also a good idea to know your test results and keep a list of the medicines you take. How can you care for yourself at home? · Take pain medicines exactly as directed. ¨ If the doctor gave you a prescription medicine for pain, take it as prescribed. ¨ If you are not taking a prescription pain medicine, ask your doctor if you can take an over-the-counter medicine. · Rest and protect your foot. Take a break from any activity that may cause pain. · Put ice or a cold pack on your foot for 10 to 20 minutes at a time. Put a thin cloth between the ice and your skin. · Prop up the sore foot on a pillow when you ice it or anytime you sit or lie down during the next 3 days. Try to keep it above the level of your heart. This will help reduce swelling. · Your doctor may recommend that you wrap your foot with an elastic bandage. Keep your foot wrapped for as long as your doctor advises. · If your doctor recommends crutches, use them as directed. · Wear roomy footwear. · As soon as pain and swelling end, begin gentle exercises of your foot. Your doctor can tell you which exercises will help. When should you call for help? Call 911 anytime you think you may need emergency care. For example, call if:  · Your foot turns pale, white, blue, or cold.   Call your doctor now or seek immediate medical care if:  · You cannot move HR=16 bpm, EKWX=098/64 mmhg, SpO2=91.0 %, Resp=15 B/min, EtCO2=36 mmHg, Apnea=0 Seconds, Pain=0, Chandra=10, Abreu=2 or stand on your foot. · Your foot looks twisted or out of its normal position. · Your foot is not stable when you step down. · You have signs of infection, such as:  ¨ Increased pain, swelling, warmth, or redness. ¨ Red streaks leading from the sore area. ¨ Pus draining from a place on your foot. ¨ A fever. · Your foot is numb or tingly. Watch closely for changes in your health, and be sure to contact your doctor if:  · You do not get better as expected. · You have bruises from an injury that last longer than 2 weeks. Where can you learn more? Go to http://lizbeth-efraín.info/. Enter M420 in the search box to learn more about \"Foot Pain: Care Instructions. \"  Current as of: March 21, 2017  Content Version: 11.3  © 3351-2720 [employer]. Care instructions adapted under license by [employer] (which disclaims liability or warranty for this information). If you have questions about a medical condition or this instruction, always ask your healthcare professional. [place] [address] any warranty or liability for your use of this information.

## 2020-07-06 ENCOUNTER — VIRTUAL VISIT (OUTPATIENT)
Dept: INTERNAL MEDICINE CLINIC | Age: 55
End: 2020-07-06

## 2020-07-06 DIAGNOSIS — G60.8 IDIOPATHIC SMALL AND LARGE FIBER SENSORY NEUROPATHY: ICD-10-CM

## 2020-07-06 DIAGNOSIS — I89.0 LYMPHEDEMA: ICD-10-CM

## 2020-07-06 DIAGNOSIS — I50.21 ACUTE SYSTOLIC CONGESTIVE HEART FAILURE (HCC): Primary | ICD-10-CM

## 2020-07-06 DIAGNOSIS — E11.21 TYPE 2 DIABETES MELLITUS WITH NEPHROPATHY (HCC): ICD-10-CM

## 2020-07-06 DIAGNOSIS — E78.5 HYPERLIPIDEMIA, UNSPECIFIED HYPERLIPIDEMIA TYPE: ICD-10-CM

## 2020-07-06 RX ORDER — PREGABALIN 25 MG/1
50 CAPSULE ORAL 2 TIMES DAILY
Qty: 120 CAP | Refills: 1 | Status: SHIPPED | OUTPATIENT
Start: 2020-07-06 | End: 2020-07-21 | Stop reason: SDUPTHER

## 2020-07-06 RX ORDER — METOLAZONE 2.5 MG/1
2.5 TABLET ORAL DAILY
Qty: 30 TAB | Refills: 2 | Status: SHIPPED | OUTPATIENT
Start: 2020-07-06 | End: 2020-09-29 | Stop reason: SDUPTHER

## 2020-07-06 NOTE — PROGRESS NOTES
Subjective:     Ness Jarrett is an 49 y.o. female who is seen for follow up of CHF. She has diabetes, hypertension, CHF and obesity. Feels she needs to get back on water pill  Diet and Lifestyle: generally follows a low sodium diet, nonsmoker. Lyrica works ok needs rf  Weight monitoring: has been stable  250 lbs her scale  No hypos, rare usage of insuin, uses SS BS seldon go > 250    Allergies   Allergen Reactions    Lortab [Hydrocodone-Acetaminophen] Nausea and Vomiting    Cymbalta [Duloxetine] Diarrhea    Lasix [Furosemide] Swelling    Morphine Other (comments)         Cardiovascular System Review  Cardiovascular ROS - taking medications as instructed, no medication side effects noted, no chest pain on exertion, notes stable dyspnea on exertion, no change, NYHA class 2, noting swelling of ankles.     Lab Results   Component Value Date/Time    Hemoglobin A1c 7.6 (H) 03/11/2020 02:38 PM    Hemoglobin A1c 7.3 (H) 12/10/2019 02:33 PM    Hemoglobin A1c 7.1 (H) 09/16/2019 03:00 PM    Glucose 284 (H) 03/11/2020 02:38 PM    Glucose (POC) 173 (H) 12/18/2015 11:17 AM    Microalbumin urine (POC) 10 01/14/2015 11:46 AM    Microalb/Creat ratio (ug/mg creat.) 34.0 (H) 07/10/2019 12:14 PM    Microalbumin/creat ratio (POC)  01/14/2015 11:46 AM    LDL, calculated 86 05/21/2019 03:44 PM    Creatinine 1.80 (H) 03/11/2020 02:38 PM       Patient Active Problem List    Diagnosis Date Noted    Hyperglycemia due to type 2 diabetes mellitus ([address] 61/14/0066    Acute systolic congestive heart failure ([address] 02/07/2018    Obesity, morbid ([address] 12/18/2017    Type 2 diabetes mellitus with nephropathy ([address] 12/18/2017    Spinal stenosis of lumbosacral region 12/08/2016    Lumbar back pain with radiculopathy affecting right lower extremity 12/08/2016    Idiopathic small and large fiber sensory neuropathy 11/16/2016    Narcotic dependence, episodic use ([address] 04/29/2016    Chronic pain     Hypertension 11/07/2012 Social History     Tobacco Use    Smoking status: Never Smoker    Smokeless tobacco: Never Used   Substance Use Topics    Alcohol use: No     Alcohol/week: 0.0 standard drinks        Lab Results   Component Value Date/Time    WBC 5.3 01/14/2019 02:26 PM    HGB 10.3 (L) 01/14/2019 02:26 PM    HCT 33.4 (L) 01/14/2019 02:26 PM    PLATELET 687 (L) 01/85/7078 02:26 PM    MCV 84 01/14/2019 02:26 PM     Lab Results   Component Value Date/Time    Hemoglobin A1c 7.6 (H) 03/11/2020 02:38 PM    Hemoglobin A1c 7.3 (H) 12/10/2019 02:33 PM    Hemoglobin A1c 7.1 (H) 09/16/2019 03:00 PM    Glucose 284 (H) 03/11/2020 02:38 PM    Glucose (POC) 173 (H) 12/18/2015 11:17 AM    Microalb/Creat ratio (ug/mg creat.) 34.0 (H) 07/10/2019 12:14 PM    LDL, calculated 86 05/21/2019 03:44 PM    Creatinine 1.80 (H) 03/11/2020 02:38 PM      Lab Results   Component Value Date/Time    Cholesterol, total 145 05/21/2019 03:44 PM    HDL Cholesterol 41 05/21/2019 03:44 PM    LDL, calculated 86 05/21/2019 03:44 PM    Triglyceride 89 05/21/2019 03:44 PM     Lab Results   Component Value Date/Time    ALT (SGPT) 23 04/02/2018 02:00 PM    Alk. phosphatase 123 (H) 04/02/2018 02:00 PM    Bilirubin, total 0.5 04/02/2018 02:00 PM    Albumin 3.5 04/02/2018 02:00 PM    Protein, total 7.8 04/02/2018 02:00 PM    INR 1.1 12/08/2015 04:42 PM    Prothrombin time 11.2 12/08/2015 04:42 PM    PLATELET 818 (L) 48/74/2876 02:26 PM     Lab Results   Component Value Date/Time    GFR est non-AA 32 (L) 03/11/2020 02:38 PM    GFR est AA 37 (L) 03/11/2020 02:38 PM    Creatinine 1.80 (H) 03/11/2020 02:38 PM    BUN 14 03/11/2020 02:38 PM    Sodium 144 03/11/2020 02:38 PM    Potassium 4.2 03/11/2020 02:38 PM    Chloride 108 (H) 03/11/2020 02:38 PM    CO2 19 (L) 03/11/2020 02:38 PM    Magnesium 1.4 (L) 11/16/2016 12:04 AM    PTH, Intact CANCELED 02/19/2018 02:29 PM        Review of Systems, additional:  Pertinent items are noted in HPI.     Objective:     Visit Vitals  LMP 12/03/2017     General:  alert, cooperative, no distress, appears stated age   Neck:    Chest Wall:    Lung:    Heart:     Abdomen:    Extremities:        Lab review: orders written for new lab studies as appropriate; see orders. Assessment/Plan:     diabetes stable, hypertension stable, hyperlipidemia stable. Chronic Conditions Addressed Today     1. Acute systolic congestive heart failure (HCC) - Primary     Relevant Medications     metOLazone (ZAROXOLYN) 2.5 mg tablet     Other Relevant Orders     METABOLIC PANEL, BASIC    2. Type 2 diabetes mellitus with nephropathy (HCC)     Relevant Medications     metOLazone (ZAROXOLYN) 2.5 mg tablet     pregabalin (LYRICA) 25 mg capsule     Other Relevant Orders     HEMOGLOBIN A1C WITH EAG    3. Idiopathic small and large fiber sensory neuropathy     Relevant Medications     pregabalin (LYRICA) 25 mg capsule      Acute Diagnoses Addressed Today     Lymphedema            Relevant Medications        metOLazone (ZAROXOLYN) 2.5 mg tablet    Hyperlipidemia, unspecified hyperlipidemia type            Relevant Orders        LIPID PANEL        Orders Placed This Encounter    METABOLIC PANEL, BASIC     Standing Status:   Future     Standing Expiration Date:   1/6/2021    HEMOGLOBIN A1C WITH EAG     Standing Status:   Future     Standing Expiration Date:   1/3/2021    LIPID PANEL     Standing Status:   Future     Standing Expiration Date:   1/6/2021    MAGNESIUM OXIDE, BULK,     Sig: Take 400 mg by mouth daily.  metOLazone (ZAROXOLYN) 2.5 mg tablet     Sig: Take 1 Tab by mouth daily. As needed for swelling     Dispense:  30 Tab     Refill:  2    pregabalin (LYRICA) 25 mg capsule     Sig: Take 2 Caps by mouth two (2) times a day. Max Daily Amount: 100 mg. Dispense:  120 Cap     Refill:  1     Current Outpatient Medications   Medication Sig Dispense Refill    MAGNESIUM OXIDE, BULK, Take 400 mg by mouth daily.       metOLazone (ZAROXOLYN) 2.5 mg tablet Take 1 Tab by mouth daily. As needed for swelling 30 Tab 2    pregabalin (LYRICA) 25 mg capsule Take 2 Caps by mouth two (2) times a day. Max Daily Amount: 100 mg. 120 Cap 1    Insulin Needles, Disposable, 32 gauge x 5/32\" ndle USE ONCE DAILY 100 Pen Needle 5    glucose blood VI test strips (True Metrix Glucose Test Strip) strip CHECK BLOOD SUGAR UP TO THREE TIMES DAILY 100 Strip 5    carvediloL (COREG) 12.5 mg tablet Take 1 tablet by mouth twice daily 60 Tab 5    liraglutide (VICTOZA) 0.6 mg/0.1 mL (18 mg/3 mL) pnij 1.2 mg by SubCUTAneous route daily. 2 Adjustable Dose Pre-filled Pen Syringe 5    lisinopriL (PRINIVIL, ZESTRIL) 10 mg tablet Take 1 Tab by mouth daily. 90 Tab 3    hydrALAZINE (APRESOLINE) 25 mg tablet Take 1 Tab by mouth two (2) times a day. 180 Tab 1    empagliflozin (JARDIANCE) 25 mg tablet Take 1 Tab by mouth daily. 30 Tab 5    nortriptyline (PAMELOR) 25 mg capsule Take 1 Cap by mouth two (2) times a day. 60 Cap 1    HUMALOG KWIKPEN INSULIN 100 unit/mL kwikpen 8 Units by SubCUTAneous route Before breakfast and dinner. (Patient taking differently: 8 Units by SubCUTAneous route Before breakfast and dinner. Taking as needed) 5 Pen 5    Blood-Glucose Meter monitoring kit Want free style leda kit 1 Kit 0    isosorbide dinitrate (ISORDIL) 20 mg tablet Take 1 Tab by mouth two (2) times a day. 180 Tab 1    cyanocobalamin (VITAMIN B-12) 500 mcg tablet Take 1 Tab by mouth daily. 90 Tab 1    cyanocobalamin (VITAMIN B12) 500 mcg tablet Take 1 Tab by mouth daily. 90 Tab 3    lidocaine (LIDODERM) 5 % Apply patch to the affected area for 12 hours a day and remove for 12 hours a day. 5 Each 0    Blood-Glucose Meter (TRUE METRIX GLUCOSE METER) misc Check blood sugar up to three times daily 1 Each 0    Cholecalciferol, Vitamin D3, 50,000 unit cap Take  by mouth.  aspirin delayed-release 81 mg tablet Take  by mouth daily.          OK diuretic  Re check labs    F/u 3 mo

## 2020-07-06 NOTE — PROGRESS NOTES
Chief Complaint   Patient presents with    Diabetes     Patient has not been out of the country in (3-4 months) 90 -120 days, NO diarrhea, NO cough, NO chest conjestion, NO temp. Pt has not been around anyone with these symptoms. Health Maintenance reviewed. I have reviewed the patient's medical history in detail and updated the computerized patient record. 1. Have you been to the ER, urgent care clinic since your last visit? Hospitalized since your last visit?no    2. Have you seen or consulted any other health care providers outside of the [address] [place] [address] since your last visit? Include any pap smears or colon screening. no    Encouraged pt to discuss pt's wishes with spouse/partner/family and bring them in the next appt to follow thru with the Advanced Directive    Fall Risk Assessment, last 12 mths 7/6/2020   Able to walk? Yes   Fall in past 12 months? No   Fall with injury? -   Number of falls in past 12 months -   Fall Risk Score -       3 most recent PHQ Screens 7/6/2020   Little interest or pleasure in doing things More than half the days   Feeling down, depressed, irritable, or hopeless More than half the days   Total Score PHQ 2 4       Abuse Screening Questionnaire 7/6/2020   Do you ever feel afraid of your partner? N   Are you in a relationship with someone who physically or mentally threatens you? N   Is it safe for you to go home?  Y       ADL Assessment 7/6/2020   Feeding yourself No Help Needed   Getting from bed to chair No Help Needed   Getting dressed No Help Needed   Bathing or showering No Help Needed   Walk across the room (includes cane/walker) No Help Needed   Using the telphone No Help Needed   Taking your medications No Help Needed   Preparing meals No Help Needed   Managing money (expenses/bills) No Help Needed   Moderately strenuous housework (laundry) No Help Needed   Shopping for personal items (toiletries/medicines) No Help Needed   Shopping for groceries No Help Needed   Driving Help Needed   Climbing a flight of stairs Help Needed   Getting to places beyond walking distances Help Needed

## 2020-07-10 DIAGNOSIS — E11.21 TYPE 2 DIABETES MELLITUS WITH NEPHROPATHY (HCC): ICD-10-CM

## 2020-07-10 NOTE — TELEPHONE ENCOUNTER
Requested Prescriptions     Pending Prescriptions Disp Refills    HumaLOG KwikPen Insulin 100 unit/mL kwikpen 5 Pen 5     Si Units by SubCUTAneous route Before breakfast and dinner.

## 2020-07-11 RX ORDER — INSULIN LISPRO 100 [IU]/ML
8 INJECTION, SOLUTION INTRAVENOUS; SUBCUTANEOUS
Qty: 2 ADJUSTABLE DOSE PRE-FILLED PEN SYRINGE | Refills: 5 | Status: SHIPPED | OUTPATIENT
Start: 2020-07-11 | End: 2020-07-31

## 2020-07-15 ENCOUNTER — TELEPHONE (OUTPATIENT)
Dept: INTERNAL MEDICINE CLINIC | Age: 55
End: 2020-07-15

## 2020-07-15 NOTE — TELEPHONE ENCOUNTER
7/15/20 Plan called @ 0  for Humalog  100 units/ml . WILLIS LEDESMA,state no claims noted since 2/1/20 on plan renewal.noted paid out of pocket. Rep state patient will need to call Member Service at [phone] for eligibility. Please follow up with patient. Oseas Taylor [phone] verify understanding. state we will give her a call,

## 2020-07-21 DIAGNOSIS — G60.8 IDIOPATHIC SMALL AND LARGE FIBER SENSORY NEUROPATHY: ICD-10-CM

## 2020-07-21 NOTE — TELEPHONE ENCOUNTER
Patient calling in for refill to go to [facility]. She is also trying to see if humalog has been faxed to us.  Patient can be reached at [phone]

## 2020-07-21 NOTE — TELEPHONE ENCOUNTER
Requested Prescriptions     Pending Prescriptions Disp Refills    pregabalin (LYRICA) 25 mg capsule 120 Cap 1     Sig: Take 2 Caps by mouth two (2) times a day. Max Daily Amount: 100 mg. Last office visit 7/6/20 future ?   Last filled  7/6/20  Changes made to medication on last visit      None

## 2020-07-22 RX ORDER — PREGABALIN 25 MG/1
50 CAPSULE ORAL 2 TIMES DAILY
Qty: 120 CAP | Refills: 1 | Status: SHIPPED | OUTPATIENT
Start: 2020-07-22 | End: 2020-10-09 | Stop reason: SDUPTHER

## 2020-07-22 NOTE — TELEPHONE ENCOUNTER
Form has been placed on Dr Jered Swenson desk for completion and signature to complete the PA process  Neptali Guerin LPN  1/14/7620  9:35 PM

## 2020-07-27 NOTE — TELEPHONE ENCOUNTER
Form faxed to [facility] department for request for Humalog insulin faxed to [phone] - confirmation of receipt received  Sam Shah LPN  1/07/4169  8:77 AM

## 2020-07-29 ENCOUNTER — DOCUMENTATION ONLY (OUTPATIENT)
Dept: INTERNAL MEDICINE CLINIC | Age: 55
End: 2020-07-29

## 2020-07-29 NOTE — PROGRESS NOTES
7/29/20 Attempted PA for Humolog KwikPen;Please note,Information regarding PA. Denial letter will be fax to office within one hour,  There is an [facility] [id number], that was clinically denied within the last 60 days. This request needs to be sent to the [email or web address] at [facility] [employer]. Appeal requests must come from the member or the provider.

## 2020-07-29 NOTE — PROGRESS NOTES
Sent a request to the provider to give Novolog instead of Humalog in order for insurance to cover  Abhinav Shah LPN  8/56/2530  0:67 PM

## 2020-07-31 ENCOUNTER — TELEPHONE (OUTPATIENT)
Dept: INTERNAL MEDICINE CLINIC | Age: 55
End: 2020-07-31

## 2020-07-31 DIAGNOSIS — E11.21 TYPE 2 DIABETES MELLITUS WITH NEPHROPATHY (HCC): ICD-10-CM

## 2020-07-31 DIAGNOSIS — E11.21 TYPE 2 DIABETES MELLITUS WITH NEPHROPATHY (HCC): Primary | ICD-10-CM

## 2020-07-31 RX ORDER — INSULIN ASPART 100 [IU]/ML
8 INJECTION, SOLUTION INTRAVENOUS; SUBCUTANEOUS 2 TIMES DAILY
Qty: 2 PEN | Refills: 5 | Status: SHIPPED | OUTPATIENT
Start: 2020-07-31 | End: 2020-12-28 | Stop reason: SDUPTHER

## 2020-07-31 RX ORDER — INSULIN ASPART 100 [IU]/ML
INJECTION, SOLUTION INTRAVENOUS; SUBCUTANEOUS
Qty: 2 PEN | Refills: 5 | Status: SHIPPED | OUTPATIENT
Start: 2020-07-31 | End: 2020-07-31

## 2020-07-31 NOTE — TELEPHONE ENCOUNTER
Pt called in reference to her novolog. She is completely out and her sugar is 330 because she has been trying to get this since last week. Please call her at [phone].

## 2020-08-03 ENCOUNTER — VIRTUAL VISIT (OUTPATIENT)
Dept: INTERNAL MEDICINE CLINIC | Age: 55
End: 2020-08-03
Payer: MEDICARE

## 2020-08-03 DIAGNOSIS — Z13.31 SCREENING FOR DEPRESSION: ICD-10-CM

## 2020-08-03 DIAGNOSIS — Z12.11 SCREENING FOR COLON CANCER: ICD-10-CM

## 2020-08-03 DIAGNOSIS — Z12.39 SCREENING FOR BREAST CANCER: ICD-10-CM

## 2020-08-03 DIAGNOSIS — Z00.00 MEDICARE ANNUAL WELLNESS VISIT, SUBSEQUENT: Primary | ICD-10-CM

## 2020-08-03 DIAGNOSIS — Z13.39 SCREENING FOR ALCOHOLISM: ICD-10-CM

## 2020-08-03 PROCEDURE — G8428 CUR MEDS NOT DOCUMENT: HCPCS | Performed by: FAMILY MEDICINE

## 2020-08-03 PROCEDURE — G0439 PPPS, SUBSEQ VISIT: HCPCS | Performed by: FAMILY MEDICINE

## 2020-08-03 PROCEDURE — 3017F COLORECTAL CA SCREEN DOC REV: CPT | Performed by: FAMILY MEDICINE

## 2020-08-03 PROCEDURE — G0442 ANNUAL ALCOHOL SCREEN 15 MIN: HCPCS | Performed by: FAMILY MEDICINE

## 2020-08-03 PROCEDURE — G8756 NO BP MEASURE DOC: HCPCS | Performed by: FAMILY MEDICINE

## 2020-08-03 PROCEDURE — G8417 CALC BMI ABV UP PARAM F/U: HCPCS | Performed by: FAMILY MEDICINE

## 2020-08-03 PROCEDURE — G0444 DEPRESSION SCREEN ANNUAL: HCPCS | Performed by: FAMILY MEDICINE

## 2020-08-03 PROCEDURE — G8510 SCR DEP NEG, NO PLAN REQD: HCPCS | Performed by: FAMILY MEDICINE

## 2020-08-03 NOTE — PATIENT INSTRUCTIONS
Medicare Wellness Visit, Female The best way to live healthy is to have a lifestyle where you eat a well-balanced diet, exercise regularly, limit alcohol use, and quit all forms of tobacco/nicotine, if applicable. Regular preventive services are another way to keep healthy. Preventive services (vaccines, screening tests, monitoring & exams) can help personalize your care plan, which helps you manage your own care. Screening tests can find health problems at the earliest stages, when they are easiest to treat. [facility] follows the current, evidence-based guidelines published by the [facility] ([facility]) when recommending preventive services for our patients. Because we follow these guidelines, sometimes recommendations change over time as research supports it. (For example, mammograms used to be recommended annually. Even though Medicare will still pay for an annual mammogram, the newer guidelines recommend a mammogram every two years for women of average risk). Of course, you and your doctor may decide to screen more often for some diseases, based on your risk and your co-morbidities (chronic disease you are already diagnosed with). Preventive services for you include: - Medicare offers their members a free annual wellness visit, which is time for you and your primary care provider to discuss and plan for your preventive service needs. Take advantage of this benefit every year! 
-All adults over the age of 72 should receive the recommended pneumonia vaccines. Current USPSTF guidelines recommend a series of two vaccines for the best pneumonia protection.  
-All adults should have a flu vaccine yearly and a tetanus vaccine every 10 years.  
-All adults age 48 and older should receive the shingles vaccines (series of two vaccines). -All adults age 38-68 who are overweight should have a diabetes screening test once every three years. -All adults born between [birth year] and 1964 should be screened once for Hepatitis C. 
-Other screening tests and preventive services for persons with diabetes include: an eye exam to screen for diabetic retinopathy, a kidney function test, a foot exam, and stricter control over your cholesterol.  
-Cardiovascular screening for adults with routine risk involves an electrocardiogram (ECG) at intervals determined by your doctor.  
-Colorectal cancer screenings should be done for adults age 54-65 with no increased risk factors for colorectal cancer. There are a number of acceptable methods of screening for this type of cancer. Each test has its own benefits and drawbacks. Discuss with your doctor what is most appropriate for you during your annual wellness visit. The different tests include: colonoscopy (considered the best screening method), a fecal occult blood test, a fecal DNA test, and sigmoidoscopy. 
 
-A bone mass density test is recommended when a woman turns 65 to screen for osteoporosis. This test is only recommended one time, as a screening. Some providers will use this same test as a disease monitoring tool if you already have osteoporosis. -Breast cancer screenings are recommended every other year for women of normal risk, age 54-69. 
-Cervical cancer screenings for women over age 72 are only recommended with certain risk factors. Here is a list of your current Health Maintenance items (your personalized list of preventive services) with a due date: 
Health Maintenance Due Topic Date Due  Shingles Vaccine (1 of 2) 12/28/2016  Colon Cancer Stool Test  12/28/2016  Mammogram  03/22/2019 [address] [place] [address] Annual Well Visit  02/25/2020  Cholesterol Test   05/21/2020  Albumin Urine Test  07/10/2020  Flu Vaccine  08/01/2020  Pap Test  11/30/2020

## 2020-08-03 NOTE — PROGRESS NOTES
1. Have you been to the ER, urgent care clinic since your last visit? Hospitalized since your last visit? No    2. Have you seen or consulted any other health care providers outside of the [address] [place] [address] since your last visit? Include any pap smears or colon screening.  No     Health Maintenance Due   Topic Date Due    Shingrix Vaccine Age 49> (1 of 2) 12/28/2016    FOBT Q1Y Age 50-75  12/28/2016    Breast Cancer Screen Mammogram  03/22/2019    Medicare Yearly Exam  02/25/2020    Lipid Screen  05/21/2020    MICROALBUMIN Q1  07/10/2020    Influenza Age 9 to Adult  08/01/2020    PAP AKA CERVICAL CYTOLOGY  11/30/2020

## 2020-08-03 NOTE — PROGRESS NOTES
This is the Subsequent Medicare Annual Wellness Exam, performed 12 months or more after the Initial AWV or the last Subsequent AWV    I have reviewed the patient's medical history in detail and updated the computerized patient record. History     Reports taking blood pressure medications without side affects. No complaints of exertional chest pain, excessive shortness of breath or focal weakness. Minimal swelling in lower legs or dizziness with standing. Wants to try slgo for WL.   No hypos  Lab Results   Component Value Date/Time    Hemoglobin A1c 7.6 (H) 03/11/2020 02:38 PM    Hemoglobin A1c 7.3 (H) 12/10/2019 02:33 PM    Hemoglobin A1c 7.1 (H) 09/16/2019 03:00 PM    Glucose 284 (H) 03/11/2020 02:38 PM    Glucose (POC) 173 (H) 12/18/2015 11:17 AM    Microalbumin urine (POC) 10 01/14/2015 11:46 AM    Microalb/Creat ratio (ug/mg creat.) 34.0 (H) 07/10/2019 12:14 PM    Microalbumin/creat ratio (POC)  01/14/2015 11:46 AM    LDL, calculated 86 05/21/2019 03:44 PM    Creatinine 1.80 (H) 03/11/2020 02:38 PM       Patient Active Problem List   Diagnosis Code    Hypertension I10    Chronic pain G89.29    Narcotic dependence, episodic use (HCC) F11.20    Idiopathic small and large fiber sensory neuropathy G60.8    Spinal stenosis of lumbosacral region M48.07    Lumbar back pain with radiculopathy affecting right lower extremity M54.16    Obesity, morbid ([facility]) E66.01    Type 2 diabetes mellitus with nephropathy ([facility]) V94.21    Acute systolic congestive heart failure ([facility]) I50.21    Hyperglycemia due to type 2 diabetes mellitus ([address] E11.65     Past Medical History:   Diagnosis Date    Anemia     CAD (coronary artery disease)     Chronic pain     Cyst in hand 2014    [address]    Diabetes mellitus type 2 in obese ([address]     Diabetic neuropathy ([address]     Hypertension     Migraine       Past Surgical History:   Procedure Laterality Date    HX ORTHOPAEDIC      right big toe     Current Outpatient Medications   Medication Sig Dispense Refill    insulin aspart U-100 (NovoLOG Flexpen U-100 Insulin) 100 unit/mL (3 mL) inpn 8 Units by SubCUTAneous route two (2) times a day. BEFORE BREAKFAST AND DINNER 2 Pen 5    pregabalin (LYRICA) 25 mg capsule Take 2 Caps by mouth two (2) times a day. Max Daily Amount: 100 mg. 120 Cap 1    MAGNESIUM OXIDE, BULK, Take 400 mg by mouth as needed.  metOLazone (ZAROXOLYN) 2.5 mg tablet Take 1 Tab by mouth daily. As needed for swelling 30 Tab 2    Insulin Needles, Disposable, 32 gauge x 5/32\" ndle USE ONCE DAILY 100 Pen Needle 5    glucose blood VI test strips (True Metrix Glucose Test Strip) strip CHECK BLOOD SUGAR UP TO THREE TIMES DAILY 100 Strip 5    carvediloL (COREG) 12.5 mg tablet Take 1 tablet by mouth twice daily 60 Tab 5    liraglutide (VICTOZA) 0.6 mg/0.1 mL (18 mg/3 mL) pnij 1.2 mg by SubCUTAneous route daily. 2 Adjustable Dose Pre-filled Pen Syringe 5    lisinopriL (PRINIVIL, ZESTRIL) 10 mg tablet Take 1 Tab by mouth daily. 90 Tab 3    hydrALAZINE (APRESOLINE) 25 mg tablet Take 1 Tab by mouth two (2) times a day. 180 Tab 1    empagliflozin (JARDIANCE) 25 mg tablet Take 1 Tab by mouth daily. 30 Tab 5    nortriptyline (PAMELOR) 25 mg capsule Take 1 Cap by mouth two (2) times a day. 60 Cap 1    Blood-Glucose Meter monitoring kit Want free style leda kit 1 Kit 0    isosorbide dinitrate (ISORDIL) 20 mg tablet Take 1 Tab by mouth two (2) times a day. 180 Tab 1    cyanocobalamin (VITAMIN B-12) 500 mcg tablet Take 1 Tab by mouth daily. 90 Tab 1    lidocaine (LIDODERM) 5 % Apply patch to the affected area for 12 hours a day and remove for 12 hours a day. 5 Each 0    Blood-Glucose Meter (TRUE METRIX GLUCOSE METER) misc Check blood sugar up to three times daily 1 Each 0    Cholecalciferol, Vitamin D3, 50,000 unit cap Take  by mouth.  aspirin delayed-release 81 mg tablet Take  by mouth daily.        Allergies   Allergen Reactions    Lortab [Hydrocodone-Acetaminophen] Nausea and Vomiting    Cymbalta [Duloxetine] Diarrhea    Lasix [Furosemide] Swelling    Morphine Other (comments)       Family History   Problem Relation Age of Onset    Cancer Father         brain    Diabetes Mother     Heart Disease Mother     Hypertension Mother     Hypertension Maternal Grandmother     Heart Disease Maternal Grandmother      Social History     Tobacco Use    Smoking status: Never Smoker    Smokeless tobacco: Never Used   Substance Use Topics    Alcohol use: No     Alcohol/week: 0.0 standard drinks       Depression Risk Factor Screening:     3 most recent PHQ Screens 8/3/2020   Little interest or pleasure in doing things Not at all   Feeling down, depressed, irritable, or hopeless Not at all   Total Score PHQ 2 0       Alcohol Risk Factor Screening:   Do you average 1 drink per night or more than 7 drinks a week:  No    On any one occasion in the past three months have you have had more than 3 drinks containing alcohol:  No      Functional Ability and Level of Safety:   Hearing: Hearing is good. Activities of Daily Living: The home contains: no safety equipment. Patient does total self care     Ambulation: with no difficulty     Fall Risk:  Fall Risk Assessment, last 12 mths 7/6/2020   Able to walk? Yes   Fall in past 12 months?  No   Fall with injury? -   Number of falls in past 12 months -   Fall Risk Score -     Abuse Screen:  Patient is not abused       Cognitive Screening   Has your family/caregiver stated any concerns about your memory: no    Cognitive Screening: Abnormal - SLUMS 9/11    Patient Care Team   Patient Care Team:  Santiago Hui MD as PCP - General (Family Medicine)  Santiago Hui MD as PCP - [facility]  Abimael York MD as Physician (Cardiology)  Roxanna North MD (Neurology)  Ignacio Reed RN as Ambulatory Care Manager  Rahul Devine MD as Surgeon (General Surgery)    Assessment/Plan Education and counseling provided:  Screening Mammography  Colorectal cancer screening tests      ICD-10-CM ICD-9-CM    1. Medicare annual wellness visit, subsequent  Z00.00 V70.0    2. Screening for alcoholism  Z13.39 V79.1 MS ANNUAL ALCOHOL SCREEN 15 MIN   3. Screening for depression  Z13.31 V79.0 DEPRESSION SCREEN ANNUAL   4. Screening for colon cancer  Z12.11 V76.51 OCCULT BLOOD IMMUNOASSAY,DIAGNOSTIC   5. Screening for breast cancer  Z12.39 V76.10 ABDULKADIR MAMMO BI SCREENING INCL CAD         Health Maintenance Due   Topic Date Due    Shingrix Vaccine Age 49> (1 of 2) 12/28/2016    FOBT Q1Y Age 54-65  12/28/2016    Breast Cancer Screen Mammogram  03/22/2019    Medicare Yearly Exam  02/25/2020    Lipid Screen  05/21/2020    MICROALBUMIN Q1  07/10/2020    Influenza Age 5 to Adult  08/01/2020    PAP AKA CERVICAL CYTOLOGY  11/30/2020     Orders Placed This Encounter    Depression Screen Annual    ABDULKADIR MAMMO BI SCREENING INCL CAD     Standing Status:   Future     Standing Expiration Date:   9/3/2021     Scheduling Instructions:      [place]    OCCULT BLOOD [facility]     Order Specific Question:   QUEST SOURCE     Answer:   Stool [1161]    Annual  Alcohol Screen 15 min ()       Yesenia Donovan, who was evaluated through a synchronous (real-time) audio only encounter, and/or her healthcare decision maker, is aware that it is a billable service, with coverage as determined by her insurance carrier. She provided verbal consent to proceed: Yes, and patient identification was verified. It was conducted pursuant to the emergency declaration under the [facility] authority and the [employer] Act. A caregiver was present when appropriate. Ability to conduct physical exam was limited. I was in the office. The patient was at home.     Ruth Prieto MD

## 2020-09-08 ENCOUNTER — OFFICE VISIT (OUTPATIENT)
Dept: INTERNAL MEDICINE CLINIC | Age: 55
End: 2020-09-08
Payer: MEDICARE

## 2020-09-08 VITALS
HEART RATE: 92 BPM | WEIGHT: 260 LBS | RESPIRATION RATE: 16 BRPM | BODY MASS INDEX: 44.39 KG/M2 | SYSTOLIC BLOOD PRESSURE: 98 MMHG | HEIGHT: 64 IN | DIASTOLIC BLOOD PRESSURE: 64 MMHG | OXYGEN SATURATION: 94 % | TEMPERATURE: 97.5 F

## 2020-09-08 DIAGNOSIS — I25.10 CORONARY ARTERY DISEASE INVOLVING NATIVE CORONARY ARTERY OF NATIVE HEART WITHOUT ANGINA PECTORIS: ICD-10-CM

## 2020-09-08 DIAGNOSIS — E11.21 TYPE 2 DIABETES MELLITUS WITH NEPHROPATHY (HCC): ICD-10-CM

## 2020-09-08 DIAGNOSIS — H26.9 CATARACT OF BOTH EYES, UNSPECIFIED CATARACT TYPE: ICD-10-CM

## 2020-09-08 DIAGNOSIS — I10 ESSENTIAL HYPERTENSION: ICD-10-CM

## 2020-09-08 DIAGNOSIS — G60.8 IDIOPATHIC SMALL AND LARGE FIBER SENSORY NEUROPATHY: ICD-10-CM

## 2020-09-08 DIAGNOSIS — N18.4 CKD (CHRONIC KIDNEY DISEASE) STAGE 4, GFR 15-29 ML/MIN (HCC): ICD-10-CM

## 2020-09-08 DIAGNOSIS — Z01.818 PREOP EXAMINATION: Primary | ICD-10-CM

## 2020-09-08 DIAGNOSIS — R79.89 ELEVATED PTHRP LEVEL: ICD-10-CM

## 2020-09-08 PROCEDURE — 3052F HG A1C>EQUAL 8.0%<EQUAL 9.0%: CPT | Performed by: NURSE PRACTITIONER

## 2020-09-08 PROCEDURE — 3017F COLORECTAL CA SCREEN DOC REV: CPT | Performed by: NURSE PRACTITIONER

## 2020-09-08 PROCEDURE — G8427 DOCREV CUR MEDS BY ELIG CLIN: HCPCS | Performed by: NURSE PRACTITIONER

## 2020-09-08 PROCEDURE — G8432 DEP SCR NOT DOC, RNG: HCPCS | Performed by: NURSE PRACTITIONER

## 2020-09-08 PROCEDURE — G8417 CALC BMI ABV UP PARAM F/U: HCPCS | Performed by: NURSE PRACTITIONER

## 2020-09-08 PROCEDURE — G8752 SYS BP LESS 140: HCPCS | Performed by: NURSE PRACTITIONER

## 2020-09-08 PROCEDURE — 2022F DILAT RTA XM EVC RTNOPTHY: CPT | Performed by: NURSE PRACTITIONER

## 2020-09-08 PROCEDURE — G8754 DIAS BP LESS 90: HCPCS | Performed by: NURSE PRACTITIONER

## 2020-09-08 PROCEDURE — 96372 THER/PROPH/DIAG INJ SC/IM: CPT | Performed by: NURSE PRACTITIONER

## 2020-09-08 PROCEDURE — 99214 OFFICE O/P EST MOD 30 MIN: CPT | Performed by: NURSE PRACTITIONER

## 2020-09-08 RX ORDER — KETOROLAC TROMETHAMINE 30 MG/ML
15 INJECTION, SOLUTION INTRAMUSCULAR; INTRAVENOUS ONCE
Qty: 1 VIAL | Refills: 0
Start: 2020-09-08 | End: 2020-09-08 | Stop reason: ALTCHOICE

## 2020-09-08 NOTE — PROGRESS NOTES
250pm patient states that her foot pain is still at 9 on a 0-10 pain scale - advised that it may take a little longer for the medication to work - patient was discharged to home with order for Tylenol arthritis  Rk Thomas LPN  0/1/4967  9:91 PM  240pm  Patient was given 15mg/ 0.5ml Toradol injection IM per order of Erika Mcnulty NP - patient rates her pain in her feet at greater than 10 on a 0-10 pain scale - injection given IM to left deltoid  Rk Thomas LPN  8/8/7982  5:18 PM

## 2020-09-08 NOTE — PROGRESS NOTES
HPI: Sia Goodman is 49 y.o. female (1965) who presents for preoperative evaluation. Procedure/Surgery: RIGHT /LEFT CATARACT EXTRACTION WITH INTRAOCULAR LENS IMPLANT   Date of Procedure/Surgery:September 21, 2020 (right)  September 28, 2020 (left). Surgeon: German Corrigan  [place] [facility]  Primary Physician: MD Mau Rios, GERRY    Reason for surgery: bilateral cataracts    Latex Allergy: none  Allergies: Hydrocodone-Acetaminophen, Cymbalta, Furosemide, Morphine    Pt reported bilateral ankle and toe pain x 1 day. Complained of severe Neuropathy and stated she ran out of oxycodone. Has a pacemaker for cardiomyopathy- EF 15%- Dr. Domenic Verde cardiologist Adan Dominguez recent apt 6/25/2020.  480    Recent use of: ASA  Tetanus up to date: last tetanus booster within 10 years  Anesthesia Complications: None  History of abnormal bleeding : None  History of Blood Transfusions: no  Health Care Directive or Living Will: no    EKG: EKG FINDINGS - normal sinus rhythm, left axis deviation, left ventricular hypertrophy  CXR: was negative for infiltrate, effusion, pneumothorax, or wide mediastinum. Labs:   Lab Results   Component Value Date/Time    Sodium 146 (H) 09/09/2020 10:22 AM    Potassium 4.3 09/09/2020 10:22 AM    Chloride 105 09/09/2020 10:22 AM    CO2 25 09/09/2020 10:22 AM    Anion gap 9 04/02/2018 02:00 PM    Glucose 201 (H) 09/09/2020 10:22 AM    BUN 33 (H) 09/09/2020 10:22 AM    Creatinine 3.37 (H) 09/09/2020 10:22 AM    BUN/Creatinine ratio 10 09/09/2020 10:22 AM    GFR est AA 17 (L) 09/09/2020 10:22 AM    GFR est non-AA 15 (L) 09/09/2020 10:22 AM    Calcium 9.2 09/09/2020 10:22 AM    Bilirubin, total 0.3 09/09/2020 10:22 AM    ALT (SGPT) 10 09/09/2020 10:22 AM    Alk.  phosphatase 139 (H) 09/09/2020 10:22 AM    Protein, total 7.1 09/09/2020 10:22 AM    Albumin 4.0 09/09/2020 10:22 AM    Globulin 4.3 (H) 04/02/2018 02:00 PM    A-G Ratio 1.3 09/09/2020 10:22 AM Lab Results   Component Value Date/Time    Hemoglobin A1c 8.2 (H) 09/09/2020 10:22 AM    Hemoglobin A1c (POC) 9.4 04/20/2018 04:18 PM     Prior to Admission medications    Medication Sig Start Date End Date Taking? Authorizing Provider   insulin aspart U-100 (NovoLOG Flexpen U-100 Insulin) 100 unit/mL (3 mL) inpn 8 Units by SubCUTAneous route two (2) times a day. BEFORE BREAKFAST AND DINNER 7/31/20  Yes Erika Thompson NP   pregabalin (LYRICA) 25 mg capsule Take 2 Caps by mouth two (2) times a day. Max Daily Amount: 100 mg. 7/22/20  Yes Octavia Hsieh MD   MAGNESIUM OXIDE, BULK, Take 400 mg by mouth as needed. 9/19/18  Yes Provider, Yael   metOLazone (ZAROXOLYN) 2.5 mg tablet Take 1 Tab by mouth daily. As needed for swelling 7/6/20  Yes Octavia Hsieh MD   Insulin Needles, Disposable, 32 gauge x 5/32\" ndle USE ONCE DAILY 7/1/20  Yes Octavia Hsieh MD   glucose blood VI test strips (True Metrix Glucose Test Strip) strip CHECK BLOOD SUGAR UP TO THREE TIMES DAILY 6/2/20  Yes Octavia Hsieh MD   carvediloL (COREG) 12.5 mg tablet Take 1 tablet by mouth twice daily 6/1/20  Yes Octavia Hsieh MD   liraglutide (VICTOZA) 0.6 mg/0.1 mL (18 mg/3 mL) pnij 1.2 mg by SubCUTAneous route daily. 5/5/20  Yes Octavia Hsieh MD   lisinopriL (PRINIVIL, ZESTRIL) 10 mg tablet Take 1 Tab by mouth daily. 5/5/20  Yes Octavia Hsieh MD   hydrALAZINE (APRESOLINE) 25 mg tablet Take 1 Tab by mouth two (2) times a day. 5/5/20  Yes Octavia Hsieh MD   empagliflozin (JARDIANCE) 25 mg tablet Take 1 Tab by mouth daily. 4/25/20  Yes Octavia Hsieh MD   nortriptyline (PAMELOR) 25 mg capsule Take 1 Cap by mouth two (2) times a day. 3/24/20  Yes Octavia Hsieh MD   Blood-Glucose Meter monitoring kit Want free style leda kit 1/12/20  Yes Octavia Hsieh MD   isosorbide dinitrate (ISORDIL) 20 mg tablet Take 1 Tab by mouth two (2) times a day. 1/4/20  Yes Octavia Hsieh MD   cyanocobalamin (VITAMIN B-12) 500 mcg tablet Take 1 Tab by mouth daily. 10/7/19  Yes Gali Murillo MD   lidocaine (LIDODERM) 5 % Apply patch to the affected area for 12 hours a day and remove for 12 hours a day. 5/20/19  Yes Alex Wills MD   Blood-Glucose Meter (TRUE METRIX GLUCOSE METER) misc Check blood sugar up to three times daily 3/22/19  Yes Gali Murillo MD   Cholecalciferol, Vitamin D3, 50,000 unit cap Take  by mouth. Yes Provider, Historical   aspirin delayed-release 81 mg tablet Take  by mouth daily. Yes Provider, Historical      Allergies   Allergen Reactions    Lortab [Hydrocodone-Acetaminophen] Nausea and Vomiting    Cymbalta [Duloxetine] Diarrhea    Lasix [Furosemide] Swelling    Morphine Other (comments)      Patient Active Problem List    Diagnosis Date Noted    Hyperglycemia due to type 2 diabetes mellitus ([address] 57/03/0635    Acute systolic congestive heart failure ([address] 02/07/2018    Obesity, morbid ([address] 12/18/2017    Type 2 diabetes mellitus with nephropathy ([address] 12/18/2017    Spinal stenosis of lumbosacral region 12/08/2016    Lumbar back pain with radiculopathy affecting right lower extremity 12/08/2016    Idiopathic small and large fiber sensory neuropathy 11/16/2016    Narcotic dependence, episodic use ([address] 04/29/2016    Chronic pain     Hypertension 11/07/2012     Past Medical History:   Diagnosis Date    Anemia     CAD (coronary artery disease)     Chronic pain     Cyst in hand 2014    [address]    Diabetes mellitus type 2 in obese ([address]     Diabetic neuropathy ([address]     Hypertension     Migraine      Past Surgical History:   Procedure Laterality Date    HX ORTHOPAEDIC      right big toe     Social History     Tobacco Use    Smoking status: Never Smoker    Smokeless tobacco: Never Used   Substance Use Topics    Alcohol use: No     Alcohol/week: 0.0 standard drinks       --------------------------------------  Review of Systems   Constitutional: Negative for chills and fever. Eyes: Positive for blurred vision.    Respiratory: Negative for cough, shortness of breath and wheezing. Cardiovascular: Negative for chest pain and leg swelling. Gastrointestinal: Negative for nausea and vomiting. Neurological: Positive for tingling and sensory change. Negative for dizziness and headaches. Physical Exam   Visit Vitals  BP 98/64 (BP 1 Location: Right arm, BP Patient Position: Sitting)   Pulse 92   Temp 97.5 °F (36.4 °C) (Temporal)   Resp 16   Ht 5' 4\" (1.626 m)   Wt 260 lb (117.9 kg)   LMP 12/03/2017   SpO2 94%   BMI 44.63 kg/m²     WD WN female NAD  Heart RRR without murmers clicks or rubs  Lungs CTA  Abdo soft nontender  Ext trace edema    Assessment & Plan:    ICD-10-CM ICD-9-CM    1. Preop examination  Z01.818 V72.84    2. Essential hypertension  X67 084.3 METABOLIC PANEL, COMPREHENSIVE      MAGNESIUM      KETOROLAC TROMETHAMINE INJ      DC THER/PROPH/DIAG INJECTION, SUBCUT/IM   3. Type 2 diabetes mellitus with nephropathy (HCC)  C78.05 551.58 METABOLIC PANEL, COMPREHENSIVE     583.81 CBC WITH AUTOMATED DIFF      HEMOGLOBIN A1C WITH EAG      KETOROLAC TROMETHAMINE INJ      DC THER/PROPH/DIAG INJECTION, SUBCUT/IM      DISCONTINUED: ketorolac (TORADOL) 30 mg/mL (1 mL) injection      CANCELED: KETOROLAC TROMETHAMINE INJ      CANCELED: DC THER/PROPH/DIAG INJECTION, SUBCUT/IM   4. Elevated PTHrP level  R79.89 790.6 PTH INTACT      KETOROLAC TROMETHAMINE INJ      DC THER/PROPH/DIAG INJECTION, SUBCUT/IM   5. Idiopathic small and large fiber sensory neuropathy  G60.8 356.4    6. Cataract of both eyes, unspecified cataract type  H26.9 366.9      1. Preop examination    2. Essential hypertension  - METABOLIC PANEL, COMPREHENSIVE; Future  - MAGNESIUM; Future  - KETOROLAC TROMETHAMINE INJ ( 0.5)  - DC THER/PROPH/DIAG INJECTION, SUBCUT/IM    3. Type 2 diabetes mellitus with nephropathy (HCC)  - METABOLIC PANEL, COMPREHENSIVE; Future  - CBC WITH AUTOMATED DIFF; Future  - HEMOGLOBIN A1C WITH EAG; Future    4.  Elevated PTHrP level  - PTH INTACT; Future    5. Idiopathic small and large fiber sensory neuropathy  Continue Pregabalin. 6. Cataract of both eyes, unspecified cataract type  Follow up with Dr. Ward. Tylenol Arthritis 2tabs q 8hr prn for pain. After reviewing the patient's history & exam she is moderate risk for cardiac complications. Surgery should be delayed until lab results obtained. Follow up in 1 week. An After Visit Summary was printed and given to the patient. If symptoms worsen and absolutely necessary, call 911 or go to nearest ER.      Jamilah Weston NP

## 2020-09-08 NOTE — PATIENT INSTRUCTIONS
Diabetes Foot Health: Care Instructions Your Care Instructions When you have diabetes, your feet need extra care and attention. Diabetes can damage the nerve endings and blood vessels in your feet, making you less likely to notice when your feet are injured. Diabetes also limits your body's ability to fight infection and get blood to areas that need it. If you get a minor foot injury, it could become an ulcer or a serious infection. With good foot care, you can prevent most of these problems. Caring for your feet can be quick and easy. Most of the care can be done when you are bathing or getting ready for bed. Follow-up care is a key part of your treatment and safety. Be sure to make and go to all appointments, and call your doctor if you are having problems. It's also a good idea to know your test results and keep a list of the medicines you take. How can you care for yourself at home? · Keep your blood sugar close to normal by watching what and how much you eat, monitoring blood sugar, taking medicines if prescribed, and getting regular exercise. · Do not smoke. Smoking affects blood flow and can make foot problems worse. If you need help quitting, talk to your doctor about stop-smoking programs and medicines. These can increase your chances of quitting for good. · Eat a diet that is low in fats. High fat intake can cause fat to build up in your blood vessels and decrease blood flow. · Inspect your feet daily for blisters, cuts, cracks, or sores. If you cannot see well, use a mirror or have someone help you. · Take care of your feet: 
? Wash your feet every day. Use warm (not hot) water. Check the water temperature with your wrists or other part of your body, not your feet. ? Dry your feet well. Pat them dry. Do not rub the skin on your feet too hard. Dry well between your toes. If the skin on your feet stays moist, bacteria or a fungus can grow, which can lead to infection. ? Keep your skin soft. Use moisturizing skin cream to keep the skin on your feet soft and prevent calluses and cracks. But do not put the cream between your toes, and stop using any cream that causes a rash. ? Clean underneath your toenails carefully. Do not use a sharp object to clean underneath your toenails. Use the blunt end of a nail file or other rounded tool. ? Trim and file your toenails straight across to prevent ingrown toenails. Use a nail clipper, not scissors. Use an emery board to smooth the edges. · Change socks daily. Socks without seams are best, because seams often rub the feet. You can find socks for people with diabetes from specialty catalogs. · Look inside your shoes every day for things like gravel or torn linings, which could cause blisters or sores. · Buy shoes that fit well: 
? Look for shoes that have plenty of space around the toes. This helps prevent bunions and blisters. ? Try on shoes while wearing the kind of socks you will usually wear with the shoes. ? Avoid plastic shoes. They may rub your feet and cause blisters. Good shoes should be made of materials that are flexible and breathable, such as leather or cloth. ? Break in new shoes slowly by wearing them for no more than an hour a day for several days. Take extra time to check your feet for red areas, blisters, or other problems after you wear new shoes. · Do not go barefoot. Do not wear sandals, and do not wear shoes with very thin soles. Thin soles are easy to puncture. They also do not protect your feet from hot pavement or cold weather. · Have your doctor check your feet during each visit. If you have a foot problem, see your doctor. Do not try to treat an early foot problem at home. Home remedies or treatments that you can buy without a prescription (such as corn removers) can be harmful. · Always get early treatment for foot problems. A minor irritation can lead to a major problem if not properly cared for early. When should you call for help? Call your doctor now or seek immediate medical care if: 
  · You have a foot sore, an ulcer or break in the skin that is not healing after 4 days, bleeding corns or calluses, or an ingrown toenail.  
  · You have blue or black areas, which can mean bruising or blood flow problems.  
  · You have peeling skin or tiny blisters between your toes or cracking or oozing of the skin.  
  · You have a fever for more than 24 hours and a foot sore.  
  · You have new numbness or tingling in your feet that does not go away after you move your feet or change positions.  
  · You have unexplained or unusual swelling of the foot or ankle. Watch closely for changes in your health, and be sure to contact your doctor if: 
  · You cannot do proper foot care. Where can you learn more? Go to http://lizbeth-efraín.info/ Enter A739 in the search box to learn more about \"Diabetes Foot Health: Care Instructions. \" Current as of: December 20, 2019               Content Version: 12.6 © 9221-0752 [employer]. Care instructions adapted under license by [employer] (which disclaims liability or warranty for this information). If you have questions about a medical condition or this instruction, always ask your healthcare professional. [address] any warranty or liability for your use of this information. Diabetic Neuropathy: Care Instructions Your Care Instructions When you have diabetes, your blood sugar level may get too high. Over time, high blood sugar levels can damage nerves. This is called diabetic neuropathy. Nerve damage can cause pain, burning, tingling, and numbness and may leave you feeling weak. The feet are often affected. When you have nerve damage in your feet, you cannot feel your feet and toes as well as normal and may not notice cuts or sores.  Even a small injury can lead to a serious infection. It is very important that you follow your doctor's advice on foot care. Sometimes diabetes damages nerves that help the body function. If this happens, your blood pressure, sweating, digestion, and urination might be affected. Your doctor may give you a target blood sugar level that is higher or lower than you are used to. Try to keep your blood sugar very close to this target level to prevent more damage. Follow-up care is a key part of your treatment and safety. Be sure to make and go to all appointments, and call your doctor if you are having problems. It's also a good idea to know your test results and keep a list of the medicines you take. How can you care for yourself at home? · Take your medicines exactly as prescribed. Call your doctor if you think you are having a problem with your medicine. It is very important that you take your insulin or diabetes pills as your doctor tells you. · Try to keep blood sugar at your target level. ? Eat a variety of healthy foods, with carbohydrate spread out in your meals. A dietitian can help you plan meals. ? Try to get at least 30 minutes of exercise on most days. ? Check your blood sugar as many times each day as your doctor recommends. · Take and record your blood pressure at home if your doctor tells you to. Learn the importance of the two measures of blood pressure (such as 130 over 80, or 130/80). To take your blood pressure at home: ? Ask your doctor to check your blood pressure monitor to be sure it is accurate and the cuff fits you. Also ask your doctor to watch you to make sure that you are using it right. ? Do not use medicine known to raise blood pressure (such as some nasal decongestant sprays) before taking your blood pressure. ? Avoid taking your blood pressure if you have just exercised or are nervous or upset. Rest at least 15 minutes before you take a reading. · Take pain medicines exactly as directed. ? If the doctor gave you a prescription medicine for pain, take it as prescribed. ? If you are not taking a prescription pain medicine, ask your doctor if you can take an over-the-counter medicine. · Do not smoke. Smoking can increase your chance for a heart attack or stroke. If you need help quitting, talk to your doctor about stop-smoking programs and medicines. These can increase your chances of quitting for good. · Limit alcohol to 2 drinks a day for men and 1 drink a day for women. Too much alcohol can cause health problems. · Eat small meals often, rather than 2 or 3 large meals a day. To care for your feet · Prevent injury by wearing shoes at all times, even when you are indoors. · Do foot care as part of your daily routine. Wash your feet and then rub lotion on your feet, but not between your toes. Use a handheld mirror or magnifying mirror to inspect your feet for blisters, cuts, cracks, or sores. · Have your toenails trimmed and filed straight across. · Wear shoes and socks that fit well. Soft shoes that have good support and that fit well (such as tennis shoes) are best for your feet. · Check your shoes for any loose objects or rough edges before you put them on. · Ask your doctor to check your feet during each visit. Your doctor may notice a foot problem you have missed. · Get early treatment for any foot problem, even a minor one. When should you call for help? Call your doctor now or seek immediate medical care if: 
  · You have symptoms of infection, such as: 
? Increased pain, swelling, warmth, or redness. ? Red streaks leading from the area. ? Pus draining from the area. ? A fever.  
  · You have new or worse numbness, pain, or tingling in any part of your body. Watch closely for changes in your health, and be sure to contact your doctor if: 
  · You have a new problem with your feet, such as: ? A new sore or ulcer. ? A break in the skin that is not healing after several days. ? Bleeding corns or calluses. ? An ingrown toenail.  
  · You do not get better as expected. Where can you learn more? Go to http://www.gray.com/ Enter V140 in the search box to learn more about \"Diabetic Neuropathy: Care Instructions. \" Current as of: December 20, 2019               Content Version: 12.6 © 1172-8248 [employer]. Care instructions adapted under license by [employer] (which disclaims liability or warranty for this information). If you have questions about a medical condition or this instruction, always ask your healthcare professional. [place] [address] any warranty or liability for your use of this information. Neuropathic Pain: Care Instructions Your Care Instructions Neuropathic pain is caused by pressure on or damage to your nerves. It's often simply called nerve pain. Some people feel this type of pain all the time. For others, it comes and goes. Diabetes, shingles, or an injury can cause nerve pain. Many people say the pain feels sharp, burning, or stabbing. But some people feel it as a dull ache. In some cases, it makes your skin very sensitive. So touch, pressure, and other sensations that did not hurt before may now cause pain. It's important to know that this kind of pain is real and can affect your quality of life. It's also important to know that treatment can help. Treatment includes pain medicines, exercise, and physical therapy. Medicines can help reduce the number of pain signals that travel over the nerves. This can make the painful areas less sensitive. It can also help you sleep better and improve your mood. But medicines are only one part of successful treatment. Most people do best with more than one kind of treatment. Your doctor may recommend that you try cognitive-behavioral therapy and stress management.  Or, if needed, you may decide to try to quit smoking, lower your blood pressure, or better control blood sugar. These kinds of healthy changes can also make a difference. If you feel that your treatment is not working, talk to your doctor. And be sure to tell your doctor if you think you might be depressed or anxious. These are common problems that can also be treated. Follow-up care is a key part of your treatment and safety. Be sure to make and go to all appointments, and call your doctor if you are having problems. It's also a good idea to know your test results and keep a list of the medicines you take. How can you care for yourself at home? · Be safe with medicines. Read and follow all instructions on the label. ? If the doctor gave you a prescription medicine for pain, take it as prescribed. ? If you are not taking a prescription pain medicine, ask your doctor if you can take an over-the-counter medicine. · Save hard tasks for days when you have less pain. Follow a hard task with an easy task. And remember to take breaks. · Relax, and reduce stress. You may want to try deep breathing or meditation. These can help. · Keep moving. Gentle, daily exercise can help reduce pain. Your doctor or physical therapist can tell you what type of exercise is best for you. This may include walking, swimming, and stationary biking. It may also include stretches and range-of-motion exercises. · Try heat, cold packs, and massage. · Get enough sleep. Constant pain can make you more tired. If the pain makes it hard to sleep, talk with your doctor. · Think positively. Your thoughts can affect your pain. Do fun things to distract yourself from the pain. See a movie, read a book, listen to music, or spend time with a friend. · Keep a pain diary. Try to write down how strong your pain is and what it feels like. Also try to notice and write down how your moods, thoughts, sleep, activities, and medicine affect your pain.  These notes can help you and your doctor find the best ways to treat your pain. Reducing constipation caused by pain medicine Pain medicines often cause constipation. To reduce constipation: 
· Include fruits, vegetables, beans, and whole grains in your diet each day. These foods are high in fiber. · Drink plenty of fluids, enough so that your urine is light yellow or clear like water. If you have kidney, heart, or liver disease and have to limit fluids, talk with your doctor before you increase the amount of fluids you drink. · Get some exercise every day. Build up slowly to 30 to 60 minutes a day on 5 or more days of the week. · Take a fiber supplement, such as Citrucel or Metamucil, every day if needed. Read and follow all instructions on the label. · Schedule time each day for a bowel movement. Having a daily routine may help. Take your time and do not strain when having a bowel movement. · Ask your doctor about a laxative. The goal is to have one easy bowel movement every 1 to 2 days. Do not let constipation go untreated for more than 3 days. When should you call for help? Call your doctor now or seek immediate medical care if: 
  · You feel sad, anxious, or hopeless for more than a few days. This could mean you are depressed. Depression is common in people who have a lot of pain. But it can be treated.  
  · You have trouble with bowel movements, such as: 
? No bowel movement in 3 days. ? Blood in the anal area, in your stool, or on the toilet paper. ? Diarrhea for more than 24 hours. Watch closely for changes in your health, and be sure to contact your doctor if: 
  · Your pain is getting worse.  
  · You can't sleep because of pain.  
  · You are very worried or anxious about your pain.  
  · You have trouble taking your pain medicine.  
  · You have any concerns about your pain medicine or its side effects.  
  · You have vomiting or cramps for more than 2 hours. Where can you learn more? Go to http://www.gray.com/ Enter E775 in the search box to learn more about \"Neuropathic Pain: Care Instructions. \" Current as of: November 20, 2019               Content Version: 12.6 © 6870-3113 [employer]. Care instructions adapted under license by [employer] (which disclaims liability or warranty for this information). If you have questions about a medical condition or this instruction, always ask your healthcare professional. [facility] [address] any warranty or liability for your use of this information. Cataract Surgery: Before Your Surgery What is cataract surgery? Cataracts are cloudy areas in the lens of your eye. Your lens is behind the colored part of your eye (iris). Its job is to focus light onto the back of your eye. In some people, cataracts prevent light from reaching the back of the eye. This can cause vision problems. Cataract surgery helps you see better. It replaces your natural lens, which has become cloudy, with a clear artificial one. There are two types of cataract surgery. Phacoemulsification (say \"jihb-cs-db-[id number]-[id number]-rew-MORIAH-shun\") is the most common type. The doctor makes a small cut in your eye. This cut is called an incision. The doctor uses a special ultrasound tool to break your cloudy lens apart. Sometimes a laser is used too. Then he or she removes the small pieces of the lens through the incision. In most cases, the doctor then inserts an artificial lens through the incision. Most people do not need stitches, because the incision is so small. If the doctor is not able to put in an artificial lens, you can wear a contact lens or thick glasses in place of your natural lens. Extracapsular extraction is a less common type of cataract surgery. The doctor makes a larger incision to remove the whole lens at once. After the doctor removes the lens, he or she stitches up the incision.  Recovery from this type of surgery takes longer. Before either surgery, the doctor puts numbing drops in your eye. Some doctors use a shot instead. You may also get medicine to make you feel relaxed. You probably will not feel much pain. The surgery takes about 20 to 40 minutes. After surgery, you may have a bandage or shield on your eye. You will probably go home from surgery after 1 hour in the recovery room. Most people see better in 1 to 3 days. You may be able to go back to work or your normal routine in a few days. It could take 3 to 10 weeks for your eye to completely heal. After your eye heals, you may still need to wear glasses, especially for reading. Follow-up care is a key part of your treatment and safety. Be sure to make and go to all appointments, and call your doctor if you are having problems. It's also a good idea to know your test results and keep a list of the medicines you take. How do you prepare for surgery? Surgery can be stressful. This information will help you understand what you can expect. And it will help you safely prepare for surgery. Preparing for surgery 
  · Be sure you have someone to take you home. Anesthesia and pain medicine will make it unsafe for you to drive or get home on your own.  
  · Understand exactly what surgery is planned, along with the risks, benefits, and other options.  
  · If you take aspirin or some other blood thinner, ask your doctor if you should stop taking it before your surgery. Make sure that you understand exactly what your doctor wants you to do. These medicines increase the risk of bleeding.  
  · Tell your doctor ALL the medicines, vitamins, supplements, and herbal remedies you take. Some may increase the risk of problems during your surgery.  Your doctor will tell you if you should stop taking any of them before the surgery and how soon to do it.  
  · Make sure your doctor and the hospital have a copy of your advance directive. If you don't have one, you may want to prepare one. It lets others know your health care wishes. It's a good thing to have before any type of surgery or procedure. What happens on the day of surgery? · Follow the instructions exactly about when to stop eating and drinking. If you don't, your surgery may be canceled. If your doctor told you to take your medicines on the day of surgery, take them with only a sip of water.  
  · Take a bath or shower before you come in for your surgery. Do not apply lotions, perfumes, deodorants, or nail polish.  
  · Take off all jewelry and piercings. And take out contact lenses, if you wear them. At the hospital or surgery center · Bring a picture ID.  
  · The area for surgery is often marked to make sure there are no errors.  
  · You will be kept comfortable and safe by your anesthesia provider. The anesthesia may make you sleep. Or it may just numb the area being worked on.  
  · The surgery will take about 20 to 40 minutes. When should you call your doctor? · You have questions or concerns.  
  · You don't understand how to prepare for your surgery.  
  · You become ill before the surgery (such as fever, flu, or a cold).  
  · You need to reschedule or have changed your mind about having the surgery. Where can you learn more? Go to http://lizbeth-efraín.info/ Enter K474 in the search box to learn more about \"Cataract Surgery: Before Your Surgery. \" Current as of: December 18, 2019               Content Version: 12.6 © 8474-8663 [employer]. Care instructions adapted under license by [employer] (which disclaims liability or warranty for this information). If you have questions about a medical condition or this instruction, always ask your healthcare professional. [place] [address] any warranty or liability for your use of this information. Cataract Surgery: What to Expect at [facility] Your Recovery You had cataract surgery. It replaced your cloudy natural lens with a clear artificial one. After surgery, your eye will not hurt. But it may feel scratchy, sticky, or uncomfortable. It may also water more than usual. 
Most people see better 1 to 3 days after surgery. But it could take 3 to 10 weeks to get the full benefits of surgery and to see as clearly as possible. Your doctor may send you home with a bandage, patch, or clear shield on your eye. This will keep you from rubbing your eye. Your doctor will also give you eyedrops to help your eye heal. Use them exactly as directed. You can read or watch TV right away, but things may look blurry. Most people are able to return to work or their normal routine in 1 to 3 days. After your eye heals, you may still need to wear glasses, especially for reading. This care sheet gives you a general idea about how long it will take for you to recover. But each person recovers at a different pace. Follow the steps below to get better as quickly as possible. How can you care for yourself at home? Activity 
  · Rest when you feel tired. Getting enough sleep will help you recover.  
  · You may have trouble judging distances for a few days. Move slowly, and be careful going up and down stairs and pouring hot liquids. Ask for help if you need it.  
  · Ask your doctor when it is okay to drive.  
  · Wear your eye bandage, patch, or shield for as long as your doctor recommends. You may only need to wear it when you sleep.  
  · You can shower or wash your hair the day after surgery. Keep water, soap, shampoo, hair spray, and shaving lotion out of your eye, especially for the first week.  
  · Do not rub or put pressure on your eye for at least 1 week.  
  · Do not wear eye makeup for 1 to 2 weeks. You may also want to avoid face cream or lotion.  
  · Do not get your hair colored or permed for 10 days after surgery.   · Do not bend over or do any strenuous activities, such as biking, jogging, weight lifting, or aerobic exercise, for 2 weeks or until your doctor says it is okay.  
  · Avoid swimming, hot tubs, gardening, and dusting for 1 to 2 weeks.  
  · Wear sunglasses on bright days for at least 1 year after surgery. Medicines 
  · Your doctor will tell you if and when you can restart your medicines. He or she will also give you instructions about taking any new medicines.  
  · If you take aspirin or some other blood thinner, ask your doctor if and when to start taking it again. Make sure that you understand exactly what your doctor wants you to do.  
  · Follow your doctor's instructions for when to use your eyedrops. Always wash your hands before you put your drops in. To put in eyedrops: ? Tilt your head back, and pull your lower eyelid down with one finger. ? Drop or squirt the medicine inside the lower lid. ? Close your eye for 30 to 60 seconds to let the drops or ointment move around. ? Do not touch the ointment or dropper tip to your eyelashes or any other surface.  
  · Follow your doctor's instructions for taking pain medicines. Follow-up care is a key part of your treatment and safety. Be sure to make and go to all appointments, and call your doctor if you are having problems. It's also a good idea to know your test results and keep a list of the medicines you take. When should you call for help? Call 911 anytime you think you may need emergency care. For example, call if: 
  · You passed out (lost consciousness).  
  · You have a sudden loss of vision.  
  · You have sudden chest pain, are short of breath, or cough up blood. Call your doctor now or seek immediate medical care if: 
  · You have signs of an eye infection, such as: 
? Pus or thick discharge coming from the eye. 
? Redness or swelling around the eye. 
? A fever.  
  · You have new or worse eye pain.  
  · You have vision changes.   · You have symptoms of a blood clot in your leg (called a deep vein thrombosis), such as: 
? Pain in the calf, back of the knee, thigh, or groin. ? Redness and swelling in your leg or groin. Watch closely for changes in your health, and be sure to contact your doctor if: 
  · You do not get better as expected. Where can you learn more? Go to http://lizbeth-efraín.info/ Enter R255 in the search box to learn more about \"Cataract Surgery: What to Expect at Home. \" Current as of: December 18, 2019               Content Version: 12.6 © 5371-2134 [email or web address]. Care instructions adapted under license by [employer] (which disclaims liability or warranty for this information). If you have questions about a medical condition or this instruction, always ask your healthcare professional. [address] any warranty or liability for your use of this information. Cataracts: Care Instructions Your Care Instructions A cataract is a clouding of the lens of the eye. The lens focuses light on the retina at the back of the eye. Cataracts block some of the light and make it harder for you to see clearly. Cataracts often develop when you get older. Most cataracts grow slowly. At first, you may just need stronger glasses to help you see better. Later, if the cataracts grow and begin to seriously impair your vision, you can have surgery to remove them. Follow-up care is a key part of your treatment and safety. Be sure to make and go to all appointments, and call your doctor if you are having problems. It's also a good idea to know your test results and keep a list of the medicines you take. How can you care for yourself at home? · Move room lights and use window shades to avoid glare. · Use more lighting or higher-watt bulbs. · Use a magnifying glass for reading. Look for large-print books and other reading material to make reading more enjoyable. · Have your eyes checked regularly, and update your glasses when needed. · Wear sunglasses to block out harmful sunlight. Buy sunglasses that screen out ultraviolet A and B (UVA and UVB) rays. · Do not smoke. Smoking can make cataracts worse. If you need help quitting, talk to your doctor about stop-smoking programs and medicines. These can increase your chances of quitting for good. When should you call for help? Watch closely for changes in your health, and be sure to contact your doctor if: 
  · Your vision is getting worse.  
  · You have increasing trouble doing everyday tasks, like driving or reading the newspaper, because of your eyesight. Where can you learn more? Go to http://www.gray.com/ Enter P916 in the search box to learn more about \"Cataracts: Care Instructions. \" Current as of: December 18, 2019               Content Version: 12.6 © 8855-4860 [employer]. Care instructions adapted under license by [employer] (which disclaims liability or warranty for this information). If you have questions about a medical condition or this instruction, always ask your healthcare professional. [address] any warranty or liability for your use of this information. Advised to get labs drawn and return next week to office.

## 2020-09-10 ENCOUNTER — TELEPHONE (OUTPATIENT)
Dept: INTERNAL MEDICINE CLINIC | Age: 55
End: 2020-09-10

## 2020-09-10 LAB
ALBUMIN SERPL-MCNC: 4 G/DL (ref 3.8–4.9)
ALBUMIN/GLOB SERPL: 1.3 {RATIO} (ref 1.2–2.2)
ALP SERPL-CCNC: 139 IU/L (ref 39–117)
ALT SERPL-CCNC: 10 IU/L (ref 0–32)
AST SERPL-CCNC: 11 IU/L (ref 0–40)
BASOPHILS # BLD AUTO: 0.1 X10E3/UL (ref 0–0.2)
BASOPHILS NFR BLD AUTO: 1 %
BILIRUB SERPL-MCNC: 0.3 MG/DL (ref 0–1.2)
BUN SERPL-MCNC: 33 MG/DL (ref 6–24)
BUN/CREAT SERPL: 10 (ref 9–23)
CALCIUM SERPL-MCNC: 9.2 MG/DL (ref 8.7–10.2)
CHLORIDE SERPL-SCNC: 105 MMOL/L (ref 96–106)
CO2 SERPL-SCNC: 25 MMOL/L (ref 20–29)
CREAT SERPL-MCNC: 3.37 MG/DL (ref 0.57–1)
EOSINOPHIL # BLD AUTO: 0.2 X10E3/UL (ref 0–0.4)
EOSINOPHIL NFR BLD AUTO: 4 %
ERYTHROCYTE [DISTWIDTH] IN BLOOD BY AUTOMATED COUNT: 19 % (ref 11.7–15.4)
EST. AVERAGE GLUCOSE BLD GHB EST-MCNC: 189 MG/DL
GLOBULIN SER CALC-MCNC: 3.1 G/DL (ref 1.5–4.5)
GLUCOSE SERPL-MCNC: 201 MG/DL (ref 65–99)
HBA1C MFR BLD: 8.2 % (ref 4.8–5.6)
HCT VFR BLD AUTO: 39.3 % (ref 34–46.6)
HGB BLD-MCNC: 12.6 G/DL (ref 11.1–15.9)
IMM GRANULOCYTES # BLD AUTO: 0 X10E3/UL (ref 0–0.1)
IMM GRANULOCYTES NFR BLD AUTO: 0 %
INTERPRETATION: NORMAL
LYMPHOCYTES # BLD AUTO: 2.2 X10E3/UL (ref 0.7–3.1)
LYMPHOCYTES NFR BLD AUTO: 38 %
Lab: NORMAL
MAGNESIUM SERPL-MCNC: 3.6 MG/DL (ref 1.6–2.3)
MCH RBC QN AUTO: 25.5 PG (ref 26.6–33)
MCHC RBC AUTO-ENTMCNC: 32.1 G/DL (ref 31.5–35.7)
MCV RBC AUTO: 79 FL (ref 79–97)
MONOCYTES # BLD AUTO: 0.4 X10E3/UL (ref 0.1–0.9)
MONOCYTES NFR BLD AUTO: 8 %
NEUTROPHILS # BLD AUTO: 2.8 X10E3/UL (ref 1.4–7)
NEUTROPHILS NFR BLD AUTO: 49 %
PLATELET # BLD AUTO: 139 X10E3/UL (ref 150–450)
POTASSIUM SERPL-SCNC: 4.3 MMOL/L (ref 3.5–5.2)
PROT SERPL-MCNC: 7.1 G/DL (ref 6–8.5)
PTH-INTACT SERPL-MCNC: 57 PG/ML (ref 15–65)
RBC # BLD AUTO: 4.95 X10E6/UL (ref 3.77–5.28)
SODIUM SERPL-SCNC: 146 MMOL/L (ref 134–144)
WBC # BLD AUTO: 5.6 X10E3/UL (ref 3.4–10.8)

## 2020-09-10 NOTE — TELEPHONE ENCOUNTER
Notified pt of recent lab results. Labs indicate CKD is worsening. Mg 3.6- advised to discontinue magnesium. GFR 17- advised to discontinue Lisinopril. Advised to get labs repeated in am at [facility]. Notified pt that she is in stage 4 kidney disease and we don't want her to go into kidney failure. Asked pt to let me know who her nephrologist is tomorrow. Hopefully she can see nephr next week and still have cataract surgery on 9/21/2020.

## 2020-09-11 ENCOUNTER — TELEPHONE (OUTPATIENT)
Dept: INTERNAL MEDICINE CLINIC | Age: 55
End: 2020-09-11

## 2020-09-11 ENCOUNTER — DOCUMENTATION ONLY (OUTPATIENT)
Dept: INTERNAL MEDICINE CLINIC | Age: 55
End: 2020-09-11

## 2020-09-11 NOTE — PROGRESS NOTES
Per order of Elyse Elder NP - faxed copy of most recent labs and last telephone note regarding the results to Dr Charity Olivo at [phone] - confirmation of receipt received  Ileana Aguirre LPN  8/56/7948  8:34 PM

## 2020-09-12 ENCOUNTER — TELEPHONE (OUTPATIENT)
Dept: INTERNAL MEDICINE CLINIC | Age: 55
End: 2020-09-12

## 2020-09-12 LAB
BUN SERPL-MCNC: 34 MG/DL (ref 6–24)
BUN/CREAT SERPL: 14 (ref 9–23)
CALCIUM SERPL-MCNC: 9 MG/DL (ref 8.7–10.2)
CHLORIDE SERPL-SCNC: 109 MMOL/L (ref 96–106)
CHOLEST SERPL-MCNC: 184 MG/DL (ref 100–199)
CO2 SERPL-SCNC: 24 MMOL/L (ref 20–29)
CREAT SERPL-MCNC: 2.38 MG/DL (ref 0.57–1)
EST. AVERAGE GLUCOSE BLD GHB EST-MCNC: 194 MG/DL
GLUCOSE SERPL-MCNC: 148 MG/DL (ref 65–99)
HBA1C MFR BLD: 8.4 % (ref 4.8–5.6)
HDLC SERPL-MCNC: 48 MG/DL
INTERPRETATION, 910389: NORMAL
INTERPRETATION: NORMAL
LDLC SERPL CALC-MCNC: 114 MG/DL (ref 0–99)
Lab: NORMAL
PDF IMAGE, 910387: NORMAL
POTASSIUM SERPL-SCNC: 4.8 MMOL/L (ref 3.5–5.2)
SODIUM SERPL-SCNC: 146 MMOL/L (ref 134–144)
TRIGL SERPL-MCNC: 120 MG/DL (ref 0–149)
VLDLC SERPL CALC-MCNC: 22 MG/DL (ref 5–40)

## 2020-09-12 NOTE — PROGRESS NOTES
As  says, your kidneys are not working that well based upon the labs done. Stop lisinopril and magnesium and keep your appointment with the kidney doctor.

## 2020-09-14 ENCOUNTER — OFFICE VISIT (OUTPATIENT)
Dept: INTERNAL MEDICINE CLINIC | Age: 55
End: 2020-09-14
Payer: MEDICARE

## 2020-09-14 VITALS
OXYGEN SATURATION: 96 % | BODY MASS INDEX: 44.39 KG/M2 | RESPIRATION RATE: 20 BRPM | WEIGHT: 260 LBS | DIASTOLIC BLOOD PRESSURE: 85 MMHG | SYSTOLIC BLOOD PRESSURE: 143 MMHG | HEART RATE: 98 BPM | HEIGHT: 64 IN | TEMPERATURE: 97 F

## 2020-09-14 DIAGNOSIS — E66.01 OBESITY, MORBID (HCC): ICD-10-CM

## 2020-09-14 DIAGNOSIS — H26.9 CATARACT OF BOTH EYES, UNSPECIFIED CATARACT TYPE: ICD-10-CM

## 2020-09-14 DIAGNOSIS — N18.30 STAGE 3 CHRONIC KIDNEY DISEASE (HCC): Primary | ICD-10-CM

## 2020-09-14 DIAGNOSIS — Z71.2 ENCOUNTER TO DISCUSS TEST RESULTS: ICD-10-CM

## 2020-09-14 PROCEDURE — G8417 CALC BMI ABV UP PARAM F/U: HCPCS | Performed by: NURSE PRACTITIONER

## 2020-09-14 PROCEDURE — 3017F COLORECTAL CA SCREEN DOC REV: CPT | Performed by: NURSE PRACTITIONER

## 2020-09-14 PROCEDURE — G8432 DEP SCR NOT DOC, RNG: HCPCS | Performed by: NURSE PRACTITIONER

## 2020-09-14 PROCEDURE — G8753 SYS BP > OR = 140: HCPCS | Performed by: NURSE PRACTITIONER

## 2020-09-14 PROCEDURE — G8754 DIAS BP LESS 90: HCPCS | Performed by: NURSE PRACTITIONER

## 2020-09-14 PROCEDURE — 99214 OFFICE O/P EST MOD 30 MIN: CPT | Performed by: NURSE PRACTITIONER

## 2020-09-14 PROCEDURE — G8427 DOCREV CUR MEDS BY ELIG CLIN: HCPCS | Performed by: NURSE PRACTITIONER

## 2020-09-14 NOTE — PROGRESS NOTES
Chief Complaint   Patient presents with    Abnormal Lab Results     follow up after labs - check on kidney function     Fall Risk Assessment, last 12 mths 9/8/2020   Able to walk? Yes   Fall in past 12 months? No   Fall with injury? -   Number of falls in past 12 months -   Fall Risk Score -       3 most recent PHQ Screens 9/14/2020   Little interest or pleasure in doing things Not at all   Feeling down, depressed, irritable, or hopeless Not at all   Total Score PHQ 2 0       Abuse Screening Questionnaire 9/8/2020   Do you ever feel afraid of your partner? N   Are you in a relationship with someone who physically or mentally threatens you? N   Is it safe for you to go home?  Y       ADL Assessment 9/8/2020   Feeding yourself No Help Needed   Getting from bed to chair No Help Needed   Getting dressed No Help Needed   Bathing or showering No Help Needed   Walk across the room (includes cane/walker) No Help Needed   Using the telphone No Help Needed   Taking your medications No Help Needed   Preparing meals No Help Needed   Managing money (expenses/bills) No Help Needed   Moderately strenuous housework (laundry) No Help Needed   Shopping for personal items (toiletries/medicines) No Help Needed   Shopping for groceries No Help Needed   Driving -   Climbing a flight of stairs No Help Needed   Getting to places beyond walking distances No Help Needed

## 2020-09-14 NOTE — PATIENT INSTRUCTIONS
High Blood Pressure: Care Instructions Overview It's normal for blood pressure to go up and down throughout the day. But if it stays up, you have high blood pressure. Another name for high blood pressure is hypertension. Despite what a lot of people think, high blood pressure usually doesn't cause headaches or make you feel dizzy or lightheaded. It usually has no symptoms. But it does increase your risk of stroke, heart attack, and other problems. You and your doctor will talk about your risks of these problems based on your blood pressure. Your doctor will give you a goal for your blood pressure. Your goal will be based on your health and your age. Lifestyle changes, such as eating healthy and being active, are always important to help lower blood pressure. You might also take medicine to reach your blood pressure goal. 
Follow-up care is a key part of your treatment and safety. Be sure to make and go to all appointments, and call your doctor if you are having problems. It's also a good idea to know your test results and keep a list of the medicines you take. How can you care for yourself at home? Medical treatment · If you stop taking your medicine, your blood pressure will go back up. You may take one or more types of medicine to lower your blood pressure. Be safe with medicines. Take your medicine exactly as prescribed. Call your doctor if you think you are having a problem with your medicine. · Talk to your doctor before you start taking aspirin every day. Aspirin can help certain people lower their risk of a heart attack or stroke. But taking aspirin isn't right for everyone, because it can cause serious bleeding. · See your doctor regularly. You may need to see the doctor more often at first or until your blood pressure comes down. · If you are taking blood pressure medicine, talk to your doctor before you take decongestants or anti-inflammatory medicine, such as ibuprofen. Some of these medicines can raise blood pressure. · Learn how to check your blood pressure at home. Lifestyle changes · Stay at a healthy weight. This is especially important if you put on weight around the waist. Losing even 10 pounds can help you lower your blood pressure. · If your doctor recommends it, get more exercise. Walking is a good choice. Bit by bit, increase the amount you walk every day. Try for at least 30 minutes on most days of the week. You also may want to swim, bike, or do other activities. · Avoid or limit alcohol. Talk to your doctor about whether you can drink any alcohol. · Try to limit how much sodium you eat to less than 2,300 milligrams (mg) a day. Your doctor may ask you to try to eat less than 1,500 mg a day. · Eat plenty of fruits (such as bananas and oranges), vegetables, legumes, whole grains, and low-fat dairy products. · Lower the amount of saturated fat in your diet. Saturated fat is found in animal products such as milk, cheese, and meat. Limiting these foods may help you lose weight and also lower your risk for heart disease. · Do not smoke. Smoking increases your risk for heart attack and stroke. If you need help quitting, talk to your doctor about stop-smoking programs and medicines. These can increase your chances of quitting for good. When should you call for help? Call  911 anytime you think you may need emergency care. This may mean having symptoms that suggest that your blood pressure is causing a serious heart or blood vessel problem. Your blood pressure may be over 180/120. For example, call 911 if: 
  · You have symptoms of a heart attack. These may include: 
? Chest pain or pressure, or a strange feeling in the chest. 
? Sweating. ? Shortness of breath. ? Nausea or vomiting. ? Pain, pressure, or a strange feeling in the back, neck, jaw, or upper belly or in one or both shoulders or arms. ? Lightheadedness or sudden weakness. ? A fast or irregular heartbeat.  
  · You have symptoms of a stroke. These may include: 
? Sudden numbness, tingling, weakness, or loss of movement in your face, arm, or leg, especially on only one side of your body. ? Sudden vision changes. ? Sudden trouble speaking. ? Sudden confusion or trouble understanding simple statements. ? Sudden problems with walking or balance. ? A sudden, severe headache that is different from past headaches.  
  · You have severe back or belly pain. Do not wait until your blood pressure comes down on its own. Get help right away. Call your doctor now or seek immediate care if: 
  · Your blood pressure is much higher than normal (such as 180/120 or higher), but you don't have symptoms.  
  · You think high blood pressure is causing symptoms, such as: 
? Severe headache. 
? Blurry vision. Watch closely for changes in your health, and be sure to contact your doctor if: 
  · Your blood pressure measures higher than your doctor recommends at least 2 times. That means the top number is higher or the bottom number is higher, or both.  
  · You think you may be having side effects from your blood pressure medicine. Where can you learn more? Go to http://lizbeth-efraín.info/ Enter R254 in the search box to learn more about \"High Blood Pressure: Care Instructions. \" Current as of: December 16, 2019               Content Version: 12.6 © 9210-1901 [employer]. Care instructions adapted under license by [employer] (which disclaims liability or warranty for this information). If you have questions about a medical condition or this instruction, always ask your healthcare professional. [place] [address] any warranty or liability for your use of this information. Type 2 Diabetes: Care Instructions Your Care Instructions Type 2 diabetes is a disease that develops when the body's tissues cannot use insulin properly. Over time, the pancreas cannot make enough insulin. Insulin is a hormone that helps the body's cells use sugar (glucose) for energy. It also helps the body store extra sugar in muscle, fat, and liver cells. Without insulin, the sugar cannot get into the cells to do its work. It stays in the blood instead. This can cause high blood sugar levels. A person has diabetes when the blood sugar stays too high too much of the time. Over time, diabetes can lead to diseases of the heart, blood vessels, nerves, kidneys, and eyes. You may be able to control your blood sugar by losing weight, eating a healthy diet, and getting daily exercise. You may also have to take insulin or other diabetes medicine. Follow-up care is a key part of your treatment and safety. Be sure to make and go to all appointments. Call your doctor if you are having problems. It's also a good idea to know your test results and keep a list of the medicines you take. How can you care for yourself at home? · Keep your blood sugar at a target level (which you set with your doctor). ? Eat a good diet that spreads carbohydrate throughout the day. Carbohydratethe body's main source of fuelaffects blood sugar more than any other nutrient. Carbohydrate is in fruits, vegetables, milk, and yogurt. It also is in breads, cereals, vegetables such as potatoes and corn, and sugary foods such as candy and cakes. ? Aim for 30 minutes of exercise on most, preferably all, days of the week. Walking is a good choice. You also may want to do other activities, such as running, swimming, cycling, or playing tennis or team sports. If your doctor says it's okay, do muscle-strengthening exercises at least 2 times a week. ? Take your medicines exactly as prescribed. Call your doctor if you think you are having a problem with your medicine. You will get more details on the specific medicines your doctor prescribes. · Check your blood sugar as often as your doctor recommends. It is important to keep track of any symptoms you have, such as low blood sugar. Also tell your doctor if you have any changes in your activities, diet, or insulin use. · Talk to your doctor before you start taking aspirin every day. Aspirin can help certain people lower their risk of a heart attack or stroke. But taking aspirin isn't right for everyone, because it can cause serious bleeding. · Do not smoke. If you need help quitting, talk to your doctor about stop-smoking programs and medicines. These can increase your chances of quitting for good. · Keep your cholesterol and blood pressure at normal levels. You may need to take one or more medicines to reach your goals. Take them exactly as directed. Do not stop or change a medicine without talking to your doctor first. 
When should you call for help? Call 911 anytime you think you may need emergency care. For example, call if: 
  · You passed out (lost consciousness), or you suddenly become very sleepy or confused. (You may have very low blood sugar.) Call your doctor now or seek immediate medical care if: 
  · Your blood sugar is 300 mg/dL or is higher than the level your doctor has set for you.  
  · You have symptoms of low blood sugar, such as: ? Sweating. ? Feeling nervous, shaky, and weak. ? Extreme hunger and slight nausea. ? Dizziness and headache. 
? Blurred vision. ? Confusion. Watch closely for changes in your health, and be sure to contact your doctor if: 
  · You often have problems controlling your blood sugar.  
  · You have symptoms of long-term diabetes problems, such as: ? New vision changes. ? New pain, numbness, or tingling in your hands or feet. ? Skin problems. Where can you learn more? Go to http://www.gray.com/ Enter C553 in the search box to learn more about \"Type 2 Diabetes: Care Instructions. \" 
 Current as of: December 20, 2019               Content Version: 12.6 © 3230-0208 [employer]. Care instructions adapted under license by [employer] (which disclaims liability or warranty for this information). If you have questions about a medical condition or this instruction, always ask your healthcare professional. [address] any warranty or liability for your use of this information. Cataract Surgery: Before Your Surgery What is cataract surgery? Cataracts are cloudy areas in the lens of your eye. Your lens is behind the colored part of your eye (iris). Its job is to focus light onto the back of your eye. In some people, cataracts prevent light from reaching the back of the eye. This can cause vision problems. Cataract surgery helps you see better. It replaces your natural lens, which has become cloudy, with a clear artificial one. There are two types of cataract surgery. Phacoemulsification (say \"shza-yq-kv-tww-vdl-hyu-MORIAH-shun\") is the most common type. The doctor makes a small cut in your eye. This cut is called an incision. The doctor uses a special ultrasound tool to break your cloudy lens apart. Sometimes a laser is used too. Then he or she removes the small pieces of the lens through the incision. In most cases, the doctor then inserts an artificial lens through the incision. Most people do not need stitches, because the incision is so small. If the doctor is not able to put in an artificial lens, you can wear a contact lens or thick glasses in place of your natural lens. Extracapsular extraction is a less common type of cataract surgery. The doctor makes a larger incision to remove the whole lens at once. After the doctor removes the lens, he or she stitches up the incision. Recovery from this type of surgery takes longer. Before either surgery, the doctor puts numbing drops in your eye.  Some doctors use a shot instead. You may also get medicine to make you feel relaxed. You probably will not feel much pain. The surgery takes about 20 to 40 minutes. After surgery, you may have a bandage or shield on your eye. You will probably go home from surgery after 1 hour in the recovery room. Most people see better in 1 to 3 days. You may be able to go back to work or your normal routine in a few days. It could take 3 to 10 weeks for your eye to completely heal. After your eye heals, you may still need to wear glasses, especially for reading. Follow-up care is a key part of your treatment and safety. Be sure to make and go to all appointments, and call your doctor if you are having problems. It's also a good idea to know your test results and keep a list of the medicines you take. How do you prepare for surgery? Surgery can be stressful. This information will help you understand what you can expect. And it will help you safely prepare for surgery. Preparing for surgery 
  · Be sure you have someone to take you home. Anesthesia and pain medicine will make it unsafe for you to drive or get home on your own.  
  · Understand exactly what surgery is planned, along with the risks, benefits, and other options.  
  · If you take aspirin or some other blood thinner, ask your doctor if you should stop taking it before your surgery. Make sure that you understand exactly what your doctor wants you to do. These medicines increase the risk of bleeding.  
  · Tell your doctor ALL the medicines, vitamins, supplements, and herbal remedies you take. Some may increase the risk of problems during your surgery. Your doctor will tell you if you should stop taking any of them before the surgery and how soon to do it.  
  · Make sure your doctor and the hospital have a copy of your advance directive. If you don't have one, you may want to prepare one. It lets others know your health care wishes.  It's a good thing to have before any type of surgery or procedure. What happens on the day of surgery? · Follow the instructions exactly about when to stop eating and drinking. If you don't, your surgery may be canceled. If your doctor told you to take your medicines on the day of surgery, take them with only a sip of water.  
  · Take a bath or shower before you come in for your surgery. Do not apply lotions, perfumes, deodorants, or nail polish.  
  · Take off all jewelry and piercings. And take out contact lenses, if you wear them. At the hospital or surgery center · Bring a picture ID.  
  · The area for surgery is often marked to make sure there are no errors.  
  · You will be kept comfortable and safe by your anesthesia provider. The anesthesia may make you sleep. Or it may just numb the area being worked on.  
  · The surgery will take about 20 to 40 minutes. When should you call your doctor? · You have questions or concerns.  
  · You don't understand how to prepare for your surgery.  
  · You become ill before the surgery (such as fever, flu, or a cold).  
  · You need to reschedule or have changed your mind about having the surgery. Where can you learn more? Go to http://lizbethWoven Incefraín.info/ Enter K474 in the search box to learn more about \"Cataract Surgery: Before Your Surgery. \" Current as of: December 18, 2019               Content Version: 12.6 © 0476-1791 [employer]. Care instructions adapted under license by [employer] (which disclaims liability or warranty for this information). If you have questions about a medical condition or this instruction, always ask your healthcare professional. [place] [address] any warranty or liability for your use of this information. Cataract Surgery: What to Expect at [facility] Your Recovery You had cataract surgery. It replaced your cloudy natural lens with a clear artificial one. After surgery, your eye will not hurt. But it may feel scratchy, sticky, or uncomfortable. It may also water more than usual. 
Most people see better 1 to 3 days after surgery. But it could take 3 to 10 weeks to get the full benefits of surgery and to see as clearly as possible. Your doctor may send you home with a bandage, patch, or clear shield on your eye. This will keep you from rubbing your eye. Your doctor will also give you eyedrops to help your eye heal. Use them exactly as directed. You can read or watch TV right away, but things may look blurry. Most people are able to return to work or their normal routine in 1 to 3 days. After your eye heals, you may still need to wear glasses, especially for reading. This care sheet gives you a general idea about how long it will take for you to recover. But each person recovers at a different pace. Follow the steps below to get better as quickly as possible. How can you care for yourself at home? Activity 
  · Rest when you feel tired. Getting enough sleep will help you recover.  
  · You may have trouble judging distances for a few days. Move slowly, and be careful going up and down stairs and pouring hot liquids. Ask for help if you need it.  
  · Ask your doctor when it is okay to drive.  
  · Wear your eye bandage, patch, or shield for as long as your doctor recommends. You may only need to wear it when you sleep.  
  · You can shower or wash your hair the day after surgery. Keep water, soap, shampoo, hair spray, and shaving lotion out of your eye, especially for the first week.  
  · Do not rub or put pressure on your eye for at least 1 week.  
  · Do not wear eye makeup for 1 to 2 weeks.  You may also want to avoid face cream or lotion.  
  · Do not get your hair colored or permed for 10 days after surgery.  
  · Do not bend over or do any strenuous activities, such as biking, jogging, weight lifting, or aerobic exercise, for 2 weeks or until your doctor says it is okay.  
  · Avoid swimming, hot tubs, gardening, and dusting for 1 to 2 weeks.  
  · Wear sunglasses on bright days for at least 1 year after surgery. Medicines 
  · Your doctor will tell you if and when you can restart your medicines. He or she will also give you instructions about taking any new medicines.  
  · If you take aspirin or some other blood thinner, ask your doctor if and when to start taking it again. Make sure that you understand exactly what your doctor wants you to do.  
  · Follow your doctor's instructions for when to use your eyedrops. Always wash your hands before you put your drops in. To put in eyedrops: ? Tilt your head back, and pull your lower eyelid down with one finger. ? Drop or squirt the medicine inside the lower lid. ? Close your eye for 30 to 60 seconds to let the drops or ointment move around. ? Do not touch the ointment or dropper tip to your eyelashes or any other surface.  
  · Follow your doctor's instructions for taking pain medicines. Follow-up care is a key part of your treatment and safety. Be sure to make and go to all appointments, and call your doctor if you are having problems. It's also a good idea to know your test results and keep a list of the medicines you take. When should you call for help? Call 911 anytime you think you may need emergency care. For example, call if: 
  · You passed out (lost consciousness).  
  · You have a sudden loss of vision.  
  · You have sudden chest pain, are short of breath, or cough up blood. Call your doctor now or seek immediate medical care if: 
  · You have signs of an eye infection, such as: 
? Pus or thick discharge coming from the eye. 
? Redness or swelling around the eye. 
? A fever.  
  · You have new or worse eye pain.  
  · You have vision changes.  
  · You have symptoms of a blood clot in your leg (called a deep vein thrombosis), such as: ? Pain in the calf, back of the knee, thigh, or groin. ? Redness and swelling in your leg or groin. Watch closely for changes in your health, and be sure to contact your doctor if: 
  · You do not get better as expected. Where can you learn more? Go to http://lizbeth-efraín.info/ Enter R255 in the search box to learn more about \"Cataract Surgery: What to Expect at Home. \" Current as of: December 18, 2019               Content Version: 12.6 © 3320-5395 [employer]. Care instructions adapted under license by [employer] (which disclaims liability or warranty for this information). If you have questions about a medical condition or this instruction, always ask your healthcare professional. [address] any warranty or liability for your use of this information. Medicines to Avoid With Kidney Disease: Care Instructions Your Care Instructions Kidney disease means that your kidneys are not able to get rid of waste from the blood. So they can't keep your body's fluids and chemicals in balance. Usually, the kidneys get rid of waste from the blood through the urine. And they balance the fluids in the body. When your kidneys don't work as they should, you have to be careful about some medicines. They may harm your kidneys. Your doctor may tell you not to take them. Or he or she may change the dose. Medicines for pain and swelling, such as ibuprofen (Advil or Motrin) or naproxen (Aleve), can cause harm. So can some antibiotics and antacids. And you need to be careful about some drugs that treat cancer, lower blood pressure, or get rid of water from the body. Some herbal products could cause harm too. Follow-up care is a key part of your treatment and safety. Be sure to make and go to all appointments, and call your doctor if you are having problems.  It's also a good idea to know your test results and keep a list of the medicines you take. How can you care for yourself at home? · Tell your doctor all the prescription, herbal, or over-the-counter medicines you take. Do not take any new ones unless you talk to your doctor first. 
· Do not take anti-inflammatory medicines. These include ibuprofen (Advil, Motrin) and naproxen (Aleve). You can use acetaminophen (Tylenol) for pain. · Do not take two or more pain medicines at the same time unless the doctor told you to. Many pain medicines have acetaminophen, which is Tylenol. Too much acetaminophen (Tylenol) can be harmful. · Tell all doctors and others who work with your health care that you have kidney disease. · Wear medical alert jewelry that lists your health problem. You can buy this at most drugstores. Where can you learn more? Go to http://www.gray.com/ Enter Y265 in the search box to learn more about \"Medicines to Avoid With Kidney Disease: Care Instructions. \" Current as of: April 15, 2020               Content Version: 12.6 © 8941-7323 [employer]. Care instructions adapted under license by [employer] (which disclaims liability or warranty for this information). If you have questions about a medical condition or this instruction, always ask your healthcare professional. [address] any warranty or liability for your use of this information. Kidney Disease and High Blood Pressure: Care Instructions Your Care Instructions Long-term (chronic) kidney disease happens when the kidneys cannot remove waste and keep your body's fluids and chemicals in balance. Usually, the kidneys remove waste from the blood through the urine. When the kidneys are not working well, waste can build up so much that it poisons the body. Kidney disease can make you very tired. It also can cause swelling, or edema, in your legs or other areas of your body. High blood pressure is one of the major causes of chronic kidney disease. And kidney disease can also cause high blood pressure. No matter which came first, having high blood pressure damages the tiny blood vessels in the kidneys. If you have high blood pressure, it is important to lower it. There are many things you can do to lower your blood pressure, which may help slow or stop the damage to your kidneys. Follow-up care is a key part of your treatment and safety. Be sure to make and go to all appointments, and call your doctor if you are having problems. It's also a good idea to know your test results and keep a list of the medicines you take. How can you care for yourself at home? · Be safe with medicines. Take your medicines exactly as prescribed. Call your doctor if you have any problems with your medicine. You will probably need more than one medicine to lower your blood pressure. You will get more details on the specific medicines your doctor prescribes. · Work with your doctor and a dietitian to plan meals that have the right amount of nutrients for you. You will probably have to limit salt, fluids, and protein. · Stay at a healthy weight. This is very important if you put on weight around the waist. Losing even 10 pounds can help you lower your blood pressure. · Manage other health problems such as diabetes and high cholesterol. You can help lower your risk for heart disease and blood vessel problems with a healthy lifestyle along with medicines. · Do not take ibuprofen (Advil, Motrin) or naproxen (Aleve), or similar medicines, unless your doctor tells you to. They may make chronic kidney disease worse. It is okay to take acetaminophen (Tylenol). · If your doctor recommends it, get more exercise. Walking is a good choice. Bit by bit, increase the amount you walk every day. Try for at least 30 minutes on most days of the week. You also may want to swim, bike, or do other activities. · Limit or avoid alcohol. Talk to your doctor about whether you can drink any alcohol. · Do not smoke or allow others to smoke around you. If you need help quitting, talk to your doctor about stop-smoking programs and medicines. These can increase your chances of quitting for good. When should you call for help? Call 911 anytime you think you may need emergency care. For example, call if: 
  · You passed out (lost consciousness). Call your doctor now or seek immediate medical care if: 
  · You have new or worse nausea and vomiting.  
  · You have much less urine than normal, or you have no urine.  
  · You are feeling confused or cannot think clearly.  
  · You have new or more blood in your urine.  
  · You have new swelling.  
  · You are dizzy or lightheaded, or you feel like you may faint. Watch closely for changes in your health, and be sure to contact your doctor if: 
  · You do not get better as expected. Where can you learn more? Go to http://lizbeth-efraín.info/ Enter Z967 in the search box to learn more about \"Kidney Disease and High Blood Pressure: Care Instructions. \" Current as of: April 15, 2020               Content Version: 12.6 © 9302-0224 [employer]. Care instructions adapted under license by [employer] (which disclaims liability or warranty for this information). If you have questions about a medical condition or this instruction, always ask your healthcare professional. [address] any warranty or liability for your use of this information.

## 2020-09-14 NOTE — PROGRESS NOTES
Subjective: (As above and below)     Chief Complaint   Patient presents with    Abnormal Lab Results     follow up after labs - check on kidney function     She is a 49y.o. year old female who presents for evaluation. HPI  Discussed repeat labwork. Pt has upcoming cataract surgery and found pt went from CKD stage 3 to CKD stage 4 on  PREOP labwork. Informed pt that GFR went from 37 to 17 to 26- kidney function improved. Pt stated she tried to get a hold of nephrology  AT [facility] and was unable to. Advised pt that she should not have cataract surgery on Sept 21, 2020 until cleared by nephrology. Reviewed PmHx, RxHx, FmHx, SocHx, AllgHx and updated in chart.   Patient Active Problem List   Diagnosis Code    Hypertension I10    Chronic pain G89.29    Narcotic dependence, episodic use ([facility]) F11.20    Idiopathic small and large fiber sensory neuropathy G60.8    Spinal stenosis of lumbosacral region M48.07    Lumbar back pain with radiculopathy affecting right lower extremity M54.16    Obesity, morbid ([facility]) E66.01    Type 2 diabetes mellitus with nephropathy ([facility]) O86.23    Acute systolic congestive heart failure ([facility]) I50.21    Hyperglycemia due to type 2 diabetes mellitus ([facility] [address] E11.65    Bilateral cataracts H26.9     Review of Systems:  Gen: no fatigue, fever, chills  Eyes: no excessive tearing, itching, or discharge  Nose: no rhinorrhea, no sinus pain  Mouth: no oral lesions, no sore throat  Resp: no shortness of breath, no wheezing, no cough  CV: no chest pain, no paroxysmal nocturnal dyspnea  Abd: no nausea, no heartburn, no diarrhea, no constipation, no abdominal pain  Neuro: no headaches, no syncope or presyncopal episodes, neuropathy in feet  Endo: no polyuria, no polydipsia  Heme: no lymphadenopathy, no easy bruising or bleeding    Allergies   Allergen Reactions    Lortab [Hydrocodone-Acetaminophen] Nausea and Vomiting    Cymbalta [Duloxetine] Diarrhea    Lasix [Furosemide] Swelling    Morphine Other (comments)     Current Outpatient Medications   Medication Sig    insulin aspart U-100 (NovoLOG Flexpen U-100 Insulin) 100 unit/mL (3 mL) inpn 8 Units by SubCUTAneous route two (2) times a day. BEFORE BREAKFAST AND DINNER    pregabalin (LYRICA) 25 mg capsule Take 2 Caps by mouth two (2) times a day. Max Daily Amount: 100 mg.    metOLazone (ZAROXOLYN) 2.5 mg tablet Take 1 Tab by mouth daily. As needed for swelling    Insulin Needles, Disposable, 32 gauge x 5/32\" ndle USE ONCE DAILY    glucose blood VI test strips (True Metrix Glucose Test Strip) strip CHECK BLOOD SUGAR UP TO THREE TIMES DAILY    carvediloL (COREG) 12.5 mg tablet Take 1 tablet by mouth twice daily    liraglutide (VICTOZA) 0.6 mg/0.1 mL (18 mg/3 mL) pnij 1.2 mg by SubCUTAneous route daily.  hydrALAZINE (APRESOLINE) 25 mg tablet Take 1 Tab by mouth two (2) times a day.  empagliflozin (JARDIANCE) 25 mg tablet Take 1 Tab by mouth daily.  nortriptyline (PAMELOR) 25 mg capsule Take 1 Cap by mouth two (2) times a day.  Blood-Glucose Meter monitoring kit Want free style leda kit    isosorbide dinitrate (ISORDIL) 20 mg tablet Take 1 Tab by mouth two (2) times a day.  cyanocobalamin (VITAMIN B-12) 500 mcg tablet Take 1 Tab by mouth daily.  lidocaine (LIDODERM) 5 % Apply patch to the affected area for 12 hours a day and remove for 12 hours a day.  Blood-Glucose Meter (TRUE METRIX GLUCOSE METER) misc Check blood sugar up to three times daily    Cholecalciferol, Vitamin D3, 50,000 unit cap Take  by mouth.  aspirin delayed-release 81 mg tablet Take  by mouth daily. No current facility-administered medications for this visit.       Objective:     Visit Vitals  BP (!) 143/85 (BP 1 Location: Left arm, BP Patient Position: At rest)   Pulse 98   Temp 97 °F (36.1 °C) (Temporal)   Resp 20   Ht 5' 4\" (1.626 m)   Wt 260 lb (117.9 kg)   LMP 12/03/2017   SpO2 96%   BMI 44.63 kg/m²      Physical Examination:   Gen: alert, oriented, no acute distress  Neck: supple, no lymphadenopathy  Resp: no increased work of breathing, lungs clear to ausculation bilaterally  CV: S1, S2 normal, no murmurs, rubs, or gallops. Abd: soft, not tender, not distended. No hepatosplenomegaly. Normal bowel sounds. No hernias. Neuro: cranial nerves intact, normal strength and movement in all extremities, reflexes and sensation intact and symmetric. Skin: no lesion or rash    Assessment/ Plan:   Consulted with Dr. Nhan Carbone. ICD-10-CM ICD-9-CM    1. Stage 3 chronic kidney disease (HCC)  N18.3 585.3    2. Obesity, morbid ([facility] [address]  E66.01 278.01    3. Cataract of both eyes, unspecified cataract type  H26.9 366.9      1. Stage 3 chronic kidney disease (HCC)  Discontinue Lisinopril and stop magnesium. Follow up with Dr. Aníbal Gage on Sept 16, 2020.     2. Cataract of both eyes, unspecified cataract type  Proceed with surgery if ok with nephrology. 3. Obesity  Healthy renal diet. Follow up with nephrology this week. If cleared, proceed with cataract surgery. I have discussed the diagnosis with the patient and the intended plan as seen in the above orders. The patient has received an after-visit summary and questions were answered concerning future plans. If symptoms worsen, go to the ER.     Medication Side Effects and Warnings were discussed with patient: yes  Patient Labs were reviewed: yes  Patient Past Records were reviewed:  yes    Helen Cardenas NP

## 2020-09-16 ENCOUNTER — TELEPHONE (OUTPATIENT)
Dept: INTERNAL MEDICINE CLINIC | Age: 55
End: 2020-09-16

## 2020-09-16 NOTE — TELEPHONE ENCOUNTER
Spoke with Felix Garcia and made her aware pt went into renal failure last week based on labs. Labs were repeated and kidney function improved. Has a VV apt today 9/16/20 with nephrology Dr. Nicole Hollis at [facility]. If ok with him can clear for cataract surgery.

## 2020-09-17 ENCOUNTER — TELEPHONE (OUTPATIENT)
Dept: INTERNAL MEDICINE CLINIC | Age: 55
End: 2020-09-17

## 2020-09-17 NOTE — TELEPHONE ENCOUNTER
Pt called in reference to wanting to speak with Erika. She did not disclose what she needed. Please call her at [phone].

## 2020-09-18 ENCOUNTER — TELEPHONE (OUTPATIENT)
Dept: INTERNAL MEDICINE CLINIC | Age: 55
End: 2020-09-18

## 2020-09-18 PROBLEM — H25.11 AGE-RELATED NUCLEAR CATARACT, RIGHT EYE: Chronic | Status: ACTIVE | Noted: 2020-09-18

## 2020-09-18 NOTE — TELEPHONE ENCOUNTER
Faxed again last labs to Dr Abhishek Martinez for his review before deciding on surgical clearance - confirmation of receipt received - faxed to [phone] with a request to contact Mare Cabrera NP upon his review of these results  Jeanna Mendiola LPN  2/06/8305  3:50 PM

## 2020-09-18 NOTE — TELEPHONE ENCOUNTER
Called pt and got update. She stated that the nephrologist had not received the fax of her latest labs. Need to contact Dr. Gricelda Hahn tomorrow to let him know so pt can have surgery Monday.

## 2020-09-18 NOTE — TELEPHONE ENCOUNTER
----- Message from Lakshmi Reyes sent at 9/18/2020  1:22 PM EDT -----  Regarding: GERRY Thompson/Telephone  Caller's first and last name: Levester Leyden, Dr. Stevan Fuel Office  Reason for call: pre-op   Callback required yes/no and why: yes, to clear the pt for her surgery  Best contact number(s): [phone]  Details to clarify the request: Pt needs to be cleared for surgery on Monday 9/21.

## 2020-09-21 ENCOUNTER — TELEPHONE (OUTPATIENT)
Dept: INTERNAL MEDICINE CLINIC | Age: 55
End: 2020-09-21

## 2020-09-21 PROBLEM — H25.11 AGE-RELATED NUCLEAR CATARACT, RIGHT EYE: Chronic | Status: RESOLVED | Noted: 2020-09-18 | Resolved: 2020-09-21

## 2020-09-25 PROBLEM — H25.12 AGE-RELATED NUCLEAR CATARACT, LEFT EYE: Chronic | Status: ACTIVE | Noted: 2020-09-25

## 2020-09-28 PROBLEM — H25.12 AGE-RELATED NUCLEAR CATARACT, LEFT EYE: Chronic | Status: RESOLVED | Noted: 2020-09-25 | Resolved: 2020-09-28

## 2020-09-29 DIAGNOSIS — I89.0 LYMPHEDEMA: ICD-10-CM

## 2020-09-29 RX ORDER — METOLAZONE 2.5 MG/1
2.5 TABLET ORAL DAILY
Qty: 30 TAB | Refills: 2 | Status: SHIPPED | OUTPATIENT
Start: 2020-09-29 | End: 2021-04-03 | Stop reason: SDUPTHER

## 2020-09-29 NOTE — TELEPHONE ENCOUNTER
Requested Prescriptions     Pending Prescriptions Disp Refills    metOLazone (ZAROXOLYN) 2.5 mg tablet 30 Tab 2     Sig: Take 1 Tab by mouth daily.  As needed for swelling

## 2020-10-09 DIAGNOSIS — G60.8 IDIOPATHIC SMALL AND LARGE FIBER SENSORY NEUROPATHY: ICD-10-CM

## 2020-10-09 RX ORDER — PREGABALIN 25 MG/1
50 CAPSULE ORAL 2 TIMES DAILY
Qty: 120 CAP | Refills: 1 | Status: SHIPPED | OUTPATIENT
Start: 2020-10-09 | End: 2020-11-18 | Stop reason: SDUPTHER

## 2020-10-09 NOTE — TELEPHONE ENCOUNTER
Requested Prescriptions     Pending Prescriptions Disp Refills    pregabalin (LYRICA) 25 mg capsule 120 Cap 1     Sig: Take 2 Caps by mouth two (2) times a day. Max Daily Amount: 100 mg. Pt has switched over to Erika.

## 2020-10-14 NOTE — ADDENDUM NOTE
[facility] INPATIENT ENCOUNTER  PODIATRY SURGERY DAILY PROGRESS NOTE  LOWER EXTREMITIES    ADMISSION DATE:  10/7/2020  DATE:  10/14/2020  CURRENT HOSPITAL DAY:  Hospital Day: 8  SURGEON: Surgeon(s) and Role:     * Brenda Carey DPM - Primary    ATTENDING PHYSICIAN:  Zeinab Avendaño MD  CODE STATUS:  Full Resuscitation    PROCEDURE PERFORMED:  Procedure(s) (LRB):  RIGHT GREAT TOE  COMPLETE AMPUTATION (Right)  POSTOPERATIVE DAY:  5 Days Post-Op    INTERVAL HISTORY:    Pain controlled.  No chest pain, no shortness of breath. Already had HBO this morning. Has RLE angio later today     MEDICATIONS:    The medication list was reviewed today.     OBJECTIVE:    Vital Signs  Vital Last Value 24 Hour Range   Temperature 98.1 °F (36.7 °C) Temp  Min: 96.9 °F (36.1 °C)  Max: 98.1 °F (36.7 °C)   Pulse 68 Pulse  Min: 68  Max: 74   Respiratory 18 Resp  Min: 16  Max: 18   Blood Pressure (!) 146/70 BP  Min: 104/59  Max: 148/84   Pulse Oximetry 97 % SpO2  Min: 94 %  Max: 99 %     PHYSICAL EXAM:    Constitutional:  Awake and alert.  No acute distress.   Musculoskeletal: Positive tibialis anterior and Gastroc Soleus Complex . Positive dorsalis pedis, suprapatellar and talonavicular.  Positive capillary refill.  Dressing clean, dry and intact.     LABORATORY DATA:  Recent Labs   Lab 10/14/20  0318 10/13/20  0349 10/12/20  0443   SODIUM 136 138 138   POTASSIUM 4.4 4.2 4.3   CHLORIDE 102 103 102   CO2 29 29 32   BUN 19 16 14   CREATININE 0.76 0.75 0.74   GLUCOSE 151* 115* 134*     Recent Labs   Lab 10/14/20  0318 10/13/20  0349 10/12/20  0443   WBC 9.0 9.9 9.3   HGB 10.9* 11.1* 11.4*   HCT 36.0* 36.3* 37.3*    278 274     No results found     XR CHEST AP OR PA   Final Result   IMPRESSION:   1. There is no acute cardiopulmonary disease.         US Lower Extremity Venous Duplex Right   Final Result   IMPRESSION:   1. Unremarkable examination, without evidence of deep venous thrombosis or   superficial thrombophlebitis.         US  Addended by: Jamal Richards on: 8/15/2018 02:56 PM     Modules accepted: Orders Lower Extremity Arteries Duplex Right   Final Result   IMPRESSION:   1. Patent femoropopliteal segment   2. Patent three-vessel runoff with minor stenosis as evidenced by   monophasic Doppler waveform            XR CHEST AP OR PA - PORTABLE   Final Result   IMPRESSION:        Mild diffuse mixed interstitial and alveolar infiltrate bilaterally, left   greater than right without focal consolidation. Findings could be due to an   inflammatory/infectious process or edema.               XR CHEST AP OR PA - PORTABLE   Final Result   IMPRESSION:        No active disease in the chest.            XR Foot 3+ View Right   Final Result   IMPRESSION:        No specific findings of osteomyelitis. Soft tissue gas is seen involving   the plantar surface of the first distal phalanx.      Cath/PV Case    (Results Pending)       ASSESSMENT:   Acute respiratory failure with hypoxia (CMS/HCC) [J96.01]  Diabetic foot infection (CMS/HCC) [E11.628, L08.9]  Right foot infection [L08.9] s/p No admission procedures for hospital encounter.   No complications    PLAN:  WBAT RLE in postop shoe  Angio later today  Continue HBO  Will assess incision tomorrow      John Dawn PA-C  [facility]  Pager: [phone]  In House: [phone] (4v-2c M-F)

## 2020-10-28 ENCOUNTER — TELEPHONE (OUTPATIENT)
Dept: INTERNAL MEDICINE CLINIC | Age: 55
End: 2020-10-28

## 2020-11-02 DIAGNOSIS — E53.8 VITAMIN B 12 DEFICIENCY: ICD-10-CM

## 2020-11-02 NOTE — TELEPHONE ENCOUNTER
Requested Prescriptions     Pending Prescriptions Disp Refills    cyanocobalamin (Vitamin B-12) 500 mcg tablet 90 Tab 1     Sig: Take 1 Tab by mouth daily.

## 2020-11-03 RX ORDER — LANOLIN ALCOHOL/MO/W.PET/CERES
500 CREAM (GRAM) TOPICAL DAILY
Qty: 90 TAB | Refills: 1 | Status: SHIPPED | OUTPATIENT
Start: 2020-11-03 | End: 2021-02-16 | Stop reason: SDUPTHER

## 2020-11-14 NOTE — DISCHARGE INSTRUCTIONS
Check on lab results and padmini healy lab - b sure Rx dose correct Thank you! Thank you for allowing us to provide you with excellent care today. We hope we addressed all of your concerns and needs. We strive to provide excellent quality care in the Emergency Department. You will receive a survey after your visit to evaluate the care you were provided. If you feel that you have not received excellent quality care or timely care, please ask to speak to the nurse manager. Please choose us in the future for your continued health care needs. ------------------------------------------------------------------------------------------------------------  The exam and treatment you received in the Emergency Department were for an urgent problem and are not intended as complete care. It is important that you follow up with a doctor, nurse practitioner, or physician assistant for ongoing care. If your symptoms become worse or you do not improve as expected and you are unable to reach your usual health care provider, you should return to the Emergency Department. We are available 24 hours a day. Please take your discharge instructions with you when you go to your follow-up appointment. If you have any problem arranging a follow-up appointment, contact the Emergency Department immediately. If a prescription has been provided, please have it filled as soon as possible to prevent a delay in treatment. Read the entire medication instruction sheet provided to you by the pharmacy. If you have any questions or reservations about taking the medication due to side effects or interactions with other medications, please call your primary care physician or contact the ER to speak with the charge nurse. Make an appointment with your family doctor or the physician you were referred to for follow-up of this visit as instructed on your discharge paperwork, as this is mandatory follow-up.  Return to the ER if you are unable to be seen or if you are unable to be seen in a timely manner. If you have any problem arranging the follow-up visit, contact the Emergency Department immediately.

## 2020-11-18 ENCOUNTER — OFFICE VISIT (OUTPATIENT)
Dept: INTERNAL MEDICINE CLINIC | Age: 55
End: 2020-11-18
Payer: MEDICARE

## 2020-11-18 VITALS
SYSTOLIC BLOOD PRESSURE: 93 MMHG | DIASTOLIC BLOOD PRESSURE: 60 MMHG | WEIGHT: 264 LBS | RESPIRATION RATE: 18 BRPM | OXYGEN SATURATION: 95 % | BODY MASS INDEX: 45.07 KG/M2 | HEART RATE: 101 BPM | TEMPERATURE: 98.1 F | HEIGHT: 64 IN

## 2020-11-18 DIAGNOSIS — G47.00 INSOMNIA, UNSPECIFIED TYPE: Primary | ICD-10-CM

## 2020-11-18 DIAGNOSIS — E11.21 TYPE 2 DIABETES MELLITUS WITH NEPHROPATHY (HCC): ICD-10-CM

## 2020-11-18 DIAGNOSIS — G60.8 IDIOPATHIC SMALL AND LARGE FIBER SENSORY NEUROPATHY: ICD-10-CM

## 2020-11-18 PROCEDURE — 3052F HG A1C>EQUAL 8.0%<EQUAL 9.0%: CPT | Performed by: FAMILY MEDICINE

## 2020-11-18 PROCEDURE — 3017F COLORECTAL CA SCREEN DOC REV: CPT | Performed by: FAMILY MEDICINE

## 2020-11-18 PROCEDURE — G8754 DIAS BP LESS 90: HCPCS | Performed by: FAMILY MEDICINE

## 2020-11-18 PROCEDURE — G8427 DOCREV CUR MEDS BY ELIG CLIN: HCPCS | Performed by: FAMILY MEDICINE

## 2020-11-18 PROCEDURE — 99214 OFFICE O/P EST MOD 30 MIN: CPT | Performed by: FAMILY MEDICINE

## 2020-11-18 PROCEDURE — 2022F DILAT RTA XM EVC RTNOPTHY: CPT | Performed by: FAMILY MEDICINE

## 2020-11-18 PROCEDURE — G8752 SYS BP LESS 140: HCPCS | Performed by: FAMILY MEDICINE

## 2020-11-18 PROCEDURE — G8510 SCR DEP NEG, NO PLAN REQD: HCPCS | Performed by: FAMILY MEDICINE

## 2020-11-18 PROCEDURE — G8417 CALC BMI ABV UP PARAM F/U: HCPCS | Performed by: FAMILY MEDICINE

## 2020-11-18 RX ORDER — OXYCODONE AND ACETAMINOPHEN 5; 325 MG/1; MG/1
1 TABLET ORAL 2 TIMES DAILY
COMMUNITY
Start: 2020-11-13 | End: 2021-01-27 | Stop reason: ALTCHOICE

## 2020-11-18 RX ORDER — ISOSORBIDE DINITRATE 20 MG/1
20 TABLET ORAL 2 TIMES DAILY
Qty: 180 TAB | Refills: 1 | Status: SHIPPED | OUTPATIENT
Start: 2020-11-18 | End: 2021-05-05 | Stop reason: SDUPTHER

## 2020-11-18 RX ORDER — CARVEDILOL 12.5 MG/1
TABLET ORAL
Qty: 180 TAB | Refills: 3 | Status: SHIPPED | OUTPATIENT
Start: 2020-11-18 | End: 2021-12-27

## 2020-11-18 RX ORDER — HYDRALAZINE HYDROCHLORIDE 25 MG/1
25 TABLET, FILM COATED ORAL 2 TIMES DAILY
Qty: 180 TAB | Refills: 1 | Status: SHIPPED | OUTPATIENT
Start: 2020-11-18 | End: 2021-05-05 | Stop reason: SDUPTHER

## 2020-11-18 RX ORDER — PREGABALIN 25 MG/1
50 CAPSULE ORAL 2 TIMES DAILY
Qty: 120 CAP | Refills: 0 | Status: SHIPPED | OUTPATIENT
Start: 2020-11-18 | End: 2020-12-28 | Stop reason: SDUPTHER

## 2020-11-18 NOTE — PROGRESS NOTES
Chief Complaint   Patient presents with   [address] [place] [address]     Health Maintenance reviewed. I have reviewed the patient's medical history in detail and updated the computerized patient record. Patient has not been out of the country in (6-7 months), NO diarrhea, NO cough, NO chest conjestion, NO temp. Pt has not been around anyone with these symptoms. 1. Have you been to the ER, urgent care clinic since your last visit? Hospitalized since your last visit?no    2. Have you seen or consulted any other health care providers outside of the [address] [place] [address] since your last visit? Include any pap smears or colon screening. No      Encouraged pt to discuss pt's wishes with spouse/partner/family and bring them in the next appt to follow thru with the Advanced Directive    Fall Risk Assessment, last 12 mths 11/18/2020   Able to walk? Yes   Fall in past 12 months? No   Fall with injury? -   Number of falls in past 12 months -   Fall Risk Score -       3 most recent PHQ Screens 11/18/2020   Little interest or pleasure in doing things Several days   Feeling down, depressed, irritable, or hopeless Several days   Total Score PHQ 2 2       Abuse Screening Questionnaire 11/18/2020   Do you ever feel afraid of your partner? N   Are you in a relationship with someone who physically or mentally threatens you? N   Is it safe for you to go home?  Y       ADL Assessment 11/18/2020   Feeding yourself No Help Needed   Getting from bed to chair No Help Needed   Getting dressed No Help Needed   Bathing or showering No Help Needed   Walk across the room (includes cane/walker) No Help Needed   Using the telphone No Help Needed   Taking your medications No Help Needed   Preparing meals No Help Needed   Managing money (expenses/bills) No Help Needed   Moderately strenuous housework (laundry) No Help Needed   Shopping for personal items (toiletries/medicines) No Help Needed   Shopping for groceries No Help Needed   Driving No Help Needed   Climbing a flight of stairs No Help Needed   Getting to places beyond walking distances No Help Needed

## 2020-11-18 NOTE — PROGRESS NOTES
HISTORY OF PRESENT ILLNESS  Ramesh Payton is a 49 y.o. female. Insomnia   The history is provided by the patient. This is a new problem. Episode onset: 1-2 mo. Pertinent negatives include no shortness of breath. No specific changes in her life to account for this problem. Tends to have a early morning awakenings. Not able to get back to sleep, tired the rest of the day. Continues to get her pain medication narcotic agent, pain under control. No hypoglycemia no nocturia  Lab Results   Component Value Date/Time    Hemoglobin A1c 8.4 (H) 09/11/2020 11:36 AM    Hemoglobin A1c 8.2 (H) 09/09/2020 10:22 AM    Hemoglobin A1c 7.6 (H) 03/11/2020 02:38 PM    Glucose 148 (H) 09/11/2020 11:36 AM    Glucose (POC) 205 (H) 09/28/2020 01:30 PM    Microalbumin urine (POC) 10 01/14/2015 11:46 AM    Microalb/Creat ratio (ug/mg creat.) 34.0 (H) 07/10/2019 12:14 PM    Microalbumin/creat ratio (POC)  01/14/2015 11:46 AM    LDL, calculated 86 05/21/2019 03:44 PM    LDL Chol Calc ([facility]) 114 (H) 09/11/2020 11:36 AM    Creatinine 2.38 (H) 09/11/2020 11:36 AM       Review of Systems   Respiratory: Negative for cough and shortness of breath. Neurological: Negative for dizziness. Psychiatric/Behavioral: Negative for depression and suicidal ideas. The patient has insomnia.       Patient Active Problem List   Diagnosis Code    Hypertension I10    Chronic pain G89.29    Narcotic dependence, episodic use ([facility]) F11.20    Idiopathic small and large fiber sensory neuropathy G60.8    Spinal stenosis of lumbosacral region M48.07    Lumbar back pain with radiculopathy affecting right lower extremity M54.16    Obesity, morbid ([facility]) E66.01    Type 2 diabetes mellitus with nephropathy ([facility]) O75.42    Acute systolic congestive heart failure ([facility]) I50.21    Hyperglycemia due to type 2 diabetes mellitus ([facility] [address] E11.65    Bilateral cataracts H26.9    Stage 3 chronic kidney disease N18.30     Social History     Socioeconomic History  Marital status:      Spouse name: Not on file    Number of children: Not on file    Years of education: Not on file    Highest education level: Not on file   Occupational History    Occupation: disabled   Social Needs    Financial resource strain: Not on file    Food insecurity     Worry: Not on file     Inability: Not on file   [employer] needs     Medical: Not on file     Non-medical: Not on file   Tobacco Use    Smoking status: Never Smoker    Smokeless tobacco: Never Used   Substance and Sexual Activity    Alcohol use: No     Alcohol/week: 0.0 standard drinks    Drug use: No    Sexual activity: Not Currently   Lifestyle    Physical activity     Days per week: Not on file     Minutes per session: Not on file    Stress: Not on file   Relationships    Social connections     Talks on phone: Not on file     Gets together: Not on file     Attends [employer] service: Not on file     Active member of club or organization: Not on file     Attends meetings of clubs or organizations: Not on file     Relationship status: Not on file    Intimate partner violence     Fear of current or ex partner: Not on file     Emotionally abused: Not on file     Physically abused: Not on file     Forced sexual activity: Not on file   Other Topics Concern    Not on file   Social History Narrative    Lives with daughter. Not working. Applying for disabilty. Physical Exam  Visit Vitals  BP 93/60 (BP 1 Location: Left arm, BP Patient Position: Sitting)   Pulse (!) 101   Temp 98.1 °F (36.7 °C) (Temporal)   Resp 18   Ht 5' 4\" (1.626 m)   Wt 264 lb (119.7 kg)   LMP 12/03/2017   SpO2 95%   BMI 45.32 kg/m²     WD WN female NAD  Heart RRR without murmers clicks or rubs  Lungs CTA  Abdo soft nontender  Ext no edema    ASSESSMENT and PLAN  Encounter Diagnoses   Name Primary?     Insomnia, unspecified type Yes    Idiopathic small and large fiber sensory neuropathy     Type 2 diabetes mellitus with nephropathy [facility])      Orders Placed This Encounter    oxyCODONE-acetaminophen (PERCOCET) 5-325 mg per tablet    pregabalin (LYRICA) 25 mg capsule    carvediloL (COREG) 12.5 mg tablet    isosorbide dinitrate (ISORDIL) 20 mg tablet    empagliflozin (JARDIANCE) 25 mg tablet    hydrALAZINE (APRESOLINE) 25 mg tablet     Trial of melatonin. See patient instructions, went over them personally with the patient. Emphasized compliance. Questions answered. Patient states that they understand the plan of action and will call if there are any issues or misunderstandings. Follow-up and Dispositions    · Return in about 6 weeks (around 12/30/2020).

## 2020-11-18 NOTE — PATIENT INSTRUCTIONS
Melatonin 3 mg 1 to 2 every evening, do some sleep restriction as well. Learning About Sleeping Well What does sleeping well mean? Sleeping well means getting enough sleep. How much sleep is enough varies among people. The number of hours you sleep is not as important as how you feel when you wake up. If you do not feel refreshed, you probably need more sleep. Another sign of not getting enough sleep is feeling tired during the day. The average total nightly sleep time is 7½ to 8 hours. Healthy adults may need a little more or a little less than this. Why is getting enough sleep important? Getting enough quality sleep is a basic part of good health. When your sleep suffers, your mood and your thoughts can suffer too. You may find yourself feeling more grumpy or stressed. Not getting enough sleep also can lead to serious problems, including injury, accidents, anxiety, and depression. What might cause poor sleeping? Many things can cause sleep problems, including: · Stress. Stress can be caused by fear about a single event, such as giving a speech. Or you may have ongoing stress, such as worry about work or school. · Depression, anxiety, and other mental or emotional conditions. · Changes in your sleep habits or surroundings. This includes changes that happen where you sleep, such as noise, light, or sleeping in a different bed. It also includes changes in your sleep pattern, such as having jet lag or working a late shift. · Health problems, such as pain, breathing problems, and restless legs syndrome. · Lack of regular exercise. How can you help yourself? Here are some tips that may help you sleep more soundly and wake up feeling more refreshed. Your sleeping area · Use your bedroom only for sleeping and sex. A bit of light reading may help you fall asleep. But if it doesn't, do your reading elsewhere in the house. Don't watch TV in bed. · Be sure your bed is big enough to stretch out comfortably, especially if you have a sleep partner. · Keep your bedroom quiet, dark, and cool. Use curtains, blinds, or a sleep mask to block out light. To block out noise, use earplugs, soothing music, or a \"white noise\" machine. Your evening and bedtime routine · Create a relaxing bedtime routine. You might want to take a warm shower or bath, listen to soothing music, or drink a cup of noncaffeinated tea. · Go to bed at the same time every night. And get up at the same time every morning, even if you feel tired. What to avoid · Limit caffeine (coffee, tea, caffeinated sodas) during the day, and don't have any for at least 4 to 6 hours before bedtime. · Don't drink alcohol before bedtime. Alcohol can cause you to wake up more often during the night. · Don't smoke or use tobacco, especially in the evening. Nicotine can keep you awake. · Don't take naps during the day, especially close to bedtime. · Don't lie in bed awake for too long. If you can't fall asleep, or if you wake up in the middle of the night and can't get back to sleep within 15 minutes or so, get out of bed and go to another room until you feel sleepy. · Don't take medicine right before bed that may keep you awake or make you feel hyper or energized. Your doctor can tell you if your medicine may do this and if you can take it earlier in the day. If you can't sleep · Imagine yourself in a peaceful, pleasant scene. Focus on the details and feelings of being in a place that is relaxing. · Get up and do a quiet or boring activity until you feel sleepy. · Don't drink any liquids after 6 p.m. if you wake up often because you have to go to the bathroom. Where can you learn more? Go to http://www.gray.com/ Enter W945 in the search box to learn more about \"Learning About Sleeping Well. \" Current as of: January 31, 2020               Content Version: 12.6 © 0542-3721 [employer]. Care instructions adapted under license by [employer] (which disclaims liability or warranty for this information). If you have questions about a medical condition or this instruction, always ask your healthcare professional. [address] any warranty or liability for your use of this information.

## 2020-11-25 DIAGNOSIS — Z79.4 TYPE 2 DIABETES MELLITUS WITH INSULIN THERAPY (HCC): ICD-10-CM

## 2020-11-25 DIAGNOSIS — E11.9 TYPE 2 DIABETES MELLITUS WITH INSULIN THERAPY (HCC): ICD-10-CM

## 2020-11-25 RX ORDER — PEN NEEDLE, DIABETIC 31 GX3/16"
NEEDLE, DISPOSABLE MISCELLANEOUS
Qty: 100 PEN NEEDLE | Refills: 5 | Status: SHIPPED | OUTPATIENT
Start: 2020-11-25 | End: 2021-08-11 | Stop reason: SDUPTHER

## 2020-11-25 NOTE — TELEPHONE ENCOUNTER
From [facility]               Caller (if not patient): N/A   Relationship of caller (if not patient): N/A   Best contact number(s): [phone]   Name of medication and dosage if known: Insulin pen needles / 2x day   Is patient out of this medication (yes/no): no, has 2 left   Pharmacy name: [facility] in [address] [place] [address] listed in chart? (yes/no): Yes   Pharmacy phone number: In Chart   Date of last visit: 11/18/2020   Details to clarify the request: Pt informed that her prescription is set for only 1x day, but she actually uses 2x day and needs this updated with the pharmacy.

## 2020-12-22 ENCOUNTER — TELEPHONE (OUTPATIENT)
Dept: INTERNAL MEDICINE CLINIC | Age: 55
End: 2020-12-22

## 2020-12-22 DIAGNOSIS — G60.8 IDIOPATHIC SMALL AND LARGE FIBER SENSORY NEUROPATHY: ICD-10-CM

## 2020-12-22 NOTE — TELEPHONE ENCOUNTER
Would like to change novolog from 8 units to 12 units; since her numbers were higher on her last OV.

## 2020-12-27 DIAGNOSIS — G60.8 IDIOPATHIC SMALL AND LARGE FIBER SENSORY NEUROPATHY: ICD-10-CM

## 2020-12-27 RX ORDER — PREGABALIN 25 MG/1
CAPSULE ORAL
Qty: 120 CAP | Refills: 0 | OUTPATIENT
Start: 2020-12-27

## 2020-12-27 NOTE — TELEPHONE ENCOUNTER
ON Call note  Pt stated she recently saw Dr. Arleen Rodas and he increased her insulin to 12 units BID. Stated she ran out and sugars are 250+. Original order is for 8units Novulog Flexpen. Advised pt will fill it but should verify with Dr. Arleen Rodas about insulin dosage.

## 2020-12-28 ENCOUNTER — TELEPHONE (OUTPATIENT)
Dept: INTERNAL MEDICINE CLINIC | Age: 55
End: 2020-12-28

## 2020-12-28 DIAGNOSIS — E11.21 TYPE 2 DIABETES MELLITUS WITH NEPHROPATHY (HCC): ICD-10-CM

## 2020-12-28 RX ORDER — PREGABALIN 25 MG/1
50 CAPSULE ORAL 2 TIMES DAILY
Qty: 120 CAP | Refills: 0 | Status: SHIPPED | OUTPATIENT
Start: 2020-12-28 | End: 2021-02-22 | Stop reason: SDUPTHER

## 2020-12-28 RX ORDER — INSULIN ASPART 100 [IU]/ML
12 INJECTION, SOLUTION INTRAVENOUS; SUBCUTANEOUS 2 TIMES DAILY
Qty: 5 PEN | Refills: 5 | Status: SHIPPED | OUTPATIENT
Start: 2020-12-28 | End: 2021-01-27 | Stop reason: SDUPTHER

## 2021-01-04 NOTE — PATIENT INSTRUCTIONS
DATE OF VISIT:  01/03/2021

 

Overall, Jill remained reasonably stable throughout the night.  There was some concern

about low blood pressure running in the 80s and 90s, however, we rechecked her blood

pressure on the right arm and her systolic blood pressures are considerably higher.  She her

urine output has been adequate, but albeit marginal.  She has had some nausea, but that

seems to be subsided as of this morning.  She continues to have diarrhea and has had

multiple watery stools throughout the night.  Her urine output ran around 30 mL/h.  She

remains afebrile from a respiratory standpoint.  Her oxygenation has been quite good,

ranging from 94 to 98 with the respiratory rate ranging between 16 and 20.  Her only

complaint this morning is that she is thirsty and since her nausea has subsided

considerably, we will go ahead and offer her clear liquids but continue her IV fluids for

the time being because of the a losses associated with her diarrhea.  In fact, I decided to

give her an additional 500 mL of normal saline this morning based on my clinical impression

that she is somewhat behind on fluids.  She did state that she coughed a bit more and had

more tenacious sputum overnight and although she is afebrile, on exam this morning, she does

have some rhonchi in her right base.  Repeat chest x-ray does show what appears to be the

infiltrate mentioned last night.  There could be some atelectasis here as well.

 

Her labs show that her hyperkalemia is improved.  Her sodium is normal.  Her potassium is

now 4.9 with a chloride of 109.  Her CO2 has returned to normal and her anion gap is now

normal.  Her creatinine has gone from 1.35 to 1.29.

 

Overall reasonably stable, probably still dehydrated based on thirst and diminished urine

output.  We will go ahead and give her some additional IV fluid judiciously in light of her

history of congestive heart failure.  We will restart her atenolol this morning and because

of this concern of a possible right lower lobe pneumonia, I decided to go ahead and start

her on levofloxacin 750 mg IV daily.

 

IMPRESSION:  Overall improved.  Possible pneumonia at play along with her gastroenteritis.

We will continue to monitor her urine output and try to keep up with fluid losses from the

diarrhea.

 

 

DEONDRE/LETTY

DD:  01/03/2021 10:17:02

DT:  01/03/2021 11:49:07

Job #:  [id number] Learning About Diabetes Food Guidelines  Your Care Instructions    Meal planning is important to manage diabetes. It helps keep your blood sugar at a target level (which you set with your doctor). You don't have to eat special foods. You can eat what your family eats, including sweets once in a while. But you do have to pay attention to how often you eat and how much you eat of certain foods. You may want to work with a dietitian or a certified diabetes educator (CDE) to help you plan meals and snacks. A dietitian or CDE can also help you lose weight if that is one of your goals. What should you know about eating carbs? Managing the amount of carbohydrate (carbs) you eat is an important part of healthy meals when you have diabetes. Carbohydrate is found in many foods. · Learn which foods have carbs. And learn the amounts of carbs in different foods. ? Bread, cereal, pasta, and rice have about 15 grams of carbs in a serving. A serving is 1 slice of bread (1 ounce), ½ cup of cooked cereal, or 1/3 cup of cooked pasta or rice. ? Fruits have 15 grams of carbs in a serving. A serving is 1 small fresh fruit, such as an apple or orange; ½ of a banana; ½ cup of cooked or canned fruit; ½ cup of fruit juice; 1 cup of melon or raspberries; or 2 tablespoons of dried fruit. ? Milk and no-sugar-added yogurt have 15 grams of carbs in a serving. A serving is 1 cup of milk or 2/3 cup of no-sugar-added yogurt. ? Starchy vegetables have 15 grams of carbs in a serving. A serving is ½ cup of mashed potatoes or sweet potato; 1 cup winter squash; ½ of a small baked potato; ½ cup of cooked beans; or ½ cup cooked corn or green peas. · Learn how much carbs to eat each day and at each meal. A dietitian or CDE can teach you how to keep track of the amount of carbs you eat. This is called carbohydrate counting. · If you are not sure how to count carbohydrate grams, use the Plate Method to plan meals.  It is a good, quick way to make sure that you have a balanced meal. It also helps you spread carbs throughout the day. ? Divide your plate by types of foods. Put non-starchy vegetables on half the plate, meat or other protein food on one-quarter of the plate, and a grain or starchy vegetable in the final quarter of the plate. To this you can add a small piece of fruit and 1 cup of milk or yogurt, depending on how many carbs you are supposed to eat at a meal.  · Try to eat about the same amount of carbs at each meal. Do not \"save up\" your daily allowance of carbs to eat at one meal.  · Proteins have very little or no carbs per serving. Examples of proteins are beef, chicken, turkey, fish, eggs, tofu, cheese, cottage cheese, and peanut butter. A serving size of meat is 3 ounces, which is about the size of a deck of cards. Examples of meat substitute serving sizes (equal to 1 ounce of meat) are 1/4 cup of cottage cheese, 1 egg, 1 tablespoon of peanut butter, and ½ cup of tofu. How can you eat out and still eat healthy? · Learn to estimate the serving sizes of foods that have carbohydrate. If you measure food at home, it will be easier to estimate the amount in a serving of restaurant food. · If the meal you order has too much carbohydrate (such as potatoes, corn, or baked beans), ask to have a low-carbohydrate food instead. Ask for a salad or green vegetables. · If you use insulin, check your blood sugar before and after eating out to help you plan how much to eat in the future. · If you eat more carbohydrate at a meal than you had planned, take a walk or do other exercise. This will help lower your blood sugar. What else should you know? · Limit saturated fat, such as the fat from meat and dairy products. This is a healthy choice because people who have diabetes are at higher risk of heart disease. So choose lean cuts of meat and nonfat or low-fat dairy products.  Use olive or canola oil instead of butter or shortening when cooking. · Don't skip meals. Your blood sugar may drop too low if you skip meals and take insulin or certain medicines for diabetes. · Check with your doctor before you drink alcohol. Alcohol can cause your blood sugar to drop too low. Alcohol can also cause a bad reaction if you take certain diabetes medicines. Follow-up care is a key part of your treatment and safety. Be sure to make and go to all appointments, and call your doctor if you are having problems. It's also a good idea to know your test results and keep a list of the medicines you take. Where can you learn more? Go to http://lizbeth-efraín.info/. Enter X557 in the search box to learn more about \"Learning About Diabetes Food Guidelines. \"  Current as of: April 16, 2019  Content Version: 12.2  © 4297-9833 [employer]. Care instructions adapted under license by [employer] (which disclaims liability or warranty for this information). If you have questions about a medical condition or this instruction, always ask your healthcare professional. [address] any warranty or liability for your use of this information. Learning About Meal Planning for Diabetes  Why plan your meals? Meal planning can be a key part of managing diabetes. Planning meals and snacks with the right balance of carbohydrate, protein, and fat can help you keep your blood sugar at the target level you set with your doctor. You don't have to eat special foods. You can eat what your family eats, including sweets once in a while. But you do have to pay attention to how often you eat and how much you eat of certain foods. You may want to work with a dietitian or a certified diabetes educator. He or she can give you tips and meal ideas and can answer your questions about meal planning. This health professional can also help you reach a healthy weight if that is one of your goals. What plan is right for you?   Your dietitian or diabetes educator may suggest that you start with the plate format or carbohydrate counting. The plate format  The plate format is a simple way to help you manage how you eat. You plan meals by learning how much space each food should take on a plate. Using the plate format helps you spread carbohydrate throughout the day. It can make it easier to keep your blood sugar level within your target range. It also helps you see if you're eating healthy portion sizes. To use the plate format, you put non-starchy vegetables on half your plate. Add meat or meat substitutes on one-quarter of the plate. Put a grain or starchy vegetable (such as brown rice or a potato) on the final quarter of the plate. You can add a small piece of fruit and some low-fat or fat-free milk or yogurt, depending on your carbohydrate goal for each meal.  Here are some tips for using the plate format:  · Make sure that you are not using an oversized plate. A 9-inch plate is best. Many restaurants use larger plates. · Get used to using the plate format at home. Then you can use it when you eat out. · Write down your questions about using the plate format. Talk to your doctor, a dietitian, or a diabetes educator about your concerns. Carbohydrate counting  With carbohydrate counting, you plan meals based on the amount of carbohydrate in each food. Carbohydrate raises blood sugar higher and more quickly than any other nutrient. It is found in desserts, breads and cereals, and fruit. It's also found in starchy vegetables such as potatoes and corn, grains such as rice and pasta, and milk and yogurt. Spreading carbohydrate throughout the day helps keep your blood sugar levels within your target range. Your daily amount depends on several things, including your weight, how active you are, which diabetes medicines you take, and what your goals are for your blood sugar levels.  A registered dietitian or diabetes educator can help you plan how much carbohydrate to include in each meal and snack. A guideline for your daily amount of carbohydrate is:  · 45 to 60 grams at each meal. That's about the same as 3 to 4 carbohydrate servings. · 15 to 20 grams at each snack. That's about the same as 1 carbohydrate serving. The Nutrition Facts label on packaged foods tells you how much carbohydrate is in a serving of the food. First, look at the serving size on the food label. Is that the amount you eat in a serving? All of the nutrition information on a food label is based on that serving size. So if you eat more or less than that, you'll need to adjust the other numbers. Total carbohydrate is the next thing you need to look for on the label. If you count carbohydrate servings, one serving of carbohydrate is 15 grams. For foods that don't come with labels, such as fresh fruits and vegetables, you'll need a guide that lists carbohydrate in these foods. Ask your doctor, dietitian, or diabetes educator about books or other nutrition guides you can use. If you take insulin, you need to know how many grams of carbohydrate are in a meal. This lets you know how much rapid-acting insulin to take before you eat. If you use an insulin pump, you get a constant rate of insulin during the day. So the pump must be programmed at meals to give you extra insulin to cover the rise in blood sugar after meals. When you know how much carbohydrate you will eat, you can take the right amount of insulin. Or, if you always use the same amount of insulin, you need to make sure that you eat the same amount of carbohydrate at meals. If you need more help to understand carbohydrate counting and food labels, ask your doctor, dietitian, or diabetes educator. How do you get started with meal planning? Here are some tips to get started:  · Plan your meals a week at a time. Don't forget to include snacks too. · Use cookbooks or online recipes to plan several main meals.  Plan some quick meals for busy nights. You also can double some recipes that freeze well. Then you can save half for other busy nights when you don't have time to cook. · Make sure you have the ingredients you need for your recipes. If you're running low on basic items, put these items on your shopping list too. · List foods that you use to make breakfasts, lunches, and snacks. List plenty of fruits and vegetables. · Post this list on the refrigerator. Add to it as you think of more things you need. · Take the list to the store to do your weekly shopping. Follow-up care is a key part of your treatment and safety. Be sure to make and go to all appointments, and call your doctor if you are having problems. It's also a good idea to know your test results and keep a list of the medicines you take. Where can you learn more? Go to http://lizbeth-efraín.info/. Jayson Ruiz in the search box to learn more about \"Learning About Meal Planning for Diabetes. \"  Current as of: April 16, 2019  Content Version: 12.2  © 9743-7927 [employer]. Care instructions adapted under license by [employer] (which disclaims liability or warranty for this information). If you have questions about a medical condition or this instruction, always ask your healthcare professional. [address] any warranty or liability for your use of this information.

## 2021-01-27 ENCOUNTER — OFFICE VISIT (OUTPATIENT)
Dept: INTERNAL MEDICINE CLINIC | Age: 56
End: 2021-01-27
Payer: MEDICARE

## 2021-01-27 VITALS
DIASTOLIC BLOOD PRESSURE: 81 MMHG | TEMPERATURE: 97.1 F | SYSTOLIC BLOOD PRESSURE: 127 MMHG | BODY MASS INDEX: 45.07 KG/M2 | OXYGEN SATURATION: 98 % | RESPIRATION RATE: 20 BRPM | WEIGHT: 264 LBS | HEART RATE: 99 BPM | HEIGHT: 64 IN

## 2021-01-27 DIAGNOSIS — E11.65 TYPE 2 DIABETES MELLITUS WITH HYPERGLYCEMIA, WITH LONG-TERM CURRENT USE OF INSULIN (HCC): ICD-10-CM

## 2021-01-27 DIAGNOSIS — I50.21 ACUTE SYSTOLIC CONGESTIVE HEART FAILURE (HCC): ICD-10-CM

## 2021-01-27 DIAGNOSIS — G89.4 CHRONIC PAIN SYNDROME: ICD-10-CM

## 2021-01-27 DIAGNOSIS — E11.21 TYPE 2 DIABETES MELLITUS WITH NEPHROPATHY (HCC): ICD-10-CM

## 2021-01-27 DIAGNOSIS — Z79.4 TYPE 2 DIABETES MELLITUS WITH HYPERGLYCEMIA, WITH LONG-TERM CURRENT USE OF INSULIN (HCC): ICD-10-CM

## 2021-01-27 DIAGNOSIS — N18.4 CRI (CHRONIC RENAL INSUFFICIENCY), STAGE 4 (SEVERE) (HCC): Primary | ICD-10-CM

## 2021-01-27 DIAGNOSIS — E66.01 OBESITY, MORBID (HCC): ICD-10-CM

## 2021-01-27 DIAGNOSIS — F11.20 NARCOTIC DEPENDENCE (HCC): ICD-10-CM

## 2021-01-27 PROCEDURE — G8427 DOCREV CUR MEDS BY ELIG CLIN: HCPCS | Performed by: FAMILY MEDICINE

## 2021-01-27 PROCEDURE — G8754 DIAS BP LESS 90: HCPCS | Performed by: FAMILY MEDICINE

## 2021-01-27 PROCEDURE — G8432 DEP SCR NOT DOC, RNG: HCPCS | Performed by: FAMILY MEDICINE

## 2021-01-27 PROCEDURE — 99214 OFFICE O/P EST MOD 30 MIN: CPT | Performed by: FAMILY MEDICINE

## 2021-01-27 PROCEDURE — G8417 CALC BMI ABV UP PARAM F/U: HCPCS | Performed by: FAMILY MEDICINE

## 2021-01-27 PROCEDURE — 2022F DILAT RTA XM EVC RTNOPTHY: CPT | Performed by: FAMILY MEDICINE

## 2021-01-27 PROCEDURE — G8752 SYS BP LESS 140: HCPCS | Performed by: FAMILY MEDICINE

## 2021-01-27 PROCEDURE — 3017F COLORECTAL CA SCREEN DOC REV: CPT | Performed by: FAMILY MEDICINE

## 2021-01-27 PROCEDURE — 3046F HEMOGLOBIN A1C LEVEL >9.0%: CPT | Performed by: FAMILY MEDICINE

## 2021-01-27 RX ORDER — INSULIN ASPART 100 [IU]/ML
12 INJECTION, SOLUTION INTRAVENOUS; SUBCUTANEOUS 2 TIMES DAILY
Qty: 5 PEN | Refills: 5 | Status: SHIPPED | OUTPATIENT
Start: 2021-01-27 | End: 2021-05-05 | Stop reason: SDUPTHER

## 2021-01-27 RX ORDER — OXYCODONE AND ACETAMINOPHEN 5; 325 MG/1; MG/1
1 TABLET ORAL
Qty: 30 TAB | Refills: 0 | Status: SHIPPED | OUTPATIENT
Start: 2021-01-27 | End: 2021-01-30

## 2021-01-27 NOTE — PROGRESS NOTES
Subjective:     Deanna Carl is a 48 y.o. female seen for follow-up of diabetes. States she is going into renal failure and blames the [employer] that I prescribed. [employer] has been stopped and she has follow-up with nephrology. Relates that her pain doctor at [facility] will no longer be prescribing her pain medications, oxycodone. She has had hypoglycemic attacks. .no  Blood sugar control has been usu OK    Lab Results   Component Value Date/Time    Hemoglobin A1c 8.4 (H) 09/11/2020 11:36 AM    Hemoglobin A1c 8.2 (H) 09/09/2020 10:22 AM    Hemoglobin A1c 7.6 (H) 03/11/2020 02:38 PM    Glucose 148 (H) 09/11/2020 11:36 AM    Glucose (POC) 205 (H) 09/28/2020 01:30 PM    Microalbumin urine (POC) 10 01/14/2015 11:46 AM    Microalb/Creat ratio (ug/mg creat.) 34.0 (H) 07/10/2019 12:14 PM    Microalbumin/creat ratio (POC)  01/14/2015 11:46 AM    LDL, calculated 114 (H) 09/11/2020 11:36 AM    LDL, calculated 86 05/21/2019 03:44 PM    Creatinine 2.38 (H) 09/11/2020 11:36 AM       She has diabetes, hypertension, hyperlipidemia and morbid obesity. She has congestive heart failure as well. Deanna Carl has the additional concern of [facility] is going to cath her, may need dialysis, blames the [employer] we put her on. Reports taking blood pressure medications without side affects. Occa complaints of exertional chest pain, c/o occa excessive shortness of breath no focal weakness. Minimal swelling in lower legs occa dizziness with standing. Diet and Lifestyle: follows a diabetic diet regularly, nonsmoker.     Patient Active Problem List    Diagnosis Date Noted    CRI (chronic renal insufficiency), stage 4 (severe) ([address] 01/27/2021    Bilateral cataracts 09/08/2020    Hyperglycemia due to type 2 diabetes mellitus ([address] 28/20/8964    Acute systolic congestive heart failure ([address] 02/07/2018    Obesity, morbid ([address] 12/18/2017    Type 2 diabetes mellitus with nephropathy ([address] 12/18/2017    Spinal stenosis of lumbosacral region 12/08/2016    Lumbar back pain with radiculopathy affecting right lower extremity 12/08/2016    Idiopathic small and large fiber sensory neuropathy 11/16/2016    Narcotic dependence, episodic use (HCC) 04/29/2016    Chronic pain     Hypertension 11/07/2012     Current Outpatient Medications   Medication Sig Dispense Refill    insulin aspart U-100 (NovoLOG Flexpen U-100 Insulin) 100 unit/mL (3 mL) inpn 12 Units by SubCUTAneous route two (2) times a day. BEFORE BREAKFAST AND DINNER 5 Pen 5    oxyCODONE-acetaminophen (PERCOCET) 5-325 mg per tablet Take 1 Tab by mouth every eight (8) hours as needed for Pain for up to 3 days. Max Daily Amount: 3 Tabs. 30 Tab 0    pregabalin (LYRICA) 25 mg capsule Take 2 Caps by mouth two (2) times a day. Max Daily Amount: 100 mg. 120 Cap 0    Insulin Needles, Disposable, 32 gauge x 5/32\" ndle Check blood sugar up to 3x daily as needed. 100 Pen Needle 5    carvediloL (COREG) 12.5 mg tablet Take 1 tablet by mouth twice daily 180 Tab 3    isosorbide dinitrate (ISORDIL) 20 mg tablet Take 1 Tab by mouth two (2) times a day. 180 Tab 1    hydrALAZINE (APRESOLINE) 25 mg tablet Take 1 Tab by mouth two (2) times a day. 180 Tab 1    cyanocobalamin (Vitamin B-12) 500 mcg tablet Take 1 Tab by mouth daily. 90 Tab 1    Victoza 3-Sukhdeep 0.6 mg/0.1 mL (18 mg/3 mL) pnij INJECT 1.2 MG SUBCUTANEOUSLY DAILY 6 mL 3    metOLazone (ZAROXOLYN) 2.5 mg tablet Take 1 Tab by mouth daily. As needed for swelling 30 Tab 2    glucose blood VI test strips (True Metrix Glucose Test Strip) strip CHECK BLOOD SUGAR UP TO THREE TIMES DAILY 100 Strip 5    Blood-Glucose Meter monitoring kit Want free style leda kit 1 Kit 0    lidocaine (LIDODERM) 5 % Apply patch to the affected area for 12 hours a day and remove for 12 hours a day.  5 Each 0    Blood-Glucose Meter (TRUE METRIX GLUCOSE METER) misc Check blood sugar up to three times daily 1 Each 0    Cholecalciferol, Vitamin D3, 50,000 unit cap Take  by mouth.  aspirin delayed-release 81 mg tablet Take  by mouth daily. Allergies   Allergen Reactions    Lortab [Hydrocodone-Acetaminophen] Nausea and Vomiting    Cymbalta [Duloxetine] Diarrhea    Jardiance [Empagliflozin] Other (comments)     kidneys    Lasix [Furosemide] Swelling    Morphine Other (comments)     Pt states is not allergic to morphine     Past Medical History:   Diagnosis Date    Anemia     CAD (coronary artery disease)     Cardiomyopathy ([facility]     Chronic pain     CKD (chronic kidney disease)     Cyst in hand 2014    [address]    Diabetes mellitus type 2 in obese ([facility]     Diabetic neuropathy ([facility]     Hypertension     Migraine      Social History     Tobacco Use    Smoking status: Never Smoker    Smokeless tobacco: Never Used   Substance Use Topics    Alcohol use: No     Alcohol/week: 0.0 standard drinks        Lab Results   Component Value Date/Time    WBC 5.6 09/09/2020 10:22 AM    HGB 12.6 09/09/2020 10:22 AM    HCT 39.3 09/09/2020 10:22 AM    PLATELET 975 (L) 44/41/0816 10:22 AM    MCV 79 09/09/2020 10:22 AM     Lab Results   Component Value Date/Time    Hemoglobin A1c 8.4 (H) 09/11/2020 11:36 AM    Hemoglobin A1c 8.2 (H) 09/09/2020 10:22 AM    Hemoglobin A1c 7.6 (H) 03/11/2020 02:38 PM    Glucose 148 (H) 09/11/2020 11:36 AM    Glucose (POC) 205 (H) 09/28/2020 01:30 PM    Microalb/Creat ratio (ug/mg creat.) 34.0 (H) 07/10/2019 12:14 PM    LDL, calculated 114 (H) 09/11/2020 11:36 AM    LDL, calculated 86 05/21/2019 03:44 PM    Creatinine 2.38 (H) 09/11/2020 11:36 AM      Lab Results   Component Value Date/Time    Cholesterol, total 184 09/11/2020 11:36 AM    HDL Cholesterol 48 09/11/2020 11:36 AM    LDL, calculated 114 (H) 09/11/2020 11:36 AM    LDL, calculated 86 05/21/2019 03:44 PM    Triglyceride 120 09/11/2020 11:36 AM     Lab Results   Component Value Date/Time    ALT (SGPT) 10 09/09/2020 10:22 AM    Alk.  phosphatase 139 (H) 09/09/2020 10:22 AM    Bilirubin, total 0.3 09/09/2020 10:22 AM    Albumin 4.0 09/09/2020 10:22 AM    Protein, total 7.1 09/09/2020 10:22 AM    INR 1.1 12/08/2015 04:42 PM    Prothrombin time 11.2 12/08/2015 04:42 PM    PLATELET 139 (L) 09/09/2020 10:22 AM     Lab Results   Component Value Date/Time    GFR est non-AA 23 (L) 09/11/2020 11:36 AM    GFR est AA 26 (L) 09/11/2020 11:36 AM    Creatinine 2.38 (H) 09/11/2020 11:36 AM    BUN 34 (H) 09/11/2020 11:36 AM    Sodium 146 (H) 09/11/2020 11:36 AM    Potassium 4.8 09/11/2020 11:36 AM    Chloride 109 (H) 09/11/2020 11:36 AM    CO2 24 09/11/2020 11:36 AM    Magnesium 3.6 (HH) 09/09/2020 10:22 AM    PTH, Intact 57 09/09/2020 10:22 AM     Lab Results   Component Value Date/Time    Glucose 148 (H) 09/11/2020 11:36 AM    Glucose (POC) 205 (H) 09/28/2020 01:30 PM         Review of Systems  Pertinent items are noted in HPI.    Objective:     Significant for the following: OW mildly ill  Visit Vitals  /81 (BP 1 Location: Left arm, BP Patient Position: Sitting)   Pulse 99   Temp 97.1 °F (36.2 °C) (Temporal)   Resp 20   Ht 5' 4\" (1.626 m)   Wt 264 lb (119.7 kg)   LMP 12/03/2017   SpO2 98%   BMI 45.32 kg/m²     WD WN female NAD  Heart RRR without murmers clicks or rubs  Lungs CTA  Abdo soft nontender  Ext no edema      Lab review: labs reviewed, I note that renal functions abnormal now stage 4 CRI.    Assessment/Plan:     Follow-up diabetes borderline controlled, needs further observation.  Diabetic issues reviewed with her: all medications, side effects and compliance discussed carefully and glycohemoglobin and other lab monitoring discussed.     Chronic Conditions Addressed Today     1. Acute systolic congestive heart failure (HCC)    2. Chronic pain    3. CRI (chronic renal insufficiency), stage 4 (severe) (HCC) - Primary    4. Hyperglycemia due to type 2 diabetes mellitus (HCC)     Relevant Medications     insulin aspart U-100 (NovoLOG Flexpen U-100 Insulin) 100 unit/mL (3 mL) inpn    5. Obesity,  morbid ([facility]    6. Type 2 diabetes mellitus with nephropathy (HCC)     Relevant Medications     insulin aspart U-100 (NovoLOG Flexpen U-100 Insulin) 100 unit/mL (3 mL) inpn      Acute Diagnoses Addressed Today     Narcotic dependence (HCC)            Relevant Medications        oxyCODONE-acetaminophen (PERCOCET) 5-325 mg per tablet          We discussed this pain and the usage of controlled substances for backpain relief. In general these medications are indicated for the acute symptom relief and not for chronic usage, allthough they are frequently used for chronic management  After a certain period of time they should be stopped to avoid dependence and/or addiction. I warned her about the dangers of addiction and other side affects including respiratory depression and death. Discussed how this is a controlled substance and that the prescribing of it is should be monitored very carefully. Drug usage is also monitored by our practise and the [facility], since there has been a lot of abuse and diversion of controlled substances. In general we do not refill this medication over the phone. PLease ask for enough pills to get you to your next appointment and make sure you keep your appointments. Warned not to take other medications like alcohol, other benzodiazapines marijuana or other narcotics when on this medication. I have gone ahead and given her 30 pills of Percocet to get until she can find a new pain manager hope hopefully [facility] will provide her with a new one. She will follow-up with nephrology. Unclear to me whether the Jardiance plays a role in her renal failure but she should not take that any longer but can continue her other diabetes medicines. Congestive heart failure stable  Patient was looked up in the . There are multiple prescribers of controlled substances  No    Follow-up and Dispositions    · Return in about 2 months (around 3/27/2021) for routine follow up. Truman Berman

## 2021-01-27 NOTE — PROGRESS NOTES
Chief Complaint   Patient presents with    LOW BACK PAIN     pt in kidney failure and has low bacp pain      Health Maintenance reviewed. I have reviewed the patient's medical history in detail and updated the computerized patient record. Patient has not been out of the country in (10-11 months), NO diarrhea, NO cough, NO chest conjestion, NO temp. Pt has not been around anyone with these symptoms. 1. Have you been to the ER, urgent care clinic since your last visit? Hospitalized since your last visit? yes    2. Have you seen or consulted any other health care providers outside of the [address] [place] [address] since your last visit? Include any pap smears or colon screening. yes      Encouraged pt to discuss pt's wishes with spouse/partner/family and bring them in the next appt to follow thru with the Advanced Directive    Fall Risk Assessment, last 12 mths 11/18/2020   Able to walk? Yes   Fall in past 12 months? No   Number of falls in past 12 months -   Fall with injury? -       3 most recent PHQ Screens 11/18/2020   Little interest or pleasure in doing things Several days   Feeling down, depressed, irritable, or hopeless Several days   Total Score PHQ 2 2       Abuse Screening Questionnaire 11/18/2020   Do you ever feel afraid of your partner? N   Are you in a relationship with someone who physically or mentally threatens you? N   Is it safe for you to go home?  Y       ADL Assessment 11/18/2020   Feeding yourself No Help Needed   Getting from bed to chair No Help Needed   Getting dressed No Help Needed   Bathing or showering No Help Needed   Walk across the room (includes cane/walker) No Help Needed   Using the telphone No Help Needed   Taking your medications No Help Needed   Preparing meals No Help Needed   Managing money (expenses/bills) No Help Needed   Moderately strenuous housework (laundry) No Help Needed   Shopping for personal items (toiletries/medicines) No Help Needed   Shopping for groceries No Help Needed   Driving No Help Needed   Climbing a flight of stairs No Help Needed   Getting to places beyond walking distances No Help Needed

## 2021-03-12 ENCOUNTER — TELEPHONE (OUTPATIENT)
Dept: INTERNAL MEDICINE CLINIC | Age: 56
End: 2021-03-12

## 2021-03-12 RX ORDER — MEDROXYPROGESTERONE ACETATE 5 MG/1
5 TABLET ORAL DAILY
Qty: 7 TAB | Refills: 1 | Status: SHIPPED | OUTPATIENT
Start: 2021-03-12 | End: 2021-03-19

## 2021-03-12 NOTE — TELEPHONE ENCOUNTER
Message fr ирина Sandoval contact number(s): [phone]       Name of medication and dosage if known: PT states the doctor gave her a Rx for medication to dry up her blood from her menstrual cycle and she wants a refill       Is patient out of this medication (yes/no):       [facility] [address] listed in chart? (yes/no): Yes   Pharmacy phone number: [phone]       Details to clarify the request:       Philly Pritchard

## 2021-03-25 DIAGNOSIS — G60.8 IDIOPATHIC SMALL AND LARGE FIBER SENSORY NEUROPATHY: ICD-10-CM

## 2021-03-25 NOTE — TELEPHONE ENCOUNTER
Patient refill request      Requested Prescriptions     Pending Prescriptions Disp Refills    pregabalin (LYRICA) 25 mg capsule 120 Cap 1     Sig: Take 2 Caps by mouth two (2) times a day. Max Daily Amount: 100 mg.        thankyou

## 2021-03-26 RX ORDER — PREGABALIN 25 MG/1
50 CAPSULE ORAL 2 TIMES DAILY
Qty: 120 CAP | Refills: 1 | Status: SHIPPED | OUTPATIENT
Start: 2021-03-26 | End: 2021-04-03 | Stop reason: SDUPTHER

## 2021-03-30 ENCOUNTER — TELEPHONE (OUTPATIENT)
Dept: INTERNAL MEDICINE CLINIC | Age: 56
End: 2021-03-30

## 2021-03-31 NOTE — TELEPHONE ENCOUNTER
Received from Eldadulce maria [facility] request for Lyrica has been approved - through 12/31/2021  Sofía Nelson, STEVEN  0/24/3445  72:35 AM

## 2021-04-02 DIAGNOSIS — I89.0 LYMPHEDEMA: ICD-10-CM

## 2021-04-02 DIAGNOSIS — G60.8 IDIOPATHIC SMALL AND LARGE FIBER SENSORY NEUROPATHY: ICD-10-CM

## 2021-04-02 RX ORDER — METOLAZONE 2.5 MG/1
2.5 TABLET ORAL DAILY
Qty: 30 TAB | Refills: 2 | OUTPATIENT
Start: 2021-04-02

## 2021-04-02 RX ORDER — PREGABALIN 25 MG/1
50 CAPSULE ORAL 2 TIMES DAILY
Qty: 120 CAP | Refills: 1 | OUTPATIENT
Start: 2021-04-02

## 2021-04-02 RX ORDER — LIRAGLUTIDE 6 MG/ML
INJECTION SUBCUTANEOUS
Qty: 6 ML | Refills: 2 | OUTPATIENT
Start: 2021-04-02

## 2021-04-02 NOTE — TELEPHONE ENCOUNTER
[facility] Office Pool             Medication Refill     Caller (if not patient):N/A       Relationship of caller (if not patientN/A       Best contact number(s):[phone]       Name of medication and dosage if known:pregabalin (LYRICA) 25 mg capsule   Victoza 2-Sukhdeep 0.6 mg/0.1 mL (18 mg/3 mL) pnij   metOLazone (ZAROXOLYN) 2.5 mg tablet               Is patient out of this medication (yes/no): Yes     [facility] [address] [facility] name:     Pharmacy listed in chart? (yes/no):Yes   Pharmacy phone number:N/A       Details to clarify the request: Del Mensah

## 2021-04-03 ENCOUNTER — TELEPHONE (OUTPATIENT)
Dept: INTERNAL MEDICINE CLINIC | Age: 56
End: 2021-04-03

## 2021-04-03 DIAGNOSIS — G60.8 IDIOPATHIC SMALL AND LARGE FIBER SENSORY NEUROPATHY: ICD-10-CM

## 2021-04-03 DIAGNOSIS — I89.0 LYMPHEDEMA: ICD-10-CM

## 2021-04-03 RX ORDER — LIRAGLUTIDE 6 MG/ML
INJECTION SUBCUTANEOUS
Qty: 6 ML | Refills: 2 | Status: SHIPPED | OUTPATIENT
Start: 2021-04-03 | End: 2021-05-05 | Stop reason: SDUPTHER

## 2021-04-03 RX ORDER — METOLAZONE 2.5 MG/1
2.5 TABLET ORAL DAILY
Qty: 30 TAB | Refills: 2 | Status: SHIPPED | OUTPATIENT
Start: 2021-04-03 | End: 2021-05-05 | Stop reason: SDUPTHER

## 2021-04-03 RX ORDER — PREGABALIN 25 MG/1
50 CAPSULE ORAL 2 TIMES DAILY
Qty: 120 CAP | Refills: 0 | Status: SHIPPED | OUTPATIENT
Start: 2021-04-03 | End: 2021-05-05 | Stop reason: SDUPTHER

## 2021-05-05 ENCOUNTER — OFFICE VISIT (OUTPATIENT)
Dept: INTERNAL MEDICINE CLINIC | Age: 56
End: 2021-05-05
Payer: MEDICARE

## 2021-05-05 VITALS
HEIGHT: 64 IN | DIASTOLIC BLOOD PRESSURE: 65 MMHG | SYSTOLIC BLOOD PRESSURE: 109 MMHG | BODY MASS INDEX: 44.05 KG/M2 | RESPIRATION RATE: 18 BRPM | TEMPERATURE: 98.6 F | OXYGEN SATURATION: 95 % | WEIGHT: 258 LBS | HEART RATE: 90 BPM

## 2021-05-05 DIAGNOSIS — I89.0 LYMPHEDEMA: ICD-10-CM

## 2021-05-05 DIAGNOSIS — Z11.59 ENCOUNTER FOR HEPATITIS C SCREENING TEST FOR LOW RISK PATIENT: ICD-10-CM

## 2021-05-05 DIAGNOSIS — E66.01 OBESITY, MORBID (HCC): ICD-10-CM

## 2021-05-05 DIAGNOSIS — G60.8 IDIOPATHIC SMALL AND LARGE FIBER SENSORY NEUROPATHY: ICD-10-CM

## 2021-05-05 DIAGNOSIS — Z79.4 TYPE 2 DIABETES MELLITUS WITH HYPERGLYCEMIA, WITH LONG-TERM CURRENT USE OF INSULIN (HCC): ICD-10-CM

## 2021-05-05 DIAGNOSIS — E11.65 TYPE 2 DIABETES MELLITUS WITH HYPERGLYCEMIA, WITH LONG-TERM CURRENT USE OF INSULIN (HCC): ICD-10-CM

## 2021-05-05 DIAGNOSIS — E11.21 TYPE 2 DIABETES MELLITUS WITH NEPHROPATHY (HCC): Primary | ICD-10-CM

## 2021-05-05 DIAGNOSIS — I10 ESSENTIAL HYPERTENSION: ICD-10-CM

## 2021-05-05 DIAGNOSIS — Z23 ENCOUNTER FOR IMMUNIZATION: ICD-10-CM

## 2021-05-05 DIAGNOSIS — N18.4 CRI (CHRONIC RENAL INSUFFICIENCY), STAGE 4 (SEVERE) (HCC): ICD-10-CM

## 2021-05-05 PROCEDURE — 2022F DILAT RTA XM EVC RTNOPTHY: CPT | Performed by: FAMILY MEDICINE

## 2021-05-05 PROCEDURE — G8427 DOCREV CUR MEDS BY ELIG CLIN: HCPCS | Performed by: FAMILY MEDICINE

## 2021-05-05 PROCEDURE — G8752 SYS BP LESS 140: HCPCS | Performed by: FAMILY MEDICINE

## 2021-05-05 PROCEDURE — G8432 DEP SCR NOT DOC, RNG: HCPCS | Performed by: FAMILY MEDICINE

## 2021-05-05 PROCEDURE — 3046F HEMOGLOBIN A1C LEVEL >9.0%: CPT | Performed by: FAMILY MEDICINE

## 2021-05-05 PROCEDURE — G8417 CALC BMI ABV UP PARAM F/U: HCPCS | Performed by: FAMILY MEDICINE

## 2021-05-05 PROCEDURE — 3017F COLORECTAL CA SCREEN DOC REV: CPT | Performed by: FAMILY MEDICINE

## 2021-05-05 PROCEDURE — G8754 DIAS BP LESS 90: HCPCS | Performed by: FAMILY MEDICINE

## 2021-05-05 PROCEDURE — 99214 OFFICE O/P EST MOD 30 MIN: CPT | Performed by: FAMILY MEDICINE

## 2021-05-05 RX ORDER — HYDRALAZINE HYDROCHLORIDE 25 MG/1
25 TABLET, FILM COATED ORAL 2 TIMES DAILY
Qty: 180 TAB | Refills: 3 | Status: SHIPPED | OUTPATIENT
Start: 2021-05-05 | End: 2022-01-25 | Stop reason: SDUPTHER

## 2021-05-05 RX ORDER — ISOSORBIDE DINITRATE 20 MG/1
20 TABLET ORAL 2 TIMES DAILY
Qty: 180 TAB | Refills: 3 | Status: SHIPPED | OUTPATIENT
Start: 2021-05-05 | End: 2021-06-21 | Stop reason: SDUPTHER

## 2021-05-05 RX ORDER — PREGABALIN 25 MG/1
50 CAPSULE ORAL 2 TIMES DAILY
Qty: 120 CAP | Refills: 2 | Status: SHIPPED | OUTPATIENT
Start: 2021-05-05 | End: 2021-08-12 | Stop reason: SDUPTHER

## 2021-05-05 RX ORDER — LIRAGLUTIDE 6 MG/ML
INJECTION SUBCUTANEOUS
Qty: 6 ML | Refills: 5 | Status: SHIPPED | OUTPATIENT
Start: 2021-05-05 | End: 2021-09-08 | Stop reason: SDUPTHER

## 2021-05-05 RX ORDER — METOLAZONE 2.5 MG/1
2.5 TABLET ORAL DAILY
Qty: 90 TAB | Refills: 1 | Status: SHIPPED | OUTPATIENT
Start: 2021-05-05 | End: 2022-02-01 | Stop reason: ALTCHOICE

## 2021-05-05 RX ORDER — INSULIN ASPART 100 [IU]/ML
12 INJECTION, SOLUTION INTRAVENOUS; SUBCUTANEOUS 2 TIMES DAILY
Qty: 5 PEN | Refills: 5 | Status: SHIPPED | OUTPATIENT
Start: 2021-05-05 | End: 2021-09-08 | Stop reason: SDUPTHER

## 2021-05-05 NOTE — PROGRESS NOTES
Chief Complaint   Patient presents with    Chronic Kidney Disease     FU     Patient has not been out of the country in (14 months), NO diarrhea, NO cough, NO chest conjestion, NO temp. Pt has not been around anyone with these symptoms. Health Maintenance reviewed. I have reviewed the patient's medical history in detail and updated the computerized patient record. 1. Have you been to the ER, urgent care clinic since your last visit? Hospitalized since your last visit? 2. Have you seen or consulted any other health care providers outside of the [address] [place] [address] since your last visit? Include any pap smears or colon screening. Encouraged pt to discuss pt's wishes with spouse/partner/family and bring them in the next appt to follow thru with the Advanced Directive    @  [address], last 12 mths 5/5/2021   Able to walk? Yes   Fall in past 12 months? 0   Do you feel unsteady? 0   Are you worried about falling 0   Number of falls in past 12 months -   Fall with injury? -       3 most recent PHQ Screens 5/5/2021   Little interest or pleasure in doing things More than half the days   Feeling down, depressed, irritable, or hopeless More than half the days   Total Score PHQ 2 4       Abuse Screening Questionnaire 5/5/2021   Do you ever feel afraid of your partner? N   Are you in a relationship with someone who physically or mentally threatens you? N   Is it safe for you to go home?  Y       ADL Assessment 5/5/2021   Feeding yourself No Help Needed   Getting from bed to chair No Help Needed   Getting dressed No Help Needed   Bathing or showering No Help Needed   Walk across the room (includes cane/walker) No Help Needed   Using the telphone No Help Needed   Taking your medications No Help Needed   Preparing meals No Help Needed   Managing money (expenses/bills) No Help Needed   Moderately strenuous housework (laundry) No Help Needed   Shopping for personal items (toiletries/medicines) No Help Needed   Shopping for groceries No Help Needed   Driving No Help Needed   Climbing a flight of stairs No Help Needed   Getting to places beyond walking distances No Help Needed

## 2021-05-06 LAB
CREAT UR-MCNC: 82.9 MG/DL
MICROALBUMIN UR-MCNC: 4.51 MG/DL
MICROALBUMIN/CREAT UR-RTO: 54 MG/G (ref 0–30)

## 2021-05-07 NOTE — PROGRESS NOTES
Subjective:     Raelyn Romberg is an 48 y.o. female who is seen for follow up of CHF. She has diabetes, hypertension, CHF, Systolic HF and obesity. Diet and Lifestyle: follows a diabetic diet regularly, nonsmoker. Follows low-sodium diet  Kidney function has been stable  No hypoglycemia  Lab Results   Component Value Date/Time    Hemoglobin A1c 8.4 (H) 09/11/2020 11:36 AM    Hemoglobin A1c 8.2 (H) 09/09/2020 10:22 AM    Hemoglobin A1c 7.6 (H) 03/11/2020 02:38 PM    Glucose 148 (H) 09/11/2020 11:36 AM    Glucose (POC) 205 (H) 09/28/2020 01:30 PM    Microalbumin urine (POC) 10 01/14/2015 11:46 AM    Microalbumin/Creat ratio (mg/g creat) 54 (H) 05/05/2021 12:50 PM    Microalbumin,urine random 4.51 05/05/2021 12:50 PM    Microalbumin/creat ratio (POC)  01/14/2015 11:46 AM    LDL, calculated 114 (H) 09/11/2020 11:36 AM    LDL, calculated 86 05/21/2019 03:44 PM    Creatinine 2.38 (H) 09/11/2020 11:36 AM     Weight monitoring: has been stable  6 pound weight loss since earlier this year her breathing's been good  Cardiovascular System Review  Cardiovascular ROS - taking medications as instructed, no medication side effects noted, no chest pain on exertion, notes stable dyspnea on exertion, no change, NYHA class 1, no swelling of ankles.     Patient Active Problem List    Diagnosis Date Noted    CRI (chronic renal insufficiency), stage 4 (severe) ([address] 01/27/2021    Bilateral cataracts 09/08/2020    Hyperglycemia due to type 2 diabetes mellitus ([address] 84/16/1778    Acute systolic congestive heart failure ([address] 02/07/2018    Obesity, morbid ([address] 12/18/2017    Type 2 diabetes mellitus with nephropathy ([address] 12/18/2017    Spinal stenosis of lumbosacral region 12/08/2016    Lumbar back pain with radiculopathy affecting right lower extremity 12/08/2016    Idiopathic small and large fiber sensory neuropathy 11/16/2016    Narcotic dependence, episodic use ([address] 04/29/2016    Chronic pain     Hypertension 11/07/2012     Current Outpatient Medications   Medication Sig Dispense Refill    pregabalin (LYRICA) 25 mg capsule Take 2 Caps by mouth two (2) times a day. Max Daily Amount: 100 mg. 120 Cap 2    liraglutide (Victoza 2-Sukhdeep) 0.6 mg/0.1 mL (18 mg/3 mL) pnij INJECT 1.2 MG SUBCUTANEOUSLY DAILY 6 mL 5    metOLazone (ZAROXOLYN) 2.5 mg tablet Take 1 Tab by mouth daily. As needed for swelling 90 Tab 1    insulin aspart U-100 (NovoLOG Flexpen U-100 Insulin) 100 unit/mL (3 mL) inpn 12 Units by SubCUTAneous route two (2) times a day. BEFORE BREAKFAST AND DINNER 5 Pen 5    isosorbide dinitrate (ISORDIL) 20 mg tablet Take 1 Tab by mouth two (2) times a day. 180 Tab 3    hydrALAZINE (APRESOLINE) 25 mg tablet Take 1 Tab by mouth two (2) times a day. 180 Tab 3    cyanocobalamin (Vitamin B-12) 500 mcg tablet Take 1 Tab by mouth daily. 90 Tab 3    Insulin Needles, Disposable, 32 gauge x 5/32\" ndle Check blood sugar up to 3x daily as needed. 100 Pen Needle 5    carvediloL (COREG) 12.5 mg tablet Take 1 tablet by mouth twice daily 180 Tab 3    glucose blood VI test strips (True Metrix Glucose Test Strip) strip CHECK BLOOD SUGAR UP TO THREE TIMES DAILY 100 Strip 5    Blood-Glucose Meter monitoring kit Want free style leda kit 1 Kit 0    lidocaine (LIDODERM) 5 % Apply patch to the affected area for 12 hours a day and remove for 12 hours a day. 5 Each 0    Blood-Glucose Meter (TRUE METRIX GLUCOSE METER) misc Check blood sugar up to three times daily 1 Each 0    Cholecalciferol, Vitamin D3, 50,000 unit cap Take  by mouth.  aspirin delayed-release 81 mg tablet Take  by mouth daily.        Allergies   Allergen Reactions    Lortab [Hydrocodone-Acetaminophen] Nausea and Vomiting    Cymbalta [Duloxetine] Diarrhea    Jardiance [Empagliflozin] Other (comments)     kidneys    Lasix [Furosemide] Swelling    Morphine Other (comments)     Pt states is not allergic to morphine     Past Medical History:   Diagnosis Date    Anemia     CAD (coronary artery disease)     Cardiomyopathy ([facility]     Chronic pain     CKD (chronic kidney disease)     Cyst in hand 2014    [address]    Diabetes mellitus type 2 in obese ([facility]     Diabetic neuropathy ([facility]     Hypertension     Migraine      Social History     Tobacco Use    Smoking status: Never Smoker    Smokeless tobacco: Never Used   Substance Use Topics    Alcohol use: No     Alcohol/week: 0.0 standard drinks        Lab Results   Component Value Date/Time    WBC 5.6 09/09/2020 10:22 AM    HGB 12.6 09/09/2020 10:22 AM    HCT 39.3 09/09/2020 10:22 AM    PLATELET 671 (L) 25/54/1682 10:22 AM    MCV 79 09/09/2020 10:22 AM     Lab Results   Component Value Date/Time    Hemoglobin A1c 8.4 (H) 09/11/2020 11:36 AM    Hemoglobin A1c 8.2 (H) 09/09/2020 10:22 AM    Hemoglobin A1c 7.6 (H) 03/11/2020 02:38 PM    Glucose 148 (H) 09/11/2020 11:36 AM    Glucose (POC) 205 (H) 09/28/2020 01:30 PM    Microalbumin/Creat ratio (mg/g creat) 54 (H) 05/05/2021 12:50 PM    Microalbumin,urine random 4.51 05/05/2021 12:50 PM    LDL, calculated 114 (H) 09/11/2020 11:36 AM    LDL, calculated 86 05/21/2019 03:44 PM    Creatinine 2.38 (H) 09/11/2020 11:36 AM      Lab Results   Component Value Date/Time    Cholesterol, total 184 09/11/2020 11:36 AM    HDL Cholesterol 48 09/11/2020 11:36 AM    LDL, calculated 114 (H) 09/11/2020 11:36 AM    LDL, calculated 86 05/21/2019 03:44 PM    Triglyceride 120 09/11/2020 11:36 AM     Lab Results   Component Value Date/Time    ALT (SGPT) 10 09/09/2020 10:22 AM    Alk.  phosphatase 139 (H) 09/09/2020 10:22 AM    Bilirubin, total 0.3 09/09/2020 10:22 AM    Albumin 4.0 09/09/2020 10:22 AM    Protein, total 7.1 09/09/2020 10:22 AM    INR 1.1 12/08/2015 04:42 PM    Prothrombin time 11.2 12/08/2015 04:42 PM    PLATELET 135 (L) 37/01/4056 10:22 AM     Lab Results   Component Value Date/Time    GFR est non-AA 23 (L) 09/11/2020 11:36 AM    GFR est AA 26 (L) 09/11/2020 11:36 AM    Creatinine 2.38 (H) 09/11/2020 11:36 AM    BUN 34 (H) 09/11/2020 11:36 AM    Sodium 146 (H) 09/11/2020 11:36 AM    Potassium 4.8 09/11/2020 11:36 AM    Chloride 109 (H) 09/11/2020 11:36 AM    CO2 24 09/11/2020 11:36 AM    Magnesium 3.6 (HH) 09/09/2020 10:22 AM    PTH, Intact 57 09/09/2020 10:22 AM     Lab Results   Component Value Date/Time    Glucose 148 (H) 09/11/2020 11:36 AM    Glucose (POC) 205 (H) 09/28/2020 01:30 PM         Review of Systems, additional:  Pertinent items are noted in HPI. Objective:     SIngnificant for no acute distress  Visit Vitals  /65 (BP 1 Location: Left arm, BP Patient Position: Sitting, BP Cuff Size: Adult)   Pulse 90   Temp 98.6 °F (37 °C) (Oral)   Resp 18   Ht 5' 4\" (1.626 m)   Wt 258 lb (117 kg)   LMP 12/03/2017   SpO2 95%   BMI 44.29 kg/m²     Wt Readings from Last 3 Encounters:   05/05/21 258 lb (117 kg)   01/27/21 264 lb (119.7 kg)   11/18/20 264 lb (119.7 kg)       WD WN female NAD  Heart RRR without murmers clicks or rubs  Lungs CTA  Abdo soft nontender  Ext no edema        Assessment/Plan:     hypertension well controlled, stable. Chronic Conditions Addressed Today     1. Type 2 diabetes mellitus with nephropathy (HCC) - Primary     Relevant Medications     pregabalin (LYRICA) 25 mg capsule     liraglutide (Victoza 2-Sukhdeep) 0.6 mg/0.1 mL (18 mg/3 mL) pnij     metOLazone (ZAROXOLYN) 2.5 mg tablet     insulin aspart U-100 (NovoLOG Flexpen U-100 Insulin) 100 unit/mL (3 mL) inpn     Other Relevant Orders     MICROALBUMIN, UR, RAND W/ MICROALB/CREAT RATIO     HEMOGLOBIN A1C WITH EAG    2. Obesity, morbid (HCC)     Relevant Medications     liraglutide (Victoza 2-Sukhdeep) 0.6 mg/0.1 mL (18 mg/3 mL) pnij     metOLazone (ZAROXOLYN) 2.5 mg tablet    3. Idiopathic small and large fiber sensory neuropathy     Relevant Medications     pregabalin (LYRICA) 25 mg capsule    4.  Hypertension     Relevant Medications     metOLazone (ZAROXOLYN) 2.5 mg tablet     isosorbide dinitrate (ISORDIL) 20 mg tablet hydrALAZINE (APRESOLINE) 25 mg tablet     Other Relevant Orders     METABOLIC PANEL, BASIC    5. Hyperglycemia due to type 2 diabetes mellitus (HCC)     Relevant Medications     pregabalin (LYRICA) 25 mg capsule     liraglutide (Victoza 2-Sukhdeep) 0.6 mg/0.1 mL (18 mg/3 mL) pnij     insulin aspart U-100 (NovoLOG Flexpen U-100 Insulin) 100 unit/mL (3 mL) inpn    6. CRI (chronic renal insufficiency), stage 4 (severe) (HCC)     Relevant Medications     metOLazone (ZAROXOLYN) 2.5 mg tablet      Acute Diagnoses Addressed Today     Encounter for hepatitis C screening test for low risk patient            Relevant Orders        HEP B SURFACE AG        RPR        HEPATITIS C AB, RFLX TO QT BY PCR        HIV 1/2 AG/AB, 4TH GENERATION,W RFLX CONFIRM    Encounter for immunization        Lymphedema            Relevant Medications        metOLazone (ZAROXOLYN) 2.5 mg tablet        Orders Placed This Encounter    MICROALBUMIN, UR, RAND W/ MICROALB/CREAT RATIO     Standing Status:   Future     Standing Expiration Date:   05/3/6907    METABOLIC PANEL, BASIC     Standing Status:   Future     Standing Expiration Date:   11/5/2021    HEMOGLOBIN A1C WITH EAG     Standing Status:   Future     Standing Expiration Date:   5/5/2022    HEP B SURFACE AG     Standing Status:   Future     Standing Expiration Date:   11/3/2021    RPR     Standing Status:   Future     Standing Expiration Date:   11/3/2021    HEPATITIS C AB, RFLX TO QT BY PCR     Standing Status:   Future     Standing Expiration Date:   11/5/2021    HIV 1/2 AG/AB, 4TH GENERATION,W RFLX CONFIRM     Standing Status:   Future     Standing Expiration Date:   5/5/2022    pregabalin (LYRICA) 25 mg capsule     Sig: Take 2 Caps by mouth two (2) times a day. Max Daily Amount: 100 mg.      Dispense:  120 Cap     Refill:  2    liraglutide (Victoza 2-Sukhdeep) 0.6 mg/0.1 mL (18 mg/3 mL) pnij     Sig: INJECT 1.2 MG SUBCUTANEOUSLY DAILY     Dispense:  6 mL     Refill:  5    metOLazone (ZAROXOLYN) 2.5 mg tablet     Sig: Take 1 Tab by mouth daily. As needed for swelling     Dispense:  90 Tab     Refill:  1    insulin aspart U-100 (NovoLOG Flexpen U-100 Insulin) 100 unit/mL (3 mL) inpn     Si Units by SubCUTAneous route two (2) times a day. BEFORE BREAKFAST AND DINNER     Dispense:  5 Pen     Refill:  5    isosorbide dinitrate (ISORDIL) 20 mg tablet     Sig: Take 1 Tab by mouth two (2) times a day. Dispense:  180 Tab     Refill:  3    hydrALAZINE (APRESOLINE) 25 mg tablet     Sig: Take 1 Tab by mouth two (2) times a day. Dispense:  180 Tab     Refill:  3     Orders Placed This Encounter    MICROALBUMIN, UR, RAND W/ MICROALB/CREAT RATIO     Standing Status:   Future     Standing Expiration Date:   06/3/8969    METABOLIC PANEL, BASIC     Standing Status:   Future     Standing Expiration Date:   2021    HEMOGLOBIN A1C WITH EAG     Standing Status:   Future     Standing Expiration Date:   2022    HEP B SURFACE AG     Standing Status:   Future     Standing Expiration Date:   11/3/2021    RPR     Standing Status:   Future     Standing Expiration Date:   11/3/2021    HEPATITIS C AB, RFLX TO QT BY PCR     Standing Status:   Future     Standing Expiration Date:   2021    HIV 1/2 AG/AB, 4TH GENERATION,W RFLX CONFIRM     Standing Status:   Future     Standing Expiration Date:   2022    pregabalin (LYRICA) 25 mg capsule     Sig: Take 2 Caps by mouth two (2) times a day. Max Daily Amount: 100 mg. Dispense:  120 Cap     Refill:  2    liraglutide (Victoza 2-Sukhdeep) 0.6 mg/0.1 mL (18 mg/3 mL) pnij     Sig: INJECT 1.2 MG SUBCUTANEOUSLY DAILY     Dispense:  6 mL     Refill:  5    metOLazone (ZAROXOLYN) 2.5 mg tablet     Sig: Take 1 Tab by mouth daily. As needed for swelling     Dispense:  90 Tab     Refill:  1    insulin aspart U-100 (NovoLOG Flexpen U-100 Insulin) 100 unit/mL (3 mL) inpn     Si Units by SubCUTAneous route two (2) times a day.  BEFORE BREAKFAST AND DINNER     Dispense:  5 Pen     Refill:  5    isosorbide dinitrate (ISORDIL) 20 mg tablet     Sig: Take 1 Tab by mouth two (2) times a day. Dispense:  180 Tab     Refill:  3    hydrALAZINE (APRESOLINE) 25 mg tablet     Sig: Take 1 Tab by mouth two (2) times a day.      Dispense:  180 Tab     Refill:  3         3 mo follow-up

## 2021-05-18 ENCOUNTER — TELEPHONE (OUTPATIENT)
Dept: INTERNAL MEDICINE CLINIC | Age: 56
End: 2021-05-18

## 2021-05-21 NOTE — PROGRESS NOTES
Chief Complaint   Patient presents with    Diabetes     follow up    Knee Pain     L/knee pain     I have reviewed the patient's medical history in detail and updated the computerized patient record. Health Maintenance reviewed. 1. Have you been to the ER, urgent care clinic since your last visit? Hospitalized since your last visit?no    2. Have you seen or consulted any other health care providers outside of the [address] [place] [address] [place] since your last visit? Include any pap smears or colon screening. No    Encouraged pt to discuss pt's wishes with spouse/partner/family and bring them in the next appt to follow thru with the Advanced Directive    Fall Risk Assessment, last 12 mths 1/8/2018   Able to walk? Yes   Fall in past 12 months? Yes   Fall with injury? Yes   Number of falls in past 12 months 2   Fall Risk Score 3       PHQ over the last two weeks 7/2/2018   Little interest or pleasure in doing things Several days   Feeling down, depressed or hopeless Several days   Total Score PHQ 2 2       Abuse Screening Questionnaire 1/8/2018   Do you ever feel afraid of your partner? N   Are you in a relationship with someone who physically or mentally threatens you? N   Is it safe for you to go home?  Y       ADL Assessment 1/8/2018   Feeding yourself No Help Needed   Getting from bed to chair No Help Needed   Getting dressed No Help Needed   Bathing or showering No Help Needed   Walk across the room (includes cane/walker) No Help Needed   Using the telphone No Help Needed   Taking your medications No Help Needed   Preparing meals No Help Needed   Managing money (expenses/bills) No Help Needed   Moderately strenuous housework (laundry) No Help Needed   Shopping for personal items (toiletries/medicines) No Help Needed   Shopping for groceries No Help Needed   Driving No Help Needed   Climbing a flight of stairs No Help Needed   Getting to places beyond walking distances No Help Needed Patient Education        Gallbladder Removal Surgery: What to Expect at Home  Your Recovery  After your surgery, you will likely feel weak and tired for several days after you return home. Your belly may be swollen. If you had laparoscopic surgery, you may also have pain in your shoulder for about 24 hours. You may have gas or need to burp a lot at first. A few people get diarrhea. The diarrhea usually goes away in 2 to 4 weeks, but it may last longer. How quickly you recover depends on whether you had a laparoscopic or open surgery. · For a laparoscopic surgery, most people can go back to work or their normal routine in 1 to 2 weeks. But it may take longer, depending on the type of work you do. · For an open surgery, it will probably take 4 to 6 weeks before you get back to your normal routine. This care sheet gives you a general idea about how long it will take for you to recover. However, each person recovers at a different pace. Follow the steps below to get better as quickly as possible. How can you care for yourself at home? Activity    · Rest when you feel tired. Getting enough sleep will help you recover.     · Try to walk each day. Start out by walking a little more than you did the day before. Gradually increase the amount you walk. Walking boosts blood flow and helps prevent pneumonia and constipation.     · For about 2 to 4 weeks, avoid lifting anything that would make you strain. This may include a child, heavy grocery bags and milk containers, a heavy briefcase or backpack, cat litter or dog food bags, or a vacuum .     · Avoid strenuous activities, such as biking, jogging, weightlifting, and aerobic exercise, until your doctor says it is okay.     · You may shower 24 to 48 hours after surgery, if your doctor okays it. Pat the cut (incision) dry.  Do not take a bath for the first 2 weeks, or until your doctor tells you it is okay.     · You may drive when you are no longer taking pain medicine and can quickly move your foot from the gas pedal to the brake. You must also be able to sit comfortably for a long period of time, even if you do not plan to go far. You might get caught in traffic.     · For a laparoscopic surgery, most people can go back to work or their normal routine in 1 to 2 weeks, but it may take longer. For an open surgery, it will probably take 4 to 6 weeks before you get back to your normal routine.     · Your doctor will tell you when you can have sex again. Diet    · Eat smaller meals more often instead of fewer larger meals. You can eat a normal diet, but avoid eating fatty foods for about 1 month. Fatty foods include hamburger, whole milk, cheese, and many snack foods. If your stomach is upset, try bland, low-fat foods like plain rice, broiled chicken, toast, and yogurt.     · Drink plenty of fluids (unless your doctor tells you not to).   · If you have diarrhea, try avoiding spicy foods, dairy products, fatty foods, and alcohol. You can also watch to see if specific foods cause it, and stop eating them. If the diarrhea continues for more than 2 weeks, talk to your doctor.     · You may notice that your bowel movements are not regular right after your surgery. This is common. Try to avoid constipation and straining with bowel movements. You may want to take a fiber supplement every day. If you have not had a bowel movement after a couple of days, ask your doctor about taking a mild laxative. Medicines    · Your doctor will tell you if and when you can restart your medicines. He or she will also give you instructions about taking any new medicines.     · If you take aspirin or some other blood thinner, ask your doctor if and when to start taking it again. Make sure that you understand exactly what your doctor wants you to do.     · Take pain medicines exactly as directed. ? If the doctor gave you a prescription medicine for pain, take it as prescribed.   ? If you are not taking a prescription pain medicine, take an over-the-counter medicine such as acetaminophen (Tylenol), ibuprofen (Advil, Motrin), or naproxen (Aleve). Read and follow all instructions on the label. ? Do not take two or more pain medicines at the same time unless the doctor told you to. Many pain medicines contain acetaminophen, which is Tylenol. Too much Tylenol can be harmful.     · If you think your pain medicine is making you sick to your stomach:  ? Take your medicine after meals (unless your doctor tells you not to). ? Ask your doctor for a different pain medicine.     · If your doctor prescribed antibiotics, take them as directed. Do not stop taking them just because you feel better. You need to take the full course of antibiotics. Incision care    · If you have strips of tape on the incision, or cut, leave the tape on for a week or until it falls off.     · After 24 to 48 hours, wash the area daily with warm, soapy water, and pat it dry.     · You may have staples to hold the cut together. Keep them dry until your doctor takes them out. This is usually in 7 to 10 days.     · Keep the area clean and dry. You may cover it with a gauze bandage if it weeps or rubs against clothing. Change the bandage every day. Ice    · To reduce swelling and pain, put ice or a cold pack on your belly for 10 to 20 minutes at a time. Do this every 1 to 2 hours. Put a thin cloth between the ice and your skin. Follow-up care is a key part of your treatment and safety. Be sure to make and go to all appointments, and call your doctor if you are having problems. It's also a good idea to know your test results and keep a list of the medicines you take. When should you call for help? Call 911 anytime you think you may need emergency care. For example, call if:    · You passed out (lost consciousness).     · You are short of breath. .   Call your doctor now or seek immediate medical care if:    · You are sick to your stomach and cannot drink fluids.     · You have pain that does not get better when you take your pain medicine.     · You cannot pass stools or gas.     · You have signs of infection, such as:  ? Increased pain, swelling, warmth, or redness. ? Red streaks leading from the incision. ? Pus draining from the incision. ? A fever.     · Bright red blood has soaked through the bandage over your incision.     · You have loose stitches, or your incision comes open.     · You have signs of a blood clot in your leg (called a deep vein thrombosis), such as:  ? Pain in your calf, back of knee, thigh, or groin. ? Redness and swelling in your leg or groin. Watch closely for any changes in your health, and be sure to contact your doctor if you have any problems. Where can you learn more? Go to http://www.gray.com/  Enter F357 in the search box to learn more about \"Gallbladder Removal Surgery: What to Expect at Home. \"  Current as of: April 15, 2020               Content Version: 12.8  © 2006-2021 [employer]. Care instructions adapted under license by [employer] (which disclaims liability or warranty for this information). If you have questions about a medical condition or this instruction, always ask your healthcare professional. [address] any warranty or liability for your use of this information. DISCHARGE SUMMARY from your Nurse      PATIENT INSTRUCTIONS    After general anesthesia or intravenous sedation, for 24 hours or while taking prescription Narcotics:  · Limit your activities  · Do not drive and operate hazardous machinery  · Do not make important personal or business decisions  · Do  not drink alcoholic beverages  · If you have not urinated within 8 hours after discharge, please contact your surgeon on call.     Report the following to your surgeon:  · Excessive pain, swelling, redness or odor of or around the surgical area  · Temperature over 100.5  · Nausea and vomiting lasting longer than 4 hours or if unable to take medications  · Any signs of decreased circulation or nerve impairment to extremity: change in color, persistent  numbness, tingling, coldness or increase pain  · Any questions      GOOD HELP TO FIGHT AN INFECTION  Here are a few tip to help reduce the chance of getting an infection after surgery:   Wash Your Hands   Good handwashing is the most important thing you and your caregiver can do.  Wash before and after caring for any wounds. Dry your hand with a clean towel.  Wash with soap and water for at least 20 seconds. A TIP: sing the \"Happy Birthday\" song through one time while washing to help with the timing.  Use a hand  in between washings.  Shower   When your surgeon says it is OK to take a shower, use a new bar of antibacterial soap (if that is what you use, and keep that bar of soap ONLY for your use), or antibacterial body wash.  Use a clean wash cloth or sponge when you bathe.  Dry off with a clean towel  after every bath - be careful around any wounds, skin staples, sutures or surgical glue over/on wounds.  Do not enter swimming pools, hot tubs, lakes, rivers and/or ocean until wounds are healed and your doctor/surgeon says it is OK.  Use Clean Sheets   Sleep on freshly laundered sheets after your surgery.  Keep the surgery site covered with a clean, dry bandage (if instructed to do so). If the bandage becomes soiled, reapply a new, dry, clean bandage.  Do not allow pets to sleep with you while your wound is healing.  Lifestyle Modification and Controlling Your Blood Sugar   Smoking slows wound healing. Stop smoking and limit exposure to second-hand smoke.  High blood sugar slows wound healing.   Eat a well-balanced diet to provide proper nutrition while healing   Monitor your blood sugar (if you are a diabetic) and take your medications as you are suppose to so you can control you blood sugar after surgery. COUGH AND DEEP BREATHE    Breathing deeply and coughing are very important exercises to do after surgery. Deep breathing and coughing open the little air tubes and air sacks in your lungs. You take deep breaths every day. You may not even notice - it is just something you do when you sigh or yawn. It is a natural exercise you do to keep these air passages open. After surgery, take deep breaths and cough, on purpose. DIRECTIONS:  · Take 10 to 15 slow deep breaths every hour while awake. · Breathe in deeply, and hold it for 2 seconds. · Exhale slowly through puckered lips, like blowing up a balloon. · After every 4th or 5th deep breath, hug your pillow to your chest or belly and give a hard, deep cough. Yes, it will probably hurt. But doing this exercise is a very important part of healing after surgery. Take your pain medicine to help you do this exercise without too much pain. Coughing and deep breathing help prevent bronchitis and pneumonia after surgery. If you had chest or belly surgery, use a pillow as a \"hug emmanuel\" and hold it tightly to your chest or belly when you cough. ANKLE PUMPS    Ankle pumps increase the circulation of oxygenated blood to your lower extremities and decrease your risk for circulation problems such as blood clots. They also stretch the muscles, tendons and ligaments in your foot and ankle, and prevent joint contracture in the ankle and foot, especially after surgeries on the legs. It is important to do ankle pump exercises regularly after surgery because immobility increases your risk for developing a blood clot. Your doctor may also have you take an Aspirin for the next few days as well. If your doctor did not ask you to take an Aspirin, consult with him before starting Aspirin therapy on your own. The exercise is quite simple.      · Slowly point your foot forward, feeling the muscles on the top of your lower leg stretch, and hold this position for 5 seconds. · Next, pull your foot back toward you as far as possible, stretching the calf muscles, and hold that position for 5 seconds. · Repeat with the other foot. · Perform 10 repetitions every hour while awake for both ankles if possible (down and then up with the foot once is one repetition). You should feel gentle stretching of the muscles in your lower leg when doing this exercise. If you feel pain, or your range of motion is limited, don't push too hard. Only go the limit your joint and muscles will let you go. If you have increasing pain, progressively worsening leg warmth or swelling, STOP the exercise and call your doctor. MEDICATION AND   SIDE EFFECT GUIDE    The [facility] MEDICATION AND SIDE EFFECT GUIDE was provided to the PATIENT AND CARE PROVIDER. Information provided includes instruction about drug purpose and common side effects for the following medications:   · Tramadol  · Zofran        These are general instructions for a healthy lifestyle:    *   Please give a list of your current medications to your Primary Care Provider. *   Please update this list whenever your medications are discontinued, doses are changed, or new medications (including over-the-counter products) are added. *   Please carry medication information at all times in case of emergency situations. About Smoking  No smoking / No tobacco products  Avoid exposure to second hand smoke     Surgeon General's Warning:  Quitting smoking now greatly reduces serious risk to your health. Obesity, smoking, and sedentary lifestyle greatly increases your risk for illness and disease. A healthy diet, regular physical exercise & weight monitoring are important for maintaining a healthy lifestyle.       Congestive Heart Failure  You may be retaining fluid if you have a history of heart failure or if you experience any of the following symptoms:  Weight gain of 3 pounds or more overnight or 5 pounds in a week, increased swelling in your hands or feet or shortness of breath while lying flat in bed. Please call your doctor as soon as you notice any of these symptoms; do not wait until your next office visit. Recognize signs and symptoms of STROKE:  F -  Face looks uneven  A -  Arms unable to move or move evenly  S -  Speech slurred or non-existent  T -  Time-call 911 as soon as signs and symptoms begin-DO NOT go          back to bed or wait to see if you get better-TIME IS BRAIN. Warning Signs of HEART ATTACK   Call 911 if you have these symptoms:     Chest discomfort. Most heart attacks involve discomfort in the center of the chest that lasts more than a few minutes, or that goes away and comes back. It can feel like uncomfortable pressure, squeezing, fullness, or pain.  Discomfort in other areas of the upper body. Symptoms can include pain or discomfort in one or both arms, the back, neck, jaw, or stomach.  Shortness of breath with or without chest discomfort.  Other signs may include breaking out in a cold sweat, nausea, or lightheadedness. Don't wait more than five minutes to call 911 - MINUTES MATTER! Fast action can save your life. Calling 911 is almost always the fastest way to get lifesaving treatment. Emergency Medical Services staff can begin treatment when they arrive -- up to an hour sooner than if someone gets to the hospital by car. Learning About Coronavirus [phone])  Coronavirus [phone]): Overview  What is coronavirus (COVID-19)? The coronavirus disease (COVID-19) is caused by a virus. It is an illness that was first found in [place], [place], in December 2019. It has since spread worldwide. The virus can cause fever, cough, and trouble breathing. In severe cases, it can cause pneumonia and make it hard to breathe without help. It can cause death.   Coronaviruses are a large group of viruses. They cause the common cold. They also cause more serious illnesses like Middle East respiratory syndrome (MERS) and severe acute respiratory syndrome (SARS). COVID-19 is caused by a novel coronavirus. That means it's a new type that has not been seen in people before. This virus spreads person-to-person through droplets from coughing and sneezing. It can also spread when you are close to someone who is infected. And it can spread when you touch something that has the virus on it, such as a doorknob or a tabletop. What can you do to protect yourself from coronavirus (COVID-19)? The best way to protect yourself from getting sick is to:  · Avoid areas where there is an outbreak. · Avoid contact with people who may be infected. · Wash your hands often with soap or alcohol-based hand sanitizers. · Avoid crowds and try to stay at least 6 feet away from other people. · Wash your hands often, especially after you cough or sneeze. Use soap and water, and scrub for at least 20 seconds. If soap and water aren't available, use an alcohol-based hand . · Avoid touching your mouth, nose, and eyes. What can you do to avoid spreading the virus to others? To help avoid spreading the virus to others:  · Cover your mouth with a tissue when you cough or sneeze. Then throw the tissue in the trash. · Use a disinfectant to clean things that you touch often. · Stay home if you are sick or have been exposed to the virus. Don't go to school, work, or public areas. And don't use public transportation. · If you are sick:  ? Leave your home only if you need to get medical care. But call the doctor's office first so they know you're coming. And wear a face mask, if you have one.  ? If you have a face mask, wear it whenever you're around other people. It can help stop the spread of the virus when you cough or sneeze. ? Clean and disinfect your home every day.  Use household  and disinfectant wipes or sprays. Take special care to clean things that you grab with your hands. These include doorknobs, remote controls, phones, and handles on your refrigerator and microwave. And don't forget countertops, tabletops, bathrooms, and computer keyboards. When to call for help  Call 911 anytime you think you may need emergency care. For example, call if:  · You have severe trouble breathing. (You can't talk at all.)  · You have constant chest pain or pressure. · You are severely dizzy or lightheaded. · You are confused or can't think clearly. · Your face and lips have a blue color. · You pass out (lose consciousness) or are very hard to wake up. Call your doctor now if you develop symptoms such as:  · Shortness of breath. · Fever. · Cough. If you need to get care, call ahead to the doctor's office for instructions before you go. Make sure you wear a face mask, if you have one, to prevent exposing other people to the virus. Where can you get the latest information? The following health organizations are tracking and studying this virus. Their websites contain the most up-to-date information. Rachel Contreras also learn what to do if you think you may have been exposed to the virus. · U.S. Centers for Disease Control and Prevention (CDC): The CDC provides updated news about the disease and travel advice. The website also tells you how to prevent the spread of infection. www.cdc.gov  · World Health Organization [facility]): WHO offers information about the virus outbreaks. WHO also has travel advice. www.who.int  Current as of: April 1, 2020               Content Version: 12.4  © 5359-8819 [employer]. Care instructions adapted under license by your healthcare professional. If you have questions about a medical condition or this instruction, always ask your healthcare professional. [address] any warranty or liability for your use of this information.     The discharge information has been reviewed with the {PATIENT PARENT GUARDIAN:51239}. Any questions and concerns from the {PATIENT PARENT GUARDIAN:56958} have been addressed. The {PATIENT PARENT GUARDIAN:63810} verbalized understanding. CONTENTS FOUND IN YOUR DISCHARGE ENVELOPE:  [x]     Surgeon and Hospital Discharge Instructions  [x]     [facility] Surgical [facility] Care Provider Card  [x]     Medication & Side Effect Guide            (your newly prescribed medications have been marked/highlighted showing the most common side effects from   the classes of drugs on your prescriptions)  [x]     Medication Prescription(s) x *** ( [] These have been sent electronically to your pharmacy by your surgeon,   - OR -       your surgeon has already provided these to you during a previous/pre-op office visit)  [x]     [facility] Education Information  []     Physical Therapy Prescription  []     Follow-up Appointment Cards  []     Surgery-related Pictures/Media  []     Pain block and/or block with On-Q Catheter from Anesthesia Service (information included in your instructions above)  []     Medical device information sheets/pamphlets from their    []     School/work excuse note. []     /parent work excuse note. The following personal items collected during your admission are returned to you:   Dental Appliance: Dental Appliances: None  Vision:    Hearing Aid:    Jewelry: Jewelry: Other (comment) (nose ring to )  Clothing: Clothing: Other (comment) (street clothes to pacu)  Other Valuables:  Other Valuables: Cell Phone (to locker)  Valuables sent to safe:

## 2021-06-07 DIAGNOSIS — E53.8 VITAMIN B 12 DEFICIENCY: ICD-10-CM

## 2021-06-09 RX ORDER — LANOLIN ALCOHOL/MO/W.PET/CERES
500 CREAM (GRAM) TOPICAL DAILY
Qty: 90 TABLET | Refills: 3 | Status: SHIPPED | OUTPATIENT
Start: 2021-06-09 | End: 2022-05-30

## 2021-08-05 ENCOUNTER — TELEPHONE (OUTPATIENT)
Dept: INTERNAL MEDICINE CLINIC | Age: 56
End: 2021-08-05

## 2021-08-05 NOTE — TELEPHONE ENCOUNTER
Needs her Calcium medication refilled, but I dont see it on her medication list.  She has it filled at [facility] in [place]

## 2021-08-09 NOTE — TELEPHONE ENCOUNTER
Patient has an appointment with Dr Gabbi Flannery today - can discuss this at that time  Dorothy Willingham LPN  9/7/7202  1:77 PM

## 2021-08-11 DIAGNOSIS — E11.9 TYPE 2 DIABETES MELLITUS WITH INSULIN THERAPY (HCC): ICD-10-CM

## 2021-08-11 DIAGNOSIS — Z79.4 TYPE 2 DIABETES MELLITUS WITH INSULIN THERAPY (HCC): ICD-10-CM

## 2021-08-11 RX ORDER — PEN NEEDLE, DIABETIC 31 GX3/16"
NEEDLE, DISPOSABLE MISCELLANEOUS
Qty: 100 PEN NEEDLE | Refills: 5 | Status: SHIPPED | OUTPATIENT
Start: 2021-08-11 | End: 2022-02-01 | Stop reason: SDUPTHER

## 2021-08-11 NOTE — TELEPHONE ENCOUNTER
Patient refill request      Requested Prescriptions     Pending Prescriptions Disp Refills    Insulin Needles, Disposable, 32 gauge x 5/32\" ndle 100 Pen Needle 5     Sig: Check blood sugar up to 3x daily as needed.

## 2021-09-08 ENCOUNTER — DOCUMENTATION ONLY (OUTPATIENT)
Dept: INTERNAL MEDICINE CLINIC | Age: 56
End: 2021-09-08

## 2021-09-08 ENCOUNTER — OFFICE VISIT (OUTPATIENT)
Dept: INTERNAL MEDICINE CLINIC | Age: 56
End: 2021-09-08
Payer: MEDICARE

## 2021-09-08 VITALS
TEMPERATURE: 98.5 F | WEIGHT: 258 LBS | SYSTOLIC BLOOD PRESSURE: 108 MMHG | HEART RATE: 83 BPM | RESPIRATION RATE: 18 BRPM | BODY MASS INDEX: 44.05 KG/M2 | OXYGEN SATURATION: 96 % | HEIGHT: 64 IN | DIASTOLIC BLOOD PRESSURE: 64 MMHG

## 2021-09-08 DIAGNOSIS — E11.21 TYPE 2 DIABETES MELLITUS WITH NEPHROPATHY (HCC): ICD-10-CM

## 2021-09-08 DIAGNOSIS — E78.2 MIXED HYPERLIPIDEMIA: ICD-10-CM

## 2021-09-08 DIAGNOSIS — Z00.00 MEDICARE ANNUAL WELLNESS VISIT, SUBSEQUENT: Primary | ICD-10-CM

## 2021-09-08 DIAGNOSIS — I10 ESSENTIAL HYPERTENSION: ICD-10-CM

## 2021-09-08 DIAGNOSIS — Z12.39 ENCOUNTER FOR SCREENING FOR MALIGNANT NEOPLASM OF BREAST, UNSPECIFIED SCREENING MODALITY: ICD-10-CM

## 2021-09-08 DIAGNOSIS — Z13.39 SCREENING FOR ALCOHOLISM: ICD-10-CM

## 2021-09-08 DIAGNOSIS — E11.65 TYPE 2 DIABETES MELLITUS WITH HYPERGLYCEMIA, WITH LONG-TERM CURRENT USE OF INSULIN (HCC): ICD-10-CM

## 2021-09-08 DIAGNOSIS — Z11.59 ENCOUNTER FOR HEPATITIS C SCREENING TEST FOR LOW RISK PATIENT: ICD-10-CM

## 2021-09-08 DIAGNOSIS — Z79.4 TYPE 2 DIABETES MELLITUS WITH HYPERGLYCEMIA, WITH LONG-TERM CURRENT USE OF INSULIN (HCC): ICD-10-CM

## 2021-09-08 PROCEDURE — G0439 PPPS, SUBSEQ VISIT: HCPCS | Performed by: FAMILY MEDICINE

## 2021-09-08 PROCEDURE — 99214 OFFICE O/P EST MOD 30 MIN: CPT | Performed by: FAMILY MEDICINE

## 2021-09-08 PROCEDURE — G8754 DIAS BP LESS 90: HCPCS | Performed by: FAMILY MEDICINE

## 2021-09-08 PROCEDURE — G8510 SCR DEP NEG, NO PLAN REQD: HCPCS | Performed by: FAMILY MEDICINE

## 2021-09-08 PROCEDURE — G8752 SYS BP LESS 140: HCPCS | Performed by: FAMILY MEDICINE

## 2021-09-08 PROCEDURE — 3017F COLORECTAL CA SCREEN DOC REV: CPT | Performed by: FAMILY MEDICINE

## 2021-09-08 PROCEDURE — G8417 CALC BMI ABV UP PARAM F/U: HCPCS | Performed by: FAMILY MEDICINE

## 2021-09-08 PROCEDURE — G0442 ANNUAL ALCOHOL SCREEN 15 MIN: HCPCS | Performed by: FAMILY MEDICINE

## 2021-09-08 PROCEDURE — G8427 DOCREV CUR MEDS BY ELIG CLIN: HCPCS | Performed by: FAMILY MEDICINE

## 2021-09-08 PROCEDURE — 3046F HEMOGLOBIN A1C LEVEL >9.0%: CPT | Performed by: FAMILY MEDICINE

## 2021-09-08 PROCEDURE — 2022F DILAT RTA XM EVC RTNOPTHY: CPT | Performed by: FAMILY MEDICINE

## 2021-09-08 RX ORDER — INSULIN ASPART 100 [IU]/ML
12 INJECTION, SOLUTION INTRAVENOUS; SUBCUTANEOUS 2 TIMES DAILY
Qty: 5 PEN | Refills: 5 | Status: SHIPPED | OUTPATIENT
Start: 2021-09-08 | End: 2021-09-14 | Stop reason: SDUPTHER

## 2021-09-08 RX ORDER — LIRAGLUTIDE 6 MG/ML
INJECTION SUBCUTANEOUS
Qty: 6 ML | Refills: 5 | Status: SHIPPED | OUTPATIENT
Start: 2021-09-08 | End: 2022-02-01 | Stop reason: SDUPTHER

## 2021-09-08 NOTE — PATIENT INSTRUCTIONS
Medicare Wellness Visit, Female     The best way to live healthy is to have a lifestyle where you eat a well-balanced diet, exercise regularly, limit alcohol use, and quit all forms of tobacco/nicotine, if applicable. Regular preventive services are another way to keep healthy. Preventive services (vaccines, screening tests, monitoring & exams) can help personalize your care plan, which helps you manage your own care. Screening tests can find health problems at the earliest stages, when they are easiest to treat. [facility] follows the current, evidence-based guidelines published by the [facility] ([facility]) when recommending preventive services for our patients. Because we follow these guidelines, sometimes recommendations change over time as research supports it. (For example, mammograms used to be recommended annually. Even though Medicare will still pay for an annual mammogram, the newer guidelines recommend a mammogram every two years for women of average risk). Of course, you and your doctor may decide to screen more often for some diseases, based on your risk and your co-morbidities (chronic disease you are already diagnosed with). Preventive services for you include:  - Medicare offers their members a free annual wellness visit, which is time for you and your primary care provider to discuss and plan for your preventive service needs. Take advantage of this benefit every year!  -All adults over the age of 72 should receive the recommended pneumonia vaccines. Current USPSTF guidelines recommend a series of two vaccines for the best pneumonia protection.   -All adults should have a flu vaccine yearly and a tetanus vaccine every 10 years.   -All adults age 48 and older should receive the shingles vaccines (series of two vaccines).       -All adults age 38-68 who are overweight should have a diabetes screening test once every three years.   -All adults born between [birth year] and 1964 should be screened once for Hepatitis C.  -Other screening tests and preventive services for persons with diabetes include: an eye exam to screen for diabetic retinopathy, a kidney function test, a foot exam, and stricter control over your cholesterol.   -Cardiovascular screening for adults with routine risk involves an electrocardiogram (ECG) at intervals determined by your doctor.   -Colorectal cancer screenings should be done for adults age 54-65 with no increased risk factors for colorectal cancer. There are a number of acceptable methods of screening for this type of cancer. Each test has its own benefits and drawbacks. Discuss with your doctor what is most appropriate for you during your annual wellness visit. The different tests include: colonoscopy (considered the best screening method), a fecal occult blood test, a fecal DNA test, and sigmoidoscopy.    -A bone mass density test is recommended when a woman turns 65 to screen for osteoporosis. This test is only recommended one time, as a screening. Some providers will use this same test as a disease monitoring tool if you already have osteoporosis. -Breast cancer screenings are recommended every other year for women of normal risk, age 54-69.  -Cervical cancer screenings for women over age 72 are only recommended with certain risk factors.      Here is a list of your current Health Maintenance items (your personalized list of preventive services) with a due date:  Health Maintenance Due   Topic Date Due    Hepatitis C Test  Never done    Pneumococcal Vaccine (1 of 4 - PCV13) Never done    COVID-19 Vaccine (1) Never done    Colorectal Screening  Never done    Shingles Vaccine (1 of 2) Never done    Mammogram  03/22/2019    Cervical cancer screen  09/03/2019    Hemoglobin A1C    09/11/2021    Cholesterol Test   09/11/2021

## 2021-09-08 NOTE — PROGRESS NOTES
Chief Complaint   Patient presents with   [facility] Annual Wellness Visit     Clock Draw Test Done    Patient has not been out of the country in (14 months), NO diarrhea, NO cough, NO chest conjestion, NO temp. Pt has not been around anyone with these symptoms. Health Maintenance reviewed. I have reviewed the patient's medical history in detail and updated the computerized patient record. 1. Have you been to the ER, urgent care clinic since your last visit? yes  Hospitalized since your last visit? yes    2. Have you seen or consulted any other health care providers outside of the [address] [place] [address] since your last visit? Yes Include any pap smears or colon screening. Encouraged pt to discuss pt's wishes with spouse/partner/family and bring them in the next appt to follow thru with the Advanced Directive    @  Victor M Esqueda, last 12 mths 5/5/2021   Able to walk? Yes   Fall in past 12 months? 0   Do you feel unsteady? 0   Are you worried about falling 0   Number of falls in past 12 months -   Fall with injury? -       3 most recent PHQ Screens 9/8/2021   Little interest or pleasure in doing things Several days   Feeling down, depressed, irritable, or hopeless Several days   Total Score PHQ 2 2       Abuse Screening Questionnaire 9/8/2021   Do you ever feel afraid of your partner? N   Are you in a relationship with someone who physically or mentally threatens you? N   Is it safe for you to go home?  Y       ADL Assessment 9/8/2021   Feeding yourself No Help Needed   Getting from bed to chair No Help Needed   Getting dressed No Help Needed   Bathing or showering No Help Needed   Walk across the room (includes cane/walker) No Help Needed   Using the telphone No Help Needed   Taking your medications No Help Needed   Preparing meals No Help Needed   Managing money (expenses/bills) No Help Needed   Moderately strenuous housework (laundry) No Help Needed   Shopping for personal items (toiletries/medicines) No Help Needed   Shopping for groceries No Help Needed   Driving No Help Needed   Climbing a flight of stairs No Help Needed   Getting to places beyond walking distances No Help Needed

## 2021-09-08 NOTE — PROGRESS NOTES
Subjective:     Kathia Oakley is an 48 y.o. female who is seen for follow up of CHF. She has diabetes, hypertension and CHF. Diet and Lifestyle: generally follows a low sodium diet, nonsmoker. Lab Results   Component Value Date/Time    Hemoglobin A1c 8.4 (H) 09/11/2020 11:36 AM    Hemoglobin A1c 8.2 (H) 09/09/2020 10:22 AM    Hemoglobin A1c 7.6 (H) 03/11/2020 02:38 PM    Glucose 148 (H) 09/11/2020 11:36 AM    Glucose (POC) 205 (H) 09/28/2020 01:30 PM    Microalbumin urine (POC) 10 01/14/2015 11:46 AM    Microalbumin/Creat ratio (mg/g creat) 54 (H) 05/05/2021 12:50 PM    Microalbumin,urine random 4.51 05/05/2021 12:50 PM    Microalbumin/creat ratio (POC)  01/14/2015 11:46 AM    LDL, calculated 114 (H) 09/11/2020 11:36 AM    LDL, calculated 86 05/21/2019 03:44 PM    Creatinine 2.38 (H) 09/11/2020 11:36 AM       Wt Readings from Last 3 Encounters:   09/08/21 258 lb (117 kg)   05/05/21 258 lb (117 kg)   01/27/21 264 lb (119.7 kg)     Weight monitoring: has been stable    Cardiovascular System Review  Cardiovascular ROS - taking medications as instructed, no medication side effects noted, no chest pain on exertion, notes stable dyspnea on exertion, no change, NYHA class 2, no swelling of ankles.     No hypos  Lab Results   Component Value Date/Time    Hemoglobin A1c 8.4 (H) 09/11/2020 11:36 AM    Hemoglobin A1c 8.2 (H) 09/09/2020 10:22 AM    Hemoglobin A1c 7.6 (H) 03/11/2020 02:38 PM    Glucose 148 (H) 09/11/2020 11:36 AM    Glucose (POC) 205 (H) 09/28/2020 01:30 PM    Microalbumin urine (POC) 10 01/14/2015 11:46 AM    Microalbumin/Creat ratio (mg/g creat) 54 (H) 05/05/2021 12:50 PM    Microalbumin,urine random 4.51 05/05/2021 12:50 PM    Microalbumin/creat ratio (POC)  01/14/2015 11:46 AM    LDL, calculated 114 (H) 09/11/2020 11:36 AM    LDL, calculated 86 05/21/2019 03:44 PM    Creatinine 2.38 (H) 09/11/2020 11:36 AM       Patient Active Problem List    Diagnosis Date Noted    CRI (chronic renal insufficiency), stage 4 (severe) ([facility] 01/27/2021    Bilateral cataracts 09/08/2020    Hyperglycemia due to type 2 diabetes mellitus ([facility] 04/54/3398    Acute systolic congestive heart failure ([facility] 02/07/2018    Obesity, morbid ([facility] 12/18/2017    Type 2 diabetes mellitus with nephropathy ([facility] 12/18/2017    Spinal stenosis of lumbosacral region 12/08/2016    Lumbar back pain with radiculopathy affecting right lower extremity 12/08/2016    Idiopathic small and large fiber sensory neuropathy 11/16/2016    Narcotic dependence, episodic use (HCC) 04/29/2016    Chronic pain     Hypertension 11/07/2012     Current Outpatient Medications   Medication Sig Dispense Refill    insulin aspart U-100 (NovoLOG Flexpen U-100 Insulin) 100 unit/mL (3 mL) inpn 12 Units by SubCUTAneous route two (2) times a day. BEFORE BREAKFAST AND DINNER 5 Pen 5    liraglutide (Victoza 2-Sukhdeep) 0.6 mg/0.1 mL (18 mg/3 mL) pnij INJECT 1.2 MG SUBCUTANEOUSLY DAILY 6 mL 5    pregabalin (LYRICA) 25 mg capsule Take 2 Capsules by mouth two (2) times a day. Max Daily Amount: 100 mg. 120 Capsule 0    atorvastatin (LIPITOR) 40 mg tablet Take 1 tablet by mouth once daily 90 Tablet 1    Insulin Needles, Disposable, 32 gauge x 5/32\" ndle Check blood sugar up to 3x daily as needed. 100 Pen Needle 5    atorvastatin (LIPITOR) 40 mg tablet TAKE 1 TABLET BY MOUTH ONCE DAILY      lisinopriL (PRINIVIL, ZESTRIL) 10 mg tablet Take 1 tablet by mouth once daily 90 Tablet 1    glucose blood VI test strips (True Metrix Glucose Test Strip) strip CHECK BLOOD SUGAR UP TO THREE TIMES DAILY 100 Strip 5    isosorbide dinitrate (ISORDIL) 20 mg tablet Take 1 Tablet by mouth two (2) times a day. 180 Tablet 1    cyanocobalamin (Vitamin B-12) 500 mcg tablet Take 1 Tablet by mouth daily. 90 Tablet 3    metOLazone (ZAROXOLYN) 2.5 mg tablet Take 1 Tab by mouth daily.  As needed for swelling 90 Tab 1    hydrALAZINE (APRESOLINE) 25 mg tablet Take 1 Tab by mouth two (2) times a day. 180 Tab 3    carvediloL (COREG) 12.5 mg tablet Take 1 tablet by mouth twice daily 180 Tab 3    Blood-Glucose Meter monitoring kit Want free style leda kit 1 Kit 0    lidocaine (LIDODERM) 5 % Apply patch to the affected area for 12 hours a day and remove for 12 hours a day. 5 Each 0    Blood-Glucose Meter (TRUE METRIX GLUCOSE METER) misc Check blood sugar up to three times daily 1 Each 0    Cholecalciferol, Vitamin D3, 50,000 unit cap Take  by mouth.  aspirin delayed-release 81 mg tablet Take  by mouth daily. Allergies   Allergen Reactions    Lortab [Hydrocodone-Acetaminophen] Nausea and Vomiting    Cymbalta [Duloxetine] Diarrhea    Jardiance [Empagliflozin] Other (comments)     kidneys    Lasix [Furosemide] Swelling    Morphine Other (comments)     Pt states is not allergic to morphine     Past Medical History:   Diagnosis Date    Anemia     CAD (coronary artery disease)     [facility]     Chronic pain     CKD (chronic kidney disease)     Cyst in hand 2014    [address]    Diabetes mellitus type 2 in obese ([facility]     Diabetic neuropathy ([facility]     Hypertension     Migraine      Social History     Tobacco Use    Smoking status: Never Smoker    Smokeless tobacco: Never Used   Substance Use Topics    Alcohol use: No     Alcohol/week: 0.0 standard drinks             Review of Systems, additional:  Pertinent items are noted in HPI. Objective:     SIngnificant for OW  Visit Vitals  /64 (BP 1 Location: Left arm, BP Patient Position: Sitting, BP Cuff Size: Adult)   Pulse 83   Temp 98.5 °F (36.9 °C) (Temporal)   Resp 18   Ht 5' 4\" (1.626 m)   Wt 258 lb (117 kg)   LMP 12/03/2017   SpO2 96%   BMI 44.29 kg/m²     WD WN female NAD  Heart RRR without murmers clicks or rubs  Lungs CTA  Abdo soft nontender  Ext sl edema        Assessment/Plan:     diabetes well controlled, stable, hypertension well controlled, stable. Chronic Conditions Addressed Today     1.  Type 2 diabetes mellitus with nephropathy (HCC) - Primary     Relevant Medications     insulin aspart U-100 (NovoLOG Flexpen U-100 Insulin) 100 unit/mL (3 mL) inpn     liraglutide (Victoza 2-Sukhdeep) 0.6 mg/0.1 mL (18 mg/3 mL) pnij    2. Hypertension     Relevant Orders     METABOLIC PANEL, BASIC     MAGNESIUM     CBC W/O DIFF    3. Hyperglycemia due to type 2 diabetes mellitus (HCC)     Relevant Medications     insulin aspart U-100 (NovoLOG Flexpen U-100 Insulin) 100 unit/mL (3 mL) inpn     liraglutide (Victoza 2-Sukhdeep) 0.6 mg/0.1 mL (18 mg/3 mL) pnij     Other Relevant Orders     HEMOGLOBIN A1C WITH EAG      Acute Diagnoses Addressed Today     Screening for alcoholism            Relevant Orders        RI ANNUAL ALCOHOL SCREEN 15 MIN    Encounter for hepatitis C screening test for low risk patient            Relevant Orders        HEP B SURFACE AG        RPR        HEPATITIS C AB, RFLX TO QT BY PCR        HIV 1/2 AG/AB, 4TH GENERATION,W RFLX CONFIRM    Mixed hyperlipidemia            Relevant Orders        LIPID PANEL    Encounter for screening for malignant neoplasm of breast, unspecified screening modality            Relevant Orders        ABDULKADIR MAMMO BI SCREENING INCL CAD            This is the Subsequent Medicare Annual Wellness Exam, performed 12 months or more after the Initial AWV or the last Subsequent AWV    I have reviewed the patient's medical history in detail and updated the computerized patient record. Assessment/Plan   Education and counseling provided:  Screening Mammography  Cardiovascular screening blood test  Diabetes outpatient self-management training services      ICD-10-CM ICD-9-CM    1. Medicare annual wellness visit, subsequent  Z00.00 V70.0    2. Type 2 diabetes mellitus with nephropathy (HCC)  E11.21 250.40 insulin aspart U-100 (NovoLOG Flexpen U-100 Insulin) 100 unit/mL (3 mL) inpn     583.81 liraglutide (Victoza 2-Sukhdeep) 0.6 mg/0.1 mL (18 mg/3 mL) pnij   3.  Type 2 diabetes mellitus with hyperglycemia, with long-term current use of insulin (HCC)  E11.65 250.00 HEMOGLOBIN A1C WITH EAG    Z79.4 790.29      V58.67    4. Essential hypertension  V69 162.8 METABOLIC PANEL, BASIC      MAGNESIUM      CBC W/O DIFF   5. Screening for alcoholism  Z13.39 V79.1 ND ANNUAL ALCOHOL SCREEN 15 MIN   6. Encounter for hepatitis C screening test for low risk patient  Z11.59 V73.89 HEP B SURFACE AG      RPR      HEPATITIS C AB, RFLX TO QT BY PCR      HIV 1/2 AG/AB, 4TH GENERATION,W RFLX CONFIRM   7. Mixed hyperlipidemia  E78.2 272.2 LIPID PANEL   8. Encounter for screening for malignant neoplasm of breast, unspecified screening modality  Z12.39 V76.10 ABDULKADIR MAMMO BI SCREENING INCL CAD     Orders Placed This Encounter    ABDULKADIR MAMMO BI SCREENING INCL CAD     Standing Status:   Future     Standing Expiration Date:   10/9/2022     Scheduling Instructions:      [facility]    METABOLIC PANEL, BASIC     Standing Status:   Future     Standing Expiration Date:   3/11/2022    HEMOGLOBIN A1C WITH EAG     Standing Status:   Future     Standing Expiration Date:   2022    MAGNESIUM     Standing Status:   Future     Standing Expiration Date:   3/8/2022    LIPID PANEL     Standing Status:   Future     Standing Expiration Date:   3/11/2022    HEP B SURFACE AG     Standing Status:   Future     Standing Expiration Date:   3/9/2022    RPR     Standing Status:   Future     Standing Expiration Date:   3/9/2022    HEPATITIS C AB, RFLX TO QT BY PCR     Standing Status:   Future     Standing Expiration Date:   3/8/2022    HIV 1/2 AG/AB, 4TH GENERATION,W RFLX CONFIRM     Standing Status:   Future     Standing Expiration Date:   2022    CBC W/O DIFF     Standing Status:   Future     Standing Expiration Date:   3/11/2022    ANNUAL ALCOHOL SCREEN 8-15 MIN    insulin aspart U-100 (NovoLOG Flexpen U-100 Insulin) 100 unit/mL (3 mL) inpn     Si Units by SubCUTAneous route two (2) times a day.  BEFORE BREAKFAST AND DINNER     Dispense: 5 Pen     Refill:  5    liraglutide (Victoza 2-Sukhdeep) 0.6 mg/0.1 mL (18 mg/3 mL) pnij     Sig: INJECT 1.2 MG SUBCUTANEOUSLY DAILY     Dispense:  6 mL     Refill:  5         3-month follow-up    Depression Risk Factor Screening     3 most recent PHQ Screens 9/8/2021   Little interest or pleasure in doing things Several days   Feeling down, depressed, irritable, or hopeless Several days   Total Score PHQ 2 2       Alcohol Risk Screen    Do you average more than 1 drink per night or more than 7 drinks a week:  No    On any one occasion in the past three months have you have had more than 3 drinks containing alcohol:  No        Functional Ability and Level of Safety    Hearing: Hearing is good. Activities of Daily Living: The home contains: no safety equipment. Patient does total self care      Ambulation: with no difficulty     Fall Risk:  Fall Risk Assessment, last 12 mths 5/5/2021   Able to walk? Yes   Fall in past 12 months? 0   Do you feel unsteady? 0   Are you worried about falling 0   Number of falls in past 12 months -   Fall with injury?  -      Abuse Screen:  Patient is not abused       Cognitive Screening    Has your family/caregiver stated any concerns about your memory: no     Cognitive Screening: Normal - Clock Drawing Test    Health Maintenance Due     Health Maintenance Due   Topic Date Due    Hepatitis C Screening  Never done    Pneumococcal 0-64 years (1 of 4 - PCV13) Never done    COVID-19 Vaccine (1) Never done    Colorectal Cancer Screening Combo  Never done    Shingrix Vaccine Age 50> (1 of 2) Never done    Breast Cancer Screen Mammogram  03/22/2019    Cervical cancer screen  09/03/2019    A1C test (Diabetic or Prediabetic)  09/11/2021    Lipid Screen  09/11/2021       Patient Care Team   Patient Care Team:  Shannan Schultz MD as PCP - General (Family Medicine)  Shannan Schultz MD as PCP - [facility] Empaneled Provider  Brigido Moreno MD as Physician (Cardiology)  Robles Cooper MD HERMAN (Neurology)  Deniz Jaimes RN as Ambulatory Care Manager  Uziel Barreto MD as Surgeon (General Surgery)    History     Patient Active Problem List   Diagnosis Code    Hypertension I10    Chronic pain G89.29    Narcotic dependence, episodic use ([address] F11.20    Idiopathic small and large fiber sensory neuropathy G60.8    Spinal stenosis of lumbosacral region M48.07    Lumbar back pain with radiculopathy affecting right lower extremity M54.16    Obesity, morbid ([address] E66.01    Type 2 diabetes mellitus with nephropathy ([address] J31.80    Acute systolic congestive heart failure ([address] I50.21    Hyperglycemia due to type 2 diabetes mellitus ([address] E11.65    Bilateral cataracts H26.9    CRI (chronic renal insufficiency), stage 4 (severe) (HCC) N18.4     Past Medical History:   Diagnosis Date    Anemia     CAD (coronary artery disease)     Cardiomyopathy ([address]     Chronic pain     CKD (chronic kidney disease)     Cyst in hand 2014    [address]    Diabetes mellitus type 2 in obese ([address]     Diabetic neuropathy ([address]     Hypertension     Migraine       Past Surgical History:   Procedure Laterality Date    HX ORTHOPAEDIC      right big toe    HX PACEMAKER       Current Outpatient Medications   Medication Sig Dispense Refill    pregabalin (LYRICA) 25 mg capsule Take 2 Capsules by mouth two (2) times a day. Max Daily Amount: 100 mg. 120 Capsule 0    atorvastatin (LIPITOR) 40 mg tablet Take 1 tablet by mouth once daily 90 Tablet 1    Insulin Needles, Disposable, 32 gauge x 5/32\" ndle Check blood sugar up to 3x daily as needed.  100 Pen Needle 5    atorvastatin (LIPITOR) 40 mg tablet TAKE 1 TABLET BY MOUTH ONCE DAILY      lisinopriL (PRINIVIL, ZESTRIL) 10 mg tablet Take 1 tablet by mouth once daily 90 Tablet 1    glucose blood VI test strips (True Metrix Glucose Test Strip) strip CHECK BLOOD SUGAR UP TO THREE TIMES DAILY 100 Strip 5    isosorbide dinitrate (ISORDIL) 20 mg tablet Take 1 Tablet by mouth two (2) times a day. 180 Tablet 1    cyanocobalamin (Vitamin B-12) 500 mcg tablet Take 1 Tablet by mouth daily. 90 Tablet 3    liraglutide (Victoza 2-Sukhdeep) 0.6 mg/0.1 mL (18 mg/3 mL) pnij INJECT 1.2 MG SUBCUTANEOUSLY DAILY 6 mL 5    metOLazone (ZAROXOLYN) 2.5 mg tablet Take 1 Tab by mouth daily. As needed for swelling 90 Tab 1    insulin aspart U-100 (NovoLOG Flexpen U-100 Insulin) 100 unit/mL (3 mL) inpn 12 Units by SubCUTAneous route two (2) times a day. BEFORE BREAKFAST AND DINNER 5 Pen 5    hydrALAZINE (APRESOLINE) 25 mg tablet Take 1 Tab by mouth two (2) times a day. 180 Tab 3    carvediloL (COREG) 12.5 mg tablet Take 1 tablet by mouth twice daily 180 Tab 3    Blood-Glucose Meter monitoring kit Want free style leda kit 1 Kit 0    lidocaine (LIDODERM) 5 % Apply patch to the affected area for 12 hours a day and remove for 12 hours a day. 5 Each 0    Blood-Glucose Meter (TRUE METRIX GLUCOSE METER) misc Check blood sugar up to three times daily 1 Each 0    Cholecalciferol, Vitamin D3, 50,000 unit cap Take  by mouth.  aspirin delayed-release 81 mg tablet Take  by mouth daily.        Allergies   Allergen Reactions    Lortab [Hydrocodone-Acetaminophen] Nausea and Vomiting    Cymbalta [Duloxetine] Diarrhea    Jardiance [Empagliflozin] Other (comments)     kidneys    Lasix [Furosemide] Swelling    Morphine Other (comments)     Pt states is not allergic to morphine       Family History   Problem Relation Age of Onset    Cancer Father         brain    Diabetes Mother     Heart Disease Mother     Hypertension Mother     Hypertension Maternal Grandmother     Heart Disease Maternal Grandmother      Social History     Tobacco Use    Smoking status: Never Smoker    Smokeless tobacco: Never Used   Substance Use Topics    Alcohol use: No     Alcohol/week: 0.0 standard drinks         Cole Bee MD

## 2021-09-13 ENCOUNTER — TELEPHONE (OUTPATIENT)
Dept: INTERNAL MEDICINE CLINIC | Age: 56
End: 2021-09-13

## 2021-09-13 NOTE — TELEPHONE ENCOUNTER
----- Message from [facility] sent at 9/13/2021  8:30 AM EDT -----  Regarding: Dr. Terrence Hernandez / Referral  Referral    Caller (first and last name if not the patient or from practice): N/A      Caller's relationship to patient (if not from a practice): N/A      Name of caller (if calling from a practice): N/A      Name of practice: [address] [place] [address]      Specialist's title, first, and last name:      Office Phone Number: Unable to Provide      Fax number: Unable to provide      Date and time of appointment: N/A      Reason for appointment: Loraine Serrano      Details to clarify the request:       [facility]

## 2021-09-14 ENCOUNTER — VIRTUAL VISIT (OUTPATIENT)
Dept: INTERNAL MEDICINE CLINIC | Age: 56
End: 2021-09-14
Payer: MEDICARE

## 2021-09-14 DIAGNOSIS — G60.8 IDIOPATHIC SMALL AND LARGE FIBER SENSORY NEUROPATHY: ICD-10-CM

## 2021-09-14 DIAGNOSIS — I10 ESSENTIAL HYPERTENSION: ICD-10-CM

## 2021-09-14 DIAGNOSIS — N18.4 CRI (CHRONIC RENAL INSUFFICIENCY), STAGE 4 (SEVERE) (HCC): ICD-10-CM

## 2021-09-14 DIAGNOSIS — I50.21 ACUTE SYSTOLIC CONGESTIVE HEART FAILURE (HCC): ICD-10-CM

## 2021-09-14 DIAGNOSIS — Z79.4 TYPE 2 DIABETES MELLITUS WITH HYPERGLYCEMIA, WITH LONG-TERM CURRENT USE OF INSULIN (HCC): ICD-10-CM

## 2021-09-14 DIAGNOSIS — E11.65 TYPE 2 DIABETES MELLITUS WITH HYPERGLYCEMIA, WITH LONG-TERM CURRENT USE OF INSULIN (HCC): ICD-10-CM

## 2021-09-14 DIAGNOSIS — M10.361 ACUTE GOUT DUE TO RENAL IMPAIRMENT INVOLVING RIGHT KNEE: ICD-10-CM

## 2021-09-14 DIAGNOSIS — E11.21 TYPE 2 DIABETES MELLITUS WITH NEPHROPATHY (HCC): Primary | ICD-10-CM

## 2021-09-14 DIAGNOSIS — G89.4 CHRONIC PAIN SYNDROME: ICD-10-CM

## 2021-09-14 PROCEDURE — 3046F HEMOGLOBIN A1C LEVEL >9.0%: CPT | Performed by: FAMILY MEDICINE

## 2021-09-14 PROCEDURE — 99214 OFFICE O/P EST MOD 30 MIN: CPT | Performed by: FAMILY MEDICINE

## 2021-09-14 PROCEDURE — 2022F DILAT RTA XM EVC RTNOPTHY: CPT | Performed by: FAMILY MEDICINE

## 2021-09-14 PROCEDURE — G8417 CALC BMI ABV UP PARAM F/U: HCPCS | Performed by: FAMILY MEDICINE

## 2021-09-14 PROCEDURE — G8756 NO BP MEASURE DOC: HCPCS | Performed by: FAMILY MEDICINE

## 2021-09-14 PROCEDURE — G8427 DOCREV CUR MEDS BY ELIG CLIN: HCPCS | Performed by: FAMILY MEDICINE

## 2021-09-14 PROCEDURE — 3017F COLORECTAL CA SCREEN DOC REV: CPT | Performed by: FAMILY MEDICINE

## 2021-09-14 PROCEDURE — G8510 SCR DEP NEG, NO PLAN REQD: HCPCS | Performed by: FAMILY MEDICINE

## 2021-09-14 RX ORDER — PREGABALIN 25 MG/1
50 CAPSULE ORAL 2 TIMES DAILY
Qty: 120 CAPSULE | Refills: 1 | Status: SHIPPED | OUTPATIENT
Start: 2021-09-14 | End: 2021-10-12 | Stop reason: SDUPTHER

## 2021-09-14 RX ORDER — INSULIN ASPART 100 [IU]/ML
12 INJECTION, SOLUTION INTRAVENOUS; SUBCUTANEOUS 2 TIMES DAILY
Qty: 5 PEN | Refills: 5 | Status: SHIPPED | OUTPATIENT
Start: 2021-09-14 | End: 2021-10-12

## 2021-09-14 NOTE — PROGRESS NOTES
Subjective:     Marely Martinez is a 48 y.o. female seen for follow-up of diabetes. She has had hypoglycemic attacks. .no  Blood sugar control has been soso    Lab Results   Component Value Date/Time    Hemoglobin A1c 8.4 (H) 09/11/2020 11:36 AM    Hemoglobin A1c 8.2 (H) 09/09/2020 10:22 AM    Hemoglobin A1c 7.6 (H) 03/11/2020 02:38 PM    Glucose 148 (H) 09/11/2020 11:36 AM    Glucose (POC) 205 (H) 09/28/2020 01:30 PM    Microalbumin urine (POC) 10 01/14/2015 11:46 AM    Microalbumin/Creat ratio (mg/g creat) 54 (H) 05/05/2021 12:50 PM    Microalbumin,urine random 4.51 05/05/2021 12:50 PM    Microalbumin/creat ratio (POC)  01/14/2015 11:46 AM    LDL, calculated 114 (H) 09/11/2020 11:36 AM    LDL, calculated 86 05/21/2019 03:44 PM    Creatinine 2.38 (H) 09/11/2020 11:36 AM       She has diabetes, hypertension, hyperlipidemia and CHF. Marely Martinez has the additional concern of knee pain. Got her Covid vaccination. Knee started hurting emergency room diagnosed her as having gout, started her on prednisone with some improvement of her pain but it is made her sugars go high. Takes her insulin as prescribed, increased it since her sugars went high. Reports taking blood pressure medications without side affects. No complaints of exertional chest pain, excessive shortness of breath or focal weakness. Minimal swelling in lower legs or dizziness with standing. Weight stable no apparent nocturnal dyspnea      Diet and Lifestyle: generally follows a low sodium diet, nonsmoker.     Patient Active Problem List    Diagnosis Date Noted    CRI (chronic renal insufficiency), stage 4 (severe) ([address] 01/27/2021    Bilateral cataracts 09/08/2020    Hyperglycemia due to type 2 diabetes mellitus ([address] 83/21/5701    Acute systolic congestive heart failure ([address] 02/07/2018    Obesity, morbid ([address] 12/18/2017    Type 2 diabetes mellitus with nephropathy ([address] 12/18/2017    Spinal stenosis of lumbosacral region 12/08/2016    Lumbar back pain with radiculopathy affecting right lower extremity 12/08/2016    Idiopathic small and large fiber sensory neuropathy 11/16/2016    Narcotic dependence, episodic use ([facility] 04/29/2016    Chronic pain     Hypertension 11/07/2012     Allergies   Allergen Reactions    Lortab [Hydrocodone-Acetaminophen] Nausea and Vomiting    Cymbalta [Duloxetine] Diarrhea    Jardiance [Empagliflozin] Other (comments)     kidneys    Lasix [Furosemide] Swelling    Morphine Other (comments)     Pt states is not allergic to morphine     Past Medical History:   Diagnosis Date    Anemia     CAD (coronary artery disease)     Cardiomyopathy ([facility]     Chronic pain     CKD (chronic kidney disease)     Cyst in hand 2014    [address]    Diabetes mellitus type 2 in obese ([facility]     Diabetic neuropathy ([facility]     Hypertension     Migraine      Social History     Tobacco Use    Smoking status: Never Smoker    Smokeless tobacco: Never Used   Substance Use Topics    Alcohol use: No     Alcohol/week: 0.0 standard drinks             Review of Systems  Pertinent items are noted in HPI. Objective:     Significant for the following: Overweight no acute distress  Visit Vitals  LMP 12/03/2017     WD WN female NAD  Heart RRR without murmers clicks or rubs  Lungs CTA  Abdo soft nontender  Ext no edema  Knee grossly normal    Lab review: labs reviewed, I note that glycosylated hemoglobin mildly abnormal but acceptable. Wt Readings from Last 3 Encounters:   09/08/21 258 lb (117 kg)   05/05/21 258 lb (117 kg)   01/27/21 264 lb (119.7 kg)       Assessment/Plan:     Follow-up diabetes stable. Diabetic issues reviewed with her: all medications, side effects and compliance discussed carefully. ICD-10-CM ICD-9-CM    1. Type 2 diabetes mellitus with nephropathy (HCC)  E11.21 250.40 insulin aspart U-100 (NovoLOG Flexpen U-100 Insulin) 100 unit/mL (3 mL) inpn     583.81    2.  Idiopathic small and large fiber sensory neuropathy  G60.8 356. 4 pregabalin (LYRICA) 25 mg capsule   3. Essential hypertension  I10 401.9    4. Acute systolic congestive heart failure ([facility])  I50.21 428.21      428.0    5. CRI (chronic renal insufficiency), stage 4 (severe) ([facility])  N18.4 585.4    6. Chronic pain syndrome  G89.4 338.4    7. Type 2 diabetes mellitus with hyperglycemia, with long-term current use of insulin ([facility])  E11.65 250.00     Z79.4 790.29      V58.67    8. Acute gout due to renal impairment involving right knee  M10.361 274.10            Orders Placed This Encounter    insulin aspart U-100 (NovoLOG Flexpen U-100 Insulin) 100 unit/mL (3 mL) inpn     Si Units by SubCUTAneous route two (2) times a day. For 5 days take 4 time daily while on steroids, then back to two to 3 times daily. Dispense:  5 Pen     Refill:  5    pregabalin (LYRICA) 25 mg capsule     Sig: Take 2 Capsules by mouth two (2) times a day. Max Daily Amount: 100 mg. Dispense:  120 Capsule     Refill:  1          Current Outpatient Medications   Medication Sig Dispense Refill    insulin aspart U-100 (NovoLOG Flexpen U-100 Insulin) 100 unit/mL (3 mL) inpn 12 Units by SubCUTAneous route two (2) times a day. For 5 days take 4 time daily while on steroids, then back to two to 3 times daily. 5 Pen 5    pregabalin (LYRICA) 25 mg capsule Take 2 Capsules by mouth two (2) times a day. Max Daily Amount: 100 mg. 120 Capsule 1    liraglutide (Victoza 2-Sukhdeep) 0.6 mg/0.1 mL (18 mg/3 mL) pnij INJECT 1.2 MG SUBCUTANEOUSLY DAILY 6 mL 5    atorvastatin (LIPITOR) 40 mg tablet Take 1 tablet by mouth once daily 90 Tablet 1    Insulin Needles, Disposable, 32 gauge x \" ndle Check blood sugar up to 3x daily as needed.  100 Pen Needle 5    atorvastatin (LIPITOR) 40 mg tablet TAKE 1 TABLET BY MOUTH ONCE DAILY      lisinopriL (PRINIVIL, ZESTRIL) 10 mg tablet Take 1 tablet by mouth once daily 90 Tablet 1    glucose blood VI test strips (True Metrix Glucose Test Strip) strip CHECK BLOOD SUGAR UP TO THREE TIMES DAILY 100 Strip 5    isosorbide dinitrate (ISORDIL) 20 mg tablet Take 1 Tablet by mouth two (2) times a day. 180 Tablet 1    cyanocobalamin (Vitamin B-12) 500 mcg tablet Take 1 Tablet by mouth daily. 90 Tablet 3    metOLazone (ZAROXOLYN) 2.5 mg tablet Take 1 Tab by mouth daily. As needed for swelling 90 Tab 1    hydrALAZINE (APRESOLINE) 25 mg tablet Take 1 Tab by mouth two (2) times a day. 180 Tab 3    carvediloL (COREG) 12.5 mg tablet Take 1 tablet by mouth twice daily 180 Tab 3    Blood-Glucose Meter monitoring kit Want free style leda kit 1 Kit 0    lidocaine (LIDODERM) 5 % Apply patch to the affected area for 12 hours a day and remove for 12 hours a day. 5 Each 0    Blood-Glucose Meter (TRUE METRIX GLUCOSE METER) misc Check blood sugar up to three times daily 1 Each 0    Cholecalciferol, Vitamin D3, 50,000 unit cap Take  by mouth.  aspirin delayed-release 81 mg tablet Take  by mouth daily. Chronic Conditions Addressed Today     1. Type 2 diabetes mellitus with nephropathy (HCC)     Relevant Medications     insulin aspart U-100 (NovoLOG Flexpen U-100 Insulin) 100 unit/mL (3 mL) inpn     pregabalin (LYRICA) 25 mg capsule    2. Idiopathic small and large fiber sensory neuropathy     Relevant Medications     pregabalin (LYRICA) 25 mg capsule    3. Hypertension - Primary    4. Hyperglycemia due to type 2 diabetes mellitus (HCC)     Relevant Medications     insulin aspart U-100 (NovoLOG Flexpen U-100 Insulin) 100 unit/mL (3 mL) inpn     pregabalin (LYRICA) 25 mg capsule    5. CRI (chronic renal insufficiency), stage 4 (severe) ([facility])    6. Chronic pain    7.  Acute systolic congestive heart failure (HCC)            f/up  6 weeks

## 2021-09-14 NOTE — PROGRESS NOTES
Chief Complaint   Patient presents with    Diabetes     pt running out insulin       Patient is aware that this is a Virtual Visit or Phone Call Only doctor's visit. Patient has not been out of the country in (14 months), NO diarrhea, NO cough, NO chest conjestion, NO temp. Pt has not been around anyone with these symptoms. Health Maintenance reviewed. I have reviewed the patient's medical history in detail and updated the computerized patient record. 1. Have you been to the ER, urgent care clinic since your last visit? Yes  Hospitalized since your last visit? yes    2. Have you seen or consulted any other health care providers outside of the [address] [place] [address] since your last visit? Yes  Include any pap smears or colon screening. Encouraged pt to discuss pt's wishes with spouse/partner/family and bring them in the next appt to follow thru with the Advanced Directive      Fall Risk Assessment, last 12 mths 5/5/2021   Able to walk? Yes   Fall in past 12 months? 0   Do you feel unsteady? 0   Are you worried about falling 0   Number of falls in past 12 months -   Fall with injury? -       3 most recent PHQ Screens 9/14/2021   Little interest or pleasure in doing things Several days   Feeling down, depressed, irritable, or hopeless Several days   Total Score PHQ 2 2       Abuse Screening Questionnaire 9/14/2021   Do you ever feel afraid of your partner? N   Are you in a relationship with someone who physically or mentally threatens you? N   Is it safe for you to go home?  Y       ADL Assessment 9/14/2021   Feeding yourself No Help Needed   Getting from bed to chair No Help Needed   Getting dressed No Help Needed   Bathing or showering No Help Needed   Walk across the room (includes cane/walker) No Help Needed   Using the telphone No Help Needed   Taking your medications No Help Needed   Preparing meals No Help Needed   Managing money (expenses/bills) No Help Needed   Moderately strenuous housework (laundry) No Help Needed   Shopping for personal items (toiletries/medicines) No Help Needed   Shopping for groceries No Help Needed   Driving No Help Needed   Climbing a flight of stairs No Help Needed   Getting to places beyond walking distances No Help Needed

## 2021-09-15 ENCOUNTER — TELEPHONE (OUTPATIENT)
Dept: INTERNAL MEDICINE CLINIC | Age: 56
End: 2021-09-15

## 2021-09-15 NOTE — TELEPHONE ENCOUNTER
Patient says she has a yeast infection from steroid and needs diclofenac called in to [address] as soon as possible.  She says that she is sore and really needs this med called in quickly

## 2021-09-16 ENCOUNTER — TELEPHONE (OUTPATIENT)
Dept: INTERNAL MEDICINE CLINIC | Age: 56
End: 2021-09-16

## 2021-09-16 NOTE — TELEPHONE ENCOUNTER
----- Message from [facility] sent at 9/16/2021  3:02 PM EDT -----  Regarding: Dr. Terrence Hernandez / Yamileth Simmons first and last name: N/A      Reason for call: Yeast Infection Pill       Callback required yes/no and why: Yes      Best contact number(s):[phone]      Details to clarify the request:      [facility]

## 2021-09-17 ENCOUNTER — TELEPHONE (OUTPATIENT)
Dept: FAMILY MEDICINE CLINIC | Age: 56
End: 2021-09-17

## 2021-09-17 NOTE — TELEPHONE ENCOUNTER
----- Message from [facility] sent at 9/17/2021  3:28 PM EDT -----  Regarding: / Medication refill  Medication Refill    Caller (if not patient):      Relationship of caller (if not patient): N/A      Best contact number(s): [phone]    Name of medication and dosage if known: Prednisone 10mg      Is patient out of this medication (yes/no): Y      Pharmacy name: [address] [place] [address] listed in chart? (yes/no): Y  Pharmacy phone number: [phone]      Details to clarify the request: Pt has taken last pill today 9/17/21. Sarahi Baird 9/17/21.

## 2021-09-28 NOTE — TELEPHONE ENCOUNTER
3/30/21 Plan called ,[phone] PA completed for Pregabalin (Lyrica) 25 mg capsules. Ref # [id number]. Turn around time 24 hours. Outcome will be fax to office,with letter to patient. Please follow up as needed. Thanks Is This A New Presentation, Or A Follow-Up?: Varicose Veins Additional History: Patient notes he has varicose veins on thighs and would like to discuss with sp.

## 2021-10-11 RX ORDER — CALCIUM CITRATE/VITAMIN D3 200MG-6.25
TABLET ORAL
Qty: 100 STRIP | Refills: 5 | Status: SHIPPED | OUTPATIENT
Start: 2021-10-11 | End: 2021-10-11 | Stop reason: SDUPTHER

## 2021-10-11 RX ORDER — CALCIUM CITRATE/VITAMIN D3 200MG-6.25
TABLET ORAL
Qty: 100 STRIP | Refills: 5 | Status: SHIPPED | OUTPATIENT
Start: 2021-10-11 | End: 2021-10-26 | Stop reason: SDUPTHER

## 2021-10-12 ENCOUNTER — OFFICE VISIT (OUTPATIENT)
Dept: INTERNAL MEDICINE CLINIC | Age: 56
End: 2021-10-12
Payer: MEDICARE

## 2021-10-12 VITALS
OXYGEN SATURATION: 97 % | SYSTOLIC BLOOD PRESSURE: 98 MMHG | HEART RATE: 89 BPM | DIASTOLIC BLOOD PRESSURE: 57 MMHG | WEIGHT: 263 LBS | BODY MASS INDEX: 44.9 KG/M2 | RESPIRATION RATE: 16 BRPM | TEMPERATURE: 98.3 F | HEIGHT: 64 IN

## 2021-10-12 DIAGNOSIS — Z12.11 SCREEN FOR COLON CANCER: ICD-10-CM

## 2021-10-12 DIAGNOSIS — M10.30 GOUT DUE TO RENAL IMPAIRMENT, UNSPECIFIED CHRONICITY, UNSPECIFIED SITE: ICD-10-CM

## 2021-10-12 DIAGNOSIS — E11.65 TYPE 2 DIABETES MELLITUS WITH HYPERGLYCEMIA, WITH LONG-TERM CURRENT USE OF INSULIN (HCC): ICD-10-CM

## 2021-10-12 DIAGNOSIS — G60.8 IDIOPATHIC SMALL AND LARGE FIBER SENSORY NEUROPATHY: ICD-10-CM

## 2021-10-12 DIAGNOSIS — I50.21 ACUTE SYSTOLIC CONGESTIVE HEART FAILURE (HCC): ICD-10-CM

## 2021-10-12 DIAGNOSIS — Z79.4 TYPE 2 DIABETES MELLITUS WITH HYPERGLYCEMIA, WITH LONG-TERM CURRENT USE OF INSULIN (HCC): ICD-10-CM

## 2021-10-12 DIAGNOSIS — E11.21 TYPE 2 DIABETES MELLITUS WITH NEPHROPATHY (HCC): Primary | ICD-10-CM

## 2021-10-12 DIAGNOSIS — N18.4 CRI (CHRONIC RENAL INSUFFICIENCY), STAGE 4 (SEVERE) (HCC): ICD-10-CM

## 2021-10-12 PROCEDURE — 3046F HEMOGLOBIN A1C LEVEL >9.0%: CPT | Performed by: FAMILY MEDICINE

## 2021-10-12 PROCEDURE — G8510 SCR DEP NEG, NO PLAN REQD: HCPCS | Performed by: FAMILY MEDICINE

## 2021-10-12 PROCEDURE — G8417 CALC BMI ABV UP PARAM F/U: HCPCS | Performed by: FAMILY MEDICINE

## 2021-10-12 PROCEDURE — 3017F COLORECTAL CA SCREEN DOC REV: CPT | Performed by: FAMILY MEDICINE

## 2021-10-12 PROCEDURE — 2022F DILAT RTA XM EVC RTNOPTHY: CPT | Performed by: FAMILY MEDICINE

## 2021-10-12 PROCEDURE — G8427 DOCREV CUR MEDS BY ELIG CLIN: HCPCS | Performed by: FAMILY MEDICINE

## 2021-10-12 PROCEDURE — G8752 SYS BP LESS 140: HCPCS | Performed by: FAMILY MEDICINE

## 2021-10-12 PROCEDURE — G8754 DIAS BP LESS 90: HCPCS | Performed by: FAMILY MEDICINE

## 2021-10-12 PROCEDURE — 99214 OFFICE O/P EST MOD 30 MIN: CPT | Performed by: FAMILY MEDICINE

## 2021-10-12 RX ORDER — INSULIN ASPART 100 [IU]/ML
12 INJECTION, SOLUTION INTRAVENOUS; SUBCUTANEOUS
Qty: 5 PEN | Refills: 5 | Status: SHIPPED | OUTPATIENT
Start: 2021-10-12 | End: 2022-02-01 | Stop reason: SDUPTHER

## 2021-10-12 RX ORDER — PREGABALIN 25 MG/1
50 CAPSULE ORAL 2 TIMES DAILY
Qty: 120 CAPSULE | Refills: 1 | Status: SHIPPED | OUTPATIENT
Start: 2021-10-12 | End: 2022-01-25 | Stop reason: SDUPTHER

## 2021-10-12 NOTE — PROGRESS NOTES
Chief Complaint   Patient presents with    Diabetes     Patient has not been out of the country in (14 months), NO diarrhea, NO cough, NO chest conjestion, NO temp. Pt has not been around anyone with these symptoms. Health Maintenance reviewed. I have reviewed the patient's medical history in detail and updated the computerized patient record. 1. Have you been to the ER, urgent care clinic since your last visit? No  Hospitalized since your last visit?  no    2. Have you seen or consulted any other health care providers outside of the [address] [place] [address] since your last visit? No  Include any pap smears or colon screening. Encouraged pt to discuss pt's wishes with spouse/partner/family and bring them in the next appt to follow thru with the Advanced Directive    @  [address], last 12 mths 5/5/2021   Able to walk? Yes   Fall in past 12 months? 0   Do you feel unsteady? 0   Are you worried about falling 0   Number of falls in past 12 months -   Fall with injury? -       3 most recent PHQ Screens 10/12/2021   Little interest or pleasure in doing things Several days   Feeling down, depressed, irritable, or hopeless Several days   Total Score PHQ 2 2       Abuse Screening Questionnaire 10/12/2021   Do you ever feel afraid of your partner? N   Are you in a relationship with someone who physically or mentally threatens you? N   Is it safe for you to go home?  Y       ADL Assessment 10/12/2021   Feeding yourself No Help Needed   Getting from bed to chair No Help Needed   Getting dressed No Help Needed   Bathing or showering No Help Needed   Walk across the room (includes cane/walker) No Help Needed   Using the telphone No Help Needed   Taking your medications No Help Needed   Preparing meals No Help Needed   Managing money (expenses/bills) No Help Needed   Moderately strenuous housework (laundry) No Help Needed   Shopping for personal items (toiletries/medicines) No Help Needed   Shopping for groceries No Help Needed   Driving No Help Needed   Climbing a flight of stairs No Help Needed   Getting to places beyond walking distances No Help Needed

## 2021-10-12 NOTE — PROGRESS NOTES
Subjective:     Max Schmidt is a 48 y.o. female seen for follow-up of diabetes. She has had hypoglycemic attacks. .no  Blood sugar control has been OK    Lab Results   Component Value Date/Time    Hemoglobin A1c 8.4 (H) 09/11/2020 11:36 AM    Hemoglobin A1c 8.2 (H) 09/09/2020 10:22 AM    Hemoglobin A1c 7.6 (H) 03/11/2020 02:38 PM    Glucose 148 (H) 09/11/2020 11:36 AM    Glucose (POC) 205 (H) 09/28/2020 01:30 PM    Microalbumin urine (POC) 10 01/14/2015 11:46 AM    Microalbumin/Creat ratio (mg/g creat) 54 (H) 05/05/2021 12:50 PM    Microalbumin,urine random 4.51 05/05/2021 12:50 PM    Microalbumin/creat ratio (POC)  01/14/2015 11:46 AM    LDL, calculated 114 (H) 09/11/2020 11:36 AM    LDL, calculated 86 05/21/2019 03:44 PM    Creatinine 2.38 (H) 09/11/2020 11:36 AM       She has diabetes, hypertension and hyperlipidemia. Max Schmidt has the additional concern of feels okay no complaints pain levels okay on Lyrica    Reports taking blood pressure medications without side affects. No complaints of exertional chest pain, excessive shortness of breath or focal weakness. Minimal swelling in lower legs or dizziness with standing. No apparent nocturnal dyspnea no hypoglycemia    Diet and Lifestyle: follows a diabetic diet regularly.     Patient Active Problem List    Diagnosis Date Noted    CRI (chronic renal insufficiency), stage 4 (severe) ([address] 01/27/2021    Bilateral cataracts 09/08/2020    Hyperglycemia due to type 2 diabetes mellitus ([address] 51/12/9598    Acute systolic congestive heart failure ([address] 02/07/2018    Obesity, morbid ([address] 12/18/2017    Type 2 diabetes mellitus with nephropathy ([address] 12/18/2017    Spinal stenosis of lumbosacral region 12/08/2016    Lumbar back pain with radiculopathy affecting right lower extremity 12/08/2016    Idiopathic small and large fiber sensory neuropathy 11/16/2016    Narcotic dependence, episodic use (HCC) 04/29/2016    Chronic pain     Hypertension 11/07/2012     Current Outpatient Medications   Medication Sig Dispense Refill    insulin aspart U-100 (NovoLOG Flexpen U-100 Insulin) 100 unit/mL (3 mL) inpn 12 Units by SubCUTAneous route Before breakfast, lunch, and dinner. For 5 days take 4 time daily while on steroids, then back to two to 3 times daily. 5 Pen 5    pregabalin (LYRICA) 25 mg capsule Take 2 Capsules by mouth two (2) times a day. Max Daily Amount: 100 mg. 120 Capsule 1    glucose blood VI test strips (True Metrix Glucose Test Strip) strip CHECK BLOOD SUGAR UP TO THREE TIMES DAILY Dx.  E11.21 100 Strip 5    liraglutide (Victoza 2-Sukhdeep) 0.6 mg/0.1 mL (18 mg/3 mL) pnij INJECT 1.2 MG SUBCUTANEOUSLY DAILY 6 mL 5    atorvastatin (LIPITOR) 40 mg tablet Take 1 tablet by mouth once daily 90 Tablet 1    Insulin Needles, Disposable, 32 gauge x 5/32\" ndle Check blood sugar up to 3x daily as needed. 100 Pen Needle 5    atorvastatin (LIPITOR) 40 mg tablet TAKE 1 TABLET BY MOUTH ONCE DAILY      lisinopriL (PRINIVIL, ZESTRIL) 10 mg tablet Take 1 tablet by mouth once daily 90 Tablet 1    isosorbide dinitrate (ISORDIL) 20 mg tablet Take 1 Tablet by mouth two (2) times a day. 180 Tablet 1    cyanocobalamin (Vitamin B-12) 500 mcg tablet Take 1 Tablet by mouth daily. 90 Tablet 3    metOLazone (ZAROXOLYN) 2.5 mg tablet Take 1 Tab by mouth daily. As needed for swelling 90 Tab 1    hydrALAZINE (APRESOLINE) 25 mg tablet Take 1 Tab by mouth two (2) times a day. 180 Tab 3    carvediloL (COREG) 12.5 mg tablet Take 1 tablet by mouth twice daily 180 Tab 3    Blood-Glucose Meter monitoring kit Want free style leda kit 1 Kit 0    lidocaine (LIDODERM) 5 % Apply patch to the affected area for 12 hours a day and remove for 12 hours a day. 5 Each 0    Blood-Glucose Meter (TRUE METRIX GLUCOSE METER) misc Check blood sugar up to three times daily 1 Each 0    Cholecalciferol, Vitamin D3, 50,000 unit cap Take  by mouth.       aspirin delayed-release 81 mg tablet Take  by mouth daily. Allergies   Allergen Reactions    Lortab [Hydrocodone-Acetaminophen] Nausea and Vomiting    Cymbalta [Duloxetine] Diarrhea    Jardiance [Empagliflozin] Other (comments)     kidneys    Lasix [Furosemide] Swelling    Morphine Other (comments)     Pt states is not allergic to morphine     Past Medical History:   Diagnosis Date    Anemia     CAD (coronary artery disease)     [facility]     Chronic pain     CKD (chronic kidney disease)     Cyst in hand 2014    [address]    Diabetes mellitus type 2 in obese ([facility]     Diabetic neuropathy ([facility]     Hypertension     Migraine      Social History     Tobacco Use    Smoking status: Never Smoker    Smokeless tobacco: Never Used   Substance Use Topics    Alcohol use: Not on file             Review of Systems  Pertinent items are noted in HPI. Objective:     Significant for the following: Within normal limits overweight  Visit Vitals  BP (!) 98/57 (BP 1 Location: Left arm, BP Patient Position: Sitting, BP Cuff Size: Adult)   Pulse 89   Temp 98.3 °F (36.8 °C) (Temporal)   Resp 16   Ht 5' 4\" (1.626 m)   Wt 263 lb (119.3 kg)   LMP 12/03/2017   SpO2 97%   BMI 45.14 kg/m²     WD WN female NAD  Heart RRR without murmers clicks or rubs  Lungs CTA  Abdo soft nontender  Ext no edema      Lab review: labs reviewed, I note that glycosylated hemoglobin mildly abnormal but acceptable. Assessment/Plan:     Follow-up diabetes stable, borderline controlled. Diabetic issues reviewed with her: diabetic diet discussed in detail, written exchange diet given and all medications, side effects and compliance discussed carefully. ICD-10-CM ICD-9-CM    1. Type 2 diabetes mellitus with nephropathy (HCC)  E11.21 250.40 insulin aspart U-100 (NovoLOG Flexpen U-100 Insulin) 100 unit/mL (3 mL) inpn     583.81 HEMOGLOBIN A1C WITH EAG   2. Idiopathic small and large fiber sensory neuropathy  G60.8 356. 4 pregabalin (LYRICA) 25 mg capsule   3. Acute systolic congestive heart failure (HCC)  I50.21 428.21      428.0    4. Type 2 diabetes mellitus with hyperglycemia, with long-term current use of insulin (HCC)  E11.65 250.00     Z79.4 790.29      V58.67    5. CRI (chronic renal insufficiency), stage 4 (severe) ([facility])  T20.7 938.8 METABOLIC PANEL, BASIC   6. Screen for colon cancer  Z12.11 V76.51 OCCULT BLOOD IMMUNOASSAY,DIAGNOSTIC      OCCULT BLOOD IMMUNOASSAY,DIAGNOSTIC   7. Gout due to renal impairment, unspecified chronicity, unspecified site  M10.30 274.10 URIC ACID         Orders Placed This Encounter    OCCULT BLOOD IMMUNOASSAY,DIAGNOSTIC     Standing Status:   Future     Number of Occurrences:   1     Standing Expiration Date:   10/12/2022     Order Specific Question:   QUEST SOURCE     Answer:   Stool [1945]    METABOLIC PANEL, BASIC     Standing Status:   Future     Standing Expiration Date:   2022    URIC ACID     Standing Status:   Future     Standing Expiration Date:   2022    HEMOGLOBIN A1C WITH EAG     Standing Status:   Future     Standing Expiration Date:   10/12/2022    insulin aspart U-100 (NovoLOG Flexpen U-100 Insulin) 100 unit/mL (3 mL) inpn     Si Units by SubCUTAneous route Before breakfast, lunch, and dinner. For 5 days take 4 time daily while on steroids, then back to two to 3 times daily. Dispense:  5 Pen     Refill:  5    pregabalin (LYRICA) 25 mg capsule     Sig: Take 2 Capsules by mouth two (2) times a day. Max Daily Amount: 100 mg. Dispense:  120 Capsule     Refill:  1     Do not fill til November can  current RX             Chronic Conditions Addressed Today     1. Type 2 diabetes mellitus with nephropathy ([facility]) - Primary     Relevant Medications     insulin aspart U-100 (NovoLOG Flexpen U-100 Insulin) 100 unit/mL (3 mL) inpn     pregabalin (LYRICA) 25 mg capsule     Other Relevant Orders     HEMOGLOBIN A1C WITH EAG    2.  Idiopathic small and large fiber sensory neuropathy     Relevant Medications     pregabalin (LYRICA) 25 mg capsule    3. Hyperglycemia due to type 2 diabetes mellitus (HCC)     Relevant Medications     insulin aspart U-100 (NovoLOG Flexpen U-100 Insulin) 100 unit/mL (3 mL) inpn     pregabalin (LYRICA) 25 mg capsule    4. CRI (chronic renal insufficiency), stage 4 (severe) (HCC)     Relevant Orders     METABOLIC PANEL, BASIC    5.  Acute systolic congestive heart failure ([facility]      Acute Diagnoses Addressed Today     Screen for colon cancer            Relevant Orders        OCCULT BLOOD IMMUNOASSAY,DIAGNOSTIC    Gout due to renal impairment, unspecified chronicity, unspecified site            Relevant Orders        URIC ACID          Follow-up 3 months

## 2021-10-26 NOTE — TELEPHONE ENCOUNTER
Patient is out!!!!    Requested Prescriptions     Pending Prescriptions Disp Refills    glucose blood VI test strips (True Metrix Glucose Test Strip) strip 100 Strip 5     Sig: CHECK BLOOD SUGAR UP TO THREE TIMES DAILY Dx.  A51.38

## 2021-10-27 RX ORDER — CALCIUM CITRATE/VITAMIN D3 200MG-6.25
TABLET ORAL
Qty: 100 STRIP | Refills: 5 | Status: SHIPPED | OUTPATIENT
Start: 2021-10-27 | End: 2022-09-06 | Stop reason: SDUPTHER

## 2021-11-29 ENCOUNTER — TELEPHONE (OUTPATIENT)
Dept: INTERNAL MEDICINE CLINIC | Age: 56
End: 2021-11-29

## 2021-12-06 LAB — HBA1C MFR BLD HPLC: 7.9 %

## 2021-12-14 DIAGNOSIS — Z12.39 ENCOUNTER FOR SCREENING FOR MALIGNANT NEOPLASM OF BREAST, UNSPECIFIED SCREENING MODALITY: ICD-10-CM

## 2021-12-27 RX ORDER — CARVEDILOL 12.5 MG/1
TABLET ORAL
Qty: 180 TABLET | Refills: 0 | Status: SHIPPED | OUTPATIENT
Start: 2021-12-27 | End: 2022-03-26 | Stop reason: SDUPTHER

## 2022-01-25 DIAGNOSIS — G60.8 IDIOPATHIC SMALL AND LARGE FIBER SENSORY NEUROPATHY: ICD-10-CM

## 2022-01-25 NOTE — TELEPHONE ENCOUNTER
Patient refill request    Requested Prescriptions     Pending Prescriptions Disp Refills    hydrALAZINE (APRESOLINE) 25 mg tablet 180 Tablet 3     Sig: Take 1 Tablet by mouth two (2) times a day.  pregabalin (LYRICA) 25 mg capsule 120 Capsule 1     Sig: Take 2 Capsules by mouth two (2) times a day. Max Daily Amount: 100 mg.    atorvastatin (LIPITOR) 40 mg tablet 90 Tablet 1     Sig: Take 1 Tablet by mouth daily.

## 2022-01-26 RX ORDER — PREGABALIN 25 MG/1
50 CAPSULE ORAL 2 TIMES DAILY
Qty: 120 CAPSULE | Refills: 0 | Status: SHIPPED | OUTPATIENT
Start: 2022-01-26 | End: 2022-02-01 | Stop reason: SDUPTHER

## 2022-01-26 RX ORDER — HYDRALAZINE HYDROCHLORIDE 25 MG/1
25 TABLET, FILM COATED ORAL 2 TIMES DAILY
Qty: 180 TABLET | Refills: 0 | Status: SHIPPED | OUTPATIENT
Start: 2022-01-26 | End: 2022-06-07 | Stop reason: SDUPTHER

## 2022-01-26 RX ORDER — ATORVASTATIN CALCIUM 40 MG/1
40 TABLET, FILM COATED ORAL DAILY
Qty: 90 TABLET | Refills: 0 | Status: SHIPPED | OUTPATIENT
Start: 2022-01-26 | End: 2022-06-07 | Stop reason: SDUPTHER

## 2022-02-01 ENCOUNTER — VIRTUAL VISIT (OUTPATIENT)
Dept: INTERNAL MEDICINE CLINIC | Age: 57
End: 2022-02-01
Payer: MEDICARE

## 2022-02-01 DIAGNOSIS — E11.21 TYPE 2 DIABETES MELLITUS WITH NEPHROPATHY (HCC): ICD-10-CM

## 2022-02-01 DIAGNOSIS — E66.01 MORBID OBESITY WITH BMI OF 45.0-49.9, ADULT (HCC): ICD-10-CM

## 2022-02-01 DIAGNOSIS — N18.4 CRI (CHRONIC RENAL INSUFFICIENCY), STAGE 4 (SEVERE) (HCC): ICD-10-CM

## 2022-02-01 DIAGNOSIS — E11.9 TYPE 2 DIABETES MELLITUS WITH INSULIN THERAPY (HCC): ICD-10-CM

## 2022-02-01 DIAGNOSIS — Z79.4 TYPE 2 DIABETES MELLITUS WITH INSULIN THERAPY (HCC): ICD-10-CM

## 2022-02-01 DIAGNOSIS — F11.20 NARCOTIC DEPENDENCE (HCC): ICD-10-CM

## 2022-02-01 DIAGNOSIS — I50.21 ACUTE SYSTOLIC CONGESTIVE HEART FAILURE (HCC): ICD-10-CM

## 2022-02-01 DIAGNOSIS — G60.8 IDIOPATHIC SMALL AND LARGE FIBER SENSORY NEUROPATHY: ICD-10-CM

## 2022-02-01 PROCEDURE — G8417 CALC BMI ABV UP PARAM F/U: HCPCS | Performed by: FAMILY MEDICINE

## 2022-02-01 PROCEDURE — G8756 NO BP MEASURE DOC: HCPCS | Performed by: FAMILY MEDICINE

## 2022-02-01 PROCEDURE — 3046F HEMOGLOBIN A1C LEVEL >9.0%: CPT | Performed by: FAMILY MEDICINE

## 2022-02-01 PROCEDURE — G8432 DEP SCR NOT DOC, RNG: HCPCS | Performed by: FAMILY MEDICINE

## 2022-02-01 PROCEDURE — 99214 OFFICE O/P EST MOD 30 MIN: CPT | Performed by: FAMILY MEDICINE

## 2022-02-01 PROCEDURE — G8427 DOCREV CUR MEDS BY ELIG CLIN: HCPCS | Performed by: FAMILY MEDICINE

## 2022-02-01 PROCEDURE — G9899 SCRN MAM PERF RSLTS DOC: HCPCS | Performed by: FAMILY MEDICINE

## 2022-02-01 PROCEDURE — 3017F COLORECTAL CA SCREEN DOC REV: CPT | Performed by: FAMILY MEDICINE

## 2022-02-01 PROCEDURE — 2022F DILAT RTA XM EVC RTNOPTHY: CPT | Performed by: FAMILY MEDICINE

## 2022-02-01 RX ORDER — PEN NEEDLE, DIABETIC 31 GX3/16"
NEEDLE, DISPOSABLE MISCELLANEOUS
Qty: 100 PEN NEEDLE | Refills: 5 | Status: SHIPPED | OUTPATIENT
Start: 2022-02-01 | End: 2022-11-03 | Stop reason: SDUPTHER

## 2022-02-01 RX ORDER — INSULIN ASPART 100 [IU]/ML
12 INJECTION, SOLUTION INTRAVENOUS; SUBCUTANEOUS
Qty: 5 PEN | Refills: 5 | Status: SHIPPED | OUTPATIENT
Start: 2022-02-01 | End: 2022-08-12

## 2022-02-01 RX ORDER — LIRAGLUTIDE 6 MG/ML
INJECTION SUBCUTANEOUS
Qty: 6 ML | Refills: 5 | Status: SHIPPED | OUTPATIENT
Start: 2022-02-01 | End: 2022-02-23

## 2022-02-01 RX ORDER — PREGABALIN 25 MG/1
50 CAPSULE ORAL 2 TIMES DAILY
Qty: 120 CAPSULE | Refills: 0 | Status: SHIPPED | OUTPATIENT
Start: 2022-02-01 | End: 2022-03-16 | Stop reason: SDUPTHER

## 2022-02-01 RX ORDER — MELATONIN
1000 DAILY
Qty: 90 TABLET | Refills: 3 | Status: SHIPPED | OUTPATIENT
Start: 2022-02-01

## 2022-02-01 NOTE — PROGRESS NOTES
Subjective:     Raymundo Sidhu is a 55 y.o. female seen for follow-up of diabetes. Feeling well in general no complaints  She has had hypoglycemic attacks. .no  Blood sugar control has been 130's 140's    Lab Results   Component Value Date/Time    Hemoglobin A1c 8.4 (H) 09/11/2020 11:36 AM    Hemoglobin A1c 8.2 (H) 09/09/2020 10:22 AM    Hemoglobin A1c 7.6 (H) 03/11/2020 02:38 PM    Glucose 148 (H) 09/11/2020 11:36 AM    Glucose (POC) 205 (H) 09/28/2020 01:30 PM    Microalbumin urine (POC) 10 01/14/2015 11:46 AM    Microalbumin/Creat ratio (mg/g creat) 54 (H) 05/05/2021 12:50 PM    Microalbumin,urine random 4.51 05/05/2021 12:50 PM    Microalbumin/creat ratio (POC)  01/14/2015 11:46 AM    LDL, calculated 114 (H) 09/11/2020 11:36 AM    LDL, calculated 86 05/21/2019 03:44 PM    Creatinine 2.38 (H) 09/11/2020 11:36 AM       She has diabetes, hypertension, hyperlipidemia and coronary artery disease. Raymundo Sidhu has the additional concern of refill of vitamin D and other medications. Pain levels are reasonable  ? Taking narc from PM    Reports taking blood pressure medications without side affects. No complaints of exertional chest pain, excessive shortness of breath or focal weakness. Minimal swelling in lower legs or dizziness with standing. Diet and Lifestyle: nonsmoker.     Patient Active Problem List    Diagnosis Date Noted    CRI (chronic renal insufficiency), stage 4 (severe) ([address] 01/27/2021    Bilateral cataracts 09/08/2020    Hyperglycemia due to type 2 diabetes mellitus ([address] 13/10/6185    Acute systolic congestive heart failure ([address] 02/07/2018    Obesity, morbid ([address] 12/18/2017    Type 2 diabetes mellitus with nephropathy ([address] 12/18/2017    Spinal stenosis of lumbosacral region 12/08/2016    Lumbar back pain with radiculopathy affecting right lower extremity 12/08/2016    Idiopathic small and large fiber sensory neuropathy 11/16/2016    Narcotic dependence, episodic use ([facility] 04/29/2016    Chronic pain     Hypertension 11/07/2012     Current Outpatient Medications   Medication Sig Dispense Refill    cholecalciferol (VITAMIN D3) (1000 Units /25 mcg) tablet Take 1 Tablet by mouth daily. 90 Tablet 3    insulin aspart U-100 (NovoLOG Flexpen U-100 Insulin) 100 unit/mL (3 mL) inpn 12 Units by SubCUTAneous route Before breakfast, lunch, and dinner. 5 Pen 5    liraglutide (Victoza 2-Sukhdeep) 0.6 mg/0.1 mL (18 mg/3 mL) pnij INJECT 1.2 MG SUBCUTANEOUSLY DAILY 6 mL 5    Insulin Needles, Disposable, 32 gauge x 5/32\" ndle Check blood sugar up to 3x daily as needed. 100 Pen Needle 5    pregabalin (LYRICA) 25 mg capsule Take 2 Capsules by mouth two (2) times a day. Max Daily Amount: 100 mg. 120 Capsule 0    hydrALAZINE (APRESOLINE) 25 mg tablet Take 1 Tablet by mouth two (2) times a day. 180 Tablet 0    atorvastatin (LIPITOR) 40 mg tablet Take 1 Tablet by mouth daily. 90 Tablet 0    carvediloL (COREG) 12.5 mg tablet Take 1 tablet by mouth twice daily 180 Tablet 0    glucose blood VI test strips (True Metrix Glucose Test Strip) strip CHECK BLOOD SUGAR UP TO THREE TIMES DAILY Dx.  E11.21 100 Strip 5    atorvastatin (LIPITOR) 40 mg tablet TAKE 1 TABLET BY MOUTH ONCE DAILY      lisinopriL (PRINIVIL, ZESTRIL) 10 mg tablet Take 1 tablet by mouth once daily 90 Tablet 1    isosorbide dinitrate (ISORDIL) 20 mg tablet Take 1 Tablet by mouth two (2) times a day. 180 Tablet 1    cyanocobalamin (Vitamin B-12) 500 mcg tablet Take 1 Tablet by mouth daily. 90 Tablet 3    Blood-Glucose Meter monitoring kit Want free style leda kit 1 Kit 0    lidocaine (LIDODERM) 5 % Apply patch to the affected area for 12 hours a day and remove for 12 hours a day. 5 Each 0    Blood-Glucose Meter (TRUE METRIX GLUCOSE METER) misc Check blood sugar up to three times daily 1 Each 0    aspirin delayed-release 81 mg tablet Take  by mouth daily.        Allergies   Allergen Reactions    Lortab [Hydrocodone-Acetaminophen] Nausea and Vomiting    Cymbalta [Duloxetine] Diarrhea    Jardiance [Empagliflozin] Other (comments)     kidneys    Lasix [Furosemide] Swelling    Morphine Other (comments)     Pt states is not allergic to morphine     Past Medical History:   Diagnosis Date    Anemia     CAD (coronary artery disease)     Cardiomy[facility]     Chronic pain     CKD (chronic kidney disease)     Cyst in hand 2014    [address]    Diabetes mellitus type 2 in obese ([facility]     Diabetic neuropathy ([facility]     Hypertension     Migraine      Social History     Tobacco Use    Smoking status: Never Smoker    Smokeless tobacco: Never Used   Substance Use Topics    Alcohol use: Not on file             Review of Systems  Pertinent items are noted in HPI. Objective:     Significant for the following: No acute distress  Visit Vitals  LMP 12/03/2017     WD WN female NAD good spirits      Lab review: labs reviewed, I note that glycosylated hemoglobin mildly abnormal but acceptable. Assessment/Plan:     Follow-up diabetes stable, asymptomatic, borderline controlled. Diabetic issues reviewed with her: all medications, side effects and compliance discussed carefully and glycohemoglobin and other lab monitoring discussed. Chronic Conditions Addressed Today     1. Type 2 diabetes mellitus with nephropathy (HCC)     Relevant Medications     cholecalciferol (VITAMIN D3) (1000 Units /25 mcg) tablet     insulin aspart U-100 (NovoLOG Flexpen U-100 Insulin) 100 unit/mL (3 mL) inpn     liraglutide (Victoza 2-Sukhdeep) 0.6 mg/0.1 mL (18 mg/3 mL) pnij     pregabalin (LYRICA) 25 mg capsule     Other Relevant Orders     HEMOGLOBIN A1C WITH EAG    2. Idiopathic small and large fiber sensory neuropathy     Relevant Medications     pregabalin (LYRICA) 25 mg capsule    3.  CRI (chronic renal insufficiency), stage 4 (severe) (HCC)     Relevant Medications     cholecalciferol (VITAMIN D3) (1000 Units /25 mcg) tablet     Other Relevant Orders     METABOLIC PANEL, BASIC    4. Acute systolic congestive heart failure (HCC)      Acute Diagnoses Addressed Today     Narcotic dependence ([facility]            Relevant Medications        pregabalin (LYRICA) 25 mg capsule    Type 2 diabetes mellitus with insulin therapy (HCC)            Relevant Medications        insulin aspart U-100 (NovoLOG Flexpen U-100 Insulin) 100 unit/mL (3 mL) inpn        liraglutide (Victoza 2-Sukhdeep) 0.6 mg/0.1 mL (18 mg/3 mL) pnij        Insulin Needles, Disposable, 32 gauge x 5/32\" ndle        pregabalin (LYRICA) 25 mg capsule    Morbid obesity with BMI of 45.0-49.9, adult (HCC)            Relevant Medications        liraglutide (Victoza 2-Sukhdeep) 0.6 mg/0.1 mL (18 mg/3 mL) pnij        Current Outpatient Medications   Medication Sig Dispense Refill    cholecalciferol (VITAMIN D3) (1000 Units /25 mcg) tablet Take 1 Tablet by mouth daily. 90 Tablet 3    insulin aspart U-100 (NovoLOG Flexpen U-100 Insulin) 100 unit/mL (3 mL) inpn 12 Units by SubCUTAneous route Before breakfast, lunch, and dinner. 5 Pen 5    liraglutide (Victoza 2-Sukhdeep) 0.6 mg/0.1 mL (18 mg/3 mL) pnij INJECT 1.2 MG SUBCUTANEOUSLY DAILY 6 mL 5    Insulin Needles, Disposable, 32 gauge x 5/32\" ndle Check blood sugar up to 3x daily as needed. 100 Pen Needle 5    pregabalin (LYRICA) 25 mg capsule Take 2 Capsules by mouth two (2) times a day. Max Daily Amount: 100 mg. 120 Capsule 0    hydrALAZINE (APRESOLINE) 25 mg tablet Take 1 Tablet by mouth two (2) times a day. 180 Tablet 0    atorvastatin (LIPITOR) 40 mg tablet Take 1 Tablet by mouth daily.  90 Tablet 0    carvediloL (COREG) 12.5 mg tablet Take 1 tablet by mouth twice daily 180 Tablet 0    glucose blood VI test strips (True Metrix Glucose Test Strip) strip CHECK BLOOD SUGAR UP TO THREE TIMES DAILY Dx.  E11.21 100 Strip 5    atorvastatin (LIPITOR) 40 mg tablet TAKE 1 TABLET BY MOUTH ONCE DAILY      lisinopriL (PRINIVIL, ZESTRIL) 10 mg tablet Take 1 tablet by mouth once daily 90 Tablet 1    isosorbide dinitrate (ISORDIL) 20 mg tablet Take 1 Tablet by mouth two (2) times a day. 180 Tablet 1    cyanocobalamin (Vitamin B-12) 500 mcg tablet Take 1 Tablet by mouth daily. 90 Tablet 3    Blood-Glucose Meter monitoring kit Want free style leda kit 1 Kit 0    lidocaine (LIDODERM) 5 % Apply patch to the affected area for 12 hours a day and remove for 12 hours a day. 5 Each 0    Blood-Glucose Meter (TRUE METRIX GLUCOSE METER) misc Check blood sugar up to three times daily 1 Each 0    aspirin delayed-release 81 mg tablet Take  by mouth daily.            Follow-up 3 months

## 2022-02-01 NOTE — PROGRESS NOTES
Chief Complaint   Patient presents with    Medication Refill     Patient is aware that this is a Virtual Visit or Phone Call Only doctor's visit. Patient has not been out of the country in (14 months), NO diarrhea, NO cough, NO chest conjestion, NO temp. Pt has not been around anyone with these symptoms. Health Maintenance reviewed. I have reviewed the patient's medical history in detail and updated the computerized patient record. 1. Have you been to the ER, urgent care clinic since your last visit? yes Hospitalized since your last visit?  no    2. Have you seen or consulted any other health care providers outside of the [address] [place] [address] since your last visit? No  Include any pap smears or colon screening. Encouraged pt to discuss pt's wishes with spouse/partner/family and bring them in the next appt to follow thru with the Advanced Directive      Fall Risk Assessment, last 12 mths 5/5/2021   Able to walk? Yes   Fall in past 12 months? 0   Do you feel unsteady? 0   Are you worried about falling 0   Number of falls in past 12 months -   Fall with injury? -       3 most recent PHQ Screens 2/1/2022   Little interest or pleasure in doing things Several days   Feeling down, depressed, irritable, or hopeless Several days   Total Score PHQ 2 2       Abuse Screening Questionnaire 10/12/2021   Do you ever feel afraid of your partner? N   Are you in a relationship with someone who physically or mentally threatens you? N   Is it safe for you to go home?  Y       ADL Assessment 10/12/2021   Feeding yourself No Help Needed   Getting from bed to chair No Help Needed   Getting dressed No Help Needed   Bathing or showering No Help Needed   Walk across the room (includes cane/walker) No Help Needed   Using the telphone No Help Needed   Taking your medications No Help Needed   Preparing meals No Help Needed   Managing money (expenses/bills) No Help Needed   Moderately strenuous housework (laundry) No Help Needed   Shopping for personal items (toiletries/medicines) No Help Needed   Shopping for groceries No Help Needed   Driving No Help Needed   Climbing a flight of stairs No Help Needed   Getting to places beyond walking distances No Help Needed

## 2022-02-16 NOTE — TELEPHONE ENCOUNTER
Patient called in reference to wanting to let Dr. Yessenia Epps know she is in a lot of pain in her feet and did not get any sleep. Please call her at [phone].
Returned pt's call and transferred to  to make an appt.
Airway patent, Nasal mucosa clear. Mouth with normal mucosa.

## 2022-02-23 ENCOUNTER — VIRTUAL VISIT (OUTPATIENT)
Dept: INTERNAL MEDICINE CLINIC | Age: 57
End: 2022-02-23
Payer: MEDICARE

## 2022-02-23 DIAGNOSIS — E11.21 TYPE 2 DIABETES MELLITUS WITH NEPHROPATHY (HCC): ICD-10-CM

## 2022-02-23 DIAGNOSIS — Z79.4 TYPE 2 DIABETES MELLITUS WITH INSULIN THERAPY (HCC): Primary | ICD-10-CM

## 2022-02-23 DIAGNOSIS — E11.9 TYPE 2 DIABETES MELLITUS WITH INSULIN THERAPY (HCC): Primary | ICD-10-CM

## 2022-02-23 PROCEDURE — 2022F DILAT RTA XM EVC RTNOPTHY: CPT | Performed by: FAMILY MEDICINE

## 2022-02-23 PROCEDURE — G8427 DOCREV CUR MEDS BY ELIG CLIN: HCPCS | Performed by: FAMILY MEDICINE

## 2022-02-23 PROCEDURE — 99214 OFFICE O/P EST MOD 30 MIN: CPT | Performed by: FAMILY MEDICINE

## 2022-02-23 PROCEDURE — 3017F COLORECTAL CA SCREEN DOC REV: CPT | Performed by: FAMILY MEDICINE

## 2022-02-23 PROCEDURE — 3046F HEMOGLOBIN A1C LEVEL >9.0%: CPT | Performed by: FAMILY MEDICINE

## 2022-02-23 PROCEDURE — G8756 NO BP MEASURE DOC: HCPCS | Performed by: FAMILY MEDICINE

## 2022-02-23 PROCEDURE — G8432 DEP SCR NOT DOC, RNG: HCPCS | Performed by: FAMILY MEDICINE

## 2022-02-23 PROCEDURE — G9899 SCRN MAM PERF RSLTS DOC: HCPCS | Performed by: FAMILY MEDICINE

## 2022-02-23 PROCEDURE — G8417 CALC BMI ABV UP PARAM F/U: HCPCS | Performed by: FAMILY MEDICINE

## 2022-02-23 RX ORDER — BUPROPION HYDROCHLORIDE 100 MG/1
100 TABLET ORAL DAILY
Qty: 30 TABLET | Refills: 2 | Status: SHIPPED | OUTPATIENT
Start: 2022-02-23 | End: 2022-05-25

## 2022-02-23 RX ORDER — LIRAGLUTIDE 6 MG/ML
1.8 INJECTION SUBCUTANEOUS DAILY
Qty: 6 ML | Refills: 5 | Status: SHIPPED | OUTPATIENT
Start: 2022-02-23 | End: 2022-09-06 | Stop reason: ALTCHOICE

## 2022-02-23 NOTE — PROGRESS NOTES
Subjective:     Madison Mccall is a 55 y.o. female seen for follow-up of diabetes. She has had hypoglycemic attacks. .no  Blood sugar control has been ok    Lab Results   Component Value Date/Time    Hemoglobin A1c 8.4 (H) 09/11/2020 11:36 AM    Hemoglobin A1c 8.2 (H) 09/09/2020 10:22 AM    Hemoglobin A1c 7.6 (H) 03/11/2020 02:38 PM    Hemoglobin A1c, External 7.9 12/06/2021 12:00 AM    Glucose 148 (H) 09/11/2020 11:36 AM    Glucose (POC) 205 (H) 09/28/2020 01:30 PM    Microalbumin urine (POC) 10 01/14/2015 11:46 AM    Microalbumin/Creat ratio (mg/g creat) 54 (H) 05/05/2021 12:50 PM    Microalbumin,urine random 4.51 05/05/2021 12:50 PM    Microalbumin/creat ratio (POC)  01/14/2015 11:46 AM    LDL, calculated 114 (H) 09/11/2020 11:36 AM    LDL, calculated 86 05/21/2019 03:44 PM    Creatinine 2.38 (H) 09/11/2020 11:36 AM       She has diabetes, hypertension and hyperlipidemia. Madison Mccall has the additional concern of as above interested in phentermine for weight loss. Reports taking blood pressure medications without side affects. No complaints of exertional chest pain, excessive shortness of breath or focal weakness. Minimal swelling in lower legs or dizziness with standing. Diet and Lifestyle: nonsmoker.     Patient Active Problem List    Diagnosis Date Noted    CRI (chronic renal insufficiency), stage 4 (severe) ([address] 01/27/2021    Bilateral cataracts 09/08/2020    Hyperglycemia due to type 2 diabetes mellitus ([address] 24/51/7282    Acute systolic congestive heart failure ([address] 02/07/2018    Obesity, morbid ([address] 12/18/2017    Type 2 diabetes mellitus with nephropathy ([address] 12/18/2017    Spinal stenosis of lumbosacral region 12/08/2016    Lumbar back pain with radiculopathy affecting right lower extremity 12/08/2016    Idiopathic small and large fiber sensory neuropathy 11/16/2016    Narcotic dependence, episodic use ([address] 04/29/2016    Chronic pain     Hypertension 11/07/2012 Allergies   Allergen Reactions    Lortab [Hydrocodone-Acetaminophen] Nausea and Vomiting    Cymbalta [Duloxetine] Diarrhea    Jardiance [Empagliflozin] Other (comments)     kidneys    Lasix [Furosemide] Swelling    Morphine Other (comments)     Pt states is not allergic to morphine     Past Medical History:   Diagnosis Date    Anemia     CAD (coronary artery disease)     Cardiomyopathy ([facility]     Chronic pain     CKD (chronic kidney disease)     Cyst in hand 2014    [address]    Diabetes mellitus type 2 in obese ([facility]     Diabetic neuropathy ([facility]     Hypertension     Migraine      Social History     Tobacco Use    Smoking status: Never Smoker    Smokeless tobacco: Never Used   Substance Use Topics    Alcohol use: Not on file            Review of Systems  Pertinent items are noted in HPI. Objective:     Significant for the following: ok  Visit Vitals  LMP 12/03/2017     WD WN female NAD  Heart RRR without murmers clicks or rubs  Lungs CTA  Abdo soft nontender  Ext no edema      Lab review: labs reviewed, I note that . Her A1c is only mildly elevated    Assessment/Plan:     Follow-up diabetes stable. Diabetic issues reviewed with her: all medications, side effects and compliance discussed carefully, glycohemoglobin and other lab monitoring discussed and labs immediately prior to next visit. Chronic Conditions Addressed Today     1.  Type 2 diabetes mellitus with nephropathy (HCC)     Relevant Medications     liraglutide (Victoza 2-Sukhdeep) 0.6 mg/0.1 mL (18 mg/3 mL) pnij     Other Relevant Orders     METABOLIC PANEL, BASIC     HEMOGLOBIN A1C WITH EAG      Acute Diagnoses Addressed Today     Type 2 diabetes mellitus with insulin therapy ([facility]    -  Primary        Relevant Medications        liraglutide (Victoza 2-Sukhdeep) 0.6 mg/0.1 mL (18 mg/3 mL) pnij    BMI 45.0-49.9, adult (HCC)            Relevant Medications        buPROPion (WELLBUTRIN) 100 mg tablet        liraglutide (Victoza 2-Sukhdeep) 0.6 mg/0.1 mL (18 mg/3 mL) pnij          Not a good candidate for phentermine given her numerous comorbidities. Can try Contrave like medication we will start with antidepressant half of it. If we do not get much weight loss can add the naltrexone later. Also could consider diabetic medicines for [employer] and/or surgical options. Discussed possible side affects, precautions, and drug interactions and possible benefits of the medication(s).     Follow-up 1 month

## 2022-02-23 NOTE — PROGRESS NOTES
Chief Complaint   Patient presents with    Weight Management     pt wants to loose weight     Patient is aware that this is a Virtual Visit or Phone Call Only doctor's visit. Patient has not been out of the country in (14 months), NO diarrhea, NO cough, NO chest conjestion, NO temp. Pt has not been around anyone with these symptoms. Health Maintenance reviewed. I have reviewed the patient's medical history in detail and updated the computerized patient record. 1. Have you been to the ER, urgent care clinic since your last visit? no Hospitalized since your last visit?  no    2. Have you seen or consulted any other health care providers outside of the [address] [place] [address] since your last visit? No  Include any pap smears or colon screening. Encouraged pt to discuss pt's wishes with spouse/partner/family and bring them in the next appt to follow thru with the Advanced Directive      Fall Risk Assessment, last 12 mths 5/5/2021   Able to walk? Yes   Fall in past 12 months? 0   Do you feel unsteady? 0   Are you worried about falling 0   Number of falls in past 12 months -   Fall with injury? -       3 most recent PHQ Screens 2/23/2022   Little interest or pleasure in doing things Several days   Feeling down, depressed, irritable, or hopeless Several days   Total Score PHQ 2 2       Abuse Screening Questionnaire 10/12/2021   Do you ever feel afraid of your partner? N   Are you in a relationship with someone who physically or mentally threatens you? N   Is it safe for you to go home?  Y       ADL Assessment 10/12/2021   Feeding yourself No Help Needed   Getting from bed to chair No Help Needed   Getting dressed No Help Needed   Bathing or showering No Help Needed   Walk across the room (includes cane/walker) No Help Needed   Using the telphone No Help Needed   Taking your medications No Help Needed   Preparing meals No Help Needed   Managing money (expenses/bills) No Help Needed   Moderately strenuous housework (laundry) No Help Needed   Shopping for personal items (toiletries/medicines) No Help Needed   Shopping for groceries No Help Needed   Driving No Help Needed   Climbing a flight of stairs No Help Needed   Getting to places beyond walking distances No Help Needed

## 2022-03-07 RX ORDER — ISOSORBIDE DINITRATE 20 MG/1
20 TABLET ORAL 2 TIMES DAILY
Qty: 180 TABLET | Refills: 1 | Status: SHIPPED | OUTPATIENT
Start: 2022-03-07 | End: 2022-06-07 | Stop reason: SDUPTHER

## 2022-03-07 NOTE — TELEPHONE ENCOUNTER
Patient refill request; as soon as possible    Requested Prescriptions     Pending Prescriptions Disp Refills    isosorbide dinitrate (ISORDIL) 20 mg tablet 180 Tablet 1     Sig: Take 1 Tablet by mouth two (2) times a day.

## 2022-03-10 ENCOUNTER — TELEPHONE (OUTPATIENT)
Dept: INTERNAL MEDICINE CLINIC | Age: 57
End: 2022-03-10

## 2022-03-10 RX ORDER — METOLAZONE 2.5 MG/1
2.5 TABLET ORAL DAILY
Qty: 60 TABLET | Refills: 2 | Status: SHIPPED | OUTPATIENT
Start: 2022-03-10 | End: 2022-05-24 | Stop reason: SDUPTHER

## 2022-03-16 DIAGNOSIS — G60.8 IDIOPATHIC SMALL AND LARGE FIBER SENSORY NEUROPATHY: ICD-10-CM

## 2022-03-16 RX ORDER — PREGABALIN 25 MG/1
50 CAPSULE ORAL 2 TIMES DAILY
Qty: 120 CAPSULE | Refills: 0 | Status: SHIPPED | OUTPATIENT
Start: 2022-03-16 | End: 2022-05-05 | Stop reason: SDUPTHER

## 2022-03-16 NOTE — TELEPHONE ENCOUNTER
Patient refill request; patient has 2 left    Requested Prescriptions     Pending Prescriptions Disp Refills    pregabalin (LYRICA) 25 mg capsule 120 Capsule 0     Sig: Take 2 Capsules by mouth two (2) times a day. Max Daily Amount: 100 mg.

## 2022-03-18 PROBLEM — N18.4 CRI (CHRONIC RENAL INSUFFICIENCY), STAGE 4 (SEVERE) (HCC): Status: ACTIVE | Noted: 2021-01-27

## 2022-03-18 PROBLEM — E11.65 HYPERGLYCEMIA DUE TO TYPE 2 DIABETES MELLITUS (HCC): Status: ACTIVE | Noted: 2020-03-04

## 2022-03-19 PROBLEM — E66.01 OBESITY, MORBID (HCC): Status: ACTIVE | Noted: 2017-12-18

## 2022-03-19 PROBLEM — E11.21 TYPE 2 DIABETES MELLITUS WITH NEPHROPATHY (HCC): Status: ACTIVE | Noted: 2017-12-18

## 2022-03-19 PROBLEM — H26.9 BILATERAL CATARACTS: Status: ACTIVE | Noted: 2020-09-08

## 2022-03-20 PROBLEM — I50.21 ACUTE SYSTOLIC CONGESTIVE HEART FAILURE (HCC): Status: ACTIVE | Noted: 2018-02-07

## 2022-03-26 RX ORDER — CARVEDILOL 12.5 MG/1
TABLET ORAL
Qty: 180 TABLET | Refills: 0 | Status: SHIPPED | OUTPATIENT
Start: 2022-03-26 | End: 2022-04-01 | Stop reason: SDUPTHER

## 2022-04-01 RX ORDER — CARVEDILOL 12.5 MG/1
12.5 TABLET ORAL 2 TIMES DAILY
Qty: 180 TABLET | Refills: 0 | Status: SHIPPED | OUTPATIENT
Start: 2022-04-01 | End: 2022-06-07 | Stop reason: SDUPTHER

## 2022-04-01 NOTE — TELEPHONE ENCOUNTER
----- Message from Mansoor Weber sent at 3/26/2022 11:54 AM EDT -----  Subject: Refill Request    QUESTIONS  Name of Medication? carvediloL (COREG) 12.5 mg tablet  Patient-reported dosage and instructions? 12.5 mg/2 day   How many days do you have left? 0  Preferred Pharmacy? [address]  Pharmacy phone number (if available)? [phone]  ---------------------------------------------------------------------------  --------------  CALL BACK INFO  What is the best way for the office to contact you? OK to leave message on   voicemail  Preferred Call Back Phone Number?  [phone]

## 2022-05-10 ENCOUNTER — DOCUMENTATION ONLY (OUTPATIENT)
Dept: INTERNAL MEDICINE CLINIC | Age: 57
End: 2022-05-10

## 2022-05-10 DIAGNOSIS — G60.8 IDIOPATHIC SMALL AND LARGE FIBER SENSORY NEUROPATHY: ICD-10-CM

## 2022-05-10 RX ORDER — PREGABALIN 50 MG/1
50 CAPSULE ORAL 2 TIMES DAILY
Qty: 60 CAPSULE | Refills: 0 | Status: SHIPPED | OUTPATIENT
Start: 2022-05-10 | End: 2022-06-07 | Stop reason: SDUPTHER

## 2022-05-10 NOTE — PROGRESS NOTES
5/10/22 PA Key: [id number], submitted electronic,for Pregabalin 25 mg capsules. [employer] will review the request and will issue a decision, typically within 3-7 days from  submission. If [employer] has not responded in 3-7 days or if you have any questions about the ePA request, please contact [employer] at [phone].

## 2022-05-24 NOTE — TELEPHONE ENCOUNTER
Patient refill request      Requested Prescriptions     Pending Prescriptions Disp Refills    metOLazone (ZAROXOLYN) 2.5 mg tablet 60 Tablet 2     Sig: Take 1 Tablet by mouth daily.  As needed for swelling

## 2022-05-25 RX ORDER — METOLAZONE 2.5 MG/1
2.5 TABLET ORAL DAILY
Qty: 60 TABLET | Refills: 2 | Status: SHIPPED | OUTPATIENT
Start: 2022-05-25 | End: 2022-09-06 | Stop reason: SDUPTHER

## 2022-06-07 ENCOUNTER — OFFICE VISIT (OUTPATIENT)
Dept: INTERNAL MEDICINE CLINIC | Age: 57
End: 2022-06-07
Payer: MEDICARE

## 2022-06-07 VITALS
HEART RATE: 89 BPM | HEIGHT: 64 IN | BODY MASS INDEX: 43.54 KG/M2 | WEIGHT: 255 LBS | DIASTOLIC BLOOD PRESSURE: 74 MMHG | TEMPERATURE: 98.4 F | RESPIRATION RATE: 16 BRPM | SYSTOLIC BLOOD PRESSURE: 136 MMHG | OXYGEN SATURATION: 96 %

## 2022-06-07 DIAGNOSIS — E11.65 TYPE 2 DIABETES MELLITUS WITH HYPERGLYCEMIA, WITH LONG-TERM CURRENT USE OF INSULIN (HCC): ICD-10-CM

## 2022-06-07 DIAGNOSIS — E78.2 MIXED HYPERLIPIDEMIA: ICD-10-CM

## 2022-06-07 DIAGNOSIS — M79.671 PAIN IN BOTH FEET: ICD-10-CM

## 2022-06-07 DIAGNOSIS — Z79.4 TYPE 2 DIABETES MELLITUS WITH HYPERGLYCEMIA, WITH LONG-TERM CURRENT USE OF INSULIN (HCC): ICD-10-CM

## 2022-06-07 DIAGNOSIS — M79.672 PAIN IN BOTH FEET: ICD-10-CM

## 2022-06-07 DIAGNOSIS — E11.21 TYPE 2 DIABETES MELLITUS WITH NEPHROPATHY (HCC): ICD-10-CM

## 2022-06-07 DIAGNOSIS — G60.8 IDIOPATHIC SMALL AND LARGE FIBER SENSORY NEUROPATHY: ICD-10-CM

## 2022-06-07 DIAGNOSIS — I50.21 ACUTE SYSTOLIC CONGESTIVE HEART FAILURE (HCC): Primary | ICD-10-CM

## 2022-06-07 DIAGNOSIS — N18.4 CRI (CHRONIC RENAL INSUFFICIENCY), STAGE 4 (SEVERE) (HCC): ICD-10-CM

## 2022-06-07 PROCEDURE — G8432 DEP SCR NOT DOC, RNG: HCPCS | Performed by: FAMILY MEDICINE

## 2022-06-07 PROCEDURE — G8752 SYS BP LESS 140: HCPCS | Performed by: FAMILY MEDICINE

## 2022-06-07 PROCEDURE — G8754 DIAS BP LESS 90: HCPCS | Performed by: FAMILY MEDICINE

## 2022-06-07 PROCEDURE — 3046F HEMOGLOBIN A1C LEVEL >9.0%: CPT | Performed by: FAMILY MEDICINE

## 2022-06-07 PROCEDURE — 3017F COLORECTAL CA SCREEN DOC REV: CPT | Performed by: FAMILY MEDICINE

## 2022-06-07 PROCEDURE — G9899 SCRN MAM PERF RSLTS DOC: HCPCS | Performed by: FAMILY MEDICINE

## 2022-06-07 PROCEDURE — G8427 DOCREV CUR MEDS BY ELIG CLIN: HCPCS | Performed by: FAMILY MEDICINE

## 2022-06-07 PROCEDURE — 2022F DILAT RTA XM EVC RTNOPTHY: CPT | Performed by: FAMILY MEDICINE

## 2022-06-07 PROCEDURE — 99214 OFFICE O/P EST MOD 30 MIN: CPT | Performed by: FAMILY MEDICINE

## 2022-06-07 PROCEDURE — G8417 CALC BMI ABV UP PARAM F/U: HCPCS | Performed by: FAMILY MEDICINE

## 2022-06-07 RX ORDER — PREGABALIN 50 MG/1
50 CAPSULE ORAL 2 TIMES DAILY
Qty: 60 CAPSULE | Refills: 2 | Status: SHIPPED | OUTPATIENT
Start: 2022-06-07 | End: 2022-07-06 | Stop reason: SDUPTHER

## 2022-06-07 RX ORDER — ATORVASTATIN CALCIUM 40 MG/1
40 TABLET, FILM COATED ORAL DAILY
Qty: 90 TABLET | Refills: 3 | Status: SHIPPED | OUTPATIENT
Start: 2022-06-07

## 2022-06-07 RX ORDER — BUPROPION HYDROCHLORIDE 100 MG/1
100 TABLET ORAL DAILY
Qty: 90 TABLET | Refills: 3 | Status: SHIPPED | OUTPATIENT
Start: 2022-06-07

## 2022-06-07 RX ORDER — CARVEDILOL 12.5 MG/1
12.5 TABLET ORAL 2 TIMES DAILY
Qty: 180 TABLET | Refills: 1 | Status: SHIPPED | OUTPATIENT
Start: 2022-06-07

## 2022-06-07 RX ORDER — HYDRALAZINE HYDROCHLORIDE 25 MG/1
25 TABLET, FILM COATED ORAL 2 TIMES DAILY
Qty: 180 TABLET | Refills: 1 | Status: SHIPPED | OUTPATIENT
Start: 2022-06-07

## 2022-06-07 RX ORDER — ISOSORBIDE DINITRATE 20 MG/1
20 TABLET ORAL 2 TIMES DAILY
Qty: 180 TABLET | Refills: 1 | Status: SHIPPED | OUTPATIENT
Start: 2022-06-07

## 2022-06-07 RX ORDER — LISINOPRIL 10 MG/1
10 TABLET ORAL DAILY
Qty: 90 TABLET | Refills: 1 | Status: SHIPPED | OUTPATIENT
Start: 2022-06-07

## 2022-06-07 NOTE — PROGRESS NOTES
Chief Complaint   Patient presents with    Documentation     Disability DMV placard      Patient has not been out of the country in (14 months), NO diarrhea, NO cough, NO chest conjestion, NO temp. Pt has not been around anyone with these symptoms. Health Maintenance reviewed. I have reviewed the patient's medical history in detail and updated the computerized patient record. 1. Have you been to the ER, urgent care clinic since your last visit? Hospitalized since your last visit? 2. Have you seen or consulted any other health care providers outside of the [address] [place] [address] since your last visit? Include any pap smears or colon screening. Encouraged pt to discuss pt's wishes with spouse/partner/family and bring them in the next appt to follow thru with the Advanced Directive    @  [address], last 12 mths 5/5/2021   Able to walk? Yes   Fall in past 12 months? 0   Do you feel unsteady? 0   Are you worried about falling 0   Number of falls in past 12 months -   Fall with injury? -       3 most recent PHQ Screens 2/23/2022   Little interest or pleasure in doing things Several days   Feeling down, depressed, irritable, or hopeless Several days   Total Score PHQ 2 2       Abuse Screening Questionnaire 10/12/2021   Do you ever feel afraid of your partner? N   Are you in a relationship with someone who physically or mentally threatens you? N   Is it safe for you to go home?  Y       ADL Assessment 10/12/2021   Feeding yourself No Help Needed   Getting from bed to chair No Help Needed   Getting dressed No Help Needed   Bathing or showering No Help Needed   Walk across the room (includes cane/walker) No Help Needed   Using the telphone No Help Needed   Taking your medications No Help Needed   Preparing meals No Help Needed   Managing money (expenses/bills) No Help Needed   Moderately strenuous housework (laundry) No Help Needed   Shopping for personal items (toiletries/medicines) No Help Needed   Shopping for groceries No Help Needed   Driving No Help Needed   Climbing a flight of stairs No Help Needed   Getting to places beyond walking distances No Help Needed

## 2022-06-07 NOTE — PROGRESS NOTES
Subjective:     Ramonita Salazar is an 55 y.o. female who is seen for follow up of CHF. And DM with neuropathy. She has diabetes, hypertension, CHF and Systolic HF. Re plaqard  C/o both toes hurting and wants a pain shot. Injectable tramadol does not exist.  Yesterday started hurting. Walking more lately, not fallen or other trauma. Bilateral toes hurting no history of gout. Diet and Lifestyle: nonsmoker. Wt Readings from Last 3 Encounters:   06/07/22 255 lb (115.7 kg)   10/12/21 263 lb (119.3 kg)   09/08/21 258 lb (117 kg)     Weight monitoring: has been stable    Cardiovascular System Review  Cardiovascular ROS - taking medications as instructed, no medication side effects noted, no chest pain on exertion, notes stable dyspnea on exertion, no change, NYHA class 1, sl swelling of ankles. Patient Active Problem List    Diagnosis Date Noted    CRI (chronic renal insufficiency), stage 4 (severe) ([facility] 01/27/2021    Bilateral cataracts 09/08/2020    Hyperglycemia due to type 2 diabetes mellitus ([address] 71/94/5355    Acute systolic congestive heart failure ([address] 02/07/2018    Obesity, morbid ([address] 12/18/2017    Type 2 diabetes mellitus with nephropathy ([address] 12/18/2017    Spinal stenosis of lumbosacral region 12/08/2016    Lumbar back pain with radiculopathy affecting right lower extremity 12/08/2016    Idiopathic small and large fiber sensory neuropathy 11/16/2016    Narcotic dependence, episodic use (HCC) 04/29/2016    Chronic pain     Hypertension 11/07/2012     Current Outpatient Medications   Medication Sig Dispense Refill    pregabalin (LYRICA) 50 mg capsule Take 1 Capsule by mouth two (2) times a day. Max Daily Amount: 100 mg. 60 Capsule 2    lisinopriL (PRINIVIL, ZESTRIL) 10 mg tablet Take 1 Tablet by mouth daily. 90 Tablet 1    atorvastatin (LIPITOR) 40 mg tablet Take 1 Tablet by mouth daily.  90 Tablet 3    hydrALAZINE (APRESOLINE) 25 mg tablet Take 1 Tablet by mouth two (2) times a day. 180 Tablet 1    isosorbide dinitrate (ISORDIL) 20 mg tablet Take 1 Tablet by mouth two (2) times a day. 180 Tablet 1    carvediloL (COREG) 12.5 mg tablet Take 1 Tablet by mouth two (2) times a day. 180 Tablet 1    buPROPion (WELLBUTRIN) 100 mg tablet Take 1 Tablet by mouth daily. 90 Tablet 3    cyanocobalamin (VITAMIN B12) 500 mcg tablet Take 1 tablet by mouth once daily 90 Tablet 3    metOLazone (ZAROXOLYN) 2.5 mg tablet Take 1 Tablet by mouth daily. As needed for swelling 60 Tablet 2    atorvastatin (LIPITOR) 40 mg tablet Take 1 Tablet by mouth daily. 90 Tablet 3    liraglutide (Victoza 2-Sukhdeep) 0.6 mg/0.1 mL (18 mg/3 mL) pnij 1.8 mg by SubCUTAneous route daily. 6 mL 5    cholecalciferol (VITAMIN D3) (1000 Units /25 mcg) tablet Take 1 Tablet by mouth daily. 90 Tablet 3    insulin aspart U-100 (NovoLOG Flexpen U-100 Insulin) 100 unit/mL (3 mL) inpn 12 Units by SubCUTAneous route Before breakfast, lunch, and dinner. 5 Pen 5    Insulin Needles, Disposable, 32 gauge x 5/32\" ndle Check blood sugar up to 3x daily as needed. 100 Pen Needle 5    glucose blood VI test strips (True Metrix Glucose Test Strip) strip CHECK BLOOD SUGAR UP TO THREE TIMES DAILY Dx.  E11.21 100 Strip 5    Blood-Glucose Meter monitoring kit Want free style leda kit 1 Kit 0    lidocaine (LIDODERM) 5 % Apply patch to the affected area for 12 hours a day and remove for 12 hours a day. 5 Each 0    Blood-Glucose Meter (TRUE METRIX GLUCOSE METER) misc Check blood sugar up to three times daily 1 Each 0    aspirin delayed-release 81 mg tablet Take  by mouth daily.        Allergies   Allergen Reactions    Lortab [Hydrocodone-Acetaminophen] Nausea and Vomiting    Cymbalta [Duloxetine] Diarrhea    Jardiance [Empagliflozin] Other (comments)     kidneys    Lasix [Furosemide] Swelling    Morphine Other (comments)     Pt states is not allergic to morphine     Past Medical History:   Diagnosis Date    Anemia     CAD (coronary artery disease)     Cardiomyopathy [facility]     Chronic pain     CKD (chronic kidney disease)     Cyst in hand 2014    [address]    Diabetes mellitus type 2 in obese ([facility]     Diabetic neuropathy ([facility]     Hypertension     Migraine      Social History     Tobacco Use    Smoking status: Never Smoker    Smokeless tobacco: Never Used   Substance Use Topics    Alcohol use: Not on file        Lab Results   Component Value Date/Time    WBC 5.6 09/09/2020 10:22 AM    HGB 12.6 09/09/2020 10:22 AM    HCT 39.3 09/09/2020 10:22 AM    PLATELET 887 (L) 60/24/5030 10:22 AM    MCV 79 09/09/2020 10:22 AM     Lab Results   Component Value Date/Time    Cholesterol, total 184 09/11/2020 11:36 AM    HDL Cholesterol 48 09/11/2020 11:36 AM    LDL, calculated 114 (H) 09/11/2020 11:36 AM    LDL, calculated 86 05/21/2019 03:44 PM    Triglyceride 120 09/11/2020 11:36 AM     Lab Results   Component Value Date/Time    ALT (SGPT) 10 09/09/2020 10:22 AM    Alk. phosphatase 139 (H) 09/09/2020 10:22 AM    Bilirubin, total 0.3 09/09/2020 10:22 AM    Albumin 4.0 09/09/2020 10:22 AM    Protein, total 7.1 09/09/2020 10:22 AM    INR 1.1 12/08/2015 04:42 PM    Prothrombin time 11.2 12/08/2015 04:42 PM    PLATELET 145 (L) 98/10/2707 10:22 AM     Lab Results   Component Value Date/Time    GFR est non-AA 23 (L) 09/11/2020 11:36 AM    GFR est AA 26 (L) 09/11/2020 11:36 AM    Creatinine 2.38 (H) 09/11/2020 11:36 AM    BUN 34 (H) 09/11/2020 11:36 AM    Sodium 146 (H) 09/11/2020 11:36 AM    Potassium 4.8 09/11/2020 11:36 AM    Chloride 109 (H) 09/11/2020 11:36 AM    CO2 24 09/11/2020 11:36 AM    Magnesium 3.6 (HH) 09/09/2020 10:22 AM    PTH, Intact 57 09/09/2020 10:22 AM     Lab Results   Component Value Date/Time    Glucose 148 (H) 09/11/2020 11:36 AM    Glucose (POC) 205 (H) 09/28/2020 01:30 PM         Review of Systems, additional:  Pertinent items are noted in HPI.     Objective:     SIngnificant for ow  Visit Vitals  /74 (BP 1 Location: Left arm, BP Patient Position: Sitting, BP Cuff Size: Adult)   Pulse 89   Temp 98.4 °F (36.9 °C) (Temporal)   Resp 16   Ht 5' 4\" (1.626 m)   Wt 255 lb (115.7 kg)   LMP 12/03/2017   SpO2 96%   BMI 43.77 kg/m²     WD WN female NAD  Heart RRR without murmers clicks or rubs  Lungs CTA  Abdo soft nontender  Ext min edema  Feet toes neurovascularly intact no swelling redness      Assessment/Plan:     hypertension stable, hyperlipidemia stable. Acetaminophen for her toes  She has topical treatments as well  Chronic Conditions Addressed Today     1. Type 2 diabetes mellitus with nephropathy (HCC)     Relevant Medications     pregabalin (LYRICA) 50 mg capsule     lisinopriL (PRINIVIL, ZESTRIL) 10 mg tablet     atorvastatin (LIPITOR) 40 mg tablet     Other Relevant Orders     MICROALBUMIN, UR, RAND W/ MICROALB/CREAT RATIO    2. Idiopathic small and large fiber sensory neuropathy     Relevant Medications     pregabalin (LYRICA) 50 mg capsule     buPROPion (WELLBUTRIN) 100 mg tablet    3. Hyperglycemia due to type 2 diabetes mellitus (HCC)     Relevant Medications     pregabalin (LYRICA) 50 mg capsule     lisinopriL (PRINIVIL, ZESTRIL) 10 mg tablet     atorvastatin (LIPITOR) 40 mg tablet     Other Relevant Orders     HEMOGLOBIN A1C WITH EAG    4. CRI (chronic renal insufficiency), stage 4 (severe) (HCC)     Relevant Medications     lisinopriL (PRINIVIL, ZESTRIL) 10 mg tablet     Other Relevant Orders     METABOLIC PANEL, BASIC    5.  Acute systolic congestive heart failure (HCC) - Primary     Relevant Medications     lisinopriL (PRINIVIL, ZESTRIL) 10 mg tablet     atorvastatin (LIPITOR) 40 mg tablet     hydrALAZINE (APRESOLINE) 25 mg tablet     isosorbide dinitrate (ISORDIL) 20 mg tablet     carvediloL (COREG) 12.5 mg tablet      Acute Diagnoses Addressed Today     Pain in both feet        BMI 45.0-49.9, adult (HCC)            Relevant Medications        buPROPion (WELLBUTRIN) 100 mg tablet    Mixed hyperlipidemia            Relevant Medications        atorvastatin (LIPITOR) 40 mg tablet        Other Relevant Orders        LIPID PANEL        Orders Placed This Encounter    HEMOGLOBIN A1C WITH EAG     Standing Status:   Future     Standing Expiration Date:   1/3/3237    METABOLIC PANEL, BASIC     Standing Status:   Future     Standing Expiration Date:   12/8/2022    LIPID PANEL     Standing Status:   Future     Standing Expiration Date:   12/8/2022    MICROALBUMIN, UR, RAND W/ MICROALB/CREAT RATIO     Standing Status:   Future     Standing Expiration Date:   6/7/2023    pregabalin (LYRICA) 50 mg capsule     Sig: Take 1 Capsule by mouth two (2) times a day. Max Daily Amount: 100 mg. Dispense:  60 Capsule     Refill:  2    lisinopriL (PRINIVIL, ZESTRIL) 10 mg tablet     Sig: Take 1 Tablet by mouth daily. Dispense:  90 Tablet     Refill:  1    atorvastatin (LIPITOR) 40 mg tablet     Sig: Take 1 Tablet by mouth daily. Dispense:  90 Tablet     Refill:  3    hydrALAZINE (APRESOLINE) 25 mg tablet     Sig: Take 1 Tablet by mouth two (2) times a day. Dispense:  180 Tablet     Refill:  1    isosorbide dinitrate (ISORDIL) 20 mg tablet     Sig: Take 1 Tablet by mouth two (2) times a day. Dispense:  180 Tablet     Refill:  1    carvediloL (COREG) 12.5 mg tablet     Sig: Take 1 Tablet by mouth two (2) times a day. Dispense:  180 Tablet     Refill:  1    buPROPion (WELLBUTRIN) 100 mg tablet     Sig: Take 1 Tablet by mouth daily. Dispense:  90 Tablet     Refill:  3     Current Outpatient Medications   Medication Sig Dispense Refill    pregabalin (LYRICA) 50 mg capsule Take 1 Capsule by mouth two (2) times a day. Max Daily Amount: 100 mg. 60 Capsule 2    lisinopriL (PRINIVIL, ZESTRIL) 10 mg tablet Take 1 Tablet by mouth daily. 90 Tablet 1    atorvastatin (LIPITOR) 40 mg tablet Take 1 Tablet by mouth daily. 90 Tablet 3    hydrALAZINE (APRESOLINE) 25 mg tablet Take 1 Tablet by mouth two (2) times a day.  180 Tablet 1    isosorbide dinitrate (ISORDIL) 20 mg tablet Take 1 Tablet by mouth two (2) times a day. 180 Tablet 1    carvediloL (COREG) 12.5 mg tablet Take 1 Tablet by mouth two (2) times a day. 180 Tablet 1    buPROPion (WELLBUTRIN) 100 mg tablet Take 1 Tablet by mouth daily. 90 Tablet 3    cyanocobalamin (VITAMIN B12) 500 mcg tablet Take 1 tablet by mouth once daily 90 Tablet 3    metOLazone (ZAROXOLYN) 2.5 mg tablet Take 1 Tablet by mouth daily. As needed for swelling 60 Tablet 2    atorvastatin (LIPITOR) 40 mg tablet Take 1 Tablet by mouth daily. 90 Tablet 3    liraglutide (Victoza 2-Sukhdeep) 0.6 mg/0.1 mL (18 mg/3 mL) pnij 1.8 mg by SubCUTAneous route daily. 6 mL 5    cholecalciferol (VITAMIN D3) (1000 Units /25 mcg) tablet Take 1 Tablet by mouth daily. 90 Tablet 3    insulin aspart U-100 (NovoLOG Flexpen U-100 Insulin) 100 unit/mL (3 mL) inpn 12 Units by SubCUTAneous route Before breakfast, lunch, and dinner. 5 Pen 5    Insulin Needles, Disposable, 32 gauge x 5/32\" ndle Check blood sugar up to 3x daily as needed. 100 Pen Needle 5    glucose blood VI test strips (True Metrix Glucose Test Strip) strip CHECK BLOOD SUGAR UP TO THREE TIMES DAILY Dx.  E11.21 100 Strip 5    Blood-Glucose Meter monitoring kit Want free style leda kit 1 Kit 0    lidocaine (LIDODERM) 5 % Apply patch to the affected area for 12 hours a day and remove for 12 hours a day. 5 Each 0    Blood-Glucose Meter (TRUE METRIX GLUCOSE METER) misc Check blood sugar up to three times daily 1 Each 0    aspirin delayed-release 81 mg tablet Take  by mouth daily.          3-month follow-up

## 2022-06-08 LAB
CREAT UR-MCNC: 110 MG/DL
MICROALBUMIN UR-MCNC: 7.66 MG/DL
MICROALBUMIN/CREAT UR-RTO: 70 MG/G (ref 0–30)

## 2022-06-10 DIAGNOSIS — E53.8 VITAMIN B 12 DEFICIENCY: ICD-10-CM

## 2022-06-10 RX ORDER — LANOLIN ALCOHOL/MO/W.PET/CERES
500 CREAM (GRAM) TOPICAL DAILY
Qty: 90 TABLET | Refills: 3 | Status: SHIPPED | OUTPATIENT
Start: 2022-06-10

## 2022-06-10 NOTE — TELEPHONE ENCOUNTER
Patient was seen the other day and said you forgot to send in her vitamin B12. She is out! Requested Prescriptions     Pending Prescriptions Disp Refills    cyanocobalamin (VITAMIN B12) 500 mcg tablet 90 Tablet 3     Sig: Take 1 Tablet by mouth daily.

## 2022-07-29 LAB
BUN SERPL-MCNC: 25 MG/DL (ref 6–24)
BUN/CREAT SERPL: 13 (ref 9–23)
CALCIUM SERPL-MCNC: 9.1 MG/DL (ref 8.7–10.2)
CHLORIDE SERPL-SCNC: 106 MMOL/L (ref 96–106)
CHOLEST SERPL-MCNC: 149 MG/DL (ref 100–199)
CO2 SERPL-SCNC: 22 MMOL/L (ref 20–29)
CREAT SERPL-MCNC: 1.93 MG/DL (ref 0.57–1)
EGFR: 30 ML/MIN/1.73
EST. AVERAGE GLUCOSE BLD GHB EST-MCNC: 177 MG/DL
GLUCOSE SERPL-MCNC: 328 MG/DL (ref 65–99)
HBA1C MFR BLD: 7.8 % (ref 4.8–5.6)
HDLC SERPL-MCNC: 54 MG/DL
IMP & REVIEW OF LAB RESULTS: NORMAL
INTERPRETATION: NORMAL
LDLC SERPL CALC-MCNC: 76 MG/DL (ref 0–99)
POTASSIUM SERPL-SCNC: 4.4 MMOL/L (ref 3.5–5.2)
SODIUM SERPL-SCNC: 146 MMOL/L (ref 134–144)
TRIGL SERPL-MCNC: 105 MG/DL (ref 0–149)
VLDLC SERPL CALC-MCNC: 19 MG/DL (ref 5–40)

## 2022-08-02 DIAGNOSIS — G60.8 IDIOPATHIC SMALL AND LARGE FIBER SENSORY NEUROPATHY: ICD-10-CM

## 2022-08-02 NOTE — TELEPHONE ENCOUNTER
Patient refill request      Requested Prescriptions     Pending Prescriptions Disp Refills    pregabalin (LYRICA) 50 mg capsule 60 Capsule 0     Sig: Take 1 Capsule by mouth two (2) times a day. Max Daily Amount: 100 mg.

## 2022-08-03 DIAGNOSIS — G60.8 IDIOPATHIC SMALL AND LARGE FIBER SENSORY NEUROPATHY: ICD-10-CM

## 2022-08-04 RX ORDER — PREGABALIN 50 MG/1
50 CAPSULE ORAL 2 TIMES DAILY
Qty: 60 CAPSULE | Refills: 0 | Status: SHIPPED | OUTPATIENT
Start: 2022-08-04 | End: 2022-08-22 | Stop reason: SDUPTHER

## 2022-08-04 RX ORDER — PREGABALIN 50 MG/1
CAPSULE ORAL
Qty: 60 CAPSULE | Refills: 0 | Status: SHIPPED | OUTPATIENT
Start: 2022-08-04 | End: 2022-08-12 | Stop reason: ALTCHOICE

## 2022-08-12 ENCOUNTER — OFFICE VISIT (OUTPATIENT)
Dept: INTERNAL MEDICINE CLINIC | Age: 57
End: 2022-08-12
Payer: MEDICARE

## 2022-08-12 VITALS
SYSTOLIC BLOOD PRESSURE: 139 MMHG | TEMPERATURE: 98.1 F | DIASTOLIC BLOOD PRESSURE: 64 MMHG | WEIGHT: 268 LBS | HEIGHT: 64 IN | OXYGEN SATURATION: 97 % | HEART RATE: 88 BPM | RESPIRATION RATE: 20 BRPM | BODY MASS INDEX: 45.75 KG/M2

## 2022-08-12 DIAGNOSIS — E11.65 TYPE 2 DIABETES MELLITUS WITH HYPERGLYCEMIA, WITH LONG-TERM CURRENT USE OF INSULIN (HCC): Primary | ICD-10-CM

## 2022-08-12 DIAGNOSIS — Z79.4 TYPE 2 DIABETES MELLITUS WITH HYPERGLYCEMIA, WITH LONG-TERM CURRENT USE OF INSULIN (HCC): Primary | ICD-10-CM

## 2022-08-12 DIAGNOSIS — E11.21 TYPE 2 DIABETES MELLITUS WITH NEPHROPATHY (HCC): ICD-10-CM

## 2022-08-12 DIAGNOSIS — I10 PRIMARY HYPERTENSION: ICD-10-CM

## 2022-08-12 DIAGNOSIS — N18.32 STAGE 3B CHRONIC KIDNEY DISEASE (HCC): ICD-10-CM

## 2022-08-12 PROBLEM — N18.4 CRI (CHRONIC RENAL INSUFFICIENCY), STAGE 4 (SEVERE) (HCC): Status: RESOLVED | Noted: 2021-01-27 | Resolved: 2022-08-12

## 2022-08-12 PROBLEM — E66.01 OBESITY, MORBID (HCC): Status: RESOLVED | Noted: 2017-12-18 | Resolved: 2022-08-12

## 2022-08-12 PROBLEM — N18.30 CHRONIC RENAL DISEASE, STAGE III (HCC): Status: ACTIVE | Noted: 2022-08-12

## 2022-08-12 PROCEDURE — G8417 CALC BMI ABV UP PARAM F/U: HCPCS | Performed by: FAMILY MEDICINE

## 2022-08-12 PROCEDURE — G8752 SYS BP LESS 140: HCPCS | Performed by: FAMILY MEDICINE

## 2022-08-12 PROCEDURE — 2022F DILAT RTA XM EVC RTNOPTHY: CPT | Performed by: FAMILY MEDICINE

## 2022-08-12 PROCEDURE — 99214 OFFICE O/P EST MOD 30 MIN: CPT | Performed by: FAMILY MEDICINE

## 2022-08-12 PROCEDURE — G8432 DEP SCR NOT DOC, RNG: HCPCS | Performed by: FAMILY MEDICINE

## 2022-08-12 PROCEDURE — 3017F COLORECTAL CA SCREEN DOC REV: CPT | Performed by: FAMILY MEDICINE

## 2022-08-12 PROCEDURE — G8754 DIAS BP LESS 90: HCPCS | Performed by: FAMILY MEDICINE

## 2022-08-12 PROCEDURE — G8427 DOCREV CUR MEDS BY ELIG CLIN: HCPCS | Performed by: FAMILY MEDICINE

## 2022-08-12 PROCEDURE — G9899 SCRN MAM PERF RSLTS DOC: HCPCS | Performed by: FAMILY MEDICINE

## 2022-08-12 RX ORDER — INSULIN ASPART 100 [IU]/ML
6 INJECTION, SOLUTION INTRAVENOUS; SUBCUTANEOUS
Qty: 5 PEN | Refills: 5 | Status: SHIPPED | OUTPATIENT
Start: 2022-08-12

## 2022-08-12 RX ORDER — TIRZEPATIDE 2.5 MG/.5ML
2.5 INJECTION, SOLUTION SUBCUTANEOUS
Qty: 4 EACH | Refills: 5 | Status: SHIPPED | OUTPATIENT
Start: 2022-08-12 | End: 2022-11-02 | Stop reason: DRUGHIGH

## 2022-08-12 RX ORDER — METFORMIN HYDROCHLORIDE 500 MG/1
500 TABLET ORAL 2 TIMES DAILY WITH MEALS
Qty: 60 TABLET | Refills: 5 | Status: SHIPPED | OUTPATIENT
Start: 2022-08-12

## 2022-08-12 NOTE — PROGRESS NOTES
Chief Complaint   Patient presents with    Labs     FU     Patient has not been out of the country in (14 months), NO diarrhea, NO cough, NO chest conjestion, NO temp. Pt has not been around anyone with these symptoms. Health Maintenance reviewed. I have reviewed the patient's medical history in detail and updated the computerized patient record. 1. Have you been to the ER, urgent care clinic since your last visit? No   Hospitalized since your last visit?  no    2. Have you seen or consulted any other health care providers outside of the [address] [place] [address] since your last visit? No  Include any pap smears or colon screening. Encouraged pt to discuss pt's wishes with spouse/partner/family and bring them in the next appt to follow thru with the Advanced Directive    @  [address], last 12 mths 5/5/2021   Able to walk? Yes   Fall in past 12 months? 0   Do you feel unsteady? 0   Are you worried about falling 0   Number of falls in past 12 months -   Fall with injury? -       3 most recent PHQ Screens 2/23/2022   Little interest or pleasure in doing things Several days   Feeling down, depressed, irritable, or hopeless Several days   Total Score PHQ 2 2       Abuse Screening Questionnaire 10/12/2021   Do you ever feel afraid of your partner? N   Are you in a relationship with someone who physically or mentally threatens you? N   Is it safe for you to go home?  Y       ADL Assessment 10/12/2021   Feeding yourself No Help Needed   Getting from bed to chair No Help Needed   Getting dressed No Help Needed   Bathing or showering No Help Needed   Walk across the room (includes cane/walker) No Help Needed   Using the telphone No Help Needed   Taking your medications No Help Needed   Preparing meals No Help Needed   Managing money (expenses/bills) No Help Needed   Moderately strenuous housework (laundry) No Help Needed   Shopping for personal items (toiletries/medicines) No Help Needed   Shopping for groceries No Help Needed   Driving No Help Needed   Climbing a flight of stairs No Help Needed   Getting to places beyond walking distances No Help Needed

## 2022-08-12 NOTE — PATIENT INSTRUCTIONS
Diabetes is not as good as it was, but it isn't too bad currently. For now no change in your medications. Please follow the diabetic diet carefully. Reduce starchy foods and sweet foods. Will need to re-check this levels in a few months. If diabetes worsens will need to increase your medications.     SGLT2 Inhibitors:  Scottie Wilkerson    GLP agonist: Victoza, Ozempic, Byetta, Tanzeum, Rybelsus, [facility]

## 2022-08-12 NOTE — PROGRESS NOTES
Subjective:     Salima Gómez is a 55 y.o. female seen for follow-up of diabetes. She has had hypoglycemic attacks. .no  Blood sugar control has been fair    Lab Results   Component Value Date/Time    Hemoglobin A1c 7.8 (H) 07/28/2022 02:41 PM    Hemoglobin A1c 8.4 (H) 09/11/2020 11:36 AM    Hemoglobin A1c 8.2 (H) 09/09/2020 10:22 AM    Hemoglobin A1c, External 7.9 12/06/2021 12:00 AM    Glucose 328 (H) 07/28/2022 02:41 PM    Glucose (POC) 205 (H) 09/28/2020 01:30 PM    Microalbumin urine (POC) 10 01/14/2015 11:46 AM    Microalbumin/Creat ratio (mg/g creat) 70 (H) 06/07/2022 04:20 AM    Microalbumin,urine random 7.66 06/07/2022 04:20 AM    Microalbumin/creat ratio (POC)  01/14/2015 11:46 AM    LDL, calculated 76 07/28/2022 02:41 PM    LDL, calculated 86 05/21/2019 03:44 PM    Creatinine 1.93 (H) 07/28/2022 02:41 PM       She has diabetes, hypertension, Systolic HF, and obesity. Salima Gómez has the additional concern of wants to lose weight. Taking Victoza and insulin. Tries to follow diet but weight continues the same. Reports taking blood pressure medications without side affects. No complaints of exertional chest pain, excessive shortness of breath or focal weakness. Minimal swelling in lower legs or dizziness with standing. No complains of apparent nocturnal dyspnea or excessive shortness of breath. Diet and Lifestyle: follows a diabetic diet regularly, nonsmoker.     Patient Active Problem List    Diagnosis Date Noted    History of congestive heart failure 08/14/2022    Chronic renal disease, stage III 08/12/2022    BMI 45.0-49.9, adult ([address] 08/12/2022    Bilateral cataracts 09/08/2020    Hyperglycemia due to type 2 diabetes mellitus ([facility] 03/04/2020    Type 2 diabetes mellitus with nephropathy ([facility] [address] [facility] 12/18/2017    Spinal stenosis of lumbosacral region 12/08/2016    Lumbar back pain with radiculopathy affecting right lower extremity 12/08/2016    Idiopathic small and large fiber sensory neuropathy 11/16/2016    Hypertension 11/07/2012     Allergies   Allergen Reactions    Lortab [Hydrocodone-Acetaminophen] Nausea and Vomiting    Cymbalta [Duloxetine] Diarrhea    Jardiance [Empagliflozin] Other (comments)     kidneys    Lasix [Furosemide] Swelling    Morphine Other (comments)     Pt states is not allergic to morphine     Past Medical History:   Diagnosis Date    Anemia     CAD (coronary artery disease)     Cardiomyopathy ([facility] [facility]     Chronic pain     CKD (chronic kidney disease)     Cyst in hand 2014    [address]    Diabetes mellitus type 2 in obese (HCC)     Diabetic neuropathy ([facility] [facility]     Hypertension     Migraine      Past Surgical History:   Procedure Laterality Date    HX ORTHOPAEDIC      right big toe    HX PACEMAKER       Social History     Tobacco Use    Smoking status: Never    Smokeless tobacco: Never   Substance Use Topics    Alcohol use: Not on file          Review of Systems  Pertinent items are noted in HPI. Objective:     Significant for the following: Overweight  Visit Vitals  /64 (BP 1 Location: Left arm, BP Patient Position: Sitting, BP Cuff Size: Adult)   Pulse 88   Temp 98.1 °F (36.7 °C) (Temporal)   Resp 20   Ht 5' 4\" (1.626 m)   Wt 268 lb (121.6 kg)   LMP 12/03/2017   SpO2 97%   BMI 46.00 kg/m²     Wt Readings from Last 3 Encounters:   08/12/22 268 lb (121.6 kg)   06/07/22 255 lb (115.7 kg)   10/12/21 263 lb (119.3 kg)       WD WN female NAD    Lab review: labs reviewed, I note that glycosylated hemoglobin mildly abnormal but acceptable. Assessment/Plan:     Follow-up diabetes well controlled, stable. Diabetic issues reviewed with her: diabetic diet discussed in detail, written exchange diet given, all medications, side effects and compliance discussed carefully, use and side effects of insulin is taught, glycohemoglobin and other lab monitoring discussed, long term diabetic complications discussed, and labs immediately prior to next visit.        ICD-10-CM ICD-9-CM 1. Type 2 diabetes mellitus with hyperglycemia, with long-term current use of insulin (HCC)  E11.65 250.00 tirzepatide (Mounjaro) 2.5 mg/0.5 mL pnij    Z79.4 790.29 metFORMIN (GLUCOPHAGE) 500 mg tablet     V58.67       2. Stage 3b chronic kidney disease ([facility]  E91.93 689.7 METABOLIC PANEL, BASIC      3. Type 2 diabetes mellitus with nephropathy (HCC)  E11.21 250.40 insulin aspart U-100 (NovoLOG Flexpen U-100 Insulin) 100 unit/mL (3 mL) inpn     583.81           Orders Placed This Encounter    METABOLIC PANEL, BASIC     Standing Status:   Future     Standing Expiration Date:   2023    tirzepatide (Mounjaro) 2.5 mg/0.5 mL pnij     Si.5 mg by SubCUTAneous route every seven (7) days. Dispense:  4 Each     Refill:  5    metFORMIN (GLUCOPHAGE) 500 mg tablet     Sig: Take 1 Tablet by mouth two (2) times daily (with meals). Dispense:  60 Tablet     Refill:  5    insulin aspart U-100 (NovoLOG Flexpen U-100 Insulin) 100 unit/mL (3 mL) inpn     Si Units by SubCUTAneous route Before breakfast, lunch, and dinner. Dispense:  5 Pen     Refill:  5     Current Outpatient Medications   Medication Sig Dispense Refill    tirzepatide (Mounjaro) 2.5 mg/0.5 mL pnij 2.5 mg by SubCUTAneous route every seven (7) days. 4 Each 5    metFORMIN (GLUCOPHAGE) 500 mg tablet Take 1 Tablet by mouth two (2) times daily (with meals). 60 Tablet 5    insulin aspart U-100 (NovoLOG Flexpen U-100 Insulin) 100 unit/mL (3 mL) inpn 6 Units by SubCUTAneous route Before breakfast, lunch, and dinner. 5 Pen 5    pregabalin (LYRICA) 50 mg capsule Take 1 Capsule by mouth two (2) times a day. Max Daily Amount: 100 mg. 60 Capsule 0    cyanocobalamin (VITAMIN B12) 500 mcg tablet Take 1 Tablet by mouth daily. 90 Tablet 3    lisinopriL (PRINIVIL, ZESTRIL) 10 mg tablet Take 1 Tablet by mouth daily. 90 Tablet 1    atorvastatin (LIPITOR) 40 mg tablet Take 1 Tablet by mouth daily.  90 Tablet 3    hydrALAZINE (APRESOLINE) 25 mg tablet Take 1 Tablet by mouth two (2) times a day. 180 Tablet 1    isosorbide dinitrate (ISORDIL) 20 mg tablet Take 1 Tablet by mouth two (2) times a day. 180 Tablet 1    carvediloL (COREG) 12.5 mg tablet Take 1 Tablet by mouth two (2) times a day. 180 Tablet 1    buPROPion (WELLBUTRIN) 100 mg tablet Take 1 Tablet by mouth daily. 90 Tablet 3    metOLazone (ZAROXOLYN) 2.5 mg tablet Take 1 Tablet by mouth daily. As needed for swelling 60 Tablet 2    atorvastatin (LIPITOR) 40 mg tablet Take 1 Tablet by mouth daily. 90 Tablet 3    liraglutide (Victoza 2-Sukhdeep) 0.6 mg/0.1 mL (18 mg/3 mL) pnij 1.8 mg by SubCUTAneous route daily. 6 mL 5    cholecalciferol (VITAMIN D3) (1000 Units /25 mcg) tablet Take 1 Tablet by mouth daily. 90 Tablet 3    Insulin Needles, Disposable, 32 gauge x 5/32\" ndle Check blood sugar up to 3x daily as needed. 100 Pen Needle 5    glucose blood VI test strips (True Metrix Glucose Test Strip) strip CHECK BLOOD SUGAR UP TO THREE TIMES DAILY Dx.  E11.21 100 Strip 5    Blood-Glucose Meter monitoring kit Want free style leda kit 1 Kit 0    lidocaine (LIDODERM) 5 % Apply patch to the affected area for 12 hours a day and remove for 12 hours a day. 5 Each 0    Blood-Glucose Meter (TRUE METRIX GLUCOSE METER) misc Check blood sugar up to three times daily 1 Each 0    aspirin delayed-release 81 mg tablet Take  by mouth daily. Chronic Conditions Addressed Today       1. Chronic renal disease, stage III     Relevant Orders     METABOLIC PANEL, BASIC    2. BMI 45.0-49.9, adult ([facility])     Relevant Medications     metFORMIN (GLUCOPHAGE) 500 mg tablet    3. Hyperglycemia due to type 2 diabetes mellitus ([facility]) - Primary     Relevant Medications     tirzepatide (Mounjaro) 2.5 mg/0.5 mL pnij     metFORMIN (GLUCOPHAGE) 500 mg tablet     insulin aspart U-100 (NovoLOG Flexpen U-100 Insulin) 100 unit/mL (3 mL) inpn    4.  Type 2 diabetes mellitus with nephropathy ([facility])     Relevant Medications     tirzepatide (Mounjaro) 2.5 mg/0.5 mL pnij metFORMIN (GLUCOPHAGE) 500 mg tablet     insulin aspart U-100 (NovoLOG Flexpen U-100 Insulin) 100 unit/mL (3 mL) inpn    5. Hypertension     I think her insulin is causing her to gain weight and I have reduced it per med list.  Going to change her GLP-1 agonist to 1 that has greater weight loss.   Continue Victoza until she can get the mounjeo  Other above issues seem stable, continue current medications    Follow-up 6 weeks Medicare wellness

## 2022-08-14 PROBLEM — I50.21 ACUTE SYSTOLIC CONGESTIVE HEART FAILURE (HCC): Status: RESOLVED | Noted: 2018-02-07 | Resolved: 2022-08-14

## 2022-08-14 PROBLEM — Z86.79 HISTORY OF CONGESTIVE HEART FAILURE: Status: ACTIVE | Noted: 2022-08-14

## 2022-08-22 DIAGNOSIS — G60.8 IDIOPATHIC SMALL AND LARGE FIBER SENSORY NEUROPATHY: ICD-10-CM

## 2022-08-22 NOTE — TELEPHONE ENCOUNTER
Patient request refill      Requested Prescriptions     Pending Prescriptions Disp Refills    pregabalin (LYRICA) 50 mg capsule 60 Capsule 0     Sig: Take 1 Capsule by mouth two (2) times a day. Max Daily Amount: 100 mg.

## 2022-08-23 RX ORDER — PREGABALIN 50 MG/1
50 CAPSULE ORAL 2 TIMES DAILY
Qty: 60 CAPSULE | Refills: 0 | Status: SHIPPED | OUTPATIENT
Start: 2022-08-23 | End: 2022-09-06 | Stop reason: SDUPTHER

## 2022-09-06 ENCOUNTER — VIRTUAL VISIT (OUTPATIENT)
Dept: INTERNAL MEDICINE CLINIC | Age: 57
End: 2022-09-06
Payer: MEDICARE

## 2022-09-06 DIAGNOSIS — E11.21 TYPE 2 DIABETES MELLITUS WITH NEPHROPATHY (HCC): ICD-10-CM

## 2022-09-06 DIAGNOSIS — G60.8 IDIOPATHIC SMALL AND LARGE FIBER SENSORY NEUROPATHY: ICD-10-CM

## 2022-09-06 DIAGNOSIS — E11.65 TYPE 2 DIABETES MELLITUS WITH HYPERGLYCEMIA, WITH LONG-TERM CURRENT USE OF INSULIN (HCC): Primary | ICD-10-CM

## 2022-09-06 DIAGNOSIS — Z79.4 TYPE 2 DIABETES MELLITUS WITH HYPERGLYCEMIA, WITH LONG-TERM CURRENT USE OF INSULIN (HCC): Primary | ICD-10-CM

## 2022-09-06 DIAGNOSIS — N18.32 STAGE 3B CHRONIC KIDNEY DISEASE (HCC): ICD-10-CM

## 2022-09-06 PROCEDURE — G8428 CUR MEDS NOT DOCUMENT: HCPCS | Performed by: FAMILY MEDICINE

## 2022-09-06 PROCEDURE — G8432 DEP SCR NOT DOC, RNG: HCPCS | Performed by: FAMILY MEDICINE

## 2022-09-06 PROCEDURE — G8417 CALC BMI ABV UP PARAM F/U: HCPCS | Performed by: FAMILY MEDICINE

## 2022-09-06 PROCEDURE — 2022F DILAT RTA XM EVC RTNOPTHY: CPT | Performed by: FAMILY MEDICINE

## 2022-09-06 PROCEDURE — 3017F COLORECTAL CA SCREEN DOC REV: CPT | Performed by: FAMILY MEDICINE

## 2022-09-06 PROCEDURE — G8756 NO BP MEASURE DOC: HCPCS | Performed by: FAMILY MEDICINE

## 2022-09-06 PROCEDURE — G9899 SCRN MAM PERF RSLTS DOC: HCPCS | Performed by: FAMILY MEDICINE

## 2022-09-06 PROCEDURE — 99214 OFFICE O/P EST MOD 30 MIN: CPT | Performed by: FAMILY MEDICINE

## 2022-09-06 RX ORDER — TIRZEPATIDE 5 MG/.5ML
5 INJECTION, SOLUTION SUBCUTANEOUS
Qty: 4 EACH | Refills: 5 | Status: SHIPPED | OUTPATIENT
Start: 2022-09-06

## 2022-09-06 RX ORDER — PREGABALIN 50 MG/1
50 CAPSULE ORAL 2 TIMES DAILY
Qty: 60 CAPSULE | Refills: 2 | Status: SHIPPED | OUTPATIENT
Start: 2022-09-06

## 2022-09-06 RX ORDER — CALCIUM CITRATE/VITAMIN D3 200MG-6.25
TABLET ORAL
Qty: 100 STRIP | Refills: 5 | Status: SHIPPED | OUTPATIENT
Start: 2022-09-06

## 2022-09-06 RX ORDER — METOLAZONE 2.5 MG/1
2.5 TABLET ORAL DAILY
Qty: 90 TABLET | Refills: 3 | Status: SHIPPED | OUTPATIENT
Start: 2022-09-06

## 2022-09-06 NOTE — PROGRESS NOTES
Chief Complaint   Patient presents with    Diabetes     Med refill     Patient is aware that this is a Virtual Visit or Phone Call Only doctor's visit. Patient has not been out of the country in (14 months), NO diarrhea, NO cough, NO chest conjestion, NO temp. Pt has not been around anyone with these symptoms. Health Maintenance reviewed. I have reviewed the patient's medical history in detail and updated the computerized patient record. Have you been to the ER, urgent care clinic since your last visit? No Hospitalized since your last visit? No     2. Have you seen or consulted any other health care providers outside of the [address] [place] [address] since your last visit?  no Include any pap smears or colon screening. Encouraged pt to discuss pt's wishes with spouse/partner/family and bring them in the next appt to follow thru with the Advanced Directive      Fall Risk Assessment, last 12 mths 5/5/2021   Able to walk? Yes   Fall in past 12 months? 0   Do you feel unsteady? 0   Are you worried about falling 0   Number of falls in past 12 months -   Fall with injury? -       3 most recent PHQ Screens 2/23/2022   Little interest or pleasure in doing things Several days   Feeling down, depressed, irritable, or hopeless Several days   Total Score PHQ 2 2       Abuse Screening Questionnaire 10/12/2021   Do you ever feel afraid of your partner? N   Are you in a relationship with someone who physically or mentally threatens you? N   Is it safe for you to go home?  Y       ADL Assessment 10/12/2021   Feeding yourself No Help Needed   Getting from bed to chair No Help Needed   Getting dressed No Help Needed   Bathing or showering No Help Needed   Walk across the room (includes cane/walker) No Help Needed   Using the telphone No Help Needed   Taking your medications No Help Needed   Preparing meals No Help Needed   Managing money (expenses/bills) No Help Needed   Moderately strenuous housework (laundry) No Help Needed   Shopping for personal items (toiletries/medicines) No Help Needed   Shopping for groceries No Help Needed   Driving No Help Needed   Climbing a flight of stairs No Help Needed   Getting to places beyond walking distances No Help Needed

## 2022-09-06 NOTE — PROGRESS NOTES
Subjective:     Danny Heaton is a 55 y.o. female seen for follow-up of diabetes. We started her on mounjaro last visit which insurance did cover. She has had hypoglycemic attacks. .no  Blood sugar control has been ok 140    Lab Results   Component Value Date/Time    Hemoglobin A1c 7.8 (H) 07/28/2022 02:41 PM    Hemoglobin A1c 8.4 (H) 09/11/2020 11:36 AM    Hemoglobin A1c 8.2 (H) 09/09/2020 10:22 AM    Hemoglobin A1c, External 7.9 12/06/2021 12:00 AM    Glucose 328 (H) 07/28/2022 02:41 PM    Glucose (POC) 205 (H) 09/28/2020 01:30 PM    Microalbumin urine (POC) 10 01/14/2015 11:46 AM    Microalbumin/Creat ratio (mg/g creat) 70 (H) 06/07/2022 04:20 AM    Microalbumin,urine random 7.66 06/07/2022 04:20 AM    Microalbumin/creat ratio (POC)  01/14/2015 11:46 AM    LDL, calculated 76 07/28/2022 02:41 PM    LDL, calculated 86 05/21/2019 03:44 PM    Creatinine 1.93 (H) 07/28/2022 02:41 PM       She has diabetes, hypertension, and hyperlipidemia. Danny Heaton has the additional concern of needs suagr test strips and refills. Reports taking blood pressure medications without side affects. No complaints of exertional chest pain, excessive shortness of breath or focal weakness. Minimal swelling in lower legs or dizziness with standing. Diet and Lifestyle: nonsmoker.     Patient Active Problem List    Diagnosis Date Noted    History of congestive heart failure 08/14/2022    Chronic renal disease, stage III 08/12/2022    BMI 45.0-49.9, adult ([facility] 08/12/2022    Bilateral cataracts 09/08/2020    Hyperglycemia due to type 2 diabetes mellitus ([facility] 03/04/2020    Type 2 diabetes mellitus with nephropathy ([facility] 12/18/2017    Spinal stenosis of lumbosacral region 12/08/2016    Lumbar back pain with radiculopathy affecting right lower extremity 12/08/2016    Idiopathic small and large fiber sensory neuropathy 11/16/2016    Hypertension 11/07/2012     Current Outpatient Medications   Medication Sig Dispense Refill    pregabalin (LYRICA) 50 mg capsule Take 1 Capsule by mouth two (2) times a day. Max Daily Amount: 100 mg. 60 Capsule 0    tirzepatide (Mounjaro) 2.5 mg/0.5 mL pnij 2.5 mg by SubCUTAneous route every seven (7) days. 4 Each 5    metFORMIN (GLUCOPHAGE) 500 mg tablet Take 1 Tablet by mouth two (2) times daily (with meals). 60 Tablet 5    insulin aspart U-100 (NovoLOG Flexpen U-100 Insulin) 100 unit/mL (3 mL) inpn 6 Units by SubCUTAneous route Before breakfast, lunch, and dinner. 5 Pen 5    cyanocobalamin (VITAMIN B12) 500 mcg tablet Take 1 Tablet by mouth daily. 90 Tablet 3    lisinopriL (PRINIVIL, ZESTRIL) 10 mg tablet Take 1 Tablet by mouth daily. 90 Tablet 1    atorvastatin (LIPITOR) 40 mg tablet Take 1 Tablet by mouth daily. 90 Tablet 3    hydrALAZINE (APRESOLINE) 25 mg tablet Take 1 Tablet by mouth two (2) times a day. 180 Tablet 1    isosorbide dinitrate (ISORDIL) 20 mg tablet Take 1 Tablet by mouth two (2) times a day. 180 Tablet 1    carvediloL (COREG) 12.5 mg tablet Take 1 Tablet by mouth two (2) times a day. 180 Tablet 1    buPROPion (WELLBUTRIN) 100 mg tablet Take 1 Tablet by mouth daily. 90 Tablet 3    metOLazone (ZAROXOLYN) 2.5 mg tablet Take 1 Tablet by mouth daily. As needed for swelling 60 Tablet 2    atorvastatin (LIPITOR) 40 mg tablet Take 1 Tablet by mouth daily. 90 Tablet 3    liraglutide (Victoza 2-Sukhdeep) 0.6 mg/0.1 mL (18 mg/3 mL) pnij 1.8 mg by SubCUTAneous route daily. 6 mL 5    cholecalciferol (VITAMIN D3) (1000 Units /25 mcg) tablet Take 1 Tablet by mouth daily. 90 Tablet 3    Insulin Needles, Disposable, 32 gauge x 5/32\" ndle Check blood sugar up to 3x daily as needed.  100 Pen Needle 5    glucose blood VI test strips (True Metrix Glucose Test Strip) strip CHECK BLOOD SUGAR UP TO THREE TIMES DAILY Dx.  E11.21 100 Strip 5    Blood-Glucose Meter monitoring kit Want free style leda kit 1 Kit 0    lidocaine (LIDODERM) 5 % Apply patch to the affected area for 12 hours a day and remove for 12 hours a day. 5 Each 0    Blood-Glucose Meter (TRUE METRIX GLUCOSE METER) misc Check blood sugar up to three times daily 1 Each 0    aspirin delayed-release 81 mg tablet Take  by mouth daily. Allergies   Allergen Reactions    Lortab [Hydrocodone-Acetaminophen] Nausea and Vomiting    Cymbalta [Duloxetine] Diarrhea    Jardiance [Empagliflozin] Other (comments)     kidneys    Lasix [Furosemide] Swelling    Morphine Other (comments)     Pt states is not allergic to morphine     Past Medical History:   Diagnosis Date    Anemia     CAD (coronary artery disease)     Cardiomyopathy ([facility]     Chronic pain     CKD (chronic kidney disease)     Cyst in hand 2014    [address]    Diabetes mellitus type 2 in obese (HCC)     Diabetic neuropathy ([facility]     Hypertension     Migraine      Social History     Tobacco Use    Smoking status: Never    Smokeless tobacco: Never   Substance Use Topics    Alcohol use: Not on file          Review of Systems  Pertinent items are noted in HPI. No apparent nocturnal dyspnea    Objective:     Significant for the following: Per her scale she is lost weight  Visit Vitals  LMP 12/03/2017     WD WN female NAD    Lab review: labs reviewed, I note that glycosylated hemoglobin stable. Assessment/Plan:     Follow-up diabetes well controlled, stable. Diabetic issues reviewed with her: all medications, side effects and compliance discussed carefully, glycohemoglobin and other lab monitoring discussed, and labs immediately prior to next visit. ICD-10-CM ICD-9-CM    1. Type 2 diabetes mellitus with hyperglycemia, with long-term current use of insulin ([facility])  E11.65 250.00 tirzepatide (Mounjaro) 5 mg/0.5 mL pnij    Z79.4 790.29 glucose blood VI test strips (True Metrix Glucose Test Strip) strip     V58.67       2. Idiopathic small and large fiber sensory neuropathy  G60.8 356. 4 pregabalin (LYRICA) 50 mg capsule      3. Stage 3b chronic kidney disease ([facility])  N18.32 585.3       4.  Type 2 diabetes mellitus with nephropathy ([facility]  E11.21 250.40      583.81           Orders Placed This Encounter    tirzepatide (Mounjaro) 5 mg/0.5 mL pnij     Si mg by SubCUTAneous route every seven (7) days. Dispense:  4 Each     Refill:  5    pregabalin (LYRICA) 50 mg capsule     Sig: Take 1 Capsule by mouth two (2) times a day. Max Daily Amount: 100 mg. Dispense:  60 Capsule     Refill:  2    glucose blood VI test strips (True Metrix Glucose Test Strip) strip     Sig: CHECK BLOOD SUGAR UP TO THREE TIMES DAILY Dx.  E11.21     Dispense:  100 Strip     Refill:  5    metOLazone (ZAROXOLYN) 2.5 mg tablet     Sig: Take 1 Tablet by mouth daily. As needed for swelling     Dispense:  90 Tablet     Refill:  3             Chronic Conditions Addressed Today       1. Chronic renal disease, stage III     Relevant Medications     metOLazone (ZAROXOLYN) 2.5 mg tablet    2. Hyperglycemia due to type 2 diabetes mellitus ([facility] - Primary     Relevant Medications     tirzepatide (Mounjaro) 5 mg/0.5 mL pnij     pregabalin (LYRICA) 50 mg capsule     glucose blood VI test strips (True Metrix Glucose Test Strip) strip    3. Type 2 diabetes mellitus with nephropathy (HCC)     Relevant Medications     tirzepatide (Mounjaro) 5 mg/0.5 mL pnij     pregabalin (LYRICA) 50 mg capsule     metOLazone (ZAROXOLYN) 2.5 mg tablet    4. Idiopathic small and large fiber sensory neuropathy     Relevant Medications     pregabalin (LYRICA) 50 mg capsule           Milo Merida, who was evaluated through a synchronous (real-time) audio-video encounter, and/or her healthcare decision maker, is aware that it is a billable service, which includes applicable co-pays, with coverage as determined by her insurance carrier. She provided verbal consent to proceed and patient identification was verified.  This visit was conducted pursuant to the emergency declaration under the [facility] waiver authority and the [facility] and Response Supplemental Appropriations Act. A caregiver was present when appropriate. Ability to conduct physical exam was limited. The patient was located at: Home: [address] [place] [id number]  The provider was located at: Facility ([facility] Department): [address] Marisol Townsend MD on 9/6/2022 at 2:57 PM      Discussed possible side affects, precautions, and drug interactions and possible benefits of the medication(s). Follow-up and Dispositions    Return in about 2 months (around 11/6/2022) for routine follow up.

## 2022-11-01 ENCOUNTER — OFFICE VISIT (OUTPATIENT)
Dept: INTERNAL MEDICINE CLINIC | Age: 57
End: 2022-11-01
Payer: MEDICARE

## 2022-11-01 VITALS
SYSTOLIC BLOOD PRESSURE: 137 MMHG | OXYGEN SATURATION: 96 % | RESPIRATION RATE: 18 BRPM | TEMPERATURE: 98.4 F | WEIGHT: 260 LBS | HEIGHT: 64 IN | BODY MASS INDEX: 44.39 KG/M2 | HEART RATE: 98 BPM | DIASTOLIC BLOOD PRESSURE: 74 MMHG

## 2022-11-01 DIAGNOSIS — J11.00 INFLUENZA AND PNEUMONIA: ICD-10-CM

## 2022-11-01 DIAGNOSIS — Z79.4 TYPE 2 DIABETES MELLITUS WITH HYPERGLYCEMIA, WITH LONG-TERM CURRENT USE OF INSULIN (HCC): ICD-10-CM

## 2022-11-01 DIAGNOSIS — E11.65 TYPE 2 DIABETES MELLITUS WITH HYPERGLYCEMIA, WITH LONG-TERM CURRENT USE OF INSULIN (HCC): ICD-10-CM

## 2022-11-01 DIAGNOSIS — M25.552 LEFT HIP PAIN: Primary | ICD-10-CM

## 2022-11-01 PROCEDURE — 3017F COLORECTAL CA SCREEN DOC REV: CPT | Performed by: FAMILY MEDICINE

## 2022-11-01 PROCEDURE — G8754 DIAS BP LESS 90: HCPCS | Performed by: FAMILY MEDICINE

## 2022-11-01 PROCEDURE — 96372 THER/PROPH/DIAG INJ SC/IM: CPT | Performed by: FAMILY MEDICINE

## 2022-11-01 PROCEDURE — G8432 DEP SCR NOT DOC, RNG: HCPCS | Performed by: FAMILY MEDICINE

## 2022-11-01 PROCEDURE — G8752 SYS BP LESS 140: HCPCS | Performed by: FAMILY MEDICINE

## 2022-11-01 PROCEDURE — G9899 SCRN MAM PERF RSLTS DOC: HCPCS | Performed by: FAMILY MEDICINE

## 2022-11-01 PROCEDURE — 99214 OFFICE O/P EST MOD 30 MIN: CPT | Performed by: FAMILY MEDICINE

## 2022-11-01 PROCEDURE — G8417 CALC BMI ABV UP PARAM F/U: HCPCS | Performed by: FAMILY MEDICINE

## 2022-11-01 PROCEDURE — G8427 DOCREV CUR MEDS BY ELIG CLIN: HCPCS | Performed by: FAMILY MEDICINE

## 2022-11-01 PROCEDURE — 2022F DILAT RTA XM EVC RTNOPTHY: CPT | Performed by: FAMILY MEDICINE

## 2022-11-01 RX ORDER — CEFDINIR 300 MG/1
300 CAPSULE ORAL 2 TIMES DAILY
COMMUNITY
Start: 2022-10-30 | End: 2022-11-09

## 2022-11-01 RX ORDER — PREDNISONE 50 MG/1
50 TABLET ORAL DAILY
COMMUNITY
Start: 2022-10-30 | End: 2022-11-03

## 2022-11-01 RX ORDER — TRIAMCINOLONE ACETONIDE 40 MG/ML
80 INJECTION, SUSPENSION INTRA-ARTICULAR; INTRAMUSCULAR ONCE
Status: COMPLETED | OUTPATIENT
Start: 2022-11-01 | End: 2022-11-01

## 2022-11-01 RX ORDER — DOXYCYCLINE 100 MG/1
100 CAPSULE ORAL 2 TIMES DAILY
COMMUNITY
Start: 2022-10-30 | End: 2022-11-09

## 2022-11-01 RX ADMIN — TRIAMCINOLONE ACETONIDE 80 MG: 40 INJECTION, SUSPENSION INTRA-ARTICULAR; INTRAMUSCULAR at 16:52

## 2022-11-01 NOTE — LETTER
NOTIFICATION RETURN TO WORK / SCHOOL    11/1/2022 4:45 PM    Ms. [address] [id number]      To Whom It May Concern:    Mary Palmer is currently under the care of [address]. She will return to work/school on: Thursday, November 3, 2022. If there are questions or concerns please have the patient contact our office.         Sincerely,      Dann Lee MD

## 2022-11-01 NOTE — PROGRESS NOTES
Chief Complaint   Patient presents with    Pneumonia     ER FU      Hip Pain     L/hip pain      Patient has not been out of the country in (14 months), NO diarrhea, NO cough, NO chest conjestion, NO temp. Pt has not been around anyone with these symptoms. Health Maintenance reviewed. I have reviewed the patient's medical history in detail and updated the computerized patient record. 1. Have you been to the ER, urgent care clinic since your last visit? Yes  Hospitalized since your last visit?  no    2. Have you seen or consulted any other health care providers outside of the [address] [place] [address] since your last visit? No  Include any pap smears or colon screening. Encouraged pt to discuss pt's wishes with spouse/partner/family and bring them in the next appt to follow thru with the Advanced Directive    @  [address], last 12 mths 5/5/2021   Able to walk? Yes   Fall in past 12 months? 0   Do you feel unsteady? 0   Are you worried about falling 0   Number of falls in past 12 months -   Fall with injury? -       3 most recent PHQ Screens 2/23/2022   Little interest or pleasure in doing things Several days   Feeling down, depressed, irritable, or hopeless Several days   Total Score PHQ 2 2       Abuse Screening Questionnaire 10/12/2021   Do you ever feel afraid of your partner? N   Are you in a relationship with someone who physically or mentally threatens you? N   Is it safe for you to go home?  Y       ADL Assessment 10/12/2021   Feeding yourself No Help Needed   Getting from bed to chair No Help Needed   Getting dressed No Help Needed   Bathing or showering No Help Needed   Walk across the room (includes cane/walker) No Help Needed   Using the telphone No Help Needed   Taking your medications No Help Needed   Preparing meals No Help Needed   Managing money (expenses/bills) No Help Needed   Moderately strenuous housework (laundry) No Help Needed   Shopping for personal items (toiletries/medicines) No Help Needed   Shopping for groceries No Help Needed   Driving No Help Needed   Climbing a flight of stairs No Help Needed   Getting to places beyond walking distances No Help Needed       [facility]  OFFICE PROCEDURE PROGRESS NOTE        Chart reviewed for the following:   Kelsey Mendoza LPN, have reviewed the History, Physical and updated the Allergic reactions for [address] [facility] [place] performed immediately prior to start of procedure:   Kelsey Mendoza LPN, have performed the following [facility] prior to the start of the procedure:            * Patient was identified by name and date of birth   * Agreement on procedure being performed was verified  * Risks and Benefits explained to the patient by Sulma Luke. Peyton Landin M.D.   * Procedure site verified and marked as necessary  * Patient was positioned for comfort  * Consent was signed and verified     Time: 4:45 PM      Date of procedure: 11/1/2022    Procedure performed by:  Oral Galindo MD    Provider assisted by:  Susan Yusuf. VITO Delaney     Patient assisted by: Sarah Delaney LPN     How tolerated by patient: Well    Post Procedural Pain Scale: 8/10    Comments: None    Dr. Peyton Landin did the procedure

## 2022-11-01 NOTE — PROGRESS NOTES
Subjective:   Fabby Brandt is a 55 y.o. female who complains of myalgias and weakness wheezing for 5 days, gradually improving since that time. She denies a history of fevers and cough. Reviewed ER visit  Diarrhea resolved  Evaluation to date: ER Dx pneumonia. Treatment to date: antibiotics -prednisone doxycycline cefdinir. Began taking 4 days ago. Has noted improvement of her symptoms. Patient does not smoke cigarettes. Relevant PMH: Asthma. Started new medicine last visit, has noted some weight loss although some that might be from her diarrhea. No complaints of side effects from it. Complains of left hip pain, she wants a steroid injection which has helped previously. Patient Active Problem List    Diagnosis Date Noted    History of congestive heart failure 08/14/2022    Chronic renal disease, stage III 08/12/2022    BMI 45.0-49.9, adult ([facility] 08/12/2022    Bilateral cataracts 09/08/2020    Hyperglycemia due to type 2 diabetes mellitus ([facility] 03/04/2020    Type 2 diabetes mellitus with nephropathy ([facility] 12/18/2017    Spinal stenosis of lumbosacral region 12/08/2016    Lumbar back pain with radiculopathy affecting right lower extremity 12/08/2016    Idiopathic small and large fiber sensory neuropathy 11/16/2016    Hypertension 11/07/2012     Current Outpatient Medications   Medication Sig Dispense Refill    cefdinir (OMNICEF) 300 mg capsule Take 300 mg by mouth two (2) times a day. doxycycline (MONODOX) 100 mg capsule Take 100 mg by mouth two (2) times a day. predniSONE (DELTASONE) 50 mg tablet Take 50 mg by mouth daily. tirzepatide (Mounjaro) 5 mg/0.5 mL pnij 5 mg by SubCUTAneous route every seven (7) days. 4 Each 5    pregabalin (LYRICA) 50 mg capsule Take 1 Capsule by mouth two (2) times a day.  Max Daily Amount: 100 mg. 60 Capsule 2    glucose blood VI test strips (True Metrix Glucose Test Strip) strip CHECK BLOOD SUGAR UP TO THREE TIMES DAILY Dx.  E11.21 100 Strip 5 metOLazone (ZAROXOLYN) 2.5 mg tablet Take 1 Tablet by mouth daily. As needed for swelling 90 Tablet 3    tirzepatide (Mounjaro) 2.5 mg/0.5 mL pnij 2.5 mg by SubCUTAneous route every seven (7) days. 4 Each 5    metFORMIN (GLUCOPHAGE) 500 mg tablet Take 1 Tablet by mouth two (2) times daily (with meals). 60 Tablet 5    insulin aspart U-100 (NovoLOG Flexpen U-100 Insulin) 100 unit/mL (3 mL) inpn 6 Units by SubCUTAneous route Before breakfast, lunch, and dinner. 5 Pen 5    cyanocobalamin (VITAMIN B12) 500 mcg tablet Take 1 Tablet by mouth daily. 90 Tablet 3    lisinopriL (PRINIVIL, ZESTRIL) 10 mg tablet Take 1 Tablet by mouth daily. 90 Tablet 1    atorvastatin (LIPITOR) 40 mg tablet Take 1 Tablet by mouth daily. 90 Tablet 3    hydrALAZINE (APRESOLINE) 25 mg tablet Take 1 Tablet by mouth two (2) times a day. 180 Tablet 1    isosorbide dinitrate (ISORDIL) 20 mg tablet Take 1 Tablet by mouth two (2) times a day. 180 Tablet 1    carvediloL (COREG) 12.5 mg tablet Take 1 Tablet by mouth two (2) times a day. 180 Tablet 1    buPROPion (WELLBUTRIN) 100 mg tablet Take 1 Tablet by mouth daily. 90 Tablet 3    atorvastatin (LIPITOR) 40 mg tablet Take 1 Tablet by mouth daily. 90 Tablet 3    cholecalciferol (VITAMIN D3) (1000 Units /25 mcg) tablet Take 1 Tablet by mouth daily. 90 Tablet 3    Insulin Needles, Disposable, 32 gauge x 5/32\" ndle Check blood sugar up to 3x daily as needed. 100 Pen Needle 5    Blood-Glucose Meter monitoring kit Want free style leda kit 1 Kit 0    lidocaine (LIDODERM) 5 % Apply patch to the affected area for 12 hours a day and remove for 12 hours a day. 5 Each 0    Blood-Glucose Meter (TRUE METRIX GLUCOSE METER) misc Check blood sugar up to three times daily 1 Each 0    aspirin delayed-release 81 mg tablet Take  by mouth daily.        Allergies   Allergen Reactions    Lortab [Hydrocodone-Acetaminophen] Nausea and Vomiting    Cymbalta [Duloxetine] Diarrhea    Jardiance [Empagliflozin] Other (comments) kidneys    Lasix [Furosemide] Swelling    Morphine Other (comments)     Pt states is not allergic to morphine     Past Medical History:   Diagnosis Date    Anemia     CAD (coronary artery disease)     Cardiomyopathy ([address]     Chronic pain     CKD (chronic kidney disease)     Cyst in hand 2014    [address]    Diabetes mellitus type 2 in obese (HCC)     Diabetic neuropathy (HCC)     Hypertension     Migraine      Social History     Tobacco Use    Smoking status: Never    Smokeless tobacco: Never   Substance Use Topics    Alcohol use: Not on file        Review of Systems  Pertinent items are noted in HPI. Objective:     Visit Vitals  /74 (BP 1 Location: Left arm, BP Patient Position: Sitting, BP Cuff Size: Adult)   Pulse 98   Temp 98.4 °F (36.9 °C) (Temporal)   Resp 18   Ht 5' 4\" (1.626 m)   Wt 260 lb (117.9 kg)   LMP 12/03/2017   SpO2 96%   BMI 44.63 kg/m²     Wt Readings from Last 3 Encounters:   11/01/22 260 lb (117.9 kg)   08/12/22 268 lb (121.6 kg)   06/07/22 255 lb (115.7 kg)       General:  alert, cooperative, no distress   Eyes: negative   Ears:    Sinuses:    Mouth:     Neck: supple, symmetrical, trachea midline and no adenopathy. Heart: S1 and S2 normal, no murmurs noted. Lungs: clear to auscultation bilaterally   Abdomen: soft, non-tender. Bowel sounds normal. No masses,  no organomegaly   Tenderness to palpation left upper buttocks lower hip  Assessment/Plan:   viral upper respiratory illness and pneumonia a lot of RSV and influenza going around COVID was negative. Discussed dx and tx of URIs  Antibiotics per orders. RTC prn. Encounter Diagnoses   Name Primary? Left hip pain Yes    Influenza and pneumonia     Type 2 diabetes mellitus with hyperglycemia, with long-term current use of insulin (HCC)        Orders Placed This Encounter    cefdinir (OMNICEF) 300 mg capsule     Sig: Take 300 mg by mouth two (2) times a day.     doxycycline (MONODOX) 100 mg capsule     Sig: Take 100 mg by mouth two (2) times a day. predniSONE (DELTASONE) 50 mg tablet     Sig: Take 50 mg by mouth daily. Options discussed. Discussed possible side affects and benefits of the procedure and/or medications. The patient agrees to undergo the procedure. Consent obtained. Sterile procedure followed. There were no complications and the procedure was well tolerated. Instructed patient to call me if it is ineffective or if there are any complications like increasing pain, redness or swelling. Point of maximal tenderness was palpated. Area cleaned with alcohol. This area was injected with 2 cc of lidocaine and 8 kenalog. The procedure was well tolerated.       Follow-up 2 months

## 2022-11-02 DIAGNOSIS — Z79.4 TYPE 2 DIABETES MELLITUS WITH INSULIN THERAPY (HCC): ICD-10-CM

## 2022-11-02 DIAGNOSIS — E11.9 TYPE 2 DIABETES MELLITUS WITH INSULIN THERAPY (HCC): ICD-10-CM

## 2022-11-02 NOTE — TELEPHONE ENCOUNTER
Pt calling in for pen needles to be called in to [facility].  If needed she can be reached at [phone]

## 2022-11-03 RX ORDER — PEN NEEDLE, DIABETIC 31 GX3/16"
NEEDLE, DISPOSABLE MISCELLANEOUS
Qty: 100 PEN NEEDLE | Refills: 5 | Status: SHIPPED | OUTPATIENT
Start: 2022-11-03

## 2022-11-09 ENCOUNTER — PATIENT OUTREACH (OUTPATIENT)
Dept: CASE MANAGEMENT | Age: 57
End: 2022-11-09

## 2022-11-10 NOTE — PROGRESS NOTES
Ambulatory Care Management Note    Date/Time:  11/10/2022 10:38 AM    This patient was received as a referral from [employer]. Ambulatory Care Manager outreached to patient today to offer care management services. Introduction to self and role of care manager provided. ACM asked pt if she needed a sooner appt w/her PCP than 12/06/22, as she was recently in the ED w/Rt Foot Pain r/t DM neuropathy. Pt sts that won't be necessary, she will keep the current appt as is. ACM then asked pt about the effectiveness of her Lt Hip steroid injection by PCP on 11/02/22. Pt reports this did help and she is expecting to have another injection at Dec's appt. Pt had no questions or concerns for this ACM. Patient declined care management services at this time. No follow up call was scheduled.   Patient has Ambulatory Care Manager's contact number for any questions or concerns and was encouraged to call prn.   /dla

## 2022-11-30 RX ORDER — LISINOPRIL 10 MG/1
TABLET ORAL
Qty: 90 TABLET | Refills: 0 | Status: SHIPPED | OUTPATIENT
Start: 2022-11-30

## 2022-12-26 ENCOUNTER — TELEPHONE (OUTPATIENT)
Dept: FAMILY MEDICINE CLINIC | Age: 57
End: 2022-12-26

## 2022-12-26 RX ORDER — CARVEDILOL 12.5 MG/1
12.5 TABLET ORAL 2 TIMES DAILY
Qty: 60 TABLET | Refills: 0 | Status: SHIPPED | OUTPATIENT
Start: 2022-12-26

## 2022-12-31 DIAGNOSIS — G60.8 IDIOPATHIC SMALL AND LARGE FIBER SENSORY NEUROPATHY: ICD-10-CM

## 2023-01-02 RX ORDER — PREGABALIN 50 MG/1
CAPSULE ORAL
Qty: 60 CAPSULE | Refills: 0 | Status: SHIPPED | OUTPATIENT
Start: 2023-01-02 | End: 2023-01-03 | Stop reason: SDUPTHER

## 2023-01-03 DIAGNOSIS — G60.8 IDIOPATHIC SMALL AND LARGE FIBER SENSORY NEUROPATHY: ICD-10-CM

## 2023-01-03 NOTE — TELEPHONE ENCOUNTER
Patient requesting a refill on the Lyrica 50mg, which she states she is now taking 2 tabs twice a day. She was originally prescribed one bid, but states she has been doubling up to get relief. She would need the quanity increased if she is to keep taking them at this dosage.

## 2023-01-04 RX ORDER — PREGABALIN 50 MG/1
CAPSULE ORAL
Qty: 60 CAPSULE | Refills: 0 | Status: SHIPPED | OUTPATIENT
Start: 2023-01-04

## 2023-01-23 RX ORDER — HYDRALAZINE HYDROCHLORIDE 25 MG/1
25 TABLET, FILM COATED ORAL 2 TIMES DAILY
Qty: 180 TABLET | Refills: 1 | Status: SHIPPED | OUTPATIENT
Start: 2023-01-23

## 2023-01-23 NOTE — TELEPHONE ENCOUNTER
Patient refill request      Requested Prescriptions     Pending Prescriptions Disp Refills    hydrALAZINE (APRESOLINE) 25 mg tablet 180 Tablet 1     Sig: Take 1 Tablet by mouth two (2) times a day.

## 2023-01-26 ENCOUNTER — OFFICE VISIT (OUTPATIENT)
Dept: INTERNAL MEDICINE CLINIC | Age: 58
End: 2023-01-26
Payer: MEDICARE

## 2023-01-26 VITALS
HEIGHT: 64 IN | WEIGHT: 255 LBS | OXYGEN SATURATION: 98 % | RESPIRATION RATE: 20 BRPM | HEART RATE: 91 BPM | DIASTOLIC BLOOD PRESSURE: 73 MMHG | TEMPERATURE: 97.7 F | BODY MASS INDEX: 43.54 KG/M2 | SYSTOLIC BLOOD PRESSURE: 113 MMHG

## 2023-01-26 DIAGNOSIS — I10 PRIMARY HYPERTENSION: ICD-10-CM

## 2023-01-26 DIAGNOSIS — G60.8 IDIOPATHIC SMALL AND LARGE FIBER SENSORY NEUROPATHY: Primary | ICD-10-CM

## 2023-01-26 PROCEDURE — G9899 SCRN MAM PERF RSLTS DOC: HCPCS | Performed by: NURSE PRACTITIONER

## 2023-01-26 PROCEDURE — 3017F COLORECTAL CA SCREEN DOC REV: CPT | Performed by: NURSE PRACTITIONER

## 2023-01-26 PROCEDURE — G8432 DEP SCR NOT DOC, RNG: HCPCS | Performed by: NURSE PRACTITIONER

## 2023-01-26 PROCEDURE — G8417 CALC BMI ABV UP PARAM F/U: HCPCS | Performed by: NURSE PRACTITIONER

## 2023-01-26 PROCEDURE — G8427 DOCREV CUR MEDS BY ELIG CLIN: HCPCS | Performed by: NURSE PRACTITIONER

## 2023-01-26 PROCEDURE — 99212 OFFICE O/P EST SF 10 MIN: CPT | Performed by: NURSE PRACTITIONER

## 2023-01-26 RX ORDER — GABAPENTIN 300 MG/1
300 CAPSULE ORAL 3 TIMES DAILY
Qty: 12 CAPSULE | Refills: 0 | Status: SHIPPED | OUTPATIENT
Start: 2023-01-26 | End: 2023-01-30

## 2023-01-26 NOTE — PROGRESS NOTES
Ze Forman (: 1965) is a 65 y.o. female is here for evaluation of the following chief complaint(s): Foot Pain (Top of left foot)        Subjective/Objective:   Henny Otto was seen today for Foot Pain (Top of left foot)  Pt as chronic pain to feet due to Neuropathy. Pt has ran out of her OxyContin that is prescribed through her pain management physician. Pt able to get a refill after the weekend. Pt requesting something to help through the weekend. Limited amount of Gabapentin prescribed. Pt denies any injury. Review of Systems   Constitutional: Negative. HENT: Negative. Eyes: Negative. Respiratory: Negative. Cardiovascular: Negative. Gastrointestinal: Negative. Genitourinary: Negative. Musculoskeletal:         Neuropathy to madeline feet. Skin: Negative. Neurological: Negative. Endo/Heme/Allergies: Negative. Psychiatric/Behavioral: Negative. Physical Exam  Vitals reviewed. Constitutional:       General: She is not in acute distress. Appearance: Normal appearance. She is obese. She is not ill-appearing. HENT:      Head: Normocephalic and atraumatic. Right Ear: External ear normal.      Left Ear: External ear normal.      Mouth/Throat:      Mouth: Mucous membranes are moist.   Eyes:      Conjunctiva/sclera: Conjunctivae normal.   Cardiovascular:      Rate and Rhythm: Normal rate and regular rhythm. Pulses: Normal pulses. Heart sounds: Normal heart sounds. Pulmonary:      Effort: Pulmonary effort is normal.      Breath sounds: Normal breath sounds. Abdominal:      Palpations: Abdomen is soft. Musculoskeletal:         General: Normal range of motion. Feet:      Right foot:      Protective Sensation: 5 sites tested. 5 sites sensed. Skin integrity: Skin integrity normal.      Toenail Condition: Right toenails are long. Left foot:      Protective Sensation: 5 sites tested. 5 sites sensed.       Skin integrity: Skin integrity normal.      Toenail Condition: Left toenails are long. Comments: Pt reports pain with any touching at all. Skin:     General: Skin is warm and dry. Capillary Refill: Capillary refill takes less than 2 seconds. Neurological:      Mental Status: She is alert. Psychiatric:         Mood and Affect: Mood normal.         Behavior: Behavior normal.         Thought Content:  Thought content normal.         Judgment: Judgment normal.        /73 (BP 1 Location: Left arm, BP Patient Position: Sitting, BP Cuff Size: Large adult)   Pulse 91   Temp 97.7 °F (36.5 °C) (Temporal)   Resp 20   Ht 5' 4\" (1.626 m)   Wt 255 lb (115.7 kg)   LMP 12/03/2017   SpO2 98%   BMI 43.77 kg/m²            Past Surgical History:   Procedure Laterality Date    HX ORTHOPAEDIC      right big toe    HX PACEMAKER          Past Medical History:   Diagnosis Date    AICD (automatic cardioverter/defibrillator) present     Anemia     CAD (coronary artery disease)     Cardiomyopathy ([facility]     CHF (congestive heart failure) (HCC)     Chronic pain     CKD (chronic kidney disease), stage III (HCC)     Cyst in hand 2014    RTH    Diabetes mellitus type 2 in obese (HCC)     Diabetic neuropathy (HCC)     HLD (hyperlipidemia)     Hypertension     Migraine     Morbid obesity with BMI of 40.0-44.9, adult (HCC)     OA (osteoarthritis) of hip     Left    RAD (reactive airway disease)         Current Outpatient Medications   Medication Instructions    aspirin delayed-release 81 mg tablet Oral, DAILY    atorvastatin (LIPITOR) 40 mg, Oral, DAILY    atorvastatin (LIPITOR) 40 mg, Oral, DAILY    Blood-Glucose Meter (TRUE METRIX GLUCOSE METER) misc Check blood sugar up to three times daily    Blood-Glucose Meter monitoring kit Want free style leda kit    buPROPion (WELLBUTRIN) 100 mg, Oral, DAILY    carvediloL (COREG) 12.5 mg tablet Take 1 tablet by mouth twice daily    cholecalciferol (VITAMIN D3) 1,000 Units, Oral, DAILY    cyanocobalamin (VITAMIN B12) 500 mcg, Oral, DAILY    gabapentin (NEURONTIN) 300 mg, Oral, 3 TIMES DAILY    glucose blood VI test strips (True Metrix Glucose Test Strip) strip CHECK BLOOD SUGAR UP TO THREE TIMES DAILY Dx.  E11.21    hydrALAZINE (APRESOLINE) 25 mg tablet Take 1 tablet by mouth twice daily    hydrALAZINE (APRESOLINE) 25 mg, Oral, 2 TIMES DAILY    insulin aspart U-100 (NOVOLOG FLEXPEN U-100 INSULIN) 6 Units, SubCUTAneous, 3 TIMES DAILY BEFORE MEALS    Insulin Needles, Disposable, 32 gauge x 5/32\" ndle Check blood sugar up to 3x daily as needed. isosorbide dinitrate (ISORDIL) 20 mg, Oral, 2 TIMES DAILY    lidocaine (LIDODERM) 5 % Apply patch to the affected area for 12 hours a day and remove for 12 hours a day. lisinopriL (PRINIVIL, ZESTRIL) 10 mg tablet Take 1 tablet by mouth once daily    metFORMIN (GLUCOPHAGE) 500 mg, Oral, 2 TIMES DAILY WITH MEALS    metOLazone (ZAROXOLYN) 2.5 mg, Oral, DAILY, As needed for swelling    Mounjaro 5 mg, SubCUTAneous, EVERY 7 DAYS    pregabalin (LYRICA) 50 mg capsule TAKE 1 CAPSULE BY MOUTH TWICE DAILY --  MAX  DAILY  AMOUNT  100  MG        Assessment/Plan:     The above diagnosis is a acute on chronic problem. We discussed expected course, resolution, and complications of diagnosis in detail. I advised her to call back or return to office if symptoms worsen/change/persist.        ICD-10-CM ICD-9-CM    1. Idiopathic small and large fiber sensory neuropathy  G60.8 356.4 gabapentin (NEURONTIN) 300 mg capsule      2. Primary hypertension  I10 401.9          Return if symptoms worsen or fail to improve. An electronic signature was used to authenticate this note.   -- Josh Egan NP

## 2023-01-26 NOTE — PATIENT INSTRUCTIONS
Beryl written enough for the weekend. Call pain management on Monday. Follow up with Dr Kojo Ferreira  for any new or worsening symptoms.     Mouna Saldana NP

## 2023-01-30 DIAGNOSIS — G60.8 IDIOPATHIC SMALL AND LARGE FIBER SENSORY NEUROPATHY: ICD-10-CM

## 2023-01-31 RX ORDER — PREGABALIN 50 MG/1
CAPSULE ORAL
Qty: 60 CAPSULE | Refills: 0 | Status: SHIPPED | OUTPATIENT
Start: 2023-01-31 | End: 2023-03-06

## 2023-03-09 RX ORDER — METOLAZONE 2.5 MG/1
5 TABLET ORAL DAILY
Qty: 180 TABLET | Refills: 1 | Status: SHIPPED | OUTPATIENT
Start: 2023-03-09

## 2023-03-30 ENCOUNTER — OFFICE VISIT (OUTPATIENT)
Dept: INTERNAL MEDICINE CLINIC | Age: 58
End: 2023-03-30

## 2023-03-30 VITALS
HEART RATE: 85 BPM | HEIGHT: 64 IN | SYSTOLIC BLOOD PRESSURE: 126 MMHG | OXYGEN SATURATION: 98 % | RESPIRATION RATE: 18 BRPM | DIASTOLIC BLOOD PRESSURE: 69 MMHG | BODY MASS INDEX: 42.68 KG/M2 | TEMPERATURE: 98.5 F | WEIGHT: 250 LBS

## 2023-03-30 DIAGNOSIS — Z12.11 SCREENING FOR COLON CANCER: ICD-10-CM

## 2023-03-30 DIAGNOSIS — E66.01 OBESITY, CLASS III, BMI 40-49.9 (MORBID OBESITY) (HCC): ICD-10-CM

## 2023-03-30 DIAGNOSIS — Z79.4 TYPE 2 DIABETES MELLITUS WITH HYPERGLYCEMIA, WITH LONG-TERM CURRENT USE OF INSULIN (HCC): ICD-10-CM

## 2023-03-30 DIAGNOSIS — E78.2 MIXED HYPERLIPIDEMIA: ICD-10-CM

## 2023-03-30 DIAGNOSIS — Z00.00 MEDICARE ANNUAL WELLNESS VISIT, SUBSEQUENT: Primary | ICD-10-CM

## 2023-03-30 DIAGNOSIS — N18.32 STAGE 3B CHRONIC KIDNEY DISEASE (HCC): ICD-10-CM

## 2023-03-30 DIAGNOSIS — I10 HYPERTENSION, UNSPECIFIED TYPE: ICD-10-CM

## 2023-03-30 DIAGNOSIS — E11.65 TYPE 2 DIABETES MELLITUS WITH HYPERGLYCEMIA, WITH LONG-TERM CURRENT USE OF INSULIN (HCC): ICD-10-CM

## 2023-03-30 DIAGNOSIS — Z13.39 SCREENING FOR ALCOHOLISM: ICD-10-CM

## 2023-03-30 DIAGNOSIS — Z13.6 SCREENING FOR ISCHEMIC HEART DISEASE: ICD-10-CM

## 2023-03-30 DIAGNOSIS — Z12.4 SCREENING FOR CERVICAL CANCER: ICD-10-CM

## 2023-03-30 DIAGNOSIS — G60.8 IDIOPATHIC SMALL AND LARGE FIBER SENSORY NEUROPATHY: ICD-10-CM

## 2023-03-30 RX ORDER — PREGABALIN 75 MG/1
75 CAPSULE ORAL 2 TIMES DAILY
Qty: 60 CAPSULE | Refills: 1 | Status: SHIPPED | OUTPATIENT
Start: 2023-03-30

## 2023-03-30 NOTE — PATIENT INSTRUCTIONS
Medicare Wellness Visit, Female     The best way to live healthy is to have a lifestyle where you eat a well-balanced diet, exercise regularly, limit alcohol use, and quit all forms of tobacco/nicotine, if applicable. Regular preventive services are another way to keep healthy. Preventive services (vaccines, screening tests, monitoring & exams) can help personalize your care plan, which helps you manage your own care. Screening tests can find health problems at the earliest stages, when they are easiest to treat. [facility] follows the current, evidence-based guidelines published by the [facility] ([facility]) when recommending preventive services for our patients. Because we follow these guidelines, sometimes recommendations change over time as research supports it. (For example, mammograms used to be recommended annually. Even though Medicare will still pay for an annual mammogram, the newer guidelines recommend a mammogram every two years for women of average risk). Of course, you and your doctor may decide to screen more often for some diseases, based on your risk and your co-morbidities (chronic disease you are already diagnosed with). Preventive services for you include:  - Medicare offers their members a free annual wellness visit, which is time for you and your primary care provider to discuss and plan for your preventive service needs.  Take advantage of this benefit every year!    -Over the age of 72 should receive the recommended pneumonia vaccines.    -All adults should have a flu vaccine yearly.  -All adults should have a tetanus vaccine every 10 years.   -Over the age 48 should receive the shingles vaccines.        -All adults should be screened once for Hepatitis C.  -All adults age 38-68 who are overweight should have a diabetes screening test once every three years.   -Other screening tests and preventive services for persons with diabetes include: an eye exam to screen for diabetic retinopathy, a kidney function test, a foot exam, and stricter control over your cholesterol.   -Cardiovascular screening for adults with routine risk involves an electrocardiogram (ECG) at intervals determined by your doctor.     -Colorectal cancer screenings should be done for adults age 39-70 with no increased risk factors for colorectal cancer. There are a number of acceptable methods of screening for this type of cancer. Each test has its own benefits and drawbacks. Discuss with your doctor what is most appropriate for you during your annual wellness visit. The different tests include: colonoscopy (considered the best screening method), a fecal occult blood test, a fecal DNA test, and sigmoidoscopy.    -Lung cancer screening is recommended annually with a low dose CT scan for adults between age 54 and 68, who have smoked at least 30 pack years (equivalent of 1 pack per day for 30 days), and who is a current smoker or quit less than 15 years ago.    -A bone mass density test is recommended when a woman turns 65 to screen for osteoporosis. This test is only recommended one time, as a screening. Some providers will use this same test as a disease monitoring tool if you already have osteoporosis. -Breast cancer screenings are recommended every other year for women of normal risk, age 54-69.    -Cervical cancer screenings for women over age 72 are only recommended with certain risk factors.      Here is a list of your current Health Maintenance items (your personalized list of preventive services) with a due date:  Health Maintenance Due   Topic Date Due    Hepatitis C Test  Never done    Pneumococcal Vaccine (1 - PCV) Never done    Hepatitis B Vaccine (1 of 3 - Risk 3-dose series) Never done    Shingles Vaccine (1 of 2) Never done    Colorectal Screening  Never done    Cervical cancer screen  11/30/2020    COVID-19 Vaccine (3 - Booster for Moderna series) 12/01/2021    Yearly Flu Vaccine (1) Never done    Eye Exam  09/01/2022    Depresssion Screening  02/23/2023

## 2023-03-30 NOTE — PROGRESS NOTES
Subjective:     Jose Magaña is a 65 y.o. female seen for follow-up of diabetes. And for wellness  She has had hypoglycemic attacks. .no  Blood sugar control has been ok    Lab Results   Component Value Date/Time    Hemoglobin A1c 7.8 (H) 07/28/2022 02:41 PM    Hemoglobin A1c 8.4 (H) 09/11/2020 11:36 AM    Hemoglobin A1c 8.2 (H) 09/09/2020 10:22 AM    Hemoglobin A1c, External 7.9 12/06/2021 12:00 AM    Glucose 328 (H) 07/28/2022 02:41 PM    Glucose (POC) 205 (H) 09/28/2020 01:30 PM    Microalbumin urine (POC) 10 01/14/2015 11:46 AM    Microalbumin/Creat ratio (mg/g creat) 70 (H) 06/07/2022 04:20 AM    Microalbumin,urine random 7.66 06/07/2022 04:20 AM    Microalbumin/creat ratio (POC)  01/14/2015 11:46 AM    LDL, calculated 76 07/28/2022 02:41 PM    LDL, calculated 86 05/21/2019 03:44 PM    Creatinine 1.93 (H) 07/28/2022 02:41 PM       She has diabetes, hypertension, hyperlipidemia, and coronary artery disease. Jose Magaña has the additional concern of refills of her Lyrica, she has had more numbness and tingling, requests increase in her Lyrica dosage since she has been taking a little more than prescribed. We discussed this controlled substance. It does help her numbness and tingling in sharpest type pain. Has not seen nephrology despite her significant kidney disease. No urine complaints. Has lost some weight with the Mounjaro. Reports taking blood pressure medications without side affects. No complaints of exertional chest pain, excessive shortness of breath or focal weakness. Minimal swelling in lower legs or dizziness with standing. Diet and Lifestyle: nonsmoker. Review of Systems  Pertinent items are noted in HPI.     Objective:     Significant for the following: OW    WD WN female NAD  Visit Vitals  /69 (BP 1 Location: Left arm, BP Patient Position: Sitting, BP Cuff Size: Adult)   Pulse 85   Temp 98.5 °F (36.9 °C) (Temporal)   Resp 18   Ht 5' 4\" (1.626 m)   Wt 250 lb (113.4 kg)   LMP 12/03/2017   SpO2 98%   BMI 42.91 kg/m²     Wt Readings from Last 3 Encounters:   03/30/23 250 lb (113.4 kg)   01/26/23 255 lb (115.7 kg)   11/01/22 260 lb (117.9 kg)       WD WN female NAD  Heart RRR without murmers clicks or rubs  Lungs CTA  Abdo soft nontender  Ext no edema        Assessment/Plan:     Follow-up diabetes stable. Diabetic issues reviewed with her: all medications, side effects and compliance discussed carefully, glycohemoglobin and other lab monitoring discussed, long term diabetic complications discussed, and labs immediately prior to next visit. ICD-10-CM ICD-9-CM    1. Medicare annual wellness visit, subsequent  Z00.00 V70.0       2. Screening for ischemic heart disease  Z13.6 V81.0       3. Screening for alcoholism  Z13.39 V79.1 MO ANNUAL ALCOHOL SCREEN 15 MIN      4. Mixed hyperlipidemia  E78.2 272.2 LIPID PANEL      5. Stage 3b chronic kidney disease ([facility])  N18.32 585.3 REFERRAL TO NEPHROLOGY      6. Type 2 diabetes mellitus with hyperglycemia, with long-term current use of insulin ([facility])  E11.65 250.00 HEMOGLOBIN A1C WITH EAG    Z79.4 790.29 REFERRAL TO OPTOMETRY     V58.67       7. Hypertension, unspecified type  A46 742.1 METABOLIC PANEL, BASIC      8. Idiopathic small and large fiber sensory neuropathy  G60.8 356. 4 pregabalin (LYRICA) 75 mg capsule      9. Obesity, Class III, BMI 40-49.9 (morbid obesity) ([facility])  E66.01 278.01       10. Screening for colon cancer  Z12.11 V76.51 OCCULT BLOOD IMMUNOASSAY,DIAGNOSTIC      OCCULT BLOOD IMMUNOASSAY,DIAGNOSTIC      11.  Screening for cervical cancer  Z12.4 V76.2 REFERRAL TO GYNECOLOGY        Orders Placed This Encounter    LIPID PANEL     Standing Status:   Future     Standing Expiration Date:   4/63/2008    METABOLIC PANEL, BASIC     Standing Status:   Future     Standing Expiration Date:   9/30/2023    HEMOGLOBIN A1C WITH EAG     Standing Status:   Future     Standing Expiration Date:   3/30/2024    OCCULT BLOOD IMMUNOASSAY,DIAGNOSTIC     Standing Status:   Future     Number of Occurrences:   1     Standing Expiration Date:   3/30/2024     Order Specific Question:   QUEST SOURCE     Answer:   Stool [1161]    REFERRAL TO NEPHROLOGY     Referral Priority:   Routine     Referral Type:   Consultation     Referral Reason:   Specialty Services Required     Referred to Provider:   Star Kirkpatrick MD     Number of Visits Requested:   1    REFERRAL TO OPTOMETRY     Referral Priority:   Routine     Referral Type:   Consultation     Referral Reason:   Specialty Services Required     Referred to Provider:   Cary Zhong OD    REFERRAL TO GYNECOLOGY     Referral Priority:   Routine     Referral Type:   Consultation     Referral Reason:   Specialty Services Required     Referred to Provider:   Sergo Xiong NP     Number of Visits Requested:   1    ANNUAL ALCOHOL SCREEN 8-15 MIN    pregabalin (LYRICA) 75 mg capsule     Sig: Take 1 Capsule by mouth two (2) times a day. Max Daily Amount: 150 mg. Dispense:  60 Capsule     Refill:  1           This is the Subsequent Medicare Annual Wellness Exam, performed 12 months or more after the Initial AWV or the last Subsequent AWV    I have reviewed the patient's medical history in detail and updated the computerized patient record. Assessment/Plan   Education and counseling provided:  Screening Pap and pelvic (covered once every 2 years)  Colorectal cancer screening tests  Cardiovascular screening blood test  Screening for glaucoma  Diabetes screening test    1. Medicare annual wellness visit, subsequent  2. Screening for ischemic heart disease  3.  Screening for alcoholism  -     MA ANNUAL ALCOHOL SCREEN 15 MIN       Depression Risk Factor Screening     3 most recent PHQ Screens 3/30/2023   Little interest or pleasure in doing things Several days   Feeling down, depressed, irritable, or hopeless Several days   Total Score PHQ 2 2       Alcohol & Drug Abuse Risk Screen    Do you average more than 1 drink per night or more than 7 drinks a week:  No    On any one occasion in the past three months have you have had more than 3 drinks containing alcohol:  No          Functional Ability and Level of Safety    Hearing: Hearing is good. Activities of Daily Living: The home contains: no safety equipment. Patient does total self care      Ambulation: with no difficulty     Fall Risk:  Fall Risk Assessment, last 12 mths 5/5/2021   Able to walk? Yes   Fall in past 12 months? 0   Do you feel unsteady? 0   Are you worried about falling 0   Number of falls in past 12 months -   Fall with injury?  -      Abuse Screen:  Patient is not abused       Cognitive Screening    Has your family/caregiver stated any concerns about your memory: no     Cognitive Screening: Abnormal - Clock Drawing Test    Health Maintenance Due     Health Maintenance Due   Topic Date Due    Hepatitis C Screening  Never done    Pneumococcal 0-64 years (1 - PCV) Never done    Hepatitis B Vaccine (1 of 3 - Risk 3-dose series) Never done    Shingles Vaccine (1 of 2) Never done    Colorectal Cancer Screening Combo  Never done    Cervical cancer screen  11/30/2020    COVID-19 Vaccine (3 - Booster for Moderna series) 12/01/2021    Flu Vaccine (1) Never done    Eye Exam Retinal or Dilated  09/01/2022    Depression Screen  02/23/2023       Patient Care Team   Patient Care Team:  Rachel Vasquez MD as PCP - General (Family Medicine)  Rachel Vasquez MD as PCP - [facility] Empaneled Provider  Merary Odom MD as Physician (Cardiovascular Disease Physician)  Heron Novak MD (Neurology)  Clifton Yates, RN as Ambulatory Care Manager  Beto Rowe MD as Surgeon (General Surgery)  Herlinda Bates MD as Physician (Cardiovascular Disease Physician)  Aakash Marlow MD as Physician (Clinical Cardiac Electrophysiology Physician)  Altagracia Cleveland RN as [address] [place] [address]  [facility] eye    History Patient Active Problem List   Diagnosis Code    Hypertension I10    Idiopathic small and large fiber sensory neuropathy G60.8    Spinal stenosis of lumbosacral region M48.07    Lumbar back pain with radiculopathy affecting right lower extremity M54.16    Type 2 diabetes mellitus with nephropathy (HCC) E11.21    Hyperglycemia due to type 2 diabetes mellitus ([facility] E11.65    Bilateral cataracts H26.9    Chronic renal disease, stage III N18.30    BMI 45.0-49.9, adult [facility] Z68.42    History of congestive heart failure Z86.79     Past Medical History:   Diagnosis Date    AICD (automatic cardioverter/defibrillator) present     Anemia     CAD (coronary artery disease)     Cardiomyopathy ([facility]     CHF (congestive heart failure) ([facility])     Chronic pain     CKD (chronic kidney disease), stage III ([facility]     Cyst in hand 2014    [address]    Diabetes mellitus type 2 in obese ([facility])     Diabetic neuropathy ([facility])     HLD (hyperlipidemia)     Hypertension     Migraine     Morbid obesity with BMI of 40.0-44.9, adult ([facility]     OA (osteoarthritis) of hip     Left    RAD (reactive airway disease)       Past Surgical History:   Procedure Laterality Date    HX ORTHOPAEDIC      right big toe    HX PACEMAKER       Current Outpatient Medications   Medication Sig Dispense Refill    metOLazone (ZAROXOLYN) 2.5 mg tablet Take 2 Tablets by mouth daily. As needed for swelling 180 Tablet 1    Mounjaro 5 mg/0.5 mL pnij INJECT 5 MG BY SUBCUTANEOUS ROUTE EVERY 7 DAYS 4 Pen 5    pregabalin (LYRICA) 50 mg capsule Take 1 capsule by mouth twice daily 60 Capsule 0    hydrALAZINE (APRESOLINE) 25 mg tablet Take 1 tablet by mouth twice daily 180 Tablet 0    carvediloL (COREG) 12.5 mg tablet Take 1 tablet by mouth twice daily 180 Tablet 0    hydrALAZINE (APRESOLINE) 25 mg tablet Take 1 Tablet by mouth two (2) times a day.  180 Tablet 1    lisinopriL (PRINIVIL, ZESTRIL) 10 mg tablet Take 1 tablet by mouth once daily 90 Tablet 0    Insulin Needles, Disposable, 32 gauge x 5/32\" ndle Check blood sugar up to 3x daily as needed. 100 Pen Needle 5    glucose blood VI test strips (True Metrix Glucose Test Strip) strip CHECK BLOOD SUGAR UP TO THREE TIMES DAILY Dx.  E11.21 100 Strip 5    metFORMIN (GLUCOPHAGE) 500 mg tablet Take 1 Tablet by mouth two (2) times daily (with meals). 60 Tablet 5    insulin aspart U-100 (NovoLOG Flexpen U-100 Insulin) 100 unit/mL (3 mL) inpn 6 Units by SubCUTAneous route Before breakfast, lunch, and dinner. 5 Pen 5    cyanocobalamin (VITAMIN B12) 500 mcg tablet Take 1 Tablet by mouth daily. 90 Tablet 3    atorvastatin (LIPITOR) 40 mg tablet Take 1 Tablet by mouth daily. 90 Tablet 3    isosorbide dinitrate (ISORDIL) 20 mg tablet Take 1 Tablet by mouth two (2) times a day. 180 Tablet 1    buPROPion (WELLBUTRIN) 100 mg tablet Take 1 Tablet by mouth daily. 90 Tablet 3    atorvastatin (LIPITOR) 40 mg tablet Take 1 Tablet by mouth daily. 90 Tablet 3    cholecalciferol (VITAMIN D3) (1000 Units /25 mcg) tablet Take 1 Tablet by mouth daily. 90 Tablet 3    Blood-Glucose Meter monitoring kit Want free style leda kit 1 Kit 0    lidocaine (LIDODERM) 5 % Apply patch to the affected area for 12 hours a day and remove for 12 hours a day. 5 Each 0    Blood-Glucose Meter (TRUE METRIX GLUCOSE METER) misc Check blood sugar up to three times daily 1 Each 0    aspirin delayed-release 81 mg tablet Take  by mouth daily.        Allergies   Allergen Reactions    Lortab [Hydrocodone-Acetaminophen] Nausea and Vomiting    Cymbalta [Duloxetine] Diarrhea    Jardiance [Empagliflozin] Other (comments)     kidneys    Lasix [Furosemide] Swelling    Morphine Other (comments)     Pt states is not allergic to morphine       Family History   Problem Relation Age of Onset    Cancer Father         brain    Diabetes Mother     Heart Disease Mother     Hypertension Mother     Hypertension Maternal Grandmother     Heart Disease Maternal Grandmother      Social History     Tobacco Use    Smoking status: Never    Smokeless tobacco: Never   Substance Use Topics    Alcohol use: Not on file         Arnel Rogel MD     Follow-up 3 months

## 2023-03-30 NOTE — PROGRESS NOTES
Chief Complaint   Patient presents with    Medication Refill    Annual Wellness Visit     Clock Draw Test Done     Patient has not been out of the country in (14 months), NO diarrhea, NO cough, NO chest conjestion, NO temp. Pt has not been around anyone with these symptoms. Health Maintenance reviewed. I have reviewed the patient's medical history in detail and updated the computerized patient record. 1. \"Have you been to the ER, urgent care clinic since your last visit? Yes  Hospitalized since your last visit? \" no    2. \"Have you seen or consulted any other health care providers outside of the [address] [place] [address] since your last visit? \" no     3. For patients aged 39-70: Has the patient had a colonoscopy / FIT/ Cologuard? no    If the patient is female:    4. For patients aged 41-77: Has the patient had a mammogram within the past 2 years? no      5. For patients aged 21-65: Has the patient had a pap smear?  no    Encouraged pt to discuss pt's wishes with spouse/partner/family and bring them in the next appt to follow thru with the Advanced Directive    @  Fall Risk Assessment, last 12 mths 5/5/2021   Able to walk? Yes   Fall in past 12 months? 0   Do you feel unsteady? 0   Are you worried about falling 0   Number of falls in past 12 months -   Fall with injury? -       3 most recent PHQ Screens 3/30/2023   Little interest or pleasure in doing things Several days   Feeling down, depressed, irritable, or hopeless Several days   Total Score PHQ 2 2       Abuse Screening Questionnaire 10/12/2021   Do you ever feel afraid of your partner? N   Are you in a relationship with someone who physically or mentally threatens you? N   Is it safe for you to go home?  Y       ADL Assessment 10/12/2021   Feeding yourself No Help Needed   Getting from bed to chair No Help Needed   Getting dressed No Help Needed   Bathing or showering No Help Needed   Walk across the room (includes cane/walker) No Help Needed   Using the telphone No Help Needed   Taking your medications No Help Needed   Preparing meals No Help Needed   Managing money (expenses/bills) No Help Needed   Moderately strenuous housework (laundry) No Help Needed   Shopping for personal items (toiletries/medicines) No Help Needed   Shopping for groceries No Help Needed   Driving No Help Needed   Climbing a flight of stairs No Help Needed   Getting to places beyond walking distances No Help Needed

## 2023-04-24 ENCOUNTER — TELEPHONE (OUTPATIENT)
Dept: INTERNAL MEDICINE CLINIC | Age: 58
End: 2023-04-24

## 2023-04-26 RX ORDER — CARVEDILOL 12.5 MG/1
12.5 TABLET ORAL 2 TIMES DAILY
Qty: 180 TABLET | Refills: 0 | Status: SHIPPED | OUTPATIENT
Start: 2023-04-26

## 2023-05-03 ENCOUNTER — PATIENT OUTREACH (OUTPATIENT)
Dept: CASE MANAGEMENT | Age: 58
End: 2023-05-03

## 2023-05-04 ENCOUNTER — TELEPHONE (OUTPATIENT)
Dept: INTERNAL MEDICINE CLINIC | Age: 58
End: 2023-05-04

## 2023-05-11 DIAGNOSIS — G60.8 IDIOPATHIC SMALL AND LARGE FIBER SENSORY NEUROPATHY: Primary | ICD-10-CM

## 2023-05-13 RX ORDER — PREGABALIN 75 MG/1
75 CAPSULE ORAL 2 TIMES DAILY
Qty: 60 CAPSULE | Refills: 0 | Status: SHIPPED | OUTPATIENT
Start: 2023-05-13 | End: 2023-06-12

## 2023-05-15 DIAGNOSIS — G60.8 IDIOPATHIC SMALL AND LARGE FIBER SENSORY NEUROPATHY: ICD-10-CM

## 2023-05-17 RX ORDER — PREGABALIN 75 MG/1
75 CAPSULE ORAL 2 TIMES DAILY
Qty: 60 CAPSULE | Refills: 0 | Status: SHIPPED | OUTPATIENT
Start: 2023-05-17 | End: 2023-06-16

## 2023-05-31 DIAGNOSIS — Z79.4 TYPE 2 DIABETES MELLITUS WITH HYPERGLYCEMIA, WITH LONG-TERM CURRENT USE OF INSULIN (HCC): Primary | ICD-10-CM

## 2023-05-31 DIAGNOSIS — E11.65 TYPE 2 DIABETES MELLITUS WITH HYPERGLYCEMIA, WITH LONG-TERM CURRENT USE OF INSULIN (HCC): Primary | ICD-10-CM

## 2023-05-31 RX ORDER — TIRZEPATIDE 7.5 MG/.5ML
7.5 INJECTION, SOLUTION SUBCUTANEOUS WEEKLY
Qty: 4 ADJUSTABLE DOSE PRE-FILLED PEN SYRINGE | Refills: 3 | Status: SHIPPED | OUTPATIENT
Start: 2023-05-31

## 2023-06-11 DIAGNOSIS — G60.8 IDIOPATHIC SMALL AND LARGE FIBER SENSORY NEUROPATHY: Primary | ICD-10-CM

## 2023-06-13 ENCOUNTER — TELEPHONE (OUTPATIENT)
Facility: CLINIC | Age: 58
End: 2023-06-13

## 2023-06-13 RX ORDER — LISINOPRIL 10 MG/1
10 TABLET ORAL DAILY
Qty: 90 TABLET | Refills: 0 | Status: SHIPPED | OUTPATIENT
Start: 2023-06-13 | End: 2023-07-12 | Stop reason: SDUPTHER

## 2023-06-13 RX ORDER — PREGABALIN 50 MG/1
50 CAPSULE ORAL 2 TIMES DAILY
Qty: 60 CAPSULE | Refills: 0 | Status: SHIPPED | OUTPATIENT
Start: 2023-06-13 | End: 2023-07-12 | Stop reason: ALTCHOICE

## 2023-06-26 ENCOUNTER — TELEPHONE (OUTPATIENT)
Facility: CLINIC | Age: 58
End: 2023-06-26

## 2023-06-26 DIAGNOSIS — E11.65 TYPE 2 DIABETES MELLITUS WITH HYPERGLYCEMIA, WITHOUT LONG-TERM CURRENT USE OF INSULIN (HCC): Primary | ICD-10-CM

## 2023-06-26 RX ORDER — TIRZEPATIDE 10 MG/.5ML
10 INJECTION, SOLUTION SUBCUTANEOUS WEEKLY
Qty: 4 ADJUSTABLE DOSE PRE-FILLED PEN SYRINGE | Refills: 5 | Status: SHIPPED | OUTPATIENT
Start: 2023-06-26

## 2023-06-30 ENCOUNTER — TELEPHONE (OUTPATIENT)
Facility: CLINIC | Age: 58
End: 2023-06-30

## 2023-06-30 NOTE — TELEPHONE ENCOUNTER
Patient refill request; Send to [facility] Please        -vitamin D (CHOLECALCIFEROL) 25 MCG (1000 UT) TABS tablet    -insulin aspart (NOVOLOG) 100 UNIT/ML injection pen

## 2023-07-10 DIAGNOSIS — G60.8 IDIOPATHIC SMALL AND LARGE FIBER SENSORY NEUROPATHY: ICD-10-CM

## 2023-07-10 NOTE — TELEPHONE ENCOUNTER
Patient refill request; [facility]      pregabalin (LYRICA) 50 MG capsule O-L Flap Text: The defect edges were debeveled with a #15 scalpel blade.  Given the location of the defect, shape of the defect and the proximity to free margins an O-L flap was deemed most appropriate.  Using a sterile surgical marker, an appropriate advancement flap was drawn incorporating the defect and placing the expected incisions within the relaxed skin tension lines where possible.    The area thus outlined was incised deep to adipose tissue with a #15 scalpel blade.  The skin margins were undermined to an appropriate distance in all directions utilizing iris scissors.

## 2023-07-11 RX ORDER — PREGABALIN 75 MG/1
75 CAPSULE ORAL 2 TIMES DAILY
Qty: 20 CAPSULE | Refills: 0 | OUTPATIENT
Start: 2023-07-11 | End: 2023-07-21

## 2023-07-12 ENCOUNTER — TELEMEDICINE (OUTPATIENT)
Facility: CLINIC | Age: 58
End: 2023-07-12
Payer: MEDICARE

## 2023-07-12 DIAGNOSIS — E11.65 TYPE 2 DIABETES MELLITUS WITH HYPERGLYCEMIA, WITH LONG-TERM CURRENT USE OF INSULIN (HCC): ICD-10-CM

## 2023-07-12 DIAGNOSIS — N18.32 STAGE 3B CHRONIC KIDNEY DISEASE (HCC): ICD-10-CM

## 2023-07-12 DIAGNOSIS — Z12.11 SCREENING FOR COLON CANCER: Primary | ICD-10-CM

## 2023-07-12 DIAGNOSIS — Z79.4 TYPE 2 DIABETES MELLITUS WITH HYPERGLYCEMIA, WITH LONG-TERM CURRENT USE OF INSULIN (HCC): ICD-10-CM

## 2023-07-12 DIAGNOSIS — E11.65 TYPE 2 DIABETES MELLITUS WITH HYPERGLYCEMIA, WITHOUT LONG-TERM CURRENT USE OF INSULIN (HCC): ICD-10-CM

## 2023-07-12 DIAGNOSIS — I10 PRIMARY HYPERTENSION: ICD-10-CM

## 2023-07-12 DIAGNOSIS — E11.21 TYPE 2 DIABETES MELLITUS WITH NEPHROPATHY (HCC): ICD-10-CM

## 2023-07-12 DIAGNOSIS — G60.8 IDIOPATHIC SMALL AND LARGE FIBER SENSORY NEUROPATHY: ICD-10-CM

## 2023-07-12 PROCEDURE — 3046F HEMOGLOBIN A1C LEVEL >9.0%: CPT | Performed by: FAMILY MEDICINE

## 2023-07-12 PROCEDURE — G8417 CALC BMI ABV UP PARAM F/U: HCPCS | Performed by: FAMILY MEDICINE

## 2023-07-12 PROCEDURE — 2022F DILAT RTA XM EVC RTNOPTHY: CPT | Performed by: FAMILY MEDICINE

## 2023-07-12 PROCEDURE — G8428 CUR MEDS NOT DOCUMENT: HCPCS | Performed by: FAMILY MEDICINE

## 2023-07-12 PROCEDURE — 4004F PT TOBACCO SCREEN RCVD TLK: CPT | Performed by: FAMILY MEDICINE

## 2023-07-12 PROCEDURE — 3017F COLORECTAL CA SCREEN DOC REV: CPT | Performed by: FAMILY MEDICINE

## 2023-07-12 PROCEDURE — 99214 OFFICE O/P EST MOD 30 MIN: CPT | Performed by: FAMILY MEDICINE

## 2023-07-12 RX ORDER — METOLAZONE 2.5 MG/1
5 TABLET ORAL DAILY
Qty: 180 TABLET | Refills: 1 | Status: SHIPPED | OUTPATIENT
Start: 2023-07-12

## 2023-07-12 RX ORDER — ATORVASTATIN CALCIUM 40 MG/1
40 TABLET, FILM COATED ORAL DAILY
Qty: 180 TABLET | Refills: 1 | Status: SHIPPED | OUTPATIENT
Start: 2023-07-12

## 2023-07-12 RX ORDER — PREGABALIN 75 MG/1
150 CAPSULE ORAL 2 TIMES DAILY
Qty: 180 CAPSULE | Refills: 0 | Status: SHIPPED | OUTPATIENT
Start: 2023-07-12 | End: 2023-07-14 | Stop reason: SDUPTHER

## 2023-07-12 RX ORDER — ISOSORBIDE DINITRATE 20 MG/1
20 TABLET ORAL 2 TIMES DAILY
Qty: 180 TABLET | Refills: 1 | Status: SHIPPED | OUTPATIENT
Start: 2023-07-12

## 2023-07-12 RX ORDER — HYDRALAZINE HYDROCHLORIDE 25 MG/1
25 TABLET, FILM COATED ORAL 2 TIMES DAILY
Qty: 180 TABLET | Refills: 1 | Status: SHIPPED | OUTPATIENT
Start: 2023-07-12

## 2023-07-12 RX ORDER — CARVEDILOL 12.5 MG/1
12.5 TABLET ORAL 2 TIMES DAILY
Qty: 180 TABLET | Refills: 1 | Status: SHIPPED | OUTPATIENT
Start: 2023-07-12

## 2023-07-12 RX ORDER — LISINOPRIL 10 MG/1
10 TABLET ORAL DAILY
Qty: 90 TABLET | Refills: 1 | Status: SHIPPED | OUTPATIENT
Start: 2023-07-12

## 2023-07-12 RX ORDER — TIRZEPATIDE 10 MG/.5ML
10 INJECTION, SOLUTION SUBCUTANEOUS WEEKLY
Qty: 4 ADJUSTABLE DOSE PRE-FILLED PEN SYRINGE | Refills: 5 | Status: SHIPPED | OUTPATIENT
Start: 2023-07-12

## 2023-07-12 ASSESSMENT — PATIENT HEALTH QUESTIONNAIRE - PHQ9
SUM OF ALL RESPONSES TO PHQ QUESTIONS 1-9: 2
SUM OF ALL RESPONSES TO PHQ9 QUESTIONS 1 & 2: 2
SUM OF ALL RESPONSES TO PHQ QUESTIONS 1-9: 2
1. LITTLE INTEREST OR PLEASURE IN DOING THINGS: 1
2. FEELING DOWN, DEPRESSED OR HOPELESS: 1
SUM OF ALL RESPONSES TO PHQ QUESTIONS 1-9: 2
SUM OF ALL RESPONSES TO PHQ QUESTIONS 1-9: 2

## 2023-07-12 NOTE — PROGRESS NOTES
Meena Patiño, was evaluated through a synchronous (real-time) audio-video encounter. The patient (or guardian if applicable) is aware that this is a billable service, which includes applicable co-pays. This Virtual Visit was conducted with patient's (and/or legal guardian's) consent. Patient identification was verified, and a caregiver was present when appropriate. The patient was located at Home: [address] [facility]  Provider was located at Facility (Appt Dept): [address],  [address]         Total time spent for this encounter: Not billed by time    --Ashok Nation MD on 7/12/2023 at 12:30 PM    An electronic signature was used to authenticate this note. Subjective:     Meena Patiño is a 65 y.o. female seen for follow-up of diabetes. She has had hypoglycemic attacks. .no  Blood sugar control has been ok    Hemoglobin A1C   Date Value Ref Range Status   07/28/2022 7.8 (H) 4.8 - 5.6 % Final     Comment:              Prediabetes: 5.7 - 6.4           Diabetes: >6.4           Glycemic control for adults with diabetes: <7.0       Lab Results   Component Value Date/Time     07/28/2022 02:41 PM    K 4.4 07/28/2022 02:41 PM     07/28/2022 02:41 PM    CO2 22 07/28/2022 02:41 PM    BUN 25 07/28/2022 02:41 PM    CREATININE 1.93 07/28/2022 02:41 PM    GLUCOSE 328 07/28/2022 02:41 PM    CALCIUM 9.1 07/28/2022 02:41 PM    LABGLOM 30 07/28/2022 02:41 PM          She has diabetes, hypertension, and hyperlipidemia. Meena Patiño has the additional concern of Lyrica and vitamin refill    Reports taking blood pressure medications without side affects. No complaints of exertional chest pain, excessive shortness of breath or focal weakness. Diet and Lifestyle: generally follows a low sodium diet.   Low-carb    Patient Active Problem List    Diagnosis Date Noted    History of congestive heart failure 08/14/2022    Chronic renal disease, stage III

## 2023-07-13 ENCOUNTER — TELEPHONE (OUTPATIENT)
Facility: CLINIC | Age: 58
End: 2023-07-13

## 2023-07-13 RX ORDER — PREGABALIN 50 MG/1
CAPSULE ORAL
Qty: 60 CAPSULE | Refills: 0 | OUTPATIENT
Start: 2023-07-13

## 2023-07-13 NOTE — TELEPHONE ENCOUNTER
Pt calling because she needs to know what she needs to take for foot pain/nerve pain since lyrica needs prior auth.  Please call her at [phone]

## 2023-07-14 ENCOUNTER — TELEPHONE (OUTPATIENT)
Facility: CLINIC | Age: 58
End: 2023-07-14

## 2023-07-14 DIAGNOSIS — G60.8 IDIOPATHIC SMALL AND LARGE FIBER SENSORY NEUROPATHY: ICD-10-CM

## 2023-07-14 RX ORDER — PREGABALIN 75 MG/1
150 CAPSULE ORAL 2 TIMES DAILY
Qty: 120 CAPSULE | Refills: 0 | Status: SHIPPED | OUTPATIENT
Start: 2023-07-14 | End: 2023-08-13

## 2023-07-17 ENCOUNTER — OFFICE VISIT (OUTPATIENT)
Facility: CLINIC | Age: 58
End: 2023-07-17
Payer: MEDICARE

## 2023-07-17 VITALS
SYSTOLIC BLOOD PRESSURE: 110 MMHG | DIASTOLIC BLOOD PRESSURE: 62 MMHG | HEART RATE: 78 BPM | HEIGHT: 64 IN | BODY MASS INDEX: 41.66 KG/M2 | WEIGHT: 244 LBS

## 2023-07-17 DIAGNOSIS — Z79.4 TYPE 2 DIABETES MELLITUS WITH HYPERGLYCEMIA, WITH LONG-TERM CURRENT USE OF INSULIN (HCC): ICD-10-CM

## 2023-07-17 DIAGNOSIS — I10 PRIMARY HYPERTENSION: ICD-10-CM

## 2023-07-17 DIAGNOSIS — G60.8 IDIOPATHIC SMALL AND LARGE FIBER SENSORY NEUROPATHY: Primary | ICD-10-CM

## 2023-07-17 DIAGNOSIS — E11.21 TYPE 2 DIABETES MELLITUS WITH NEPHROPATHY (HCC): ICD-10-CM

## 2023-07-17 DIAGNOSIS — E11.65 TYPE 2 DIABETES MELLITUS WITH HYPERGLYCEMIA, WITH LONG-TERM CURRENT USE OF INSULIN (HCC): ICD-10-CM

## 2023-07-17 PROCEDURE — 99214 OFFICE O/P EST MOD 30 MIN: CPT | Performed by: FAMILY MEDICINE

## 2023-07-17 PROCEDURE — 3074F SYST BP LT 130 MM HG: CPT | Performed by: FAMILY MEDICINE

## 2023-07-17 PROCEDURE — 3046F HEMOGLOBIN A1C LEVEL >9.0%: CPT | Performed by: FAMILY MEDICINE

## 2023-07-17 PROCEDURE — G8427 DOCREV CUR MEDS BY ELIG CLIN: HCPCS | Performed by: FAMILY MEDICINE

## 2023-07-17 PROCEDURE — 3017F COLORECTAL CA SCREEN DOC REV: CPT | Performed by: FAMILY MEDICINE

## 2023-07-17 PROCEDURE — 2022F DILAT RTA XM EVC RTNOPTHY: CPT | Performed by: FAMILY MEDICINE

## 2023-07-17 PROCEDURE — 3078F DIAST BP <80 MM HG: CPT | Performed by: FAMILY MEDICINE

## 2023-07-17 PROCEDURE — G8417 CALC BMI ABV UP PARAM F/U: HCPCS | Performed by: FAMILY MEDICINE

## 2023-07-17 PROCEDURE — 1036F TOBACCO NON-USER: CPT | Performed by: FAMILY MEDICINE

## 2023-07-17 RX ORDER — GABAPENTIN 400 MG/1
400 CAPSULE ORAL 2 TIMES DAILY
Qty: 60 CAPSULE | Refills: 0 | Status: SHIPPED | OUTPATIENT
Start: 2023-07-17 | End: 2023-08-16

## 2023-07-17 RX ORDER — PREGABALIN 75 MG/1
75 CAPSULE ORAL 2 TIMES DAILY
Qty: 60 CAPSULE | Refills: 2 | Status: SHIPPED | OUTPATIENT
Start: 2023-07-17 | End: 2023-10-15

## 2023-07-17 NOTE — PROGRESS NOTES
Chief Complaint   Patient presents with    Discuss Medications     Lyrica increased requires a PA - patient unable to wait and needs something sooner for foot pain

## 2023-07-17 NOTE — PROGRESS NOTES
PROGRESS NOTE        SUBJECTIVE:  Diagnosis/Chief Complaint: Discuss Medications (Lyrica increased requires a PA - patient unable to wait and needs something sooner for foot pain/)      Symptoms Bi foot pain since ran out of lyrica x > 1 week PA needed per pharmacy  Doing well with pain no  Pain levels 10/10   Usage of narcotic or benzodiazapine or other sedatives no  Activities: Able to do activities of daily living.  no  Side affects: no  States taking medications per medicine list.no   queried no  Urine drug screen done no  Opiod Rx exceeds 50 MME/dayno  Hx of depression no  Hx of drug addiction: no    Patient Active Problem List    Diagnosis Date Noted    History of congestive heart failure 08/14/2022    Chronic renal disease, stage III ([address]) 08/12/2022    BMI 45.0-49.9, adult ([address]) 08/12/2022    Bilateral cataracts 09/08/2020    Hyperglycemia due to type 2 diabetes mellitus ([address]) 03/04/2020    Type 2 diabetes mellitus with nephropathy ([address]) 12/18/2017    Lumbar back pain with radiculopathy affecting right lower extremity 12/08/2016    Spinal stenosis of lumbosacral region 12/08/2016    Idiopathic small and large fiber sensory neuropathy 11/16/2016    Hypertension 11/07/2012     Allergies   Allergen Reactions    Hydrocodone-Acetaminophen Nausea And Vomiting    Duloxetine Diarrhea    Empagliflozin Other (See Comments)     kidneys    Furosemide Swelling    Morphine Other (See Comments)     Pt states is not allergic to morphine     Past Medical History:   Diagnosis Date    AICD (automatic cardioverter/defibrillator) present     Anemia     CAD (coronary artery disease)     Cardiomyopathy ([address])     CHF (congestive heart failure) (HCC)     Chronic pain     CKD (chronic kidney disease), stage III ([address])     Cyst in hand 2014    [facility]    Diabetes mellitus type 2 in obese ([address])     on PO & insulin antiDM Rx's    Diabetic neuropathy (HCC)     HLD (hyperlipidemia)     HTN (hypertension)     Migraine     Morbid obesity with

## 2023-07-18 ENCOUNTER — CLINICAL DOCUMENTATION (OUTPATIENT)
Age: 58
End: 2023-07-18

## 2023-07-18 NOTE — PROGRESS NOTES
Key: [id number] - PA Case ID: [id number] - Rx #: [id number]  Status  Sent to Plan today. Please call: [phone],DT no response within 3 to 7 days. Claim pending pharmacy review. Drug  Pregabalin 75MG capsules  Form  [employer] Electronic PA Form  Approved today  PA Case: [id number], Status: Approved, Coverage Starts on: 1/1/2023 12:00:00 AM, Coverage Ends on: 12/31/2023 12:00:00 AM. Questions? Contact [phone].

## 2023-08-03 LAB
ALBUMIN/CREAT UR: 23 MG/G CREAT (ref 0–29)
BUN SERPL-MCNC: 26 MG/DL (ref 6–24)
BUN/CREAT SERPL: 11 (ref 9–23)
CALCIUM SERPL-MCNC: 9.4 MG/DL (ref 8.7–10.2)
CHLORIDE SERPL-SCNC: 107 MMOL/L (ref 96–106)
CO2 SERPL-SCNC: 23 MMOL/L (ref 20–29)
CREAT SERPL-MCNC: 2.39 MG/DL (ref 0.57–1)
CREAT UR-MCNC: 66.6 MG/DL
EGFRCR SERPLBLD CKD-EPI 2021: 23 ML/MIN/1.73
GLUCOSE SERPL-MCNC: 134 MG/DL (ref 70–99)
HBA1C MFR BLD: 6.2 % (ref 4.8–5.6)
MICROALBUMIN UR-MCNC: 15.6 UG/ML
POTASSIUM SERPL-SCNC: 4.6 MMOL/L (ref 3.5–5.2)
REPORT: NORMAL
SODIUM SERPL-SCNC: 141 MMOL/L (ref 134–144)

## 2023-08-04 ENCOUNTER — TELEPHONE (OUTPATIENT)
Facility: CLINIC | Age: 58
End: 2023-08-04

## 2023-08-04 DIAGNOSIS — E11.65 TYPE 2 DIABETES MELLITUS WITH HYPERGLYCEMIA, WITHOUT LONG-TERM CURRENT USE OF INSULIN (HCC): ICD-10-CM

## 2023-08-04 RX ORDER — TIRZEPATIDE 10 MG/.5ML
10 INJECTION, SOLUTION SUBCUTANEOUS WEEKLY
Qty: 4 ADJUSTABLE DOSE PRE-FILLED PEN SYRINGE | Refills: 5 | Status: SHIPPED | OUTPATIENT
Start: 2023-08-04 | End: 2023-08-04

## 2023-08-06 LAB
HEMOCCULT STL QL IA: NEGATIVE
SPECIMEN STATUS REPORT: NORMAL

## 2023-08-16 ENCOUNTER — OFFICE VISIT (OUTPATIENT)
Facility: CLINIC | Age: 58
End: 2023-08-16
Payer: MEDICARE

## 2023-08-16 VITALS
OXYGEN SATURATION: 96 % | WEIGHT: 255 LBS | SYSTOLIC BLOOD PRESSURE: 121 MMHG | BODY MASS INDEX: 43.54 KG/M2 | HEIGHT: 64 IN | DIASTOLIC BLOOD PRESSURE: 73 MMHG | RESPIRATION RATE: 16 BRPM | TEMPERATURE: 98.5 F | HEART RATE: 88 BPM

## 2023-08-16 DIAGNOSIS — D68.9 COAGULATION DEFECT (HCC): ICD-10-CM

## 2023-08-16 DIAGNOSIS — M17.12 OSTEOARTHRITIS OF LEFT KNEE, UNSPECIFIED OSTEOARTHRITIS TYPE: ICD-10-CM

## 2023-08-16 DIAGNOSIS — E11.65 TYPE 2 DIABETES MELLITUS WITH HYPERGLYCEMIA, WITHOUT LONG-TERM CURRENT USE OF INSULIN (HCC): Primary | ICD-10-CM

## 2023-08-16 DIAGNOSIS — E11.21 TYPE 2 DIABETES MELLITUS WITH NEPHROPATHY (HCC): ICD-10-CM

## 2023-08-16 DIAGNOSIS — Z86.79 HISTORY OF CONGESTIVE HEART FAILURE: ICD-10-CM

## 2023-08-16 DIAGNOSIS — I10 PRIMARY HYPERTENSION: ICD-10-CM

## 2023-08-16 PROCEDURE — 3074F SYST BP LT 130 MM HG: CPT | Performed by: FAMILY MEDICINE

## 2023-08-16 PROCEDURE — 99214 OFFICE O/P EST MOD 30 MIN: CPT | Performed by: FAMILY MEDICINE

## 2023-08-16 PROCEDURE — G8427 DOCREV CUR MEDS BY ELIG CLIN: HCPCS | Performed by: FAMILY MEDICINE

## 2023-08-16 PROCEDURE — 1036F TOBACCO NON-USER: CPT | Performed by: FAMILY MEDICINE

## 2023-08-16 PROCEDURE — G8417 CALC BMI ABV UP PARAM F/U: HCPCS | Performed by: FAMILY MEDICINE

## 2023-08-16 PROCEDURE — 3078F DIAST BP <80 MM HG: CPT | Performed by: FAMILY MEDICINE

## 2023-08-16 PROCEDURE — 3017F COLORECTAL CA SCREEN DOC REV: CPT | Performed by: FAMILY MEDICINE

## 2023-08-16 PROCEDURE — 2022F DILAT RTA XM EVC RTNOPTHY: CPT | Performed by: FAMILY MEDICINE

## 2023-08-16 PROCEDURE — 3044F HG A1C LEVEL LT 7.0%: CPT | Performed by: FAMILY MEDICINE

## 2023-08-16 RX ORDER — TIRZEPATIDE 15 MG/.5ML
15 INJECTION, SOLUTION SUBCUTANEOUS WEEKLY
Qty: 4 ADJUSTABLE DOSE PRE-FILLED PEN SYRINGE | Refills: 5 | Status: SHIPPED | OUTPATIENT
Start: 2023-08-16

## 2023-08-16 ASSESSMENT — PATIENT HEALTH QUESTIONNAIRE - PHQ9
1. LITTLE INTEREST OR PLEASURE IN DOING THINGS: 1
SUM OF ALL RESPONSES TO PHQ QUESTIONS 1-9: 2
SUM OF ALL RESPONSES TO PHQ9 QUESTIONS 1 & 2: 2
2. FEELING DOWN, DEPRESSED OR HOPELESS: 1
SUM OF ALL RESPONSES TO PHQ QUESTIONS 1-9: 2

## 2023-08-16 NOTE — PROGRESS NOTES
Chief Complaint   Patient presents with    Diabetes     Patient has not been out of the country in (14 months), NO diarrhea, NO cough, NO chest conjestion, NO temp. Pt has not been around anyone with these symptoms. Health Maintenance reviewed. I have reviewed the patient's medical history in detail and updated the computerized patient record. 1. \"Have you been to the ER, urgent care clinic since your last visit? No Hospitalized since your last visit? \" no    2. \"Have you seen or consulted any other health care providers outside of the [address] [place] [address] since your last visit? \" no     3. For patients aged 43-73: Has the patient had a colonoscopy / FIT/ Cologuard?  no

## 2023-08-16 NOTE — PROGRESS NOTES
Chief Complaint   Patient presents with    Diabetes     Patient has not been out of the country in (14 months), NO diarrhea, NO cough, NO chest conjestion, NO temp. Pt has not been around anyone with these symptoms. Health Maintenance reviewed. I have reviewed the patient's medical history in detail and updated the computerized patient record. 1. \"Have you been to the ER, urgent care clinic since your last visit? No Hospitalized since your last visit? \" no    2. \"Have you seen or consulted any other health care providers outside of the [address] [place] [address] since your last visit? \" no     3. For patients aged 43-73: Has the patient had a colonoscopy / FIT/ Cologuard?  no

## 2023-08-16 NOTE — PROGRESS NOTES
Subjective:     Yesi Che is a 65 y.o. female seen for follow-up of diabetes. She has had hypoglycemic attacks. .no  Blood sugar control has been really good    Hemoglobin A1C   Date Value Ref Range Status   08/02/2023 6.2 (H) 4.8 - 5.6 % Final     Comment:              Prediabetes: 5.7 - 6.4           Diabetes: >6.4           Glycemic control for adults with diabetes: <7.0       Lab Results   Component Value Date/Time     08/02/2023 12:18 PM    K 4.6 08/02/2023 12:18 PM     08/02/2023 12:18 PM    CO2 23 08/02/2023 12:18 PM    BUN 26 08/02/2023 12:18 PM    CREATININE 2.39 08/02/2023 12:18 PM    GLUCOSE 134 08/02/2023 12:18 PM    CALCIUM 9.4 08/02/2023 12:18 PM    LABGLOM 23 08/02/2023 12:18 PM          She has diabetes, hypertension, and hyperlipidemia. Yesi Che has the additional concern of lost weight with [facility] unfortunately however gained some of it back despite following dietary restrictions. Left knee continues to be a little painful. No injury. Reports taking blood pressure medications without side affects. No complaints of exertional chest pain, excessive shortness of breath or focal weakness. Minimal swelling in lower legs or dizziness with standing. Diet and Lifestyle: generally follows a low fat low cholesterol diet.     Patient Active Problem List    Diagnosis Date Noted    Coagulation defect ([address]) 08/16/2023    History of congestive heart failure 08/14/2022    Chronic renal disease, stage III ([address]) 08/12/2022    BMI 45.0-49.9, adult ([address]) 08/12/2022    Bilateral cataracts 09/08/2020    Hyperglycemia due to type 2 diabetes mellitus ([address]) 03/04/2020    Type 2 diabetes mellitus with nephropathy ([address]) 12/18/2017    Lumbar back pain with radiculopathy affecting right lower extremity 12/08/2016    Spinal stenosis of lumbosacral region 12/08/2016    Idiopathic small and large fiber sensory neuropathy 11/16/2016    Hypertension 11/07/2012     Allergies   Allergen

## 2023-09-18 DIAGNOSIS — G60.8 IDIOPATHIC SMALL AND LARGE FIBER SENSORY NEUROPATHY: ICD-10-CM

## 2023-09-18 RX ORDER — PREGABALIN 75 MG/1
CAPSULE ORAL
Qty: 240 CAPSULE | Refills: 0 | Status: SHIPPED | OUTPATIENT
Start: 2023-09-18 | End: 2023-09-19 | Stop reason: SDUPTHER

## 2023-09-19 DIAGNOSIS — G60.8 IDIOPATHIC SMALL AND LARGE FIBER SENSORY NEUROPATHY: ICD-10-CM

## 2023-09-19 RX ORDER — PREGABALIN 75 MG/1
CAPSULE ORAL
Qty: 240 CAPSULE | Refills: 0 | Status: SHIPPED | OUTPATIENT
Start: 2023-09-19 | End: 2023-09-21 | Stop reason: DRUGHIGH

## 2023-09-20 ENCOUNTER — TELEPHONE (OUTPATIENT)
Facility: CLINIC | Age: 58
End: 2023-09-20

## 2023-09-20 DIAGNOSIS — G60.8 IDIOPATHIC SMALL AND LARGE FIBER SENSORY NEUROPATHY: Primary | ICD-10-CM

## 2023-09-20 RX ORDER — GABAPENTIN 400 MG/1
800 CAPSULE ORAL 3 TIMES DAILY
Qty: 180 CAPSULE | Refills: 0 | Status: SHIPPED | OUTPATIENT
Start: 2023-09-20 | End: 2023-10-20

## 2023-09-20 RX ORDER — PREGABALIN 75 MG/1
CAPSULE ORAL
Qty: 240 CAPSULE | Refills: 0 | OUTPATIENT
Start: 2023-09-20 | End: 2023-10-19

## 2023-09-20 NOTE — TELEPHONE ENCOUNTER
(Barry Dominguez) - [id number]  Pregabalin 75MG capsules  Status: Message forward to provider. Awaiting,Question Response,to complete PA. Please note. Thanks

## 2023-09-20 NOTE — TELEPHONE ENCOUNTER
Lyrica needs a PA - while waiting to get that approved patient would like Gabapentin 400mg called in like before during the transition to help with her foot pain  Vero Sherman LPN 8/24/1050 06:77 AM

## 2023-09-21 ENCOUNTER — TELEPHONE (OUTPATIENT)
Age: 58
End: 2023-09-21

## 2023-09-21 DIAGNOSIS — G60.8 IDIOPATHIC SMALL AND LARGE FIBER SENSORY NEUROPATHY: Primary | ICD-10-CM

## 2023-09-21 RX ORDER — PREGABALIN 150 MG/1
150 CAPSULE ORAL 2 TIMES DAILY
Qty: 60 CAPSULE | Refills: 2 | Status: SHIPPED | OUTPATIENT
Start: 2023-09-21 | End: 2023-12-20

## 2023-09-21 NOTE — TELEPHONE ENCOUNTER
Key: [id number] - PA Case ID: [id number] - Rx #: [id number] Outcome  Provider said that taking one 150 mg pill is okay, instead of two 75 mg pills as originally prescribed. Provider may need to write a new prescription for 150 mg Pregabalin pill(S). The new prescription will be covered for you. This decision was from [facility] Quantity Limitation Coverage Policy. Pregabalin 75 MG capsules was denied. Please send new script to office for 150 mg. [address] [place] [address] called Cyril Cowart stated if Gabapentin is still needed along with Pregabalin,provider will need to provide document,why both is needed,only one will be filled if no document is received. Please follow up as need in a timely manner. Thanks

## 2023-09-21 NOTE — TELEPHONE ENCOUNTER
Pharmacy needs a new prescription sent in to [facility] for Lyrica 150mg - will not pay for 2 75 mg tabs when the 150mg tabs is what she needs - please send in 150mg to [facility] Farris LPN 9/97/1324 3:81 PM

## 2023-11-22 ENCOUNTER — HOSPITAL ENCOUNTER (EMERGENCY)
Facility: HOSPITAL | Age: 58
Discharge: HOME | End: 2023-11-22
Payer: MEDICARE

## 2023-11-22 ENCOUNTER — APPOINTMENT (OUTPATIENT)
Facility: HOSPITAL | Age: 58
End: 2023-11-22
Payer: MEDICARE

## 2023-11-22 VITALS
BODY MASS INDEX: 43.56 KG/M2 | SYSTOLIC BLOOD PRESSURE: 150 MMHG | WEIGHT: 253.75 LBS | DIASTOLIC BLOOD PRESSURE: 92 MMHG | RESPIRATION RATE: 18 BRPM | HEART RATE: 91 BPM | OXYGEN SATURATION: 99 % | TEMPERATURE: 98.2 F

## 2023-11-22 DIAGNOSIS — S39.012A STRAIN OF LUMBAR REGION, INITIAL ENCOUNTER: ICD-10-CM
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
## 2023-11-22 DIAGNOSIS — S09.90XA INJURY OF HEAD, INITIAL ENCOUNTER: ICD-10-CM

## 2023-11-22 DIAGNOSIS — S16.1XXA STRAIN OF NECK MUSCLE, INITIAL ENCOUNTER: Primary | ICD-10-CM

## 2023-11-22 DIAGNOSIS — M54.50 ACUTE BILATERAL LOW BACK PAIN, UNSPECIFIED WHETHER SCIATICA PRESENT: ICD-10-CM

## 2023-11-22 DIAGNOSIS — M54.2 NECK PAIN: ICD-10-CM

## 2023-11-22 PROCEDURE — 6370000000 HC RX 637 (ALT 250 FOR IP)

## 2023-11-22 PROCEDURE — 71250 CT THORAX DX C-: CPT

## 2023-11-22 PROCEDURE — 70450 CT HEAD/BRAIN W/O DYE: CPT

## 2023-11-22 PROCEDURE — 74176 CT ABD & PELVIS W/O CONTRAST: CPT

## 2023-11-22 PROCEDURE — 72125 CT NECK SPINE W/O DYE: CPT

## 2023-11-22 PROCEDURE — 99284 EMERGENCY DEPT VISIT MOD MDM: CPT

## 2023-11-22 RX ORDER — ACETAMINOPHEN 325 MG/1
650 TABLET ORAL EVERY 4 HOURS PRN
Qty: 20 TABLET | Refills: 0 | Status: SHIPPED | OUTPATIENT
Start: 2023-11-22

## 2023-11-22 RX ORDER — OXYCODONE HYDROCHLORIDE AND ACETAMINOPHEN 5; 325 MG/1; MG/1
1 TABLET ORAL
Status: COMPLETED | OUTPATIENT
Start: 2023-11-22 | End: 2023-11-22

## 2023-11-22 RX ADMIN — OXYCODONE HYDROCHLORIDE AND ACETAMINOPHEN 1 TABLET: 5; 325 TABLET ORAL at 22:56

## 2023-11-22 ASSESSMENT — PAIN DESCRIPTION - ORIENTATION: ORIENTATION: ANTERIOR

## 2023-11-22 ASSESSMENT — PAIN SCALES - GENERAL: PAINLEVEL_OUTOF10: 10

## 2023-11-22 ASSESSMENT — PAIN DESCRIPTION - LOCATION: LOCATION: HEAD;SHOULDER

## 2023-11-22 ASSESSMENT — PAIN - FUNCTIONAL ASSESSMENT: PAIN_FUNCTIONAL_ASSESSMENT: 0-10

## 2023-11-23 NOTE — ED NOTES
Patient discharged from the ED by [facility]. Diagnosis, medications, precautions and follow-ups were reviewed with the patient/family. Questions were asked and answered prior to departure. Patient departed the ED via ambulation and was accompanied by self.      Tom Dunlap RN  11/22/23 [id number]

## 2023-12-11 DIAGNOSIS — G60.8 IDIOPATHIC SMALL AND LARGE FIBER SENSORY NEUROPATHY: ICD-10-CM

## 2023-12-11 RX ORDER — GABAPENTIN 400 MG/1
CAPSULE ORAL
Qty: 180 CAPSULE | Refills: 0 | OUTPATIENT
Start: 2023-12-11

## 2023-12-11 RX ORDER — GABAPENTIN 400 MG/1
800 CAPSULE ORAL 3 TIMES DAILY
Qty: 42 CAPSULE | Refills: 0 | Status: SHIPPED | OUTPATIENT
Start: 2023-12-11 | End: 2023-12-18

## 2023-12-11 NOTE — TELEPHONE ENCOUNTER
Refill Request: Pt calling   -Pt out of medication      gabapentin (NEURONTIN) 400 MG capsule       [address]  [address] [place] [address]  Phone: [phone]  Fax: [phone]     Thank You

## 2023-12-18 ENCOUNTER — TELEPHONE (OUTPATIENT)
Facility: CLINIC | Age: 58
End: 2023-12-18

## 2023-12-18 NOTE — TELEPHONE ENCOUNTER
[facility] only has the 10 in stock until after the new year when they will have the 15 back in stock. She needs dr edward to write he RX for 10 this time so she doesn't lose taking it like she is supposed to.    [facility]      Thankyou

## 2024-01-01 NOTE — DISCHARGE INSTRUCTIONS
Chest Pain: Care Instructions  Your Care Instructions  There are many things that can cause chest pain. Some are not serious and will get better on their own in a few days. But some kinds of chest pain need more testing and treatment. Your doctor may have recommended a follow-up visit in the next 8 to 12 hours. If you are not getting better, you may need more tests or treatment. Even though your doctor has released you, you still need to watch for any problems. The doctor carefully checked you, but sometimes problems can develop later. If you have new symptoms or if your symptoms do not get better, get medical care right away. If you have worse or different chest pain or pressure that lasts more than 5 minutes or you passed out (lost consciousness), call 911 or seek other emergency help right away. A medical visit is only one step in your treatment. Even if you feel better, you still need to do what your doctor recommends, such as going to all suggested follow-up appointments and taking medicines exactly as directed. This will help you recover and help prevent future problems. How can you care for yourself at home? · Rest until you feel better. · Take your medicine exactly as prescribed. Call your doctor if you think you are having a problem with your medicine. · Do not drive after taking a prescription pain medicine. When should you call for help? Call 911 if:  · You passed out (lost consciousness). · You have severe difficulty breathing. · You have symptoms of a heart attack. These may include:  ¨ Chest pain or pressure, or a strange feeling in your chest.  ¨ Sweating. ¨ Shortness of breath. ¨ Nausea or vomiting. ¨ Pain, pressure, or a strange feeling in your back, neck, jaw, or upper belly or in one or both shoulders or arms. ¨ Lightheadedness or sudden weakness. ¨ A fast or irregular heartbeat.   After you call 911, the  may tell you to chew 1 adult-strength or 2 to 4 low-dose aspirin. Wait for an ambulance. Do not try to drive yourself. Call your doctor today if:  · You have any trouble breathing. · Your chest pain gets worse. · You are dizzy or lightheaded, or you feel like you may faint. · You are not getting better as expected. · You are having new or different chest pain. Where can you learn more? Go to http://lizbeth-efraín.info/. Enter A120 in the search box to learn more about \"Chest Pain: Care Instructions. \"  Current as of: March 20, 2017  Content Version: 11.3  © 1708-0508 [email or web address]. Care instructions adapted under license by [employer] (which disclaims liability or warranty for this information). If you have questions about a medical condition or this instruction, always ask your healthcare professional. [address] any warranty or liability for your use of this information. Heart Failure: Care Instructions  Your Care Instructions    Heart failure occurs when your heart does not pump as much blood as the body needs. Failure does not mean that the heart has stopped pumping but rather that it is not pumping as well as it should. Over time, this causes fluid buildup in your lungs and other parts of your body. Fluid buildup can cause shortness of breath, fatigue, swollen ankles, and other problems. By taking medicines regularly, reducing sodium (salt) in your diet, checking your weight every day, and making lifestyle changes, you can feel better and live longer. Follow-up care is a key part of your treatment and safety. Be sure to make and go to all appointments, and call your doctor if you are having problems. It's also a good idea to know your test results and keep a list of the medicines you take. How can you care for yourself at home? Medicines  · Be safe with medicines. Take your medicines exactly as prescribed. Call your doctor if you think you are having a problem with your medicine.   · Do not take any vitamins, over-the-counter medicine, or herbal products without talking to your doctor first. Pretty Camara not take ibuprofen (Advil or Motrin) and naproxen (Aleve) without talking to your doctor first. They could make your heart failure worse. · You may be taking some of the following medicine. ¨ Beta-blockers can slow heart rate, decrease blood pressure, and improve your condition. Taking a beta-blocker may lower your chance of needing to be hospitalized. ¨ Angiotensin-converting enzyme inhibitors (ACEIs) reduce the heart's workload, lower blood pressure, and reduce swelling. Taking an ACEI may lower your chance of needing to be hospitalized again. ¨ Angiotensin II receptor blockers (ARBs) work like ACEIs. Your doctor may prescribe them instead of ACEIs. ¨ Diuretics, also called water pills, reduce swelling. ¨ Potassium supplements replace this important mineral, which is sometimes lost with diuretics. ¨ Aspirin and other blood thinners prevent blood clots, which can cause a stroke or heart attack. You will get more details on the specific medicines your doctor prescribes. Diet  · Your doctor may suggest that you limit sodium to 2,000 milligrams (mg) a day or less. That is less than 1 teaspoon of salt a day, including all the salt you eat in cooking or in packaged foods. People get most of their sodium from processed foods. Fast food and restaurant meals also tend to be very high in sodium. · Ask your doctor how much liquid you can drink each day. You may have to limit liquids. Weight  · Weigh yourself without clothing at the same time each day. Record your weight. Call your doctor if you have a sudden weight gain, such as more than 2 to 3 pounds in a day or 5 pounds in a week. (Your doctor may suggest a different range of weight gain.) A sudden weight gain may mean that your heart failure is getting worse. Activity level  · Start light exercise (if your doctor says it is okay).  Even if you can only do a small amount, exercise will help you get stronger, have more energy, and manage your weight and your stress. Walking is an easy way to get exercise. Start out by walking a little more than you did before. Bit by bit, increase the amount you walk. · When you exercise, watch for signs that your heart is working too hard. You are pushing yourself too hard if you cannot talk while you are exercising. If you become short of breath or dizzy or have chest pain, stop, sit down, and rest.  · If you feel \"wiped out\" the day after you exercise, walk slower or for a shorter distance until you can work up to a better pace. · Get enough rest at night. Sleeping with 1 or 2 pillows under your upper body and head may help you breathe easier. Lifestyle changes  · Do not smoke. Smoking can make a heart condition worse. If you need help quitting, talk to your doctor about stop-smoking programs and medicines. These can increase your chances of quitting for good. Quitting smoking may be the most important step you can take to protect your heart. · Limit alcohol to 2 drinks a day for men and 1 drink a day for women. Too much alcohol can cause health problems. · Avoid getting sick from colds and the flu. Get a pneumococcal vaccine shot. If you have had one before, ask your doctor whether you need another dose. Get a flu shot each year. If you must be around people with colds or the flu, wash your hands often. When should you call for help? Call 911 if you have symptoms of sudden heart failure such as:  · You have severe trouble breathing. · You cough up pink, foamy mucus. · You have a new irregular or rapid heartbeat. Call your doctor now or seek immediate medical care if:  · You have new or increased shortness of breath. · You are dizzy or lightheaded, or you feel like you may faint. · You have sudden weight gain, such as more than 2 to 3 pounds in a day or 5 pounds in a week.  (Your doctor may suggest a different range of weight gain.)  · You have increased swelling in your legs, ankles, or feet. · You are suddenly so tired or weak that you cannot do your usual activities. Watch closely for changes in your health, and be sure to contact your doctor if:  · You develop new symptoms. Where can you learn more? Go to http://lizbeth-efraín.info/. Enter J233 in the search box to learn more about \"Heart Failure: Care Instructions. \"  Current as of: April 3, 2017  Content Version: 11.3  © 3006-7793 [employer]. Care instructions adapted under license by [employer] (which disclaims liability or warranty for this information). If you have questions about a medical condition or this instruction, always ask your healthcare professional. [address] any warranty or liability for your use of this information. Gas and Bloating: Care Instructions  Your Care Instructions  Gas and bloating can be uncomfortable and embarrassing problems. All people pass gas, but some people produce more gas than others, sometimes enough to cause distress. It is normal to pass gas from 6 to 20 times per day. Excess gas usually is not caused by a serious health problem. Gas and bloating usually are caused by something you eat or drink, including some food supplements and medicines. Gas and bloating are usually harmless and go away without treatment. However, changing your diet can help end the problem. Some over-the-counter medicines can help prevent gas and relieve bloating. Follow-up care is a key part of your treatment and safety. Be sure to make and go to all appointments, and call your doctor if you are having problems. It's also a good idea to know your test results and keep a list of the medicines you take. How can you care for yourself at home? · Keep a food diary if you think a food gives you gas. Write down what you eat or drink. Also record when you get gas.  If you notice that a food seems to cause your gas each time, avoid it and see if the gas goes away. Examples of foods that cause gas include:  ¨ Fried and fatty foods. ¨ Beans. ¨ Vegetables such as artichokes, asparagus, broccoli, brussels sprouts, cabbage, cauliflower, cucumbers, green peppers, onions, peas, radishes, and raw potatoes. ¨ Fruits such as apricots, bananas, melons, peaches, pears, prunes, and raw apples. ¨ Wheat and wheat bran. · Soak dry beans in water overnight, then dump the water and cook the soaked beans in new water. This can help prevent gas and bloating. · If you have problems with lactose, avoid dairy products such as milk and cheese. · Try not to swallow air. Do not drink through a straw, gulp your food, or chew gum. · Take an over-the-counter medicine. Read and follow all instructions on the label. ¨ Food enzymes, such as Beano, can be added to gas-producing foods to prevent gas. ¨ Antacids, such as Maalox Anti-Gas and Mylanta Gas, can relieve bloating by making you burp. Be careful when you take over-the-counter antacid medicines. Many of these medicines have aspirin in them. Read the label to make sure that you are not taking more than the recommended dose. Too much aspirin can be harmful. ¨ Activated charcoal tablets, such as CharcoCaps, may decrease odor from gas you pass. ¨ If you have problems with lactose, you can take medicines such as Dairy Ease and Lactaid with dairy products to prevent gas and bloating. · Get some exercise regularly. When should you call for help? Call 911 anytime you think you may need emergency care. For example, call if:  · You have gas and signs of a heart attack, such as:  ¨ Chest pain or pressure. ¨ Sweating. ¨ Shortness of breath. ¨ Nausea or vomiting. ¨ Pain that spreads from the chest to the neck, jaw, or one or both shoulders or arms. ¨ Dizziness or lightheadedness. ¨ A fast or uneven pulse. After calling 911, chew 1 adult-strength aspirin.  Wait for an ambulance. Do not try to drive yourself. Call your doctor now or seek immediate medical care if:  · You have severe belly pain. · You have blood in your stool. Watch closely for changes in your health, and be sure to contact your doctor if:  · You have blood or pus in your urine. · Your urine is cloudy or smells bad. · You are burping and have trouble swallowing. · You feel bloated and have swelling in your belly. · You do not get better as expected. Where can you learn more? Go to http://lizbeth-efraín.info/. Enter T381 in the search box to learn more about \"Gas and Bloating: Care Instructions. \"  Current as of: March 20, 2017  Content Version: 11.3  © 5571-1485 [employer]. Care instructions adapted under license by [employer] (which disclaims liability or warranty for this information). If you have questions about a medical condition or this instruction, always ask your healthcare professional. [address] any warranty or liability for your use of this information. Name band;

## 2024-01-02 NOTE — TELEPHONE ENCOUNTER
Pt said that \"they have the 15's in stock now\" but she needs a new order for this to be filled. Please call in to [facility]

## 2024-01-04 ENCOUNTER — TELEPHONE (OUTPATIENT)
Facility: CLINIC | Age: 59
End: 2024-01-04

## 2024-01-05 ENCOUNTER — TELEPHONE (OUTPATIENT)
Facility: CLINIC | Age: 59
End: 2024-01-05

## 2024-01-05 NOTE — TELEPHONE ENCOUNTER
Patient said she needs the refill for the Tirzepatide (MOUNJARO) but she needs to go back to the 15MG. They have that back in stock!     gabapentin (NEURONTIN) 400 MG capsule    Please send this to [facility]; [facility]

## 2024-01-08 DIAGNOSIS — G60.8 IDIOPATHIC SMALL AND LARGE FIBER SENSORY NEUROPATHY: ICD-10-CM

## 2024-01-09 NOTE — TELEPHONE ENCOUNTER
Pt calling again about her meds including gabapentin, please call pt today, she is out of medicine

## 2024-01-11 DIAGNOSIS — G60.8 IDIOPATHIC SMALL AND LARGE FIBER SENSORY NEUROPATHY: ICD-10-CM

## 2024-01-11 RX ORDER — GABAPENTIN 400 MG/1
800 CAPSULE ORAL 3 TIMES DAILY
Qty: 42 CAPSULE | Refills: 0 | Status: SHIPPED | OUTPATIENT
Start: 2024-01-11 | End: 2024-01-18

## 2024-01-15 ENCOUNTER — TELEPHONE (OUTPATIENT)
Facility: CLINIC | Age: 59
End: 2024-01-15

## 2024-01-15 NOTE — TELEPHONE ENCOUNTER
Pt says she needs \"my fluid pills that start with a T\" I don't see any meds for fluid with a letter. She would like this called in to [facility]. Pt says she has run out and doesn't want her fluid to build up, I told her for future reference to call us when she has 3-4 days worth of meds because this will take at least 48 hours to be taken care of      bed rails

## 2024-01-19 NOTE — TELEPHONE ENCOUNTER
Pt called to get update on medication refill, she was told that Dr Whaley would not call in any med until she is seen. Pt said she only has 8 pills and is going to run out before 2/1 so I scheduled her as a work in on Tues 1/23. Pt knows that she needs to be seen in order to get refills

## 2024-01-23 ENCOUNTER — OFFICE VISIT (OUTPATIENT)
Facility: CLINIC | Age: 59
End: 2024-01-23
Payer: MEDICARE

## 2024-01-23 VITALS
RESPIRATION RATE: 16 BRPM | WEIGHT: 250 LBS | TEMPERATURE: 97.8 F | DIASTOLIC BLOOD PRESSURE: 62 MMHG | HEIGHT: 64 IN | BODY MASS INDEX: 42.68 KG/M2 | HEART RATE: 83 BPM | OXYGEN SATURATION: 97 % | SYSTOLIC BLOOD PRESSURE: 102 MMHG

## 2024-01-23 DIAGNOSIS — E66.01 OBESITY, CLASS III, BMI 40-49.9 (MORBID OBESITY) (HCC): ICD-10-CM

## 2024-01-23 DIAGNOSIS — N18.32 STAGE 3B CHRONIC KIDNEY DISEASE (HCC): ICD-10-CM

## 2024-01-23 DIAGNOSIS — Z12.31 SCREENING MAMMOGRAM FOR BREAST CANCER: ICD-10-CM

## 2024-01-23 DIAGNOSIS — Z11.59 ENCOUNTER FOR HCV SCREENING TEST FOR LOW RISK PATIENT: ICD-10-CM

## 2024-01-23 DIAGNOSIS — E11.21 TYPE 2 DIABETES MELLITUS WITH NEPHROPATHY (HCC): ICD-10-CM

## 2024-01-23 DIAGNOSIS — Z00.00 MEDICARE ANNUAL WELLNESS VISIT, SUBSEQUENT: Primary | ICD-10-CM

## 2024-01-23 DIAGNOSIS — E11.65 TYPE 2 DIABETES MELLITUS WITH HYPERGLYCEMIA, WITHOUT LONG-TERM CURRENT USE OF INSULIN (HCC): ICD-10-CM

## 2024-01-23 DIAGNOSIS — I10 PRIMARY HYPERTENSION: ICD-10-CM

## 2024-01-23 DIAGNOSIS — N18.30 STAGE 3 CHRONIC KIDNEY DISEASE, UNSPECIFIED WHETHER STAGE 3A OR 3B CKD (HCC): ICD-10-CM

## 2024-01-23 DIAGNOSIS — E78.2 MIXED HYPERLIPIDEMIA: ICD-10-CM

## 2024-01-23 DIAGNOSIS — I10 ESSENTIAL (PRIMARY) HYPERTENSION: ICD-10-CM

## 2024-01-23 DIAGNOSIS — D68.9 COAGULATION DEFECT (HCC): ICD-10-CM

## 2024-01-23 DIAGNOSIS — G60.8 IDIOPATHIC SMALL AND LARGE FIBER SENSORY NEUROPATHY: ICD-10-CM

## 2024-01-23 DIAGNOSIS — G62.9 NEUROPATHY: ICD-10-CM

## 2024-01-23 PROCEDURE — 3074F SYST BP LT 130 MM HG: CPT | Performed by: FAMILY MEDICINE

## 2024-01-23 PROCEDURE — G0439 PPPS, SUBSEQ VISIT: HCPCS | Performed by: FAMILY MEDICINE

## 2024-01-23 PROCEDURE — 3046F HEMOGLOBIN A1C LEVEL >9.0%: CPT | Performed by: FAMILY MEDICINE

## 2024-01-23 PROCEDURE — 3078F DIAST BP <80 MM HG: CPT | Performed by: FAMILY MEDICINE

## 2024-01-23 PROCEDURE — G8417 CALC BMI ABV UP PARAM F/U: HCPCS | Performed by: FAMILY MEDICINE

## 2024-01-23 PROCEDURE — 3017F COLORECTAL CA SCREEN DOC REV: CPT | Performed by: FAMILY MEDICINE

## 2024-01-23 PROCEDURE — G8427 DOCREV CUR MEDS BY ELIG CLIN: HCPCS | Performed by: FAMILY MEDICINE

## 2024-01-23 PROCEDURE — 2022F DILAT RTA XM EVC RTNOPTHY: CPT | Performed by: FAMILY MEDICINE

## 2024-01-23 PROCEDURE — 1036F TOBACCO NON-USER: CPT | Performed by: FAMILY MEDICINE

## 2024-01-23 PROCEDURE — 99214 OFFICE O/P EST MOD 30 MIN: CPT | Performed by: FAMILY MEDICINE

## 2024-01-23 PROCEDURE — G8484 FLU IMMUNIZE NO ADMIN: HCPCS | Performed by: FAMILY MEDICINE

## 2024-01-23 RX ORDER — GABAPENTIN 400 MG/1
800 CAPSULE ORAL 3 TIMES DAILY
Qty: 180 CAPSULE | Refills: 1 | Status: SHIPPED | OUTPATIENT
Start: 2024-01-23 | End: 2024-03-23

## 2024-01-23 RX ORDER — ATORVASTATIN CALCIUM 40 MG/1
40 TABLET, FILM COATED ORAL DAILY
Qty: 180 TABLET | Refills: 1 | Status: SHIPPED | OUTPATIENT
Start: 2024-01-23

## 2024-01-23 RX ORDER — HYDRALAZINE HYDROCHLORIDE 25 MG/1
25 TABLET, FILM COATED ORAL 2 TIMES DAILY
Qty: 180 TABLET | Refills: 1 | Status: SHIPPED | OUTPATIENT
Start: 2024-01-23

## 2024-01-23 RX ORDER — CARVEDILOL 12.5 MG/1
12.5 TABLET ORAL 2 TIMES DAILY
Qty: 180 TABLET | Refills: 1 | Status: SHIPPED | OUTPATIENT
Start: 2024-01-23

## 2024-01-23 RX ORDER — TIRZEPATIDE 10 MG/.5ML
10 INJECTION, SOLUTION SUBCUTANEOUS WEEKLY
Qty: 4 ADJUSTABLE DOSE PRE-FILLED PEN SYRINGE | Refills: 5 | Status: SHIPPED | OUTPATIENT
Start: 2024-01-23

## 2024-01-23 RX ORDER — LISINOPRIL 10 MG/1
10 TABLET ORAL DAILY
Qty: 90 TABLET | Refills: 1 | Status: SHIPPED | OUTPATIENT
Start: 2024-01-23

## 2024-01-23 RX ORDER — METOLAZONE 2.5 MG/1
5 TABLET ORAL DAILY
Qty: 180 TABLET | Refills: 1 | Status: SHIPPED | OUTPATIENT
Start: 2024-01-23

## 2024-01-23 RX ORDER — ISOSORBIDE DINITRATE 20 MG/1
20 TABLET ORAL 2 TIMES DAILY
Qty: 180 TABLET | Refills: 1 | Status: SHIPPED | OUTPATIENT
Start: 2024-01-23

## 2024-01-23 NOTE — PROGRESS NOTES
Chief Complaint   Patient presents with    Diabetes     Med refill       Patient has not been out of the country in (14 months), NO diarrhea, NO cough, NO chest conjestion, NO temp.  Pt has not been around anyone with these symptoms.     Health Maintenance reviewed.    I have reviewed the patient's medical history in detail and updated the computerized patient record.    \"Have you been to the ER, urgent care clinic since your last visit?  Yes Hospitalized since your last visit?\"    no    “Have you seen or consulted any other health care providers outside of [facility] since your last visit?”    no       Have you had a mammogram?”   no     “Have you had a pap smear?”    no                                      
    Dispense:  180 tablet     Refill:  1    Tirzepatide (MOUNJARO) 10 MG/0.5ML SOPN SC injection     Sig: Inject 0.5 mLs into the skin once a week     Dispense:  4 Adjustable Dose Pre-filled Pen Syringe     Refill:  5    vitamin D (CHOLECALCIFEROL) 25 MCG (1000 UT) TABS tablet     Sig: Take 1 tablet by mouth daily     Dispense:  90 tablet     Refill:  1         I have discussed the diagnosis with the patient and the intended plan as seen in the above orders. The patient understands and agrees with the plan. The patient has received an after-visit summary and questions were answered concerning future plans.     Current Outpatient Medications   Medication Sig Dispense Refill    gabapentin (NEURONTIN) 400 MG capsule Take 2 capsules by mouth 3 times daily for 60 days. Max Daily Amount: 2,400 mg 180 capsule 1    atorvastatin (LIPITOR) 40 MG tablet Take 1 tablet by mouth daily 180 tablet 1    carvedilol (COREG) 12.5 MG tablet Take 1 tablet by mouth 2 times daily 180 tablet 1    hydrALAZINE (APRESOLINE) 25 MG tablet Take 1 tablet by mouth 2 times daily 180 tablet 1    isosorbide dinitrate (ISORDIL) 20 MG tablet Take 1 tablet by mouth 2 times daily 180 tablet 1    lisinopril (PRINIVIL;ZESTRIL) 10 MG tablet Take 1 tablet by mouth daily 90 tablet 1    metFORMIN (GLUCOPHAGE) 500 MG tablet Take 1 tablet by mouth 2 times daily (with meals) 180 tablet 1    metOLazone (ZAROXOLYN) 2.5 MG tablet Take 2 tablets by mouth daily 180 tablet 1    Tirzepatide (MOUNJARO) 10 MG/0.5ML SOPN SC injection Inject 0.5 mLs into the skin once a week 4 Adjustable Dose Pre-filled Pen Syringe 5    vitamin D (CHOLECALCIFEROL) 25 MCG (1000 UT) TABS tablet Take 1 tablet by mouth daily 90 tablet 1    acetaminophen (TYLENOL) 325 MG tablet Take 2 tablets by mouth every 4 hours as needed for Pain 20 tablet 0    diclofenac sodium (VOLTAREN) 1 % GEL Apply 4 g topically 4 times daily Left knee 100 g 1    cyanocobalamin 500 MCG tablet Take 1 tablet by mouth daily

## 2024-02-02 NOTE — TELEPHONE ENCOUNTER
----- Message from Oseas Hall sent at 9/15/2021  2:36 PM EDT -----  Regarding: dr lechuga/ yoli  General Message/Vendor Calls    Caller's first and last name: pt      Reason for call: to check on the status for the yeast infection pill      Callback required yes/no and why: yes      Best contact number(s): [phone]       Details to clarify the request:      Oseas Hall normal

## 2024-02-05 ENCOUNTER — TELEPHONE (OUTPATIENT)
Facility: CLINIC | Age: 59
End: 2024-02-05

## 2024-02-05 NOTE — TELEPHONE ENCOUNTER
Patient refill request; [facility]    Tirzepatide (MOUNJARO) 15 MG/0.5ML SOPN SC injection      Please send in the 15MG please, they have that back in stock.      Thankyou

## 2024-02-06 DIAGNOSIS — E11.65 TYPE 2 DIABETES MELLITUS WITH HYPERGLYCEMIA, WITHOUT LONG-TERM CURRENT USE OF INSULIN (HCC): Primary | ICD-10-CM

## 2024-02-06 RX ORDER — TIRZEPATIDE 15 MG/.5ML
15 INJECTION, SOLUTION SUBCUTANEOUS WEEKLY
Qty: 4 ADJUSTABLE DOSE PRE-FILLED PEN SYRINGE | Refills: 5 | Status: SHIPPED | OUTPATIENT
Start: 2024-02-06

## 2024-02-16 ENCOUNTER — TELEPHONE (OUTPATIENT)
Facility: CLINIC | Age: 59
End: 2024-02-16

## 2024-02-23 LAB
BUN SERPL-MCNC: 31 MG/DL (ref 6–24)
BUN/CREAT SERPL: 11 (ref 9–23)
CALCIUM SERPL-MCNC: 9.1 MG/DL (ref 8.7–10.2)
CHLORIDE SERPL-SCNC: 107 MMOL/L (ref 96–106)
CHOLEST SERPL-MCNC: 136 MG/DL (ref 100–199)
CO2 SERPL-SCNC: 22 MMOL/L (ref 20–29)
CREAT SERPL-MCNC: 2.8 MG/DL (ref 0.57–1)
EGFRCR SERPLBLD CKD-EPI 2021: 19 ML/MIN/1.73
GLUCOSE SERPL-MCNC: 160 MG/DL (ref 70–99)
HBA1C MFR BLD: 6.4 % (ref 4.8–5.6)
HDLC SERPL-MCNC: 53 MG/DL
HIV 1+2 AB+HIV1 P24 AG SERPL QL IA: NON REACTIVE
IMP & REVIEW OF LAB RESULTS: NORMAL
LDLC SERPL CALC-MCNC: 67 MG/DL (ref 0–99)
Lab: NORMAL
POTASSIUM SERPL-SCNC: 4.4 MMOL/L (ref 3.5–5.2)
REPORT: NORMAL
REPORT: NORMAL
SODIUM SERPL-SCNC: 147 MMOL/L (ref 134–144)
SPECIMEN STATUS REPORT: NORMAL
TRIGL SERPL-MCNC: 85 MG/DL (ref 0–149)
VLDLC SERPL CALC-MCNC: 16 MG/DL (ref 5–40)

## 2024-02-26 ENCOUNTER — TELEPHONE (OUTPATIENT)
Facility: CLINIC | Age: 59
End: 2024-02-26

## 2024-02-26 NOTE — TELEPHONE ENCOUNTER
Dr. Jose Luis Zhong's nurse called asking to have pt's last labs faxed to them [phone] this was done with confirmation.

## 2024-03-11 ENCOUNTER — TELEPHONE (OUTPATIENT)
Facility: CLINIC | Age: 59
End: 2024-03-11

## 2024-03-11 DIAGNOSIS — E11.65 TYPE 2 DIABETES MELLITUS WITH HYPERGLYCEMIA, WITHOUT LONG-TERM CURRENT USE OF INSULIN (HCC): ICD-10-CM

## 2024-03-11 RX ORDER — TIRZEPATIDE 12.5 MG/.5ML
12.5 INJECTION, SOLUTION SUBCUTANEOUS WEEKLY
Qty: 2 ML | Refills: 3 | Status: SHIPPED | OUTPATIENT
Start: 2024-03-11

## 2024-03-11 NOTE — TELEPHONE ENCOUNTER
Pt calling to get Mounjaro 12 called in to [facility]. They do not have 15 like she normally does but was told to call us and ask for 12, she has missed her shot for yesterday so she needs this called in as quickly as possible.

## 2024-03-26 ENCOUNTER — OFFICE VISIT (OUTPATIENT)
Facility: CLINIC | Age: 59
End: 2024-03-26
Payer: MEDICARE

## 2024-03-26 VITALS
WEIGHT: 253 LBS | OXYGEN SATURATION: 96 % | RESPIRATION RATE: 18 BRPM | HEIGHT: 60 IN | SYSTOLIC BLOOD PRESSURE: 112 MMHG | DIASTOLIC BLOOD PRESSURE: 70 MMHG | TEMPERATURE: 98 F | BODY MASS INDEX: 49.67 KG/M2 | HEART RATE: 94 BPM

## 2024-03-26 DIAGNOSIS — M54.30 SCIATICA, UNSPECIFIED LATERALITY: ICD-10-CM

## 2024-03-26 DIAGNOSIS — E11.65 TYPE 2 DIABETES MELLITUS WITH HYPERGLYCEMIA, WITHOUT LONG-TERM CURRENT USE OF INSULIN (HCC): Primary | ICD-10-CM

## 2024-03-26 DIAGNOSIS — N18.30 STAGE 3 CHRONIC KIDNEY DISEASE, UNSPECIFIED WHETHER STAGE 3A OR 3B CKD (HCC): ICD-10-CM

## 2024-03-26 DIAGNOSIS — E11.21 TYPE 2 DIABETES MELLITUS WITH NEPHROPATHY (HCC): ICD-10-CM

## 2024-03-26 DIAGNOSIS — I10 PRIMARY HYPERTENSION: ICD-10-CM

## 2024-03-26 DIAGNOSIS — Z86.79 HISTORY OF CONGESTIVE HEART FAILURE: ICD-10-CM

## 2024-03-26 DIAGNOSIS — G60.8 IDIOPATHIC SMALL AND LARGE FIBER SENSORY NEUROPATHY: ICD-10-CM

## 2024-03-26 PROCEDURE — 3078F DIAST BP <80 MM HG: CPT | Performed by: FAMILY MEDICINE

## 2024-03-26 PROCEDURE — G8417 CALC BMI ABV UP PARAM F/U: HCPCS | Performed by: FAMILY MEDICINE

## 2024-03-26 PROCEDURE — G8484 FLU IMMUNIZE NO ADMIN: HCPCS | Performed by: FAMILY MEDICINE

## 2024-03-26 PROCEDURE — 1036F TOBACCO NON-USER: CPT | Performed by: FAMILY MEDICINE

## 2024-03-26 PROCEDURE — G8427 DOCREV CUR MEDS BY ELIG CLIN: HCPCS | Performed by: FAMILY MEDICINE

## 2024-03-26 PROCEDURE — 2022F DILAT RTA XM EVC RTNOPTHY: CPT | Performed by: FAMILY MEDICINE

## 2024-03-26 PROCEDURE — 3074F SYST BP LT 130 MM HG: CPT | Performed by: FAMILY MEDICINE

## 2024-03-26 PROCEDURE — G2211 COMPLEX E/M VISIT ADD ON: HCPCS | Performed by: FAMILY MEDICINE

## 2024-03-26 PROCEDURE — 3017F COLORECTAL CA SCREEN DOC REV: CPT | Performed by: FAMILY MEDICINE

## 2024-03-26 PROCEDURE — 99214 OFFICE O/P EST MOD 30 MIN: CPT | Performed by: FAMILY MEDICINE

## 2024-03-26 PROCEDURE — 3044F HG A1C LEVEL LT 7.0%: CPT | Performed by: FAMILY MEDICINE

## 2024-03-26 RX ORDER — METHYLNALTREXONE BROMIDE 150 MG/1
TABLET ORAL
COMMUNITY

## 2024-03-26 RX ORDER — GABAPENTIN 400 MG/1
800 CAPSULE ORAL 3 TIMES DAILY
Qty: 180 CAPSULE | Refills: 1 | Status: SHIPPED | OUTPATIENT
Start: 2024-03-26 | End: 2024-05-25

## 2024-03-26 RX ORDER — OXYCODONE HYDROCHLORIDE AND ACETAMINOPHEN 5; 325 MG/1; MG/1
1 TABLET ORAL EVERY 4 HOURS PRN
COMMUNITY

## 2024-03-26 ASSESSMENT — PATIENT HEALTH QUESTIONNAIRE - PHQ9
SUM OF ALL RESPONSES TO PHQ QUESTIONS 1-9: 2
2. FEELING DOWN, DEPRESSED OR HOPELESS: SEVERAL DAYS
SUM OF ALL RESPONSES TO PHQ QUESTIONS 1-9: 2
SUM OF ALL RESPONSES TO PHQ9 QUESTIONS 1 & 2: 2
SUM OF ALL RESPONSES TO PHQ QUESTIONS 1-9: 2
SUM OF ALL RESPONSES TO PHQ QUESTIONS 1-9: 2
1. LITTLE INTEREST OR PLEASURE IN DOING THINGS: SEVERAL DAYS

## 2024-03-26 NOTE — PROGRESS NOTES
Subjective:     Laurita Galvan is a 58 y.o. female seen for follow-up of diabetes.     She has had hypoglycemic attacks. .no  Blood sugar control has been ok    Hemoglobin A1C   Date Value Ref Range Status   02/22/2024 6.4 (H) 4.8 - 5.6 % Final     Comment:                 Prediabetes: 5.7 - 6.4           Diabetes: >6.4           Glycemic control for adults with diabetes: <7.0       Lab Results   Component Value Date/Time     02/22/2024 02:22 PM    K 4.4 02/22/2024 02:22 PM     02/22/2024 02:22 PM    CO2 22 02/22/2024 02:22 PM    BUN 31 02/22/2024 02:22 PM    CREATININE 2.80 02/22/2024 02:22 PM    GLUCOSE 160 02/22/2024 02:22 PM    CALCIUM 9.1 02/22/2024 02:22 PM    LABGLOM 19 02/22/2024 02:22 PM          She has diabetes, hypertension, and hyperlipidemia.    Laurita Galvan has the additional concern of needs anoth PM, current one is retiring/leaving.  Takes Percocet and Lyrica for back and knee pains.  They are working well.  Discussed how we do not prescribe narcotics for chronic pain.  She is requesting referral.    Reports taking blood pressure medications without side affects. No complaints of exertional chest pain, excessive shortness of breath or focal weakness. Minimal swelling in lower legs or dizziness with standing.      Diet and Lifestyle:  no tob .    Patient Active Problem List    Diagnosis Date Noted    Coagulation defect ([facility]) 08/16/2023    History of congestive heart failure 08/14/2022    Chronic renal disease, stage III ([facility]) 08/12/2022    BMI 45.0-49.9, adult ([facility]) 08/12/2022    Bilateral cataracts 09/08/2020    Hyperglycemia due to type 2 diabetes mellitus ([facility]) 03/04/2020    Type 2 diabetes mellitus with nephropathy ([facility]) 12/18/2017    Lumbar back pain with radiculopathy affecting right lower extremity 12/08/2016    Spinal stenosis of lumbosacral region 12/08/2016    Idiopathic small and large fiber sensory neuropathy 11/16/2016    Hypertension 11/07/2012     Allergies   Allergen

## 2024-03-26 NOTE — PROGRESS NOTES
Chief Complaint   Patient presents with    Referral - General     Referral for pain management    Finger Pain       Patient has not been out of the country in (14 months), NO diarrhea, NO cough, NO chest conjestion, NO temp.  Pt has not been around anyone with these symptoms.     Health Maintenance reviewed.    I have reviewed the patient's medical history in detail and updated the computerized patient record  no    “Have you seen or consulted any other health care providers outside of [facility] since your last visit?”    no       Have you had a mammogram?”   no    Date of last Mammogram: 11/23/2021      “Have you had a pap smear?”    no    Date of last Cervical Cancer screen (HPV or PAP): 9/3/2014

## 2024-04-09 ENCOUNTER — OFFICE VISIT (OUTPATIENT)
Facility: CLINIC | Age: 59
End: 2024-04-09

## 2024-04-09 VITALS
RESPIRATION RATE: 16 BRPM | WEIGHT: 252 LBS | DIASTOLIC BLOOD PRESSURE: 73 MMHG | TEMPERATURE: 98.4 F | OXYGEN SATURATION: 95 % | HEIGHT: 64 IN | HEART RATE: 87 BPM | BODY MASS INDEX: 43.02 KG/M2 | SYSTOLIC BLOOD PRESSURE: 130 MMHG

## 2024-04-09 DIAGNOSIS — E11.65 TYPE 2 DIABETES MELLITUS WITH HYPERGLYCEMIA, WITHOUT LONG-TERM CURRENT USE OF INSULIN (HCC): ICD-10-CM

## 2024-04-09 DIAGNOSIS — M65.332 TRIGGER MIDDLE FINGER OF LEFT HAND: Primary | ICD-10-CM

## 2024-04-09 RX ORDER — TIRZEPATIDE 12.5 MG/.5ML
12.5 INJECTION, SOLUTION SUBCUTANEOUS WEEKLY
Qty: 2 ML | Refills: 3 | Status: SHIPPED | OUTPATIENT
Start: 2024-04-09

## 2024-04-09 RX ORDER — TRIAMCINOLONE ACETONIDE 40 MG/ML
40 INJECTION, SUSPENSION INTRA-ARTICULAR; INTRAMUSCULAR ONCE
Status: COMPLETED | OUTPATIENT
Start: 2024-04-09 | End: 2024-04-10

## 2024-04-09 NOTE — PROGRESS NOTES
Chief Complaint   Patient presents with    Finger Pain     L/middle finger pain      Patient has not been out of the country in (14 months), NO diarrhea, NO cough, NO chest conjestion, NO temp.  Pt has not been around anyone with these symptoms.     Health Maintenance reviewed.    I have reviewed the patient's medical history in detail and updated the computerized patient record.    \"Have you been to the ER, urgent care clinic since your last visit? No  Hospitalized since your last visit?\"    no    “Have you seen or consulted any other health care providers outside of [facility] since your last visit?”    no       Have you had a mammogram?”   no    Date of last Mammogram: 11/23/2021      “Have you had a pap smear?”    no    Date of last Cervical Cancer screen (HPV or PAP): 9/3/2014     [unfilled]  OFFICE PROCEDURE PROGRESS NOTE        Chart reviewed for the following:   IGertrudis LPN, have reviewed the History, Physical and updated the Allergic reactions for Laurita Galvan     TIME OUT performed immediately prior to start of procedure:   I, Lucio Whaley MD have performed the following reviews on Laurita Galvan prior to the start of the procedure:            * Patient was identified by name and date of birth   * Agreement on procedure being performed was verified  * Risks and Benefits explained to the patient by Lucio Whaley MD  * Procedure site verified and marked as necessary  * Patient was positioned for comfort  * Consent was signed and verified     Time: 4:00 PM      Date of procedure:    Procedure performed by:  Lucio Whaley MD    Provider assisted by: Gertrudis Tidwell LPN    Patient assisted by:  Gertrudis Tidwell LPN    How tolerated by patient: Well    Post Procedural Pain Scale: 6/10    Comments: None    Dr. Whaley did the procedure                                       
mouth daily 180 tablet 1    carvedilol (COREG) 12.5 MG tablet Take 1 tablet by mouth 2 times daily 180 tablet 1    hydrALAZINE (APRESOLINE) 25 MG tablet Take 1 tablet by mouth 2 times daily 180 tablet 1    isosorbide dinitrate (ISORDIL) 20 MG tablet Take 1 tablet by mouth 2 times daily 180 tablet 1    metOLazone (ZAROXOLYN) 2.5 MG tablet Take 2 tablets by mouth daily 180 tablet 1    vitamin D (CHOLECALCIFEROL) 25 MCG (1000 UT) TABS tablet Take 1 tablet by mouth daily 90 tablet 1    acetaminophen (TYLENOL) 325 MG tablet Take 2 tablets by mouth every 4 hours as needed for Pain 20 tablet 0    diclofenac sodium (VOLTAREN) 1 % GEL Apply 4 g topically 4 times daily Left knee 100 g 1    cyanocobalamin 500 MCG tablet Take 1 tablet by mouth daily 90 tablet 1    aspirin 81 MG EC tablet Take by mouth daily      lidocaine (LIDODERM) 5 % as needed       Current Facility-Administered Medications   Medication Dose Route Frequency Provider Last Rate Last Admin    triamcinolone acetonide (KENALOG-40) injection 40 mg  40 mg IntraMUSCular Once Lucio Whaley MD         Allergies   Allergen Reactions    Hydrocodone-Acetaminophen Nausea And Vomiting     Past Medical History:   Diagnosis Date    AICD (automatic cardioverter/defibrillator) present     Anemia     CAD (coronary artery disease)     Cardiomyopathy (HCC)     CHF (congestive heart failure) (HCC)     Chronic pain     CKD (chronic kidney disease), stage IV (HCC)     per Nephro on 8/28/23    Cyst in hand 2014    RTH    Diabetes mellitus type 2 in obese (HCC)     on PO & insulin antiDM Rx's    Diabetic neuropathy (HCC)     HLD (hyperlipidemia)     HTN (hypertension)     Migraine     Morbid obesity with BMI of 40.0-44.9, adult (HCC)     OA (osteoarthritis) of hip     Left    RAD (reactive airway disease)      Social History     Tobacco Use    Smoking status: Never    Smokeless tobacco: Never   Substance Use Topics    Alcohol use: Never        OBJECTIVE:    /73 (Site: Left

## 2024-04-10 RX ADMIN — TRIAMCINOLONE ACETONIDE 40 MG: 40 INJECTION, SUSPENSION INTRA-ARTICULAR; INTRAMUSCULAR at 08:33

## 2024-04-12 ENCOUNTER — TELEPHONE (OUTPATIENT)
Facility: CLINIC | Age: 59
End: 2024-04-12

## 2024-04-12 NOTE — TELEPHONE ENCOUNTER
Pt requesting xrays and most recent office note for pain management purposes. Please fax this to [facility] at [phone]    Please keep copies so pt can pick these up with confirmation.

## 2024-04-16 NOTE — TELEPHONE ENCOUNTER
Faxed with confirmation to [phone] [facility] as requested by patient last OV x2 and XR   <--- Click to Launch ICDx for PreOp, PostOp and Procedure

## 2024-04-30 DIAGNOSIS — Z12.31 SCREENING MAMMOGRAM FOR BREAST CANCER: ICD-10-CM

## 2024-05-27 PROBLEM — E11.65 TYPE 2 DIABETES MELLITUS WITH HYPERGLYCEMIA, WITH LONG-TERM CURRENT USE OF INSULIN (HCC): Status: ACTIVE | Noted: 2020-03-04

## 2024-05-27 PROBLEM — N18.30 CHRONIC RENAL DISEASE, STAGE III (HCC): Status: RESOLVED | Noted: 2022-08-12 | Resolved: 2024-05-27

## 2024-05-27 PROBLEM — Z79.4 TYPE 2 DIABETES MELLITUS WITH HYPERGLYCEMIA, WITH LONG-TERM CURRENT USE OF INSULIN (HCC): Status: ACTIVE | Noted: 2020-03-04

## 2024-05-27 PROBLEM — N18.4 CHRONIC RENAL DISEASE, STAGE 4, SEVERELY DECREASED GLOMERULAR FILTRATION RATE (GFR) BETWEEN 15-29 ML/MIN/1.73 SQUARE METER (HCC): Status: ACTIVE | Noted: 2021-01-27

## 2024-05-27 NOTE — PROGRESS NOTES
tablet     Refill:  1       Current Outpatient Medications   Medication Sig Dispense Refill    MOUNJARO 15 MG/0.5ML SOPN SC injection Inject 0.5 mLs into the skin once a week 2 mL 5    Tirzepatide (MOUNJARO) 12.5 MG/0.5ML SOPN SC injection Inject 0.5 mLs into the skin once a week 2 mL 3    metOLazone (ZAROXOLYN) 2.5 MG tablet Take 2 tablets by mouth daily 180 tablet 1    isosorbide dinitrate (ISORDIL) 20 MG tablet Take 1 tablet by mouth 2 times daily 180 tablet 1    hydrALAZINE (APRESOLINE) 25 MG tablet Take 1 tablet by mouth 2 times daily 180 tablet 1    gabapentin (NEURONTIN) 400 MG capsule Take 2 capsules by mouth 3 times daily for 60 days. Max Daily Amount: 2,400 mg 180 capsule 1    cyanocobalamin 500 MCG tablet Take 1 tablet by mouth daily 90 tablet 1    carvedilol (COREG) 12.5 MG tablet Take 1 tablet by mouth 2 times daily 180 tablet 1    atorvastatin (LIPITOR) 40 MG tablet Take 1 tablet by mouth daily 180 tablet 1    Methylnaltrexone Bromide (RELISTOR) 150 MG TABS Take by mouth      oxyCODONE-acetaminophen (PERCOCET) 5-325 MG per tablet Take 1 tablet by mouth every 4 hours as needed for Pain.      vitamin D (CHOLECALCIFEROL) 25 MCG (1000 UT) TABS tablet Take 1 tablet by mouth daily 90 tablet 1    acetaminophen (TYLENOL) 325 MG tablet Take 2 tablets by mouth every 4 hours as needed for Pain 20 tablet 0    diclofenac sodium (VOLTAREN) 1 % GEL Apply 4 g topically 4 times daily Left knee 100 g 1    aspirin 81 MG EC tablet Take by mouth daily      lidocaine (LIDODERM) 5 % as needed       No current facility-administered medications for this visit.       Return in about 10 weeks (around 8/6/2024).      Subjective:     Laurita Galvan is a 58 y.o. female seen for follow-up of diabetes.     She has had hypoglycemic attacks. .no  Blood sugar control has been ok    Hemoglobin A1C   Date Value Ref Range Status   02/22/2024 6.4 (H) 4.8 - 5.6 % Final     Comment:                 Prediabetes: 5.7 - 6.4           Diabetes:

## 2024-05-28 ENCOUNTER — OFFICE VISIT (OUTPATIENT)
Facility: CLINIC | Age: 59
End: 2024-05-28
Payer: MEDICARE

## 2024-05-28 VITALS
OXYGEN SATURATION: 95 % | BODY MASS INDEX: 42.68 KG/M2 | HEIGHT: 64 IN | TEMPERATURE: 98.3 F | HEART RATE: 90 BPM | WEIGHT: 250 LBS | DIASTOLIC BLOOD PRESSURE: 68 MMHG | SYSTOLIC BLOOD PRESSURE: 109 MMHG | RESPIRATION RATE: 16 BRPM

## 2024-05-28 DIAGNOSIS — I10 PRIMARY HYPERTENSION: ICD-10-CM

## 2024-05-28 DIAGNOSIS — E11.21 TYPE 2 DIABETES MELLITUS WITH NEPHROPATHY (HCC): ICD-10-CM

## 2024-05-28 DIAGNOSIS — G60.8 IDIOPATHIC SMALL AND LARGE FIBER SENSORY NEUROPATHY: ICD-10-CM

## 2024-05-28 DIAGNOSIS — E78.2 MIXED HYPERLIPIDEMIA: ICD-10-CM

## 2024-05-28 DIAGNOSIS — M54.30 SCIATICA, UNSPECIFIED LATERALITY: ICD-10-CM

## 2024-05-28 DIAGNOSIS — N18.4 CHRONIC RENAL DISEASE, STAGE 4, SEVERELY DECREASED GLOMERULAR FILTRATION RATE (GFR) BETWEEN 15-29 ML/MIN/1.73 SQUARE METER (HCC): ICD-10-CM

## 2024-05-28 DIAGNOSIS — Z86.79 HISTORY OF CONGESTIVE HEART FAILURE: ICD-10-CM

## 2024-05-28 DIAGNOSIS — E11.65 TYPE 2 DIABETES MELLITUS WITH HYPERGLYCEMIA, WITHOUT LONG-TERM CURRENT USE OF INSULIN (HCC): Primary | ICD-10-CM

## 2024-05-28 DIAGNOSIS — D68.9 COAGULATION DEFECT (HCC): ICD-10-CM

## 2024-05-28 PROCEDURE — 1036F TOBACCO NON-USER: CPT | Performed by: FAMILY MEDICINE

## 2024-05-28 PROCEDURE — 2022F DILAT RTA XM EVC RTNOPTHY: CPT | Performed by: FAMILY MEDICINE

## 2024-05-28 PROCEDURE — G8427 DOCREV CUR MEDS BY ELIG CLIN: HCPCS | Performed by: FAMILY MEDICINE

## 2024-05-28 PROCEDURE — 3078F DIAST BP <80 MM HG: CPT | Performed by: FAMILY MEDICINE

## 2024-05-28 PROCEDURE — 3074F SYST BP LT 130 MM HG: CPT | Performed by: FAMILY MEDICINE

## 2024-05-28 PROCEDURE — G8417 CALC BMI ABV UP PARAM F/U: HCPCS | Performed by: FAMILY MEDICINE

## 2024-05-28 PROCEDURE — 3017F COLORECTAL CA SCREEN DOC REV: CPT | Performed by: FAMILY MEDICINE

## 2024-05-28 PROCEDURE — 3044F HG A1C LEVEL LT 7.0%: CPT | Performed by: FAMILY MEDICINE

## 2024-05-28 RX ORDER — TIRZEPATIDE 15 MG/.5ML
15 INJECTION, SOLUTION SUBCUTANEOUS WEEKLY
Qty: 2 ML | Refills: 5 | Status: SHIPPED | OUTPATIENT
Start: 2024-05-28

## 2024-05-28 RX ORDER — HYDRALAZINE HYDROCHLORIDE 25 MG/1
25 TABLET, FILM COATED ORAL 2 TIMES DAILY
Qty: 180 TABLET | Refills: 1 | Status: SHIPPED | OUTPATIENT
Start: 2024-05-28

## 2024-05-28 RX ORDER — ISOSORBIDE DINITRATE 20 MG/1
20 TABLET ORAL 2 TIMES DAILY
Qty: 180 TABLET | Refills: 1 | Status: SHIPPED | OUTPATIENT
Start: 2024-05-28

## 2024-05-28 RX ORDER — ATORVASTATIN CALCIUM 40 MG/1
40 TABLET, FILM COATED ORAL DAILY
Qty: 180 TABLET | Refills: 1 | Status: SHIPPED | OUTPATIENT
Start: 2024-05-28

## 2024-05-28 RX ORDER — GABAPENTIN 400 MG/1
800 CAPSULE ORAL 3 TIMES DAILY
Qty: 180 CAPSULE | Refills: 1 | Status: SHIPPED | OUTPATIENT
Start: 2024-05-28 | End: 2024-07-27

## 2024-05-28 RX ORDER — METOLAZONE 2.5 MG/1
5 TABLET ORAL DAILY
Qty: 180 TABLET | Refills: 1 | Status: SHIPPED | OUTPATIENT
Start: 2024-05-28

## 2024-05-28 RX ORDER — CARVEDILOL 12.5 MG/1
12.5 TABLET ORAL 2 TIMES DAILY
Qty: 180 TABLET | Refills: 1 | Status: SHIPPED | OUTPATIENT
Start: 2024-05-28

## 2024-05-28 RX ORDER — TIRZEPATIDE 12.5 MG/.5ML
12.5 INJECTION, SOLUTION SUBCUTANEOUS WEEKLY
Qty: 2 ML | Refills: 3 | Status: SHIPPED | OUTPATIENT
Start: 2024-05-28

## 2024-05-28 SDOH — ECONOMIC STABILITY: FOOD INSECURITY: WITHIN THE PAST 12 MONTHS, THE FOOD YOU BOUGHT JUST DIDN'T LAST AND YOU DIDN'T HAVE MONEY TO GET MORE.: NEVER TRUE

## 2024-05-28 SDOH — ECONOMIC STABILITY: FOOD INSECURITY: WITHIN THE PAST 12 MONTHS, YOU WORRIED THAT YOUR FOOD WOULD RUN OUT BEFORE YOU GOT MONEY TO BUY MORE.: NEVER TRUE

## 2024-05-28 SDOH — ECONOMIC STABILITY: HOUSING INSECURITY
IN THE LAST 12 MONTHS, WAS THERE A TIME WHEN YOU DID NOT HAVE A STEADY PLACE TO SLEEP OR SLEPT IN A SHELTER (INCLUDING NOW)?: NO

## 2024-05-28 SDOH — ECONOMIC STABILITY: INCOME INSECURITY: HOW HARD IS IT FOR YOU TO PAY FOR THE VERY BASICS LIKE FOOD, HOUSING, MEDICAL CARE, AND HEATING?: NOT HARD AT ALL

## 2024-05-28 ASSESSMENT — PATIENT HEALTH QUESTIONNAIRE - PHQ9
SUM OF ALL RESPONSES TO PHQ QUESTIONS 1-9: 2
2. FEELING DOWN, DEPRESSED OR HOPELESS: SEVERAL DAYS
SUM OF ALL RESPONSES TO PHQ9 QUESTIONS 1 & 2: 2
SUM OF ALL RESPONSES TO PHQ QUESTIONS 1-9: 2
SUM OF ALL RESPONSES TO PHQ QUESTIONS 1-9: 2
1. LITTLE INTEREST OR PLEASURE IN DOING THINGS: SEVERAL DAYS
SUM OF ALL RESPONSES TO PHQ QUESTIONS 1-9: 2

## 2024-06-18 ENCOUNTER — TELEPHONE (OUTPATIENT)
Facility: CLINIC | Age: 59
End: 2024-06-18

## 2024-06-18 NOTE — TELEPHONE ENCOUNTER
Pt calling in for refill of gabapentin (NEURONTIN) 400 MG capsule  to go to [facility], pt's next appt is 7/9 but is running out of med.

## 2024-06-28 LAB
BUN SERPL-MCNC: 57 MG/DL (ref 6–24)
BUN/CREAT SERPL: 17 (ref 9–23)
CALCIUM SERPL-MCNC: 9 MG/DL (ref 8.7–10.2)
CHLORIDE SERPL-SCNC: 102 MMOL/L (ref 96–106)
CO2 SERPL-SCNC: 25 MMOL/L (ref 20–29)
CREAT SERPL-MCNC: 3.42 MG/DL (ref 0.57–1)
EGFRCR SERPLBLD CKD-EPI 2021: 15 ML/MIN/1.73
GLUCOSE SERPL-MCNC: 165 MG/DL (ref 70–99)
HBA1C MFR BLD: 7 % (ref 4.8–5.6)
Lab: NORMAL
POTASSIUM SERPL-SCNC: 4.2 MMOL/L (ref 3.5–5.2)
REPORT: NORMAL
SODIUM SERPL-SCNC: 140 MMOL/L (ref 134–144)

## 2024-07-11 ENCOUNTER — TELEPHONE (OUTPATIENT)
Age: 59
End: 2024-07-11

## 2024-07-16 ENCOUNTER — TELEPHONE (OUTPATIENT)
Facility: CLINIC | Age: 59
End: 2024-07-16

## 2024-08-11 ENCOUNTER — HOSPITAL ENCOUNTER (EMERGENCY)
Facility: HOSPITAL | Age: 59
Discharge: HOME | End: 2024-08-12
Attending: EMERGENCY MEDICINE

## 2024-08-11 DIAGNOSIS — S16.1XXA ACUTE STRAIN OF NECK MUSCLE, INITIAL ENCOUNTER: ICD-10-CM
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
## 2024-08-11 DIAGNOSIS — S39.012A STRAIN OF LUMBAR REGION, INITIAL ENCOUNTER: ICD-10-CM

## 2024-08-11 PROCEDURE — 99284 EMERGENCY DEPT VISIT MOD MDM: CPT

## 2024-08-11 RX ORDER — OXYCODONE HYDROCHLORIDE 5 MG/1
10 TABLET ORAL ONCE
Status: COMPLETED | OUTPATIENT
Start: 2024-08-11 | End: 2024-08-12

## 2024-08-11 RX ORDER — METHOCARBAMOL 750 MG/1
750 TABLET, FILM COATED ORAL ONCE
Status: COMPLETED | OUTPATIENT
Start: 2024-08-11 | End: 2024-08-12

## 2024-08-11 ASSESSMENT — PAIN - FUNCTIONAL ASSESSMENT: PAIN_FUNCTIONAL_ASSESSMENT: PREVENTS OR INTERFERES SOME ACTIVE ACTIVITIES AND ADLS

## 2024-08-11 ASSESSMENT — PAIN DESCRIPTION - DESCRIPTORS: DESCRIPTORS: ACHING;CRAMPING

## 2024-08-11 ASSESSMENT — PAIN DESCRIPTION - FREQUENCY: FREQUENCY: CONTINUOUS

## 2024-08-11 ASSESSMENT — PAIN DESCRIPTION - LOCATION: LOCATION: BACK;NECK;SHOULDER

## 2024-08-11 ASSESSMENT — PAIN SCALES - GENERAL: PAINLEVEL_OUTOF10: 10

## 2024-08-11 ASSESSMENT — PAIN DESCRIPTION - PAIN TYPE: TYPE: ACUTE PAIN

## 2024-08-11 ASSESSMENT — PAIN DESCRIPTION - ONSET: ONSET: SUDDEN

## 2024-08-12 ENCOUNTER — APPOINTMENT (OUTPATIENT)
Facility: HOSPITAL | Age: 59
End: 2024-08-12

## 2024-08-12 VITALS
TEMPERATURE: 98.4 F | HEIGHT: 64 IN | HEART RATE: 89 BPM | SYSTOLIC BLOOD PRESSURE: 104 MMHG | OXYGEN SATURATION: 93 % | DIASTOLIC BLOOD PRESSURE: 63 MMHG | WEIGHT: 250.22 LBS | RESPIRATION RATE: 18 BRPM | BODY MASS INDEX: 42.72 KG/M2

## 2024-08-12 PROCEDURE — 96372 THER/PROPH/DIAG INJ SC/IM: CPT

## 2024-08-12 PROCEDURE — 6370000000 HC RX 637 (ALT 250 FOR IP): Performed by: EMERGENCY MEDICINE

## 2024-08-12 PROCEDURE — 6360000002 HC RX W HCPCS: Performed by: EMERGENCY MEDICINE

## 2024-08-12 PROCEDURE — 72125 CT NECK SPINE W/O DYE: CPT

## 2024-08-12 PROCEDURE — 71045 X-RAY EXAM CHEST 1 VIEW: CPT

## 2024-08-12 PROCEDURE — 72100 X-RAY EXAM L-S SPINE 2/3 VWS: CPT

## 2024-08-12 RX ORDER — HYDROMORPHONE HYDROCHLORIDE 1 MG/ML
1 INJECTION, SOLUTION INTRAMUSCULAR; INTRAVENOUS; SUBCUTANEOUS ONCE
Status: COMPLETED | OUTPATIENT
Start: 2024-08-12 | End: 2024-08-12

## 2024-08-12 RX ADMIN — OXYCODONE HYDROCHLORIDE 10 MG: 5 TABLET ORAL at 00:01

## 2024-08-12 RX ADMIN — METHOCARBAMOL TABLETS 750 MG: 750 TABLET, COATED ORAL at 00:02

## 2024-08-12 RX ADMIN — HYDROMORPHONE HYDROCHLORIDE 1 MG: 1 INJECTION, SOLUTION INTRAMUSCULAR; INTRAVENOUS; SUBCUTANEOUS at 01:14

## 2024-08-12 ASSESSMENT — PAIN DESCRIPTION - ORIENTATION
ORIENTATION: RIGHT

## 2024-08-12 ASSESSMENT — PAIN DESCRIPTION - LOCATION
LOCATION: NECK;SHOULDER
LOCATION: SHOULDER;NECK;ARM
LOCATION: BACK;NECK;SHOULDER;ARM
LOCATION: ARM;NECK;SHOULDER

## 2024-08-12 ASSESSMENT — PAIN DESCRIPTION - FREQUENCY
FREQUENCY: CONTINUOUS
FREQUENCY: INTERMITTENT

## 2024-08-12 ASSESSMENT — PAIN DESCRIPTION - DESCRIPTORS
DESCRIPTORS: ACHING
DESCRIPTORS: ACHING;DULL

## 2024-08-12 ASSESSMENT — PAIN DESCRIPTION - PAIN TYPE
TYPE: ACUTE PAIN
TYPE: ACUTE PAIN

## 2024-08-12 ASSESSMENT — LIFESTYLE VARIABLES
HOW MANY STANDARD DRINKS CONTAINING ALCOHOL DO YOU HAVE ON A TYPICAL DAY: PATIENT DOES NOT DRINK
HOW OFTEN DO YOU HAVE A DRINK CONTAINING ALCOHOL: NEVER

## 2024-08-12 ASSESSMENT — PAIN DESCRIPTION - ONSET
ONSET: ON-GOING
ONSET: ON-GOING

## 2024-08-12 ASSESSMENT — PAIN SCALES - GENERAL
PAINLEVEL_OUTOF10: 8
PAINLEVEL_OUTOF10: 10
PAINLEVEL_OUTOF10: 10
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
## 2024-08-21 ENCOUNTER — TELEPHONE (OUTPATIENT)
Facility: CLINIC | Age: 59
End: 2024-08-21

## 2024-08-22 ENCOUNTER — OFFICE VISIT (OUTPATIENT)
Facility: CLINIC | Age: 59
End: 2024-08-22
Payer: MEDICARE

## 2024-08-22 VITALS
BODY MASS INDEX: 42.68 KG/M2 | HEART RATE: 91 BPM | DIASTOLIC BLOOD PRESSURE: 67 MMHG | HEIGHT: 64 IN | SYSTOLIC BLOOD PRESSURE: 123 MMHG | TEMPERATURE: 98.1 F | RESPIRATION RATE: 16 BRPM | OXYGEN SATURATION: 95 % | WEIGHT: 250 LBS

## 2024-08-22 DIAGNOSIS — Z12.11 SCREENING FOR COLON CANCER: Primary | ICD-10-CM

## 2024-08-22 DIAGNOSIS — G60.8 IDIOPATHIC SMALL AND LARGE FIBER SENSORY NEUROPATHY: ICD-10-CM

## 2024-08-22 DIAGNOSIS — E11.65 TYPE 2 DIABETES MELLITUS WITH HYPERGLYCEMIA, WITHOUT LONG-TERM CURRENT USE OF INSULIN (HCC): ICD-10-CM

## 2024-08-22 DIAGNOSIS — M54.30 SCIATICA, UNSPECIFIED LATERALITY: ICD-10-CM

## 2024-08-22 DIAGNOSIS — Z86.79 HISTORY OF CONGESTIVE HEART FAILURE: ICD-10-CM

## 2024-08-22 DIAGNOSIS — I10 PRIMARY HYPERTENSION: ICD-10-CM

## 2024-08-22 DIAGNOSIS — E78.2 MIXED HYPERLIPIDEMIA: ICD-10-CM

## 2024-08-22 PROCEDURE — G8428 CUR MEDS NOT DOCUMENT: HCPCS | Performed by: FAMILY MEDICINE

## 2024-08-22 PROCEDURE — 2022F DILAT RTA XM EVC RTNOPTHY: CPT | Performed by: FAMILY MEDICINE

## 2024-08-22 PROCEDURE — 99214 OFFICE O/P EST MOD 30 MIN: CPT | Performed by: FAMILY MEDICINE

## 2024-08-22 PROCEDURE — G8417 CALC BMI ABV UP PARAM F/U: HCPCS | Performed by: FAMILY MEDICINE

## 2024-08-22 PROCEDURE — 3017F COLORECTAL CA SCREEN DOC REV: CPT | Performed by: FAMILY MEDICINE

## 2024-08-22 PROCEDURE — 3078F DIAST BP <80 MM HG: CPT | Performed by: FAMILY MEDICINE

## 2024-08-22 PROCEDURE — 3074F SYST BP LT 130 MM HG: CPT | Performed by: FAMILY MEDICINE

## 2024-08-22 PROCEDURE — 3051F HG A1C>EQUAL 7.0%<8.0%: CPT | Performed by: FAMILY MEDICINE

## 2024-08-22 PROCEDURE — 1036F TOBACCO NON-USER: CPT | Performed by: FAMILY MEDICINE

## 2024-08-22 RX ORDER — METOLAZONE 2.5 MG/1
5 TABLET ORAL DAILY
Qty: 180 TABLET | Refills: 1 | Status: SHIPPED | OUTPATIENT
Start: 2024-08-22

## 2024-08-22 RX ORDER — ATORVASTATIN CALCIUM 40 MG/1
40 TABLET, FILM COATED ORAL DAILY
Qty: 180 TABLET | Refills: 1 | Status: SHIPPED | OUTPATIENT
Start: 2024-08-22

## 2024-08-22 RX ORDER — GABAPENTIN 400 MG/1
800 CAPSULE ORAL 3 TIMES DAILY
Qty: 180 CAPSULE | Refills: 1 | Status: SHIPPED | OUTPATIENT
Start: 2024-08-22 | End: 2024-10-21

## 2024-08-22 RX ORDER — HYDRALAZINE HYDROCHLORIDE 25 MG/1
25 TABLET, FILM COATED ORAL 2 TIMES DAILY
Qty: 180 TABLET | Refills: 1 | Status: SHIPPED | OUTPATIENT
Start: 2024-08-22

## 2024-08-22 RX ORDER — TIRZEPATIDE 12.5 MG/.5ML
12.5 INJECTION, SOLUTION SUBCUTANEOUS WEEKLY
Qty: 2 ML | Refills: 3 | Status: SHIPPED | OUTPATIENT
Start: 2024-08-22

## 2024-08-22 RX ORDER — ISOSORBIDE DINITRATE 20 MG/1
20 TABLET ORAL 2 TIMES DAILY
Qty: 180 TABLET | Refills: 1 | Status: SHIPPED | OUTPATIENT
Start: 2024-08-22

## 2024-08-22 RX ORDER — CARVEDILOL 12.5 MG/1
12.5 TABLET ORAL 2 TIMES DAILY
Qty: 180 TABLET | Refills: 1 | Status: SHIPPED | OUTPATIENT
Start: 2024-08-22

## 2024-08-22 NOTE — PROGRESS NOTES
Assessment/Plan:        Assessment & Plan  1. Diabetes Mellitus.  Her A1c level, last checked on 06/27/2024, was satisfactory at 7.0. She is currently on Mounjaro 12.5 mg and was advised to switch to Mounjaro 15 mg when it becomes available at [facility]. She was informed that high-dose Ozempic could be an alternative, but data does not show it works better than Mounjaro. She was advised to be patient and check with the pharmacy every couple of weeks for the availability of Mounjaro 15 mg. No significant side effects were reported. She was instructed to continue her current medication regimen, including Lipitor, carvedilol, B vitamin, gabapentin, and metolazone. A refill for gabapentin was provided.    2. Chronic Kidney Disease.  She has an upcoming appointment with her kidney doctor on 09/09/2024 for blood work. It was explained that kidney pain is not typical unless kidney stones are present. She was advised to follow up with her nephrologist as scheduled.    3. Hyperlipidemia.  She is currently taking Lipitor as prescribed. She was instructed to continue her current medication regimen.    4. Hypertension.  She is currently taking carvedilol as prescribed. She was instructed to continue her current medication regimen.    5. Arthritis.  She reported stiffness and difficulty moving, which was attributed to arthritis. She was advised to take acetaminophen for symptom relief.    6. Pain Management.  She is receiving oxycodone from her pain management specialist, Yennifer Peralta, at a spine clinic in [facility]. She was advised to continue her current pain management plan and ensure that reports are sent to the primary care provider.    7. Health Maintenance.  A FIT kit will be ordered for colorectal cancer screening. She was advised to complete the test and return it as instructed. She declined the flu shot for this year. She has an upcoming eye doctor appointment in September.    Follow-up  The patient will

## 2024-08-22 NOTE — PROGRESS NOTES
Chief Complaint   Patient presents with    Medication Adjustment     Patient has not been out of the country in (14 months), NO diarrhea, NO cough, NO chest conjestion, NO temp.  Pt has not been around anyone with these symptoms.     Health Maintenance reviewed.    I have reviewed the patient's medical history in detail and updated the computerized patient record.    \"Have you been to the ER, urgent care clinic since your last visit?  No Hospitalized since your last visit?\"    no    “Have you seen or consulted any other health care providers outside of [facility] since your last visit?”    no    “Have you had a colorectal cancer screening such as a colonoscopy/FIT/Cologuard?    no    No colonoscopy on file  No cologuard on file  Date of last FIT: 8/4/2023   No flexible sigmoidoscopy on file         “Have you had a pap smear?”    NO    Date of last Cervical Cancer screen (HPV or PAP): 9/3/2014

## 2024-08-29 ENCOUNTER — TELEPHONE (OUTPATIENT)
Facility: CLINIC | Age: 59
End: 2024-08-29

## 2024-08-29 RX ORDER — LISINOPRIL 10 MG/1
10 TABLET ORAL DAILY
Qty: 90 TABLET | Refills: 1 | Status: SHIPPED | OUTPATIENT
Start: 2024-08-29

## 2024-09-19 ENCOUNTER — HOSPITAL ENCOUNTER (EMERGENCY)
Facility: HOSPITAL | Age: 59
Discharge: HOME | End: 2024-09-19

## 2024-09-19 VITALS
DIASTOLIC BLOOD PRESSURE: 81 MMHG | HEART RATE: 91 BPM | WEIGHT: 247.8 LBS | SYSTOLIC BLOOD PRESSURE: 148 MMHG | BODY MASS INDEX: 42.3 KG/M2 | TEMPERATURE: 99 F | OXYGEN SATURATION: 100 % | HEIGHT: 64 IN | RESPIRATION RATE: 16 BRPM

## 2024-09-19 DIAGNOSIS — M25.511 ACUTE PAIN OF RIGHT SHOULDER: Primary | ICD-10-CM

## 2024-09-19 PROCEDURE — 6360000002 HC RX W HCPCS

## 2024-09-19 PROCEDURE — 96372 THER/PROPH/DIAG INJ SC/IM: CPT

## 2024-09-19 PROCEDURE — 99284 EMERGENCY DEPT VISIT MOD MDM: CPT

## 2024-09-19 RX ORDER — KETOROLAC TROMETHAMINE 30 MG/ML
30 INJECTION, SOLUTION INTRAMUSCULAR; INTRAVENOUS
Status: COMPLETED | OUTPATIENT
Start: 2024-09-19 | End: 2024-09-19

## 2024-09-19 RX ORDER — NAPROXEN 500 MG/1
500 TABLET ORAL 2 TIMES DAILY WITH MEALS
Qty: 20 TABLET | Refills: 0 | Status: SHIPPED | OUTPATIENT
Start: 2024-09-19

## 2024-09-19 RX ADMIN — KETOROLAC TROMETHAMINE 30 MG: 30 INJECTION, SOLUTION INTRAMUSCULAR at 16:01

## 2024-09-19 ASSESSMENT — PAIN DESCRIPTION - LOCATION: LOCATION: SHOULDER

## 2024-09-19 ASSESSMENT — LIFESTYLE VARIABLES
HOW OFTEN DO YOU HAVE A DRINK CONTAINING ALCOHOL: MONTHLY OR LESS
HOW MANY STANDARD DRINKS CONTAINING ALCOHOL DO YOU HAVE ON A TYPICAL DAY: 1 OR 2

## 2024-09-19 ASSESSMENT — PAIN DESCRIPTION - DESCRIPTORS: DESCRIPTORS: ACHING

## 2024-09-19 ASSESSMENT — PAIN SCALES - GENERAL
PAINLEVEL_OUTOF10: 10
PAINLEVEL_OUTOF10: 10

## 2024-09-19 ASSESSMENT — PAIN DESCRIPTION - ORIENTATION: ORIENTATION: RIGHT

## 2024-09-20 LAB
EKG ATRIAL RATE: 89 BPM
EKG DIAGNOSIS: NORMAL
EKG P AXIS: 67 DEGREES
EKG P-R INTERVAL: 156 MS
EKG Q-T INTERVAL: 350 MS
EKG QRS DURATION: 118 MS
EKG QTC CALCULATION (BAZETT): 425 MS
EKG R AXIS: -52 DEGREES
EKG T AXIS: -11 DEGREES
EKG VENTRICULAR RATE: 89 BPM

## 2024-10-30 ENCOUNTER — HOSPITAL ENCOUNTER (EMERGENCY)
Facility: HOSPITAL | Age: 59
Discharge: HOME | End: 2024-10-30
Payer: MEDICARE

## 2024-10-30 ENCOUNTER — APPOINTMENT (OUTPATIENT)
Facility: HOSPITAL | Age: 59
End: 2024-10-30
Payer: MEDICARE

## 2024-10-30 VITALS
SYSTOLIC BLOOD PRESSURE: 130 MMHG | HEART RATE: 91 BPM | BODY MASS INDEX: 42.72 KG/M2 | WEIGHT: 248.9 LBS | OXYGEN SATURATION: 98 % | RESPIRATION RATE: 16 BRPM | TEMPERATURE: 98.2 F | DIASTOLIC BLOOD PRESSURE: 75 MMHG

## 2024-10-30 DIAGNOSIS — M25.511 ACUTE PAIN OF RIGHT SHOULDER: Primary | ICD-10-CM

## 2024-10-30 PROCEDURE — 6360000002 HC RX W HCPCS: Performed by: PHYSICIAN ASSISTANT

## 2024-10-30 PROCEDURE — 73030 X-RAY EXAM OF SHOULDER: CPT

## 2024-10-30 PROCEDURE — 96372 THER/PROPH/DIAG INJ SC/IM: CPT

## 2024-10-30 PROCEDURE — 99284 EMERGENCY DEPT VISIT MOD MDM: CPT

## 2024-10-30 RX ORDER — KETOROLAC TROMETHAMINE 30 MG/ML
30 INJECTION, SOLUTION INTRAMUSCULAR; INTRAVENOUS
Status: COMPLETED | OUTPATIENT
Start: 2024-10-30 | End: 2024-10-30

## 2024-10-30 RX ORDER — TIZANIDINE 2 MG/1
2 TABLET ORAL 3 TIMES DAILY PRN
Qty: 20 TABLET | Refills: 0 | Status: SHIPPED | OUTPATIENT
Start: 2024-10-30

## 2024-10-30 RX ADMIN — KETOROLAC TROMETHAMINE 30 MG: 30 INJECTION, SOLUTION INTRAMUSCULAR at 13:31

## 2024-10-30 ASSESSMENT — PAIN DESCRIPTION - ORIENTATION
ORIENTATION: RIGHT
ORIENTATION: RIGHT

## 2024-10-30 ASSESSMENT — PAIN SCALES - GENERAL
PAINLEVEL_OUTOF10: 7
PAINLEVEL_OUTOF10: 8

## 2024-10-30 ASSESSMENT — PAIN - FUNCTIONAL ASSESSMENT
PAIN_FUNCTIONAL_ASSESSMENT: 0-10
PAIN_FUNCTIONAL_ASSESSMENT: 0-10

## 2024-10-30 ASSESSMENT — LIFESTYLE VARIABLES
HOW OFTEN DO YOU HAVE A DRINK CONTAINING ALCOHOL: NEVER
HOW MANY STANDARD DRINKS CONTAINING ALCOHOL DO YOU HAVE ON A TYPICAL DAY: PATIENT DOES NOT DRINK

## 2024-10-30 ASSESSMENT — PAIN DESCRIPTION - LOCATION
LOCATION: SHOULDER
LOCATION: SHOULDER

## 2024-10-30 NOTE — ED PROVIDER NOTES
disregards these errors. Please excuse any errors that have escaped final proofreading.)         Marva Tapia PA-C  10/31/24 1109

## 2024-10-31 ENCOUNTER — TELEPHONE (OUTPATIENT)
Facility: CLINIC | Age: 59
End: 2024-10-31

## 2024-11-18 ENCOUNTER — TELEPHONE (OUTPATIENT)
Facility: CLINIC | Age: 59
End: 2024-11-18

## 2024-11-19 DIAGNOSIS — G60.8 IDIOPATHIC SMALL AND LARGE FIBER SENSORY NEUROPATHY: ICD-10-CM

## 2024-11-19 DIAGNOSIS — M54.30 SCIATICA, UNSPECIFIED LATERALITY: ICD-10-CM

## 2024-11-21 ENCOUNTER — TELEPHONE (OUTPATIENT)
Facility: CLINIC | Age: 59
End: 2024-11-21

## 2024-11-21 NOTE — TELEPHONE ENCOUNTER
Pt need enough Gabapentin until her appt on Monday. She would like the refill to be called into [facility]

## 2024-11-22 ENCOUNTER — TELEPHONE (OUTPATIENT)
Facility: CLINIC | Age: 59
End: 2024-11-22

## 2024-11-22 NOTE — TELEPHONE ENCOUNTER
Pt is completely out of medication and would like enough to last her until Monday.        gabapentin (NEURONTIN) 400 MG capsule

## 2024-11-24 NOTE — PROGRESS NOTES
Assessment/Plan:        Assessment & Plan  1. Medication Management.  She has been without her gabapentin for the past weekend and has been experiencing discomfort. A prescription for gabapentin has been issued. She is advised to continue her current medications, including carvedilol, B12, and lisinopril. She is reminded that she can not take anti-inflammatories like naproxen due to her kidney condition.    2. Diabetes Mellitus.  Her weight has decreased by 7 to 8 pounds, indicating that her current treatment is effective. A prescription for Mounjaro 15 mg has been issued. She is advised to continue her current diabetes management regimen. Blood work has been ordered to monitor her diabetes and kidney function. She is instructed to complete the blood work before her next appointment in February.    3. Chronic Kidney Disease.  She reports that her kidney doctor is aware of her current medications, including lisinopril, which she was advised to take to help with her kidneys. She had a dialysis shunt placed in her right arm last Friday in preparation for potential future dialysis. She is advised to follow up with her kidney doctor as scheduled.    Follow-up  The patient will follow up in February 2025.    PROCEDURE  The patient underwent a surgical procedure on Friday for the placement of a dialysis shunt in her right elbow.    Results    1. Type 2 diabetes mellitus with nephropathy (HCC)  -     Microalbumin / Creatinine Urine Ratio; Future  -     Tirzepatide (MOUNJARO) 15 MG/0.5ML SOAJ; Inject 15 mg into the skin once a week, Disp-2 mL, R-3Normal  2. Type 2 diabetes mellitus with hyperglycemia, without long-term current use of insulin (HCC)  3. Coagulation defect (HCC)  4. Chronic renal disease, stage 4, severely decreased glomerular filtration rate (GFR) between 15-29 mL/min/1.73 square meter (HCC)  5. BMI 45.0-49.9, adult  6. Mixed hyperlipidemia  7. Primary hypertension  -     carvedilol (COREG) 12.5 MG

## 2024-11-25 ENCOUNTER — TELEMEDICINE (OUTPATIENT)
Facility: CLINIC | Age: 59
End: 2024-11-25

## 2024-11-25 DIAGNOSIS — E11.65 TYPE 2 DIABETES MELLITUS WITH HYPERGLYCEMIA, WITHOUT LONG-TERM CURRENT USE OF INSULIN (HCC): ICD-10-CM

## 2024-11-25 DIAGNOSIS — E11.21 TYPE 2 DIABETES MELLITUS WITH NEPHROPATHY (HCC): Primary | ICD-10-CM

## 2024-11-25 DIAGNOSIS — Z86.79 HISTORY OF CONGESTIVE HEART FAILURE: ICD-10-CM

## 2024-11-25 DIAGNOSIS — E78.2 MIXED HYPERLIPIDEMIA: ICD-10-CM

## 2024-11-25 DIAGNOSIS — M54.30 SCIATICA, UNSPECIFIED LATERALITY: ICD-10-CM

## 2024-11-25 DIAGNOSIS — G60.8 IDIOPATHIC SMALL AND LARGE FIBER SENSORY NEUROPATHY: ICD-10-CM

## 2024-11-25 DIAGNOSIS — N18.4 CHRONIC RENAL DISEASE, STAGE 4, SEVERELY DECREASED GLOMERULAR FILTRATION RATE (GFR) BETWEEN 15-29 ML/MIN/1.73 SQUARE METER (HCC): ICD-10-CM

## 2024-11-25 DIAGNOSIS — I10 PRIMARY HYPERTENSION: ICD-10-CM

## 2024-11-25 DIAGNOSIS — D68.9 COAGULATION DEFECT (HCC): ICD-10-CM

## 2024-11-25 RX ORDER — CARVEDILOL 12.5 MG/1
12.5 TABLET ORAL 2 TIMES DAILY
Qty: 200 TABLET | Refills: 1 | Status: SHIPPED | OUTPATIENT
Start: 2024-11-25

## 2024-11-25 RX ORDER — GABAPENTIN 400 MG/1
800 CAPSULE ORAL 3 TIMES DAILY
Qty: 180 CAPSULE | Refills: 2 | Status: SHIPPED | OUTPATIENT
Start: 2024-11-25 | End: 2025-02-23

## 2024-11-25 RX ORDER — ISOSORBIDE DINITRATE 20 MG/1
20 TABLET ORAL 2 TIMES DAILY
Qty: 200 TABLET | Refills: 1 | Status: SHIPPED | OUTPATIENT
Start: 2024-11-25

## 2024-11-25 RX ORDER — LISINOPRIL 10 MG/1
10 TABLET ORAL DAILY
Qty: 100 TABLET | Refills: 1 | Status: SHIPPED | OUTPATIENT
Start: 2024-11-25

## 2024-11-25 RX ORDER — TIRZEPATIDE 15 MG/.5ML
15 INJECTION, SOLUTION SUBCUTANEOUS WEEKLY
Qty: 2 ML | Refills: 3 | Status: SHIPPED | OUTPATIENT
Start: 2024-11-25

## 2024-11-25 RX ORDER — HYDRALAZINE HYDROCHLORIDE 25 MG/1
25 TABLET, FILM COATED ORAL 2 TIMES DAILY
Qty: 200 TABLET | Refills: 1 | Status: SHIPPED | OUTPATIENT
Start: 2024-11-25

## 2024-11-25 RX ORDER — GABAPENTIN 400 MG/1
CAPSULE ORAL
Qty: 180 CAPSULE | Refills: 0 | OUTPATIENT
Start: 2024-11-25

## 2024-11-25 RX ORDER — METOLAZONE 2.5 MG/1
5 TABLET ORAL DAILY
Qty: 200 TABLET | Refills: 1 | Status: SHIPPED | OUTPATIENT
Start: 2024-11-25

## 2024-11-25 ASSESSMENT — PATIENT HEALTH QUESTIONNAIRE - PHQ9
SUM OF ALL RESPONSES TO PHQ QUESTIONS 1-9: 2
1. LITTLE INTEREST OR PLEASURE IN DOING THINGS: SEVERAL DAYS
SUM OF ALL RESPONSES TO PHQ QUESTIONS 1-9: 2
2. FEELING DOWN, DEPRESSED OR HOPELESS: SEVERAL DAYS
SUM OF ALL RESPONSES TO PHQ9 QUESTIONS 1 & 2: 2

## 2024-11-25 NOTE — PROGRESS NOTES
Chief Complaint   Patient presents with    Medication Refill     Patient has not been out of the country in (14 months), NO diarrhea, NO cough, NO chest conjestion, NO temp.  Pt has not been around anyone with these symptoms.     Health Maintenance reviewed.    I have reviewed the patient's medical history in detail and updated the computerized patient record.    \"Have you been to the ER, urgent care clinic since your last visit?  No Hospitalized since your last visit?\"    no    “Have you seen or consulted any other health care providers outside of  since your last visit?”    no    “Have you had a colorectal cancer screening such as a colonoscopy/FIT/Cologuard?    no    No colonoscopy on file  No cologuard on file  Date of last FIT: 8/4/2023   No flexible sigmoidoscopy on file         “Have you had a pap smear?”    no    Date of last Cervical Cancer screen (HPV or PAP): 9/3/2014

## 2024-12-10 LAB — HBA1C MFR BLD HPLC: 6.8 %

## 2024-12-18 ENCOUNTER — TELEPHONE (OUTPATIENT)
Facility: CLINIC | Age: 59
End: 2024-12-18

## 2024-12-20 ENCOUNTER — TELEPHONE (OUTPATIENT)
Facility: CLINIC | Age: 59
End: 2024-12-20

## 2024-12-20 ENCOUNTER — TELEMEDICINE (OUTPATIENT)
Facility: CLINIC | Age: 59
End: 2024-12-20

## 2024-12-20 DIAGNOSIS — E11.21 TYPE 2 DIABETES MELLITUS WITH NEPHROPATHY (HCC): Primary | ICD-10-CM

## 2024-12-20 DIAGNOSIS — E78.2 MIXED HYPERLIPIDEMIA: ICD-10-CM

## 2024-12-20 DIAGNOSIS — I10 PRIMARY HYPERTENSION: ICD-10-CM

## 2024-12-20 DIAGNOSIS — M10.261: ICD-10-CM

## 2024-12-20 DIAGNOSIS — E11.65 TYPE 2 DIABETES MELLITUS WITH HYPERGLYCEMIA, WITHOUT LONG-TERM CURRENT USE OF INSULIN (HCC): ICD-10-CM

## 2024-12-20 DIAGNOSIS — D68.9 COAGULATION DEFECT (HCC): ICD-10-CM

## 2024-12-20 DIAGNOSIS — N18.4 CHRONIC RENAL DISEASE, STAGE 4, SEVERELY DECREASED GLOMERULAR FILTRATION RATE (GFR) BETWEEN 15-29 ML/MIN/1.73 SQUARE METER (HCC): ICD-10-CM

## 2024-12-20 RX ORDER — ATORVASTATIN CALCIUM 40 MG/1
40 TABLET, FILM COATED ORAL DAILY
Qty: 200 TABLET | Refills: 2 | Status: SHIPPED | OUTPATIENT
Start: 2024-12-20

## 2024-12-20 RX ORDER — PREDNISOLONE 5 MG/1
TABLET ORAL
Qty: 8 TABLET | Refills: 0 | Status: SHIPPED | OUTPATIENT
Start: 2024-12-20 | End: 2024-12-21 | Stop reason: ALTCHOICE

## 2024-12-20 RX ORDER — PREDNISOLONE 5 MG/1
5 TABLET ORAL DAILY
COMMUNITY
End: 2024-12-20 | Stop reason: SDUPTHER

## 2024-12-20 RX ORDER — TIRZEPATIDE 15 MG/.5ML
15 INJECTION, SOLUTION SUBCUTANEOUS WEEKLY
Qty: 2 ML | Refills: 3 | Status: SHIPPED | OUTPATIENT
Start: 2024-12-20

## 2024-12-20 RX ORDER — TORSEMIDE 20 MG/1
40 TABLET ORAL DAILY
COMMUNITY

## 2024-12-20 NOTE — PROGRESS NOTES
Chief Complaint   Patient presents with    Medication Refill     Patient is requesting a refill of Prednisone       
Housing Stability Vital Sign     Unable to Pay for Housing in the Last Year: No     Number of Places Lived in the Last Year: 1     Unstable Housing in the Last Year: No         Objective:     There were no vitals taken for this visit.    Physical Exam        The patient (or guardian, if applicable) and other individuals in attendance with the patient were advised that Artificial Intelligence will be utilized during this visit to record and process the conversation to generate a clinical note. The patient (or guardian, if applicable) and other individuals in attendance at the appointment consented to the use of AI, including the recording.      An electronic signature was used to authenticate this note.    --Lucio Whaley MD

## 2024-12-21 PROBLEM — E78.2 MIXED HYPERLIPIDEMIA: Status: ACTIVE | Noted: 2024-12-21

## 2024-12-21 RX ORDER — PREDNISONE 5 MG/1
TABLET ORAL
Qty: 11 TABLET | Refills: 0 | Status: SHIPPED | OUTPATIENT
Start: 2024-12-21 | End: 2025-01-02

## 2024-12-24 ENCOUNTER — OFFICE VISIT (OUTPATIENT)
Facility: CLINIC | Age: 59
End: 2024-12-24
Payer: MEDICARE

## 2024-12-24 VITALS
WEIGHT: 242 LBS | TEMPERATURE: 97.8 F | SYSTOLIC BLOOD PRESSURE: 120 MMHG | RESPIRATION RATE: 18 BRPM | HEART RATE: 94 BPM | BODY MASS INDEX: 41.32 KG/M2 | HEIGHT: 64 IN | OXYGEN SATURATION: 97 % | DIASTOLIC BLOOD PRESSURE: 76 MMHG

## 2024-12-24 DIAGNOSIS — E11.65 TYPE 2 DIABETES MELLITUS WITH HYPERGLYCEMIA, WITHOUT LONG-TERM CURRENT USE OF INSULIN (HCC): ICD-10-CM

## 2024-12-24 DIAGNOSIS — N18.4 ANEMIA, CHRONIC RENAL FAILURE, STAGE 4 (SEVERE) (HCC): ICD-10-CM

## 2024-12-24 DIAGNOSIS — E11.21 TYPE 2 DIABETES MELLITUS WITH NEPHROPATHY (HCC): ICD-10-CM

## 2024-12-24 DIAGNOSIS — N18.4 CHRONIC RENAL DISEASE, STAGE 4, SEVERELY DECREASED GLOMERULAR FILTRATION RATE (GFR) BETWEEN 15-29 ML/MIN/1.73 SQUARE METER (HCC): ICD-10-CM

## 2024-12-24 DIAGNOSIS — D63.1 ANEMIA, CHRONIC RENAL FAILURE, STAGE 4 (SEVERE) (HCC): ICD-10-CM

## 2024-12-24 DIAGNOSIS — M10.261: Primary | ICD-10-CM

## 2024-12-24 DIAGNOSIS — E78.2 MIXED HYPERLIPIDEMIA: ICD-10-CM

## 2024-12-24 DIAGNOSIS — D68.9 COAGULATION DEFECT (HCC): ICD-10-CM

## 2024-12-24 DIAGNOSIS — I10 PRIMARY HYPERTENSION: ICD-10-CM

## 2024-12-24 LAB
CREAT UR-MCNC: 59.6 MG/DL
MICROALBUMIN UR-MCNC: <0.5 MG/DL
MICROALBUMIN/CREAT UR-RTO: <8 MG/G (ref 0–30)

## 2024-12-24 PROCEDURE — 3074F SYST BP LT 130 MM HG: CPT | Performed by: FAMILY MEDICINE

## 2024-12-24 PROCEDURE — 3017F COLORECTAL CA SCREEN DOC REV: CPT | Performed by: FAMILY MEDICINE

## 2024-12-24 PROCEDURE — 1036F TOBACCO NON-USER: CPT | Performed by: FAMILY MEDICINE

## 2024-12-24 PROCEDURE — G2211 COMPLEX E/M VISIT ADD ON: HCPCS | Performed by: FAMILY MEDICINE

## 2024-12-24 PROCEDURE — G8427 DOCREV CUR MEDS BY ELIG CLIN: HCPCS | Performed by: FAMILY MEDICINE

## 2024-12-24 PROCEDURE — 3078F DIAST BP <80 MM HG: CPT | Performed by: FAMILY MEDICINE

## 2024-12-24 PROCEDURE — 3051F HG A1C>EQUAL 7.0%<8.0%: CPT | Performed by: FAMILY MEDICINE

## 2024-12-24 PROCEDURE — 99214 OFFICE O/P EST MOD 30 MIN: CPT | Performed by: FAMILY MEDICINE

## 2024-12-24 PROCEDURE — 2022F DILAT RTA XM EVC RTNOPTHY: CPT | Performed by: FAMILY MEDICINE

## 2024-12-24 PROCEDURE — G8417 CALC BMI ABV UP PARAM F/U: HCPCS | Performed by: FAMILY MEDICINE

## 2024-12-24 PROCEDURE — G8484 FLU IMMUNIZE NO ADMIN: HCPCS | Performed by: FAMILY MEDICINE

## 2024-12-24 RX ORDER — ALLOPURINOL 100 MG/1
100 TABLET ORAL DAILY
Qty: 100 TABLET | Refills: 1 | Status: SHIPPED | OUTPATIENT
Start: 2024-12-24

## 2024-12-24 RX ORDER — PREDNISONE 5 MG/1
TABLET ORAL
Qty: 11 TABLET | Refills: 0 | Status: SHIPPED | OUTPATIENT
Start: 2024-12-24 | End: 2025-01-05

## 2024-12-24 NOTE — PROGRESS NOTES
Chief Complaint   Patient presents with    Follow-Up from Hospital         Health Maintenance Due   Topic Date Due    Hepatitis B vaccine (1 of 3 - 19+ 3-dose series) Never done    Shingles vaccine (1 of 2) Never done    Cervical cancer screen  09/03/2019    Diabetic retinal exam  09/01/2021    Diabetic Alb to Cr ratio (uACR) test  08/02/2024    Colorectal Cancer Screen  08/04/2024         \"Have you been to the ER, urgent care clinic since your last visit?  Hospitalized since your last visit?\"    YES - When: approximately 2  weeks ago.  Where and Why: [facility]    “Have you seen or consulted any other health care providers outside of [facility] since your last visit?”    NO    “Have you had a colorectal cancer screening such as a colonoscopy/FIT/Cologuard?    NO    No colonoscopy on file  No cologuard on file  Date of last FIT: 8/4/2023   No flexible sigmoidoscopy on file         “Have you had a pap smear?”    YES - Where: Unsure Nurse/CMA to request most recent records if not in the chart    Date of last Cervical Cancer screen (HPV or PAP): 9/3/2014           
attendance with the patient were advised that Artificial Intelligence will be utilized during this visit to record and process the conversation to generate a clinical note. The patient (or guardian, if applicable) and other individuals in attendance at the appointment consented to the use of AI, including the recording.      An electronic signature was used to authenticate this note.    --Lucio Whaley MD

## 2025-01-14 NOTE — PROGRESS NOTES
Subjective:     Laurita Galvan is a 59 y.o. female seen for follow-up of diabetes.     She has had hypoglycemic attacks. .no  Blood sugar control has been ok    Hemoglobin A1C   Date Value Ref Range Status   06/27/2024 7.0 (H) 4.8 - 5.6 % Final     Comment:                 Prediabetes: 5.7 - 6.4           Diabetes: >6.4           Glycemic control for adults with diabetes: <7.0       Lab Results   Component Value Date/Time     06/27/2024 02:37 PM    K 4.2 06/27/2024 02:37 PM     06/27/2024 02:37 PM    CO2 25 06/27/2024 02:37 PM    BUN 57 06/27/2024 02:37 PM    CREATININE 3.42 06/27/2024 02:37 PM    GLUCOSE 165 06/27/2024 02:37 PM    CALCIUM 9.0 06/27/2024 02:37 PM    LABGLOM 15 06/27/2024 02:37 PM    LABGLOM 19 02/22/2024 02:22 PM        She has the following problems:  Patient Active Problem List   Diagnosis    Type 2 diabetes mellitus with hyperglycemia, without long-term current use of insulin (HCC)    Chronic renal disease, stage 4, severely decreased glomerular filtration rate (GFR) between 15-29 mL/min/1.73 square meter (HCC)    Bilateral cataracts    Lumbar back pain with radiculopathy affecting right lower extremity    Type 2 diabetes mellitus with nephropathy (HCC)    Idiopathic small and large fiber sensory neuropathy    Spinal stenosis of lumbosacral region    Primary hypertension    BMI 40.0-44.9, adult    History of congestive heart failure    Coagulation defect (HCC)    Drug-induced gout, right knee    Mixed hyperlipidemia      History of Present Illness  The patient is a 59-year-old female who presents via virtual visit for evaluation of gout, left knee pain, foot sore, and medication management.    She was hospitalized due to a severe gout flare-up in her left knee approximately one week ago. She was discharged last Wednesday.  It was recommended that she be prescribed colchicine to be taken once daily. However, she reports that the medication was not provided upon discharge. She has

## 2025-01-15 ENCOUNTER — TELEMEDICINE (OUTPATIENT)
Facility: CLINIC | Age: 60
End: 2025-01-15

## 2025-01-15 DIAGNOSIS — G60.8 IDIOPATHIC SMALL AND LARGE FIBER SENSORY NEUROPATHY: ICD-10-CM

## 2025-01-15 DIAGNOSIS — E11.21 TYPE 2 DIABETES MELLITUS WITH NEPHROPATHY (HCC): Primary | ICD-10-CM

## 2025-01-15 DIAGNOSIS — M10.262 ACUTE DRUG-INDUCED GOUT OF LEFT KNEE: ICD-10-CM

## 2025-01-15 DIAGNOSIS — I10 PRIMARY HYPERTENSION: ICD-10-CM

## 2025-01-15 DIAGNOSIS — Z86.79 HISTORY OF CONGESTIVE HEART FAILURE: ICD-10-CM

## 2025-01-15 DIAGNOSIS — E78.2 MIXED HYPERLIPIDEMIA: ICD-10-CM

## 2025-01-15 DIAGNOSIS — E11.65 TYPE 2 DIABETES MELLITUS WITH HYPERGLYCEMIA, WITHOUT LONG-TERM CURRENT USE OF INSULIN (HCC): ICD-10-CM

## 2025-01-15 DIAGNOSIS — N18.4 CHRONIC RENAL DISEASE, STAGE 4, SEVERELY DECREASED GLOMERULAR FILTRATION RATE (GFR) BETWEEN 15-29 ML/MIN/1.73 SQUARE METER (HCC): ICD-10-CM

## 2025-01-15 DIAGNOSIS — M54.30 SCIATICA, UNSPECIFIED LATERALITY: ICD-10-CM

## 2025-01-15 RX ORDER — GABAPENTIN 300 MG/1
300 CAPSULE ORAL DAILY
Qty: 90 CAPSULE | Refills: 0 | Status: SHIPPED | OUTPATIENT
Start: 2025-01-15 | End: 2025-04-15

## 2025-01-15 RX ORDER — COLCHICINE 0.6 MG/1
0.6 TABLET ORAL DAILY
Qty: 30 TABLET | Refills: 3 | Status: SHIPPED | OUTPATIENT
Start: 2025-01-15

## 2025-01-15 RX ORDER — METOLAZONE 2.5 MG/1
2.5 TABLET ORAL 2 TIMES DAILY PRN
Qty: 60 TABLET | Refills: 2 | Status: SHIPPED | OUTPATIENT
Start: 2025-01-15

## 2025-01-15 RX ORDER — METOLAZONE 2.5 MG/1
2.5 TABLET ORAL 2 TIMES DAILY
COMMUNITY
End: 2025-01-15 | Stop reason: SDUPTHER

## 2025-01-15 SDOH — ECONOMIC STABILITY: FOOD INSECURITY: WITHIN THE PAST 12 MONTHS, THE FOOD YOU BOUGHT JUST DIDN'T LAST AND YOU DIDN'T HAVE MONEY TO GET MORE.: NEVER TRUE

## 2025-01-15 SDOH — ECONOMIC STABILITY: FOOD INSECURITY: WITHIN THE PAST 12 MONTHS, YOU WORRIED THAT YOUR FOOD WOULD RUN OUT BEFORE YOU GOT MONEY TO BUY MORE.: NEVER TRUE

## 2025-01-15 ASSESSMENT — PATIENT HEALTH QUESTIONNAIRE - PHQ9
1. LITTLE INTEREST OR PLEASURE IN DOING THINGS: NOT AT ALL
SUM OF ALL RESPONSES TO PHQ QUESTIONS 1-9: 0
SUM OF ALL RESPONSES TO PHQ QUESTIONS 1-9: 0
SUM OF ALL RESPONSES TO PHQ9 QUESTIONS 1 & 2: 0
SUM OF ALL RESPONSES TO PHQ QUESTIONS 1-9: 0
2. FEELING DOWN, DEPRESSED OR HOPELESS: NOT AT ALL
SUM OF ALL RESPONSES TO PHQ QUESTIONS 1-9: 0

## 2025-01-15 ASSESSMENT — LIFESTYLE VARIABLES: HOW MANY STANDARD DRINKS CONTAINING ALCOHOL DO YOU HAVE ON A TYPICAL DAY: PATIENT DOES NOT DRINK

## 2025-01-15 NOTE — PROGRESS NOTES
Chief Complaint   Patient presents with    Follow-up                Health Maintenance Due   Topic Date Due    Hepatitis B vaccine (1 of 3 - 19+ 3-dose series) Never done    Shingles vaccine (1 of 2) Never done    Cervical cancer screen  09/03/2019    Diabetic retinal exam  09/01/2021    Colorectal Cancer Screen  08/04/2024    Annual Wellness Visit (Medicare Advantage)  01/01/2025         \"Have you been to the ER, urgent care clinic since your last visit?  Hospitalized since your last visit?\"    YES - When: approximately 1  weeks ago.  Where and Why: [facility]    “Have you seen or consulted any other health care providers outside of [facility] since your last visit?”    NO    “Have you had a colorectal cancer screening such as a colonoscopy/FIT/Cologuard?    NO    No colonoscopy on file  No cologuard on file  Date of last FIT: 8/4/2023   No flexible sigmoidoscopy on file         “Have you had a pap smear?”    NO    Date of last Cervical Cancer screen (HPV or PAP): 9/3/2014

## 2025-02-07 ENCOUNTER — TELEMEDICINE (OUTPATIENT)
Facility: CLINIC | Age: 60
End: 2025-02-07

## 2025-02-07 DIAGNOSIS — Z86.79 HISTORY OF CONGESTIVE HEART FAILURE: ICD-10-CM

## 2025-02-07 DIAGNOSIS — M10.262 ACUTE DRUG-INDUCED GOUT OF LEFT KNEE: ICD-10-CM

## 2025-02-07 DIAGNOSIS — I10 PRIMARY HYPERTENSION: ICD-10-CM

## 2025-02-07 DIAGNOSIS — S91.109D OPEN WOUND OF TOE, SUBSEQUENT ENCOUNTER: Primary | ICD-10-CM

## 2025-02-07 DIAGNOSIS — E11.21 TYPE 2 DIABETES MELLITUS WITH NEPHROPATHY (HCC): ICD-10-CM

## 2025-02-07 DIAGNOSIS — M10.261: ICD-10-CM

## 2025-02-07 RX ORDER — ALLOPURINOL 100 MG/1
100 TABLET ORAL DAILY
Qty: 100 TABLET | Refills: 1 | Status: SHIPPED | OUTPATIENT
Start: 2025-02-07

## 2025-02-07 RX ORDER — HYDRALAZINE HYDROCHLORIDE 25 MG/1
25 TABLET, FILM COATED ORAL 2 TIMES DAILY
Qty: 200 TABLET | Refills: 1 | Status: SHIPPED | OUTPATIENT
Start: 2025-02-07

## 2025-02-07 RX ORDER — CARVEDILOL 12.5 MG/1
12.5 TABLET ORAL 2 TIMES DAILY
Qty: 200 TABLET | Refills: 1 | Status: SHIPPED | OUTPATIENT
Start: 2025-02-07

## 2025-02-07 RX ORDER — TIRZEPATIDE 15 MG/.5ML
15 INJECTION, SOLUTION SUBCUTANEOUS WEEKLY
Qty: 2 ML | Refills: 3 | Status: SHIPPED | OUTPATIENT
Start: 2025-02-07

## 2025-02-07 RX ORDER — COLCHICINE 0.6 MG/1
0.6 TABLET ORAL DAILY
Qty: 30 TABLET | Refills: 3 | Status: SHIPPED | OUTPATIENT
Start: 2025-02-07

## 2025-02-07 RX ORDER — ISOSORBIDE DINITRATE 20 MG/1
20 TABLET ORAL 2 TIMES DAILY
Qty: 200 TABLET | Refills: 1 | Status: SHIPPED | OUTPATIENT
Start: 2025-02-07

## 2025-02-07 RX ORDER — LISINOPRIL 10 MG/1
10 TABLET ORAL DAILY
Qty: 100 TABLET | Refills: 1 | Status: SHIPPED | OUTPATIENT
Start: 2025-02-07

## 2025-02-07 NOTE — PROGRESS NOTES
Chief Complaint   Patient presents with    Toe Pain     Patient states she already talked to Dr Whaley about her toe

## 2025-02-07 NOTE — PROGRESS NOTES
Laurita Galvan, was evaluated through a synchronous (real-time) audio-video encounter. The patient (or guardian if applicable) is aware that this is a billable service, which includes applicable co-pays. This Virtual Visit was conducted with patient's (and/or legal guardian's) consent. Patient identification was verified, and a caregiver was present when appropriate.   The patient was located at Home: [facility] [address]  [facility] [address]  Provider was located at Home (Appt Dept State): [place]  Confirm you are appropriately licensed, registered, or certified to deliver care in the state where the patient is located as indicated above. If you are not or unsure, please re-schedule the visit: Yes, I confirm.     Laurita Galvan (:  1965) is a Established patient, presenting virtually for evaluation of the following: wound care    Assessment & Plan   Below is the assessment and plan developed based on review of pertinent history, physical exam, labs, studies, and medications.  Assessment & Plan  1. Wound on the left third toe.  The wound has been present since 2025. A referral for wound care has been initiated, with a frequency of 3 times per week. The necessary documentation will be faxed to the appropriate department. She is advised to verify with [facility] on Monday to ensure the referral process is complete.    2. Diabetes Mellitus.  Her blood sugar levels have been stable, ranging between 99 and 120. She is currently taking Mounjaro and using 12 units of insulin daily. A prescription for Mounjaro will be provided. Test strips for her True Metrix glucometer will also be ordered.    3. Gout.  She has not experienced any recent gout flare-ups since starting her current medication and receiving a knee injection. A prescription for colchicine will be issued to manage her gout symptoms.    4. Medication Management.  She is currently taking gabapentin 300 mg once daily, which is working well for her. A

## 2025-02-21 ENCOUNTER — TELEMEDICINE (OUTPATIENT)
Facility: CLINIC | Age: 60
End: 2025-02-21

## 2025-02-21 DIAGNOSIS — S91.109D OPEN WOUND OF TOE, SUBSEQUENT ENCOUNTER: Primary | ICD-10-CM

## 2025-02-21 DIAGNOSIS — I10 PRIMARY HYPERTENSION: ICD-10-CM

## 2025-02-21 DIAGNOSIS — E11.21 TYPE 2 DIABETES MELLITUS WITH NEPHROPATHY (HCC): ICD-10-CM

## 2025-02-21 DIAGNOSIS — E11.65 TYPE 2 DIABETES MELLITUS WITH HYPERGLYCEMIA, WITHOUT LONG-TERM CURRENT USE OF INSULIN (HCC): ICD-10-CM

## 2025-02-21 DIAGNOSIS — Z86.79 HISTORY OF CONGESTIVE HEART FAILURE: ICD-10-CM

## 2025-02-21 DIAGNOSIS — N18.4 CHRONIC RENAL DISEASE, STAGE 4, SEVERELY DECREASED GLOMERULAR FILTRATION RATE (GFR) BETWEEN 15-29 ML/MIN/1.73 SQUARE METER (HCC): ICD-10-CM

## 2025-02-21 DIAGNOSIS — E78.2 MIXED HYPERLIPIDEMIA: ICD-10-CM

## 2025-02-21 RX ORDER — INSULIN ASPART 100 [IU]/ML
12 INJECTION, SOLUTION INTRAVENOUS; SUBCUTANEOUS 2 TIMES DAILY
Qty: 5 ADJUSTABLE DOSE PRE-FILLED PEN SYRINGE | Refills: 3 | Status: SHIPPED | OUTPATIENT
Start: 2025-02-21

## 2025-02-21 RX ORDER — GLUCOSAMINE HCL/CHONDROITIN SU 500-400 MG
CAPSULE ORAL
Qty: 200 STRIP | Refills: 2 | Status: SHIPPED | OUTPATIENT
Start: 2025-02-21

## 2025-02-21 RX ORDER — MAGNESIUM GLUCONATE 27 MG(500)
500 TABLET ORAL 2 TIMES DAILY
COMMUNITY

## 2025-02-21 NOTE — PROGRESS NOTES
Chief Complaint   Patient presents with    Toe Pain    Diabetes     Diabetes follow up - toe infection - she has already seen podiatry and due to followup in 2 weeks        
(Non-Medical): No   Physical Activity: Inactive (1/15/2025)    Exercise Vital Sign     Days of Exercise per Week: 0 days     Minutes of Exercise per Session: 0 min   Stress: Stress Concern Present (6/19/2024)    [employer] - Occupational Stress Questionnaire     Feeling of Stress : To some extent   Social Connections: Feeling Socially Integrated (1/10/2025)    Received from [facility]    OASIS : Social Isolation     Frequency of experiencing loneliness or isolation: Never   Intimate Partner Violence: Not At Risk (6/19/2024)    Humiliation, Afraid, Rape, and Kick questionnaire     Fear of Current or Ex-Partner: No     Emotionally Abused: No     Physically Abused: No     Sexually Abused: No   Housing Stability: Low Risk  (1/15/2025)    Housing Stability Vital Sign     Unable to Pay for Housing in the Last Year: No     Number of Times Moved in the Last Year: 0     Homeless in the Last Year: No         Objective:     LMP  (LMP Unknown)     Physical Exam  Vital Signs  Weight is 228 pounds.      The patient (or guardian, if applicable) and other individuals in attendance with the patient were advised that Artificial Intelligence will be utilized during this visit to record and process the conversation to generate a clinical note. The patient (or guardian, if applicable) and other individuals in attendance at the appointment consented to the use of AI, including the recording.      An electronic signature was used to authenticate this note.    --Lucio Whaley MD

## 2025-03-03 ENCOUNTER — CLINICAL DOCUMENTATION (OUTPATIENT)
Facility: CLINIC | Age: 60
End: 2025-03-03

## 2025-03-28 ENCOUNTER — CLINICAL DOCUMENTATION (OUTPATIENT)
Facility: CLINIC | Age: 60
End: 2025-03-28

## 2025-03-28 NOTE — PROGRESS NOTES
[facility] Orders faxed back w/ dr huizar signature on 3.27.25 @ 11:46am  
> 16 inches

## 2025-03-31 ENCOUNTER — TELEPHONE (OUTPATIENT)
Facility: CLINIC | Age: 60
End: 2025-03-31

## 2025-03-31 DIAGNOSIS — M54.30 SCIATICA, UNSPECIFIED LATERALITY: ICD-10-CM

## 2025-03-31 DIAGNOSIS — G60.8 IDIOPATHIC SMALL AND LARGE FIBER SENSORY NEUROPATHY: ICD-10-CM

## 2025-03-31 NOTE — TELEPHONE ENCOUNTER
Pt says Dr Whaley was going to call in med to [facility]       gabapentin (NEURONTIN) 300 MG capsule

## 2025-04-01 ENCOUNTER — TELEMEDICINE (OUTPATIENT)
Facility: CLINIC | Age: 60
End: 2025-04-01
Payer: MEDICARE

## 2025-04-01 DIAGNOSIS — E11.65 TYPE 2 DIABETES MELLITUS WITH HYPERGLYCEMIA, WITHOUT LONG-TERM CURRENT USE OF INSULIN (HCC): ICD-10-CM

## 2025-04-01 DIAGNOSIS — N18.4 CHRONIC RENAL DISEASE, STAGE 4, SEVERELY DECREASED GLOMERULAR FILTRATION RATE (GFR) BETWEEN 15-29 ML/MIN/1.73 SQUARE METER (HCC): ICD-10-CM

## 2025-04-01 DIAGNOSIS — E11.21 TYPE 2 DIABETES MELLITUS WITH NEPHROPATHY (HCC): Primary | ICD-10-CM

## 2025-04-01 DIAGNOSIS — F11.20 NARCOTIC DEPENDENCE, EPISODIC USE (HCC): ICD-10-CM

## 2025-04-01 DIAGNOSIS — I50.21 ACUTE SYSTOLIC CONGESTIVE HEART FAILURE (HCC): ICD-10-CM

## 2025-04-01 DIAGNOSIS — M54.30 SCIATICA, UNSPECIFIED LATERALITY: ICD-10-CM

## 2025-04-01 DIAGNOSIS — G60.8 IDIOPATHIC SMALL AND LARGE FIBER SENSORY NEUROPATHY: ICD-10-CM

## 2025-04-01 PROCEDURE — 99213 OFFICE O/P EST LOW 20 MIN: CPT | Performed by: FAMILY MEDICINE

## 2025-04-01 PROCEDURE — 1036F TOBACCO NON-USER: CPT | Performed by: FAMILY MEDICINE

## 2025-04-01 PROCEDURE — G8417 CALC BMI ABV UP PARAM F/U: HCPCS | Performed by: FAMILY MEDICINE

## 2025-04-01 PROCEDURE — 3017F COLORECTAL CA SCREEN DOC REV: CPT | Performed by: FAMILY MEDICINE

## 2025-04-01 PROCEDURE — G8427 DOCREV CUR MEDS BY ELIG CLIN: HCPCS | Performed by: FAMILY MEDICINE

## 2025-04-01 PROCEDURE — 3046F HEMOGLOBIN A1C LEVEL >9.0%: CPT | Performed by: FAMILY MEDICINE

## 2025-04-01 PROCEDURE — 2022F DILAT RTA XM EVC RTNOPTHY: CPT | Performed by: FAMILY MEDICINE

## 2025-04-01 RX ORDER — GABAPENTIN 300 MG/1
300 CAPSULE ORAL DAILY
Qty: 90 CAPSULE | Refills: 0 | Status: CANCELLED | OUTPATIENT
Start: 2025-04-01 | End: 2025-06-30

## 2025-04-01 RX ORDER — GABAPENTIN 300 MG/1
300 CAPSULE ORAL 2 TIMES DAILY
Qty: 65 CAPSULE | Refills: 1 | Status: SHIPPED | OUTPATIENT
Start: 2025-04-01 | End: 2025-06-05

## 2025-04-01 ASSESSMENT — PATIENT HEALTH QUESTIONNAIRE - PHQ9
2. FEELING DOWN, DEPRESSED OR HOPELESS: NOT AT ALL
SUM OF ALL RESPONSES TO PHQ QUESTIONS 1-9: 0
1. LITTLE INTEREST OR PLEASURE IN DOING THINGS: NOT AT ALL
SUM OF ALL RESPONSES TO PHQ QUESTIONS 1-9: 0

## 2025-04-01 NOTE — PROGRESS NOTES
\"Have you been to the ER, urgent care clinic since your last visit?  Hospitalized since your last visit?\"    NO    “Have you seen or consulted any other health care providers outside our system since your last visit?”    NO     “Have you had a pap smear?”    NO    Date of last Cervical Cancer screen (HPV or PAP): 9/3/2014       “Have you had a colorectal cancer screening such as a colonoscopy/FIT/Cologuard?    NO    No colonoscopy on file  No cologuard on file  Date of last FIT: 8/4/2023   No flexible sigmoidoscopy on file     “Have you had a diabetic eye exam?”    NO     Date of last diabetic eye exam: 9/1/2020            Chief Complaint   Patient presents with    Diabetes     Medication refills

## 2025-04-01 NOTE — PROGRESS NOTES
Laurita Galvan is a 59 y.o. female who presents to the office today for the following:  Chief Complaint   Patient presents with    Diabetes     Medication refills       She has the following problems:  Patient Active Problem List   Diagnosis    Type 2 diabetes mellitus with hyperglycemia, without long-term current use of insulin ([facility])    Chronic renal disease, stage 4, severely decreased glomerular filtration rate (GFR) between 15-29 mL/min/1.73 square meter (HCC)    Bilateral cataracts    Lumbar back pain with radiculopathy affecting right lower extremity    Type 2 diabetes mellitus with nephropathy ([facility])    Idiopathic small and large fiber sensory neuropathy    Spinal stenosis of lumbosacral region    Primary hypertension    BMI 40.0-44.9, adult    History of congestive heart failure    Coagulation defect    Drug-induced gout, right knee    Mixed hyperlipidemia          Assessment & Plan  1. Medication management.  - Currently taking gabapentin 300 mg twice a day (one pill in the morning and one at night).  - Reports that the medication is effective and has no side effects.  - Discussed the effectiveness of gabapentin in managing her condition.  - Prescription refill for gabapentin 300 mg will be sent to her pharmacy at [facility].    2. Pain management.  - Experiencing significant pain in her third toe, scheduled for removal next Friday due to an unresolved infection.  - Ran out of her pain medication, Percocet, and has been advised to contact her provider for a refill.  - Discussed the need for pain management and the upcoming toe removal procedure.  - Appointment scheduled for Thursday at 3:00 PM to address the pain medication refill.    3. Medicare wellness visit.  - Due for a Medicare wellness visit.  - Discussed scheduling options and agreed on a virtual visit.  - Virtual visit scheduled for 05/02/2025 at 11:30 AM.  - Follow-up planned for 05/02/2025 at 11:30 AM for the virtual Medicare wellness

## 2025-04-17 ENCOUNTER — TELEPHONE (OUTPATIENT)
Facility: CLINIC | Age: 60
End: 2025-04-17

## 2025-04-30 ENCOUNTER — TELEPHONE (OUTPATIENT)
Facility: CLINIC | Age: 60
End: 2025-04-30

## 2025-04-30 SDOH — HEALTH STABILITY: PHYSICAL HEALTH: ON AVERAGE, HOW MANY DAYS PER WEEK DO YOU ENGAGE IN MODERATE TO STRENUOUS EXERCISE (LIKE A BRISK WALK)?: 7 DAYS

## 2025-04-30 ASSESSMENT — PATIENT HEALTH QUESTIONNAIRE - PHQ9
SUM OF ALL RESPONSES TO PHQ QUESTIONS 1-9: 0
1. LITTLE INTEREST OR PLEASURE IN DOING THINGS: NOT AT ALL
2. FEELING DOWN, DEPRESSED OR HOPELESS: NOT AT ALL
SUM OF ALL RESPONSES TO PHQ QUESTIONS 1-9: 0

## 2025-04-30 ASSESSMENT — LIFESTYLE VARIABLES
HOW MANY STANDARD DRINKS CONTAINING ALCOHOL DO YOU HAVE ON A TYPICAL DAY: 0
HOW OFTEN DO YOU HAVE A DRINK CONTAINING ALCOHOL: NEVER
HOW OFTEN DO YOU HAVE A DRINK CONTAINING ALCOHOL: 1
HOW MANY STANDARD DRINKS CONTAINING ALCOHOL DO YOU HAVE ON A TYPICAL DAY: PATIENT DOES NOT DRINK
HOW OFTEN DO YOU HAVE SIX OR MORE DRINKS ON ONE OCCASION: 1

## 2025-05-02 ENCOUNTER — TELEMEDICINE (OUTPATIENT)
Facility: CLINIC | Age: 60
End: 2025-05-02
Payer: MEDICARE

## 2025-05-02 DIAGNOSIS — I42.8 OTHER CARDIOMYOPATHY (HCC): ICD-10-CM

## 2025-05-02 DIAGNOSIS — E78.2 MIXED HYPERLIPIDEMIA: ICD-10-CM

## 2025-05-02 DIAGNOSIS — Z86.79 HISTORY OF CONGESTIVE HEART FAILURE: ICD-10-CM

## 2025-05-02 DIAGNOSIS — E11.21 TYPE 2 DIABETES MELLITUS WITH NEPHROPATHY (HCC): ICD-10-CM

## 2025-05-02 DIAGNOSIS — F11.20 NARCOTIC DEPENDENCY, CONTINUOUS (HCC): ICD-10-CM

## 2025-05-02 DIAGNOSIS — I10 PRIMARY HYPERTENSION: ICD-10-CM

## 2025-05-02 DIAGNOSIS — I11.0 HYPERTENSIVE HEART DISEASE WITH CHRONIC SYSTOLIC CONGESTIVE HEART FAILURE (HCC): ICD-10-CM

## 2025-05-02 DIAGNOSIS — M54.16 LUMBAR BACK PAIN WITH RADICULOPATHY AFFECTING RIGHT LOWER EXTREMITY: ICD-10-CM

## 2025-05-02 DIAGNOSIS — Z00.00 ENCOUNTER FOR ANNUAL WELLNESS VISIT (AWV) IN MEDICARE PATIENT: Primary | ICD-10-CM

## 2025-05-02 DIAGNOSIS — E11.22 TYPE 2 DIABETES MELLITUS WITH STAGE 3B CHRONIC KIDNEY DISEASE, WITH LONG-TERM CURRENT USE OF INSULIN (HCC): ICD-10-CM

## 2025-05-02 DIAGNOSIS — I50.22 HYPERTENSIVE HEART DISEASE WITH CHRONIC SYSTOLIC CONGESTIVE HEART FAILURE (HCC): ICD-10-CM

## 2025-05-02 DIAGNOSIS — Z12.11 SCREENING FOR COLON CANCER: ICD-10-CM

## 2025-05-02 DIAGNOSIS — Z79.4 TYPE 2 DIABETES MELLITUS WITH STAGE 3B CHRONIC KIDNEY DISEASE, WITH LONG-TERM CURRENT USE OF INSULIN (HCC): ICD-10-CM

## 2025-05-02 DIAGNOSIS — M10.261: ICD-10-CM

## 2025-05-02 DIAGNOSIS — N18.32 TYPE 2 DIABETES MELLITUS WITH STAGE 3B CHRONIC KIDNEY DISEASE, WITH LONG-TERM CURRENT USE OF INSULIN (HCC): ICD-10-CM

## 2025-05-02 DIAGNOSIS — N18.32 CHRONIC KIDNEY DISEASE, STAGE 3B (HCC): ICD-10-CM

## 2025-05-02 PROCEDURE — 1036F TOBACCO NON-USER: CPT | Performed by: FAMILY MEDICINE

## 2025-05-02 PROCEDURE — G8427 DOCREV CUR MEDS BY ELIG CLIN: HCPCS | Performed by: FAMILY MEDICINE

## 2025-05-02 PROCEDURE — 3017F COLORECTAL CA SCREEN DOC REV: CPT | Performed by: FAMILY MEDICINE

## 2025-05-02 PROCEDURE — G0439 PPPS, SUBSEQ VISIT: HCPCS | Performed by: FAMILY MEDICINE

## 2025-05-02 PROCEDURE — 2022F DILAT RTA XM EVC RTNOPTHY: CPT | Performed by: FAMILY MEDICINE

## 2025-05-02 PROCEDURE — 99214 OFFICE O/P EST MOD 30 MIN: CPT | Performed by: FAMILY MEDICINE

## 2025-05-02 PROCEDURE — G8417 CALC BMI ABV UP PARAM F/U: HCPCS | Performed by: FAMILY MEDICINE

## 2025-05-02 PROCEDURE — 3046F HEMOGLOBIN A1C LEVEL >9.0%: CPT | Performed by: FAMILY MEDICINE

## 2025-05-02 RX ORDER — HYDRALAZINE HYDROCHLORIDE 25 MG/1
25 TABLET, FILM COATED ORAL 2 TIMES DAILY
Qty: 200 TABLET | Refills: 1 | Status: SHIPPED | OUTPATIENT
Start: 2025-05-02

## 2025-05-02 RX ORDER — ALLOPURINOL 100 MG/1
100 TABLET ORAL DAILY
Qty: 100 TABLET | Refills: 1 | Status: SHIPPED | OUTPATIENT
Start: 2025-05-02

## 2025-05-02 RX ORDER — ISOSORBIDE DINITRATE 20 MG/1
20 TABLET ORAL 2 TIMES DAILY
Qty: 200 TABLET | Refills: 1 | Status: SHIPPED | OUTPATIENT
Start: 2025-05-02

## 2025-05-02 RX ORDER — COLCHICINE 0.6 MG/1
0.6 TABLET ORAL EVERY OTHER DAY
Qty: 50 TABLET | Refills: 1 | Status: SHIPPED | OUTPATIENT
Start: 2025-05-02

## 2025-05-02 RX ORDER — LISINOPRIL 10 MG/1
10 TABLET ORAL DAILY
Qty: 100 TABLET | Refills: 1 | Status: SHIPPED | OUTPATIENT
Start: 2025-05-02

## 2025-05-02 RX ORDER — TIRZEPATIDE 15 MG/.5ML
15 INJECTION, SOLUTION SUBCUTANEOUS WEEKLY
Qty: 2 ML | Refills: 3 | Status: SHIPPED | OUTPATIENT
Start: 2025-05-02

## 2025-05-02 RX ORDER — GABAPENTIN 300 MG/1
300 CAPSULE ORAL 2 TIMES DAILY
Qty: 65 CAPSULE | Refills: 1 | Status: SHIPPED | OUTPATIENT
Start: 2025-05-02 | End: 2025-05-04 | Stop reason: SDUPTHER

## 2025-05-02 NOTE — PATIENT INSTRUCTIONS
Exercise has been shown to be as effective as medication in the treatment of weight loss.  If you do not exercise on a regular basis I recommend starting an exercise program. Would start with a walking program of 10 to 20 minutes of a rapid walk 3 to 4 times a week. Goal is to achieve a moderate intesity 30 minute walk, 5 days a week. Afterwards you should feel tired but not exhausted. You can increase the pace as tolerated. Other exercise maybe substituted for walking such as bicycling and gentle weight lifting.

## 2025-05-02 NOTE — PROGRESS NOTES
Have you been to the ER, urgent care clinic since your last visit?  Hospitalized since your last visit?   NO    Have you seen or consulted any other health care providers outside our system since your last visit?   NO     “Have you had a pap smear?”    NO    Date of last Cervical Cancer screen (HPV or PAP): 9/3/2014       “Have you had a colorectal cancer screening such as a colonoscopy/FIT/Cologuard?    NO    No colonoscopy on file  No cologuard on file  Date of last FIT: 8/4/2023   No flexible sigmoidoscopy on file     “Have you had a diabetic eye exam?”    NO     Date of last diabetic eye exam: 9/1/2020            Chief Complaint   Patient presents with    Medicare AWV       
symptoms of irregular blood glucose. Dispense sufficient amount for indicated testing frequency plus additional to accommodate PRN testing needs. 200 strip 2    insulin aspart (NOVOLOG FLEXPEN RELION) 100 UNIT/ML injection pen Inject 12 Units into the skin in the morning and at bedtime 5 Adjustable Dose Pre-filled Pen Syringe 3    carvedilol (COREG) 12.5 MG tablet Take 1 tablet by mouth 2 times daily 200 tablet 1    metOLazone (ZAROXOLYN) 2.5 MG tablet Take 1 tablet by mouth 2 times daily as needed (swelling) As needed for swelling 60 tablet 2    torsemide (DEMADEX) 20 MG tablet Take 2 tablets by mouth daily      atorvastatin (LIPITOR) 40 MG tablet Take 1 tablet by mouth daily 200 tablet 2    cyanocobalamin 500 MCG tablet Take 1 tablet by mouth daily 100 tablet 1    vitamin D (CHOLECALCIFEROL) 25 MCG (1000 UT) TABS tablet Take 1 tablet by mouth daily 90 tablet 1    Methylnaltrexone Bromide (RELISTOR) 150 MG TABS Take by mouth      oxyCODONE-acetaminophen (PERCOCET) 5-325 MG per tablet Take 1 tablet by mouth every 4 hours as needed for Pain.      acetaminophen (TYLENOL) 325 MG tablet Take 2 tablets by mouth every 4 hours as needed for Pain 20 tablet 0    diclofenac sodium (VOLTAREN) 1 % GEL Apply 4 g topically 4 times daily Left knee 100 g 1    aspirin 81 MG EC tablet Take by mouth daily      lidocaine (LIDODERM) 5 % as needed       No current facility-administered medications for this visit.       Return in about 3 months (around 8/2/2025) for follow up.      Subjective:     Laurita Galvan is a 59 y.o. female seen for follow-up of diabetes.     She has had hypoglycemic attacks. .no  Blood sugar control has been ok    Hemoglobin A1C   Date Value Ref Range Status   06/27/2024 7.0 (H) 4.8 - 5.6 % Final     Comment:                 Prediabetes: 5.7 - 6.4           Diabetes: >6.4           Glycemic control for adults with diabetes: <7.0       Lab Results   Component Value Date/Time     06/27/2024 02:37 PM    K 4.2

## 2025-05-04 PROBLEM — N18.4 CHRONIC RENAL DISEASE, STAGE 4, SEVERELY DECREASED GLOMERULAR FILTRATION RATE (GFR) BETWEEN 15-29 ML/MIN/1.73 SQUARE METER (HCC): Status: RESOLVED | Noted: 2021-01-27 | Resolved: 2025-05-04

## 2025-05-04 PROBLEM — E11.65 TYPE 2 DIABETES MELLITUS WITH HYPERGLYCEMIA, WITHOUT LONG-TERM CURRENT USE OF INSULIN (HCC): Status: RESOLVED | Noted: 2020-03-04 | Resolved: 2025-05-04

## 2025-05-04 PROBLEM — Z79.4 TYPE 2 DIABETES MELLITUS WITH STAGE 3 CHRONIC KIDNEY DISEASE, WITH LONG-TERM CURRENT USE OF INSULIN (HCC): Status: ACTIVE | Noted: 2017-12-18

## 2025-05-04 PROBLEM — N18.30 TYPE 2 DIABETES MELLITUS WITH STAGE 3 CHRONIC KIDNEY DISEASE, WITH LONG-TERM CURRENT USE OF INSULIN (HCC): Status: ACTIVE | Noted: 2017-12-18

## 2025-05-04 PROBLEM — N18.32 CHRONIC KIDNEY DISEASE, STAGE 3B (HCC): Status: ACTIVE | Noted: 2022-08-12

## 2025-05-04 PROBLEM — E11.22 TYPE 2 DIABETES MELLITUS WITH STAGE 3 CHRONIC KIDNEY DISEASE, WITH LONG-TERM CURRENT USE OF INSULIN (HCC): Status: ACTIVE | Noted: 2017-12-18

## 2025-05-04 PROBLEM — I42.9 MYOCARDIOPATHY (HCC): Status: ACTIVE | Noted: 2025-05-04

## 2025-05-04 RX ORDER — GABAPENTIN 300 MG/1
300 CAPSULE ORAL 2 TIMES DAILY PRN
Qty: 65 CAPSULE | Refills: 1 | Status: SHIPPED | OUTPATIENT
Start: 2025-05-04 | End: 2025-07-08

## 2025-05-05 PROBLEM — I11.0 HYPERTENSIVE HEART DISEASE WITH CHRONIC SYSTOLIC CONGESTIVE HEART FAILURE (HCC): Status: ACTIVE | Noted: 2025-05-05

## 2025-05-05 PROBLEM — I50.22 HYPERTENSIVE HEART DISEASE WITH CHRONIC SYSTOLIC CONGESTIVE HEART FAILURE (HCC): Status: ACTIVE | Noted: 2025-05-05

## 2025-05-13 NOTE — PROGRESS NOTES
The patient (or guardian, if applicable) and other individuals in attendance with the patient were advised that Artificial Intelligence will be utilized during this visit to record and process the conversation to generate a clinical note. The patient (or guardian, if applicable) and other individuals in attendance at the appointment consented to the use of AI, including the recording.      An electronic signature was used to authenticate this note.    --Lucio Whaley MD      Post-Discharge Transitional Care Management Progress Note      Laurita Galvan   YOB: 1965    Date of Office Visit:  5/14/2025  Date of Hospital Admission: 5/2/25  Date of Hospital Discharge: 5/7/25    Care management risk score Rising risk (score 2-5) and Complex Care (Scores >=6): No Risk Score On File     Non face to face  following discharge, date last encounter closed (first attempt may have been earlier):  5/8/25 and 5/9/25    Call initiated 2 business days of discharge: yes    ASSESSMENT/PLAN:   Below is the assessment and plan developed based on review of pertinent history, physical exam, labs, studies, and medications.  Acute kidney injury  Type 2 diabetes mellitus with stage 3 chronic kidney disease, with long-term current use of insulin, unspecified whether stage 3a or 3b CKD (HCC)  Narcotic dependency, continuous (HCC)  Cardiomyopathy, unspecified type (HCC)  BMI 40.0-44.9, adult (HCC)  Chronic kidney disease, stage 3b (HCC)  Hypertensive heart disease with chronic systolic congestive heart failure (HCC)  Mixed hyperlipidemia  Primary hypertension  Hospital discharge follow-up  -     ND DISCHARGE MEDS RECONCILED W/ CURRENT OUTPATIENT MED LIST  Screening mammogram for breast cancer  -     LOIS DIGITAL SCREEN W OR WO CAD BILATERAL; Future  Screen for colon cancer  -     Amb External Referral To General Surgery      Medical Decision Making: high complexity  Return in 2 weeks (on 5/28/2025).         Subjective:

## 2025-05-14 ENCOUNTER — TELEMEDICINE (OUTPATIENT)
Facility: CLINIC | Age: 60
End: 2025-05-14

## 2025-05-14 DIAGNOSIS — N18.32 CHRONIC KIDNEY DISEASE, STAGE 3B (HCC): ICD-10-CM

## 2025-05-14 DIAGNOSIS — Z09 HOSPITAL DISCHARGE FOLLOW-UP: ICD-10-CM

## 2025-05-14 DIAGNOSIS — Z79.4 TYPE 2 DIABETES MELLITUS WITH STAGE 3 CHRONIC KIDNEY DISEASE, WITH LONG-TERM CURRENT USE OF INSULIN, UNSPECIFIED WHETHER STAGE 3A OR 3B CKD (HCC): ICD-10-CM

## 2025-05-14 DIAGNOSIS — N17.9 ACUTE KIDNEY INJURY: Primary | ICD-10-CM

## 2025-05-14 DIAGNOSIS — N18.30 TYPE 2 DIABETES MELLITUS WITH STAGE 3 CHRONIC KIDNEY DISEASE, WITH LONG-TERM CURRENT USE OF INSULIN, UNSPECIFIED WHETHER STAGE 3A OR 3B CKD (HCC): ICD-10-CM

## 2025-05-14 DIAGNOSIS — I10 PRIMARY HYPERTENSION: ICD-10-CM

## 2025-05-14 DIAGNOSIS — F11.20 NARCOTIC DEPENDENCY, CONTINUOUS (HCC): ICD-10-CM

## 2025-05-14 DIAGNOSIS — Z12.31 SCREENING MAMMOGRAM FOR BREAST CANCER: ICD-10-CM

## 2025-05-14 DIAGNOSIS — I42.9 CARDIOMYOPATHY, UNSPECIFIED TYPE (HCC): ICD-10-CM

## 2025-05-14 DIAGNOSIS — E11.22 TYPE 2 DIABETES MELLITUS WITH STAGE 3 CHRONIC KIDNEY DISEASE, WITH LONG-TERM CURRENT USE OF INSULIN, UNSPECIFIED WHETHER STAGE 3A OR 3B CKD (HCC): ICD-10-CM

## 2025-05-14 DIAGNOSIS — I50.22 HYPERTENSIVE HEART DISEASE WITH CHRONIC SYSTOLIC CONGESTIVE HEART FAILURE (HCC): ICD-10-CM

## 2025-05-14 DIAGNOSIS — Z12.11 SCREEN FOR COLON CANCER: ICD-10-CM

## 2025-05-14 DIAGNOSIS — E78.2 MIXED HYPERLIPIDEMIA: ICD-10-CM

## 2025-05-14 DIAGNOSIS — I11.0 HYPERTENSIVE HEART DISEASE WITH CHRONIC SYSTOLIC CONGESTIVE HEART FAILURE (HCC): ICD-10-CM

## 2025-05-14 ASSESSMENT — PATIENT HEALTH QUESTIONNAIRE - PHQ9
SUM OF ALL RESPONSES TO PHQ QUESTIONS 1-9: 0
2. FEELING DOWN, DEPRESSED OR HOPELESS: NOT AT ALL
SUM OF ALL RESPONSES TO PHQ QUESTIONS 1-9: 0
1. LITTLE INTEREST OR PLEASURE IN DOING THINGS: NOT AT ALL
SUM OF ALL RESPONSES TO PHQ QUESTIONS 1-9: 0
SUM OF ALL RESPONSES TO PHQ QUESTIONS 1-9: 0

## 2025-05-14 NOTE — PROGRESS NOTES
Have you been to the ER, urgent care clinic since your last visit?  Hospitalized since your last visit?   NO    Have you seen or consulted any other health care providers outside our system since your last visit?   NO     “Have you had a pap smear?”    NO    Date of last Cervical Cancer screen (HPV or PAP): 9/3/2014       “Have you had a colorectal cancer screening such as a colonoscopy/FIT/Cologuard?    NO    No colonoscopy on file  No cologuard on file  Date of last FIT: 8/4/2023   No flexible sigmoidoscopy on file     “Have you had a diabetic eye exam?”    NO     Date of last diabetic eye exam: 9/1/2020              Chief Complaint   Patient presents with    Diabetes    Hypertension

## 2025-05-23 ENCOUNTER — APPOINTMENT (OUTPATIENT)
Facility: HOSPITAL | Age: 60
End: 2025-05-23
Payer: OTHER MISCELLANEOUS

## 2025-05-23 ENCOUNTER — HOSPITAL ENCOUNTER (EMERGENCY)
Facility: HOSPITAL | Age: 60
Discharge: HOME | End: 2025-05-23
Attending: STUDENT IN AN ORGANIZED HEALTH CARE EDUCATION/TRAINING PROGRAM
Payer: OTHER MISCELLANEOUS

## 2025-05-23 VITALS
BODY MASS INDEX: 41.54 KG/M2 | HEART RATE: 96 BPM | RESPIRATION RATE: 16 BRPM | TEMPERATURE: 98.6 F | SYSTOLIC BLOOD PRESSURE: 143 MMHG | OXYGEN SATURATION: 100 % | WEIGHT: 242 LBS | DIASTOLIC BLOOD PRESSURE: 78 MMHG
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
## 2025-05-23 DIAGNOSIS — R51.9 ACUTE NONINTRACTABLE HEADACHE, UNSPECIFIED HEADACHE TYPE: ICD-10-CM

## 2025-05-23 DIAGNOSIS — M54.50 LOW BACK PAIN, UNSPECIFIED BACK PAIN LATERALITY, UNSPECIFIED CHRONICITY, UNSPECIFIED WHETHER SCIATICA PRESENT: ICD-10-CM

## 2025-05-23 DIAGNOSIS — M54.2 NECK PAIN: ICD-10-CM

## 2025-05-23 PROCEDURE — 70450 CT HEAD/BRAIN W/O DYE: CPT

## 2025-05-23 PROCEDURE — 72100 X-RAY EXAM L-S SPINE 2/3 VWS: CPT

## 2025-05-23 PROCEDURE — 99284 EMERGENCY DEPT VISIT MOD MDM: CPT

## 2025-05-23 PROCEDURE — 72125 CT NECK SPINE W/O DYE: CPT

## 2025-05-23 PROCEDURE — 6370000000 HC RX 637 (ALT 250 FOR IP): Performed by: STUDENT IN AN ORGANIZED HEALTH CARE EDUCATION/TRAINING PROGRAM

## 2025-05-23 RX ORDER — OXYCODONE HYDROCHLORIDE 5 MG/1
5 TABLET ORAL
Refills: 0 | Status: COMPLETED | OUTPATIENT
Start: 2025-05-23 | End: 2025-05-23

## 2025-05-23 RX ORDER — METHOCARBAMOL 750 MG/1
750 TABLET, FILM COATED ORAL
Status: COMPLETED | OUTPATIENT
Start: 2025-05-23 | End: 2025-05-23

## 2025-05-23 RX ORDER — LIDOCAINE 4 G/G
2 PATCH TOPICAL
Status: DISCONTINUED | OUTPATIENT
Start: 2025-05-23 | End: 2025-05-24 | Stop reason: HOSPADM

## 2025-05-23 RX ORDER — LIDOCAINE 50 MG/G
1 PATCH TOPICAL DAILY
Qty: 10 PATCH | Refills: 0 | Status: SHIPPED | OUTPATIENT
Start: 2025-05-23 | End: 2025-06-02

## 2025-05-23 RX ORDER — METHOCARBAMOL 750 MG/1
750 TABLET, FILM COATED ORAL 4 TIMES DAILY
Qty: 40 TABLET | Refills: 0 | Status: SHIPPED | OUTPATIENT
Start: 2025-05-23 | End: 2025-05-29 | Stop reason: ALTCHOICE

## 2025-05-23 RX ORDER — ACETAMINOPHEN 325 MG/1
650 TABLET ORAL
Status: COMPLETED | OUTPATIENT
Start: 2025-05-23 | End: 2025-05-23

## 2025-05-23 RX ADMIN — OXYCODONE 5 MG: 5 TABLET ORAL at 21:07

## 2025-05-23 RX ADMIN — METHOCARBAMOL TABLETS 750 MG: 750 TABLET, COATED ORAL at 21:07

## 2025-05-23 RX ADMIN — ACETAMINOPHEN 650 MG: 325 TABLET ORAL at 21:07

## 2025-05-23 ASSESSMENT — PAIN DESCRIPTION - ORIENTATION: ORIENTATION: POSTERIOR

## 2025-05-23 ASSESSMENT — PAIN DESCRIPTION - DESCRIPTORS: DESCRIPTORS: ACHING

## 2025-05-23 ASSESSMENT — PAIN SCALES - GENERAL: PAINLEVEL_OUTOF10: 10

## 2025-05-23 ASSESSMENT — PAIN DESCRIPTION - LOCATION: LOCATION: BACK;NECK

## 2025-05-23 ASSESSMENT — PAIN - FUNCTIONAL ASSESSMENT: PAIN_FUNCTIONAL_ASSESSMENT: 0-10

## 2025-05-24 NOTE — DISCHARGE INSTRUCTIONS
Thank You!    It was a pleasure taking care of you in our Emergency Department today. We know that when you come to our Emergency Department, you are entrusting us with your health, comfort, and safety. Our physicians and nurses honor that trust, and truly appreciate the opportunity to care for you and your loved ones.      We also value your feedback. If you receive a survey about your Emergency Department experience today, please fill it out.  We care about our patients' feedback, and we listen to what you have to say.  Thank you.    Hermann Ruiz MD  ________________________________________________________________________  I have included a copy of your lab results and/or radiologic studies from today's visit so you can have them easily available at your follow-up visit. We hope you feel better and please do not hesitate to contact the ED if you have any questions at all!    No results found for this or any previous visit (from the past 12 hours).  XR LUMBAR SPINE (2-3 VIEWS)   Final Result   No acute bony abnormalities.                                                Electronically signed by LUNA Joseph      CT HEAD WO CONTRAST   Final Result   No evidence of acute process.            Electronically signed by DMITRIY ESPARZA      CT CERVICAL SPINE WO CONTRAST   Final Result   1. No acute abnormality         Electronically signed by Pato Parra          The exam and treatment you received in the Emergency Department were for an urgent problem and are not intended as complete care. It is important that you follow up with a doctor, nurse practitioner, or physician assistant for ongoing care. If your symptoms become worse or you do not improve as expected and you are unable to reach your usual health care provider, you should return to the Emergency Department. We are available 24 hours a day.    Please take your discharge instructions with you when you go to your follow-up appointment.     If a prescription has  been provided, please have it filled as soon as possible to prevent a delay in treatment. Read the entire medication instruction sheet provided to you by the pharmacy. If you have any questions or reservations about taking the medication due to side effects or interactions with other medications, please call your primary care physician or contact the ER to speak with the charge nurse.     Please make an appointment with your family doctor or the physician you were referred to for follow-up of this visit as instructed on your discharge paperwork. Return to the ER if you are unable to be seen or if you are unable to be seen in a timely manner.    Should you experience abdominal pain lasting greater than 6 hours, chest pain, difficulty breathing, fever/chills, numbness/tingling, skin changes or other symptoms that concern you, return to the ED sooner. If you feel worse over the next 24 hours, please return to the ED. We are available 24 hours a day. Thank you for trusting us with your care!

## 2025-05-27 ENCOUNTER — TELEPHONE (OUTPATIENT)
Facility: CLINIC | Age: 60
End: 2025-05-27

## 2025-05-27 ENCOUNTER — HOSPITAL ENCOUNTER (EMERGENCY)
Facility: HOSPITAL | Age: 60
Discharge: HOME | End: 2025-05-27
Payer: MEDICARE

## 2025-05-27 VITALS
SYSTOLIC BLOOD PRESSURE: 150 MMHG | OXYGEN SATURATION: 100 % | BODY MASS INDEX: 41.33 KG/M2 | RESPIRATION RATE: 20 BRPM | WEIGHT: 242.06 LBS | TEMPERATURE: 98.8 F | HEART RATE: 97 BPM | DIASTOLIC BLOOD PRESSURE: 83 MMHG | HEIGHT: 64 IN

## 2025-05-27 DIAGNOSIS — G89.29 OTHER CHRONIC PAIN: ICD-10-CM

## 2025-05-27 DIAGNOSIS — I10 ESSENTIAL HYPERTENSION: ICD-10-CM

## 2025-05-27 DIAGNOSIS — F11.90 CHRONIC, CONTINUOUS USE OF OPIOIDS: ICD-10-CM
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
## 2025-05-27 DIAGNOSIS — M54.50 ACUTE BILATERAL LOW BACK PAIN WITHOUT SCIATICA: Primary | ICD-10-CM

## 2025-05-27 PROCEDURE — 99283 EMERGENCY DEPT VISIT LOW MDM: CPT

## 2025-05-27 PROCEDURE — 6370000000 HC RX 637 (ALT 250 FOR IP): Performed by: PHYSICIAN ASSISTANT

## 2025-05-27 RX ORDER — OXYCODONE HYDROCHLORIDE 5 MG/1
10 TABLET ORAL
Refills: 0 | Status: COMPLETED | OUTPATIENT
Start: 2025-05-27 | End: 2025-05-27

## 2025-05-27 RX ADMIN — OXYCODONE 10 MG: 5 TABLET ORAL at 20:52

## 2025-05-27 ASSESSMENT — PAIN DESCRIPTION - FREQUENCY: FREQUENCY: INTERMITTENT

## 2025-05-27 ASSESSMENT — VISUAL ACUITY: OU: 1

## 2025-05-27 ASSESSMENT — PAIN - FUNCTIONAL ASSESSMENT
PAIN_FUNCTIONAL_ASSESSMENT: 0-10
PAIN_FUNCTIONAL_ASSESSMENT: PREVENTS OR INTERFERES SOME ACTIVE ACTIVITIES AND ADLS

## 2025-05-27 ASSESSMENT — PAIN DESCRIPTION - DESCRIPTORS: DESCRIPTORS: ACHING

## 2025-05-27 ASSESSMENT — PAIN SCALES - GENERAL: PAINLEVEL_OUTOF10: 10

## 2025-05-27 ASSESSMENT — PAIN DESCRIPTION - ONSET: ONSET: ON-GOING

## 2025-05-27 ASSESSMENT — PAIN DESCRIPTION - ORIENTATION: ORIENTATION: LOWER

## 2025-05-27 ASSESSMENT — PAIN DESCRIPTION - PAIN TYPE: TYPE: ACUTE PAIN

## 2025-05-27 ASSESSMENT — PAIN DESCRIPTION - LOCATION: LOCATION: BACK
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
## 2025-05-27 NOTE — DISCHARGE INSTRUCTIONS
Thank You!    It was a pleasure taking care of you in our Emergency Department today. We know that when you come to our Emergency Department, you are entrusting us with your health, comfort, and safety. Our physicians and nurses honor that trust, and truly appreciate the opportunity to care for you and your loved ones.      We also value your feedback. If you receive a survey about your Emergency Department experience today, please fill it out.  We care about our patients' feedback, and we listen to what you have to say.  Thank you.    Zoltan Galvan PA-C      ________________________________________________________________________  I have included a copy of your lab results and/or radiologic studies from today's visit so you can have them easily available at your follow-up visit. We hope you feel better and please do not hesitate to contact the ED if you have any questions at all!    No results found for this or any previous visit (from the past 12 hours).    No orders to display     [unfilled]  The exam and treatment you received in the Emergency Department were for an urgent problem and are not intended as complete care. It is important that you follow up with a doctor, nurse practitioner, or physician assistant for ongoing care. If your symptoms become worse or you do not improve as expected and you are unable to reach your usual health care provider, you should return to the Emergency Department. We are available 24 hours a day.    Please take your discharge instructions with you when you go to your follow-up appointment.     If a prescription has been provided, please have it filled as soon as possible to prevent a delay in treatment. Read the entire medication instruction sheet provided to you by the pharmacy. If you have any questions or reservations about taking the medication due to side effects or interactions with other medications, please call your primary care physician or contact the ER to speak with the

## 2025-05-29 ENCOUNTER — TELEMEDICINE (OUTPATIENT)
Facility: CLINIC | Age: 60
End: 2025-05-29

## 2025-05-29 DIAGNOSIS — G60.8 IDIOPATHIC SMALL AND LARGE FIBER SENSORY NEUROPATHY: ICD-10-CM

## 2025-05-29 DIAGNOSIS — E11.21 TYPE 2 DIABETES MELLITUS WITH NEPHROPATHY (HCC): Primary | ICD-10-CM

## 2025-05-29 DIAGNOSIS — N18.32 CHRONIC KIDNEY DISEASE, STAGE 3B (HCC): ICD-10-CM

## 2025-05-29 DIAGNOSIS — Z12.11 SCREENING FOR COLON CANCER: ICD-10-CM

## 2025-05-29 DIAGNOSIS — E78.2 MIXED HYPERLIPIDEMIA: ICD-10-CM

## 2025-05-29 DIAGNOSIS — I10 PRIMARY HYPERTENSION: ICD-10-CM

## 2025-05-29 RX ORDER — CARVEDILOL 12.5 MG/1
12.5 TABLET ORAL 2 TIMES DAILY
Qty: 200 TABLET | Refills: 1 | Status: SHIPPED | OUTPATIENT
Start: 2025-05-29

## 2025-05-29 RX ORDER — TIRZEPATIDE 15 MG/.5ML
15 INJECTION, SOLUTION SUBCUTANEOUS WEEKLY
Qty: 2 ML | Refills: 3 | Status: SHIPPED | OUTPATIENT
Start: 2025-05-29

## 2025-05-29 RX ORDER — ATORVASTATIN CALCIUM 40 MG/1
40 TABLET, FILM COATED ORAL DAILY
Qty: 200 TABLET | Refills: 1 | Status: SHIPPED | OUTPATIENT
Start: 2025-05-29

## 2025-05-29 ASSESSMENT — PATIENT HEALTH QUESTIONNAIRE - PHQ9
SUM OF ALL RESPONSES TO PHQ QUESTIONS 1-9: 0
1. LITTLE INTEREST OR PLEASURE IN DOING THINGS: NOT AT ALL
SUM OF ALL RESPONSES TO PHQ QUESTIONS 1-9: 0
SUM OF ALL RESPONSES TO PHQ QUESTIONS 1-9: 0
2. FEELING DOWN, DEPRESSED OR HOPELESS: NOT AT ALL
SUM OF ALL RESPONSES TO PHQ QUESTIONS 1-9: 0

## 2025-05-29 NOTE — PROGRESS NOTES
Have you been to the ER, urgent care clinic since your last visit?  Hospitalized since your last visit?   NO    Have you seen or consulted any other health care providers outside our system since your last visit?   NO     “Have you had a pap smear?”    NO    Date of last Cervical Cancer screen (HPV or PAP): 9/3/2014       “Have you had a colorectal cancer screening such as a colonoscopy/FIT/Cologuard?    NO    No colonoscopy on file  No cologuard on file  Date of last FIT: 8/4/2023   No flexible sigmoidoscopy on file     “Have you had a diabetic eye exam?”    NO     Date of last diabetic eye exam: 9/1/2020              Chief Complaint   Patient presents with    Medication Refill     Vitamins Refill         
Coagulation defect    Drug-induced gout, right knee    Mixed hyperlipidemia    Myocardiopathy (HCC)    Hypertensive heart disease with chronic systolic congestive heart failure (HCC)      History of Present Illness  The patient is a 59-year-old female who presents via virtual visit for evaluation of diabetes mellitus, glaucoma, and health maintenance.    She reports that her current regimen of Mounjaro, administered once weekly, is effectively managing her diabetes without causing any gastrointestinal discomfort. She has not experienced any episodes of hypoglycemia or syncope. Blood glucose levels have been consistently within the range of 90 to 130. She has been on Mounjaro for approximately one year. Insulin is used as needed, typically administering 12 units once or twice weekly, but the frequency increases to twice daily when receiving steroid injections.    She has been diagnosed with glaucoma and is under the care of Dr. jarvis. She plans to schedule a follow-up appointment for an eye examination and potential prescription of new eyeglasses.    She has experienced significant weight loss, with her current weight being 216 pounds, down from 250 pounds in January 2024. She has expressed interest in undergoing a colonoscopy and breast examination. She has previously consulted an ophthalmologist and intends to schedule a follow-up appointment for further evaluation of her glaucoma. She has discontinued the use of methocarbamol and metolazone, which she previously took twice weekly to prevent leg swelling.    She reports no significant recent episodes of gout.    SOCIAL HISTORY  She does not smoke, drink alcohol, or use drugs.    Social History     Socioeconomic History    Marital status:      Spouse name: Not on file    Number of children: Not on file    Years of education: Not on file    Highest education level: Not on file   Occupational History    Not on file   Tobacco Use    Smoking status: Never

## 2025-05-31 ENCOUNTER — HOSPITAL ENCOUNTER (EMERGENCY)
Facility: HOSPITAL | Age: 60
Discharge: HOME | End: 2025-05-31
Payer: MEDICARE

## 2025-05-31 ENCOUNTER — APPOINTMENT (OUTPATIENT)
Facility: HOSPITAL | Age: 60
End: 2025-05-31
Payer: MEDICARE

## 2025-05-31 VITALS
TEMPERATURE: 98.2 F | HEART RATE: 92 BPM | DIASTOLIC BLOOD PRESSURE: 69 MMHG | RESPIRATION RATE: 17 BRPM | OXYGEN SATURATION: 100 % | SYSTOLIC BLOOD PRESSURE: 130 MMHG
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
## 2025-05-31 DIAGNOSIS — M17.12 TRICOMPARTMENT OSTEOARTHRITIS OF LEFT KNEE: ICD-10-CM

## 2025-05-31 DIAGNOSIS — M25.562 LEFT KNEE PAIN, UNSPECIFIED CHRONICITY: Primary | ICD-10-CM

## 2025-05-31 PROCEDURE — 73564 X-RAY EXAM KNEE 4 OR MORE: CPT

## 2025-05-31 PROCEDURE — 99283 EMERGENCY DEPT VISIT LOW MDM: CPT

## 2025-05-31 ASSESSMENT — PAIN SCALES - GENERAL: PAINLEVEL_OUTOF10: 10
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
## 2025-05-31 NOTE — DISCHARGE INSTRUCTIONS
Thank You!    It was a pleasure taking care of you in our Emergency Department today. We know that when you come to [facility], you are entrusting us with your health, comfort, and safety. Our clinicians honor that trust, and truly appreciate the opportunity to care for you and your loved ones.    If you receive a survey about your Emergency Department experience today, please fill it out.  We value your feedback. Thank you.      Nalini Bower PA-C    ___________________________________  I have included a copy of your lab results and/or radiologic studies from today's visit so you can have them easily available at your follow-up visit.   No results found for this or any previous visit (from the past 12 hours).    XR KNEE LEFT (MIN 4 VIEWS)   Final Result      Tricompartmental degenerative change.      No acute fracture or dislocation..      Electronically signed by AYANA ERNANDEZ        [unfilled]
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
## 2025-06-04 ENCOUNTER — APPOINTMENT (OUTPATIENT)
Facility: HOSPITAL | Age: 60
End: 2025-06-04

## 2025-06-04 ENCOUNTER — HOSPITAL ENCOUNTER (EMERGENCY)
Facility: HOSPITAL | Age: 60
Discharge: HOME | End: 2025-06-04

## 2025-06-04 ENCOUNTER — TELEPHONE (OUTPATIENT)
Facility: CLINIC | Age: 60
End: 2025-06-04

## 2025-06-04 VITALS
SYSTOLIC BLOOD PRESSURE: 136 MMHG | DIASTOLIC BLOOD PRESSURE: 75 MMHG | OXYGEN SATURATION: 95 % | WEIGHT: 221.78 LBS | TEMPERATURE: 98.2 F | HEART RATE: 101 BPM | RESPIRATION RATE: 18 BRPM | HEIGHT: 64 IN | BODY MASS INDEX: 37.86 KG/M2

## 2025-06-04 DIAGNOSIS — M25.562 CHRONIC PAIN OF BOTH KNEES: ICD-10-CM
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
## 2025-06-04 DIAGNOSIS — M25.561 CHRONIC PAIN OF BOTH KNEES: ICD-10-CM

## 2025-06-04 DIAGNOSIS — G89.29 CHRONIC PAIN OF BOTH KNEES: ICD-10-CM

## 2025-06-04 PROCEDURE — 73560 X-RAY EXAM OF KNEE 1 OR 2: CPT

## 2025-06-04 PROCEDURE — 99283 EMERGENCY DEPT VISIT LOW MDM: CPT

## 2025-06-04 ASSESSMENT — PAIN DESCRIPTION - ORIENTATION: ORIENTATION: RIGHT;LEFT

## 2025-06-04 ASSESSMENT — PAIN DESCRIPTION - PAIN TYPE: TYPE: ACUTE PAIN

## 2025-06-04 ASSESSMENT — PAIN DESCRIPTION - LOCATION: LOCATION: KNEE

## 2025-06-04 ASSESSMENT — PAIN DESCRIPTION - FREQUENCY: FREQUENCY: INTERMITTENT

## 2025-06-04 ASSESSMENT — PAIN DESCRIPTION - ONSET: ONSET: ON-GOING

## 2025-06-04 ASSESSMENT — PAIN SCALES - GENERAL: PAINLEVEL_OUTOF10: 9

## 2025-06-04 ASSESSMENT — PAIN DESCRIPTION - DESCRIPTORS: DESCRIPTORS: ACHING

## 2025-06-04 NOTE — ED PROVIDER NOTES
[facility] EMERGENCY DEPARTMENT  EMERGENCY DEPARTMENT ENCOUNTER         Pt Name: Laurita Galvan  MRN: [id number]  Birthdate 1965  Date of evaluation: 6/4/2025  Provider: Janie Lopez PA-C   PCP: Lucio Whaley MD  Note Started: 5:05 PM EDT 6/4/25     CHIEF COMPLAINT       Chief Complaint   Patient presents with    Fall     This is pt 6th visit so far this month. Pt states she had a GLF yesterday. Pt states she landed on both knees. Pt denies head injury or LOC.         HISTORY OF PRESENT ILLNESS: 1 or more elements      History From: Patient  HPI Limitations: None  Arrival Mode:     Laurita aGlvan is a 59 y.o. female who presents with concerns for bilateral knee pain since having a ground-level fall yesterday.  Patient states that she was walking through her hospital to go to labor and delivery to see her granddaughter who just had a baby.  Patient states that there was something on the floor that she slipped on and fell onto both knees.  She endorses pain in bilateral knees, worse in the left.  Patient is ambulatory with a steady gait.  She has full range of motion bilateral knees with no difficulty.  There is some tenderness to palpation of the left knee with no obvious effusion.  Knee is normothermic.  No obvious deformity.  Patient is weightbearing as normal.  Has a past medical history of osteoarthritis in both knees.  On multiple occasions patient asked me how to file for compensation from the hospital.  Patient denies hitting her head or loss of consciousness.  She has no other pain from the fall.     Nursing Notes were all reviewed and agreed with or any disagreements were addressed in the HPI.  Please see MDM for additional details of HPI and ROS     REVIEW OF SYSTEMS      Review of Systems   Musculoskeletal:  Positive for arthralgias and myalgias.   All other systems reviewed and are negative.       Positives and Pertinent negatives as per HPI.    PAST HISTORY     Past Medical History:  Past  were reviewed with the patient. All medications were reviewed with the patient; will d/c home with Ace wrap. All of pt's questions and concerns were addressed. Patient was instructed and agrees to follow up with orthopedics, as well as to return to the ED upon further deterioration. Patient is ready to go home.      PATIENT REFERRED TO:  Philipp  [address] [facility]  [address]  [place] [address]  [phone]           DISCHARGE MEDICATIONS:     Medication List        ASK your doctor about these medications      acetaminophen 325 MG tablet  Commonly known as: Tylenol  Take 2 tablets by mouth every 4 hours as needed for Pain     allopurinol 100 MG tablet  Commonly known as: ZYLOPRIM  Take 1 tablet by mouth daily     aspirin 81 MG EC tablet     atorvastatin 40 MG tablet  Commonly known as: LIPITOR  Take 1 tablet by mouth daily     blood glucose test strips  Test 2 times a day & as needed for symptoms of irregular blood glucose. Dispense sufficient amount for indicated testing frequency plus additional to accommodate PRN testing needs.     carvedilol 12.5 MG tablet  Commonly known as: COREG  Take 1 tablet by mouth 2 times daily     cyanocobalamin 500 MCG tablet  Take 1 tablet by mouth daily     diclofenac sodium 1 % Gel  Commonly known as: VOLTAREN  Apply 4 g topically 4 times daily Left knee     gabapentin 300 MG capsule  Commonly known as: NEURONTIN  Take 1 capsule by mouth 2 times daily as needed (Pain) for up to 65 days. Max Daily Amount: 600 mg     isosorbide dinitrate 20 MG tablet  Commonly known as: ISORDIL  Take 1 tablet by mouth 2 times daily     magnesium gluconate 500 MG tablet  Commonly known as: MAGONATE     metOLazone 2.5 MG tablet  Commonly known as: ZAROXOLYN  Take 1 tablet by mouth 2 times daily as needed (swelling) As needed for swelling     Mounjaro 15 MG/0.5ML Soaj pen  Generic drug: Tirzepatide  Inject 15 mg into the skin once a week     NovoLOG FlexPen ReliOn 100 UNIT/ML

## 2025-06-06 RX ORDER — PEN NEEDLE, DIABETIC 32GX 5/32"
NEEDLE, DISPOSABLE MISCELLANEOUS
Qty: 100 EACH | Refills: 5 | Status: SHIPPED | OUTPATIENT
Start: 2025-06-06

## 2025-06-06 NOTE — TELEPHONE ENCOUNTER
PCP: Lucio Whaley MD    LAST APPT:5/29/2025    Future Appointments   Date Time Provider Department Center   8/1/2025 10:30 AM Lucio Whaley MD [facility] E[facility] DEP       Requested Prescriptions     Pending Prescriptions Disp Refills    BD PEN NEEDLE ANAHY 2ND GEN 32G X 4 MM MISC [Pharmacy Med Name: BD ANAHY PEN NDL [id number] MIS] 100 each 0     Sig: CHECK BLOOD SUGAR 3 TIMES A DAY AS NEEDED

## 2025-06-07 LAB
ALBUMIN SERPL-MCNC: 3.7 G/DL (ref 3.8–4.9)
ALP SERPL-CCNC: 196 IU/L (ref 44–121)
ALT SERPL-CCNC: 12 IU/L (ref 0–32)
AST SERPL-CCNC: 16 IU/L (ref 0–40)
BILIRUB SERPL-MCNC: 0.2 MG/DL (ref 0–1.2)
BUN SERPL-MCNC: 28 MG/DL (ref 6–24)
BUN/CREAT SERPL: 12 (ref 9–23)
CALCIUM SERPL-MCNC: 9 MG/DL (ref 8.7–10.2)
CHLORIDE SERPL-SCNC: 104 MMOL/L (ref 96–106)
CHOLEST SERPL-MCNC: 133 MG/DL (ref 100–199)
CO2 SERPL-SCNC: 23 MMOL/L (ref 20–29)
CREAT SERPL-MCNC: 2.41 MG/DL (ref 0.57–1)
EGFRCR SERPLBLD CKD-EPI 2021: 23 ML/MIN/1.73
GLOBULIN SER CALC-MCNC: 2 G/DL (ref 1.5–4.5)
GLUCOSE SERPL-MCNC: 155 MG/DL (ref 70–99)
HBA1C MFR BLD: 6.2 % (ref 4.8–5.6)
HDLC SERPL-MCNC: 53 MG/DL
IMP & REVIEW OF LAB RESULTS: NORMAL
LDLC SERPL CALC-MCNC: 61 MG/DL (ref 0–99)
Lab: NORMAL
POTASSIUM SERPL-SCNC: 4 MMOL/L (ref 3.5–5.2)
PROT SERPL-MCNC: 5.7 G/DL (ref 6–8.5)
REPORT: NORMAL
REPORT: NORMAL
SODIUM SERPL-SCNC: 139 MMOL/L (ref 134–144)
SPECIMEN STATUS REPORT: NORMAL
TRIGL SERPL-MCNC: 100 MG/DL (ref 0–149)
VLDLC SERPL CALC-MCNC: 19 MG/DL (ref 5–40)

## 2025-06-09 ENCOUNTER — TELEPHONE (OUTPATIENT)
Facility: CLINIC | Age: 60
End: 2025-06-09

## 2025-06-09 DIAGNOSIS — E11.21 TYPE 2 DIABETES MELLITUS WITH NEPHROPATHY (HCC): ICD-10-CM

## 2025-06-09 RX ORDER — INSULIN ASPART 100 [IU]/ML
12 INJECTION, SOLUTION INTRAVENOUS; SUBCUTANEOUS EVERY EVENING
Qty: 5 ADJUSTABLE DOSE PRE-FILLED PEN SYRINGE | Refills: 3 | Status: SHIPPED | OUTPATIENT
Start: 2025-06-09

## 2025-06-15 ENCOUNTER — HOSPITAL ENCOUNTER (EMERGENCY)
Facility: HOSPITAL | Age: 60
Discharge: HOME | End: 2025-06-15
Payer: MEDICAID

## 2025-06-15 VITALS
SYSTOLIC BLOOD PRESSURE: 121 MMHG | RESPIRATION RATE: 12 BRPM | HEART RATE: 93 BPM | TEMPERATURE: 98.5 F | HEIGHT: 64 IN | BODY MASS INDEX: 38.28 KG/M2 | DIASTOLIC BLOOD PRESSURE: 65 MMHG | OXYGEN SATURATION: 100 % | WEIGHT: 224.21 LBS

## 2025-06-15 DIAGNOSIS — M25.562 CHRONIC PAIN OF BOTH KNEES: Primary | ICD-10-CM

## 2025-06-15 DIAGNOSIS — Z71.1 FEARED CONDITION NOT DEMONSTRATED: ICD-10-CM

## 2025-06-15 DIAGNOSIS — M25.561 CHRONIC PAIN OF BOTH KNEES: Primary | ICD-10-CM

## 2025-06-15 DIAGNOSIS — G89.29 CHRONIC PAIN OF BOTH KNEES: Primary | ICD-10-CM

## 2025-06-15 PROCEDURE — 99282 EMERGENCY DEPT VISIT SF MDM: CPT

## 2025-06-15 ASSESSMENT — PAIN DESCRIPTION - PAIN TYPE: TYPE: ACUTE PAIN

## 2025-06-15 ASSESSMENT — PAIN DESCRIPTION - DESCRIPTORS: DESCRIPTORS: ACHING;SORE

## 2025-06-15 ASSESSMENT — PAIN DESCRIPTION - FREQUENCY: FREQUENCY: CONTINUOUS

## 2025-06-15 ASSESSMENT — PAIN SCALES - GENERAL: PAINLEVEL_OUTOF10: 8

## 2025-06-15 ASSESSMENT — PAIN - FUNCTIONAL ASSESSMENT
PAIN_FUNCTIONAL_ASSESSMENT: 0-10
PAIN_FUNCTIONAL_ASSESSMENT: ACTIVITIES ARE NOT PREVENTED

## 2025-06-15 ASSESSMENT — PAIN DESCRIPTION - LOCATION: LOCATION: KNEE

## 2025-06-15 ASSESSMENT — PAIN DESCRIPTION - ORIENTATION: ORIENTATION: LEFT

## 2025-06-15 NOTE — ED PROVIDER NOTES
the skin every evening     oxyCODONE-acetaminophen 5-325 MG per tablet  Commonly known as: PERCOCET     Relistor 150 MG Tabs  Generic drug: Methylnaltrexone Bromide     torsemide 20 MG tablet  Commonly known as: DEMADEX     vitamin D 25 MCG (1000 UT) Tabs tablet  Commonly known as: CHOLECALCIFEROL  Take 1 tablet by mouth daily                DISCONTINUED MEDICATIONS:  Discharge Medication List as of 6/15/2025 11:25 AM        I have seen and evaluated the patient autonomously. My supervision physician was on site and available for consultation if needed.     I am the Primary Clinician of Record. MISAEL Torre NP (electronically signed)    (Please note that parts of this dictation were completed with voice recognition software. Quite often unanticipated grammatical, syntax, homophones, and other interpretive errors are inadvertently transcribed by the computer software. Please disregards these errors. Please excuse any errors that have escaped final proofreading.)       Padmini Medina APRN - NP  06/15/25 [id number]

## 2025-06-15 NOTE — ED NOTES
Ace wrap applied to affected knee. Pt education provided. Pt expressed/demonstrated understanding. Discharge paperwork reviewed and provided to pt. Time was given to ask any questions or concerns which there were none att.

## 2025-06-15 NOTE — DISCHARGE INSTRUCTIONS
Continue taking your pain medications as prescribed by your pain management provider.                              Thank you for choosing our Emergency Department for your care.  It is our privilege to care for you in your time of need.  In the next several days, you may receive a survey via email or mailed to your home about your experience with our team.  We would greatly appreciate you taking a few minutes to complete the survey, as we use this information to learn what we have done well and what we could be doing better. Thank you for trusting us with your care!    Below you will find a list of your tests from today's visit.   Labs and Radiology Studies  No results found for this or any previous visit (from the past 12 hours).  No results found.  ------------------------------------------------------------------------------------------------------------  The evaluation and treatment you received in the Emergency Department were for an urgent problem. It is important that you follow-up with a doctor, nurse practitioner, or physician assistant to:  (1) confirm your diagnosis,  (2) re-evaluation of changes in your illness and treatment, and (3) for ongoing care. Please take your discharge instructions with you when you go to your follow-up appointment.     If you have any problem arranging a follow-up appointment, contact us!  If your symptoms become worse or you do not improve as expected, please return to us. We are available 24 hours a day.     If a prescription has been provided, please fill it as soon as possible to prevent a delay in treatment. If you have any questions or reservations about taking the medication due to side effects or interactions with other medications, please call your primary care provider or contact us directly.  Again, THANK YOU for choosing us to care for YOU!

## 2025-07-22 DIAGNOSIS — M54.16 LUMBAR BACK PAIN WITH RADICULOPATHY AFFECTING RIGHT LOWER EXTREMITY: Primary | ICD-10-CM

## 2025-07-22 RX ORDER — LIDOCAINE 50 MG/G
1 PATCH TOPICAL DAILY
Qty: 30 PATCH | Refills: 5 | Status: SHIPPED | OUTPATIENT
Start: 2025-07-22 | End: 2025-07-23

## 2025-07-23 ENCOUNTER — TELEPHONE (OUTPATIENT)
Facility: CLINIC | Age: 60
End: 2025-07-23

## 2025-07-23 RX ORDER — LIDOCAINE 50 MG/G
OINTMENT TOPICAL
Qty: 1 EACH | Refills: 2 | Status: SHIPPED | OUTPATIENT
Start: 2025-07-23

## 2025-07-30 ENCOUNTER — PATIENT MESSAGE (OUTPATIENT)
Age: 60
End: 2025-07-30

## 2025-07-31 DIAGNOSIS — M54.16 LUMBAR BACK PAIN WITH RADICULOPATHY AFFECTING RIGHT LOWER EXTREMITY: ICD-10-CM

## 2025-07-31 RX ORDER — GABAPENTIN 300 MG/1
300 CAPSULE ORAL 2 TIMES DAILY PRN
Qty: 30 CAPSULE | Refills: 0 | Status: SHIPPED | OUTPATIENT
Start: 2025-07-31 | End: 2025-08-01 | Stop reason: SDUPTHER

## 2025-08-01 ENCOUNTER — TELEMEDICINE (OUTPATIENT)
Facility: CLINIC | Age: 60
End: 2025-08-01

## 2025-08-01 DIAGNOSIS — N18.32 CHRONIC KIDNEY DISEASE, STAGE 3B (HCC): ICD-10-CM

## 2025-08-01 DIAGNOSIS — E78.2 MIXED HYPERLIPIDEMIA: ICD-10-CM

## 2025-08-01 DIAGNOSIS — F11.20 NARCOTIC DEPENDENCY, CONTINUOUS (HCC): ICD-10-CM

## 2025-08-01 DIAGNOSIS — E11.22 TYPE 2 DIABETES MELLITUS WITH STAGE 3 CHRONIC KIDNEY DISEASE, WITH LONG-TERM CURRENT USE OF INSULIN, UNSPECIFIED WHETHER STAGE 3A OR 3B CKD (HCC): Primary | ICD-10-CM

## 2025-08-01 DIAGNOSIS — M54.16 LUMBAR BACK PAIN WITH RADICULOPATHY AFFECTING RIGHT LOWER EXTREMITY: ICD-10-CM

## 2025-08-01 DIAGNOSIS — I11.0 HYPERTENSIVE HEART DISEASE WITH CHRONIC SYSTOLIC CONGESTIVE HEART FAILURE (HCC): ICD-10-CM

## 2025-08-01 DIAGNOSIS — Z79.4 TYPE 2 DIABETES MELLITUS WITH STAGE 3 CHRONIC KIDNEY DISEASE, WITH LONG-TERM CURRENT USE OF INSULIN, UNSPECIFIED WHETHER STAGE 3A OR 3B CKD (HCC): Primary | ICD-10-CM

## 2025-08-01 DIAGNOSIS — M10.261: ICD-10-CM

## 2025-08-01 DIAGNOSIS — Z86.79 HISTORY OF CONGESTIVE HEART FAILURE: ICD-10-CM

## 2025-08-01 DIAGNOSIS — G60.8 IDIOPATHIC SMALL AND LARGE FIBER SENSORY NEUROPATHY: ICD-10-CM

## 2025-08-01 DIAGNOSIS — I10 PRIMARY HYPERTENSION: ICD-10-CM

## 2025-08-01 DIAGNOSIS — I50.22 HYPERTENSIVE HEART DISEASE WITH CHRONIC SYSTOLIC CONGESTIVE HEART FAILURE (HCC): ICD-10-CM

## 2025-08-01 DIAGNOSIS — N18.30 TYPE 2 DIABETES MELLITUS WITH STAGE 3 CHRONIC KIDNEY DISEASE, WITH LONG-TERM CURRENT USE OF INSULIN, UNSPECIFIED WHETHER STAGE 3A OR 3B CKD (HCC): Primary | ICD-10-CM

## 2025-08-01 DIAGNOSIS — E11.21 TYPE 2 DIABETES MELLITUS WITH NEPHROPATHY (HCC): ICD-10-CM

## 2025-08-01 RX ORDER — GABAPENTIN 300 MG/1
300 CAPSULE ORAL 2 TIMES DAILY PRN
Qty: 200 CAPSULE | Refills: 0 | Status: SHIPPED | OUTPATIENT
Start: 2025-08-01 | End: 2025-11-09

## 2025-08-01 RX ORDER — METOLAZONE 2.5 MG/1
2.5 TABLET ORAL 2 TIMES DAILY PRN
Qty: 60 TABLET | Refills: 2 | Status: SHIPPED | OUTPATIENT
Start: 2025-08-01

## 2025-08-01 RX ORDER — AMMONIUM LACTATE 12 G/100G
LOTION TOPICAL
Qty: 1 EACH | Refills: 5 | Status: SHIPPED | OUTPATIENT
Start: 2025-08-01

## 2025-08-01 RX ORDER — TIRZEPATIDE 15 MG/.5ML
15 INJECTION, SOLUTION SUBCUTANEOUS WEEKLY
Qty: 2 ML | Refills: 3 | Status: SHIPPED | OUTPATIENT
Start: 2025-08-01

## 2025-08-01 RX ORDER — ISOSORBIDE DINITRATE 20 MG/1
20 TABLET ORAL 2 TIMES DAILY
Qty: 200 TABLET | Refills: 1 | Status: SHIPPED | OUTPATIENT
Start: 2025-08-01

## 2025-08-01 RX ORDER — ATORVASTATIN CALCIUM 40 MG/1
40 TABLET, FILM COATED ORAL DAILY
Qty: 200 TABLET | Refills: 2 | Status: SHIPPED | OUTPATIENT
Start: 2025-08-01

## 2025-08-01 RX ORDER — CARVEDILOL 12.5 MG/1
12.5 TABLET ORAL 2 TIMES DAILY
Qty: 200 TABLET | Refills: 2 | Status: SHIPPED | OUTPATIENT
Start: 2025-08-01

## 2025-08-01 RX ORDER — ALLOPURINOL 100 MG/1
100 TABLET ORAL DAILY
Qty: 100 TABLET | Refills: 2 | Status: SHIPPED | OUTPATIENT
Start: 2025-08-01

## 2025-08-01 NOTE — PROGRESS NOTES
well-controlled, typically around 135 or 140, and have not exceeded 300 in a long time. She has lost weight with the use of Mounjaro, now weighing 219 pounds.     She reports minimal leg swelling overall and uses metolazone occasionally when her legs swell up. She is scheduled to receive another injection in her finger on 08/28/2025 due to difficulty in opening it in the mornings. She is requesting a refill of lidocaine cream for her feet.    She is currently on allopurinol for gout prevention, atorvastatin for cholesterol management, carvedilol for blood pressure control, vitamin B12 and B6 supplements, gabapentin which she reports as effective, and oxycodone for pain management as needed.    She is scheduled to see an ophthalmologist in September 2025. She is awaiting clearance from her cardiologist before undergoing a colonoscopy.    Social History     Socioeconomic History    Marital status:      Spouse name: Not on file    Number of children: Not on file    Years of education: Not on file    Highest education level: Not on file   Occupational History    Not on file   Tobacco Use    Smoking status: Never    Smokeless tobacco: Never   Vaping Use    Vaping status: Never Used   Substance and Sexual Activity    Alcohol use: Never    Drug use: No    Sexual activity: Not Currently     Partners: Male   Other Topics Concern    Not on file   Social History Narrative    Lives with daughter and or son.  Not working.  Applying for [employer]. Approved.     Social Drivers of Health     Financial Resource Strain: Medium Risk (6/2/2025)    Overall Financial Resource Strain (CARDIA)     Difficulty of Paying Living Expenses: Somewhat hard   Food Insecurity: No Food Insecurity (6/2/2025)    Hunger Vital Sign     Worried About Running Out of Food in the Last Year: Never true     Ran Out of Food in the Last Year: Never true   Transportation Needs: No Transportation Needs (6/2/2025)    PRAPARE - Transportation     Lack of